# Patient Record
Sex: FEMALE | Race: BLACK OR AFRICAN AMERICAN | Employment: OTHER | URBAN - METROPOLITAN AREA
[De-identification: names, ages, dates, MRNs, and addresses within clinical notes are randomized per-mention and may not be internally consistent; named-entity substitution may affect disease eponyms.]

---

## 2017-01-12 ENCOUNTER — TELEPHONE (OUTPATIENT)
Dept: FAMILY MEDICINE CLINIC | Age: 57
End: 2017-01-12

## 2017-01-12 DIAGNOSIS — R26.2 AMBULATORY DYSFUNCTION: ICD-10-CM

## 2017-01-12 DIAGNOSIS — I69.959 HEMIPLEGIA OF DOMINANT SIDE AS LATE EFFECT FOLLOWING CEREBROVASCULAR DISEASE (HCC): Primary | ICD-10-CM

## 2017-01-13 ENCOUNTER — OFFICE VISIT (OUTPATIENT)
Dept: ORTHOPEDIC SURGERY | Age: 57
End: 2017-01-13

## 2017-01-13 VITALS
WEIGHT: 168 LBS | SYSTOLIC BLOOD PRESSURE: 119 MMHG | HEIGHT: 59 IN | HEART RATE: 72 BPM | TEMPERATURE: 97.2 F | BODY MASS INDEX: 33.87 KG/M2 | DIASTOLIC BLOOD PRESSURE: 64 MMHG

## 2017-01-13 DIAGNOSIS — M17.12 PRIMARY OSTEOARTHRITIS OF LEFT KNEE: Primary | ICD-10-CM

## 2017-01-13 RX ORDER — HYDROCODONE BITARTRATE AND ACETAMINOPHEN 10; 325 MG/1; MG/1
1 TABLET ORAL
Qty: 60 TAB | Refills: 0 | Status: SHIPPED | OUTPATIENT
Start: 2017-01-13 | End: 2017-02-24 | Stop reason: SDUPTHER

## 2017-01-13 RX ORDER — BETAMETHASONE SODIUM PHOSPHATE AND BETAMETHASONE ACETATE 3; 3 MG/ML; MG/ML
6 INJECTION, SUSPENSION INTRA-ARTICULAR; INTRALESIONAL; INTRAMUSCULAR; SOFT TISSUE ONCE
Qty: 1 ML | Refills: 0
Start: 2017-01-13 | End: 2017-01-13

## 2017-01-13 NOTE — PATIENT INSTRUCTIONS
Knee: Exercises  Your Care Instructions  Here are some examples of exercises for your knee. Start each exercise slowly. Ease off the exercise if you start to have pain. Your doctor or physical therapist will tell you when you can start these exercises and which ones will work best for you. How to do the exercises  Quad sets    1. Sit with your leg straight and supported on the floor or a firm bed. (If you feel discomfort in the front or back of your knee, place a small towel roll under your knee.)  2. Tighten the muscles on top of your thigh by pressing the back of your knee flat down to the floor. (If you feel discomfort under your kneecap, place a small towel roll under your knee.)  3. Hold for about 6 seconds, then rest for up to 10 seconds. 4. Do 8 to 12 repetitions several times a day. Straight-leg raises to the front    1. Lie on your back with your good knee bent so that your foot rests flat on the floor. Your injured leg should be straight. Make sure that your low back has a normal curve. You should be able to slip your flat hand in between the floor and the small of your back, with your palm touching the floor and your back touching the back of your hand. 2. Tighten the thigh muscles in the injured leg by pressing the back of your knee flat down to the floor. Hold your knee straight. 3. Keeping the thigh muscles tight, lift your injured leg up so that your heel is about 12 inches off the floor. Hold for about 6 seconds and then lower slowly. 4. Do 8 to 12 repetitions, 3 times a day. Straight-leg raises to the outside    1. Lie on your side, with your injured leg on top. 2. Tighten the front thigh muscles of your injured leg to keep your knee straight. 3. Keep your hip and your leg straight in line with the rest of your body, and keep your knee pointing forward. Do not drop your hip back. 4. Lift your injured leg straight up toward the ceiling, about 12 inches off the floor.  Hold for about 6 seconds, then slowly lower your leg. 5. Do 8 to 12 repetitions. Straight-leg raises to the back    1. Lie on your stomach, and lift your leg straight up behind you (toward the ceiling). 2. Lift your toes about 6 inches off the floor, hold for about 6 seconds, then lower slowly. 3. Do 8 to 12 repetitions. Straight-leg raises to the inside    1. Lie on the side of your body with the injured leg. 2. You can either prop your other (good) leg up on a chair, or you can bend your good knee and put that foot in front of your injured knee. Do not drop your hip back. 3. Tighten the muscles on the front of your thigh to straighten your injured knee. 4. Keep your kneecap pointing forward, and lift your whole leg up toward the ceiling about 6 inches. Hold for about 6 seconds, then lower slowly. 5. Do 8 to 12 repetitions. Heel dig bridging    1. Lie on your back with both knees bent and your ankles bent so that only your heels are digging into the floor. Your knees should be bent about 90 degrees. 2. Then push your heels into the floor, squeeze your buttocks, and lift your hips off the floor until your shoulders, hips, and knees are all in a straight line. 3. Hold for about 6 seconds as you continue to breathe normally, and then slowly lower your hips back down to the floor and rest for up to 10 seconds. 4. Do 8 to 12 repetitions. Hamstring curls    1. Lie on your stomach with your knees straight. If your kneecap is uncomfortable, roll up a washcloth and put it under your leg just above your kneecap. 2. Lift the foot of your injured leg by bending the knee so that you bring the foot up toward your buttock. If this motion hurts, try it without bending your knee quite as far. This may help you avoid any painful motion. 3. Slowly lower your leg back to the floor. 4. Do 8 to 12 repetitions.   5. With permission from your doctor or physical therapist, you may also want to add a cuff weight to your ankle (not more than 5 pounds). With weight, you do not have to lift your leg more than 12 inches to get a hamstring workout. Shallow standing knee bends    Note: Do this exercise only if you have very little pain; if you have no clicking, locking, or giving way if you have an injured knee; and if it does not hurt while you are doing 8 to 12 repetitions. 1. Stand with your hands lightly resting on a counter or chair in front of you. Put your feet shoulder-width apart. 2. Slowly bend your knees so that you squat down like you are going to sit in a chair. Make sure your knees do not go in front of your toes. 3. Lower yourself about 6 inches. Your heels should remain on the floor at all times. 4. Rise slowly to a standing position. Heel raises    1. Stand with your feet a few inches apart, with your hands lightly resting on a counter or chair in front of you. 2. Slowly raise your heels off the floor while keeping your knees straight. 3. Hold for about 6 seconds, then slowly lower your heels to the floor. 4. Do 8 to 12 repetitions several times during the day. Follow-up care is a key part of your treatment and safety. Be sure to make and go to all appointments, and call your doctor if you are having problems. It's also a good idea to know your test results and keep a list of the medicines you take. Where can you learn more? Go to http://pramod-bryan.info/. Enter G744 in the search box to learn more about \"Knee: Exercises. \"  Current as of: May 23, 2016  Content Version: 11.1  © 0512-5686 [employer]. Care instructions adapted under license by [employer] (which disclaims liability or warranty for this information). If you have questions about a medical condition or this instruction, always ask your healthcare professional. [address] any warranty or liability for your use of this information.        Joint Injections: Care Instructions  Your Care Instructions  Joint injections are shots into a joint, such as the knee. They may be used to put in medicines, such as pain relievers. Or they can be used to take out fluid. Sometimes the fluid is tested in a lab. This can help find the cause of a joint problem. A corticosteroid, or steroid, shot is used to reduce inflammation in tendons or joints. It is often used to treat problems such as arthritis, tendinitis, and bursitis. Steroids can be injected directly into a painful, inflamed joint. They can also help reduce inflammation of a bursa. A bursa is a sac of fluid. It cushions and lubricates areas where tendons, ligaments, skin, muscles, or bones rub against each other. A steroid shot can sometimes help with short-term pain relief when other treatments haven't worked. If steroid shots help, pain may improve for weeks or months. Follow-up care is a key part of your treatment and safety. Be sure to make and go to all appointments, and call your doctor if you are having problems. It's also a good idea to know your test results and keep a list of the medicines you take. How can you care for yourself at home? · Put ice or a cold pack on the area for 10 to 20 minutes at a time. Put a thin cloth between the ice and your skin. · Take anti-inflammatory medicines to reduce pain, swelling, or inflammation. These include ibuprofen (Advil, Motrin) and naproxen (Aleve). Read and follow all instructions on the label. · Avoid strenuous activities for several days, especially those that put stress on the area where you got the shot. · If you have dressings over the area, keep them clean and dry. You may remove them when your doctor tells you to. When should you call for help? Call your doctor now or seek immediate medical care if:  · You have signs of infection, such as:  ¨ Increased pain, swelling, warmth, or redness. ¨ Red streaks leading from the site. ¨ Pus draining from the site. ¨ A fever.   Watch closely for changes in your health, and be sure to contact your doctor if you have any problems. Where can you learn more? Go to http://pramod-bryan.info/. Enter N616 in the search box to learn more about \"Joint Injections: Care Instructions. \"  Current as of: May 23, 2016  Content Version: 11.1  © 9911-6761 [employer]. Care instructions adapted under license by [employer] (which disclaims liability or warranty for this information). If you have questions about a medical condition or this instruction, always ask your healthcare professional. [address] any warranty or liability for your use of this information.

## 2017-01-23 DIAGNOSIS — I10 ESSENTIAL HYPERTENSION: ICD-10-CM

## 2017-01-23 DIAGNOSIS — M89.9 DISORDER OF BONE AND CARTILAGE: ICD-10-CM

## 2017-01-23 DIAGNOSIS — M94.9 DISORDER OF BONE AND CARTILAGE: ICD-10-CM

## 2017-01-23 DIAGNOSIS — M54.16 LUMBAR NEURITIS: ICD-10-CM

## 2017-01-23 DIAGNOSIS — R73.09 ELEVATED HEMOGLOBIN A1C: ICD-10-CM

## 2017-01-23 DIAGNOSIS — E78.49 OTHER HYPERLIPIDEMIA: ICD-10-CM

## 2017-01-23 DIAGNOSIS — I50.22 CHRONIC SYSTOLIC HEART FAILURE (HCC): ICD-10-CM

## 2017-01-23 DIAGNOSIS — F51.01 PRIMARY INSOMNIA: ICD-10-CM

## 2017-01-24 RX ORDER — PREGABALIN 150 MG/1
150 CAPSULE ORAL 2 TIMES DAILY
Qty: 60 CAP | Refills: 0 | Status: SHIPPED | OUTPATIENT
Start: 2017-01-24 | End: 2017-03-01 | Stop reason: SDUPTHER

## 2017-01-25 ENCOUNTER — HOSPITAL ENCOUNTER (OUTPATIENT)
Dept: LAB | Age: 57
Discharge: HOME | End: 2017-01-25

## 2017-01-25 PROCEDURE — 99001 SPECIMEN HANDLING PT-LAB: CPT | Performed by: INTERNAL MEDICINE

## 2017-01-26 ENCOUNTER — TELEPHONE (OUTPATIENT)
Dept: FAMILY MEDICINE CLINIC | Age: 57
End: 2017-01-26

## 2017-01-26 DIAGNOSIS — I69.959 HEMIPLEGIA OF DOMINANT SIDE AS LATE EFFECT FOLLOWING CEREBROVASCULAR DISEASE (HCC): ICD-10-CM

## 2017-01-30 ENCOUNTER — TELEPHONE (OUTPATIENT)
Dept: FAMILY MEDICINE CLINIC | Age: 57
End: 2017-01-30

## 2017-01-30 DIAGNOSIS — E55.9 VITAMIN D DEFICIENCY: Primary | ICD-10-CM

## 2017-01-30 RX ORDER — ERGOCALCIFEROL 1.25 MG/1
50000 CAPSULE ORAL
Qty: 12 CAP | Refills: 3 | Status: SHIPPED | OUTPATIENT
Start: 2017-01-30 | End: 2020-06-23 | Stop reason: ALTCHOICE

## 2017-01-30 NOTE — TELEPHONE ENCOUNTER
Omer Reynoso,   Please let the patient know that her Vitamin D is low and I will reorder this. Also, I may have to restart metformin because her HgbA1C is still elevated at 6.6. Thanks.     CHELO

## 2017-01-31 ENCOUNTER — TELEPHONE (OUTPATIENT)
Dept: FAMILY MEDICINE CLINIC | Age: 57
End: 2017-01-31

## 2017-01-31 NOTE — TELEPHONE ENCOUNTER
Pt said she needs a report explaining why she needs the shower bars,to take with,to [facility]--they will not fill without

## 2017-02-02 NOTE — TELEPHONE ENCOUNTER
Contacted Pt regarding previous note. Informed Pt that document would be printed and left at  for . Pt verbalized understanding.

## 2017-02-03 ENCOUNTER — OFFICE VISIT (OUTPATIENT)
Dept: ORTHOPEDIC SURGERY | Age: 57
End: 2017-02-03

## 2017-02-03 VITALS
HEART RATE: 59 BPM | HEIGHT: 59 IN | BODY MASS INDEX: 33.67 KG/M2 | DIASTOLIC BLOOD PRESSURE: 74 MMHG | SYSTOLIC BLOOD PRESSURE: 146 MMHG | WEIGHT: 167 LBS

## 2017-02-03 DIAGNOSIS — M47.817 SPONDYLOSIS WITHOUT MYELOPATHY OR RADICULOPATHY, LUMBOSACRAL REGION: ICD-10-CM

## 2017-02-03 DIAGNOSIS — M54.12 RADICULOPATHY, CERVICAL: ICD-10-CM

## 2017-02-03 DIAGNOSIS — M50.30 OTHER CERVICAL DISC DEGENERATION, UNSPECIFIED CERVICAL REGION: ICD-10-CM

## 2017-02-03 DIAGNOSIS — M47.812 SPONDYLOSIS WITHOUT MYELOPATHY OR RADICULOPATHY, CERVICAL REGION: Primary | ICD-10-CM

## 2017-02-03 NOTE — PROGRESS NOTES
[facility]  [address], [place]   Phone: [phone]  Fax: [phone]        PROGRESS NOTE      HISTORY OF PRESENT ILLNESS:  The patient is a 55 y.o. female and was seen today for follow up of low back pain localized primarily to the right side of the lumbar spine. Previously, she had c/o low back pain localized primarily to the right side of the lumbar spine without significant radicular pain complaints. She also had c/o left knee pain which she is followed by Dr. Parham. Per patient, she did have knee surgery in 2009 and 2011 and was given a brace and they are monitoring her on this. She reports no increase in pain since discontinuing NEURONTIN. She reports significant relief following bilateral L4 and L5 and left sided L5 and S1 facet blocks on 3/17/16. She states walking exacerbates her pain and bending over alleviates her pain. Note from Caron Smith dated 7/29/16 indicating patient was seen with f/u bilateral hip and knee pain. She has hx of bilateral hip replacements and left knee replacement. According to the note, she has trochanteric bursitis. Note from AMAURY Smith dated 9/9/16 indicating patient was seen with c/o bilateral hip and knee pain. He administered bilateral hip injections at that time. Note from Caron Smith dated 10/21/2016 reveals pt was seen for left knee pain. She was given an injection in her knee which she reports did not give her any relief. Patient has additional complaints of neck pain ongoing for 1 year which radiates into the left upper extremity to the forearm. The patient reports a fall on 10/11/16 from LOB and reported to the ER. She reports multiple falls due to LOB ongoing x1 year and states it is progressive in nature. She has also been dropping objects. She states her neck pain extends down her LUE to her hand with numbness in digits 1-2. She reports 2 falls since her last visit.  She endorses loss of dexterity and states she has been dropping things with her left hand. She admits to staggering with walking. It was noted patient has been prescribed Lyrica and continues to take Tegretol. She completed the MDP without significant relief. She continues on Cymbalta 60 mg daily. She completes her HEP 2x/day. Noted, patient has previously had bilateral L4/5 facet blocks and left-sided L5/S1 facet blocks with good relief performed 03/04/16. She previously reported significant relief with left-sided L4-L5 and L5-S1 facet blocks. She denies hx of DM. Of note, patient has a defibrillator. CT myelogram dated 9/29/14 per report revealed no spinal stenosis or diagnostic intrathecal abnormality. There was minimal degenerative disc disease. There was mild left-sided lumbar neuroforaminal narrowing from facet hypertrophy. There was lower lumbar facet hypertrophy and arthropathy, left greater than right. At L4-L5, there was bilateral moderate to severe facet hypertrophy. At the L5-S1 level, there was severe left-sided facet hypertrophy. Cervical spine CT dated 10/4/16 reviewed. Per report, no fracture or subluxation. Degenerative changes at C4-5 with posterior osteophyte which may be making contact with the cord. Disc bulge at C6-7 may also be making contact with the cord. Cervical spine CT myelogram dated 11/2/2016 per report, reveals multilevel mild degenerative changes as discussed most pronounced disc disease at C4/5 and C5/6. No high-grade central canal or foraminal stenosis. Cervical spine myelogram dated 11/2/2016 per report, reveals multilevel mild broad based on the disc space extradural defects at C2-3, C3-4, C4-5, and C5-6. C6/7 and C7/T1 is not adequately visualized on the crosstable lateral view due to high position of the shoulders. At her last clinical appointment, I was unable to explain her balance and coordination issues or her LUE symptoms from a cervical spine standpoint.  I was seeking a second opinion on her CT myelogram films from Dr. Ev Will. I referred her to Dr. Sofia Castillo for LUE EMG and further evaluation of her loss of balance and coordination. The patient returns today with LUE pain from the shoulder to the elbow. She denies symptoms extending to the hand at this time. Pain is exacerbated with reaching behind and overhead activity. She rates pain 7/10, a slight decrease from her last visit (8/10). She continues to have LOB with coordination issues and falls. Note from Etelvina Barry 1/13/17 indicating patient was seen significant fixed flexion deformity which of improved with left knee surgery. Per patient, she received a left knee injection at that appointment. Upon examination, she was unable to extend digits 3, 4 and 5 from previous nerve injury. A LUE EMG dated 12/23/16 was suggestive of possible C5 radiculopathy. Note from Afua Forde LPN dated 92/34/16 indicating Dr. Ev Will reviewed the CT myelogram and stated he didn't see anything.  reviewed. Past Medical History   Diagnosis Date    Arm pain jan15    Arrhythmia 2012      [employer] ICD     Arthritis      ALL OVER    CAD (coronary artery disease) 2011     STENTS PLACED X2    Chronic pain      KNEE & LOWER BACK    Diabetes (HCC)     GERD (gastroesophageal reflux disease)     H/O gastric bypass     Heart attack ([address] 2011    Heart failure ([address]      ischemic cardiomyopathy    Hypertension     Spinal cord injury         Social History     Social History    Marital status: SINGLE     Spouse name: N/A    Number of children: N/A    Years of education: N/A     Occupational History    Not on file.      Social History Main Topics    Smoking status: Former Smoker     Quit date: 5/20/2013    Smokeless tobacco: Never Used    Alcohol use No    Drug use: No    Sexual activity: No      Comment: Hysterectomy     Other Topics Concern    Not on file     Social History Narrative       Current Outpatient Prescriptions   Medication Sig Dispense Refill    ergocalciferol (ERGOCALCIFEROL) 50,000 unit capsule Take 1 Cap by mouth every seven (7) days. 12 Cap 3    pregabalin (LYRICA) 150 mg capsule Take 1 Cap by mouth two (2) times a day. Max Daily Amount: 300 mg. 60 Cap 0    HYDROcodone-acetaminophen (NORCO)  mg tablet Take 1 Tab by mouth every eight (8) hours as needed for Pain. Max Daily Amount: 3 Tabs. 60 Tab 0    celecoxib (CELEBREX) 200 mg capsule TAKE 1 CAPSULE BY MOUTH TWICE DAILY FOR 90 DAYS 180 Cap 2    zolpidem (AMBIEN) 10 mg tablet Take 1 Tab by mouth nightly as needed for Sleep. Max Daily Amount: 10 mg. 30 Tab 0    carBAMazepine (TEGRETOL) 200 mg tablet SEE NOTES 360 Tab 2    methocarbamol (ROBAXIN) 500 mg tablet Take 1 Tab by mouth four (4) times daily as needed. Indications: MUSCLE SPASM 120 Tab 2    levocetirizine (XYZAL) 5 mg tablet Take 1 Tab by mouth daily. 30 Tab 1    lisinopril (PRINIVIL, ZESTRIL) 5 mg tablet Take  by mouth daily.  rosuvastatin (CRESTOR) 40 mg tablet Take 1 Tab by mouth daily. 30 Tab 2    furosemide (LASIX) 40 mg tablet TAKE 1 TABLET BY MOUTH TWICE DAILY AS NEEDED 180 Tab 0    GENERLAC 10 gram/15 mL solution       DULoxetine (CYMBALTA) 60 mg capsule Take 1 Cap by mouth daily. 30 Cap 5    isosorbide mononitrate ER (IMDUR) 30 mg tablet 15 mg.  carvedilol (COREG) 12.5 mg tablet 6.25 mg.      cyanocobalamin (VITAMIN B-12) 500 mcg tablet Take 500 mcg by mouth daily.  omeprazole (PRILOSEC) 20 mg capsule Take 1 capsule by mouth daily. 30 capsule 3    polyethylene glycol (MIRALAX) 17 gram/dose powder Take 17 g by mouth daily. 255 g 1    clopidogrel (PLAVIX) 75 mg tablet Take 1 tablet by mouth daily. 30 tablet 3    therapeutic multivitamin (THERAGRAN) tablet Take 1 tablet by mouth daily.  calcium citrate-vitamin d3 (CITRACAL+D) 315-200 mg-unit tab Take 1 tablet by mouth two (2) times daily (with meals).  Cholecalciferol, Vitamin D3, (VITAMIN D3) 1,000 unit cap Take  by mouth.       miscellaneous medical supply misc 2 Each by Does Not Apply route daily. 2 Each 1    miscellaneous medical supply misc 2 Each by Does Not Apply route daily. 2 Each 0    miscellaneous medical supply misc 1 Each by Does Not Apply route daily. 1 Each 1    miscellaneous medical supply misc 1 Each by Does Not Apply route daily. 1 Each 1    HYDROcodone-acetaminophen (NORCO) 5-325 mg per tablet Take 1 Tab by mouth every eight (8) hours as needed for Pain. Max Daily Amount: 3 Tabs. 60 Tab 0       Allergies   Allergen Reactions    Aspirin Hives        Ambulates with a single point cane. PHYSICAL EXAMINATION    Visit Vitals    /74    Pulse (!) 59    Ht 4' 11\" (1.499 m)    Wt 167 lb (75.8 kg)    BMI 33.73 kg/m2       CONSTITUTIONAL: NAD, A&O x 3  SENSATION: Intact to light touch throughout  NEURO: Mechelle's is negative bilaterally. RANGE OF MOTION: The patient has full passive range of motion in all four extremities. MOTOR:  Straight Leg Raise: Negative, bilateral  MUSCULOSKELETAL: Positive impingement, left shoulder. Unable to extend digits 3, 4 and 5 from previous nerve injury. Shoulder AB/Flex Elbow Flex Wrist Ext Elbow Ext Wrist Flex Hand Intrin Tone   Right +4/5 +4/5 +4/5 +4/5 +4/5 +4/5 +4/5   Left +4/5 +4/5 +4/5 +4/5 +4/5 +4/5 +4/5              Hip Flex Knee Ext Knee Flex Ankle DF GTE Ankle PF Tone   Right +4/5 +4/5 +4/5 +4/5 +4/5 +4/5 +4/5   Left +4/5 +4/5 +4/5 +4/5 +4/5 +4/5 +4/5       ASSESSMENT   Temi was seen today for follow-up.     Diagnoses and all orders for this visit:    Spondylosis without myelopathy or radiculopathy, cervical region  -     REFERRAL TO NEUROLOGY  -     REFERRAL TO ORTHOPEDICS    Radiculopathy, cervical  -     REFERRAL TO NEUROLOGY  -     REFERRAL TO ORTHOPEDICS    Other cervical disc degeneration, unspecified cervical region  -     REFERRAL TO NEUROLOGY  -     REFERRAL TO ORTHOPEDICS    Spondylosis without myelopathy or radiculopathy, lumbosacral region  - REFERRAL TO NEUROLOGY  -     REFERRAL TO ORTHOPEDICS          IMPRESSION AND PLAN:  She has continued RUE pain. I cannot explain her neck and upper extremity symptoms based on spinal pathology and her diagnostic testing. Her examination is more consistent with shoulder pathology with a positive impingement on the left and she will be referred to Dr. Etelvina Jacob for further evaluation. I will again refer her to neurology for evaluation of patient's balance and coordination issues. I will see the patient back following neurology evaluation. Written by Jay Olmos, as dictated by Toi Nunez MD  I examined the patient, reviewed and agree with the note.

## 2017-02-03 NOTE — MR AVS SNAPSHOT
Visit Information Date & Time Provider Department Dept. Phone Encounter #  
 2/3/2017 11:10 AM Manuel Beavers  [address], [address] [facility] [place] [phone] [id number] Follow-up Instructions Return for following neurology consult. Your Appointments 2/24/2017 10:40 AM  
Follow Up with Em Reyna PA-C  
[facility] [place] ([address]) Appt Note: lt knee 6wk fu  
 [address], [address] 6[address] [facility]  
[phone] [address], [address]  
  
    
 3/1/2017 10:00 AM  
Follow Up with Kimi Luis DO [address] [place] [address] (--) Appt Note: 3 month fu  
 [address] [facility] [address] [phone]  
  
   
 [facility] [address]  
  
    
 3/20/2017  2:45 PM  
Follow Up with Gloria Bermudez MD  
[facility] [address]) Appt Note: 3mon f/u; Labs; rs'd from 02/14/2017 provider out of office Marbin Mustafa [address] [address] [phone]  
  
   
 [place] [id number] Upcoming Health Maintenance Date Due  
 EYE EXAM RETINAL OR DILATED Q1 8/5/1971 Pneumococcal 19-64 Medium Risk (1 of 1 - PPSV23) 8/5/1980 DTaP/Tdap/Td series (1 - Tdap) 8/5/1982 FOBT Q 1 YEAR AGE 50-75 8/5/2011 FOOT EXAM Q1 3/3/2015 INFLUENZA AGE 9 TO ADULT 8/1/2016 GLAUCOMA SCREENING Q2Y 9/29/2016 BREAST CANCER SCRN MAMMOGRAM 4/15/2017 HEMOGLOBIN A1C Q6M 7/25/2017 MICROALBUMIN Q1 10/17/2017 LIPID PANEL Q1 1/25/2018 Allergies as of 2/3/2017  Review Complete On: 2/3/2017 By: Manuel Beavers MD  
  
 Severity Noted Reaction Type Reactions Aspirin  08/02/2012   Topical Hives Current Immunizations  Reviewed on 12/13/2013 Name Date Influenza Vaccine 9/29/2015 Influenza Vaccine PF 9/24/2014, 12/13/2013 Not reviewed this visit You Were Diagnosed With   
  
 Codes Comments Spondylosis without myelopathy or radiculopathy, cervical region    -  Primary ICD-10-CM: M47.812 ICD-9-CM: 721.0 Radiculopathy, cervical     ICD-10-CM: M54.12 
ICD-9-CM: 723.4 Other cervical disc degeneration, unspecified cervical region     ICD-10-CM: M50.30 ICD-9-CM: 722.4 Spondylosis without myelopathy or radiculopathy, lumbosacral region     ICD-10-CM: M47.817 ICD-9-CM: 721.3 Vitals BP Pulse Height(growth percentile) Weight(growth percentile) BMI OB Status 146/74 (!) 59 4' 11\" (1.499 m) 167 lb (75.8 kg) 33.73 kg/m2 Hysterectomy Smoking Status Former Smoker Vitals History BMI and BSA Data Body Mass Index Body Surface Area  
 33.73 kg/m 2 1.78 m 2 [facility]erred Pharmacy [facility] [address] [phone] Your Updated Medication List  
  
   
This list is accurate as of: 2/3/17 12:19 PM.  Always use your most recent med list.  
  
  
  
  
 calcium citrate-vitamin d3 315-200 mg-unit Tab Commonly known as:  CITRACAL+D Take 1 tablet by mouth two (2) times daily (with meals). carBAMazepine 200 mg tablet Commonly known as:  TEGretol SEE NOTES  
  
 carvedilol 12.5 mg tablet Commonly known as:  COREG  
6.25 mg.  
  
 celecoxib 200 mg capsule Commonly known as:  CELEBREX  
TAKE 1 CAPSULE BY MOUTH TWICE DAILY FOR 90 DAYS  
  
 clopidogrel 75 mg Tab Commonly known as:  PLAVIX Take 1 tablet by mouth daily. DULoxetine 60 mg capsule Commonly known as:  CYMBALTA Take 1 Cap by mouth daily. ergocalciferol 50,000 unit capsule Commonly known as:  ERGOCALCIFEROL Take 1 Cap by mouth every seven (7) days. furosemide 40 mg tablet Commonly known as:  LASIX TAKE 1 TABLET BY MOUTH TWICE DAILY AS NEEDED  
  
 GENERLAC 10 gram/15 mL solution Generic drug:  lactulose * HYDROcodone-acetaminophen 5-325 mg per tablet Commonly known as:  Sherie Pereira Take 1 Tab by mouth every eight (8) hours as needed for Pain. Max Daily Amount: 3 Tabs. * HYDROcodone-acetaminophen  mg tablet Commonly known as:  Sherie Pereira Take 1 Tab by mouth every eight (8) hours as needed for Pain. Max Daily Amount: 3 Tabs. isosorbide mononitrate ER 30 mg tablet Commonly known as:  IMDUR  
15 mg.  
  
 levocetirizine 5 mg tablet Commonly known as:  Ladonna Easton Take 1 Tab by mouth daily. lisinopril 5 mg tablet Commonly known as:  Bhaskar Isabel Take  by mouth daily. methocarbamol 500 mg tablet Commonly known as:  ROBAXIN Take 1 Tab by mouth four (4) times daily as needed. Indications: MUSCLE SPASM * miscellaneous medical supply Misc  
1 Each by Does Not Apply route daily. * miscellaneous medical supply Misc  
1 Each by Does Not Apply route daily. * miscellaneous medical supply Misc  
2 Each by Does Not Apply route daily. * miscellaneous medical supply Misc  
2 Each by Does Not Apply route daily. omeprazole 20 mg capsule Commonly known as:  PRILOSEC Take 1 capsule by mouth daily. polyethylene glycol 17 gram/dose powder Commonly known as:  Nataly Kehr Take 17 g by mouth daily. pregabalin 150 mg capsule Commonly known as:  Catalina Ruff Take 1 Cap by mouth two (2) times a day. Max Daily Amount: 300 mg.  
  
 rosuvastatin 40 mg tablet Commonly known as:  CRESTOR Take 1 Tab by mouth daily. therapeutic multivitamin tablet Commonly known as:  [facility] Take 1 tablet by mouth daily. VITAMIN B-12 500 mcg tablet Generic drug:  cyanocobalamin Take 500 mcg by mouth daily. VITAMIN D3 1,000 unit Cap Generic drug:  cholecalciferol Take  by mouth.  
  
 zolpidem 10 mg tablet Commonly known as:  AMBIEN  
 Take 1 Tab by mouth nightly as needed for Sleep. Max Daily Amount: 10 mg.  
  
 * Notice: This list has 6 medication(s) that are the same as other medications prescribed for you. Read the directions carefully, and ask your doctor or other care provider to review them with you. We Performed the Following REFERRAL TO NEUROLOGY [BKJ99 Custom] Comments:  
 Dr. Joy lr for LOB and falls REFERRAL TO ORTHOPEDICS [ZNA536 Custom] Comments:  
 Ivy Left shoulder Impingement Follow-up Instructions Return for following neurology consult. Referral Information Referral ID Referred By Referred To  
  
 [id number] JANELL HENSLEY III Not Available Visits Status Start Date End Date 1 New Request 2/3/17 2/3/18 If your referral has a status of pending review or denied, additional information will be sent to support the outcome of this decision. Referral ID Referred By Referred To  
 [id number] JANELL HENSLEY III Not Available Visits Status Start Date End Date 1 New Request 2/3/17 2/3/18 If your referral has a status of pending review or denied, additional information will be sent to support the outcome of this decision. Introducing [facility]! Dear Jeremi Rodgers: 
Thank you for requesting a [employer] account. Our records indicate that you already have an active [employer] account. You can access your account anytime at [email or web address] Did you know that you can access your hospital and ER discharge instructions at any time in [employer]? You can also review all of your test results from your hospital stay or ER visit. Additional Information If you have questions, please visit the Frequently Asked Questions section of the [employer] website at [email or web address] Remember, [employer] is NOT to be used for urgent needs. For medical emergencies, dial 911. Now available from your iPhone and Android! Please provide this summary of care documentation to your next provider. Your primary care clinician is listed as Robb Hung. If you have any questions after today's visit, please call [phone].

## 2017-02-20 ENCOUNTER — OFFICE VISIT (OUTPATIENT)
Dept: ORTHOPEDIC SURGERY | Age: 57
End: 2017-02-20

## 2017-02-20 VITALS
BODY MASS INDEX: 34.07 KG/M2 | HEART RATE: 72 BPM | WEIGHT: 169 LBS | DIASTOLIC BLOOD PRESSURE: 85 MMHG | SYSTOLIC BLOOD PRESSURE: 151 MMHG | HEIGHT: 59 IN

## 2017-02-20 DIAGNOSIS — M25.512 CHRONIC LEFT SHOULDER PAIN: ICD-10-CM

## 2017-02-20 DIAGNOSIS — G89.29 CHRONIC LEFT SHOULDER PAIN: ICD-10-CM

## 2017-02-20 DIAGNOSIS — M25.512 LEFT SHOULDER PAIN, UNSPECIFIED CHRONICITY: Primary | ICD-10-CM

## 2017-02-20 NOTE — PROGRESS NOTES
Patient: Kyler Ansari                MRN: [id number]       SSN: [id number]  YOB: 1960        AGE: 55 y.o. SEX: female  There is no height or weight on file to calculate BMI. PCP: Janet Geronimo DO  02/20/17      No chief complaint on file. HISTORY OF PRESENT ILLNESS: Kyler Ansari is a 55 y.o. female who presents to the office for 2 week hx of left shoulder pain. She denies any numbness but has pain when raising her arm over her head. She has hadprevious neck problems and was seen at the spine center. Review of Systems   Constitutional: Negative. HENT: Negative. Eyes: Negative. Respiratory: Negative. Cardiovascular: Negative. Gastrointestinal: Negative. Genitourinary: Negative. Musculoskeletal: Positive for joint pain (left shoulder). Skin: Negative. Neurological: Negative. Endo/Heme/Allergies: Negative. Psychiatric/Behavioral: Negative. Social History     Social History    Marital status: SINGLE     Spouse name: N/A    Number of children: N/A    Years of education: N/A     Occupational History    Not on file.      Social History Main Topics    Smoking status: Former Smoker     Quit date: 5/20/2013    Smokeless tobacco: Never Used    Alcohol use No    Drug use: No    Sexual activity: No      Comment: Hysterectomy     Other Topics Concern    Not on file     Social History Narrative        Past Medical History   Diagnosis Date    Arm pain jan15    Arrhythmia 2012      Medtronic ICD     Arthritis      ALL OVER    CAD (coronary artery disease) 2011     STENTS PLACED X2    Chronic pain      KNEE & LOWER BACK    Diabetes ([address]     GERD (gastroesophageal reflux disease)     H/O gastric bypass     Heart attack ([address] 2011    Heart failure ([address]      ischemic cardiomyopathy    Hypertension     Spinal cord injury         Allergies   Allergen Reactions    Aspirin Hives         Current Outpatient Prescriptions Medication Sig    ergocalciferol (ERGOCALCIFEROL) 50,000 unit capsule Take 1 Cap by mouth every seven (7) days.  miscellaneous medical supply [facility] 2 Each by Does Not Apply route daily.  pregabalin (LYRICA) 150 mg capsule Take 1 Cap by mouth two (2) times a day. Max Daily Amount: 300 mg.    HYDROcodone-acetaminophen (NORCO)  mg tablet Take 1 Tab by mouth every eight (8) hours as needed for Pain. Max Daily Amount: 3 Tabs.  miscellaneous medical supply [facility] 2 Each by Does Not Apply route daily.  miscellaneous medical supply [facility] 1 Each by Does Not Apply route daily.  miscellaneous medical supply [facility] 1 Each by Does Not Apply route daily.  celecoxib (CELEBREX) 200 mg capsule TAKE 1 CAPSULE BY MOUTH TWICE DAILY FOR 90 DAYS    zolpidem (AMBIEN) 10 mg tablet Take 1 Tab by mouth nightly as needed for Sleep. Max Daily Amount: 10 mg.    carBAMazepine (TEGRETOL) 200 mg tablet SEE NOTES    methocarbamol (ROBAXIN) 500 mg tablet Take 1 Tab by mouth four (4) times daily as needed. Indications: MUSCLE SPASM    HYDROcodone-acetaminophen (NORCO) 5-325 mg per tablet Take 1 Tab by mouth every eight (8) hours as needed for Pain. Max Daily Amount: 3 Tabs.  levocetirizine (XYZAL) 5 mg tablet Take 1 Tab by mouth daily.  lisinopril (PRINIVIL, ZESTRIL) 5 mg tablet Take  by mouth daily.  rosuvastatin (CRESTOR) 40 mg tablet Take 1 Tab by mouth daily.  furosemide (LASIX) 40 mg tablet TAKE 1 TABLET BY MOUTH TWICE DAILY AS NEEDED    GENERLAC 10 gram/15 mL solution     DULoxetine (CYMBALTA) 60 mg capsule Take 1 Cap by mouth daily.  isosorbide mononitrate ER (IMDUR) 30 mg tablet 15 mg.  carvedilol (COREG) 12.5 mg tablet 6.25 mg.    cyanocobalamin (VITAMIN B-12) 500 mcg tablet Take 500 mcg by mouth daily.  omeprazole (PRILOSEC) 20 mg capsule Take 1 capsule by mouth daily.  polyethylene glycol (MIRALAX) 17 gram/dose powder Take 17 g by mouth daily.     clopidogrel (PLAVIX) 75 mg tablet Take 1 tablet by mouth daily.  therapeutic multivitamin (THERAGRAN) tablet Take 1 tablet by mouth daily.  calcium citrate-vitamin d3 (CITRACAL+D) 315-200 mg-unit tab Take 1 tablet by mouth two (2) times daily (with meals).  Cholecalciferol, Vitamin D3, (VITAMIN D3) 1,000 unit cap Take  by mouth. No current facility-administered medications for this visit. Physical Exam  Constitutional: she is oriented to person, place, and time and well-developed, well-nourished, and in no distress. No distress. HENT:   Head: Normocephalic and atraumatic. Right Ear: Hearing normal.   Left Ear: Hearing normal.   Nose: Nose normal.   Eyes: Conjunctivae, EOM and lids are normal. Pupils are equal, round, and reactive to light. Neck: Trachea normal.   Pulmonary/Chest: Effort normal and breath sounds normal. No respiratory distress. Abdominal: Soft. Neurological: she is alert and oriented to person, place, and time. Skin: Skin is warm, dry and intact. she is not diaphoretic. Psychiatric: Affect normal.   Nursing note and vitals reviewed. Ortho Exam   Shows pain with flexion to 90 degrees and abduction to 110 degrees. External rotation in normal and pain free. Left shoulder rotation with pain. No swelling of the left shoulder but palpation to the anterior cause pain. RADIOGRAPHS:  3 views of the shoulder were normal.    IMPRESSION & PLAN:  my concern has a rotator cuff tear and have her undergo an MRI.  I will see her back after the results of the arthrogram        Scribed by [facility]) as dictated by Frankie Haines MD.

## 2017-02-24 ENCOUNTER — OFFICE VISIT (OUTPATIENT)
Dept: ORTHOPEDIC SURGERY | Age: 57
End: 2017-02-24

## 2017-02-24 VITALS
SYSTOLIC BLOOD PRESSURE: 147 MMHG | HEIGHT: 59 IN | DIASTOLIC BLOOD PRESSURE: 79 MMHG | BODY MASS INDEX: 32.74 KG/M2 | WEIGHT: 162.4 LBS | HEART RATE: 62 BPM | TEMPERATURE: 97.6 F

## 2017-02-24 DIAGNOSIS — M17.12 PRIMARY OSTEOARTHRITIS OF LEFT KNEE: ICD-10-CM

## 2017-02-24 DIAGNOSIS — Z96.652 STATUS POST LEFT KNEE REPLACEMENT: Primary | ICD-10-CM

## 2017-02-24 RX ORDER — BETAMETHASONE SODIUM PHOSPHATE AND BETAMETHASONE ACETATE 3; 3 MG/ML; MG/ML
6 INJECTION, SUSPENSION INTRA-ARTICULAR; INTRALESIONAL; INTRAMUSCULAR; SOFT TISSUE ONCE
Qty: 1 ML | Refills: 0
Start: 2017-02-24 | End: 2017-02-24

## 2017-02-24 RX ORDER — HYDROCODONE BITARTRATE AND ACETAMINOPHEN 10; 325 MG/1; MG/1
1 TABLET ORAL
Qty: 60 TAB | Refills: 0 | Status: SHIPPED | OUTPATIENT
Start: 2017-02-24 | End: 2017-05-26 | Stop reason: SDUPTHER
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
## 2017-03-01 ENCOUNTER — OFFICE VISIT (OUTPATIENT)
Dept: FAMILY MEDICINE CLINIC | Age: 57
End: 2017-03-01

## 2017-03-01 VITALS
BODY MASS INDEX: 33.67 KG/M2 | WEIGHT: 167 LBS | OXYGEN SATURATION: 99 % | TEMPERATURE: 97 F | SYSTOLIC BLOOD PRESSURE: 124 MMHG | DIASTOLIC BLOOD PRESSURE: 77 MMHG | HEIGHT: 59 IN | RESPIRATION RATE: 17 BRPM | HEART RATE: 68 BPM

## 2017-03-01 DIAGNOSIS — M94.9 DISORDER OF BONE AND CARTILAGE: ICD-10-CM

## 2017-03-01 DIAGNOSIS — E78.49 OTHER HYPERLIPIDEMIA: ICD-10-CM

## 2017-03-01 DIAGNOSIS — R73.09 ELEVATED HEMOGLOBIN A1C: ICD-10-CM

## 2017-03-01 DIAGNOSIS — I50.22 CHRONIC SYSTOLIC HEART FAILURE (HCC): ICD-10-CM

## 2017-03-01 DIAGNOSIS — I10 ESSENTIAL HYPERTENSION: ICD-10-CM

## 2017-03-01 DIAGNOSIS — M89.9 DISORDER OF BONE AND CARTILAGE: ICD-10-CM

## 2017-03-01 DIAGNOSIS — M54.16 LUMBAR NEURITIS: ICD-10-CM

## 2017-03-01 DIAGNOSIS — F51.01 PRIMARY INSOMNIA: ICD-10-CM

## 2017-03-01 RX ORDER — ROSUVASTATIN CALCIUM 40 MG/1
40 TABLET, COATED ORAL DAILY
Qty: 90 TAB | Refills: 3 | Status: SHIPPED | OUTPATIENT
Start: 2017-03-01 | End: 2018-03-07 | Stop reason: SDUPTHER

## 2017-03-01 RX ORDER — PREGABALIN 150 MG/1
150 CAPSULE ORAL 2 TIMES DAILY
Qty: 60 CAP | Refills: 0 | Status: SHIPPED | OUTPATIENT
Start: 2017-03-01 | End: 2017-05-02 | Stop reason: SDUPTHER

## 2017-03-01 NOTE — PATIENT INSTRUCTIONS
Please contact our office if you have any questions about your visit today.     1.) Please get labs a week before next visit    2.) Return in 3 months for follow up

## 2017-03-01 NOTE — PROGRESS NOTES
Progress Note    Patient: Norah Laboy MRN: [id number]  SSN: [id number]    YOB: 1960  Age: 55 y.o. Sex: female          Subjective:   Norah Laboy is a 55 y.o. female who is here for regular follow up. The patient has had pain in rotator cuff which her orthopedic surgeon is following. Patient will get CT of left shoulder for rotator cuff. She is planned for left knee repair as well. The patient also would like refills on her Lyrica and also Crestor. Objective:     Visit Vitals    /77 (BP 1 Location: Right arm, BP Patient Position: Sitting)    Pulse 68    Temp 97 °F (36.1 °C) (Oral)    Resp 17    Ht 4' 11\" (1.499 m)    Wt 167 lb (75.8 kg)    SpO2 99%    BMI 33.73 kg/m2       Review of Systems:  Pertinent ROS noted in HPI     Physical Exam:   GENERAL: alert, cooperative, appears stated age  EYE: conjunctivae/corneas clear. PERRL, EOM's intact. Fundi benign  LYMPHATIC: Cervical, supraclavicular, and axillary nodes normal.   THROAT & NECK: normal and no erythema or exudates noted. Poor dentition on exam.   LUNG: clear to auscultation bilaterally  HEART: regular rate and rhythm, S1, S2 normal, no murmur, click, rub or gallop  ABDOMEN: Bowels sounds diminished but observed. EXTREMITIES:  No significant edema  NEUROLOGIC: Right arm flaccid on exam. No change from previous visits.            Lab/Data Review:    Lab Results   Component Value Date/Time    Hemoglobin A1c 6.6 01/25/2017 12:10 PM     Lab Results   Component Value Date/Time    Cholesterol, total 201 01/25/2017 12:10 PM    HDL Cholesterol 62 01/25/2017 12:10 PM    LDL, calculated 114 01/25/2017 12:10 PM    VLDL, calculated 25 01/25/2017 12:10 PM    Triglyceride 127 01/25/2017 12:10 PM    CHOL/HDL Ratio 2.6 01/05/2016 10:48 AM     Lab Results   Component Value Date/Time    Sodium 144 01/25/2017 12:10 PM    Potassium 4.4 01/25/2017 12:10 PM    Chloride 102 01/25/2017 12:10 PM    CO2 23 01/25/2017 12:10 PM    Anion gap 4 11/02/2016 08:23 AM    Glucose 139 01/25/2017 12:10 PM    BUN 12 01/25/2017 12:10 PM    Creatinine 0.89 01/25/2017 12:10 PM    BUN/Creatinine ratio 13 01/25/2017 12:10 PM    GFR est AA 84 01/25/2017 12:10 PM    GFR est non-AA 73 01/25/2017 12:10 PM    Calcium 8.8 01/25/2017 12:10 PM    Bilirubin, total <0.2 01/25/2017 12:10 PM    AST (SGOT) 27 01/25/2017 12:10 PM    Alk. phosphatase 130 01/25/2017 12:10 PM    Protein, total 6.2 01/25/2017 12:10 PM    Albumin 3.8 01/25/2017 12:10 PM    Globulin 3.0 07/26/2016 11:25 AM    A-G Ratio 1.6 01/25/2017 12:10 PM    ALT (SGPT) 19 01/25/2017 12:10 PM       Assessment:     1.) Diabetic Neuropathy: Patient's Lyrica 150 mg twice a day was refilled. 2.) Insomnia: Patient stable on Ambien 10 mg once a day. 3.) Diabetes Mellitus Type 2:  HgbA1C gradually increasing. The patient will continue lifestyle change. Patient will continue on exercise regimen. 4.) Hyperlipidemia: Patient's rosuvastatin reordered. 5.) Preventive: Labs ordered as noted below     I personally reviewed and summarized all labs with the patient. Patient will return in 3 weeks for follow-up.         Plan:     Orders Placed This Encounter    LIPID CASCADE W/REFLEX APO B    HEMOGLOBIN A1C WITH EAG    VITAMIN D, 25 HYDROXY    pregabalin (LYRICA) 150 mg capsule    rosuvastatin (CRESTOR) 40 mg tablet             Signed By: [id number] Raj Geronimo, DO     March 1, 2017

## 2017-03-01 NOTE — MR AVS SNAPSHOT
Visit Information Date & Time Provider Department Dept. Phone Encounter #  
 3/1/2017 10:00 AM Saba Chaudhari [id number] Follow-up Instructions Return in about 3 months (around 6/1/2017) for Regular Follow Up. Your Appointments 3/13/2017  1:00 PM  
DIAG TEST F/U with Danielle Soto MD  
[facility], [address] [facility] ([facility]) Appt Note: LFT SHOULD CT F/U/CT IN CC  
 [address] [facility] [id number]  
[phone]  
  
   
 [address] [facility] [address]  
  
    
 3/20/2017  2:45 PM  
Follow Up with Gaurav Guerra MD  
[facility]) Appt Note: 3mon f/u; Labs; rs'd from 02/14/2017 provider out of office Marbin Mustafa [address] [place], [place] [address] [address]  
[phone]  
  
   
 [place] [id number]  
  
    
 5/26/2017  9:45 AM  
Follow Up with Casie Redmond PA-C  
[facility] ([facility]) Appt Note: 3 mo fu  
 [address], [address] [place] [address]  
[phone] [address] [place] [address], [facility] [address] Upcoming Health Maintenance Date Due  
 EYE EXAM RETINAL OR DILATED Q1 8/5/1971 Pneumococcal 19-64 Medium Risk (1 of 1 - PPSV23) 8/5/1980 DTaP/Tdap/Td series (1 - Tdap) 8/5/1982 FOBT Q 1 YEAR AGE 50-75 8/5/2011 FOOT EXAM Q1 3/3/2015 INFLUENZA AGE 9 TO ADULT 8/1/2016 GLAUCOMA SCREENING Q2Y 9/29/2016 BREAST CANCER SCRN MAMMOGRAM 4/15/2017 HEMOGLOBIN A1C Q6M 7/25/2017 MICROALBUMIN Q1 10/17/2017 LIPID PANEL Q1 1/25/2018 Allergies as of 3/1/2017  Review Complete On: 2/8/4465 By: Chela Garcia. Bill Arredondo LPN Severity Noted Reaction Type Reactions Aspirin  08/02/2012   Topical Hives Current Immunizations  Reviewed on 12/13/2013 Name Date Influenza Vaccine 9/29/2015 Influenza Vaccine PF 9/24/2014, 12/13/2013 Not reviewed this visit You Were Diagnosed With   
  
 Codes Comments Lumbar neuritis     ICD-10-CM: M54.16 
ICD-9-CM: 724.4 Primary insomnia     ICD-10-CM: F51.01 
ICD-9-CM: 307.42 Chronic systolic heart failure (HCC)     ICD-10-CM: I50.22 ICD-9-CM: 428.22 Other hyperlipidemia     ICD-10-CM: E78.4 ICD-9-CM: 272.4 Essential hypertension     ICD-10-CM: I10 
ICD-9-CM: 401.9 Elevated hemoglobin A1c     ICD-10-CM: R73.09 
ICD-9-CM: 790.29 Disorder of bone and cartilage     ICD-10-CM: M89.9, M94.9 ICD-9-CM: 733.90 Vitals BP  
  
  
  
  
  
 124/77 (BP 1 Location: Right arm, BP Patient Position: Sitting) BMI and BSA Data Body Mass Index Body Surface Area  
 33.73 kg/m 2 1.78 m 2 Preferred Pharmacy Pharmacy Name Phone [address] [place] [phone] Your Updated Medication List  
  
   
This list is accurate as of: 3/1/17 10:54 AM.  Always use your most recent med list.  
  
  
  
  
 calcium citrate-vitamin d3 315-200 mg-unit Tab Commonly known as:  CITRACAL+D Take 1 tablet by mouth two (2) times daily (with meals). carBAMazepine 200 mg tablet Commonly known as:  TEGretol SEE NOTES  
  
 carvedilol 12.5 mg tablet Commonly known as:  COREG  
6.25 mg.  
  
 celecoxib 200 mg capsule Commonly known as:  CELEBREX  
TAKE 1 CAPSULE BY MOUTH TWICE DAILY FOR 90 DAYS  
  
 clopidogrel 75 mg Tab Commonly known as:  PLAVIX Take 1 tablet by mouth daily. DULoxetine 60 mg capsule Commonly known as:  CYMBALTA Take 1 Cap by mouth daily. ergocalciferol 50,000 unit capsule Commonly known as:  ERGOCALCIFEROL Take 1 Cap by mouth every seven (7) days. furosemide 40 mg tablet Commonly known as:  LASIX TAKE 1 TABLET BY MOUTH TWICE DAILY AS NEEDED GENERLAC 10 gram/15 mL solution Generic drug:  lactulose * HYDROcodone-acetaminophen 5-325 mg per tablet Commonly known as:  Shiraz Ill Take 1 Tab by mouth every eight (8) hours as needed for Pain. Max Daily Amount: 3 Tabs. * HYDROcodone-acetaminophen  mg tablet Commonly known as:  Shiraz Ill Take 1 Tab by mouth every eight (8) hours as needed for Pain. Max Daily Amount: 3 Tabs. isosorbide mononitrate ER 30 mg tablet Commonly known as:  IMDUR  
15 mg.  
  
 levocetirizine 5 mg tablet Commonly known as:  Homer Lennox Take 1 Tab by mouth daily. lisinopril 5 mg tablet Commonly known as:  Cullen Bridges Take  by mouth daily. methocarbamol 500 mg tablet Commonly known as:  ROBAXIN Take 1 Tab by mouth four (4) times daily as needed. Indications: MUSCLE SPASM * miscellaneous medical supply Misc  
1 Each by Does Not Apply route daily. * miscellaneous medical supply Misc  
1 Each by Does Not Apply route daily. * miscellaneous medical supply Misc  
2 Each by Does Not Apply route daily. * miscellaneous medical supply Misc  
2 Each by Does Not Apply route daily. omeprazole 20 mg capsule Commonly known as:  PRILOSEC Take 1 capsule by mouth daily. polyethylene glycol 17 gram/dose powder Commonly known as:  Melisa  Take 17 g by mouth daily. pregabalin 150 mg capsule Commonly known as:  [place] Take 1 Cap by mouth two (2) times a day. Max Daily Amount: 300 mg.  
  
 rosuvastatin 40 mg tablet Commonly known as:  CRESTOR Take 1 Tab by mouth daily. therapeutic multivitamin tablet Commonly known as:  [facility] Take 1 tablet by mouth daily. VITAMIN B-12 500 mcg tablet Generic drug:  cyanocobalamin Take 500 mcg by mouth daily. VITAMIN D3 1,000 unit Cap Generic drug:  cholecalciferol Take  by mouth.  
  
 zolpidem 10 mg tablet Commonly known as:  AMBIEN Take 1 Tab by mouth nightly as needed for Sleep. Max Daily Amount: 10 mg. * Notice: This list has 6 medication(s) that are the same as other medications prescribed for you. Read the directions carefully, and ask your doctor or other care provider to review them with you. Prescriptions Printed Refills  
 pregabalin (LYRICA) 150 mg capsule 0 Sig: Take 1 Cap by mouth two (2) times a day. Max Daily Amount: 300 mg. Class: Print Route: Oral  
  
Prescriptions Sent to Pharmacy Refills  
 rosuvastatin (CRESTOR) 40 mg tablet 3 Sig: Take 1 Tab by mouth daily. Class: Normal  
 Pharmacy: [facility], [address] [place] [address] #: [phone] Route: Oral  
  
Follow-up Instructions Return in about 3 months (around 6/1/2017) for Regular Follow Up. To-Do List   
 03/01/2017 Lab:  HEMOGLOBIN A1C WITH EAG   
  
 03/01/2017 Lab:  LIPID CASCADE W/REFLEX APO B   
  
 03/01/2017 Lab:  VITAMIN D, 25 HYDROXY   
  
 03/06/2017 10:30 AM  
  Appointment with Karla Keith; [facility] DX RM 1 at [facility] ([phone]) OUTSIDE FILMS  - Any outside films related to the study being scheduled should be brought with you on the day of the exam.  If this cannot be done there may be a delay in the reading of the study. MEDICATIONS  - Patient must bring a complete list of all medications currently taking to include prescriptions, over-the-counter meds, herbals, vitamins & any dietary supplements  CONTRAST EXAMS  - Diabetic patients/ patients with renal deficiency and /or patients age 61 and older will be required to have the Creatinine labs drawn- must be done no more than 30 days prior to exam - If  labs were drawn at MD office, please fax results to [phone]  QUESTIONS  - Notify the Diagnostic Radiology Department if there are questions.   [facility] [phone] [facility] [phone] [facility] [phone]  SAME DAY LABS  - Same day labs: report to Registration 1 hour earlier than the exam time - Written order for labs is required. MD office can fax to [phone] or the patient can hand carry to Registration. 03/06/2017 11:30 AM  
  Appointment with [facility] CT RM 2 at [facility] RAD [address] ([phone]) Please follow instructions given to you for the first part of your study in Diagnostic Radiology. Patient Instructions Please contact our office if you have any questions about your visit today. 1.) Please get labs a week before next visit 2.) Return in 3 months for follow up Introducing [facility] [place] [facility] SERVICES! Dear Nika Sees: 
Thank you for requesting a [employer] account. Our records indicate that you already have an active [employer] account. You can access your account anytime at [email or web address] Did you know that you can access your hospital and ER discharge instructions at any time in [employer]? You can also review all of your test results from your hospital stay or ER visit. Additional Information If you have questions, please visit the Frequently Asked Questions section of the [employer] website at [email or web address] Remember, [employer] is NOT to be used for urgent needs. For medical emergencies, dial 911. Now available from your iPhone and Android! Please provide this summary of care documentation to your next provider. Your primary care clinician is listed as Mariajose Guzman. If you have any questions after today's visit, please call [phone].

## 2017-03-01 NOTE — PROGRESS NOTES
Chief Complaint   Patient presents with    CHF     1. Have you been to the ER, urgent care clinic since your last visit? Hospitalized since your last visit? No    2. Have you seen or consulted any other health care providers outside of the [address] [place] [address] since your last visit? Include any pap smears or colon screening.  No

## 2017-03-06 ENCOUNTER — HOSPITAL ENCOUNTER (OUTPATIENT)
Dept: LAB | Age: 57
Discharge: HOME | End: 2017-03-06
Payer: MEDICARE

## 2017-03-06 ENCOUNTER — HOSPITAL ENCOUNTER (OUTPATIENT)
Dept: LAB | Age: 57
Discharge: HOME | End: 2017-03-06

## 2017-03-06 ENCOUNTER — HOSPITAL ENCOUNTER (OUTPATIENT)
Dept: GENERAL RADIOLOGY | Age: 57
Discharge: HOME | End: 2017-03-06
Attending: ORTHOPAEDIC SURGERY
Payer: MEDICARE

## 2017-03-06 ENCOUNTER — HOSPITAL ENCOUNTER (OUTPATIENT)
Dept: CT IMAGING | Age: 57
Discharge: HOME | End: 2017-03-06
Attending: ORTHOPAEDIC SURGERY
Payer: MEDICARE

## 2017-03-06 DIAGNOSIS — G89.29 CHRONIC LEFT SHOULDER PAIN: ICD-10-CM

## 2017-03-06 DIAGNOSIS — M25.512 CHRONIC LEFT SHOULDER PAIN: ICD-10-CM

## 2017-03-06 LAB
CARBAMAZEPINE SERPL-MCNC: 7.7 UG/ML (ref 4–12)
FOLATE SERPL-MCNC: 8.8 NG/ML (ref 3.1–17.5)
VIT B12 SERPL-MCNC: 415 PG/ML (ref 211–911)

## 2017-03-06 PROCEDURE — 74011636320 HC RX REV CODE- 636/320: Performed by: ORTHOPAEDIC SURGERY

## 2017-03-06 PROCEDURE — 74011000250 HC RX REV CODE- 250: Performed by: ORTHOPAEDIC SURGERY

## 2017-03-06 PROCEDURE — 73201 CT UPPER EXTREMITY W/DYE: CPT

## 2017-03-06 PROCEDURE — 77002 NEEDLE LOCALIZATION BY XRAY: CPT

## 2017-03-06 RX ORDER — SODIUM CHLORIDE 9 MG/ML
20 INJECTION INTRAMUSCULAR; INTRAVENOUS; SUBCUTANEOUS
Status: COMPLETED | OUTPATIENT
Start: 2017-03-06 | End: 2017-03-06

## 2017-03-06 RX ORDER — LIDOCAINE HYDROCHLORIDE 10 MG/ML
30 INJECTION, SOLUTION EPIDURAL; INFILTRATION; INTRACAUDAL; PERINEURAL
Status: COMPLETED | OUTPATIENT
Start: 2017-03-06 | End: 2017-03-06

## 2017-03-06 RX ADMIN — SODIUM CHLORIDE 13 ML: 9 INJECTION, SOLUTION INTRAMUSCULAR; INTRAVENOUS; SUBCUTANEOUS at 11:00

## 2017-03-06 RX ADMIN — IOPAMIDOL 10 ML: 408 INJECTION, SOLUTION INTRATHECAL at 11:00

## 2017-03-06 RX ADMIN — LIDOCAINE HYDROCHLORIDE 10 ML: 10 INJECTION, SOLUTION EPIDURAL; INFILTRATION; INTRACAUDAL; PERINEURAL at 11:00

## 2017-03-07 LAB
ALBUMIN SERPL ELPH-MCNC: 3.7 G/DL (ref 2.9–4.4)
ALBUMIN/GLOB SERPL: 1.3 {RATIO} (ref 0.7–1.7)
ALPHA1 GLOB SERPL ELPH-MCNC: 0.2 G/DL (ref 0–0.4)
ALPHA2 GLOB SERPL ELPH-MCNC: 0.7 G/DL (ref 0.4–1)
ANA SER QL: NEGATIVE
B-GLOBULIN SERPL ELPH-MCNC: 1.2 G/DL (ref 0.7–1.3)
GAMMA GLOB SERPL ELPH-MCNC: 0.8 G/DL (ref 0.4–1.8)
GLOBULIN SER-MCNC: 2.9 G/DL (ref 2.2–3.9)
IGA SERPL-MCNC: 301 MG/DL (ref 87–352)
IGG SERPL-MCNC: 876 MG/DL (ref 700–1600)
IGM SERPL-MCNC: 99 MG/DL (ref 26–217)
INTERPRETATION SERPL IEP-IMP: NORMAL
M PROTEIN SERPL ELPH-MCNC: NORMAL G/DL
PROT SERPL-MCNC: 6.6 G/DL (ref 6–8.5)

## 2017-03-13 ENCOUNTER — OFFICE VISIT (OUTPATIENT)
Dept: ORTHOPEDIC SURGERY | Age: 57
End: 2017-03-13

## 2017-03-13 VITALS
BODY MASS INDEX: 33.87 KG/M2 | HEART RATE: 57 BPM | DIASTOLIC BLOOD PRESSURE: 79 MMHG | HEIGHT: 59 IN | SYSTOLIC BLOOD PRESSURE: 143 MMHG | WEIGHT: 168 LBS

## 2017-03-13 DIAGNOSIS — M75.112 INCOMPLETE TEAR OF LEFT ROTATOR CUFF: Primary | ICD-10-CM

## 2017-03-13 NOTE — PROGRESS NOTES
Patient: Rico Cobb                MRN: [id number]       SSN: [id number]  YOB: 1960        AGE: 55 y.o. SEX: female  There is no height or weight on file to calculate BMI. PCP: Cecily Tian DO  03/13/17      No chief complaint on file. HISTORY OF PRESENT ILLNESS: Rico Cobb is a 55 y.o. female who presents to the office for ***. Review of Systems   Constitutional: Negative. HENT: Negative. Eyes: Negative. Respiratory: Negative. Cardiovascular: Negative. Gastrointestinal: Negative. Genitourinary: Negative. Musculoskeletal: Positive for joint pain (left shoulder). Skin: Negative. Neurological: Negative. Endo/Heme/Allergies: Negative. Psychiatric/Behavioral: Negative. Social History     Social History    Marital status: SINGLE     Spouse name: N/A    Number of children: N/A    Years of education: N/A     Occupational History    Not on file. Social History Main Topics    Smoking status: Former Smoker     Quit date: 5/20/2013    Smokeless tobacco: Never Used    Alcohol use No    Drug use: No    Sexual activity: No      Comment: Hysterectomy     Other Topics Concern    Not on file     Social History Narrative        Past Medical History:   Diagnosis Date    Arm pain jan15    Arrhythmia 2012     Medtronic ICD     Arthritis     ALL OVER    CAD (coronary artery disease) 2011    STENTS PLACED X2    Chronic pain     KNEE & LOWER BACK    Diabetes ([address]     GERD (gastroesophageal reflux disease)     H/O gastric bypass     Heart attack ([address] 2011    Heart failure ([address]     ischemic cardiomyopathy    Hypertension     Spinal cord injury         Allergies   Allergen Reactions    Aspirin Hives         Current Outpatient Prescriptions   Medication Sig    pregabalin (LYRICA) 150 mg capsule Take 1 Cap by mouth two (2) times a day.  Max Daily Amount: 300 mg.    rosuvastatin (CRESTOR) 40 mg tablet Take 1 Tab by mouth daily.    HYDROcodone-acetaminophen (NORCO)  mg tablet Take 1 Tab by mouth every eight (8) hours as needed for Pain. Max Daily Amount: 3 Tabs.  ergocalciferol (ERGOCALCIFEROL) 50,000 unit capsule Take 1 Cap by mouth every seven (7) days.  miscellaneous medical supply [facility] [place] [facility] 2 Each by Does Not Apply route daily.  miscellaneous medical supply [facility] [place] [facility] 2 Each by Does Not Apply route daily.  miscellaneous medical supply [facility] [place] [facility] 1 Each by Does Not Apply route daily.  miscellaneous medical supply [facility] [place] [facility] 1 Each by Does Not Apply route daily.  celecoxib (CELEBREX) 200 mg capsule TAKE 1 CAPSULE BY MOUTH TWICE DAILY FOR 90 DAYS    zolpidem (AMBIEN) 10 mg tablet Take 1 Tab by mouth nightly as needed for Sleep. Max Daily Amount: 10 mg.    carBAMazepine (TEGRETOL) 200 mg tablet SEE NOTES    methocarbamol (ROBAXIN) 500 mg tablet Take 1 Tab by mouth four (4) times daily as needed. Indications: MUSCLE SPASM    HYDROcodone-acetaminophen (NORCO) 5-325 mg per tablet Take 1 Tab by mouth every eight (8) hours as needed for Pain. Max Daily Amount: 3 Tabs.  levocetirizine (XYZAL) 5 mg tablet Take 1 Tab by mouth daily.  lisinopril (PRINIVIL, ZESTRIL) 5 mg tablet Take  by mouth daily.  furosemide (LASIX) 40 mg tablet TAKE 1 TABLET BY MOUTH TWICE DAILY AS NEEDED    GENERLAC 10 gram/15 mL solution     DULoxetine (CYMBALTA) 60 mg capsule Take 1 Cap by mouth daily.  isosorbide mononitrate ER (IMDUR) 30 mg tablet 15 mg.  carvedilol (COREG) 12.5 mg tablet 6.25 mg.    cyanocobalamin (VITAMIN B-12) 500 mcg tablet Take 500 mcg by mouth daily.  omeprazole (PRILOSEC) 20 mg capsule Take 1 capsule by mouth daily.  polyethylene glycol (MIRALAX) 17 gram/dose powder Take 17 g by mouth daily.  clopidogrel (PLAVIX) 75 mg tablet Take 1 tablet by mouth daily.  therapeutic multivitamin (THERAGRAN) tablet Take 1 tablet by mouth daily.     calcium citrate-vitamin d3 (CITRACAL+D) 315-200 mg-unit tab Take 1 tablet by mouth two (2) times daily (with meals).  Cholecalciferol, Vitamin D3, (VITAMIN D3) 1,000 unit cap Take  by mouth. No current facility-administered medications for this visit. Physical Exam  Constitutional: she is oriented to person, place, and time and well-developed, well-nourished, and in no distress. No distress. HENT:   Head: Normocephalic and atraumatic. Right Ear: Hearing normal.   Left Ear: Hearing normal.   Nose: Nose normal.   Eyes: Conjunctivae, EOM and lids are normal. Pupils are equal, round, and reactive to light. Neck: Trachea normal.   Pulmonary/Chest: Effort normal and breath sounds normal. No respiratory distress. Abdominal: Soft. Neurological: she is alert and oriented to person, place, and time. Skin: Skin is warm, dry and intact. she is not diaphoretic. Psychiatric: Affect normal.   Nursing note and vitals reviewed. Ortho Exam   ***    RADIOGRAPHS:   ***    IMPRESSION & PLAN: ***. I will see her back ***.       Scribed by Dania Sanz ([address] [place] [address] [address]) as dictated by Hina Crespo MD.

## 2017-03-13 NOTE — PROGRESS NOTES
Patient: Kavon Kingston                MRN: [id number]       SSN: [id number]  YOB: 1960        AGE: 55 y.o. SEX: female  Body mass index is 33.93 kg/(m^2). PCP: Fox De Los Santos DO  03/13/17      Chief Complaint   Patient presents with    Follow-up     left shoulder CT results       HISTORY OF PRESENT ILLNESS: Kavon Kingston is a 55 y.o. female who presents to the office for management of her shoulder pain. MRI shows a 5 mm by 5 mm partial thickness tear of the anterior supraspinatus. Since her last visit her pain has not changed. Review of Systems   Constitutional: Negative. HENT: Negative. Eyes: Negative. Respiratory: Negative. Cardiovascular: Negative. Gastrointestinal: Negative. Genitourinary: Negative. Musculoskeletal: Positive for joint pain (left shoulder). Skin: Negative. Neurological: Negative. Endo/Heme/Allergies: Negative. Psychiatric/Behavioral: Negative. Social History     Social History    Marital status: SINGLE     Spouse name: N/A    Number of children: N/A    Years of education: N/A     Occupational History    Not on file.      Social History Main Topics    Smoking status: Former Smoker     Quit date: 5/20/2013    Smokeless tobacco: Never Used    Alcohol use No    Drug use: No    Sexual activity: No      Comment: Hysterectomy     Other Topics Concern    Not on file     Social History Narrative        Past Medical History:   Diagnosis Date    Arm pain jan15    Arrhythmia 2012     Medtronic ICD     Arthritis     ALL OVER    CAD (coronary artery disease) 2011    STENTS PLACED X2    Chronic pain     KNEE & LOWER BACK    Diabetes ([address]     GERD (gastroesophageal reflux disease)     H/O gastric bypass     Heart attack ([address] 2011    Heart failure ([address]     ischemic cardiomyopathy    Hypertension     Spinal cord injury         Allergies   Allergen Reactions    Aspirin Hives         Current Outpatient Prescriptions   Medication Sig    pregabalin (LYRICA) 150 mg capsule Take 1 Cap by mouth two (2) times a day. Max Daily Amount: 300 mg.    rosuvastatin (CRESTOR) 40 mg tablet Take 1 Tab by mouth daily.  HYDROcodone-acetaminophen (NORCO)  mg tablet Take 1 Tab by mouth every eight (8) hours as needed for Pain. Max Daily Amount: 3 Tabs.  ergocalciferol (ERGOCALCIFEROL) 50,000 unit capsule Take 1 Cap by mouth every seven (7) days.  celecoxib (CELEBREX) 200 mg capsule TAKE 1 CAPSULE BY MOUTH TWICE DAILY FOR 90 DAYS    zolpidem (AMBIEN) 10 mg tablet Take 1 Tab by mouth nightly as needed for Sleep. Max Daily Amount: 10 mg.    carBAMazepine (TEGRETOL) 200 mg tablet SEE NOTES    methocarbamol (ROBAXIN) 500 mg tablet Take 1 Tab by mouth four (4) times daily as needed. Indications: MUSCLE SPASM    levocetirizine (XYZAL) 5 mg tablet Take 1 Tab by mouth daily.  lisinopril (PRINIVIL, ZESTRIL) 5 mg tablet Take  by mouth daily.  furosemide (LASIX) 40 mg tablet TAKE 1 TABLET BY MOUTH TWICE DAILY AS NEEDED    GENERLAC 10 gram/15 mL solution     DULoxetine (CYMBALTA) 60 mg capsule Take 1 Cap by mouth daily.  isosorbide mononitrate ER (IMDUR) 30 mg tablet 15 mg.  carvedilol (COREG) 12.5 mg tablet 6.25 mg.    cyanocobalamin (VITAMIN B-12) 500 mcg tablet Take 500 mcg by mouth daily.  omeprazole (PRILOSEC) 20 mg capsule Take 1 capsule by mouth daily.  polyethylene glycol (MIRALAX) 17 gram/dose powder Take 17 g by mouth daily.  clopidogrel (PLAVIX) 75 mg tablet Take 1 tablet by mouth daily.  therapeutic multivitamin (THERAGRAN) tablet Take 1 tablet by mouth daily.  calcium citrate-vitamin d3 (CITRACAL+D) 315-200 mg-unit tab Take 1 tablet by mouth two (2) times daily (with meals).  Cholecalciferol, Vitamin D3, (VITAMIN D3) 1,000 unit cap Take  by mouth.  miscellaneous medical supply misc 2 Each by Does Not Apply route daily.     miscellaneous medical supply [facility] 2 Each by Does Not Apply route daily.  miscellaneous medical supply [facility] 1 Each by Does Not Apply route daily.  miscellaneous medical supply [facility] 1 Each by Does Not Apply route daily.  HYDROcodone-acetaminophen (NORCO) 5-325 mg per tablet Take 1 Tab by mouth every eight (8) hours as needed for Pain. Max Daily Amount: 3 Tabs. No current facility-administered medications for this visit. Physical Exam  Constitutional: she is oriented to person, place, and time and well-developed, well-nourished, and in no distress. No distress. HENT:   Head: Normocephalic and atraumatic. Right Ear: Hearing normal.   Left Ear: Hearing normal.   Nose: Nose normal.   Eyes: Conjunctivae, EOM and lids are normal. Pupils are equal, round, and reactive to light. Neck: Trachea normal.   Pulmonary/Chest: Effort normal and breath sounds normal. No respiratory distress. Abdominal: Soft. Neurological: she is alert and oriented to person, place, and time. Skin: Skin is warm, dry and intact. she is not diaphoretic. Psychiatric: Affect normal.   Nursing note and vitals reviewed. Ortho Exam   She has pain with flexion and abduction of the shoulder. RADIOGRAPHS:   03/06/17 CT Shoulder LT W CONT  IMPRESSION:     1. No full-thickness rotator cuff rupture. Anterior supraspinatus undersurface  partial-thickness tear about 5 x 5 mm. Smaller undersurface tear at the junction  of the supraspinatus and infraspinatus insertion.     2. Mild acromioclavicular osteoarthrosis. 03/06/17 XR INJ SHOULDER LT  IMPRESSION:        1. The arthrogram of left shoulder with injection of diluted iodinated contrast,  as been completed, as above.        2. No evidence of rotator cuff tear on fluoroscopy examination.        3. Findings suggestive of adhesive capsulitis in left shoulder joint.        4.  CT scan of left shoulder joint to be performed following arthrogram procedure  and would be reported separately      IMPRESSION & PLAN:  I discussed the options of surgical and non surgical care and the option of a cortisone injection. She is not interested in the injectio and would like to have the surgery. She was given Dr. Colt mcdonald.          Scribed by Carol Jordan ([address] [place] [address] [address]) as dictated by Cullen Mesa MD.

## 2017-03-17 ENCOUNTER — HOSPITAL ENCOUNTER (OUTPATIENT)
Dept: PHYSICAL THERAPY | Age: 57
End: 2017-03-17

## 2017-03-20 ENCOUNTER — OFFICE VISIT (OUTPATIENT)
Dept: NEUROLOGY | Age: 57
End: 2017-03-20

## 2017-03-20 VITALS
HEIGHT: 59 IN | BODY MASS INDEX: 33.38 KG/M2 | HEART RATE: 68 BPM | TEMPERATURE: 98.4 F | OXYGEN SATURATION: 98 % | WEIGHT: 165.6 LBS | DIASTOLIC BLOOD PRESSURE: 80 MMHG | SYSTOLIC BLOOD PRESSURE: 147 MMHG

## 2017-03-20 DIAGNOSIS — M79.2 NEUROPATHIC PAIN: ICD-10-CM

## 2017-03-20 DIAGNOSIS — M54.12 RADICULOPATHY, CERVICAL: ICD-10-CM

## 2017-03-20 DIAGNOSIS — G81.90 HEMIPLEGIA AFFECTING DOMINANT SIDE (HCC): Primary | ICD-10-CM

## 2017-03-20 RX ORDER — CARBAMAZEPINE 200 MG/1
TABLET ORAL
Qty: 360 TAB | Refills: 2 | Status: SHIPPED | OUTPATIENT
Start: 2017-03-20 | End: 2017-12-27 | Stop reason: SDUPTHER
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
## 2017-03-20 NOTE — MR AVS SNAPSHOT
Visit Information Date & Time Provider Department Dept. Phone Encounter #  
 3/20/2017  2:45 PM Izabella Dawson MD [address] [place] [address] [phone] [id number] Follow-up Instructions Return in about 3 months (around 6/20/2017). Follow-up and Disposition History Your Appointments 3/21/2017  2:00 PM  
New Patient with Klever Hugo MD  
[facility] - [facility] ([facility]) Appt Note: PAIN IN LEFT SHOULDER ref by Dr. Camilla Vivas [address] [facility] [id number]  
[phone]  
  
   
 [address] [place] [address]  
  
    
 5/2/2017 10:00 AM  
Office Visit with Teri Farah [facility] (--) Appt Note: leonardo Alonso [address] [facility] [address] [phone]  
  
   
 [address] [place] [phone]  
  
    
 5/26/2017  9:45 AM  
Follow Up with Chayito Tinoco PA-C  
[facility] - [facility] ([facility]) Appt Note: 3 mo fu  
 [address], [address] [facility]  
[phone] [address], [address]  
  
    
 6/7/2017 10:00 AM  
Follow Up with Teri Farah [facility] (--) Appt Note: 3 month fu  
 [address] [facility] [address] [address]  
  
    
 6/20/2017  9:45 AM  
Follow Up with Izabella Dawson MD  
[address] [place] [address]) Appt Note: 3mon f/u  
 [address] [address] [phone]  
  
   
 [facility] [address] Upcoming Health Maintenance Date Due  
 EYE EXAM RETINAL OR DILATED Q1 8/5/1971 Pneumococcal 19-64 Medium Risk (1 of 1 - PPSV23) 8/5/1980 DTaP/Tdap/Td series (1 - Tdap) 8/5/1982 FOBT Q 1 YEAR AGE 50-75 8/5/2011 FOOT EXAM Q1 3/3/2015 INFLUENZA AGE 9 TO ADULT 8/1/2016 GLAUCOMA SCREENING Q2Y 9/29/2016 BREAST CANCER SCRN MAMMOGRAM 4/15/2017 HEMOGLOBIN A1C Q6M 7/25/2017 MICROALBUMIN Q1 10/17/2017 LIPID PANEL Q1 1/25/2018 Allergies as of 3/20/2017  Review Complete On: 3/20/2017 By: Evelyne Mcgrath LPN Severity Noted Reaction Type Reactions Aspirin  08/02/2012   Topical Hives Current Immunizations  Reviewed on 12/13/2013 Name Date Influenza Vaccine 9/29/2015 Influenza Vaccine PF 9/24/2014, 12/13/2013 Not reviewed this visit You Were Diagnosed With   
  
 Codes Comments Hemiplegia affecting dominant side ([facility]    -  Primary ICD-10-CM: G81.90 ICD-9-CM: 342.91 Neuropathic pain     ICD-10-CM: M79.2 ICD-9-CM: 729.2 Radiculopathy, cervical     ICD-10-CM: M54.12 
ICD-9-CM: 723.4 Vitals BP Pulse Temp Height(growth percentile) Weight(growth percentile) SpO2  
 147/80 68 98.4 °F (36.9 °C) (Oral) 4' 11\" (1.499 m) 165 lb 9.6 oz (75.1 kg) 98% BMI OB Status Smoking Status 33.45 kg/m2 Hysterectomy Former Smoker BMI and BSA Data Body Mass Index Body Surface Area  
 33.45 kg/m 2 1.77 m 2 Preferred Pharmacy [facility] [address] [phone] Your Updated Medication List  
  
   
This list is accurate as of: 3/20/17  3:54 PM.  Always use your most recent med list.  
  
  
  
  
 calcium citrate-vitamin d3 315-200 mg-unit Tab Commonly known as:  CITRACAL+D Take 1 tablet by mouth two (2) times daily (with meals). carBAMazepine 200 mg tablet Commonly known as:  TEGretol 1  q am 1 q pm  2 qhs  
  
 carvedilol 12.5 mg tablet Commonly known as:  COREG  
6.25 mg.  
  
 celecoxib 200 mg capsule Commonly known as:  CELEBREX  
TAKE 1 CAPSULE BY MOUTH TWICE DAILY FOR 90 DAYS  
  
 clopidogrel 75 mg Tab Commonly known as:  PLAVIX Take 1 tablet by mouth daily. DULoxetine 60 mg capsule Commonly known as:  CYMBALTA Take 1 Cap by mouth daily. ergocalciferol 50,000 unit capsule Commonly known as:  ERGOCALCIFEROL Take 1 Cap by mouth every seven (7) days. furosemide 40 mg tablet Commonly known as:  LASIX TAKE 1 TABLET BY MOUTH TWICE DAILY AS NEEDED GENERLAC 10 gram/15 mL solution Generic drug:  lactulose * HYDROcodone-acetaminophen 5-325 mg per tablet Commonly known as:  Iven Zimmerman Take 1 Tab by mouth every eight (8) hours as needed for Pain. Max Daily Amount: 3 Tabs. * HYDROcodone-acetaminophen  mg tablet Commonly known as:  Iven Zimmerman Take 1 Tab by mouth every eight (8) hours as needed for Pain. Max Daily Amount: 3 Tabs. isosorbide mononitrate ER 30 mg tablet Commonly known as:  IMDUR  
15 mg.  
  
 levocetirizine 5 mg tablet Commonly known as:  Sherlyn Hemp Take 1 Tab by mouth daily. lisinopril 5 mg tablet Commonly known as:  Jaimee Avina Take  by mouth daily. methocarbamol 500 mg tablet Commonly known as:  ROBAXIN Take 1 Tab by mouth four (4) times daily as needed. Indications: MUSCLE SPASM * miscellaneous medical supply Misc  
1 Each by Does Not Apply route daily. * miscellaneous medical supply Misc  
1 Each by Does Not Apply route daily. * miscellaneous medical supply Misc  
2 Each by Does Not Apply route daily. * miscellaneous medical supply Misc  
2 Each by Does Not Apply route daily. omeprazole 20 mg capsule Commonly known as:  PRILOSEC Take 1 capsule by mouth daily. polyethylene glycol 17 gram/dose powder Commonly known as:  Antoinette Prabhu Take 17 g by mouth daily. pregabalin 150 mg capsule Commonly known as:  Gretchen Millwood Take 1 Cap by mouth two (2) times a day. Max Daily Amount: 300 mg.  
  
 rosuvastatin 40 mg tablet Commonly known as:  CRESTOR Take 1 Tab by mouth daily. therapeutic multivitamin tablet Commonly known as:  [facility] Take 1 tablet by mouth daily. VITAMIN B-12 500 mcg tablet Generic drug:  cyanocobalamin Take 500 mcg by mouth daily. VITAMIN D3 1,000 unit Cap Generic drug:  cholecalciferol Take  by mouth.  
  
 zolpidem 10 mg tablet Commonly known as:  AMBIEN Take 1 Tab by mouth nightly as needed for Sleep. Max Daily Amount: 10 mg.  
  
 * Notice: This list has 6 medication(s) that are the same as other medications prescribed for you. Read the directions carefully, and ask your doctor or other care provider to review them with you. Prescriptions Sent to Pharmacy Refills  
 carBAMazepine (TEGRETOL) 200 mg tablet 2 Si  q am 1 q pm  2 qhs  
 Class: Normal  
 Pharmacy: [address] [place] [address] #: [phone] We Performed the Following REFERRAL TO ORTHOPEDIC SURGERY [REF62 Custom] Comments:  
 Please evaluate patient for cervical radiculopathy Follow-up Instructions Return in about 3 months (around 2017). To-Do List   
 2017 Lab:  CARBAMAZEPINE   
  
 2017 11:00 AM  
  Appointment with Juan Carlos Conner PT at [facility] PT Gemma SIFUENTES ([phone]) Referral Information Referral ID Referred By Referred To  
  
 [id number] Yesica MCLAUGHLIN Not Available Visits Status Start Date End Date 1 New Request 3/20/17 3/20/18 If your referral has a status of pending review or denied, additional information will be sent to support the outcome of this decision. Patient Instructions Patient  To have dexa scan pcp Introducing [facility]! Dear Lissett Del Cid: 
Thank you for requesting a [employer] account. Our records indicate that you already have an active [employer] account. You can access your account anytime at [email or web address] Did you know that you can access your hospital and ER discharge instructions at any time in [employer]? You can also review all of your test results from your hospital stay or ER visit. Additional Information If you have questions, please visit the Frequently Asked Questions section of the [employer] website at [email or web address] Remember, [employer] is NOT to be used for urgent needs. For medical emergencies, dial 911. Now available from your iPhone and Android! Please provide this summary of care documentation to your next provider. Your primary care clinician is listed as Kane Sung. If you have any questions after today's visit, please call [phone].

## 2017-03-21 ENCOUNTER — HOSPITAL ENCOUNTER (OUTPATIENT)
Dept: PHYSICAL THERAPY | Age: 57
Discharge: HOME | End: 2017-03-21
Payer: MEDICARE

## 2017-03-21 ENCOUNTER — OFFICE VISIT (OUTPATIENT)
Dept: ORTHOPEDIC SURGERY | Age: 57
End: 2017-03-21

## 2017-03-21 VITALS
TEMPERATURE: 97.7 F | BODY MASS INDEX: 33.26 KG/M2 | HEART RATE: 65 BPM | HEIGHT: 59 IN | SYSTOLIC BLOOD PRESSURE: 131 MMHG | WEIGHT: 165 LBS | DIASTOLIC BLOOD PRESSURE: 78 MMHG

## 2017-03-21 DIAGNOSIS — M75.112 INCOMPLETE TEAR OF LEFT ROTATOR CUFF: Primary | ICD-10-CM

## 2017-03-21 PROCEDURE — G8978 MOBILITY CURRENT STATUS: HCPCS

## 2017-03-21 PROCEDURE — 97112 NEUROMUSCULAR REEDUCATION: CPT

## 2017-03-21 PROCEDURE — 97162 PT EVAL MOD COMPLEX 30 MIN: CPT

## 2017-03-21 PROCEDURE — G8979 MOBILITY GOAL STATUS: HCPCS

## 2017-03-21 PROCEDURE — 97110 THERAPEUTIC EXERCISES: CPT

## 2017-03-21 RX ORDER — TRIAMCINOLONE ACETONIDE 40 MG/ML
40 INJECTION, SUSPENSION INTRA-ARTICULAR; INTRAMUSCULAR ONCE
Qty: 1 ML | Refills: 0
Start: 2017-03-21 | End: 2017-03-21

## 2017-03-21 NOTE — PROGRESS NOTES
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 Pt was recently referred to a neurologist who did and EMG/NCV revealing neuropathy in B LE's. Pt reports PMHx of L TKA & R THR that still cause her issues as well. Pt ambulates with SPC in community.      OBJECTIVE    Modality rationale:  to improve the patients ability to    Min Type Additional Details    [] Estim:  []Unatt       []IFC  []Premod                        []Other:  []w/ice   []w/heat  Position:  Location:    [] Estim: []Att    []TENS instruct  []NMES                    []Other:  []w/US   []w/ice   []w/heat  Position:  Location:    []  Traction: [] Cervical       []Lumbar                       [] Prone          []Supine                       []Intermittent   []Continuous Lbs:  [] before manual  [] after manual    []  Ultrasound: []Continuous   [] Pulsed                           []1MHz   []3MHz W/cm2:  Location:    []  Iontophoresis with dexamethasone         Location: [] Take home patch   [] In clinic    []  Ice     []  heat  []  Ice massage  []  Laser   []  Anodyne Position:  Location:    []  Laser with stim  []  Other:  Position:  Location:    []  Vasopneumatic Device Pressure:       [] lo [] med [] hi   Temperature: [] lo [] med [] hi   [] Skin assessment post-treatment:  []intact []redness- no adverse reaction    []redness - adverse reaction:     12 min [x]Eval                  []Re-Eval       13 min Therapeutic Exercise:  [x] See flow sheet : Instructed, demonstrated, and performed HEP   Rationale: increase ROM and increase strength to improve the patients ability to decrease risk for falls     min Therapeutic Activity:  []  See flow sheet :   Rationale:   to improve the patients ability to      10 min Neuromuscular Re-education:  [x]  See flow sheet : balance act's   Rationale: increase strength, improve coordination, improve balance and increase proprioception  to improve the patients ability to decrease risk for falls     min Manual Therapy:     Rationale:  to      min Gait Training:  ___ feet with ___ device on level surfaces with ___ level of assist   Rationale: With   [] TE   [] TA   [] neuro   [] other: Patient Education: [x] Review HEP    [] Progressed/Changed HEP based on:   [] positioning   [] body mechanics   [] transfers   [] heat/ice application    [] other:      Other Objective/Functional Measures: Pt ambulates into clinic with SPC on L, L LE toe out and Trendelenburg on R. Pt's B LE MMT: hip flex L 4-/5 R 3+/5, knee ext L 3+/5 R 4-/5, knee flex B 4-/5, ankle DF L 4/5 R 4-/5. Pt demos sit to stand with UE support and narrow base of support. Pt's balance assessment: Rhomberg EO 15 sec EC 5 sec, MSR B 5 sec, SLS L 5 sec R 3 sec.        Pain Level (0-10 scale) post treatment: 0    ASSESSMENT/Changes in Function: see POC    Patient will continue to benefit from skilled PT services to modify and progress therapeutic interventions, address functional mobility deficits, address ROM deficits, address strength deficits, analyze and address soft tissue restrictions, analyze and cue movement patterns, address imbalance/dizziness and instruct in home and community integration to attain remaining goals. [x]  See Plan of Care  []  See progress note/recertification  []  See Discharge Summary         Progress towards goals / Updated goals:  Short term goals: to be accomplished in 2 weeks  1) Pt will report (I) and compliance with HEP for home management of symptoms. At eval: Instructed, demonstrated, and performed HEP  2) Pt will demo Rhomberg EO on airex > or = 30 sec to decrease risk for falls. At eval: Rhomberg EO on even ground 15 sec  Long term goals: to be accomplished in 6 weeks  1) Pt's FOTO score will improve > or = 64 indicating improvements in function. At eval: FOTO = 57  2) Pt will improve B LE MMT > or = 4/5 for functional strength during ambulation. At eval: hip flex L 4-/5 R 3+/5, knee ext L 3+/5 R 4-/5, knee flex B 4-/5, ankle DF L 4/5 R 4-/5  3) Pt will demo MSR B > or = 30 sec to decrease risk for falls. At eval: MSR B 5 sec  4) Pt will demo B SLS > or = 15 sec to decrease risk for falls. At eval: SLS L 5 sec R 3 sec  5) Pt will report > or = 60% improvement in symptoms in order to progress rehab.   At eval: 0%    PLAN  []  Upgrade activities as tolerated     []  Continue plan of care  []  Update interventions per flow sheet       []  Discharge due to:_  [x]  Other: 2x/week for 6 weeks      Mitchell Odell, PT 3/21/2017  11:27 AM    Future Appointments  Date Time Provider Oren Ramon   3/21/2017 2:00 PM MD AHSAN Silva EVERARDO SCHED   5/2/2017 10:00 AM Emmanuelle Marvin DO [facility] None   5/26/2017 9:45 AM AMAURY Matamoros [address]   6/7/2017 10:00 AM Emmanuelle Marvin DO [facility] None   6/20/2017 9:45 AM Ortega Denis MD [facility]
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
## 2017-03-21 NOTE — PROGRESS NOTES
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 The Plan of Care and following information is based on the information from the initial evaluation. Assessment/ key information: Pt is a 54 y/o female who c/o decreased balance and an increase in falls that has gotten progressively worse over last 2 years. Pt was recently referred to a neurologist who did and EMG/NCV revealing neuropathy in B LE's. Pt reports PMHx of L TKA & R THR that still cause her issues as well. Pt ambulates into clinic with SPC on L, L LE toe out and Trendelenburg on R. Pt's B LE MMT: hip flex L 4-/5 R 3+/5, knee ext L 3+/5 R 4-/5, knee flex B 4-/5, ankle DF L 4/5 R 4-/5. Pt demos sit to stand with UE support and narrow base of support. Pt's balance assessment: Rhomberg EO 15 sec EC 5 sec, MSR B 5 sec, SLS L 5 sec R 3 sec.  Patient will benefit from skilled PT services to modify and progress therapeutic interventions, address functional mobility deficits, address ROM deficits, address strength deficits, analyze and address soft tissue restrictions, analyze and cue movement patterns, address imbalance/dizziness and instruct in home and community integration to attain remaining goals.    Evaluation Complexity History MEDIUM  Complexity : 1-2 comorbidities / personal factors will impact the outcome/ POC ; Examination MEDIUM Complexity : 3 Standardized tests and measures addressing body structure, function, activity limitation and / or participation in recreation  ;Presentation MEDIUM Complexity : Evolving with changing characteristics  ; Clinical Decision Making MEDIUM Complexity : FOTO score of 26-74  Overall Complexity Rating: MEDIUM  Problem List: pain affecting function, decrease ROM, decrease strength, impaired gait/ balance, decrease ADL/ functional abilitiies, decrease activity tolerance and decrease flexibility/ joint mobility   Treatment Plan may include any combination of the following: Therapeutic exercise, Therapeutic activities, Neuromuscular re-education, Physical agent/modality, Gait/balance training, Manual therapy and Patient education  Patient / Family readiness to learn indicated by: asking questions, trying to perform skills and interest  Persons(s) to be included in education: patient (P)  Barriers to Learning/Limitations: None  Patient Goal (s): Decrease falls  Patient Self Reported Health Status: poor  Rehabilitation Potential: fair    Short Term Goals: To be accomplished in 2 weeks:  1) Pt will report (I) and compliance with HEP for home management of symptoms. 2) Pt will demo Rhomberg EO on airex > or = 30 sec to decrease risk for falls. Long Term Goals: To be accomplished in 6 weeks:  1) Pt's FOTO score will improve > or = 64 indicating improvements in function. 2) Pt will improve B LE MMT > or = 4/5 for functional strength during ambulation. 3) Pt will demo MSR B > or = 30 sec to decrease risk for falls. 4) Pt will demo B SLS > or = 15 sec to decrease risk for falls. 5) Pt will report > or = 60% improvement in symptoms in order to progress rehab. Frequency / Duration: Patient to be seen 2 times per week for 6 weeks.     Patient/ Caregiver education and instruction: Diagnosis, prognosis, exercises   [x]  Plan of care has been reviewed with PTA    G-Codes (GP)  Mobility   Current  CK= 40-59%   Goal  CJ= 20-39%    The severity rating is based on clinical judgment and the FOTO score. Certification Period: 3/21/2017-6/19/2017  Natali Bustillos, PT 3/21/2017 11:27 AM  ________________________________________________________________________    I certify that the above Therapy Services are being furnished while the patient is under my care. I agree with the treatment plan and certify that this therapy is necessary.     [de-identified] Signature:____________________  Date:____________Time: _________    Please sign and return to [facility] - [address], [facility]   [phone] fax

## 2017-03-24 ENCOUNTER — HOSPITAL ENCOUNTER (OUTPATIENT)
Dept: PHYSICAL THERAPY | Age: 57
Discharge: HOME | End: 2017-03-24
Payer: MEDICARE

## 2017-03-24 NOTE — PROGRESS NOTES
PT DAILY TREATMENT NOTE - [facility]     Patient Name: Francesco Carlson  Date:3/24/2017  : 1960  [x]  Patient  Verified  Payor: Elfego Galdamez / Plan: [facility] [employer] MEDICARE CHOICE PPO/PFFS / Product Type: Managed Care Medicare /    In time:11:05  Out time:11:43  Total Treatment Time (min): 38  Total Timed Codes (min): 23  1:1 Treatment Time ( [address] only): 38   Visit #: 1 of 12    Treatment Area: Incomplete rotator cuff tear or rupture of left shoulder, not specified as traumatic [M75.112]    SUBJECTIVE  Pain Level (0-10 scale): 9  Any medication changes, allergies to medications, adverse drug reactions, diagnosis change, or new procedure performed?: [x] No    [] Yes (see summary sheet for update)  Subjective functional status/changes:   [] No changes reported  Pt is a 56 y/o female who c/o L shoulder that has gotten progressively worse over the last year. Pt reports QIANA is just wear and tear and a few falls where she has landed on L side. Pt reports her pain is in anterior/top of shoulder that sometimes radiates down to elbow. Pt reports pain is constant, nagging and increases when she tries to use her arm. Pt denies any paraesthesias. Pt reports she had a CT scan about ~1 month ago revealing incomplete RC tear.      OBJECTIVE    Modality rationale:  to improve the patients ability to    Min Type Additional Details    [] Estim:  []Unatt       []IFC  []Premod                        []Other:  []w/ice   []w/heat  Position:  Location:    [] Estim: []Att    []TENS instruct  []NMES                    []Other:  []w/US   []w/ice   []w/heat  Position:  Location:    []  Traction: [] Cervical       []Lumbar                       [] Prone          []Supine                       []Intermittent   []Continuous Lbs:  [] before manual  [] after manual    []  Ultrasound: []Continuous   [] Pulsed                           []1MHz   []3MHz W/cm2:  Location:    []  Iontophoresis with dexamethasone         Location: [] Take home patch   [] In clinic    []  Ice     []  heat  []  Ice massage  []  Laser   []  Anodyne Position:  Location:    []  Laser with stim  []  Other:  Position:  Location:    []  Vasopneumatic Device Pressure:       [] lo [] med [] hi   Temperature: [] lo [] med [] hi   [] Skin assessment post-treatment:  []intact []redness- no adverse reaction    []redness - adverse reaction:     15 min [x]Eval                  []Re-Eval       13 min Therapeutic Exercise:  [x] See flow sheet : Instructed, demonstrated, and performed HEP   Rationale: increase ROM, increase strength and improve coordination to improve the patients ability to decrease pain and perform ADL's     min Therapeutic Activity:  []  See flow sheet :   Rationale:   to improve the patients ability to       min Neuromuscular Re-education:  []  See flow sheet :   Rationale:   to improve the patients ability to     10 min Manual Therapy:  PROM all planes, Grade I GHJ mobs, STJ mobs, STM L UT, levator, biceps. Rationale: decrease pain, increase ROM, increase tissue extensibility and decrease trigger points to increase ease with ADL's     min Gait Training:  ___ feet with ___ device on level surfaces with ___ level of assist   Rationale: With   [] TE   [] TA   [] neuro   [] other: Patient Education: [x] Review HEP    [] Progressed/Changed HEP based on:   [] positioning   [] body mechanics   [] transfers   [] heat/ice application    [] other:      Other Objective/Functional Measures: Pt sits with L shoulder lower than R. Pt's L shoulder PROM: flex 130 deg with pain, scap 150 deg with pain, ER 70 deg with pain, IR WNL no pain. Pt's L shoulder AROM: flex 115 deg, scap 110 deg, ER fingertip to L ear, IR fingertip to L glute max. Pt presents with (+) Neer's & HK on L, (-) Drop Arm on L.       Pain Level (0-10 scale) post treatment: 8    ASSESSMENT/Changes in Function: see POC    Patient will continue to benefit from skilled PT services to modify and progress therapeutic interventions, address functional mobility deficits, address ROM deficits, address strength deficits, analyze and address soft tissue restrictions, analyze and cue movement patterns, assess and modify postural abnormalities and instruct in home and community integration to attain remaining goals. [x]  See Plan of Care  []  See progress note/recertification  []  See Discharge Summary         Progress towards goals / Updated goals:  Short term goals: to be accomplished in 2 weeks  1) Pt will report (I) and compliance with HEP for home management of symptoms. At eval: Instructed, demonstrated, and performed HEP  2) Pt will improve L shoulder PROM flex, scap, ER WNL no increase in pain in order to perform ADL's. At eval: flex 130 deg with pain, scap 150 deg with pain, ER 70 deg with pain  Long term goals: to be accomplished in 6 weeks  1) Pt's FOTO score will improve > or = 64 indicating improvements in function. Current: FOTO = 53  2) Pt will improve L shoulder AAROM flex/scap > or = 120 deg in order to perform ADL's. At eval: NT  3) Pt will improve L shoulder AROM flex/scap > or = 120 deg in order to perform ADL's. At eval: L shoulder AROM flex 115 deg, scap 110 deg  4) Pt will improve L shoulder AROM ER/IR to [facility] in order to perform ADL's. At eval: ER fingertip to L ear, IR fingertip to L glute max  5) Pt will report < or = 4/10 pain pre and post for 2 weeks in order to progress rehab.   At eval: 9/10 pre and 8/10 post    PLAN  []  Upgrade activities as tolerated     []  Continue plan of care  []  Update interventions per flow sheet       []  Discharge due to:_  [x]  Other: 2x/week for 6 weeks      Heraclio Mcpherson, PT 3/24/2017  11:55 AM    Future Appointments  Date Time Provider Oren Ramon   4/18/2017 1:20 PM MD AHSAN Adams EVERARDO SCHED   5/2/2017 10:00 AM Emmanuelle Marvin,  [facility] None   5/26/2017 9:45 AM Tully Severin, PA-C [address]   6/7/2017 10:00 AM [facility]   6/20/2017 9:45 AM Bree Patino MD [address] [place] [address]
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
## 2017-03-29 ENCOUNTER — APPOINTMENT (OUTPATIENT)
Dept: PHYSICAL THERAPY | Age: 57
End: 2017-03-29
Payer: MEDICARE

## 2017-04-04 ENCOUNTER — HOSPITAL ENCOUNTER (OUTPATIENT)
Dept: PHYSICAL THERAPY | Age: 57
Discharge: HOME | End: 2017-04-04
Payer: MEDICARE

## 2017-04-04 PROCEDURE — 97112 NEUROMUSCULAR REEDUCATION: CPT

## 2017-04-04 PROCEDURE — 97110 THERAPEUTIC EXERCISES: CPT

## 2017-04-04 PROCEDURE — 97116 GAIT TRAINING THERAPY: CPT

## 2017-04-04 NOTE — PROGRESS NOTES
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]    SUBJECTIVE  Pain Level (0-10 scale): K-6  Any medication changes, allergies to medications, adverse drug reactions, diagnosis change, or new procedure performed?: [x] No    [] Yes (see summary sheet for update)  Subjective functional status/changes:   [] No changes reported  Pt reports compliance with HEP    OBJECTIVE    Modality rationale:  to improve the patients ability to    Min Type Additional Details    [] Estim:  []Unatt       []IFC  []Premod                        []Other:  []w/ice   []w/heat  Position:  Location:    [] Estim: []Att    []TENS instruct  []NMES                    []Other:  []w/US   []w/ice   []w/heat  Position:  Location:    []  Traction: [] Cervical       []Lumbar                       [] Prone          []Supine                       []Intermittent   []Continuous Lbs:  [] before manual  [] after manual    []  Ultrasound: []Continuous   [] Pulsed                           []1MHz   []3MHz W/cm2:  Location:    []  Iontophoresis with dexamethasone         Location: [] Take home patch   [] In clinic    []  Ice     []  heat  []  Ice massage  []  Laser   []  Anodyne Position:  Location:    []  Laser with stim  []  Other:  Position:  Location:    []  Vasopneumatic Device Pressure:       [] lo [] med [] hi   Temperature: [] lo [] med [] hi   [] Skin assessment post-treatment:  []intact []redness- no adverse reaction    []redness - adverse reaction:      min []Eval                  []Re-Eval       25 min Therapeutic Exercise:  [x] See flow sheet :   Rationale: increase ROM, increase strength and improve coordination to improve the patients ability to decrease risk for falls     min Therapeutic Activity:  []  See flow sheet :   Rationale:   to improve the patients ability to      8 min Neuromuscular Re-education:  [x]  See flow sheet : Balance act's   Rationale: increase strength, improve coordination, improve balance and increase proprioception  to improve the patients ability to decrease risk for falls     min Manual Therapy:     Rationale:  to     10 min Gait Training:  Dynamic gait per flow sheet   Rationale: to improve community ambulation          With   [] TE   [] TA   [] neuro   [] other: Patient Education: [x] Review HEP    [] Progressed/Changed HEP based on:   [] positioning   [] body mechanics   [] transfers   [] heat/ice application    [] other:      Other Objective/Functional Measures:      Pain Level (0-10 scale) post treatment: K-6    ASSESSMENT/Changes in Function: cont per POC    Patient will continue to benefit from skilled PT services to modify and progress therapeutic interventions, address functional mobility deficits, address ROM deficits, address strength deficits, analyze and address soft tissue restrictions, analyze and cue movement patterns, assess and modify postural abnormalities, address imbalance/dizziness and instruct in home and community integration to attain remaining goals. []  See Plan of Care  []  See progress note/recertification  []  See Discharge Summary         Progress towards goals / Updated goals:  Short term goals: to be accomplished in 2 weeks  1) Pt will report (I) and compliance with HEP for home management of symptoms. At eval: Instructed, demonstrated, and performed HEP  Current: Goal met per pt report (4/4/17)  2) Pt will demo Rhomberg EO on airex > or = 30 sec to decrease risk for falls.   At eval: Rhomberg EO on even ground 15 sec  Long term goals: to be accomplished in 6 weeks  1) Pt's FOTO score will improve > or = 64 indicating improvements in function. At eval: FOTO = 57  2) Pt will improve B LE MMT > or = 4/5 for functional strength during ambulation. At eval: hip flex L 4-/5 R 3+/5, knee ext L 3+/5 R 4-/5, knee flex B 4-/5, ankle DF L 4/5 R 4-/5  3) Pt will demo MSR B > or = 30 sec to decrease risk for falls. At eval: MSR B 5 sec  4) Pt will demo B SLS > or = 15 sec to decrease risk for falls. At eval: SLS L 5 sec R 3 sec  5) Pt will report > or = 60% improvement in symptoms in order to progress rehab.   At eval: 0%    PLAN  [x]  Upgrade activities as tolerated     [x]  Continue plan of care  []  Update interventions per flow sheet       []  Discharge due to:_  []  Other:_      Jaxon Lobe, PT 4/4/2017  12:46 PM    Future Appointments  Date Time Provider hospitals   4/18/2017 1:20 PM Tez Solo MD [facility]   5/2/2017 10:00 AM Alyssa Marvin, DO [facility] None   5/26/2017 9:45 AM [facility] Rosales [facility]   6/7/2017 10:00 AM Emmanuelle Marvin DO [facility] None   6/20/2017 9:45 AM Mery Vasquez MD [address] [place] [address]

## 2017-04-05 ENCOUNTER — DOCUMENTATION ONLY (OUTPATIENT)
Dept: NEUROLOGY | Age: 57
End: 2017-04-05

## 2017-04-06 RX ORDER — HYDROCODONE BITARTRATE AND ACETAMINOPHEN 10; 325 MG/1; MG/1
1 TABLET ORAL
Qty: 60 TAB | Refills: 0 | Status: SHIPPED | OUTPATIENT
Start: 2017-04-06 | End: 2017-10-11 | Stop reason: SDUPTHER

## 2017-04-06 NOTE — TELEPHONE ENCOUNTER
Last Visit: 02/24/2017 with BERNICE Frank    Next Appointment: 05/26/2017 with BERNICE Frank   Previous Refill Encounters: 02/24/2017 per BERNICE Frank #[id number]     Requested Prescriptions     Pending Prescriptions Disp Refills    HYDROcodone-acetaminophen (NORCO)  mg tablet 60 Tab 0     Sig: Take 1 Tab by mouth every eight (8) hours as needed for Pain. Max Daily Amount: 3 Tabs.

## 2017-04-10 DIAGNOSIS — M62.830 PARASPINAL MUSCLE SPASM: ICD-10-CM

## 2017-04-10 RX ORDER — METHOCARBAMOL 500 MG/1
TABLET, FILM COATED ORAL
Qty: 120 TAB | Refills: 0 | Status: SHIPPED | OUTPATIENT
Start: 2017-04-10 | End: 2017-06-07 | Stop reason: SDUPTHER

## 2017-04-11 ENCOUNTER — HOSPITAL ENCOUNTER (OUTPATIENT)
Dept: PHYSICAL THERAPY | Age: 57
End: 2017-04-11
Payer: MEDICARE

## 2017-04-13 ENCOUNTER — HOSPITAL ENCOUNTER (OUTPATIENT)
Dept: PHYSICAL THERAPY | Age: 57
Discharge: HOME | End: 2017-04-13
Payer: MEDICARE

## 2017-04-13 PROCEDURE — 97110 THERAPEUTIC EXERCISES: CPT

## 2017-04-13 PROCEDURE — 97116 GAIT TRAINING THERAPY: CPT

## 2017-04-13 PROCEDURE — 97140 MANUAL THERAPY 1/> REGIONS: CPT

## 2017-04-13 PROCEDURE — 97112 NEUROMUSCULAR REEDUCATION: CPT

## 2017-04-13 NOTE — PROGRESS NOTES
PT DAILY TREATMENT NOTE - [facility]    Patient Name: Giovanni Oats  Date:2017  : 1960  [x]  Patient  Verified  Payor: Tonny Patton / Plan: [facility] HUMANA MEDICARE CHOICE PPO/PFFS / Product Type: Managed Care Medicare /    In time:1:46  Out time:2:30  Total Treatment Time (min): 44  Total Timed Codes (min): 34  1:1 Treatment Time ( [address] only): 34   Visit #: 2 of 12    Treatment Area: Incomplete rotator cuff tear or rupture of left shoulder, not specified as traumatic [M75.112]    SUBJECTIVE  Pain Level (0-10 scale): 5  Any medication changes, allergies to medications, adverse drug reactions, diagnosis change, or new procedure performed?: [x] No    [] Yes (see summary sheet for update)  Subjective functional status/changes:   [] No changes reported  Patient reports compliance with HEP.     OBJECTIVE  Modality rationale: decrease pain to improve the patients ability to ease ADL tolerance   Min Type Additional Details    [] Estim:  []Unatt       []IFC  []Premod                        []Other:  []w/ice   []w/heat  Position:  Location:    [] Estim: []Att    []TENS instruct  []NMES                    []Other:  []w/US   []w/ice   []w/heat  Position:  Location:    []  Traction: [] Cervical       []Lumbar                       [] Prone          []Supine                       []Intermittent   []Continuous Lbs:  [] before manual  [] after manual    []  Ultrasound: []Continuous   [] Pulsed                           []1MHz   []3MHz Location:  W/cm2:    []  Iontophoresis with dexamethasone         Location: [] Take home patch   [] In clinic   10 [x]  Ice     []  heat  []  Ice massage  []  Laser   []  Anodyne Position: seated  Location: left shoulder    []  Laser with stim  []  Other: Position:  Location:    []  Vasopneumatic Device Pressure:       [] lo [] med [] hi   Temperature: [] lo [] med [] hi   [] Skin assessment post-treatment:  []intact []redness- no adverse reaction    []redness - adverse reaction:     26 min Therapeutic Exercise:  [x] See flow sheet :   Rationale: increase ROM, increase strength and improve coordination to improve the patients ability to improve activity tolerance      8 min Manual Therapy:  PROM left shoulder, GHJ posterior/superior capsule mobs, DTM left UT/ supraspinatus   Rationale: decrease pain, increase ROM and increase tissue extensibility to ease ADL tolerance            With   [] TE   [] TA   [] neuro   [] other: Patient Education: [x] Review HEP    [] Progressed/Changed HEP based on:   [] positioning   [] body mechanics   [] transfers   [] heat/ice application    [] other:      Other Objective/Functional Measures: first follow up session. Cues with teal theraband rows/bilateral shoulder ext to avoid UT subsitution    Pain Level (0-10 scale) post treatment: 5    ASSESSMENT/Changes in Function: Initiated therex per flowsheet. Patient presents with TTP to left supraspinatus. Patient presents with fatigue towards end of session as patient's reps become slower, will continue POC. Patient will continue to benefit from skilled PT services to modify and progress therapeutic interventions, address functional mobility deficits, address ROM deficits, address strength deficits, analyze and address soft tissue restrictions, analyze and cue movement patterns, analyze and modify body mechanics/ergonomics and assess and modify postural abnormalities to attain remaining goals. []  See Plan of Care  []  See progress note/recertification  []  See Discharge Summary         Progress towards goals / Updated goals:  Short term goals: to be accomplished in 2 weeks  1) Pt will report (I) and compliance with HEP for home management of symptoms. At eval: Instructed, demonstrated, and performed HEP  Current: progressing on 4/13/2017  2) Pt will improve L shoulder PROM flex, scap, ER WNL no increase in pain in order to perform ADL's.   At eval: flex 130 deg with pain, scap 150 deg with pain, ER 70 deg with pain  Long term goals: to be accomplished in 6 weeks  1) Pt's FOTO score will improve > or = 64 indicating improvements in function. Current: FOTO = 53  2) Pt will improve L shoulder AAROM flex/scap > or = 120 deg in order to perform ADL's. At eval: NT  3) Pt will improve L shoulder AROM flex/scap > or = 120 deg in order to perform ADL's. At eval: L shoulder AROM flex 115 deg, scap 110 deg  4) Pt will improve L shoulder AROM ER/IR to [facility] in order to perform ADL's. At eval: ER fingertip to L ear, IR fingertip to L glute max  5) Pt will report < or = 4/10 pain pre and post for 2 weeks in order to progress rehab.   At eval: 9/10 pre and 8/10 post    PLAN  []  Upgrade activities as tolerated     [x]  Continue plan of care  []  Update interventions per flow sheet       []  Discharge due to:_  []  Other:_      Carolyn Regalado 4/13/2017  12:44 PM    Future Appointments  Date Time Provider Oren Ramon   4/13/2017 1:00 PM Carolyn Regalado [facility]   4/13/2017 1:30 PM Carolyn Corpus [facility]   4/18/2017 11:00 AM Katianaeleanor Conwayio [facility]   4/18/2017 11:30 AM Katiana Mata [facility]   4/20/2017 1:00 PM Carolyn Corpus [facility]   4/20/2017 1:30 PM Katiana Mata [facility]   4/25/2017 11:30 AM Katiana Mata [facility]   4/25/2017 12:00 PM Katiana Conwayio [facility]   4/27/2017 11:30 AM Abby Sterling PT [facility]   4/27/2017 12:00 PM Abby Lie, PT [facility]   5/2/2017 10:00 AM Emmanuelle Marvin, DO [facility] None   5/2/2017 11:30 AM Jlseble Shell, PTA [facility]   5/2/2017 12:00 PM Jlseble Shell, PTA [facility]   5/2/2017 1:50 PM Colby Chen MD [facility]   5/4/2017 1:00 PM Jlseble Shell, PTA [facility]   5/4/2017 1:30 PM Jl Shell, PTA [facility]   5/26/2017 9:45 AM AMAURY Rubio [facility]   6/7/2017 10:00 AM Emmanuelle Marvin, DO [facility] None   6/20/2017 9:45 AM Mandi Colbert MD [address] [place] [address]

## 2017-04-13 NOTE — PROGRESS NOTES
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]    SUBJECTIVE  Pain Level (0-10 scale): 5-left knee  Any medication changes, allergies to medications, adverse drug reactions, diagnosis change, or new procedure performed?: [x] No    [] Yes (see summary sheet for update)  Subjective functional status/changes:   [] No changes reported  \"I had to cancel last time because I had a 24 stomach bug.\"    OBJECTIVE    24 min Therapeutic Exercise:  [x] See flow sheet :   Rationale: increase ROM, increase strength and improve coordination to improve the patients ability to perform ADLs    8 min Neuromuscular Re-education:  [x]  See flow sheet :   Rationale: increase strength, improve coordination and increase proprioception  to improve the patients ability to perform functional tasks     10 min Gait Training:  Dynamic gait per flow sheet   Rationale: to normalize gait and improve community ambulation              With   [] TE   [] TA   [] neuro   [] other: Patient Education: [x] Review HEP    [] Progressed/Changed HEP based on:   [] positioning   [] body mechanics   [] transfers   [] heat/ice application    [] other:      Other Objective/Functional Measures:   Increased HR/TR x15   Presents with moderate sway during rhomberg and EC (able to self correct)  Requires therapist assist x1 for MSR and EC with left LE behind    Dynamic gait without SPC: forward and backward, cues to widen XENA as patient presents with scissoring of left LE    Dynamic gait with SPC: marches and side steps, cues for core engagement and upright posture throughout    Patient ambulates throughout clinic at [facility] with forward flexed posture, flexed knees, and antalgic gait    Pain Level (0-10 scale) post treatment: 5-left knee    ASSESSMENT/Changes in Function: Patient is challenged with MSR and EC, will continue to challenge balance without visual system input. Frequent cues throughout treatment to maintain upright posture. Patient will continue to benefit from skilled PT services to modify and progress therapeutic interventions, address functional mobility deficits, address ROM deficits, address strength deficits, analyze and address soft tissue restrictions, analyze and cue movement patterns, analyze and modify body mechanics/ergonomics, assess and modify postural abnormalities and address imbalance/dizziness to attain remaining goals. []  See Plan of Care  []  See progress note/recertification  []  See Discharge Summary         Progress towards goals / Updated goals:  Short term goals: to be accomplished in 2 weeks  1) Pt will report (I) and compliance with HEP for home management of symptoms. At eval: Instructed, demonstrated, and performed HEP  Current: Goal met per pt report (4/4/17)  2) Pt will demo Rhomberg EO on airex > or = 30 sec to decrease risk for falls. At eval: Rhomberg EO on even ground 15 sec  Long term goals: to be accomplished in 6 weeks  1) Pt's FOTO score will improve > or = 64 indicating improvements in function. At eval: FOTO = 57  2) Pt will improve B LE MMT > or = 4/5 for functional strength during ambulation. At eval: hip flex L 4-/5 R 3+/5, knee ext L 3+/5 R 4-/5, knee flex B 4-/5, ankle DF L 4/5 R 4-/5  3) Pt will demo MSR B > or = 30 sec to decrease risk for falls. At eval: MSR B 5 sec  Current: met, able to perform MSR and EO 2x30\" on 4/13/2017  4) Pt will demo B SLS > or = 15 sec to decrease risk for falls. At eval: SLS L 5 sec R 3 sec  5) Pt will report > or = 60% improvement in symptoms in order to progress rehab.   At eval: 0%    PLAN  []  Upgrade activities as tolerated     [x]  Continue plan of care  []  Update interventions per flow sheet       []  Discharge due to:_  []  Other:_      Jorge Delong 4/13/2017  12:43 PM    Future Appointments  Date Time Provider Oren Yenny   4/13/2017 1:00 PM Jorge Baljeet [facility]   4/13/2017 1:30 PM Jorge Delong [facility]   4/18/2017 11:00 AM Katiana Mata [facility]   4/18/2017 11:30 AM Katiana Mata [facility]   4/20/2017 1:00 PM Ojrge Delong [facility]   4/20/2017 1:30 PM Katiana Mata [facility]   4/25/2017 11:30 AM Katiana Mata [facility]   4/25/2017 12:00 PM Katiana Mata [facility]   4/27/2017 11:30 AM Riddhi Piedra, PT [facility]   4/27/2017 12:00 PM Riddhi Piedra, PT [facility]   5/2/2017 10:00 AM Emmanuelle Marvin, DO [facility] None   5/2/2017 11:30 AM Guido Latif, PTA [facility]   5/2/2017 12:00 PM Guido Latif, PTA [facility]   5/2/2017 1:50 PM Annel Ruiz MD [facility] SCHED   5/4/2017 1:00 PM Guido Latif, PTA [facility]   5/4/2017 1:30 PM Guido Latif PTA [facility]   5/26/2017 9:45 AM AMAURY House [facility]   6/7/2017 10:00 AM [facility]   6/20/2017 9:45 AM Betsy Zamudio MD [address] [place] [address]

## 2017-04-18 ENCOUNTER — HOSPITAL ENCOUNTER (OUTPATIENT)
Dept: PHYSICAL THERAPY | Age: 57
Discharge: HOME | End: 2017-04-18
Payer: MEDICARE

## 2017-04-18 PROCEDURE — 97140 MANUAL THERAPY 1/> REGIONS: CPT

## 2017-04-18 PROCEDURE — 97110 THERAPEUTIC EXERCISES: CPT

## 2017-04-18 PROCEDURE — 97116 GAIT TRAINING THERAPY: CPT

## 2017-04-18 PROCEDURE — 97112 NEUROMUSCULAR REEDUCATION: CPT

## 2017-04-18 NOTE — PROGRESS NOTES
PT DAILY TREATMENT NOTE - [facility] 3-16    Patient Name: Jason Berger  Date:2017  : 1960  [x]  Patient  Verified  Payor: Demetrice Justo / Plan: [facility] [employer] MEDICARE CHOICE PPO/PFFS / Product Type: Managed Care Medicare /    In time:12:11  Out time:12:45  Total Treatment Time (min): 34  Total Timed Codes (min): 34  1:1 Treatment Time ([facility] only): 30   Visit #: 3 of 12    Treatment Area: Incomplete rotator cuff tear or rupture of left shoulder, not specified as traumatic [M75.112]    SUBJECTIVE  Pain Level (0-10 scale): 5  Any medication changes, allergies to medications, adverse drug reactions, diagnosis change, or new procedure performed?: [x] No    [] Yes (see summary sheet for update)  Subjective functional status/changes:   [] No changes reported  Patient reports no new complaints.  Patient reports compliance with HEP    OBJECTIVE  Modality rationale: PD   Min Type Additional Details    [] Estim:  []Unatt       []IFC  []Premod                        []Other:  []w/ice   []w/heat  Position:  Location:    [] Estim: []Att    []TENS instruct  []NMES                    []Other:  []w/US   []w/ice   []w/heat  Position:  Location:    []  Traction: [] Cervical       []Lumbar                       [] Prone          []Supine                       []Intermittent   []Continuous Lbs:  [] before manual  [] after manual    []  Ultrasound: []Continuous   [] Pulsed                           []1MHz   []3MHz Location:  W/cm2:    []  Iontophoresis with dexamethasone         Location: [] Take home patch   [] In clinic    []  Ice     []  heat  []  Ice massage  []  Laser   []  Anodyne     []  Laser with stim  []  Other: Position:  Location:    []  Vasopneumatic Device Pressure:       [] lo [] med [] hi   Temperature: [] lo [] med [] hi   [] Skin assessment post-treatment:  []intact []redness- no adverse reaction    []redness - adverse reaction:     26 min Therapeutic Exercise:  [x] See flow sheet :   Rationale: increase ROM, increase strength and improve coordination to improve the patients ability to improve activity tolerance    8 min Manual Therapy:  PROM left shoulder all planes, gentle GHJ mobs   Rationale: decrease pain, increase ROM and increase tissue extensibility to ease ADL tolerance            With   [] TE   [] TA   [] neuro   [] other: Patient Education: [x] Review HEP    [] Progressed/Changed HEP based on:   [] positioning   [] body mechanics   [] transfers   [] heat/ice application    [] other:      Other Objective/Functional Measures:   Increased table slides 5\" x10    Patient continues to require cues to decrease UT substitution during theraband rows/ bilateral shoulder extension     Pain Level (0-10 scale) post treatment: 2    ASSESSMENT/Changes in Function: Patient presents with decreased pain during manual today, states that her shoulder has been doing better since she has been doing her HEP. Patient demonstrates good tolerance to therex and reports decrease in pain from 5/10 to 2/10 pre and post session. Patient's session is cut short due to patient reports that she has to go  her mom since her mom recently fell. Will continue POC. Patient will continue to benefit from skilled PT services to modify and progress therapeutic interventions, address functional mobility deficits, address ROM deficits, address strength deficits, analyze and address soft tissue restrictions, analyze and cue movement patterns, analyze and modify body mechanics/ergonomics and assess and modify postural abnormalities to attain remaining goals. []  See Plan of Care  []  See progress note/recertification  []  See Discharge Summary             Progress towards goals / Updated goals:  Short term goals: to be accomplished in 2 weeks  1) Pt will report (I) and compliance with HEP for home management of symptoms.   At [facility]: Instructed, demonstrated, and performed HEP  Current: met on 4/18/2017  2) Pt will improve L shoulder PROM flex, scap, ER WNL no increase in pain in order to perform ADL's. At eval: flex 130 deg with pain, scap 150 deg with pain, ER 70 deg with pain  Current: flex 150 without pain, scap 164 without pain, ER 73 without pain on 4/18/2018  Long term goals: to be accomplished in 6 weeks  1) Pt's FOTO score will improve > or = 64 indicating improvements in function. Current: FOTO = 53  2) Pt will improve L shoulder AAROM flex/scap > or = 120 deg in order to perform ADL's. At eval: NT  3) Pt will improve L shoulder AROM flex/scap > or = 120 deg in order to perform ADL's. At eval: L shoulder AROM flex 115 deg, scap 110 deg  4) Pt will improve L shoulder AROM ER/IR to [facility] in order to perform ADL's. At eval: ER fingertip to L ear, IR fingertip to L glute max  5) Pt will report < or = 4/10 pain pre and post for 2 weeks in order to progress rehab.   At eval: 9/10 pre and 8/10 post    PLAN  []  Upgrade activities as tolerated     [x]  Continue plan of care  []  Update interventions per flow sheet       []  Discharge due to:_  []  Other:_      Dino Hebert 4/18/2017  10:09 AM    Future Appointments  Date Time Provider Oren Ramon   4/18/2017 11:00 AM Dino Hebert [facility]   4/18/2017 11:30 AM Katiana Mata [facility]   4/20/2017 1:00 PM Dino Hebert [facility]   4/20/2017 1:30 PM Dino Hebert [facility]   4/25/2017 11:30 AM Katiana Mata [facility]   4/25/2017 12:00 PM Katiana Mata [facility]   4/27/2017 11:30 AM Lay Morris PT [facility]   4/27/2017 12:00 PM Lay Morris, PT [facility]   5/2/2017 10:00 AM Emmanuelle Marvin, DO [facility] None   5/2/2017 11:30 AM Klaudia Susan, PTA [facility]   5/2/2017 12:00 PM Klaudia Arelie, PTA [facility]   5/2/2017 1:50 PM [facility]   5/4/2017 1:00 PM Klaudia Susan, PTA [facility]   5/4/2017 1:30 PM Klaudiadanica Davis, PTA [facility]   5/26/2017 9:45 AM AMAURY Delacruz Miguel 70   6/7/2017 10:00 AM Emmanuelle Marvin,  [facility] None   6/20/2017 9:45 AM Celina Marquez  [facility]

## 2017-04-18 NOTE — PROGRESS NOTES
PT DAILY TREATMENT NOTE - [facility] 316    Patient Name: Maria Dolores Lenz  Date:2017  : 1960  [x]  Patient  Verified  Payor: Zaina Kamara / Plan: 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 Any medication changes, allergies to medications, adverse drug reactions, diagnosis change, or new procedure performed?: [x] No    [] Yes (see summary sheet for update)  Subjective functional status/changes:   [] No changes reported  Patient reports that she fell out of her bed last night and that she lost her balance and almost fell getting her laundry out of the washing machine a few days ago, however, states her niece was present and caught her. Patient reports that she has been practicing the balancing activities at home where her eyes are closed, therapist educates patient that the exercises she is to do at home are only the ones printed out on the HEP and not the ones that the patient performs in therapy, therapist instructs patient to refrain from performing any exercise at home besides the HEP due to safety risks, patient states verbal understanding.      OBJECTIVE  25 min Therapeutic Exercise:  [x] See flow sheet :   Rationale: increase ROM, increase strength and improve coordination to improve the patients ability to perform ADLs    8 min Neuromuscular Re-education:  [x]  See flow sheet :   Rationale: increase strength, improve coordination, improve balance and increase proprioception  to improve the patients ability to improve stability for ambulation     10 min Gait Training:  Dynamic gait per flow sheet   Rationale: to normalize gait and improve community ambulation            With   [] TE   [] TA   [] neuro [] other: Patient Education: [x] Review HEP    [] Progressed/Changed HEP based on:   [] positioning   [] body mechanics   [] transfers   [] heat/ice application    [] other:      Other Objective/Functional Measures:   Demonstrates mild-moderate sway during MSR with EC, presents with forward flexed posture     Demonstrates mild sway during rhomberg with EC, is able to self correct via ankle strategy    Patient is able to maintain widened XENA during dynamic gait without SPC and demonstrates decreased scissoring of left LE today with cues for increased eloisa and for upright posture    Cues to decrease weight bear on SPC during marches and side stepping    Pain Level (0-10 scale) post treatment: 8-left knee    ASSESSMENT/Changes in Function: Patient education provided to patient to only perform exercises provided on HEP as patient reports that she has been practicing the exercises where her eyes are closed at home, therapist instructs patient to refrain from performing such exercises due to safety concerns, patient states verbal understanding and reports that she will only perform her HEP at home. Patient will continue to benefit from skilled PT services to modify and progress therapeutic interventions, address functional mobility deficits, address ROM deficits, address strength deficits, analyze and address soft tissue restrictions, analyze and cue movement patterns, analyze and modify body mechanics/ergonomics, assess and modify postural abnormalities and address imbalance/dizziness to attain remaining goals. []  See Plan of Care  []  See progress note/recertification  []  See Discharge Summary         Progress towards goals / Updated goals:  Short term goals: to be accomplished in 2 weeks  1) Pt will report (I) and compliance with HEP for home management of symptoms.   At eval: Instructed, demonstrated, and performed HEP  Current: Goal met per pt report (4/4/17)  2) Pt will demo Rhomberg EO on airex > or = 30 sec to decrease risk for falls. At eval: Rhomberg EO on even ground 15 sec  Long term goals: to be accomplished in 6 weeks  1) Pt's FOTO score will improve > or = 64 indicating improvements in function. At eval: FOTO = 57  2) Pt will improve B LE MMT > or = 4/5 for functional strength during ambulation. At eval: hip flex L 4-/5 R 3+/5, knee ext L 3+/5 R 4-/5, knee flex B 4-/5, ankle DF L 4/5 R 4-/5  Current: hip flex L 4-/5 R 3+/5, knee ext L 4-/5 R 4/5, knee flex R 4-/5 L 4/5, DF L 4/5 R 4-/5 on 4/18/2018  3) Pt will demo MSR B > or = 30 sec to decrease risk for falls. At eval: MSR B 5 sec  Current: met, able to perform MSR and EO 2x30\" on 4/13/2017  4) Pt will demo B SLS > or = 15 sec to decrease risk for falls. At eval: SLS L 5 sec R 3 sec  Current: progressing, currently RLE x10 sec, LLE x20 sec, on 4/18/2017  5) Pt will report > or = 60% improvement in symptoms in order to progress rehab.   At eval: 0%    PLAN  []  Upgrade activities as tolerated     [x]  Continue plan of care  []  Update interventions per flow sheet       []  Discharge due to:_  []  Other:_      Saima Monroy 4/18/2017  10:07 AM    Future Appointments  Date Time Provider Oren Ramon   4/18/2017 11:00 AM Saima Monroy [facility]   4/18/2017 11:30 AM Katiana Mata [facility]   4/20/2017 1:00 PM Saima Monroy [facility]   4/20/2017 1:30 PM Saima Monroy [facility]   4/25/2017 11:30 AM Katiana Mata [facility]   4/25/2017 12:00 PM Saima Monroy [facility]   4/27/2017 11:30 AM Amanda Effie, PT [facility]   4/27/2017 12:00 PM Amanda Chou, PT [facility]   5/2/2017 10:00 AM Todd-Trever Marvin, DO [facility] None   5/2/2017 11:30 AM Verner Seals, PTA [facility]   5/2/2017 12:00 PM Verner Seals, PTA [facility]   5/2/2017 1:50 PM [facility]   5/4/2017 1:00 PM Verner Seals, PTA [facility]   5/4/2017 1:30 PM Verner Seals, PTA [facility]   5/26/2017 9:45 AM AMAURY Hernandez Miguel 70   6/7/2017 10:00 AM [facility]   6/20/2017 9:45 AM Tom Art MD [facility] [place] [facility]

## 2017-04-20 ENCOUNTER — HOSPITAL ENCOUNTER (OUTPATIENT)
Dept: PHYSICAL THERAPY | Age: 57
End: 2017-04-20
Payer: MEDICARE

## 2017-04-25 ENCOUNTER — HOSPITAL ENCOUNTER (OUTPATIENT)
Dept: PHYSICAL THERAPY | Age: 57
Discharge: HOME | End: 2017-04-25
Payer: MEDICARE

## 2017-04-25 PROCEDURE — G8984 CARRY CURRENT STATUS: HCPCS

## 2017-04-25 PROCEDURE — 97112 NEUROMUSCULAR REEDUCATION: CPT

## 2017-04-25 PROCEDURE — G8978 MOBILITY CURRENT STATUS: HCPCS

## 2017-04-25 PROCEDURE — G8979 MOBILITY GOAL STATUS: HCPCS

## 2017-04-25 PROCEDURE — 97116 GAIT TRAINING THERAPY: CPT

## 2017-04-25 PROCEDURE — 97110 THERAPEUTIC EXERCISES: CPT

## 2017-04-25 PROCEDURE — G8985 CARRY GOAL STATUS: HCPCS

## 2017-04-25 NOTE — PROGRESS NOTES
PT DAILY TREATMENT NOTE - [facility] 316    Patient Name: Giovanni Roy  Date:2017  : 1960  [x]  Patient  Verified  Payor: Jose Raul Emmanuel / Plan: 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 \" Patient reports that she has not had any falls recently.  Patient reports that she is going to see her heart doctor today and must leave therapy by 12:30 PM.     OBJECTIVE  26 min Therapeutic Exercise:  [x] See flow sheet :   Rationale: increase ROM, increase strength and improve coordination to improve the patients ability to reduce patient's fall risk    8 min Neuromuscular Re-education:  [x]  See flow sheet :   Rationale: improve coordination, improve balance and increase proprioception  to improve the patients ability to reduce patient's fall risk     10 min Gait Training: dynamic gait per flow sheet   Rationale: to normalize gait and improve tolerance to community ambulation            With   [] TE   [] TA   [] neuro   [] other: Patient Education: [x] Review HEP    [] Progressed/Changed HEP based on:   [] positioning   [] body mechanics   [] transfers   [] heat/ice application    [] other:      Other Objective/Functional Measures:   FOTO score of 55     Presents with minimal sway during MSR with EC    Dynamic gait activities per flowsheet without SPC    Pain Level (0-10 scale) post treatment: 5    ASSESSMENT/Changes in Function: Patient making steady progress towards goals, able to maintain rhomberg EO on airex x 40 seconds today with minimal sway. Patient is able to perform all dynamic gait activities without SPC today, is challenged with marches and requires therapist assist x1 to regain balance, however, presents with improved sidestepping form. Patient would continue to benefit from skilled PT to decrease fall risk. Patient will continue to benefit from skilled PT services to modify and progress therapeutic interventions, address functional mobility deficits, address ROM deficits, address strength deficits, analyze and address soft tissue restrictions, analyze and cue movement patterns, analyze and modify body mechanics/ergonomics, assess and modify postural abnormalities and address imbalance/dizziness to attain remaining goals. []  See Plan of Care  [x]  See progress note/recertification  []  See Discharge Summary         Short term goals: to be accomplished in 2 weeks  1) Pt will report (I) and compliance with HEP for home management of symptoms. At [facility]: Instructed, demonstrated, and performed HEP  Current: Goal met per pt report (4/4/17)  2) Pt will demo Rhomberg EO on airex > or = 30 sec to decrease risk for falls. At [facility]: Rhomberg EO on even ground 15 sec  Current: met, able to perform x40 sec on 4/25/2017  Long term goals: to be accomplished in 6 weeks  1) Pt's FOTO score will improve > or = 64 indicating improvements in function. At [facility]: FOTO = 57  Current: not met, score of 55 on 4/25/2017  2) Pt will improve B LE MMT > or = 4/5 for functional strength during ambulation. At eval: hip flex L 4-/5 R 3+/5, knee ext L 3+/5 R 4-/5, knee flex B 4-/5, ankle DF L 4/5 R 4-/5  Current: hip flex L 4-/5 R 3+/5, knee ext L 4-/5 R 4/5, knee flex R 4-/5 L 4/5, DF L 4/5 R 4-/5 on 4/18/2018  3) Pt will demo MSR B > or = 30 sec to decrease risk for falls.   At [facility]: MSR B 5 sec  Current: met, able to perform MSR and EO 2x30\" on 4/13/2017  4) Pt will demo B SLS > or = 15 sec to decrease risk for falls. At eval: SLS L 5 sec R 3 sec  Current: progressing, currently RLE x10 sec, LLE x20 sec, on 4/18/2017  5) Pt will report > or = 60% improvement in symptoms in order to progress rehab.   At eval: 0%  Current: progressing, patient reports 50% improvement on 4/25/2017    PLAN  []  Upgrade activities as tolerated     [x]  Continue plan of care  []  Update interventions per flow sheet       []  Discharge due to:_  []  Other:_      Jay Ache 4/25/2017  10:01 AM    Future Appointments  Date Time Provider Oren Yenny   4/25/2017 11:30 AM Jay Ache [facility]   4/25/2017 12:00 PM Jay Ache [facility]   4/27/2017 11:30 AM Lizet Soliz, PT [facility]   4/27/2017 12:00 PM Lizet Soliz, PT [facility]   5/2/2017 10:00 AM Emmanuelle Marvin, DO [facility] None   5/2/2017 11:30 AM Daily Barnes PTA [facility]   5/2/2017 12:00 PM Daily Barnes PTA [facility]   5/2/2017 1:50 PM [facility]   5/4/2017 1:00 PM Daily Barnes PTA [facility]   5/4/2017 1:30 PM Daily Barnes PTA [facility]   5/26/2017 9:45 AM AMAURY Gray 70   6/7/2017 10:00 AM Emmanuelle Marvin, DO [facility] None   6/20/2017 9:45 AM Micheline Barnes MD [address] [place] [address]

## 2017-04-25 NOTE — PROGRESS NOTES
[facility]              [address]        [place], [address]   [phone] fax    Medicare Progress Report    Patient name: Romana Ramirez Start of Care: 3/21/2017   Referral source: Chilango Murray MD : 1960   Medical/Treatment Diagnosis: Idiopathic progressive neuropathy [G60.3] Onset Date:progressive over last 2 years     Prior Hospitalization: see medical history Provider#: [id number]   Medications: Verified on Patient Summary List    Comorbidities: Arthritis, Back pain, BMI over 30, CHF, HTN, Osteoporosis, Pacemaker  Prior Level of Function: Pt is not employed; (I) with ADL's but ambulates in community with [facility] and is fall risk. Visits from Start of Care: 5    Missed Visits: 3  30 day PN missed due to pt having to cxl last appt    Reporting Period: 3/21/2017 to 2017    Subjective Reports: Pt reports 50% improvement since [facility]    Key functional changes:   Short term goals: to be accomplished in 2 weeks  1) Pt will report (I) and compliance with HEP for home management of symptoms. At eval: Instructed, demonstrated, and performed HEP  Current: Goal met per pt report   2) Pt will demo Rhomberg EO on airex > or = 30 sec to decrease risk for falls. At eval: Rhomberg EO on even ground 15 sec  Current: met, able to perform x40 sec   Long term goals: to be accomplished in 6 weeks  1) Pt's FOTO score will improve > or = 64 indicating improvements in function. At eval: FOTO = 57  Current: not met, score of 55 on   2) Pt will improve B LE MMT > or = 4/5 for functional strength during ambulation. At eval: hip flex L 4-/5 R 3+/5, knee ext L 3+/5 R 4-/5, knee flex B 4-/5, ankle DF L 4/5 R 4-/5  Current: hip flex L 4-/5 R 3+/5, knee ext L 4-/5 R 4/5, knee flex R 4-/5 L 4/5, DF L 4/5 R 4-/5    3) Pt will demo MSR B > or = 30 sec to decrease risk for falls.   At eval: MSR B 5 sec  Current: met, able to perform MSR and EO 2x30\"    4) Pt will demo B SLS > or = 15 sec to decrease risk for falls. At eval: SLS L 5 sec R 3 sec  Current: progressing, currently RLE x10 sec, LLE x20 sec   5) Pt will report > or = 60% improvement in symptoms in order to progress rehab. At eval: 0%  Current: progressing, patient reports 50% improvement     Patient making steady progress towards goals, able to maintain rhomberg EO on airex x 40 seconds today with minimal sway. Patient is able to perform all dynamic gait activities without SPC today, is challenged with marches and requires therapist assist x1 to regain balance, however, presents with improved sidestepping form. Problems/ barriers to goal attainment: Other medical issues     Assessment / Recommendations: Patient will continue to benefit from skilled PT services to modify and progress therapeutic interventions, address functional mobility deficits, address ROM deficits, address strength deficits, analyze and address soft tissue restrictions, analyze and cue movement patterns, analyze and modify body mechanics/ergonomics, assess and modify postural abnormalities and address imbalance/dizziness to attain remaining goals. Problem List: pain affecting function, decrease ROM, decrease strength, impaired gait/ balance, decrease ADL/ functional abilitiies, decrease activity tolerance, decrease flexibility/ joint mobility and decrease transfer abilities   Treatment Plan: Therapeutic exercise, Therapeutic activities, Neuromuscular re-education, Physical agent/modality, Gait/balance training, Manual therapy and Patient education    Patient Goal (s) has been updated and includes: No more falls. Updated Goals to be accomplished in 4 weeks:  Continue with above unmet goals    Frequency / Duration: Patient to be seen 2 times per week for 4 weeks:    G-Codes (GP)  Mobility  [id number] Current  CK= 40-59%   Goal  CJ= 20-39%    The severity rating is based on clinical judgment and the FOTO score.       Nolan Escobar, PT 4/25/2017 1:56 PM

## 2017-04-25 NOTE — PROGRESS NOTES
[facility] - [facility]              [address], [address]   [phone] fax    Medicare Progress Report    Patient name: Prateek Holt Start of Care: 3/24/2017   Referral source: Arya Rojas,* : 1960   Medical/Treatment Diagnosis: Incomplete rotator cuff tear or rupture of left shoulder, not specified as traumatic [M75.112] Onset Date:~1 year ago     Prior Hospitalization: see medical history Provider#: [id number]   Medications: Verified on Patient Summary List    Comorbidities: Arthritis, Back pain, BMI over 30, CHF, Hearth Attack, HTN, Osteoporosis, Pacemaker  Prior Level of Function: Pt is on disability; but (I) with all ADL's. Pt ambulates with SPC on L and is R hand dominate. Visits from Start of Care: 4    Missed Visits: 2  30 day PN missed due to pt cxl last appt    Reporting Period: 3/24/2017 to 2017    Subjective Reports: Pt reports 0% improvement since [facility] and pain is still constant, nagging pain. Key functional changes:   Progress towards goals / Updated goals:  Short term goals: to be accomplished in 2 weeks  1) Pt will report (I) and compliance with HEP for home management of symptoms. At eval: Instructed, demonstrated, and performed HEP  Current: met   2) Pt will improve L shoulder PROM flex, scap, ER WNL no increase in pain in order to perform ADL's. At eval: flex 130 deg with pain, scap 150 deg with pain, ER 70 deg with pain  Current: flex 150 without pain, scap 164 without pain, ER 73 without pain   Long term goals: to be accomplished in 6 weeks  1) Pt's FOTO score will improve > or = 64 indicating improvements in function. Current: FOTO = 53  Current: progressing, score of 59   2) Pt will improve L shoulder AAROM flex/scap > or = 120 deg in order to perform ADL's. At eval: NT  3) Pt will improve L shoulder AROM flex/scap > or = 120 deg in order to perform ADL's.   At eval: L shoulder AROM flex 115 deg, scap 110 deg  Current: L shoulder AROM flex 142 deg with pain, scap 135 deg with pain  4) Pt will improve L shoulder AROM ER/IR to [facility] in order to perform ADL's. At eval: ER fingertip to L ear, IR fingertip to L glute max  5) Pt will report < or = 4/10 pain pre and post for 2 weeks in order to progress rehab. At eval: 9/10 pre and 8/10 post  Current: 3/10 pre and 8/10 post today    Session is limited due to time constraint (patient's reports \"I have to be out of here by 12:30 PM\") and patient's c/o \"nagging\" pain today. Patient's AROM measurements have improved even with c/o pain today. Patient's pain increases significantly pre/post session, will continue POC per patient tolerance. Problems/ barriers to goal attainment: Other medical issues     Assessment / Recommendations: Patient will continue to benefit from skilled PT services to modify and progress therapeutic interventions, address functional mobility deficits, address ROM deficits, address strength deficits, analyze and address soft tissue restrictions, analyze and cue movement patterns, analyze and modify body mechanics/ergonomics and assess and modify postural abnormalities to attain remaining goals. Problem List: pain affecting function, decrease ROM, decrease strength, decrease ADL/ functional abilitiies, decrease activity tolerance and decrease flexibility/ joint mobility   Treatment Plan: Therapeutic exercise, Therapeutic activities, Neuromuscular re-education, Physical agent/modality, Manual therapy and Patient education    Patient Goal (s) has been updated and includes: Less pain in shoulder     Updated Goals to be accomplished in 4 weeks:  Continue with above unmet goals    Frequency / Duration: Patient to be seen 2 times per week for 4 weeks:    G-Codes (GP)  Carry   Current  CK= 40-59%    Goal  CJ= 20-39%    The severity rating is based on clinical judgment and the FOTO score.       Annette Parikh, PT 4/25/2017 2:04 PM

## 2017-04-25 NOTE — PROGRESS NOTES
PT DAILY TREATMENT NOTE - [facility] 3-16    Patient Name: Anup Browne  Date:2017  : 1960  [x]  Patient  Verified  Payor: Bonnie Tapia / Plan: [facility] [employer] MEDICARE CHOICE PPO/PFFS / Product Type: Managed Care Medicare /    In time:12:05  Out time:12:30  Total Treatment Time (min): 25  Total Timed Codes (min): 15  1:1 Treatment Time ( [address] only): 15   Visit #: 4 of 12    Treatment Area: Incomplete rotator cuff tear or rupture of left shoulder, not specified as traumatic [M75.112]    SUBJECTIVE  Pain Level (0-10 scale): 3  Any medication changes, allergies to medications, adverse drug reactions, diagnosis change, or new procedure performed?: [x] No    [] Yes (see summary sheet for update)  Subjective functional status/changes:   [] No changes reported  \"It is a nagging pain today. I had wanted my shoulder pain to decrease to a 2/10 or a 0/10 after therapy. I haven't eaten anything all day because I have a stress test today. \"    OBJECTIVE  Modality rationale: decrease pain to improve the patients ability to ease ADL tolerance   Min Type Additional Details    [] Estim:  []Unatt       []IFC  []Premod                        []Other:  []w/ice   []w/heat  Position:  Location:    [] Estim: []Att    []TENS instruct  []NMES                    []Other:  []w/US   []w/ice   []w/heat  Position:  Location:    []  Traction: [] Cervical       []Lumbar                       [] Prone          []Supine                       []Intermittent   []Continuous Lbs:  [] before manual  [] after manual    []  Ultrasound: []Continuous   [] Pulsed                           []1MHz   []3MHz Location:  W/cm2:    []  Iontophoresis with dexamethasone         Location: [] Take home patch   [] In clinic   10 [x]  Ice     []  heat  []  Ice massage  []  Laser   []  Anodyne Position:seated  Location: left shoulder    []  Laser with stim  []  Other: Position:  Location:    []  Vasopneumatic Device Pressure:       [] lo [] med [] hi Temperature: [] lo [] med [] hi   [x] Skin assessment post-treatment:  [x]intact []redness- no adverse reaction    []redness - adverse reaction:     15 min Therapeutic Exercise:  [x] See flow sheet :   Rationale: increase ROM, increase strength and improve coordination to improve the patients ability to perform ADLs              With   [] TE   [] TA   [] neuro   [] other: Patient Education: [x] Review HEP    [] Progressed/Changed HEP based on:   [] positioning   [] body mechanics   [] transfers   [] heat/ice application    [] other:      Other Objective/Functional Measures:   Patient reports pain with pulley's   FOTO score of [address] through therex, patient begins to cry and states \"I don't want to be in pain\", therapist holds therex per flowsheet. Left shoulder  flexion AROM: 142 degrees (with pain)  Left shoulder scaption AROM: 135 degrees (with pain)    Pain Level (0-10 scale) post treatment: 8 \"nagging\"    ASSESSMENT/Changes in Function: Session is limited due to time constraint (patient's reports \"I have to be out of here by 12:30 PM\") and patient's c/o \"nagging\" pain today. Patient's AROM measurements have improved even with c/o pain today. Patient's pain increases significantly pre/post session, will continue POC per patient tolerance. Patient will continue to benefit from skilled PT services to modify and progress therapeutic interventions, address functional mobility deficits, address ROM deficits, address strength deficits, analyze and address soft tissue restrictions, analyze and cue movement patterns, analyze and modify body mechanics/ergonomics and assess and modify postural abnormalities to attain remaining goals. []  See Plan of Care  [x]  See progress note/recertification  []  See Discharge Summary         Progress towards goals / Updated goals:  Short term goals: to be accomplished in 2 weeks  1) Pt will report (I) and compliance with HEP for home management of symptoms.   At eval: Instructed, demonstrated, and performed HEP  Current: met on 4/18/2017  2) Pt will improve L shoulder PROM flex, scap, ER WNL no increase in pain in order to perform ADL's. At eval: flex 130 deg with pain, scap 150 deg with pain, ER 70 deg with pain  Current: flex 150 without pain, scap 164 without pain, ER 73 without pain on 4/18/2018  Long term goals: to be accomplished in 6 weeks  1) Pt's FOTO score will improve > or = 64 indicating improvements in function. Current: FOTO = 53  Current: progressing, score of 59 on 4/25/2017  2) Pt will improve L shoulder AAROM flex/scap > or = 120 deg in order to perform ADL's. At eval: NT  3) Pt will improve L shoulder AROM flex/scap > or = 120 deg in order to perform ADL's. At eval: L shoulder AROM flex 115 deg, scap 110 deg  Current: L shoulder AROM flex 142 deg with pain, scap 135 deg with pain on 4/25/2017  4) Pt will improve L shoulder AROM ER/IR to [facility] in order to perform ADL's. At eval: ER fingertip to L ear, IR fingertip to L glute max  5) Pt will report < or = 4/10 pain pre and post for 2 weeks in order to progress rehab.   At eval: 9/10 pre and 8/10 post    PLAN  []  Upgrade activities as tolerated     [x]  Continue plan of care  []  Update interventions per flow sheet       []  Discharge due to:_  []  Other:_      Aren Potts 4/25/2017  10:05 AM    Future Appointments  Date Time Provider Oren Ramon   4/25/2017 11:30 AM Aren Potts [facility]   4/25/2017 12:00 PM [facility]   4/27/2017 11:30 AM Lorena Ling PT [facility]   4/27/2017 12:00 PM Lorena Ling PT [facility]   5/2/2017 10:00 AM Emmanuelle Marvin DO [facility] None   5/2/2017 11:30 AM Nancy Paez PTA [facility]   5/2/2017 12:00 PM Nancy Paez PTA [facility]   5/2/2017 1:50 PM Claressa Aase, MD [facility]ED   5/4/2017 1:00 PM Nancy Paez PTA [facility]   5/4/2017 1:30 PM Nancy Paez PTA [facility]   5/26/2017 9:45 AM Violeta Santiago PA-C [id number] SCHED   6/7/2017 10:00 AM Emmanuelle Marvin DO [facility] None   6/20/2017 9:45 AM Stas Christopher MD [address] [place] [address]

## 2017-04-27 ENCOUNTER — HOSPITAL ENCOUNTER (OUTPATIENT)
Dept: PHYSICAL THERAPY | Age: 57
Discharge: HOME | End: 2017-04-27
Payer: MEDICARE

## 2017-04-27 PROCEDURE — 97110 THERAPEUTIC EXERCISES: CPT

## 2017-04-27 PROCEDURE — 97140 MANUAL THERAPY 1/> REGIONS: CPT

## 2017-04-27 PROCEDURE — 97112 NEUROMUSCULAR REEDUCATION: CPT

## 2017-04-27 NOTE — PROGRESS NOTES
PT DAILY TREATMENT NOTE - [facility]     Patient Name: Liz Alcazar  Date:2017  : 1960  [x]  Patient  Verified  Payor: Dominga Jhaveri / Plan: [facility] [employer] MEDICARE CHOICE PPO/PFFS / Product Type: Managed Care Medicare /    In time:11:24  Out time:11:56  Total Treatment Time (min): 32  Total Timed Codes (min): 32  1:1 Treatment Time ( [address] only): 32   Visit #: 1 of 8    Treatment Area: Incomplete rotator cuff tear or rupture of left shoulder, not specified as traumatic [M75.112]    SUBJECTIVE  Pain Level (0-10 scale): 3  Any medication changes, allergies to medications, adverse drug reactions, diagnosis change, or new procedure performed?: [x] No    [] Yes (see summary sheet for update)  Subjective functional status/changes:   [] No changes reported  Pt reports her shoulder is feeling better today compared to last session.      OBJECTIVE    Modality rationale:  to improve the patients ability to    Min Type Additional Details    [] Estim:  []Unatt       []IFC  []Premod                        []Other:  []w/ice   []w/heat  Position:  Location:    [] Estim: []Att    []TENS instruct  []NMES                    []Other:  []w/US   []w/ice   []w/heat  Position:  Location:    []  Traction: [] Cervical       []Lumbar                       [] Prone          []Supine                       []Intermittent   []Continuous Lbs:  [] before manual  [] after manual    []  Ultrasound: []Continuous   [] Pulsed                           []1MHz   []3MHz W/cm2:  Location:    []  Iontophoresis with dexamethasone         Location: [] Take home patch   [] In clinic    []  Ice     []  heat  []  Ice massage  []  Laser   []  Anodyne Position:  Location:    []  Laser with stim  []  Other:  Position:  Location:    []  Vasopneumatic Device Pressure:       [] lo [] med [] hi   Temperature: [] lo [] med [] hi   [] Skin assessment post-treatment:  []intact []redness- no adverse reaction    []redness - adverse reaction:      min []Eval []Re-Eval       22 min Therapeutic Exercise:  [x] See flow sheet :   Rationale: increase ROM, increase strength and improve coordination to improve the patients ability to decrease pain and perform ADL's     min Therapeutic Activity:  []  See flow sheet :   Rationale:   to improve the patients ability to       min Neuromuscular Re-education:  []  See flow sheet :   Rationale:   to improve the patients ability to     10 min Manual Therapy:  Per flow sheet   Rationale: decrease pain, increase ROM, increase tissue extensibility and decrease trigger points to increase ease with ADL's     min Gait Training:  ___ feet with ___ device on level surfaces with ___ level of assist   Rationale: With   [] TE   [] TA   [] neuro   [] other: Patient Education: [x] Review HEP    [] Progressed/Changed HEP based on:   [] positioning   [] body mechanics   [] transfers   [] heat/ice application    [] other:      Other Objective/Functional Measures: ER fingertip to levator, IR thumb to lower L/S   Pain Level (0-10 scale) post treatment: 2    ASSESSMENT/Changes in Function: cont per POC    Patient will continue to benefit from skilled PT services to modify and progress therapeutic interventions, address functional mobility deficits, address ROM deficits, address strength deficits, analyze and address soft tissue restrictions, analyze and cue movement patterns, assess and modify postural abnormalities and instruct in home and community integration to attain remaining goals. []  See Plan of Care  []  See progress note/recertification  []  See Discharge Summary         Progress towards goals / Updated goals:  Short term goals: to be accomplished in 2 weeks  1) Pt will improve L shoulder PROM flex, scap, ER WNL no increase in pain in order to perform ADL's. At PN: Progressing- flex 150 without pain, scap 164 without pain, ER 73 without pain.  4/18/2018  Long term goals: to be accomplished in 6 weeks  1) Pt's FOTO score will improve > or = 64 indicating improvements in function. At PN: progressing, score of 59 (+6 since I.E.) (4/25/17)  2) Pt will improve L shoulder AAROM flex/scap > or = 120 deg in order to perform ADL's. At eval: NT  3) Pt will improve L shoulder AROM flex/scap > or = 120 deg in order to perform ADL's. At PN: Progressing- L shoulder AROM flex 142 deg with pain, scap 135 deg with pain on 4/25/2017  4) Pt will improve L shoulder AROM ER/IR to [facility] in order to perform ADL's. At eval: ER fingertip to L ear, IR fingertip to L glute max  Current: Progressing- ER to L levator, IR thumb to lower L/S (4/27/17)  5) Pt will report < or = 4/10 pain pre and post for 2 weeks in order to progress rehab.   At eval: 9/10 pre and 8/10 post  Current: Progressing- 3-5/10 pre and 2-8/10 post last 4 sessions (4/27/17)    PLAN  [x]  Upgrade activities as tolerated     [x]  Continue plan of care  []  Update interventions per flow sheet       []  Discharge due to:_  []  Other:_      Jaxon Hough, PT 4/27/2017  12:31 PM    Future Appointments  Date Time Provider Oren Ramon   5/2/2017 10:00 AM Emmanuelle Marvin DO [facility] None   5/2/2017 11:30 AM Jay Jay Alves PTA [facility]   5/2/2017 12:00 PM Jay Jay Alves PTA [facility]   5/2/2017 1:50 PM Tez Solo MD [facility] SCHED   5/4/2017 1:00 PM Jay Jay Alves PTA [facility]   5/4/2017 1:30 PM Jay Jay Alves PTA [facility]   5/26/2017 9:45 AM AMAURY Rosales   6/7/2017 10:00 AM Emmanuelle Marvin DO [facility] None   6/20/2017 9:45 AM Mery Vasquez MD [facility]

## 2017-04-27 NOTE — PROGRESS NOTES
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      OBJECTIVE    Modality rationale:  to improve the patients ability to    Min Type Additional Details    [] Estim:  []Unatt       []IFC  []Premod                        []Other:  []w/ice   []w/heat  Position:  Location:    [] Estim: []Att    []TENS instruct  []NMES                    []Other:  []w/US   []w/ice   []w/heat  Position:  Location:    []  Traction: [] Cervical       []Lumbar                       [] Prone          []Supine                       []Intermittent   []Continuous Lbs:  [] before manual  [] after manual    []  Ultrasound: []Continuous   [] Pulsed                           []1MHz   []3MHz W/cm2:  Location:    []  Iontophoresis with dexamethasone         Location: [] Take home patch   [] In clinic    []  Ice     []  heat  []  Ice massage  []  Laser   []  Anodyne Position:  Location:    []  Laser with stim  []  Other:  Position:  Location:    []  Vasopneumatic Device Pressure:       [] lo [] med [] hi   Temperature: [] lo [] med [] hi   [] Skin assessment post-treatment:  []intact []redness- no adverse reaction    []redness - adverse reaction:      min []Eval                  []Re-Eval       24 min Therapeutic Exercise:  [x] See flow sheet : Rationale: increase ROM and increase strength to improve the patients ability to improve activity tolerance and decrease risk for falls     min Therapeutic Activity:  []  See flow sheet :   Rationale:   to improve the patients ability to      10 min Neuromuscular Re-education:  [x]  See flow sheet : balance and core ex's   Rationale: increase strength, improve coordination, improve balance and increase proprioception  to improve the patients ability to decrease risk for falls     min Manual Therapy:     Rationale:  to      min Gait Training:  ___ feet with ___ device on level surfaces with ___ level of assist   Rationale: With   [] TE   [] TA   [] neuro   [] other: Patient Education: [x] Review HEP    [] Progressed/Changed HEP based on:   [] positioning   [] body mechanics   [] transfers   [] heat/ice application    [] other:      Other Objective/Functional Measures:      Pain Level (0-10 scale) post treatment: Knee-3, Hip-4    ASSESSMENT/Changes in Function: cont per POC    Patient will continue to benefit from skilled PT services to modify and progress therapeutic interventions, address functional mobility deficits, address ROM deficits, address strength deficits, analyze and address soft tissue restrictions, analyze and cue movement patterns, assess and modify postural abnormalities, address imbalance/dizziness and instruct in home and community integration to attain remaining goals. []  See Plan of Care  []  See progress note/recertification  []  See Discharge Summary         Progress towards goals / Updated goals:  Long term goals: to be accomplished in 6 weeks  1) Pt's FOTO score will improve > or = 64 indicating improvements in function. At PN: not met, score of 55 on 4/25/2017  2) Pt will improve B LE MMT > or = 4/5 for functional strength during ambulation.    At PN: hip flex L 4-/5 R 3+/5, knee ext L 4-/5 R 4/5, knee flex R 4-/5 L 4/5, DF L 4/5 R 4-/5 on 4/18/2018  3) Pt will demo B SLS > or = 15 sec to decrease risk for falls. At PN: progressing, currently RLE x10 sec, LLE x20 sec, on 4/18/2017  4) Pt will report > or = 60% improvement in symptoms in order to progress rehab.   At PN: progressing, patient reports 50% improvement on 4/25/2017    PLAN  [x]  Upgrade activities as tolerated     [x]  Continue plan of care  []  Update interventions per flow sheet       []  Discharge due to:_  []  Other:_      Shon Mcneil, PT 4/27/2017  12:36 PM    Future Appointments  Date Time Provider Oren Polancoi   5/2/2017 10:00 AM Emmanuelle Marvin, DO [facility] None   5/2/2017 11:30 AM Freddy Grist, PTA [facility]   5/2/2017 12:00 PM Freddy Grist, PTA [facility]   5/2/2017 1:50 PM Kelly Ponce MD [facility]   5/4/2017 1:00 PM Freddy Grist, PTA [facility]   5/4/2017 1:30 PM Freddy Grist, PTA [facility]   5/26/2017 9:45 AM AAMURY [address]   6/7/2017 10:00 AM Emmanuelle Marvin DO [facility] None   6/20/2017 9:45 AM Brisa Bassett MD [address] [place] [address]

## 2017-05-02 ENCOUNTER — OFFICE VISIT (OUTPATIENT)
Dept: ORTHOPEDIC SURGERY | Age: 57
End: 2017-05-02

## 2017-05-02 ENCOUNTER — APPOINTMENT (OUTPATIENT)
Dept: PHYSICAL THERAPY | Age: 57
End: 2017-05-02
Payer: MEDICARE

## 2017-05-02 ENCOUNTER — OFFICE VISIT (OUTPATIENT)
Dept: FAMILY MEDICINE CLINIC | Age: 57
End: 2017-05-02

## 2017-05-02 ENCOUNTER — HOSPITAL ENCOUNTER (OUTPATIENT)
Dept: PHYSICAL THERAPY | Age: 57
End: 2017-05-02
Payer: MEDICARE

## 2017-05-02 VITALS
BODY MASS INDEX: 32.46 KG/M2 | DIASTOLIC BLOOD PRESSURE: 60 MMHG | SYSTOLIC BLOOD PRESSURE: 104 MMHG | TEMPERATURE: 97 F | HEIGHT: 59 IN | WEIGHT: 161 LBS | HEART RATE: 59 BPM

## 2017-05-02 VITALS
SYSTOLIC BLOOD PRESSURE: 145 MMHG | OXYGEN SATURATION: 100 % | WEIGHT: 161 LBS | RESPIRATION RATE: 17 BRPM | HEART RATE: 66 BPM | DIASTOLIC BLOOD PRESSURE: 78 MMHG | TEMPERATURE: 97.2 F | HEIGHT: 59 IN | BODY MASS INDEX: 32.46 KG/M2

## 2017-05-02 DIAGNOSIS — M54.16 LUMBAR NEURITIS: ICD-10-CM

## 2017-05-02 DIAGNOSIS — F51.01 PRIMARY INSOMNIA: ICD-10-CM

## 2017-05-02 DIAGNOSIS — I10 ESSENTIAL HYPERTENSION: ICD-10-CM

## 2017-05-02 DIAGNOSIS — M89.9 DISORDER OF BONE AND CARTILAGE: ICD-10-CM

## 2017-05-02 DIAGNOSIS — M50.10 CERVICAL DISC DISORDER WITH RADICULOPATHY: Primary | ICD-10-CM

## 2017-05-02 DIAGNOSIS — I50.22 CHRONIC SYSTOLIC HEART FAILURE (HCC): ICD-10-CM

## 2017-05-02 DIAGNOSIS — R73.09 ELEVATED HEMOGLOBIN A1C: ICD-10-CM

## 2017-05-02 DIAGNOSIS — M75.112 INCOMPLETE TEAR OF LEFT ROTATOR CUFF: ICD-10-CM

## 2017-05-02 DIAGNOSIS — Z12.31 ENCOUNTER FOR SCREENING MAMMOGRAM FOR BREAST CANCER: ICD-10-CM

## 2017-05-02 DIAGNOSIS — Z00.00 ROUTINE GENERAL MEDICAL EXAMINATION AT A HEALTH CARE FACILITY: ICD-10-CM

## 2017-05-02 DIAGNOSIS — M94.9 DISORDER OF BONE AND CARTILAGE: ICD-10-CM

## 2017-05-02 DIAGNOSIS — Z13.39 SCREENING FOR ALCOHOLISM: ICD-10-CM

## 2017-05-02 DIAGNOSIS — Z23 ENCOUNTER FOR IMMUNIZATION: Primary | ICD-10-CM

## 2017-05-02 RX ORDER — PREGABALIN 150 MG/1
150 CAPSULE ORAL 2 TIMES DAILY
Qty: 60 CAP | Refills: 0 | Status: SHIPPED | OUTPATIENT
Start: 2017-05-02 | End: 2017-06-09 | Stop reason: SDUPTHER

## 2017-05-02 RX ORDER — ZOLPIDEM TARTRATE 10 MG/1
10 TABLET ORAL
Qty: 30 TAB | Refills: 0 | Status: SHIPPED | OUTPATIENT
Start: 2017-05-02 | End: 2017-09-07 | Stop reason: SDUPTHER

## 2017-05-02 NOTE — PROGRESS NOTES
Jason Berger  1960   Chief Complaint   Patient presents with    Shoulder Pain     left        HISTORY OF PRESENT ILLNESS  Jason Berger is a 55 y.o. female who presents today for reevaluation of left shoulder pain x 6 months. She rates her pain 6/10 today. Patient received relief for one week from the cortisone injection she received at her previous office visit on 3/21/17. She states she is attending PT. Patient has completed a CT that shows a partial thickness rotator cuff tear. She is unable to have an MRI due to a defibrillator. She reports having pain with overhead motions or dangling by her side. She recalls she has had a lot of falls in the past. She ambulates with a single point cane. Patient denies any fever, chills, chest pain, shortness of breath or calf pain. There are no new illness or injuries to report since last seen in the office. There are no changes to medications, allergies, family or social history. PHYSICAL EXAM:   Visit Vitals    /60    Pulse (!) 59    Temp 97 °F (36.1 °C) (Oral)    Ht 4' 11\" (1.499 m)    Wt 161 lb (73 kg)    BMI 32.52 kg/m2     The patient is a well-developed, well-nourished female   in no acute distress. The patient is alert and oriented times three. The patient is alert and oriented times three. Mood and affect are normal.  LYMPHATIC: lymph nodes are not enlarged and are within normal limits  SKIN: normal in color and non tender to palpation. There are no bruises or abrasions noted. NEUROLOGICAL: Motor sensory exam is within normal limits. Reflexes are equal bilaterally.  There is normal sensation to pinprick and light touch  MUSCULOSKELETAL:  Examination Left shoulder   Skin Intact   AC joint tenderness -   Biceps tenderness -   Forward flexion/Elevation    Active abduction    Glenohumeral abduction 70   External rotation ROM 70   Internal rotation ROM 50   Apprehension -   Jennifers Relocation -   Jerk -   Load and Shift - Montey Flaming -   Speeds -   Impingement sign +   Supraspinatus/Empty Can -   External Rotation Strength -, 5/5   Lift Off/Belly Press -, 5/5   Neurovascular Intact      pos spurling    IMAGING: CT of the left shoulder dated 3/6/17 was reviewed and read:   IMPRESSION:  1. No full-thickness rotator cuff rupture. Anterior supraspinatus undersurface  partial-thickness tear about 5 x 5 mm. Smaller undersurface tear at the junction  of the supraspinatus and infraspinatus insertion. 2. Mild acromioclavicular osteoarthrosis. IMPRESSION:      ICD-10-CM ICD-9-CM    1. Cervical disc disorder with radiculopathy M50.10 723.4 REFERRAL TO SPINE SURGERY   2. Incomplete tear of left rotator cuff M75.112 840.4         PLAN:Her pain is worse with the arm down , more consistent with a cervical disc herniation Patient received limitied relief from the cortisone injection she received at her previous office visit. I would like to proceed with an MRI of the cervical spine but the patient has a defibrillator. Due to limited relief from the cortisone injection, at this time, I feel her pain is coming from the neck. I will have her follow up with the spine center. Follow-up Disposition: Not on File    Scribed by [facility]) as dictated by Sara Bush MD    I, Dr. Sara Bush, confirm that all documentation is accurate.     Sara Bush M.D.   [facility]
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
## 2017-05-02 NOTE — MR AVS SNAPSHOT
Visit Information Date & Time Provider Department Dept. Phone Encounter #  
 5/2/2017 10:00 AM Saba Mcintyre Bishnu Hemphill 77 [id number] Follow-up Instructions Return for Patient will keep June apointment . Your Appointments 5/2/2017  1:50 PM  
Follow Up with Sofia Melton MD  
[facility] [place] ([address]) Appt Note: fu for left shoulder; DUE TO PROVIDER IN SX; FU FOR LT SHDR / [place] FR 04-18/DUE TO PROVIDER IN SX  
 2012 [facility] [address] [address]  
  
    
 5/26/2017  9:45 AM  
Follow Up with Asad Yepez PA-C  
[facility] [place] [address] ([address]) Appt Note: 3 mo fu  
 [address] Mehta, [facility] [address] [place] [address]  
[phone] [address]  
  
    
 6/7/2017 10:00 AM  
Follow Up with Key Bledsoe DO [facility] (--) Appt Note: 3 month fu  
 [place] [address] [address]  
  
    
 6/20/2017  9:45 AM  
Follow Up with Chintan Moran MD  
[facility]) Appt Note: 3mon f/u  
 [address] [id number] [phone]  
  
   
 [facility] [address] Upcoming Health Maintenance Date Due  
 EYE EXAM RETINAL OR DILATED Q1 8/5/1971 Pneumococcal 19-64 Medium Risk (1 of 1 - PPSV23) 8/5/1980 DTaP/Tdap/Td series (1 - Tdap) 8/5/1982 FOBT Q 1 YEAR AGE 50-75 8/5/2011 FOOT EXAM Q1 3/3/2015 GLAUCOMA SCREENING Q2Y 9/29/2016 BREAST CANCER SCRN MAMMOGRAM 4/15/2017 HEMOGLOBIN A1C Q6M 7/25/2017 INFLUENZA AGE 9 TO ADULT 8/1/2017 MICROALBUMIN Q1 10/17/2017 LIPID PANEL Q1 1/25/2018 Allergies as of 5/2/2017  Review Complete On: 4/7/7893 By: Cherry Gould. Yasemin Hayden LPN Severity Noted Reaction Type Reactions Aspirin  08/02/2012   Topical Hives Current Immunizations  Reviewed on 12/13/2013 Name Date Influenza Vaccine 9/29/2015 Influenza Vaccine PF 9/24/2014, 12/13/2013 Not reviewed this visit You Were Diagnosed With   
  
 Codes Comments Encounter for immunization    -  Primary ICD-10-CM: G05 ICD-9-CM: V03.89 Routine general medical examination at a health care facility     ICD-10-CM: Z00.00 ICD-9-CM: V70.0 Screening for alcoholism     ICD-10-CM: Z13.89 ICD-9-CM: V79.1 Encounter for screening mammogram for breast cancer     ICD-10-CM: Z12.31 
ICD-9-CM: V76.12 Lumbar neuritis     ICD-10-CM: M54.16 
ICD-9-CM: 724.4 Primary insomnia     ICD-10-CM: F51.01 
ICD-9-CM: 307.42 Chronic systolic heart failure (HCC)     ICD-10-CM: I50.22 ICD-9-CM: 428.22 Essential hypertension     ICD-10-CM: I10 
ICD-9-CM: 401.9 Elevated hemoglobin A1c     ICD-10-CM: R73.09 
ICD-9-CM: 790.29 Disorder of bone and cartilage     ICD-10-CM: M89.9, M94.9 ICD-9-CM: 733.90 Vitals BP Pulse Temp Resp Height(growth percentile) Weight(growth percentile) 145/78 (BP 1 Location: Right arm, BP Patient Position: Sitting) 66 97.2 °F (36.2 °C) (Oral) 17 4' 11\" (1.499 m) 161 lb (73 kg) SpO2 BMI OB Status Smoking Status 100% 32.52 kg/m2 Hysterectomy Former Smoker Vitals History BMI and BSA Data Body Mass Index Body Surface Area 32.52 kg/m 2 1.74 m 2 Preferred Pharmacy Pharmacy Name Phone [facility] [address] [phone] Your Updated Medication List  
  
   
This list is accurate as of: 5/2/17 11:09 AM.  Always use your most recent med list.  
  
  
  
  
 calcium citrate-vitamin d3 315-200 mg-unit Tab Commonly known as:  CITRACAL+D  
 Take 1 tablet by mouth two (2) times daily (with meals). carBAMazepine 200 mg tablet Commonly known as:  TEGretol 1  q am 1 q pm  2 qhs  
  
 carvedilol 12.5 mg tablet Commonly known as:  COREG  
6.25 mg.  
  
 celecoxib 200 mg capsule Commonly known as:  CELEBREX  
TAKE 1 CAPSULE BY MOUTH TWICE DAILY FOR 90 DAYS  
  
 clopidogrel 75 mg Tab Commonly known as:  PLAVIX Take 1 tablet by mouth daily. DULoxetine 60 mg capsule Commonly known as:  CYMBALTA Take 1 Cap by mouth daily. ergocalciferol 50,000 unit capsule Commonly known as:  ERGOCALCIFEROL Take 1 Cap by mouth every seven (7) days. furosemide 40 mg tablet Commonly known as:  LASIX TAKE 1 TABLET BY MOUTH TWICE DAILY AS NEEDED GENERLAC 10 gram/15 mL solution Generic drug:  lactulose * HYDROcodone-acetaminophen 5-325 mg per tablet Commonly known as:  Tyra Dus Take 1 Tab by mouth every eight (8) hours as needed for Pain. Max Daily Amount: 3 Tabs. * HYDROcodone-acetaminophen  mg tablet Commonly known as:  Tyra Dus Take 1 Tab by mouth every eight (8) hours as needed for Pain. Max Daily Amount: 3 Tabs. * HYDROcodone-acetaminophen  mg tablet Commonly known as:  Tyra Dus Take 1 Tab by mouth every eight (8) hours as needed for Pain. Max Daily Amount: 3 Tabs. isosorbide mononitrate ER 30 mg tablet Commonly known as:  IMDUR  
15 mg.  
  
 levocetirizine 5 mg tablet Commonly known as:  Kvng Cesar Take 1 Tab by mouth daily. lisinopril 5 mg tablet Commonly known as:  Dorean Peeks Take  by mouth daily. methocarbamol 500 mg tablet Commonly known as:  ROBAXIN  
TAKE 1 TABLET BY MOUTH FOUR TIMES DAILY AS NEEDED FOR MUSCLE SPASM * miscellaneous medical supply [facility]  
1 Each by Does Not Apply route daily. * miscellaneous medical supply [facility]  
1 Each by Does Not Apply route daily. * miscellaneous medical supply Misc 2 Each by Does Not Apply route daily. * miscellaneous medical supply [facility]  
2 Each by Does Not Apply route daily. omeprazole 20 mg capsule Commonly known as:  PRILOSEC Take 1 capsule by mouth daily. polyethylene glycol 17 gram/dose powder Commonly known as:  Jacob Caldwell Take 17 g by mouth daily. pregabalin 150 mg capsule Commonly known as:  Kia Brand Take 1 Cap by mouth two (2) times a day. Max Daily Amount: 300 mg.  
  
 rosuvastatin 40 mg tablet Commonly known as:  CRESTOR Take 1 Tab by mouth daily. therapeutic multivitamin tablet Commonly known as:  [facility] Take 1 tablet by mouth daily. varicella zoster vacine live 19,400 unit/0.65 mL Susr injection Commonly known as:  ZOSTAVAX  
1 Vial by SubCUTAneous route once for 1 dose. VITAMIN B-12 500 mcg tablet Generic drug:  cyanocobalamin Take 500 mcg by mouth daily. VITAMIN D3 1,000 unit Cap Generic drug:  cholecalciferol Take  by mouth.  
  
 zolpidem 10 mg tablet Commonly known as:  AMBIEN Take 1 Tab by mouth nightly as needed for Sleep. Max Daily Amount: 10 mg.  
  
 * Notice: This list has 7 medication(s) that are the same as other medications prescribed for you. Read the directions carefully, and ask your doctor or other care provider to review them with you. Prescriptions Printed Refills  
 varicella zoster vacine live (ZOSTAVAX) 19,400 unit/0.65 mL susr injection 0 Si Vial by SubCUTAneous route once for 1 dose. Class: Print Route: SubCUTAneous  
 pregabalin (LYRICA) 150 mg capsule 0 Sig: Take 1 Cap by mouth two (2) times a day. Max Daily Amount: 300 mg. Class: Print Route: Oral  
 zolpidem (AMBIEN) 10 mg tablet 0 Sig: Take 1 Tab by mouth nightly as needed for Sleep. Max Daily Amount: 10 mg.  
 Class: Print Route: Oral  
  
Follow-up Instructions Return for Patient will keep Prisca iglesia . To-Do List   
 2017 Imaging:  PABLO MAMMO BI SCREENING INCL CAD   
  
 05/02/2017 11:30 AM  
  Appointment with Fior Resendiz PTA at [facility] ([phone])  
  
 05/02/2017 12:00 PM  
  Appointment with Fior Resendiz PTA at [facility] ([phone])  
  
 05/04/2017 1:00 PM  
  Appointment with Fior Resendiz PTA at [facility] ([phone])  
  
 05/04/2017 1:30 PM  
  [facility]  ([phone]) Patient Instructions Please contact our office if you have any questions about your visit today. Introducing [facility] SERVICES! Dear Vero Manuel: 
Thank you for requesting a [employer] account. Our records indicate that you already have an active [employer] account. You can access your account anytime at [email or web address] Did you know that you can access your hospital and ER discharge instructions at any time in [employer]? You can also review all of your test results from your hospital stay or ER visit. Additional Information If you have questions, please visit the Frequently Asked Questions section of the [employer] website at [email or web address] Remember, [employer] is NOT to be used for urgent needs. For medical emergencies, dial 911. Now available from your iPhone and Android! Please provide this summary of care documentation to your next provider. Your primary care clinician is listed as Radha Camacho. If you have any questions after today's visit, please call [phone].

## 2017-05-04 ENCOUNTER — HOSPITAL ENCOUNTER (OUTPATIENT)
Dept: PHYSICAL THERAPY | Age: 57
Discharge: HOME | End: 2017-05-04
Payer: MEDICARE

## 2017-05-04 PROCEDURE — G8985 CARRY GOAL STATUS: HCPCS

## 2017-05-04 PROCEDURE — G8986 CARRY D/C STATUS: HCPCS

## 2017-05-04 PROCEDURE — 97112 NEUROMUSCULAR REEDUCATION: CPT

## 2017-05-04 PROCEDURE — 97110 THERAPEUTIC EXERCISES: CPT

## 2017-05-04 NOTE — PROGRESS NOTES
PT DAILY TREATMENT NOTE - [facility]     Patient Name: Silverio Moody  Date:2017  : 1960  [x]  Patient  Verified  Payor: Leighann Amos / Plan: [facility] HUMANA MEDICARE CHOICE PPO/PFFS / Product Type: Managed Care Medicare /    In time:1:08  Out time:1:35  Total Treatment Time (min): 27  Total Timed Codes (min): 27  1:1 Treatment Time ( [address] only): 27   Visit #: 2 of 8    Treatment Area: Incomplete rotator cuff tear or rupture of left shoulder, not specified as traumatic [M75.112]    SUBJECTIVE  Pain Level (0-10 scale): 0  Any medication changes, allergies to medications, adverse drug reactions, diagnosis change, or new procedure performed?: [x] No    [] Yes (see summary sheet for update)  Subjective functional status/changes:   [] No changes reported  Pt reports the MD stated the pain is not in her shoulder it is more in her neck. She is going back to the spine doctor.      OBJECTIVE    Modality rationale:    Min Type Additional Details    [] Estim:  []Unatt       []IFC  []Premod                        []Other:  []w/ice   []w/heat  Position:  Location:    [] Estim: []Att    []TENS instruct  []NMES                    []Other:  []w/US   []w/ice   []w/heat  Position:  Location:    []  Traction: [] Cervical       []Lumbar                       [] Prone          []Supine                       []Intermittent   []Continuous Lbs:  [] before manual  [] after manual    []  Ultrasound: []Continuous   [] Pulsed                           []1MHz   []3MHz W/cm2:  Location:    []  Iontophoresis with dexamethasone         Location: [] Take home patch   [] In clinic    []  Ice     []  heat  []  Ice massage  []  Laser   []  Anodyne Position:  Location:    []  Laser with stim  []  Other:  Position:  Location:    []  Vasopneumatic Device Pressure:       [] lo [] med [] hi   Temperature: [] lo [] med [] hi   [] Skin assessment post-treatment:  []intact []redness- no adverse reaction    []redness - adverse reaction:       27 min Therapeutic Exercise:  [x] See flow sheet :   Rationale: increase ROM and increase strength to improve the patients ability to increase tolerance to activities. With   [] TE   [] TA   [] neuro   [] other: Patient Education: [x] Review HEP    [] Progressed/Changed HEP based on:   [] positioning   [] body mechanics   [] transfers   [] heat/ice application    [] other:      Other Objective/Functional Measures: see goals     Pain Level (0-10 scale) post treatment: 0    ASSESSMENT/Changes in Function: Pt has progressed with having decreased shoulder pain. AAROM flex and scap 160 deg without pain. L shoulder AROM flex 145 deg without pain, scap 150 deg without pain. ER/IR AROM WNL without pain. FOTO score = 75, increased by 22 since [facility]. []  See Plan of Care  []  See progress note/recertification  [x]  See Discharge Summary         Progress towards goals / Updated goals:  Long term goals: to be accomplished in 6 weeks  1) Pt's FOTO score will improve > or = 64 indicating improvements in function. At PN: progressing, score of 59 (+6 since I.E.) (4/25/17)  Current; FOTO = 75, increased by 22 since [facility]. 5/3/17  2) Pt will improve L shoulder AAROM flex/scap > or = 120 deg in order to perform ADL's. At eval: NT  Current; Goal met: AAROM flex and scap 160 deg without pain. 5/4/17  3) Pt will improve L shoulder AROM flex/scap > or = 120 deg in order to perform ADL's. At PN: Progressing- L shoulder AROM flex 142 deg with pain, scap 135 deg with pain on 4/25/2017  Current: goal met: L shoulder AROM flex 145 deg without pain, scap 150 deg without pain 5/4/17  4) Pt will improve L shoulder AROM ER/IR to [facility] in order to perform ADL's. At eval: ER fingertip to L ear, IR fingertip to L glute max  Current:goal met: ER/IR WNL without pain. 5/4/17  5) Pt will report < or = 4/10 pain pre and post for 2 weeks in order to progress rehab.   At eval: 9/10 pre and 8/10 post  Current: Goal met: :Pain has been last then 4/10 for the past two weeks. PLAN  []  Upgrade activities as tolerated     []  Continue plan of care  []  Update interventions per flow sheet       [x]  Discharge due to:_Pt reports the MD stated the pain is not in her shoulder it is more in her neck. She is going back to the spine doctor.    []  Other:_      Parvin Goins PTA 5/4/2017  1:12 PM    Future Appointments  Date Time Provider Oren Yenny   5/4/2017 1:30 PM Parvin Goins PTA [facility] [id number]   5/9/2017 10:25 AM Michelle Galvin MD [facility]   5/13/2017 11:00 AM [facility] PABLO RM 1 [facility]   5/26/2017 9:45 AM AMAURY Beverly [address]   6/7/2017 10:00 AM Emmanuelle Marvin, DO [facility] None   6/20/2017 9:45 AM Freeman Velazquez MD [address] [place] [address]

## 2017-05-04 NOTE — PROGRESS NOTES
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      OBJECTIVE        Min Type Additional Details    [] Estim:  []Unatt       []IFC  []Premod                        []Other:  []w/ice   []w/heat  Position:  Location:    [] Estim: []Att    []TENS instruct  []NMES                    []Other:  []w/US   []w/ice   []w/heat  Position:  Location:    []  Traction: [] Cervical       []Lumbar                       [] Prone          []Supine                       []Intermittent   []Continuous Lbs:  [] before manual  [] after manual    []  Ultrasound: []Continuous   [] Pulsed                           []1MHz   []3MHz W/cm2:  Location:    []  Iontophoresis with dexamethasone         Location: [] Take home patch   [] In clinic    []  Ice     []  heat  []  Ice massage  []  Laser   []  Anodyne Position:  Location:    []  Laser with stim  []  Other:  Position:  Location:    []  Vasopneumatic Device Pressure:       [] lo [] med [] hi   Temperature: [] lo [] med [] hi   [] Skin assessment post-treatment:  []intact []redness- no adverse reaction    []redness - adverse reaction:       20 min Therapeutic Exercise:  [x] See flow sheet :   Rationale: increase ROM and increase strength to improve the patients ability to increase tolerance to activities. 10 min Neuromuscular Re-education:  [x]  See flow sheet :balance ex's   Rationale: improve coordination, improve balance and increase proprioception  to improve the patients ability to increase ease with ADLs. With   [] TE   [] TA   [] neuro   [] other: Patient Education: [x] Review HEP    [] Progressed/Changed HEP based on:   [] positioning   [] body mechanics   [] transfers   [] heat/ice application    [] other:      Other Objective/Functional Measures: Pt is able to SLS B >15 sec. Pain Level (0-10 scale) post treatment: knee = 5    ASSESSMENT/Changes in Function: Continue per POC. Patient will continue to benefit from skilled PT services to modify and progress therapeutic interventions, address functional mobility deficits, address ROM deficits and address strength deficits to attain remaining goals. []  See Plan of Care  []  See progress note/recertification  []  See Discharge Summary         Progress towards goals / Updated goals:  Long term goals: to be accomplished in 6 weeks  1) Pt's FOTO score will improve > or = 64 indicating improvements in function. At PN: not met, score of 55 on 4/25/2017  2) Pt will improve B LE MMT > or = 4/5 for functional strength during ambulation. At PN: hip flex L 4-/5 R 3+/5, knee ext L 4-/5 R 4/5, knee flex R 4-/5 L 4/5, DF L 4/5 R 4-/5 on 4/18/2018  3) Pt will demo B SLS > or = 15 sec to decrease risk for falls. At PN: progressing, currently RLE x10 sec, LLE x20 sec, on 4/18/2017  Current: Goal met: Pt is able to SLS B >15 sec. 5/4/17  4) Pt will report > or = 60% improvement in symptoms in order to progress rehab.   At PN: progressing, patient reports 50% improvement on 4/25/2017    PLAN  []  Upgrade activities as tolerated     [x]  Continue plan of care  []  Update interventions per flow sheet       []  Discharge due to:_  []  Other:_      Gloria Kaiser, PTA 5/4/2017  1:23 PM    Future Appointments  Date Time Provider Department Center   5/4/2017 1:30 PM Yasmany Edwards, PTA [facility]   5/9/2017 10:25 AM Osbaldo Ivey MD [facility]   5/13/2017 11:00 AM [facility] PABLO RM 1 [facility]   5/26/2017 9:45 AM AMAURY Be Miguel [address]   6/7/2017 10:00 AM Emmanuelle Marvin, DO [facility] None   6/20/2017 9:45 AM Georgia Cervantes MD [address] [place] [address]

## 2017-05-04 NOTE — PROGRESS NOTES
[facility]              [address]        [place], [address] [place]   [phone] fax      Discharge Summary  Patient name: Silverio Moody Start of Care: 3/24/2017   Referral source: Bekaerick Meigs,* : 1960   Medical/Treatment Diagnosis: Incomplete rotator cuff tear or rupture of left shoulder, not specified as traumatic [M75.112] Onset Date:~1 year ago     Prior Hospitalization: see medical history Provider#: [id number]   Medications: Verified on Patient Summary List    Comorbidities: Arthritis, Back pain, BMI over 30, CHF, Hearth Attack, HTN, Osteoporosis, Pacemaker  Prior Level of Function: Pt is on disability; but (I) with all ADL's. Pt ambulates with SPC on L and is R hand dominate. Visits from Start of Care: 6    Missed Visits: 3  Reporting Period : 3/24/2017 to 2017      Summary of Care:  Progress towards goals / Updated goals:  Long term goals: to be accomplished in 6 weeks  1) Pt's FOTO score will improve > or = 64 indicating improvements in function. At PN: progressing, score of 59 (+6 since I.E.)   Current; FOTO = 75, increased by 22 since [facility]. 2) Pt will improve L shoulder AAROM flex/scap > or = 120 deg in order to perform ADL's. At eval: NT  Current; Goal met: AAROM flex and scap 160 deg without pain. 3) Pt will improve L shoulder AROM flex/scap > or = 120 deg in order to perform ADL's. At PN: Progressing- L shoulder AROM flex 142 deg with pain, scap 135 deg with pain on   Current: goal met: L shoulder AROM flex 145 deg without pain, scap 150 deg without pain   4) Pt will improve L shoulder AROM ER/IR to [facility] in order to perform ADL's. At eval: ER fingertip to L ear, IR fingertip to L glute max  Current:goal met: ER/IR WNL without pain. 5) Pt will report < or = 4/10 pain pre and post for 2 weeks in order to progress rehab.   At eval: 10 pre and 8/10 post  Current: Goal met: :Pain has been last then 4/10 for the past two weeks. Pt has progressed with having decreased shoulder pain. AAROM flex and scap 160 deg without pain. L shoulder AROM flex 145 deg without pain, scap 150 deg without pain. ER/IR AROM WNL without pain. FOTO score = 75, increased by 22 since [facility]. Pt had a quick turn around with progressing over the last week and also reports MD referred her to spine specialist for neck issues. Pt to be D/C at this time. G-Codes (GP)  Carry    Goal  CJ= 20-39%   D/C  CJ= 20-39%    The severity rating is based on clinical judgment and the FOTO Score score.     ASSESSMENT/RECOMMENDATIONS:  [x]Discontinue therapy: [x]Patient has reached or is progressing toward set goals      []Patient is non-compliant or has abdicated      []Due to lack of appreciable progress towards set [address], PT 5/4/2017 3:10 PM

## 2017-05-09 ENCOUNTER — OFFICE VISIT (OUTPATIENT)
Dept: ORTHOPEDIC SURGERY | Age: 57
End: 2017-05-09

## 2017-05-09 VITALS
HEART RATE: 58 BPM | DIASTOLIC BLOOD PRESSURE: 78 MMHG | BODY MASS INDEX: 32.66 KG/M2 | HEIGHT: 59 IN | WEIGHT: 162 LBS | SYSTOLIC BLOOD PRESSURE: 148 MMHG

## 2017-05-09 DIAGNOSIS — M75.112 INCOMPLETE TEAR OF LEFT ROTATOR CUFF: Primary | ICD-10-CM

## 2017-05-09 DIAGNOSIS — M50.30 OTHER CERVICAL DISC DEGENERATION, UNSPECIFIED CERVICAL REGION: ICD-10-CM

## 2017-05-09 DIAGNOSIS — M47.817 SPONDYLOSIS OF LUMBOSACRAL JOINT WITHOUT MYELOPATHY: ICD-10-CM

## 2017-05-09 DIAGNOSIS — M47.812 SPONDYLOSIS WITHOUT MYELOPATHY OR RADICULOPATHY, CERVICAL REGION: ICD-10-CM

## 2017-05-09 DIAGNOSIS — M54.12 RADICULOPATHY, CERVICAL: ICD-10-CM

## 2017-05-09 RX ORDER — MONTELUKAST SODIUM 10 MG/1
TABLET ORAL
Refills: 2 | Status: ON HOLD | COMMUNITY
Start: 2017-03-20 | End: 2019-05-23

## 2017-05-09 RX ORDER — CALCIPOTRIENE 50 UG/G
CREAM TOPICAL
Status: ON HOLD | COMMUNITY
Start: 2017-05-02 | End: 2018-08-28 | Stop reason: CLARIF

## 2017-05-09 RX ORDER — PREGABALIN 225 MG/1
225 CAPSULE ORAL 2 TIMES DAILY
Qty: 60 CAP | Refills: 1 | Status: SHIPPED | OUTPATIENT
Start: 2017-05-09 | End: 2017-06-09 | Stop reason: SDUPTHER

## 2017-05-09 RX ORDER — ALIROCUMAB 75 MG/ML
INJECTION, SOLUTION SUBCUTANEOUS
Status: ON HOLD | COMMUNITY
Start: 2017-04-28 | End: 2018-08-28 | Stop reason: CLARIF

## 2017-05-09 NOTE — MR AVS SNAPSHOT
Visit Information Date & Time Provider Department Dept. Phone Encounter #  
 5/9/2017 10:25 AM Blanca Chin MD [facility] [place] [phone] [id number] Follow-up Instructions Return in about 4 weeks (around 6/6/2017). Your Appointments 5/26/2017  9:45 AM  
Follow Up with Elyse Taveras PA-C  
[facility] Appt Note: 3 mo fu  
 [address], [address] [facility]  
[phone] [address], [address]  
  
    
 6/7/2017 10:00 AM  
Follow Up with [address] [address] (--) Appt Note: 3 month fu  
 [address] [address]  
  
    
 6/20/2017  9:45 AM  
Follow Up with Stas Christopher MD  
[address]-[facility]) Appt Note: 3mon f/u  
 [address] [place] [address] [facility] [address] [phone]  
  
   
 [facility] [address] Upcoming Health Maintenance Date Due  
 EYE EXAM RETINAL OR DILATED Q1 8/5/1971 FOBT Q 1 YEAR AGE 50-75 8/5/2011 FOOT EXAM Q1 3/3/2015 GLAUCOMA SCREENING Q2Y 9/29/2016 BREAST CANCER SCRN MAMMOGRAM 4/15/2017 HEMOGLOBIN A1C Q6M 7/25/2017 INFLUENZA AGE 9 TO ADULT 8/1/2017 MICROALBUMIN Q1 10/17/2017 LIPID PANEL Q1 1/25/2018 DTaP/Tdap/Td series (2 - Td) 10/4/2026 Allergies as of 5/9/2017  Review Complete On: 5/9/2017 By: Blanca Chin MD  
  
 Severity Noted Reaction Type Reactions Aspirin  08/02/2012   Topical Hives Current Immunizations  Reviewed on 12/13/2013 Name Date Influenza Vaccine 9/29/2015, 9/2/2015 12:00 AM, 11/24/2011 12:00 AM  
 Influenza Vaccine PF 9/24/2014, 12/13/2013 Pneumococcal Conjugate (PCV-13) 5/2/2017 Pneumococcal Polysaccharide (PPSV-23) 11/3/2015 12:00 AM  
 Tdap 10/4/2016 12:00 AM  
  
 Not reviewed this visit You Were Diagnosed With   
  
 Codes Comments Incomplete tear of left rotator cuff    -  Primary ICD-10-CM: Q58.297 ICD-9-CM: 840.4 Spondylosis without myelopathy or radiculopathy, cervical region     ICD-10-CM: M47.812 ICD-9-CM: 721.0 Radiculopathy, cervical     ICD-10-CM: M54.12 
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  
  
   
This list is accurate as of: 5/9/17 12:45 PM.  Always use your most recent med list.  
  
  
  
  
 calcipotriene 0.005 % topical cream  
Commonly known as:  DOVONEX  
  
 calcium citrate-vitamin d3 315-200 mg-unit Tab Commonly known as:  CITRACAL+D Take 1 tablet by mouth two (2) times daily (with meals). carBAMazepine 200 mg tablet Commonly known as:  TEGretol 1  q am 1 q pm  2 qhs  
  
 carvedilol 12.5 mg tablet Commonly known as:  COREG  
6.25 mg.  
  
 celecoxib 200 mg capsule Commonly known as:  CELEBREX  
TAKE 1 CAPSULE BY MOUTH TWICE DAILY FOR 90 DAYS  
  
 clopidogrel 75 mg Tab Commonly known as:  PLAVIX Take 1 tablet by mouth daily. DULoxetine 60 mg capsule Commonly known as:  CYMBALTA Take 1 Cap by mouth daily. ergocalciferol 50,000 unit capsule Commonly known as:  ERGOCALCIFEROL Take 1 Cap by mouth every seven (7) days. furosemide 40 mg tablet Commonly known as:  LASIX TAKE 1 TABLET BY MOUTH TWICE DAILY AS NEEDED GENERLAC 10 gram/15 mL solution Generic drug:  lactulose * HYDROcodone-acetaminophen 5-325 mg per tablet Commonly known as:  [place] Take 1 Tab by mouth every eight (8) hours as needed for Pain. Max Daily Amount: 3 Tabs. * HYDROcodone-acetaminophen  mg tablet Commonly known as:  [place] Take 1 Tab by mouth every eight (8) hours as needed for Pain. Max Daily Amount: 3 Tabs. * HYDROcodone-acetaminophen  mg tablet Commonly known as:  [place] Take 1 Tab by mouth every eight (8) hours as needed for Pain. Max Daily Amount: 3 Tabs. isosorbide mononitrate ER 30 mg tablet Commonly known as:  IMDUR  
15 mg.  
  
 levocetirizine 5 mg tablet Commonly known as:  Chalino Hammoleg Take 1 Tab by mouth daily. lisinopril 5 mg tablet Commonly known as:  Yohan Headings Take  by mouth daily. methocarbamol 500 mg tablet Commonly known as:  ROBAXIN  
TAKE 1 TABLET BY MOUTH FOUR TIMES DAILY AS NEEDED FOR MUSCLE SPASM * miscellaneous medical supply Misc  
1 Each by Does Not Apply route daily. * miscellaneous medical supply Misc  
1 Each by Does Not Apply route daily. * miscellaneous medical supply Misc  
2 Each by Does Not Apply route daily. * miscellaneous medical supply Misc  
2 Each by Does Not Apply route daily. montelukast 10 mg tablet Commonly known as:  SINGULAIR TK 1 T PO D  
  
 omeprazole 20 mg capsule Commonly known as:  PRILOSEC Take 1 capsule by mouth daily. polyethylene glycol 17 gram/dose powder Commonly known as:  Elbridge Rodriguez Take 17 g by mouth daily. PRALUENT PEN 75 mg/mL injector pen Generic drug:  alirocumab * pregabalin 150 mg capsule Commonly known as:  Yaritza Crawford Take 1 Cap by mouth two (2) times a day. Max Daily Amount: 300 mg.  
  
 * pregabalin 225 mg capsule Commonly known as:  Rendell Panda Take 1 Cap by mouth two (2) times a day. Max Daily Amount: 450 mg.  
  
 rosuvastatin 40 mg tablet Commonly known as:  CRESTOR Take 1 Tab by mouth daily. therapeutic multivitamin tablet Commonly known as:  [facility] Take 1 tablet by mouth daily. VITAMIN B-12 500 mcg tablet Generic drug:  cyanocobalamin Take 500 mcg by mouth daily. VITAMIN D3 1,000 unit Cap Generic drug:  cholecalciferol Take  by mouth.  
  
 zolpidem 10 mg tablet Commonly known as:  AMBIEN Take 1 Tab by mouth nightly as needed for Sleep. Max Daily Amount: 10 mg.  
  
 * Notice: This list has 9 medication(s) that are the same as other medications prescribed for you. Read the directions carefully, and ask your doctor or other care provider to review them with you. Prescriptions Printed Refills  
 pregabalin (LYRICA) 225 mg capsule 1 Sig: Take 1 Cap by mouth two (2) times a day. Max Daily Amount: 450 mg.  
 Class: Print Route: Oral  
  
Follow-up Instructions Return in about 4 weeks (around 6/6/2017). To-Do List   
 05/13/2017 11:00 AM  
  Appointment with TOBIN SHAH RM 1 at [address] [place] [address] ([phone]) OUTSIDE FILMS  - Any outside films related to the study being scheduled should be brought with you on the day of the exam.  If this cannot be done there may be a delay in the reading of the study. MEDICATIONS  - Patient must bring a complete list of all medications currently taking to include prescriptions, over-the-counter meds, herbals, vitamins & any dietary supplements  GENERAL INSTRUCTIONS  - On the day of your exam do not use any bath powder, deodorant or lotions on the armpit area. -Tenderness of breasts may cause an increase of discomfort during procedure.   If you are experiencing breast tenderness on the day of your appointment and would like to reschedule, please call [phone].  
  
 05/22/2017 3:00 PM  
  Appointment with Beckie Dos Santos PTA at [facility]  [address] [facility] ([phone])  
  
 05/24/2017 10:00 AM  
  Appointment with Jay Jay Alves PTA at [address] [facility] ([phone])  
  
 05/31/2017 9:30 AM  
  Appointment with Beckie Dos Santos PTA at [facility]  [address] [facility] ([phone])  
  
 06/01/2017 1:30 PM  
  Appointment with Jay Jay Alves PTA at [address] IM ([phone]) [facility]! Dear Lisa Lu: 
Thank you for requesting a [employer] account. Our records indicate that you already have an active [employer] account. You can access your account anytime at [email or web address] Did you know that you can access your hospital and ER discharge instructions at any time in [employer]? You can also review all of your test results from your hospital stay or ER visit. Additional Information If you have questions, please visit the Frequently Asked Questions section of the [employer] website at [email or web address] Remember, [employer] is NOT to be used for urgent needs. For medical emergencies, dial 911. Now available from your iPhone and Android! Please provide this summary of care documentation to your next provider. Your primary care clinician is listed as Zainab Cage. If you have any questions after today's visit, please call [phone].

## 2017-05-09 NOTE — PROGRESS NOTES
[facility] SPECIALISTS  [address], [place]   Phone: [phone]  Fax: [phone]        PROGRESS NOTE      HISTORY OF PRESENT ILLNESS:  The patient is a 55 y.o. female and was seen today for follow up of LUE pain from the shoulder to the elbow. She denies symptoms extending to the hand at this time. Pain is exacerbated with reaching behind and overhead activity. She continues to have LOB with coordination issues and falls. Pt was initially seen for low back pain localized primarily to the right side of the lumbar spine. Previously, she had c/o low back pain localized primarily to the right side of the lumbar spine without significant radicular pain complaints. She also had c/o left knee pain which she is followed by Dr. Naima Macias for. Per patient, she did have knee surgery in 2009 and 2011 and was given a brace and they are monitoring her on this. She reports no increase in pain since discontinuing NEURONTIN. She reports significant relief following bilateral L4 and L5 and left sided L5 and S1 facet blocks on 3/17/16. She states walking exacerbates her pain and bending over alleviates her pain. She is followed by Melisa Tripp PA-C for bilateral hip and knee pain. Upon examination, she was unable to extend digits 3, 4 and 5 from previous nerve injury. Pt has additional complaints of neck pain ongoing for 1+ year which radiates into the left upper extremity to the forearm. The patient reports a fall on 10/11/16 from LOB and reported to the ER. She reports multiple falls due to LOB ongoing x 1+ year and states it is progressive in nature. She has also been dropping objects. She states her neck pain extends down her LUE to her hand with numbness in digits 1-2. She reports 2 falls since her last visit. She endorses loss of dexterity and states she has been dropping things with her left hand. She admits to staggering with walking.  It was noted patient has been prescribed Lyrica and continues to take Tegretol. She completed the MDP without significant relief. She continues on Cymbalta 60 mg daily. She completes her HEP 2x/day. Noted, patient has previously had bilateral L4/5 facet blocks and left-sided L5/S1 facet blocks with good relief performed 03/04/16. She previously reported significant relief with left-sided L4-L5 and L5-S1 facet blocks. She denies hx of DM. Pt is not a candidate for MRI due to defibrillator. Lumbar CT myelogram dated 9/29/14 per report revealed no spinal stenosis or diagnostic intrathecal abnormality. There was minimal degenerative disc disease. There was mild left-sided lumbar neuroforaminal narrowing from facet hypertrophy. There was lower lumbar facet hypertrophy and arthropathy, left greater than right. At L4-L5, there was bilateral moderate to severe facet hypertrophy. At the L5-S1 level, there was severe left-sided facet hypertrophy. Cervical CT myelogram dated 11/2/2016 per report, reveals multilevel mild degenerative changes as discussed most pronounced disc disease at C4/5 and C5/6. No high-grade central canal or foraminal stenosis. Cervical spine myelogram dated 11/2/2016 per report, reveals multilevel mild broad based on the disc space extradural defects at C2-3, C3-4, C4-5, and C5-6. C6/7 and C7/T1 is not adequately visualized on the crosstable lateral view due to high position of the shoulders. A LUE EMG dated 12/23/16 was suggestive of possible C5 radiculopathy. Note from Aurora Sheriff LPN dated 57/91/98 indicating Dr. Leia Bella reviewed the CT myelogram and stated he didn't note anything to account for her pain complaints. At her last clinical appointment, she had continued RUE pain. I am unsure her neck and upper extremity symptoms are based solely on spinal pathology. Her examination was more consistent with shoulder pathology with a positive impingement on the left and she was referred to Dr. Jayne Escobar for further evaluation.  I again referred her to neurology for evaluation of patient's balance and coordination issues. The patient returns today with left-sided neck pain extending into the left shoulder. She rates pain 6/10, a slight decrease since her last visit (7/10). Note from Dr. Daya Carr dated 5/2/17 indicating patient was seen for reevaluation of let shoulder pain with limited relief from previous cortisone injections. Of note, there is a partial thickness tear of the rotator cuff by left shoulder arthrogram. Per patient, she is currently enrolled in physical therapy for her left shoulder. She states she did f/u with Dr. Fadi Price concerning her balance and coordination issues who referred her to physical therapy. Notes from Dr. Fadi Price were not available for my review.  reviewed. Past Medical History:   Diagnosis Date    Arm pain jan15    Arrhythmia 2012     [employer] ICD     Arthritis     ALL OVER    CAD (coronary artery disease) 2011    STENTS PLACED X2    Chronic pain     KNEE & LOWER BACK    Diabetes (HCC)     GERD (gastroesophageal reflux disease)     H/O gastric bypass     Heart attack ([address] 2011    Heart failure ([address]     ischemic cardiomyopathy    Hypertension     Nerve damage 2017    in bilat legs and feet    Spinal cord injury         Social History     Social History    Marital status: SINGLE     Spouse name: N/A    Number of children: N/A    Years of education: N/A     Occupational History    Not on file.      Social History Main Topics    Smoking status: Former Smoker     Quit date: 5/20/2013    Smokeless tobacco: Never Used    Alcohol use No    Drug use: No    Sexual activity: No      Comment: Hysterectomy     Other Topics Concern    Not on file     Social History Narrative       Current Outpatient Prescriptions   Medication Sig Dispense Refill    montelukast (SINGULAIR) 10 mg tablet TK 1 T PO D  2    calcipotriene (DOVONEX) 0.005 % topical cream       PRALUENT PEN 75 mg/mL injector pen       pregabalin (LYRICA) 225 mg capsule Take 1 Cap by mouth two (2) times a day. Max Daily Amount: 450 mg. 60 Cap 1    pregabalin (LYRICA) 150 mg capsule Take 1 Cap by mouth two (2) times a day. Max Daily Amount: 300 mg. 60 Cap 0    zolpidem (AMBIEN) 10 mg tablet Take 1 Tab by mouth nightly as needed for Sleep. Max Daily Amount: 10 mg. 30 Tab 0    methocarbamol (ROBAXIN) 500 mg tablet TAKE 1 TABLET BY MOUTH FOUR TIMES DAILY AS NEEDED FOR MUSCLE SPASM 120 Tab 0    carBAMazepine (TEGRETOL) 200 mg tablet 1  q am 1 q pm  2 qhs 360 Tab 2    rosuvastatin (CRESTOR) 40 mg tablet Take 1 Tab by mouth daily. 90 Tab 3    HYDROcodone-acetaminophen (NORCO)  mg tablet Take 1 Tab by mouth every eight (8) hours as needed for Pain. Max Daily Amount: 3 Tabs. 60 Tab 0    ergocalciferol (ERGOCALCIFEROL) 50,000 unit capsule Take 1 Cap by mouth every seven (7) days. 12 Cap 3    celecoxib (CELEBREX) 200 mg capsule TAKE 1 CAPSULE BY MOUTH TWICE DAILY FOR 90 DAYS 180 Cap 2    levocetirizine (XYZAL) 5 mg tablet Take 1 Tab by mouth daily. 30 Tab 1    lisinopril (PRINIVIL, ZESTRIL) 5 mg tablet Take  by mouth daily.  furosemide (LASIX) 40 mg tablet TAKE 1 TABLET BY MOUTH TWICE DAILY AS NEEDED 180 Tab 0    GENERLAC 10 gram/15 mL solution       DULoxetine (CYMBALTA) 60 mg capsule Take 1 Cap by mouth daily. 30 Cap 5    isosorbide mononitrate ER (IMDUR) 30 mg tablet 15 mg.  carvedilol (COREG) 12.5 mg tablet 6.25 mg.      cyanocobalamin (VITAMIN B-12) 500 mcg tablet Take 500 mcg by mouth daily.  omeprazole (PRILOSEC) 20 mg capsule Take 1 capsule by mouth daily. 30 capsule 3    polyethylene glycol (MIRALAX) 17 gram/dose powder Take 17 g by mouth daily. 255 g 1    clopidogrel (PLAVIX) 75 mg tablet Take 1 tablet by mouth daily. 30 tablet 3    therapeutic multivitamin (THERAGRAN) tablet Take 1 tablet by mouth daily.       calcium citrate-vitamin d3 (CITRACAL+D) 315-200 mg-unit tab Take 1 tablet by mouth two (2) times daily (with meals).  Cholecalciferol, Vitamin D3, (VITAMIN D3) 1,000 unit cap Take  by mouth.  HYDROcodone-acetaminophen (NORCO)  mg tablet Take 1 Tab by mouth every eight (8) hours as needed for Pain. Max Daily Amount: 3 Tabs. 60 Tab 0    miscellaneous medical supply misc 2 Each by Does Not Apply route daily. 2 Each 1    miscellaneous medical supply misc 2 Each by Does Not Apply route daily. 2 Each 0    miscellaneous medical supply misc 1 Each by Does Not Apply route daily. 1 Each 1    miscellaneous medical supply misc 1 Each by Does Not Apply route daily. 1 Each 1    HYDROcodone-acetaminophen (NORCO) 5-325 mg per tablet Take 1 Tab by mouth every eight (8) hours as needed for Pain. Max Daily Amount: 3 Tabs. 60 Tab 0       Allergies   Allergen Reactions    Aspirin Hives        Ambulates with a single point cane. PHYSICAL EXAMINATION    Visit Vitals    /78    Pulse (!) 58    Ht 4' 11\" (1.499 m)    Wt 162 lb (73.5 kg)    BMI 32.72 kg/m2       CONSTITUTIONAL: NAD, A&O x 3  SENSATION: Intact to light touch throughout  NEURO: Positive Mechelle's, bilaterally. RANGE OF MOTION: The patient has full passive range of motion in all four extremities. MUSCULOSKELETAL: Minimal impingement, left shoulder. No pain with overhead activity. Shoulder AB/Flex Elbow Flex Wrist Ext Elbow Ext Wrist Flex Hand Intrin Tone   Right +4/5 +4/5 +4/5 +4/5 +4/5 +4/5 +4/5   Left +4/5 +4/5 +4/5 +4/5 +4/5 +4/5 +4/5                 ASSESSMENT   Temi was seen today for follow-up. Diagnoses and all orders for this visit:    Incomplete tear of left rotator cuff    Spondylosis without myelopathy or radiculopathy, cervical region    Radiculopathy, cervical    Other cervical disc degeneration, unspecified cervical region    Spondylosis of lumbosacral joint without myelopathy or radiculopathy    Other orders  -     pregabalin (LYRICA) 225 mg capsule; Take 1 Cap by mouth two (2) times a day.  Max Daily Amount: 450 mg.        IMPRESSION AND PLAN:  She describes neck pain extending to the left shoulder. Despite being diagnosed with partial rotator cuff tear, Dr. Claudia Ding does not believe her symptoms are related to shoulder pathology. I previously had Dr. Ana Damon review her cervical CT myelogram and it is my understanding he did not see surgical pathology. Regardless, she is interested in surgical intervention and I will be seeking Dr. Alan Peng opinion once again. In the interim, I will increase her Lyrica to 225 mg BID. Patient advised to call the office if intolerant to higher dose. I will see the patient back in 1 month's time or earlier if needed. Written by Oliva Silva, as dictated by Rigoberto Marquez MD  I examined the patient, reviewed and agree with the note.

## 2017-05-15 ENCOUNTER — HOSPITAL ENCOUNTER (OUTPATIENT)
Dept: PHYSICAL THERAPY | Age: 57
Discharge: HOME | End: 2017-05-15
Payer: MEDICARE
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
## 2017-05-16 ENCOUNTER — HOSPITAL ENCOUNTER (OUTPATIENT)
Dept: ULTRASOUND IMAGING | Age: 57
Discharge: HOME | End: 2017-05-16
Attending: INTERNAL MEDICINE
Payer: MEDICARE

## 2017-05-16 ENCOUNTER — HOSPITAL ENCOUNTER (OUTPATIENT)
Dept: MAMMOGRAPHY | Age: 57
Discharge: HOME | End: 2017-05-16
Attending: INTERNAL MEDICINE
Payer: MEDICARE

## 2017-05-16 DIAGNOSIS — N63.10 MASS OF BREAST, RIGHT: Primary | ICD-10-CM

## 2017-05-16 DIAGNOSIS — N63.10 MASS OF BREAST, RIGHT: ICD-10-CM

## 2017-05-16 DIAGNOSIS — R92.8 ABNORMAL MAMMOGRAM: Primary | ICD-10-CM

## 2017-05-16 DIAGNOSIS — R92.8 ABNORMAL MAMMOGRAM: ICD-10-CM

## 2017-05-16 PROCEDURE — 77062 BREAST TOMOSYNTHESIS BI: CPT

## 2017-05-16 PROCEDURE — 77066 DX MAMMO INCL CAD BI: CPT

## 2017-05-16 PROCEDURE — 76642 ULTRASOUND BREAST LIMITED: CPT

## 2017-05-16 PROCEDURE — 77063 BREAST TOMOSYNTHESIS BI: CPT

## 2017-05-19 ENCOUNTER — HOSPITAL ENCOUNTER (OUTPATIENT)
Dept: PHYSICAL THERAPY | Age: 57
Discharge: HOME | End: 2017-05-19
Payer: MEDICARE

## 2017-05-19 PROCEDURE — 97112 NEUROMUSCULAR REEDUCATION: CPT

## 2017-05-19 PROCEDURE — 97110 THERAPEUTIC EXERCISES: CPT

## 2017-05-19 NOTE — PROGRESS NOTES
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 \"    OBJECTIVE  15 min Therapeutic Exercise:  [x] See flow sheet :   Rationale: increase ROM, increase strength and improve coordination to improve the patients ability to improve stability for functional activities    25 min Neuromuscular Re-education:  [x]  See flow sheet :   Rationale: increase strength, improve coordination, improve balance and increase proprioception  to improve the patients ability to perform ADLs with increased stability             With   [] TE   [] TA   [] neuro   [] other: Patient Education: [x] Review HEP    [] Progressed/Changed HEP based on:   [] positioning   [] body mechanics   [] transfers   [] heat/ice application    [] other:      Other Objective/Functional Measures:   Cues upright posture with hip 3 way  Patient heavilty utilizes B UE support during 6\" step ups  Patient is able to increase speed with retro dynamic gait with practice     Pain Level (0-10 scale) post treatment: 0    ASSESSMENT/Changes in Function: Patient is increasing in B LE strength, able to perform increased reps and add weight per flowsheet; however, she continues to struggle with all balance activities, requiring Nhan to regain balance during dynamic gait several times. Will continue to focus on balance activities for improved stability with ambulation. Patient will continue to benefit from skilled PT services to modify and progress therapeutic interventions, address functional mobility deficits, address ROM deficits, address strength deficits, analyze and address soft tissue restrictions, analyze and cue movement patterns, analyze and modify body mechanics/ergonomics, assess and modify postural abnormalities, address imbalance/dizziness and instruct in home and community integration to attain remaining goals. []  See Plan of Care  []  See progress note/recertification  []  See Discharge Summary         Progress towards goals / Updated goals:  Long term goals: to be accomplished in 6 weeks  1) Pt's FOTO score will improve > or = 64 indicating improvements in function. At PN: not met, score of 55 on 4/25/2017  2) Pt will improve B LE MMT > or = 4/5 for functional strength during ambulation. At PN: hip flex L 4-/5 R 3+/5, knee ext L 4-/5 R 4/5, knee flex R 4-/5 L 4/5, DF L 4/5 R 4-/5 on 4/18/2018  3) Pt will demo B SLS > or = 15 sec to decrease risk for falls. At PN: progressing, currently RLE x10 sec, LLE x20 sec, on 4/18/2017  Current: Goal met: Pt is able to SLS B >15 sec. 5/4/17  4) Pt will report > or = 60% improvement in symptoms in order to progress rehab.   At PN: progressing, patient reports 50% improvement on 4/25/2017  Current: met, patient reports 60% improvement on 5/19/2017    PLAN  []  Upgrade activities as tolerated     [x]  Continue plan of care  []  Update interventions per flow sheet       []  Discharge due to:_  []  Other:_      Dom Cook 5/19/2017  9:29 AM    Future Appointments  Date Time Provider Oren Ramon   5/19/2017 11:30 AM Dom Cook [facility]   5/22/2017 3:00 PM Naima Anne PTA [facility]   5/24/2017 10:00 AM Kel Ponce PTA [facility]   5/26/2017 9:45 AM Trey Saxena PA-C [address]   5/31/2017 9:30 AM Naima Anne, PTA [facility]   6/1/2017 1:30 PM Perez Salomon PTA [facility]   6/7/2017 10:00 AM Emmanuelle Marvin DO [facility] None   6/9/2017 1:45 PM [facility]   6/20/2017 9:45 AM Celina Marquez MD [address] [place] [address]

## 2017-05-22 ENCOUNTER — APPOINTMENT (OUTPATIENT)
Dept: PHYSICAL THERAPY | Age: 57
End: 2017-05-22
Payer: MEDICARE

## 2017-05-24 ENCOUNTER — APPOINTMENT (OUTPATIENT)
Dept: PHYSICAL THERAPY | Age: 57
End: 2017-05-24
Payer: MEDICARE

## 2017-05-24 ENCOUNTER — PATIENT OUTREACH (OUTPATIENT)
Dept: FAMILY MEDICINE CLINIC | Age: 57
End: 2017-05-24
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
## 2017-05-26 ENCOUNTER — OFFICE VISIT (OUTPATIENT)
Dept: ORTHOPEDIC SURGERY | Age: 57
End: 2017-05-26

## 2017-05-26 VITALS
DIASTOLIC BLOOD PRESSURE: 55 MMHG | SYSTOLIC BLOOD PRESSURE: 98 MMHG | BODY MASS INDEX: 32.58 KG/M2 | HEIGHT: 59 IN | WEIGHT: 161.6 LBS | HEART RATE: 70 BPM | TEMPERATURE: 97.1 F

## 2017-05-26 DIAGNOSIS — Z96.652 STATUS POST LEFT KNEE REPLACEMENT: ICD-10-CM

## 2017-05-26 DIAGNOSIS — M70.61 TROCHANTERIC BURSITIS OF RIGHT HIP: Primary | ICD-10-CM

## 2017-05-26 RX ORDER — HYDROCODONE BITARTRATE AND ACETAMINOPHEN 10; 325 MG/1; MG/1
1 TABLET ORAL
Qty: 60 TAB | Refills: 0 | Status: SHIPPED | OUTPATIENT
Start: 2017-05-26 | End: 2017-06-09 | Stop reason: SDUPTHER

## 2017-05-26 RX ORDER — BETAMETHASONE SODIUM PHOSPHATE AND BETAMETHASONE ACETATE 3; 3 MG/ML; MG/ML
6 INJECTION, SUSPENSION INTRA-ARTICULAR; INTRALESIONAL; INTRAMUSCULAR; SOFT TISSUE ONCE
Qty: 1 ML | Refills: 0
Start: 2017-05-26 | End: 2017-05-26

## 2017-05-26 RX ORDER — BETAMETHASONE SODIUM PHOSPHATE AND BETAMETHASONE ACETATE 3; 3 MG/ML; MG/ML
6 INJECTION, SUSPENSION INTRA-ARTICULAR; INTRALESIONAL; INTRAMUSCULAR; SOFT TISSUE ONCE
Qty: 1 ML | Refills: 0 | Status: CANCELLED
Start: 2017-05-26 | End: 2017-05-26
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
## 2017-05-30 ENCOUNTER — OFFICE VISIT (OUTPATIENT)
Dept: FAMILY MEDICINE CLINIC | Age: 57
End: 2017-05-30

## 2017-05-30 VITALS
BODY MASS INDEX: 32.66 KG/M2 | TEMPERATURE: 96.6 F | SYSTOLIC BLOOD PRESSURE: 134 MMHG | WEIGHT: 162 LBS | OXYGEN SATURATION: 99 % | HEART RATE: 65 BPM | DIASTOLIC BLOOD PRESSURE: 79 MMHG | HEIGHT: 59 IN

## 2017-05-30 DIAGNOSIS — Z09 HOSPITAL DISCHARGE FOLLOW-UP: ICD-10-CM

## 2017-05-30 DIAGNOSIS — R55 SYNCOPE, UNSPECIFIED SYNCOPE TYPE: Primary | ICD-10-CM

## 2017-05-30 DIAGNOSIS — L98.9 HAND LESION: ICD-10-CM

## 2017-05-30 RX ORDER — LISINOPRIL 20 MG/1
20 TABLET ORAL DAILY
COMMUNITY
Start: 2017-05-23 | End: 2020-05-19

## 2017-05-30 NOTE — MR AVS SNAPSHOT
Visit Information Date & Time Provider Department Dept. Phone Encounter #  
 5/30/2017 10:30 AM Meche James NP [address] [id number] Follow-up Instructions Return if symptoms worsen or fail to improve, for appointment with Dr. Rabia Johnson on June 7th. Your Appointments 6/7/2017 10:00 AM  
Follow Up with Emmanuelle Marvin DO [facility] (--) Appt Note: 3 month fu  
 [address] [facility] [id number] [phone]  
  
   
 [address] [facility] [id number]  
  
    
 6/9/2017  1:45 PM  
Follow Up with Blanca Chin MD  
[facility] [facility] ([address] [place] [address]) Appt Note: upper back 4 wk fu no copay 2012 [facility] [address]  
  
   
 [address] [facility] [address]  
  
    
 6/20/2017  9:45 AM  
Follow Up with Stas Christopher MD  
[facility] [address]) Appt Note: 3mon f/u  
 [facility] [address] [place] [id number] [phone]  
  
   
 [facility] [address]  
  
    
 9/1/2017  9:45 AM  
Follow Up with Elyse Taveras PA-C  
[facility] ([address] [place] [address]) Appt Note: lt knee 3 mo fu  
 [address] [place] [address], [address] [facility]  
[phone] [address] [place] [address], [facility] [address] Upcoming Health Maintenance Date Due  
 EYE EXAM RETINAL OR DILATED Q1 8/5/1971 FOBT Q 1 YEAR AGE 50-75 8/5/2011 FOOT EXAM Q1 3/3/2015 GLAUCOMA SCREENING Q2Y 9/29/2016 HEMOGLOBIN A1C Q6M 7/25/2017 INFLUENZA AGE 9 TO ADULT 8/1/2017 MICROALBUMIN Q1 10/17/2017 LIPID PANEL Q1 1/25/2018 BREAST CANCER SCRN MAMMOGRAM 5/16/2018 DTaP/Tdap/Td series (2 - Td) 10/4/2026 Allergies as of 5/30/2017  Review Complete On: 5/30/2017 By: Meche James NP  
  
 Severity Noted Reaction Type Reactions Aspirin  08/02/2012   Topical Hives Current Immunizations  Reviewed on 12/13/2013 Name Date Influenza Vaccine 9/29/2015, 9/2/2015 12:00 AM, 11/24/2011 12:00 AM  
 Influenza Vaccine PF 9/24/2014, 12/13/2013 Pneumococcal Conjugate (PCV-13) 5/2/2017 Pneumococcal Polysaccharide (PPSV-23) 11/3/2015 12:00 AM  
 Tdap 10/4/2016 12:00 AM  
  
 Not reviewed this visit You Were Diagnosed With   
  
 Codes Comments Syncope, unspecified syncope type    -  Primary ICD-10-CM: R55 
ICD-9-CM: 780.2 Hand lesion     ICD-10-CM: L98.9 ICD-9-CM: 709.9 Vitals BP Pulse Temp Height(growth percentile) Weight(growth percentile) LMP  
 134/79 (BP 1 Location: Right arm, BP Patient Position: Sitting) 65 96.6 °F (35.9 °C) (Oral) 4' 11\" (1.499 m) 162 lb (73.5 kg) (LMP Unknown) SpO2 BMI OB Status Smoking Status 99% 32.72 kg/m2 Hysterectomy Former Smoker BMI and BSA Data Body Mass Index Body Surface Area 32.72 kg/m 2 1.75 m 2 Preferred Pharmacy Pharmacy Name Phone [address], [address] [place] [address] [phone] Your Updated Medication List  
  
   
This list is accurate as of: 5/30/17 11:44 AM.  Always use your most recent med list.  
  
  
  
  
 calcipotriene 0.005 % topical cream  
Commonly known as:  DOVONEX  
  
 calcium citrate-vitamin d3 315-200 mg-unit Tab Commonly known as:  CITRACAL+D Take 1 tablet by mouth two (2) times daily (with meals). carBAMazepine 200 mg tablet Commonly known as:  TEGretol 1  q am 1 q pm  2 qhs  
  
 carvedilol 12.5 mg tablet Commonly known as:  COREG  
6.25 mg.  
  
 celecoxib 200 mg capsule Commonly known as:  CELEBREX  
TAKE 1 CAPSULE BY MOUTH TWICE DAILY FOR 90 DAYS  
  
 clopidogrel 75 mg Tab Commonly known as:  PLAVIX Take 1 tablet by mouth daily. DULoxetine 60 mg capsule Commonly known as:  CYMBALTA Take 1 Cap by mouth daily. ergocalciferol 50,000 unit capsule Commonly known as:  ERGOCALCIFEROL Take 1 Cap by mouth every seven (7) days. furosemide 40 mg tablet Commonly known as:  LASIX TAKE 1 TABLET BY MOUTH TWICE DAILY AS NEEDED GENERLAC 10 gram/15 mL solution Generic drug:  lactulose * HYDROcodone-acetaminophen 5-325 mg per tablet Commonly known as:  Elyn Barley Take 1 Tab by mouth every eight (8) hours as needed for Pain. Max Daily Amount: 3 Tabs. * HYDROcodone-acetaminophen  mg tablet Commonly known as:  Elyn Barley Take 1 Tab by mouth every eight (8) hours as needed for Pain. Max Daily Amount: 3 Tabs. * HYDROcodone-acetaminophen  mg tablet Commonly known as:  Elyn Barley Take 1 Tab by mouth every twelve (12) hours as needed for Pain. Max Daily Amount: 2 Tabs. isosorbide mononitrate ER 30 mg tablet Commonly known as:  IMDUR  
15 mg.  
  
 levocetirizine 5 mg tablet Commonly known as:  Avie Harsh Take 1 Tab by mouth daily. * lisinopril 5 mg tablet Commonly known as:  Myra Cozier Take  by mouth daily. * lisinopril 20 mg tablet Commonly known as:  Myra Cozier Take 20 mg by mouth daily. methocarbamol 500 mg tablet Commonly known as:  ROBAXIN  
TAKE 1 TABLET BY MOUTH FOUR TIMES DAILY AS NEEDED FOR MUSCLE SPASM * miscellaneous medical supply Misc  
1 Each by Does Not Apply route daily. * miscellaneous medical supply Misc  
1 Each by Does Not Apply route daily. * miscellaneous medical supply Misc  
2 Each by Does Not Apply route daily. * miscellaneous medical supply Misc  
2 Each by Does Not Apply route daily. montelukast 10 mg tablet Commonly known as:  SINGULAIR TK 1 T PO D  
  
 omeprazole 20 mg capsule Commonly known as:  PRILOSEC Take 1 capsule by mouth daily. polyethylene glycol 17 gram/dose powder Commonly known as:  Noah Young Take 17 g by mouth daily. PRALUENT PEN 75 mg/mL injector pen Generic drug:  alirocumab * pregabalin 150 mg capsule Commonly known as:  York Pace Take 1 Cap by mouth two (2) times a day. Max Daily Amount: 300 mg.  
  
 * pregabalin 225 mg capsule Commonly known as:  York Pace Take 1 Cap by mouth two (2) times a day. Max Daily Amount: 450 mg.  
  
 rosuvastatin 40 mg tablet Commonly known as:  CRESTOR Take 1 Tab by mouth daily. therapeutic multivitamin tablet Commonly known as:  [facility] Take 1 tablet by mouth daily. VITAMIN B-12 500 mcg tablet Generic drug:  cyanocobalamin Take 500 mcg by mouth daily. VITAMIN D3 1,000 unit Cap Generic drug:  cholecalciferol Take  by mouth.  
  
 zolpidem 10 mg tablet Commonly known as:  AMBIEN Take 1 Tab by mouth nightly as needed for Sleep. Max Daily Amount: 10 mg.  
  
 * Notice: This list has 11 medication(s) that are the same as other medications prescribed for you. Read the directions carefully, and ask your doctor or other care provider to review them with you. Follow-up Instructions Return if symptoms worsen or fail to improve, for appointment with Dr. Homer Gracia on June 7th. To-Do List   
 05/31/2017 9:30 AM  
  Appointment with Douglas Loza PTA at [facility]  [address]  ([phone])  
  
 06/01/2017 1:30 PM  
  [facility]  ([phone]) Patient Instructions Lightheadedness or Faintness: Care Instructions Your Care Instructions Lightheadedness is a feeling that you are about to faint or \"pass out. \" You do not feel as if you or your surroundings are moving. It is different from vertigo, which is the feeling that you or things around you are spinning or tilting. Lightheadedness usually goes away or gets better when you lie down. If lightheadedness gets worse, it can lead to a fainting spell. It is common to feel lightheaded from time to time. Lightheadedness usually is not caused by a serious problem. It often is caused by a short-lasting drop in blood pressure and blood flow to your head that occurs when you get up too quickly from a seated or lying position. Follow-up care is a key part of your treatment and safety. Be sure to make and go to all appointments, and call your doctor if you are having problems. It's also a good idea to know your test results and keep a list of the medicines you take. How can you care for yourself at home? · Lie down for 1 or 2 minutes when you feel lightheaded. After lying down, sit up slowly and remain sitting for 1 to 2 minutes before slowly standing up. · Avoid movements, positions, or activities that have made you lightheaded in the past. 
· Get plenty of rest, especially if you have a cold or flu, which can cause lightheadedness. · Make sure you drink plenty of fluids, especially if you have a fever or have been sweating. · Do not drive or put yourself and others in danger while you feel lightheaded. When should you call for help? Call 911 anytime you think you may need emergency care. For example, call if: 
· You have symptoms of a stroke. These may include: 
¨ Sudden numbness, tingling, weakness, or loss of movement in your face, arm, or leg, especially on only one side of your body. ¨ Sudden vision changes. ¨ Sudden trouble speaking. ¨ Sudden confusion or trouble understanding simple statements. ¨ Sudden problems with walking or balance. ¨ A sudden, severe headache that is different from past headaches. · You have symptoms of a heart attack. These may include: ¨ Chest pain or pressure, or a strange feeling in the chest. 
¨ Sweating. ¨ Shortness of breath. ¨ Nausea or vomiting. ¨ Pain, pressure, or a strange feeling in the back, neck, jaw, or upper belly or in one or both shoulders or arms. ¨ Lightheadedness or sudden weakness. ¨ A fast or irregular heartbeat. After you call 911, the  may tell you to chew 1 adult-strength or 2 to 4 low-dose aspirin. Wait for an ambulance. Do not try to drive yourself. Watch closely for changes in your health, and be sure to contact your doctor if: 
· Your lightheadedness gets worse or does not get better with home care. Where can you learn more? Go to http://pramod-bryan.info/. Enter R116 in the search box to learn more about \"Lightheadedness or Faintness: Care Instructions. \" Current as of: May 27, 2016 Content Version: 11.2 © 2608-1936 [employer]. Care instructions adapted under license by [employer] (which disclaims liability or warranty for this information). If you have questions about a medical condition or this instruction, always ask your healthcare professional. [place] [address] any warranty or liability for your use of this information. Introducing \[facility] [place] [facility]\"" & HEALTH SERVICES! Dear Kristi Tse: 
Thank you for requesting a [employer] account. Our records indicate that you already have an active [employer] account. You can access your account anytime at [email or web address] Did you know that you can access your hospital and ER discharge instructions at any time in [employer]? You can also review all of your test results from your hospital stay or ER visit. Additional Information If you have questions, please visit the Frequently Asked Questions section of the [employer] website at [email or web address] Remember, [employer] is NOT to be used for urgent needs. For medical emergencies, dial 911. Now available from your iPhone and Android! Please provide this summary of care documentation to your next provider. Your primary care clinician is listed as Tally Pi. If you have any questions after today's visit, please call [phone].
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
## 2017-05-30 NOTE — PATIENT INSTRUCTIONS
Lightheadedness or Faintness: Care Instructions  Your Care Instructions  Lightheadedness is a feeling that you are about to faint or \"pass out. \" You do not feel as if you or your surroundings are moving. It is different from vertigo, which is the feeling that you or things around you are spinning or tilting. Lightheadedness usually goes away or gets better when you lie down. If lightheadedness gets worse, it can lead to a fainting spell. It is common to feel lightheaded from time to time. Lightheadedness usually is not caused by a serious problem. It often is caused by a short-lasting drop in blood pressure and blood flow to your head that occurs when you get up too quickly from a seated or lying position. Follow-up care is a key part of your treatment and safety. Be sure to make and go to all appointments, and call your doctor if you are having problems. It's also a good idea to know your test results and keep a list of the medicines you take. How can you care for yourself at home? · Lie down for 1 or 2 minutes when you feel lightheaded. After lying down, sit up slowly and remain sitting for 1 to 2 minutes before slowly standing up. · Avoid movements, positions, or activities that have made you lightheaded in the past.  · Get plenty of rest, especially if you have a cold or flu, which can cause lightheadedness. · Make sure you drink plenty of fluids, especially if you have a fever or have been sweating. · Do not drive or put yourself and others in danger while you feel lightheaded. When should you call for help? Call 911 anytime you think you may need emergency care. For example, call if:  · You have symptoms of a stroke. These may include:  ¨ Sudden numbness, tingling, weakness, or loss of movement in your face, arm, or leg, especially on only one side of your body. ¨ Sudden vision changes. ¨ Sudden trouble speaking. ¨ Sudden confusion or trouble understanding simple statements.   ¨ Sudden problems with walking or balance. ¨ A sudden, severe headache that is different from past headaches. · You have symptoms of a heart attack. These may include:  ¨ Chest pain or pressure, or a strange feeling in the chest.  ¨ Sweating. ¨ Shortness of breath. ¨ Nausea or vomiting. ¨ Pain, pressure, or a strange feeling in the back, neck, jaw, or upper belly or in one or both shoulders or arms. ¨ Lightheadedness or sudden weakness. ¨ A fast or irregular heartbeat. After you call 911, the  may tell you to chew 1 adult-strength or 2 to 4 low-dose aspirin. Wait for an ambulance. Do not try to drive yourself. Watch closely for changes in your health, and be sure to contact your doctor if:  · Your lightheadedness gets worse or does not get better with home care. Where can you learn more? Go to http://pramod-bryan.info/. Enter P726 in the search box to learn more about \"Lightheadedness or Faintness: Care Instructions. \"  Current as of: May 27, 2016  Content Version: 11.2  © 9083-4755 [employer]. Care instructions adapted under license by [employer] (which disclaims liability or warranty for this information). If you have questions about a medical condition or this instruction, always ask your healthcare professional. [address] any warranty or liability for your use of this information.

## 2017-06-01 ENCOUNTER — APPOINTMENT (OUTPATIENT)
Dept: PHYSICAL THERAPY | Age: 57
End: 2017-06-01

## 2017-06-07 ENCOUNTER — OFFICE VISIT (OUTPATIENT)
Dept: FAMILY MEDICINE CLINIC | Age: 57
End: 2017-06-07

## 2017-06-07 ENCOUNTER — TELEPHONE (OUTPATIENT)
Dept: ORTHOPEDIC SURGERY | Age: 57
End: 2017-06-07

## 2017-06-07 VITALS
OXYGEN SATURATION: 100 % | HEIGHT: 59 IN | TEMPERATURE: 97.6 F | BODY MASS INDEX: 32.66 KG/M2 | SYSTOLIC BLOOD PRESSURE: 101 MMHG | WEIGHT: 162 LBS | DIASTOLIC BLOOD PRESSURE: 67 MMHG | HEART RATE: 68 BPM

## 2017-06-07 DIAGNOSIS — M50.30 OTHER CERVICAL DISC DEGENERATION, UNSPECIFIED CERVICAL REGION: ICD-10-CM

## 2017-06-07 DIAGNOSIS — M89.9 DISORDER OF BONE: ICD-10-CM

## 2017-06-07 DIAGNOSIS — M62.830 PARASPINAL MUSCLE SPASM: ICD-10-CM

## 2017-06-07 DIAGNOSIS — I69.959 HEMIPLEGIA OF DOMINANT SIDE AS LATE EFFECT FOLLOWING CEREBROVASCULAR DISEASE (HCC): ICD-10-CM

## 2017-06-07 DIAGNOSIS — I50.22 CHRONIC SYSTOLIC HEART FAILURE (HCC): Primary | ICD-10-CM

## 2017-06-07 DIAGNOSIS — M75.112 PARTIAL TEAR OF LEFT ROTATOR CUFF: ICD-10-CM

## 2017-06-07 DIAGNOSIS — G62.9 NEUROPATHY: ICD-10-CM

## 2017-06-07 RX ORDER — METHOCARBAMOL 500 MG/1
TABLET, FILM COATED ORAL
Qty: 120 TAB | Refills: 3 | Status: SHIPPED | OUTPATIENT
Start: 2017-06-07 | End: 2018-12-06 | Stop reason: SDUPTHER

## 2017-06-07 NOTE — PROGRESS NOTES
Progress Note    Patient: Era Woods MRN: [id number]  SSN: [id number]    YOB: 1960  Age: 55 y.o. Sex: female          Subjective:   Era Woods is a 55 y.o. female who is here for regular follow up. Patient mentions that she had a fainting episode a few weeks ago. She had an echo and carotid ultrasound. They did place her on a Holter Monitor. She will see Dr. Joelle Gonzalez on Thursday. She states that she was in [employer] when she had the fainting spell. She states that at the time she could hear people speaking but could not respond. She states that she was later taken to the ER and that is when she had all of the evaluation done. She states she was standing up at the time of the fainting episodes. She states that her BP and BS were also fine that morning. She denies any lightheaded episodes since discharge from the hospital.     The patient mentions that Dr. Gema Das increased her Lyrica to 225 mg twice a day. The patient mentions that she was told by her different orthopedic specialists and was told that pain was coming from neck and another says the pain is coming from the shoulder. The patient cannot have an MRI due to her pacemaker. She states that she has episodes where she will drop items that she is holding in her left hands. The patient admits to getting CT scans of both her neck and left shoulder. Objective:     Visit Vitals    /67 (BP 1 Location: Right arm, BP Patient Position: Sitting)    Pulse 68    Temp 97.6 °F (36.4 °C) (Oral)    Ht 4' 11\" (1.499 m)    Wt 162 lb (73.5 kg)    LMP  (LMP Unknown)    SpO2 100%    BMI 32.72 kg/m2       Review of Systems:  Pertinent ROS noted in HPI     Physical Exam:   GENERAL: alert, cooperative, appears stated age  EYE: conjunctivae/corneas clear. PERRL, EOM's intact. Fundi benign  LYMPHATIC: Cervical, supraclavicular, and axillary nodes normal.   THROAT & NECK: normal and no erythema or exudates noted.  Poor dentition on exam. LUNG: clear to auscultation bilaterally  HEART: regular rate and rhythm, S1, S2 normal, no murmur, click, rub or gallop  ABDOMEN: Bowels sounds diminished but observed. EXTREMITIES:  No significant edema  NEUROLOGIC: Right arm flaccid on exam. No change from previous visits. MUSCULOSKELETAL:          Lab/Data Review:    Lab Results   Component Value Date/Time    Hemoglobin A1c 6.6 01/25/2017 12:10 PM     Lab Results   Component Value Date/Time    Cholesterol, total 201 01/25/2017 12:10 PM    HDL Cholesterol 62 01/25/2017 12:10 PM    LDL, calculated 114 01/25/2017 12:10 PM    VLDL, calculated 25 01/25/2017 12:10 PM    Triglyceride 127 01/25/2017 12:10 PM    CHOL/HDL Ratio 2.6 01/05/2016 10:48 AM     Lab Results   Component Value Date/Time    Sodium 144 01/25/2017 12:10 PM    Potassium 4.4 01/25/2017 12:10 PM    Chloride 102 01/25/2017 12:10 PM    CO2 23 01/25/2017 12:10 PM    Anion gap 4 11/02/2016 08:23 AM    Glucose 139 01/25/2017 12:10 PM    BUN 12 01/25/2017 12:10 PM    Creatinine 0.89 01/25/2017 12:10 PM    BUN/Creatinine ratio 13 01/25/2017 12:10 PM    GFR est AA 84 01/25/2017 12:10 PM    GFR est non-AA 73 01/25/2017 12:10 PM    Calcium 8.8 01/25/2017 12:10 PM    Bilirubin, total <0.2 01/25/2017 12:10 PM    AST (SGOT) 27 01/25/2017 12:10 PM    Alk. phosphatase 130 01/25/2017 12:10 PM    Protein, total 6.6 03/06/2017 12:07 PM    Albumin 3.8 01/25/2017 12:10 PM    Globulin 3.0 07/26/2016 11:25 AM    A-G Ratio 1.6 01/25/2017 12:10 PM    ALT (SGPT) 19 01/25/2017 12:10 PM       Assessment:     1.) Lumbar Neuritis with radiation down left arm: Patient referred to Dr. Sarah Dalton for second opinion. 2.) Diabetic Neuropathy: Patient switched to Lyrica 225 mg twice a day by Dr. Arcenio Cedillo. 3.) Insomnia: Patient stable on Ambien 10 mg once a day. 4.) Paraspinal Muscle Spasm:  Robaxin reordered. 5.) Hyperlipidemia: Lipid cascade ordered.      6.) Preventive: Labs ordered as noted below     I personally reviewed and summarized all labs with the patient. Patient will return in 3 months for follow-up.         Plan:     Orders Placed This Encounter    CBC WITH AUTOMATED DIFF    METABOLIC PANEL, COMPREHENSIVE    LIPID CASCADE W/REFLEX APO B    VITAMIN D, 25 HYDROXY    REFERRAL TO ORTHOPEDIC SURGERY    methocarbamol (ROBAXIN) 500 mg tablet           Signed By: Key Bledsoe DO     June 7, 2017

## 2017-06-07 NOTE — TELEPHONE ENCOUNTER
Dr. Rosalva Magana asked Dr. Al Trevino to look over the patients CT Myelogram Cervical and after review by Dr. Al Trevino he states it is nothing surgical from the neck standpoint but could be related to her shoulder. I informed Dr. Rosalva Mgaana and he states to inform the patient.

## 2017-06-07 NOTE — TELEPHONE ENCOUNTER
Called and informed patient of the providers findings that there is nothing surgical from the neck standpoint but it could be coming from the shoulder. Patient verbalized understanding.

## 2017-06-07 NOTE — PATIENT INSTRUCTIONS
1.) They will call to set up an appointment for a second opinion on your shoulder. If you do not hear back from the specialist in 2 weeks then give them a call. 2.) Return in 3 months for regular follow up. Please get labs before next visit.

## 2017-06-07 NOTE — MR AVS SNAPSHOT
Visit Information Date & Time Provider Department Dept. Phone Encounter #  
 6/7/2017 10:00 AM Artist NadiaSaba Bishnu Hemphill 77 [id number] Follow-up Instructions Return in about 3 months (around 9/7/2017) for Regular Follow Up. .  
  
Your Appointments 6/9/2017  1:45 PM  
Follow Up with Kaur Rivera MD  
914 [facility], [address] and [facility] ([address]) Appt Note: upper back 4 wk fu no copay 2012 [facility] [address]  
  
   
 [address] [facility] access hospital [address]  
  
    
 6/20/2017  9:45 AM  
Follow Up with Lottie Libman, MD  
[address]) Appt Note: 3mon f/u  
 [address] [address]  
[phone]  
  
   
 [place] [address]  
  
    
 9/1/2017  9:45 AM  
Follow Up with Stalin Menezes PA-C  
[facility] ([address]) Appt Note: lt knee 3 mo fu  
 [address], [address] 200 [facility]  
[phone] 1212 [facility], [address] Upcoming Health Maintenance Date Due  
 EYE EXAM RETINAL OR DILATED Q1 8/5/1971 FOBT Q 1 YEAR AGE 50-75 8/5/2011 FOOT EXAM Q1 3/3/2015 GLAUCOMA SCREENING Q2Y 9/29/2016 HEMOGLOBIN A1C Q6M 7/25/2017 INFLUENZA AGE 9 TO ADULT 8/1/2017 MICROALBUMIN Q1 10/17/2017 LIPID PANEL Q1 1/25/2018 BREAST CANCER SCRN MAMMOGRAM 5/16/2018 DTaP/Tdap/Td series (2 - Td) 10/4/2026 Allergies as of 6/7/2017  Review Complete On: 6/7/2017 By: Sylvester Turcios LPN Severity Noted Reaction Type Reactions Aspirin  08/02/2012   Topical Hives Current Immunizations  Reviewed on 12/13/2013 Name Date Influenza Vaccine 9/29/2015, 9/2/2015 12:00 AM, 11/24/2011 12:00 AM  
 Influenza Vaccine PF 9/24/2014, 12/13/2013 Pneumococcal Conjugate (PCV-13) 5/2/2017 Pneumococcal Polysaccharide (PPSV-23) 11/3/2015 12:00 AM  
 Tdap 10/4/2016 12:00 AM  
  
 Not reviewed this visit You Were Diagnosed With   
  
 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  
 101/67 (BP 1 Location: Right arm, BP Patient Position: Sitting) 68 97.6 °F (36.4 °C) (Oral) 4' 11\" (1.499 m) 162 lb (73.5 kg) (LMP Unknown) SpO2 BMI OB Status Smoking Status 100% 32.72 kg/m2 Hysterectomy Former Smoker BMI and BSA Data Body Mass Index Body Surface Area 32.72 kg/m 2 1.75 m 2 Preferred Pharmacy Pharmacy Name Phone [facility] 1, [address] [phone] Your Updated Medication List  
  
   
This list is accurate as of: 6/7/17 10:47 AM.  Always use your most recent med list.  
  
  
  
  
 calcipotriene 0.005 % topical cream  
Commonly known as:  DOVONEX  
  
 calcium citrate-vitamin d3 315-200 mg-unit Tab Commonly known as:  CITRACAL+D Take 1 tablet by mouth two (2) times daily (with meals). carBAMazepine 200 mg tablet Commonly known as:  TEGretol 1  q am 1 q pm  2 qhs  
  
 carvedilol 12.5 mg tablet Commonly known as:  COREG  
6.25 mg.  
  
 celecoxib 200 mg capsule Commonly known as:  CELEBREX TAKE 1 CAPSULE BY MOUTH TWICE DAILY FOR 90 DAYS  
  
 clopidogrel 75 mg Tab Commonly known as:  PLAVIX Take 1 tablet by mouth daily. DULoxetine 60 mg capsule Commonly known as:  CYMBALTA Take 1 Cap by mouth daily. ergocalciferol 50,000 unit capsule Commonly known as:  ERGOCALCIFEROL Take 1 Cap by mouth every seven (7) days. furosemide 40 mg tablet Commonly known as:  LASIX TAKE 1 TABLET BY MOUTH TWICE DAILY AS NEEDED GENERLAC 10 gram/15 mL solution Generic drug:  lactulose * HYDROcodone-acetaminophen 5-325 mg per tablet Commonly known as:  [place] Littler Take 1 Tab by mouth every eight (8) hours as needed for Pain. Max Daily Amount: 3 Tabs. * HYDROcodone-acetaminophen  mg tablet Commonly known as:  Helio Littler Take 1 Tab by mouth every eight (8) hours as needed for Pain. Max Daily Amount: 3 Tabs. * HYDROcodone-acetaminophen  mg tablet Commonly known as:  Helio Littler Take 1 Tab by mouth every twelve (12) hours as needed for Pain. Max Daily Amount: 2 Tabs. isosorbide mononitrate ER 30 mg tablet Commonly known as:  IMDUR  
15 mg.  
  
 levocetirizine 5 mg tablet Commonly known as:  Lawrnce Ty Take 1 Tab by mouth daily. * lisinopril 5 mg tablet Commonly known as:  Em Gaster Take  by mouth daily. * lisinopril 20 mg tablet Commonly known as:  Em Gaster Take 20 mg by mouth daily. methocarbamol 500 mg tablet Commonly known as:  ROBAXIN  
TAKE 1 TABLET BY MOUTH FOUR TIMES DAILY AS NEEDED FOR MUSCLE SPASM * miscellaneous medical supply Misc  
1 Each by Does Not Apply route daily. * miscellaneous medical supply Misc  
1 Each by Does Not Apply route daily. * miscellaneous medical supply Misc  
2 Each by Does Not Apply route daily. * miscellaneous medical supply Misc  
2 Each by Does Not Apply route daily. montelukast 10 mg tablet Commonly known as:  CRICKET TK 1 T PO D  
  
 omeprazole 20 mg capsule Commonly known as:  PRILOSEC Take 1 capsule by mouth daily. polyethylene glycol 17 gram/dose powder Commonly known as:  Aidan Bryant Take 17 g by mouth daily. PRALUENT PEN 75 mg/mL injector pen Generic drug:  alirocumab * pregabalin 150 mg capsule Commonly known as:  Terra Garcia Take 1 Cap by mouth two (2) times a day. Max Daily Amount: 300 mg.  
  
 * pregabalin 225 mg capsule Commonly known as:  Terra Garcia Take 1 Cap by mouth two (2) times a day. Max Daily Amount: 450 mg.  
  
 rosuvastatin 40 mg tablet Commonly known as:  CRESTOR Take 1 Tab by mouth daily. therapeutic multivitamin tablet Commonly known as:  [facility] Take 1 tablet by mouth daily. VITAMIN B-12 500 mcg tablet Generic drug:  cyanocobalamin Take 500 mcg by mouth daily. VITAMIN D3 1,000 unit Cap Generic drug:  cholecalciferol Take  by mouth.  
  
 zolpidem 10 mg tablet Commonly known as:  AMBIEN Take 1 Tab by mouth nightly as needed for Sleep. Max Daily Amount: 10 mg.  
  
 * Notice: This list has 11 medication(s) that are the same as other medications prescribed for you. Read the directions carefully, and ask your doctor or other care provider to review them with you. Prescriptions Sent to Pharmacy Refills  
 methocarbamol (ROBAXIN) 500 mg tablet 3 Sig: TAKE 1 TABLET BY MOUTH FOUR TIMES DAILY AS NEEDED FOR MUSCLE SPASM Class: Normal  
 Pharmacy: [address] [place] [address] #: [phone] We Performed the Following REFERRAL TO ORTHOPEDIC SURGERY [REF62 Custom] Comments:  
 Please evaluate patient for second opinion for partial left rotator cuff tear and cervical disc disease. Follow-up Instructions Return in about 3 months (around 9/7/2017) for Regular Follow Up. Tyler Au To-Do List   
 06/07/2017 Lab:  LIPID CASCADE W/REFLEX APO B   
  
 06/07/2017 Lab: VITAMIN D, 25 HYDROXY Around 09/05/2017 Lab:  CBC WITH AUTOMATED DIFF Around 09/05/2017 Lab:  METABOLIC PANEL, COMPREHENSIVE Referral Information Referral ID Referred By Referred To  
  
 [id number] GLEN HannaSheriBENITA [address] [place] [address], [facility] [place], [facility] [facility] [address] Phone: [phone] Fax: [phone] Visits Status Start Date End Date 1 New Request 6/7/17 6/7/18 If your referral has a status of pending review or denied, additional information will be sent to support the outcome of this decision. Patient Instructions 1.) They will call to set up an appointment for a second opinion on your shoulder. If you do not hear back from the specialist in 2 weeks then give them a call. 2.) Return in 3 months for regular follow up. Please get labs before next visit. Introducing [facility] & HEALTH SERVICES! Dear Danielle Mitchell: 
Thank you for requesting a [email or web address] account. Our records indicate that you already have an active [email or web address] account. You can access your account anytime at [email or web address] Did you know that you can access your hospital and ER discharge instructions at any time in [email or web address]? You can also review all of your test results from your hospital stay or ER visit. Additional Information If you have questions, please visit the Frequently Asked Questions section of the [email or web address] website at [email or web address] Remember, [email or web address] is NOT to be used for urgent needs. For medical emergencies, dial 911. Now available from your iPhone and Android! Please provide this summary of care documentation to your next provider. Your primary care clinician is listed as Cassidy Pearl. If you have any questions after today's visit, please call [phone].

## 2017-06-07 NOTE — PROGRESS NOTES
1. Have you been to the ER, urgent care clinic since your last visit? Hospitalized since your last visit? No    2. Have you seen or consulted any other health care providers outside of the [address] [place] [address] since your last visit? Include any pap smears or colon screening.  No    Is someone accompanying this pt? no    Is the patient using any DME equipment during OV? no      Chief Complaint   Patient presents with    Hypertension    Coronary Artery Disease    Diabetes

## 2017-06-09 ENCOUNTER — OFFICE VISIT (OUTPATIENT)
Dept: ORTHOPEDIC SURGERY | Age: 57
End: 2017-06-09

## 2017-06-09 VITALS — RESPIRATION RATE: 14 BRPM | WEIGHT: 165.6 LBS | BODY MASS INDEX: 33.38 KG/M2 | HEIGHT: 59 IN

## 2017-06-09 DIAGNOSIS — M54.12 RADICULOPATHY, CERVICAL: ICD-10-CM

## 2017-06-09 DIAGNOSIS — M50.30 OTHER CERVICAL DISC DEGENERATION, UNSPECIFIED CERVICAL REGION: Primary | ICD-10-CM

## 2017-06-09 DIAGNOSIS — M47.812 SPONDYLOSIS WITHOUT MYELOPATHY OR RADICULOPATHY, CERVICAL REGION: ICD-10-CM

## 2017-06-09 DIAGNOSIS — M75.112 NONTRAUMATIC INCOMPLETE TEAR OF ROTATOR CUFF, LEFT: ICD-10-CM

## 2017-06-09 PROBLEM — M75.110: Status: ACTIVE | Noted: 2017-06-09

## 2017-06-09 RX ORDER — PREGABALIN 300 MG/1
300 CAPSULE ORAL 2 TIMES DAILY
Qty: 60 CAP | Refills: 1 | Status: SHIPPED | OUTPATIENT
Start: 2017-06-09 | End: 2017-10-11 | Stop reason: SDUPTHER

## 2017-06-09 NOTE — MR AVS SNAPSHOT
Visit Information Date & Time Provider Department Dept. Phone Encounter #  
 6/9/2017  1:45 PM Asael Castañeda  [facility], [address] and [facility] Specialists - [facility] [phone] [id number] Your Appointments 6/20/2017  9:45 AM  
Follow Up with Yelena Rutherford MD  
[employer] [facility]) Appt Note: 3mon f/u  
 [address] [facility] [id number] [phone]  
  
   
 [place] [address]  
  
    
 9/1/2017  9:45 AM  
Follow Up with Milton Stoddard PA-C  
[facility] ([facility]) Appt Note: lt knee 3 mo fu  
 [facility], [address] 200 [facility]  
[phone] [facility], [facility] [address]  
  
    
 9/7/2017 10:30 AM  
Follow Up with [id number] [place] [address] (--) Appt Note: follow up and Lab results [facility] 57 [address] [address] [address] [phone]  
  
   
 [address] [address] Upcoming Health Maintenance Date Due  
 EYE EXAM RETINAL OR DILATED Q1 8/5/1971 FOBT Q 1 YEAR AGE 50-75 8/5/2011 FOOT EXAM Q1 3/3/2015 GLAUCOMA SCREENING Q2Y 9/29/2016 HEMOGLOBIN A1C Q6M 7/25/2017 INFLUENZA AGE 9 TO ADULT 8/1/2017 MICROALBUMIN Q1 10/17/2017 LIPID PANEL Q1 1/25/2018 BREAST CANCER SCRN MAMMOGRAM 5/16/2018 DTaP/Tdap/Td series (2 - Td) 10/4/2026 Allergies as of 6/9/2017  Review Complete On: 6/9/2017 By: Asael Castañeda MD  
  
 Severity Noted Reaction Type Reactions Aspirin  08/02/2012   Topical Hives Current Immunizations  Reviewed on 12/13/2013 Name Date Influenza Vaccine 9/29/2015, 9/2/2015 12:00 AM, 11/24/2011 12:00 AM  
 Influenza Vaccine PF 9/24/2014, 12/13/2013 Pneumococcal Conjugate (PCV-13) 5/2/2017  Pneumococcal Polysaccharide (PPSV-23) 11/3/2015 12:00 AM  
 Tdap 10/4/2016 12:00 AM  
  
 Not reviewed this visit You Were Diagnosed With   
  
 Codes Comments Other cervical disc degeneration, unspecified cervical region    -  Primary ICD-10-CM: M50.30 ICD-9-CM: 722.4 Radiculopathy, cervical     ICD-10-CM: M54.12 
ICD-9-CM: 723.4 Spondylosis without myelopathy or radiculopathy, cervical region     ICD-10-CM: M47.812 ICD-9-CM: 721.0 Nontraumatic incomplete tear of rotator cuff, left     ICD-10-CM: M75.112 ICD-9-CM: 726.13 Vitals Resp Height(growth percentile) Weight(growth percentile) LMP BMI OB Status 14 4' 11\" (1.499 m) 165 lb 9.6 oz (75.1 kg) (LMP Unknown) 33.45 kg/m2 Hysterectomy Smoking Status Former Smoker BMI and BSA Data Body Mass Index Body Surface Area  
 33.45 kg/m 2 1.77 m 2 Preferred Pharmacy Pharmacy Name Phone [facility], [address] [phone] Your Updated Medication List  
  
   
This list is accurate as of: 6/9/17  2:18 PM.  Always use your most recent med list.  
  
  
  
  
 calcipotriene 0.005 % topical cream  
Commonly known as:  DOVONEX  
  
 calcium citrate-vitamin d3 315-200 mg-unit Tab Commonly known as:  CITRACAL+D Take 1 tablet by mouth two (2) times daily (with meals). carBAMazepine 200 mg tablet Commonly known as:  TEGretol 1  q am 1 q pm  2 qhs  
  
 carvedilol 12.5 mg tablet Commonly known as:  Soni Stable Take 6.25 mg by mouth two (2) times daily (with meals). celecoxib 200 mg capsule Commonly known as:  CELEBREX  
TAKE 1 CAPSULE BY MOUTH TWICE DAILY FOR 90 DAYS  
  
 clopidogrel 75 mg Tab Commonly known as:  PLAVIX Take 1 tablet by mouth daily. DULoxetine 60 mg capsule Commonly known as:  CYMBALTA Take 1 Cap by mouth daily. ergocalciferol 50,000 unit capsule Commonly known as:  ERGOCALCIFEROL Take 1 Cap by mouth every seven (7) days. furosemide 40 mg tablet Commonly known as:  LASIX TAKE 1 TABLET BY MOUTH TWICE DAILY AS NEEDED HYDROcodone-acetaminophen  mg tablet Commonly known as:  Karime Wadsworth Take 1 Tab by mouth every eight (8) hours as needed for Pain. Max Daily Amount: 3 Tabs. isosorbide mononitrate ER 30 mg tablet Commonly known as:  IMDUR Take 15 mg by mouth every morning. levocetirizine 5 mg tablet Commonly known as:  Pearley Manners Take 1 Tab by mouth daily. lisinopril 20 mg tablet Commonly known as:  Aminta Barrier Take 20 mg by mouth daily. methocarbamol 500 mg tablet Commonly known as:  ROBAXIN  
TAKE 1 TABLET BY MOUTH FOUR TIMES DAILY AS NEEDED FOR MUSCLE SPASM * miscellaneous medical supply Misc  
1 Each by Does Not Apply route daily. * miscellaneous medical supply Misc  
1 Each by Does Not Apply route daily. * miscellaneous medical supply Misc  
2 Each by Does Not Apply route daily. * miscellaneous medical supply Misc  
2 Each by Does Not Apply route daily. montelukast 10 mg tablet Commonly known as:  SINGULAIR TK 1 T PO D  
  
 omeprazole 20 mg capsule Commonly known as:  PRILOSEC Take 1 capsule by mouth daily. polyethylene glycol 17 gram/dose powder Commonly known as:  Bettylou Hanny Take 17 g by mouth daily. PRALUENT PEN 75 mg/mL injector pen Generic drug:  alirocumab  
  
 pregabalin 300 mg capsule Commonly known as:  Shell Ballardkel Take 1 Cap by mouth two (2) times a day. Max Daily Amount: 600 mg.  
  
 rosuvastatin 40 mg tablet Commonly known as:  CRESTOR Take 1 Tab by mouth daily. therapeutic multivitamin tablet Commonly known as:  [facility] Take 1 tablet by mouth daily. VITAMIN B-12 500 mcg tablet Generic drug:  cyanocobalamin Take 500 mcg by mouth daily. zolpidem 10 mg tablet Commonly known as:  AMBIEN Take 1 Tab by mouth nightly as needed for Sleep. Max Daily Amount: 10 mg. * Notice: This list has 4 medication(s) that are the same as other medications prescribed for you. Read the directions carefully, and ask your doctor or other care provider to review them with you. Prescriptions Printed Refills  
 pregabalin (LYRICA) 300 mg capsule 1 Sig: Take 1 Cap by mouth two (2) times a day. Max Daily Amount: 600 mg. Class: Print Route: Oral  
  
Introducing [facility] & HEALTH SERVICES! Dear Marilyn He: 
Thank you for requesting a [employer] account. Our records indicate that you already have an active [employer] account. You can access your account anytime at [email or web address] Did you know that you can access your hospital and ER discharge instructions at any time in [employer]? You can also review all of your test results from your hospital stay or ER visit. Additional Information If you have questions, please visit the Frequently Asked Questions section of the [employer] website at [email or web address] Remember, [employer] is NOT to be used for urgent needs. For medical emergencies, dial 911. Now available from your iPhone and Android! Please provide this summary of care documentation to your next provider. Your primary care clinician is listed as Adán Larson. If you have any questions after today's visit, please call [phone].

## 2017-06-09 NOTE — PROGRESS NOTES
[facility]  [address], [place]   Phone: [phone]  Fax: [phone]        PROGRESS NOTE      HISTORY OF PRESENT ILLNESS:  The patient is a 55 y.o. female and was seen today for follow up of left-sided neck pain extending into the left shoulder. She denies symptoms extending to the hand at this time. Pain is exacerbated with reaching behind and overhead activity. The patient reports a fall on 10/11/16 from LOB and reported to the ER. She reports multiple falls due to LOB ongoing x 1+ year and states it is progressive in nature. She has also been dropping objects. Pt endorses loss of dexterity and states she has been dropping things with her left hand. She admits to staggering with walking. She continues to have LOB with coordination issues and falls. Note from Dr. Roxanne Story dated 5/2/17 indicating patient was seen for reevaluation of let shoulder pain with limited relief from previous cortisone injections. Of note, there is a partial thickness tear of the rotator cuff by left shoulder arthrogram. Per patient, she is currently enrolled in physical therapy for her left shoulder. She states she did f/u with Dr. Charles Trevizo concerning her balance and coordination issues who referred her to physical therapy. Cervical CT myelogram dated 11/2/2016 per report, reveals multilevel mild degenerative changes as discussed most pronounced disc disease at C4/5 and C5/6. No high-grade central canal or foraminal stenosis. Cervical spine myelogram dated 11/2/2016 per report, reveals multilevel mild broad based on the disc space extradural defects at C2-3, C3-4, C4-5, and C5-6. C6/7 and C7/T1 is not adequately visualized on the crosstable lateral view due to high position of the shoulders. Note from Heath Quinteros LPN dated 09/29/84 indicating Dr. Alexis Gilbert reviewed the CT myelogram and stated he didn't note definitely surgical pathology to account for her pain complaints.  PITO DISLA EMG dated 12/23/16 was suggestive of possible C5 radiculopathy. Pt was initially seen for low back pain localized primarily to the right side of the lumbar spine. Previously, she had c/o low back pain localized primarily to the right side of the lumbar spine without significant radicular pain complaints. She also had c/o left knee pain which she is followed by Dr. Edgar Em for. Per patient, she did have knee surgery in 2009 and 2011 and was given a brace and they are monitoring her on this. She reports significant relief following bilateral L4 and L5 and left sided L5 and S1 facet blocks on 3/17/16. She states walking exacerbates her pain and bending over alleviates her pain. She is followed by Zenaida Marley PA-C for bilateral hip and knee pain. Upon examination, she was unable to extend digits 3, 4 and 5 from previous nerve injury. Noted, patient has previously had bilateral L4/5 facet blocks and left-sided L5/S1 facet blocks with good relief performed 03/04/16. She previously reported significant relief with left-sided L4-L5 and L5-S1 facet blocks. Pt is not a candidate for MRI due to defibrillator. Lumbar CT myelogram dated 9/29/14 per report revealed no spinal stenosis or diagnostic intrathecal abnormality. There was minimal degenerative disc disease. There was mild left-sided lumbar neuroforaminal narrowing from facet hypertrophy. There was lower lumbar facet hypertrophy and arthropathy, left greater than right. At L4-L5, there was bilateral moderate to severe facet hypertrophy. At the L5-S1 level, there was severe left-sided facet hypertrophy. She reports no increase in pain since discontinuing NEURONTIN. It was noted patient continues to take Tegretol. She completed the MDP without significant relief. Pt completes her HEP 2x/day. She denies hx of DM. At her last clinical appointment, she described neck pain extending to the left shoulder.  Despite being diagnosed with partial rotator cuff tear, Dr. Marko Booker did not believe her symptoms were related to shoulder pathology. I previously had Dr. Pastora Mccabe review her cervical CT myelogram and it was my understanding he did not see surgical pathology. Regardless, she was interested in surgical intervention and I did seek Dr. Deirdre Kemp opinion once again. In the interim, I increased her Lyrica to 225 mg BID. The patient returns today with pain location and distribution remain unchanged. She rates pain 5/10, a slight decrease since her last visit (6/10). Dr. Girish Carrillo again reviewed patient's cervical CT myelogram and felt no definitive surgical pathology. She is tolerating increased Lyrica 225 mg BID with good relief. Pt continues on Cymbalta 60 mg daily. She has taken Topamax in the past.  reviewed. Past Medical History:   Diagnosis Date    Arm pain jan15    Arrhythmia 2012     Medtronic ICD     Arthritis     ALL OVER    CAD (coronary artery disease) 2011    STENTS PLACED X2    Chronic pain     KNEE & LOWER BACK    Diabetes (HCC)     GERD (gastroesophageal reflux disease)     H/O gastric bypass     Heart attack ([address] 2011    Heart failure ([address]     ischemic cardiomyopathy    Hypertension     Nerve damage 2017    in bilat legs and feet    Spinal cord injury         Social History     Social History    Marital status: SINGLE     Spouse name: N/A    Number of children: N/A    Years of education: N/A     Occupational History    Not on file. Social History Main Topics    Smoking status: Former Smoker     Quit date: 5/20/2013    Smokeless tobacco: Never Used    Alcohol use No    Drug use: No    Sexual activity: No      Comment: Hysterectomy     Other Topics Concern    Not on file     Social History Narrative       Current Outpatient Prescriptions   Medication Sig Dispense Refill    pregabalin (LYRICA) 300 mg capsule Take 1 Cap by mouth two (2) times a day.  Max Daily Amount: 600 mg. 60 Cap 1    methocarbamol (ROBAXIN) 500 mg tablet TAKE 1 TABLET BY MOUTH FOUR TIMES DAILY AS NEEDED FOR MUSCLE SPASM 120 Tab 3    lisinopril (PRINIVIL, ZESTRIL) 20 mg tablet Take 20 mg by mouth daily.  montelukast (SINGULAIR) 10 mg tablet TK 1 T PO D  2    calcipotriene (DOVONEX) 0.005 % topical cream       PRALUENT PEN 75 mg/mL injector pen       zolpidem (AMBIEN) 10 mg tablet Take 1 Tab by mouth nightly as needed for Sleep. Max Daily Amount: 10 mg. 30 Tab 0    HYDROcodone-acetaminophen (NORCO)  mg tablet Take 1 Tab by mouth every eight (8) hours as needed for Pain. Max Daily Amount: 3 Tabs. 60 Tab 0    carBAMazepine (TEGRETOL) 200 mg tablet 1  q am 1 q pm  2 qhs 360 Tab 2    rosuvastatin (CRESTOR) 40 mg tablet Take 1 Tab by mouth daily. 90 Tab 3    ergocalciferol (ERGOCALCIFEROL) 50,000 unit capsule Take 1 Cap by mouth every seven (7) days. 12 Cap 3    celecoxib (CELEBREX) 200 mg capsule TAKE 1 CAPSULE BY MOUTH TWICE DAILY FOR 90 DAYS 180 Cap 2    levocetirizine (XYZAL) 5 mg tablet Take 1 Tab by mouth daily. 30 Tab 1    furosemide (LASIX) 40 mg tablet TAKE 1 TABLET BY MOUTH TWICE DAILY AS NEEDED 180 Tab 0    DULoxetine (CYMBALTA) 60 mg capsule Take 1 Cap by mouth daily. 30 Cap 5    isosorbide mononitrate ER (IMDUR) 30 mg tablet Take 15 mg by mouth every morning.  carvedilol (COREG) 12.5 mg tablet Take 6.25 mg by mouth two (2) times daily (with meals).  cyanocobalamin (VITAMIN B-12) 500 mcg tablet Take 500 mcg by mouth daily.  polyethylene glycol (MIRALAX) 17 gram/dose powder Take 17 g by mouth daily. 255 g 1    clopidogrel (PLAVIX) 75 mg tablet Take 1 tablet by mouth daily. 30 tablet 3    therapeutic multivitamin (THERAGRAN) tablet Take 1 tablet by mouth daily.  calcium citrate-vitamin d3 (CITRACAL+D) 315-200 mg-unit tab Take 1 tablet by mouth two (2) times daily (with meals).  miscellaneous medical supply misc 2 Each by Does Not Apply route daily.  2 Each 1    miscellaneous medical supply misc 2 Each by Does Not Apply route daily. 2 Each 0    miscellaneous medical supply misc 1 Each by Does Not Apply route daily. 1 Each 1    miscellaneous medical supply misc 1 Each by Does Not Apply route daily. 1 Each 1    omeprazole (PRILOSEC) 20 mg capsule Take 1 capsule by mouth daily. 30 capsule 3       Allergies   Allergen Reactions    Aspirin Hives        Ambulates with a single point cane. PHYSICAL EXAMINATION    Visit Vitals    Resp 14    Ht 4' 11\" (1.499 m)    Wt 165 lb 9.6 oz (75.1 kg)    LMP  (LMP Unknown)    BMI 33.45 kg/m2       CONSTITUTIONAL: NAD, A&O x 3  SENSATION: Intact to light touch throughout  NEURO: Mechelle's is negative bilaterally. RANGE OF MOTION: The patient has full passive range of motion in all four extremities. MOTOR:  Straight Leg Raise: Negative, bilateral  EXTREMITIES: Digits 3-5 of the RUE, partially flexed position which is chronic from previous injury. Shoulder AB/Flex Elbow Flex Wrist Ext Elbow Ext Wrist Flex Hand Intrin Tone   Right +4/5 +4/5 +4/5 +4/5 +4/5 +4/5 +4/5   Left +4/5 +4/5 +4/5 +4/5 +4/5 +4/5 +4/5              Hip Flex Knee Ext Knee Flex Ankle DF GTE Ankle PF Tone   Right +4/5 +4/5 +4/5 +4/5 +4/5 +4/5 +4/5   Left +4/5 +4/5 +4/5 +4/5 +4/5 +4/5 +4/5       ASSESSMENT   Temi was seen today for back pain and follow-up. Diagnoses and all orders for this visit:    Other cervical disc degeneration, unspecified cervical region    Radiculopathy, cervical    Spondylosis without myelopathy or radiculopathy, cervical region    Nontraumatic incomplete tear of rotator cuff, left    Other orders  -     pregabalin (LYRICA) 300 mg capsule; Take 1 Cap by mouth two (2) times a day. Max Daily Amount: 600 mg. IMPRESSION AND PLAN:  Patient notes improvement in symptoms with increased dose of Lyrica 225 mg BID. I will increase her Lyrica to 300 mg BID for hopeful additional benefit. Patient advised to call the office if intolerant to higher dose. I encourage patient to perform her HEP daily. I will see the patient back in 1 month's time or earlier if needed. Written by Pedro Dexter, as dictated by Fatuma Rizo MD  I examined the patient, reviewed and agree with the note.

## 2017-06-16 ENCOUNTER — OFFICE VISIT (OUTPATIENT)
Dept: ORTHOPEDIC SURGERY | Age: 57
End: 2017-06-16

## 2017-06-16 VITALS
HEART RATE: 64 BPM | DIASTOLIC BLOOD PRESSURE: 77 MMHG | TEMPERATURE: 96.8 F | HEIGHT: 59 IN | SYSTOLIC BLOOD PRESSURE: 151 MMHG

## 2017-06-16 DIAGNOSIS — S80.12XA CONTUSION OF KNEE AND LOWER LEG, LEFT, INITIAL ENCOUNTER: Primary | ICD-10-CM

## 2017-06-16 DIAGNOSIS — S80.02XA CONTUSION OF KNEE AND LOWER LEG, LEFT, INITIAL ENCOUNTER: Primary | ICD-10-CM

## 2017-06-16 DIAGNOSIS — Z96.652 STATUS POST TOTAL LEFT KNEE REPLACEMENT: ICD-10-CM

## 2017-06-20 ENCOUNTER — OFFICE VISIT (OUTPATIENT)
Dept: NEUROLOGY | Age: 57
End: 2017-06-20

## 2017-06-20 VITALS
WEIGHT: 163.6 LBS | DIASTOLIC BLOOD PRESSURE: 70 MMHG | BODY MASS INDEX: 33.04 KG/M2 | OXYGEN SATURATION: 98 % | HEART RATE: 69 BPM | SYSTOLIC BLOOD PRESSURE: 138 MMHG

## 2017-06-20 DIAGNOSIS — M79.2 NEUROPATHIC PAIN: ICD-10-CM

## 2017-06-20 DIAGNOSIS — M54.12 RADICULOPATHY, CERVICAL: ICD-10-CM

## 2017-06-20 DIAGNOSIS — G81.90 HEMIPLEGIA AFFECTING DOMINANT SIDE (HCC): Primary | ICD-10-CM

## 2017-06-20 NOTE — MR AVS SNAPSHOT
Visit Information Date & Time Provider Department Dept. Phone Encounter #  
 6/20/2017  9:45 AM Betsy Zamudio MD [facility] [phone] [id number] Follow-up Instructions Return in about 6 months (around 12/20/2017). Follow-up and Disposition History Your Appointments 7/7/2017 10:50 AM  
Follow Up with Cecily Graves PA-C  
[facility] ([facility]) Appt Note: lt knee 3 wk fu  
 [address], [address] [facility] [address]  
[phone] [address] [facility], [address]  
  
    
 7/7/2017  1:35 PM  
Follow Up with Quita Lu MD  
[address] [facility], [address] [facility] [place] ([address]) Appt Note: upper back 4 wk fu no copay 2012 [place] [facility] [address]  
  
   
 [address] [facility] [address]  
  
    
 9/1/2017  9:45 AM  
Follow Up with Cecily Graves PA-C  
[facility] ([facility]) Appt Note: lt knee 3 mo fu  
 [address], [address] [facility] [address]  
[phone]  
  
    
 9/7/2017 10:30 AM  
Follow Up with [address] [facility] [address] [place] [address] (--) Appt Note: follow up and Lab results [place] [address] [id number] [address] [address] [phone]  
  
   
 [place] [address] [id number] [address] [address] Upcoming Health Maintenance Date Due  
 EYE EXAM RETINAL OR DILATED Q1 8/5/1971 FOBT Q 1 YEAR AGE 50-75 8/5/2011 FOOT EXAM Q1 3/3/2015 GLAUCOMA SCREENING Q2Y 9/29/2016 HEMOGLOBIN A1C Q6M 7/25/2017 INFLUENZA AGE 9 TO ADULT 8/1/2017 MICROALBUMIN Q1 10/17/2017 LIPID PANEL Q1 1/25/2018 BREAST CANCER SCRN MAMMOGRAM 5/16/2018 DTaP/Tdap/Td series (2 - Td) 10/4/2026 Allergies as of 6/20/2017  Review Complete On: 6/20/2017 By: Debra Morales LPN Severity Noted Reaction Type Reactions Aspirin  08/02/2012   Topical Hives Current Immunizations  Reviewed on 12/13/2013 Name Date Influenza Vaccine 9/29/2015, 9/2/2015 12:00 AM, 11/24/2011 12:00 AM  
 Influenza Vaccine PF 9/24/2014, 12/13/2013 Pneumococcal Conjugate (PCV-13) 5/2/2017 Pneumococcal Polysaccharide (PPSV-23) 11/3/2015 12:00 AM  
 Tdap 10/4/2016 12:00 AM  
  
 Not reviewed this visit You Were Diagnosed With   
  
 Codes Comments Hemiplegia affecting dominant side ([facility] [address]    -  Primary ICD-10-CM: G81.90 ICD-9-CM: 342.91 Neuropathic pain     ICD-10-CM: M79.2 ICD-9-CM: 729.2 Radiculopathy, cervical     ICD-10-CM: M54.12 
ICD-9-CM: 723.4 Vitals BP Pulse Weight(growth percentile) LMP SpO2 BMI  
 138/70 69 163 lb 9.6 oz (74.2 kg) (LMP Unknown) 98% 33.04 kg/m2 OB Status Smoking Status Hysterectomy Former Smoker BMI and BSA Data Body Mass Index Body Surface Area 33.04 kg/m 2 1.76 m 2 Preferred Pharmacy [facility] [address] [phone] Your Updated Medication List  
  
   
This list is accurate as of: 6/20/17 10:54 AM.  Always use your most recent med list.  
  
  
  
  
 calcipotriene 0.005 % topical cream  
Commonly known as:  DOVONEX  
  
 calcium citrate-vitamin d3 315-200 mg-unit Tab Commonly known as:  CITRACAL+D Take 1 tablet by mouth two (2) times daily (with meals). carBAMazepine 200 mg tablet Commonly known as:  TEGretol 1  q am 1 q pm  2 qhs  
  
 carvedilol 12.5 mg tablet Commonly known as:  Amy Haven Take 6.25 mg by mouth two (2) times daily (with meals). celecoxib 200 mg capsule Commonly known as:  CELEBREX  
TAKE 1 CAPSULE BY MOUTH TWICE DAILY FOR 90 DAYS  
  
 clopidogrel 75 mg Tab Commonly known as:  PLAVIX Take 1 tablet by mouth daily. DULoxetine 60 mg capsule Commonly known as:  CYMBALTA Take 1 Cap by mouth daily. ergocalciferol 50,000 unit capsule Commonly known as:  ERGOCALCIFEROL Take 1 Cap by mouth every seven (7) days. furosemide 40 mg tablet Commonly known as:  LASIX TAKE 1 TABLET BY MOUTH TWICE DAILY AS NEEDED HYDROcodone-acetaminophen  mg tablet Commonly known as:  Rozann Tiffanie Take 1 Tab by mouth every eight (8) hours as needed for Pain. Max Daily Amount: 3 Tabs. isosorbide mononitrate ER 30 mg tablet Commonly known as:  IMDUR Take 15 mg by mouth every morning. levocetirizine 5 mg tablet Commonly known as:  Evalene Shirlene Take 1 Tab by mouth daily. lisinopril 20 mg tablet Commonly known as:  Walker Ku Take 20 mg by mouth daily. methocarbamol 500 mg tablet Commonly known as:  ROBAXIN  
TAKE 1 TABLET BY MOUTH FOUR TIMES DAILY AS NEEDED FOR MUSCLE SPASM * miscellaneous medical supply Misc  
1 Each by Does Not Apply route daily. * miscellaneous medical supply Misc  
1 Each by Does Not Apply route daily. * miscellaneous medical supply Misc  
2 Each by Does Not Apply route daily. * miscellaneous medical supply Misc  
2 Each by Does Not Apply route daily. montelukast 10 mg tablet Commonly known as:  SINGULAIR TK 1 T PO D  
  
 omeprazole 20 mg capsule Commonly known as:  PRILOSEC Take 1 capsule by mouth daily. polyethylene glycol 17 gram/dose powder Commonly known as:  Hermann Aus Take 17 g by mouth daily. PRALUENT PEN 75 mg/mL injector pen Generic drug:  alirocumab  
  
 pregabalin 300 mg capsule Commonly known as:  Elisa Ehsan Take 1 Cap by mouth two (2) times a day. Max Daily Amount: 600 mg.  
  
 rosuvastatin 40 mg tablet Commonly known as:  CRESTOR Take 1 Tab by mouth daily. therapeutic multivitamin tablet Commonly known as:  [facility] Take 1 tablet by mouth daily. VITAMIN B-12 500 mcg tablet Generic drug:  cyanocobalamin Take 500 mcg by mouth daily. zolpidem 10 mg tablet Commonly known as:  AMBIEN Take 1 Tab by mouth nightly as needed for Sleep. Max Daily Amount: 10 mg.  
  
 * Notice: This list has 4 medication(s) that are the same as other medications prescribed for you. Read the directions carefully, and ask your doctor or other care provider to review them with you. Follow-up Instructions Return in about 6 months (around 12/20/2017). Introducing [facility]! Dear Hafsa Freeze: 
Thank you for requesting a [employer] account. Our records indicate that you already have an active [employer] account. You can access your account anytime at [email or web address] Did you know that you can access your hospital and ER discharge instructions at any time in [employer]? You can also review all of your test results from your hospital stay or ER visit. Additional Information If you have questions, please visit the Frequently Asked Questions section of the [employer] website at [email or web address] Remember, [employer] is NOT to be used for urgent needs. For medical emergencies, dial 911. Now available from your iPhone and Android! Please provide this summary of care documentation to your next provider. Your primary care clinician is listed as Janet Geronimo. If you have any questions after today's visit, please call [phone].
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
## 2017-06-20 NOTE — PROGRESS NOTES
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 Pt ambulates with SPC on L and is R hand dominate. Visits from Start of Care: 9    Missed Visits: 6  Reporting Period : 3/21/17 to 17    Summary of Care:  Progress towards goals / Updated goals:  Long term goals: to be accomplished in 6 weeks  1) Pt's FOTO score will improve > or = 64 indicating improvements in function. At PN: not met, score of 55 on 2017  Current: not formally reassessed prior to DC  2) Pt will improve B LE MMT > or = 4/5 for functional strength during ambulation. At PN: hip flex L 4-/5 R 3+/5, knee ext L 4-/5 R 4/5, knee flex R 4-/5 L 4/5, DF L 4/5 R 4-/5 on 2018  Current: not formally reassessed prior to DC  3) Pt will demo B SLS > or = 15 sec to decrease risk for falls. At PN: progressing, currently RLE x10 sec, LLE x20 sec, on 2017  Current: Goal met: Pt is able to SLS B >15 sec. 17  4) Pt will report > or = 60% improvement in symptoms in order to progress rehab. At PN: progressing, patient reports 50% improvement on 2017  Current: met, patient reports 60% improvement on 2017    Pt was making good progress with PT but had a set back when she was hospitalized on 17 after passing out at [employer]. Reported she was undergoing testing.  Pt was informed that she would have to have a MD note to resume PT. Pt was called again to check in but no answer. It has now been > 30 days and pt is DC'd at this time. ASSESSMENT/RECOMMENDATIONS:  [x]Discontinue therapy: []Patient has reached or is progressing toward set goals      [x]Patient is non-compliant or has abdicated      []Due to lack of appreciable progress towards set goals    Carmen Keane, PT 6/20/2017 8:46 AM    NOTE TO PHYSICIAN:  Please complete the following and fax to: [facility] at [facility] at [phone]  . Retain this original for your records. If you are unable to process this request in   24 hours, please contact our office.      [] I have read the above report and request that my patient continue therapy with the following changes/special instructions:  [] I have read the above report and request that my patient be discharged from therapy    Physician's Signature:_____________________ Date:___________Time:__________

## 2017-06-26 ENCOUNTER — PATIENT OUTREACH (OUTPATIENT)
Dept: FAMILY MEDICINE CLINIC | Age: 57
End: 2017-06-26

## 2017-06-26 NOTE — PROGRESS NOTES
S/P [facility] hospital discharge on 5/23/17 post treament of syncope    Contacted for follow-up. Spoke with patient and her identity verified. Patient deny dizziness or other symptoms; report ongoing pain in her left knee from fall she sustained on 6/14/17. Patient visited orthopedic specialist on 6/9/17 and 6/16/17 and neurologist on 6/20/17. Patient also kept post hospital discharge follow-up appointment with office provider on 5/30/17 and PCP on 6/7/17. Rate current knee pain level at 7/10 on 0-10 numeric scale; stated that she is using Narco 5 with little effect. Review patient's current medications; patient acknowledges that she is taking all medications as prescribed. Patient denied checking her weight and blood pressure daily. Stated that she weighs herself twice a week and her most recent weight was 162 pounds and her most recent blood pressure was 123/68. Discussed with patient the benefits of daily weights and blood pressure measurement for CHF self management. Encourage patient to contact orthopedic specialist if her left knee pain remains uncontrolled. Patient verbalized understanding of information discussed and has this NN's contact information to call with any questions.

## 2017-07-07 ENCOUNTER — OFFICE VISIT (OUTPATIENT)
Dept: ORTHOPEDIC SURGERY | Age: 57
End: 2017-07-07

## 2017-07-07 VITALS
HEART RATE: 64 BPM | WEIGHT: 164.2 LBS | SYSTOLIC BLOOD PRESSURE: 126 MMHG | RESPIRATION RATE: 16 BRPM | HEIGHT: 59 IN | DIASTOLIC BLOOD PRESSURE: 68 MMHG | BODY MASS INDEX: 33.1 KG/M2

## 2017-07-07 DIAGNOSIS — M47.812 SPONDYLOSIS WITHOUT MYELOPATHY OR RADICULOPATHY, CERVICAL REGION: ICD-10-CM

## 2017-07-07 DIAGNOSIS — M75.112 INCOMPLETE TEAR OF LEFT ROTATOR CUFF: ICD-10-CM

## 2017-07-07 DIAGNOSIS — M54.12 RADICULOPATHY, CERVICAL: ICD-10-CM

## 2017-07-07 DIAGNOSIS — M50.30 OTHER CERVICAL DISC DEGENERATION, UNSPECIFIED CERVICAL REGION: Primary | ICD-10-CM

## 2017-07-07 RX ORDER — DULOXETIN HYDROCHLORIDE 60 MG/1
60 CAPSULE, DELAYED RELEASE ORAL DAILY
Qty: 90 CAP | Refills: 0 | Status: SHIPPED | OUTPATIENT
Start: 2017-07-07 | End: 2018-05-02 | Stop reason: SDUPTHER

## 2017-07-07 RX ORDER — ZOSTER VACCINE LIVE 19400 [PFU]/.65ML
INJECTION, POWDER, LYOPHILIZED, FOR SUSPENSION SUBCUTANEOUS
Refills: 0 | COMMUNITY
Start: 2017-05-09 | End: 2017-10-11 | Stop reason: ALTCHOICE

## 2017-07-07 RX ORDER — PREGABALIN 300 MG/1
300 CAPSULE ORAL 2 TIMES DAILY
Qty: 60 CAP | Refills: 2 | Status: SHIPPED | OUTPATIENT
Start: 2017-07-07 | End: 2018-05-02 | Stop reason: SDUPTHER

## 2017-07-07 NOTE — MR AVS SNAPSHOT
Visit Information Date & Time Provider Department Dept. Phone Encounter #  
 7/7/2017  1:35 PM Tanja Inman  [facility], [address] [facility] [place] [phone] [id number] Follow-up Instructions Return in about 3 months (around 10/7/2017). Your Appointments 7/14/2017 11:20 AM  
Follow Up with Codie Fiore PA-C  
[facility] [address] ([address] [place] [address]) Appt Note: lt knee 3 wk fu; 06/29/17- PT CALLED DUE TO PROVIDER OUT OF OFFICE ON 7/7/17. CALLED PT. PT RS FOR THIS NEW DATE FOR HV LOC. [address] [facility] [address]  
[phone] [address] [place] [address]  
  
    
 9/1/2017  9:45 AM  
Follow Up with Codie Fiore PA-C  
[facility] [address] ([address] [place] [address]) Appt Note: lt knee 3 mo fu  
 [address] [facility] [address]  
[phone]  
  
    
 9/7/2017 10:30 AM  
Follow Up with Beth Rutledge DO [address] [place] [address] (--) Appt Note: follow up and Lab results [address] [address]  
  
    
 12/20/2017 11:00 AM  
Follow Up with Andrew Menezes MD  
[address]) Appt Note: 6mon f/u  
 [address] [address]  
[phone]  
  
   
 [facility] [address] Upcoming Health Maintenance Date Due  
 EYE EXAM RETINAL OR DILATED Q1 8/5/1971 FOBT Q 1 YEAR AGE 50-75 8/5/2011 FOOT EXAM Q1 3/3/2015 GLAUCOMA SCREENING Q2Y 9/29/2016 HEMOGLOBIN A1C Q6M 7/25/2017 INFLUENZA AGE 9 TO ADULT 8/1/2017 MICROALBUMIN Q1 10/17/2017 LIPID PANEL Q1 1/25/2018 BREAST CANCER SCRN MAMMOGRAM 5/16/2018 DTaP/Tdap/Td series (2 - Td) 10/4/2026 Allergies as of 7/7/2017  Review Complete On: 7/7/2017 By: Stephan Stark MD  
  
 Severity Noted Reaction Type Reactions Dextromethorphan-guaifenesin High 11/24/2011    Other (comments) Aspirin  08/02/2012   Topical Hives Current Immunizations  Reviewed on 12/13/2013 Name Date Influenza Vaccine 9/29/2015, 9/2/2015 12:00 AM, 11/24/2011 12:00 AM  
 Influenza Vaccine PF 9/24/2014, 12/13/2013 Pneumococcal Conjugate (PCV-13) 5/2/2017 Pneumococcal Polysaccharide (PPSV-23) 11/3/2015 12:00 AM  
 Tdap 10/4/2016 12:00 AM  
  
 Not reviewed this visit You Were Diagnosed With   
  
 Codes Comments Other cervical disc degeneration, unspecified cervical region    -  Primary ICD-10-CM: M50.30 ICD-9-CM: 722.4 Radiculopathy, cervical     ICD-10-CM: M54.12 
ICD-9-CM: 723.4 Spondylosis without myelopathy or radiculopathy, cervical region     ICD-10-CM: M47.812 ICD-9-CM: 721.0 Incomplete tear of left rotator cuff     ICD-10-CM: M75.112 ICD-9-CM: 840.4 Vitals BP Pulse Resp Height(growth percentile) Weight(growth percentile) LMP  
 126/68 64 16 4' 11\" (1.499 m) 164 lb 3.2 oz (74.5 kg) (LMP Unknown) BMI OB Status Smoking Status 33.16 kg/m2 Hysterectomy Former Smoker BMI and BSA Data Body Mass Index Body Surface Area  
 33.16 kg/m 2 1.76 m 2 [facility] [address] [phone] Your Updated Medication List  
  
   
This list is accurate as of: 7/7/17  2:34 PM.  Always use your most recent med list.  
  
  
  
  
 calcipotriene 0.005 % topical cream  
Commonly known as:  DOVONEX  
  
 calcium citrate-vitamin d3 315-200 mg-unit Tab Commonly known as:  CITRACAL+D Take 1 tablet by mouth two (2) times daily (with meals). carBAMazepine 200 mg tablet Commonly known as:  TEGretol 1  q am 1 q pm  2 qhs  
  
 carvedilol 12.5 mg tablet Commonly known as:  Reddy Monae Take 6.25 mg by mouth two (2) times daily (with meals). celecoxib 200 mg capsule Commonly known as:  CELEBREX  
TAKE 1 CAPSULE BY MOUTH TWICE DAILY FOR 90 DAYS  
  
 clopidogrel 75 mg Tab Commonly known as:  PLAVIX Take 1 tablet by mouth daily. * DULoxetine 60 mg capsule Commonly known as:  CYMBALTA Take 1 Cap by mouth daily. * DULoxetine 60 mg capsule Commonly known as:  CYMBALTA Take 1 Cap by mouth daily. ergocalciferol 50,000 unit capsule Commonly known as:  ERGOCALCIFEROL Take 1 Cap by mouth every seven (7) days. furosemide 40 mg tablet Commonly known as:  LASIX TAKE 1 TABLET BY MOUTH TWICE DAILY AS NEEDED HYDROcodone-acetaminophen  mg tablet Commonly known as:  Dorothy Dieter Take 1 Tab by mouth every eight (8) hours as needed for Pain. Max Daily Amount: 3 Tabs. isosorbide mononitrate ER 30 mg tablet Commonly known as:  IMDUR Take 15 mg by mouth every morning. levocetirizine 5 mg tablet Commonly known as:  Mitch Linton Take 1 Tab by mouth daily. lisinopril 20 mg tablet Commonly known as:  Chiqui Flight Take 20 mg by mouth daily. methocarbamol 500 mg tablet Commonly known as:  ROBAXIN  
TAKE 1 TABLET BY MOUTH FOUR TIMES DAILY AS NEEDED FOR MUSCLE SPASM * miscellaneous medical supply Misc  
1 Each by Does Not Apply route daily. * miscellaneous medical supply Misc  
1 Each by Does Not Apply route daily. * miscellaneous medical supply Misc  
2 Each by Does Not Apply route daily. * miscellaneous medical supply Misc  
2 Each by Does Not Apply route daily. montelukast 10 mg tablet Commonly known as:  SINGULAIR TK 1 T PO D  
  
 omeprazole 20 mg capsule Commonly known as:  PRILOSEC Take 1 capsule by mouth daily. polyethylene glycol 17 gram/dose powder Commonly known as:  Cortland Prince Take 17 g by mouth daily. PRALUENT PEN 75 mg/mL injector pen Generic drug:  alirocumab * pregabalin 300 mg capsule Commonly known as:  Tre Bob Take 1 Cap by mouth two (2) times a day. Max Daily Amount: 600 mg.  
  
 * pregabalin 300 mg capsule Commonly known as:  Tre Bob Take 1 Cap by mouth two (2) times a day. Max Daily Amount: 600 mg.  
  
 rosuvastatin 40 mg tablet Commonly known as:  CRESTOR Take 1 Tab by mouth daily. therapeutic multivitamin tablet Commonly known as:  [facility] Take 1 tablet by mouth daily. varicella-zoster vacine live 19,400 unit/0.65 mL Susr injection Generic drug:  varicella zoster vacine live ADM 0.65ML SC UTD  
  
 VITAMIN B-12 500 mcg tablet Generic drug:  cyanocobalamin Take 500 mcg by mouth daily. zolpidem 10 mg tablet Commonly known as:  AMBIEN Take 1 Tab by mouth nightly as needed for Sleep. Max Daily Amount: 10 mg.  
  
 * Notice: This list has 8 medication(s) that are the same as other medications prescribed for you. Read the directions carefully, and ask your doctor or other care provider to review them with you. Prescriptions Printed Refills  
 pregabalin (LYRICA) 300 mg capsule 2 Sig: Take 1 Cap by mouth two (2) times a day. Max Daily Amount: 600 mg. Class: Print Route: Oral  
  
Prescriptions Sent to Pharmacy Refills DULoxetine (CYMBALTA) 60 mg capsule 0 Sig: Take 1 Cap by mouth daily. Class: Normal  
 Pharmacy: [address] [place] [address] #: [phone] Route: Oral  
  
Follow-up Instructions Return in about 3 months (around 10/7/2017). Introducing [facility]! Dear Hayley Hill: 
Thank you for requesting a [employer] account. Our records indicate that you already have an active [employer] account. You can access your account anytime at [email or web address] Did you know that you can access your hospital and ER discharge instructions at any time in [employer]? You can also review all of your test results from your hospital stay or ER visit. Additional Information If you have questions, please visit the Frequently Asked Questions section of the [employer] website at [email or web address] Remember, [employer] is NOT to be used for urgent needs. For medical emergencies, dial 911. Now available from your iPhone and Android! Please provide this summary of care documentation to your next provider. Your primary care clinician is listed as Cruz Silvestre. If you have any questions after today's visit, please call [phone].

## 2017-07-07 NOTE — PROGRESS NOTES
[facility]  [address] [facility]  [address], [address]   Phone: [phone]  Fax: [phone]        PROGRESS NOTE      HISTORY OF PRESENT ILLNESS:  The patient is a 55 y.o. female and was seen today for follow up of left-sided neck pain extending into the left shoulder. She denies symptoms extending to the hand at this time. Pain is exacerbated with reaching behind and overhead activity. The patient reports a fall on 10/11/16 from LOB and reported to the ER. She reports multiple falls due to LOB ongoing x 1+ year and states it is progressive in nature. She has also been dropping objects. Pt endorses loss of dexterity and states she has been dropping things with her left hand. She admits to staggering with walking. She continues to have LOB with coordination issues and falls. Note from Dr. Joel Martinez dated 5/2/17 indicating patient was seen for reevaluation of let shoulder pain with limited relief from previous cortisone injections. Of note, there is a partial thickness tear of the rotator cuff by left shoulder arthrogram. Per patient, she is currently enrolled in physical therapy for her left shoulder. She states she did f/u with Dr. Compa Parham concerning her balance and coordination issues who referred her to physical therapy. Pt completes her HEP 2x/day. She failed NEURONTIN. It was noted patient continues to take Tegretol. She completed the MDP without significant relief. She denies hx of DM. Cervical CT myelogram dated 11/2/2016 per report reveals multilevel mild degenerative changes as discussed most pronounced disc disease at C4/5 and C5/6. No high-grade central canal or foraminal stenosis. Cervical spine myelogram dated 11/2/2016 per report, reveals multilevel mild broad based on the disc space extradural defects at C2-3, C3-4, C4-5, and C5-6. C6/7 and C7/T1 is not adequately visualized on the crosstable lateral view due to high position of the shoulders.  Note from [facility] ZURI Longoria dated 94/04/77 indicating Dr. Chandler Goode reviewed the CT myelogram and stated he didn't note definitely surgical pathology to account for her pain complaints. Dr. Harry Wilson again reviewed patient's cervical CT myelogram and felt no definitive surgical pathology. A LUE EMG dated 12/23/16 was suggestive of possible C5 radiculopathy. Pt was initially seen for low back pain localized primarily to the right side of the lumbar spine. Previously, she had c/o low back pain localized primarily to the right side of the lumbar spine without significant radicular pain complaints. She also had c/o left knee pain which she is followed by Dr. Mar Caraballo for. Per patient, she did have knee surgery in 2009 and 2011 and was given a brace and they are monitoring her on this. She reports significant relief following bilateral L4 and L5 and left sided L5 and S1 facet blocks on 3/17/16. She states walking exacerbates her pain and bending over alleviates her pain. She is followed by Jt Kirby PA-C for bilateral hip and knee pain. Upon examination, she was unable to extend digits 3, 4 and 5 from previous nerve injury. Noted, patient has previously had bilateral L4/5 facet blocks and left-sided L5/S1 facet blocks with good relief performed 03/04/16. She previously reported significant relief with left-sided L4-L5 and L5-S1 facet blocks. Pt is not a candidate for MRI due to defibrillator. Lumbar CT myelogram dated 9/29/14 per report revealed no spinal stenosis or diagnostic intrathecal abnormality. There was minimal degenerative disc disease. There was mild left-sided lumbar neuroforaminal narrowing from facet hypertrophy. There was lower lumbar facet hypertrophy and arthropathy, left greater than right. At L4-L5, there was bilateral moderate to severe facet hypertrophy. At the L5-S1 level, there was severe left-sided facet hypertrophy.   At her last clinical appointment, patient noted improvement in symptoms with increased dose of Lyrica 225 mg BID. I increased her Lyrica to 300 mg BID for hopeful additional benefit. I encouraged patient to perform her HEP daily. The patient returns today with pain location and distribution remain unchanged. She rates pain 0-4/10, an improvement since her last visit (5/10). Pt is tolerating increased Lyrica 300 mg BID with good relief. Pt continues on Cymbalta 60 mg daily. She has taken Topamax in the past. Pt continues to be followed by Dr. Stacey Alexis for her left knee.  reviewed. Past Medical History:   Diagnosis Date    Arm pain jan15    Arrhythmia 2012     Medtronic ICD     Arthritis     ALL OVER    CAD (coronary artery disease) 2011    STENTS PLACED X2    Chronic pain     KNEE & LOWER BACK    Diabetes (HCC)     GERD (gastroesophageal reflux disease)     H/O gastric bypass     Heart attack ([address] 2011    Heart failure ([address]     ischemic cardiomyopathy    Hypertension     Nerve damage 2017    in bilat legs and feet    Spinal cord injury         Social History     Social History    Marital status: SINGLE     Spouse name: N/A    Number of children: N/A    Years of education: N/A     Occupational History    Not on file. Social History Main Topics    Smoking status: Former Smoker     Quit date: 5/20/2013    Smokeless tobacco: Never Used    Alcohol use No    Drug use: No    Sexual activity: No      Comment: Hysterectomy     Other Topics Concern    Not on file     Social History Narrative       Current Outpatient Prescriptions   Medication Sig Dispense Refill    DULoxetine (CYMBALTA) 60 mg capsule Take 1 Cap by mouth daily. 90 Cap 0    pregabalin (LYRICA) 300 mg capsule Take 1 Cap by mouth two (2) times a day. Max Daily Amount: 600 mg. 60 Cap 2    pregabalin (LYRICA) 300 mg capsule Take 1 Cap by mouth two (2) times a day.  Max Daily Amount: 600 mg. 60 Cap 1    methocarbamol (ROBAXIN) 500 mg tablet TAKE 1 TABLET BY MOUTH FOUR TIMES DAILY AS NEEDED FOR MUSCLE SPASM 120 Tab 3  lisinopril (PRINIVIL, ZESTRIL) 20 mg tablet Take 20 mg by mouth daily.  montelukast (SINGULAIR) 10 mg tablet TK 1 T PO D  2    calcipotriene (DOVONEX) 0.005 % topical cream       PRALUENT PEN 75 mg/mL injector pen       zolpidem (AMBIEN) 10 mg tablet Take 1 Tab by mouth nightly as needed for Sleep. Max Daily Amount: 10 mg. 30 Tab 0    HYDROcodone-acetaminophen (NORCO)  mg tablet Take 1 Tab by mouth every eight (8) hours as needed for Pain. Max Daily Amount: 3 Tabs. 60 Tab 0    carBAMazepine (TEGRETOL) 200 mg tablet 1  q am 1 q pm  2 qhs 360 Tab 2    rosuvastatin (CRESTOR) 40 mg tablet Take 1 Tab by mouth daily. 90 Tab 3    ergocalciferol (ERGOCALCIFEROL) 50,000 unit capsule Take 1 Cap by mouth every seven (7) days. 12 Cap 3    miscellaneous medical supply [facility] 2 Each by Does Not Apply route daily. 2 Each 1    miscellaneous medical supply misc 2 Each by Does Not Apply route daily. 2 Each 0    miscellaneous medical supply misc 1 Each by Does Not Apply route daily. 1 Each 1    miscellaneous medical supply [facility] 1 Each by Does Not Apply route daily. 1 Each 1    levocetirizine (XYZAL) 5 mg tablet Take 1 Tab by mouth daily. 30 Tab 1    furosemide (LASIX) 40 mg tablet TAKE 1 TABLET BY MOUTH TWICE DAILY AS NEEDED 180 Tab 0    DULoxetine (CYMBALTA) 60 mg capsule Take 1 Cap by mouth daily. 30 Cap 5    isosorbide mononitrate ER (IMDUR) 30 mg tablet Take 15 mg by mouth every morning.  carvedilol (COREG) 12.5 mg tablet Take 6.25 mg by mouth two (2) times daily (with meals).  cyanocobalamin (VITAMIN B-12) 500 mcg tablet Take 500 mcg by mouth daily.  omeprazole (PRILOSEC) 20 mg capsule Take 1 capsule by mouth daily. 30 capsule 3    polyethylene glycol (MIRALAX) 17 gram/dose powder Take 17 g by mouth daily. 255 g 1    clopidogrel (PLAVIX) 75 mg tablet Take 1 tablet by mouth daily. 30 tablet 3    therapeutic multivitamin (THERAGRAN) tablet Take 1 tablet by mouth daily.       calcium citrate-vitamin d3 (CITRACAL+D) 315-200 mg-unit tab Take 1 tablet by mouth two (2) times daily (with meals).  VARICELLA-ZOSTER VACINE LIVE 19,400 unit/0.65 mL susr injection ADM 0.65ML SC UTD  0    celecoxib (CELEBREX) 200 mg capsule TAKE 1 CAPSULE BY MOUTH TWICE DAILY FOR 90 DAYS 180 Cap 2       Allergies   Allergen Reactions    Dextromethorphan-Guaifenesin Other (comments)    Aspirin Hives          PHYSICAL EXAMINATION    Visit Vitals    /68    Pulse 64    Resp 16    Ht 4' 11\" (1.499 m)    Wt 164 lb 3.2 oz (74.5 kg)    LMP  (LMP Unknown)    BMI 33.16 kg/m2       CONSTITUTIONAL: NAD, A&O x 3  SENSATION: Intact to light touch throughout  NEURO: Mechelle's is negative bilaterally. RANGE OF MOTION: The patient has full passive range of motion in all four extremities. Shoulder AB/Flex Elbow Flex Wrist Ext Elbow Ext Wrist Flex Hand Intrin Tone   Right +4/5 +4/5 +4/5 +4/5 +4/5 +4/5 +4/5   Left +4/5 +4/5 +4/5 +4/5 +4/5 +4/5 +4/5                 ASSESSMENT   Camilaia was seen today for neck pain, back pain and arm pain. Diagnoses and all orders for this visit:    Other cervical disc degeneration, unspecified cervical region    Radiculopathy, cervical    Spondylosis without myelopathy or radiculopathy, cervical region    Incomplete tear of left rotator cuff    Other orders  -     DULoxetine (CYMBALTA) 60 mg capsule; Take 1 Cap by mouth daily. -     pregabalin (LYRICA) 300 mg capsule; Take 1 Cap by mouth two (2) times a day. Max Daily Amount: 600 mg. IMPRESSION AND PLAN:  The patient notes improvement in symptoms with increased dose of Lyrica 300 mg BID. She wishes to continue her current treatment. I will refill her Lyrica 300 mg BID and Cymbalta 60 mg.  I encourage patient to perform her HEP daily. I will see the patient back in 3 month's time or earlier if needed.       Written by Evert Magallon, as dictated by Sae Hall MD  I examined the patient, reviewed and agree with the note.

## 2017-07-14 ENCOUNTER — OFFICE VISIT (OUTPATIENT)
Dept: ORTHOPEDIC SURGERY | Age: 57
End: 2017-07-14

## 2017-07-14 VITALS
HEART RATE: 66 BPM | HEIGHT: 59 IN | WEIGHT: 165 LBS | SYSTOLIC BLOOD PRESSURE: 112 MMHG | TEMPERATURE: 96.3 F | BODY MASS INDEX: 33.26 KG/M2 | DIASTOLIC BLOOD PRESSURE: 65 MMHG

## 2017-07-14 DIAGNOSIS — M70.52 BURSITIS OF LEFT KNEE, UNSPECIFIED BURSA: Primary | ICD-10-CM

## 2017-07-14 RX ORDER — HYDROCODONE BITARTRATE AND ACETAMINOPHEN 10; 325 MG/1; MG/1
1 TABLET ORAL
Qty: 21 TAB | Refills: 0 | Status: SHIPPED | OUTPATIENT
Start: 2017-07-14 | End: 2017-10-11 | Stop reason: SDUPTHER

## 2017-07-14 RX ORDER — BETAMETHASONE SODIUM PHOSPHATE AND BETAMETHASONE ACETATE 3; 3 MG/ML; MG/ML
6 INJECTION, SUSPENSION INTRA-ARTICULAR; INTRALESIONAL; INTRAMUSCULAR; SOFT TISSUE ONCE
Qty: 1 ML | Refills: 0
Start: 2017-07-14 | End: 2017-07-14
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
## 2017-07-26 RX ORDER — CELECOXIB 200 MG/1
CAPSULE ORAL
Qty: 180 CAP | Refills: 0 | Status: SHIPPED | OUTPATIENT
Start: 2017-07-26 | End: 2017-10-28 | Stop reason: SDUPTHER

## 2017-08-17 ENCOUNTER — OFFICE VISIT (OUTPATIENT)
Dept: ORTHOPEDIC SURGERY | Age: 57
End: 2017-08-17

## 2017-08-17 VITALS
TEMPERATURE: 97.7 F | SYSTOLIC BLOOD PRESSURE: 115 MMHG | DIASTOLIC BLOOD PRESSURE: 69 MMHG | HEART RATE: 68 BPM | OXYGEN SATURATION: 97 %

## 2017-08-17 DIAGNOSIS — G89.29 CHRONIC PAIN OF LEFT KNEE: Primary | ICD-10-CM

## 2017-08-17 DIAGNOSIS — M25.562 CHRONIC PAIN OF LEFT KNEE: Primary | ICD-10-CM

## 2017-08-17 RX ORDER — HYDROCODONE BITARTRATE AND ACETAMINOPHEN 10; 325 MG/1; MG/1
1 TABLET ORAL
Qty: 35 TAB | Refills: 0 | Status: SHIPPED | OUTPATIENT
Start: 2017-08-17 | End: 2017-10-11 | Stop reason: SDUPTHER
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
## 2017-08-21 RX ORDER — CARBAMAZEPINE 200 MG/1
TABLET ORAL
Qty: 360 TAB | Refills: 0 | Status: SHIPPED | OUTPATIENT
Start: 2017-08-21 | End: 2017-10-11 | Stop reason: SDUPTHER

## 2017-09-01 ENCOUNTER — OFFICE VISIT (OUTPATIENT)
Dept: ORTHOPEDIC SURGERY | Age: 57
End: 2017-09-01

## 2017-09-01 VITALS
RESPIRATION RATE: 15 BRPM | DIASTOLIC BLOOD PRESSURE: 72 MMHG | SYSTOLIC BLOOD PRESSURE: 132 MMHG | TEMPERATURE: 97.3 F | OXYGEN SATURATION: 97 % | WEIGHT: 169.8 LBS | BODY MASS INDEX: 34.23 KG/M2 | HEIGHT: 59 IN | HEART RATE: 60 BPM

## 2017-09-01 DIAGNOSIS — S80.02XA CONTUSION OF KNEE AND LOWER LEG, LEFT, INITIAL ENCOUNTER: Primary | ICD-10-CM

## 2017-09-01 DIAGNOSIS — S80.12XA CONTUSION OF KNEE AND LOWER LEG, LEFT, INITIAL ENCOUNTER: Primary | ICD-10-CM

## 2017-09-01 RX ORDER — HYDROCODONE BITARTRATE AND ACETAMINOPHEN 7.5; 325 MG/1; MG/1
1 TABLET ORAL
Qty: 21 TAB | Refills: 0 | Status: SHIPPED | OUTPATIENT
Start: 2017-09-01 | End: 2017-12-27 | Stop reason: SDUPTHER
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
## 2017-09-07 ENCOUNTER — OFFICE VISIT (OUTPATIENT)
Dept: FAMILY MEDICINE CLINIC | Age: 57
End: 2017-09-07

## 2017-09-07 ENCOUNTER — HOSPITAL ENCOUNTER (OUTPATIENT)
Dept: PHYSICAL THERAPY | Age: 57
Discharge: HOME | End: 2017-09-07
Payer: MEDICARE

## 2017-09-07 ENCOUNTER — HOSPITAL ENCOUNTER (OUTPATIENT)
Dept: LAB | Age: 57
Discharge: HOME | End: 2017-09-07
Payer: MEDICARE

## 2017-09-07 VITALS
HEART RATE: 59 BPM | OXYGEN SATURATION: 98 % | WEIGHT: 169 LBS | RESPIRATION RATE: 17 BRPM | HEIGHT: 59 IN | SYSTOLIC BLOOD PRESSURE: 154 MMHG | DIASTOLIC BLOOD PRESSURE: 85 MMHG | TEMPERATURE: 97.3 F | BODY MASS INDEX: 34.07 KG/M2

## 2017-09-07 DIAGNOSIS — R73.09 ELEVATED HEMOGLOBIN A1C: ICD-10-CM

## 2017-09-07 DIAGNOSIS — I50.22 CHRONIC SYSTOLIC HEART FAILURE (HCC): ICD-10-CM

## 2017-09-07 DIAGNOSIS — M25.474 SWELLING OF FOOT JOINT, RIGHT: ICD-10-CM

## 2017-09-07 DIAGNOSIS — G62.9 NEUROPATHY: ICD-10-CM

## 2017-09-07 DIAGNOSIS — I69.959 HEMIPLEGIA OF DOMINANT SIDE AS LATE EFFECT FOLLOWING CEREBROVASCULAR DISEASE (HCC): ICD-10-CM

## 2017-09-07 DIAGNOSIS — M62.830 PARASPINAL MUSCLE SPASM: ICD-10-CM

## 2017-09-07 DIAGNOSIS — M75.112 PARTIAL TEAR OF LEFT ROTATOR CUFF: ICD-10-CM

## 2017-09-07 DIAGNOSIS — N39.498 OTHER URINARY INCONTINENCE: ICD-10-CM

## 2017-09-07 DIAGNOSIS — M89.9 DISORDER OF BONE: ICD-10-CM

## 2017-09-07 DIAGNOSIS — M94.9 DISORDER OF BONE AND CARTILAGE: ICD-10-CM

## 2017-09-07 DIAGNOSIS — F51.01 PRIMARY INSOMNIA: Primary | ICD-10-CM

## 2017-09-07 DIAGNOSIS — I10 ESSENTIAL HYPERTENSION: ICD-10-CM

## 2017-09-07 DIAGNOSIS — M50.30 OTHER CERVICAL DISC DEGENERATION, UNSPECIFIED CERVICAL REGION: ICD-10-CM

## 2017-09-07 DIAGNOSIS — M89.9 DISORDER OF BONE AND CARTILAGE: ICD-10-CM

## 2017-09-07 DIAGNOSIS — M54.16 LUMBAR NEURITIS: ICD-10-CM

## 2017-09-07 LAB
ALBUMIN SERPL-MCNC: 3.7 G/DL (ref 3.4–5)
ALBUMIN/GLOB SERPL: 1.2 {RATIO} (ref 0.8–1.7)
ALP SERPL-CCNC: 182 U/L (ref 45–117)
ALT SERPL-CCNC: 44 U/L (ref 13–56)
ANION GAP SERPL CALC-SCNC: 8 MMOL/L (ref 3–18)
AST SERPL-CCNC: 29 U/L (ref 15–37)
BASOPHILS # BLD: 0 K/UL (ref 0–0.06)
BASOPHILS NFR BLD: 1 % (ref 0–2)
BILIRUB SERPL-MCNC: 0.2 MG/DL (ref 0.2–1)
BUN SERPL-MCNC: 6 MG/DL (ref 7–18)
BUN/CREAT SERPL: 8 (ref 12–20)
CALCIUM SERPL-MCNC: 9.1 MG/DL (ref 8.5–10.1)
CHLORIDE SERPL-SCNC: 109 MMOL/L (ref 100–108)
CO2 SERPL-SCNC: 28 MMOL/L (ref 21–32)
CREAT SERPL-MCNC: 0.72 MG/DL (ref 0.6–1.3)
DIFFERENTIAL METHOD BLD: ABNORMAL
EOSINOPHIL # BLD: 0.1 K/UL (ref 0–0.4)
EOSINOPHIL NFR BLD: 2 % (ref 0–5)
ERYTHROCYTE [DISTWIDTH] IN BLOOD BY AUTOMATED COUNT: 15.7 % (ref 11.6–14.5)
GLOBULIN SER CALC-MCNC: 3.2 G/DL (ref 2–4)
GLUCOSE SERPL-MCNC: 87 MG/DL (ref 74–99)
HCT VFR BLD AUTO: 34.9 % (ref 35–45)
HGB BLD-MCNC: 11 G/DL (ref 12–16)
LYMPHOCYTES # BLD: 2 K/UL (ref 0.9–3.6)
LYMPHOCYTES NFR BLD: 40 % (ref 21–52)
MCH RBC QN AUTO: 27 PG (ref 24–34)
MCHC RBC AUTO-ENTMCNC: 31.5 G/DL (ref 31–37)
MCV RBC AUTO: 85.7 FL (ref 74–97)
MONOCYTES # BLD: 0.5 K/UL (ref 0.05–1.2)
MONOCYTES NFR BLD: 9 % (ref 3–10)
NEUTS SEG # BLD: 2.4 K/UL (ref 1.8–8)
NEUTS SEG NFR BLD: 48 % (ref 40–73)
PLATELET # BLD AUTO: 312 K/UL (ref 135–420)
PMV BLD AUTO: 10.8 FL (ref 9.2–11.8)
POTASSIUM SERPL-SCNC: 4.5 MMOL/L (ref 3.5–5.5)
PROT SERPL-MCNC: 6.9 G/DL (ref 6.4–8.2)
RBC # BLD AUTO: 4.07 M/UL (ref 4.2–5.3)
SODIUM SERPL-SCNC: 145 MMOL/L (ref 136–145)
URATE SERPL-MCNC: 4.6 MG/DL (ref 2.6–7.2)
WBC # BLD AUTO: 5 K/UL (ref 4.6–13.2)

## 2017-09-07 PROCEDURE — 97162 PT EVAL MOD COMPLEX 30 MIN: CPT

## 2017-09-07 PROCEDURE — 82172 ASSAY OF APOLIPOPROTEIN: CPT | Performed by: INTERNAL MEDICINE

## 2017-09-07 PROCEDURE — 97116 GAIT TRAINING THERAPY: CPT

## 2017-09-07 PROCEDURE — 85025 COMPLETE CBC W/AUTO DIFF WBC: CPT | Performed by: INTERNAL MEDICINE

## 2017-09-07 PROCEDURE — 36415 COLL VENOUS BLD VENIPUNCTURE: CPT | Performed by: INTERNAL MEDICINE

## 2017-09-07 PROCEDURE — 97110 THERAPEUTIC EXERCISES: CPT

## 2017-09-07 PROCEDURE — 80061 LIPID PANEL: CPT | Performed by: INTERNAL MEDICINE

## 2017-09-07 PROCEDURE — 84550 ASSAY OF BLOOD/URIC ACID: CPT | Performed by: INTERNAL MEDICINE

## 2017-09-07 PROCEDURE — 80053 COMPREHEN METABOLIC PANEL: CPT | Performed by: INTERNAL MEDICINE

## 2017-09-07 PROCEDURE — 82306 VITAMIN D 25 HYDROXY: CPT | Performed by: INTERNAL MEDICINE

## 2017-09-07 RX ORDER — ZOLPIDEM TARTRATE 10 MG/1
10 TABLET ORAL
Qty: 30 TAB | Refills: 0 | Status: SHIPPED | OUTPATIENT
Start: 2017-09-07 | End: 2017-12-07 | Stop reason: SDUPTHER

## 2017-09-07 NOTE — PROGRESS NOTES
Chief Complaint   Patient presents with    Knee Pain     left side rates as 10/10 states that she will have PT this afternoon followed by Dr Viola Shaffer     1. Have you been to the ER, urgent care clinic since your last visit? Hospitalized since your last visit? Yes When: 8/20/17 [facility] Reason for visit: knee pain/fall    2. Have you seen or consulted any other health care providers outside of the [address] [place] [address] since your last visit? Include any pap smears or colon screening.  No

## 2017-09-07 NOTE — PROGRESS NOTES
PT DAILY TREATMENT NOTE - [facility]     Patient Name: Rosita Rivera  Date:2017  : 1960  [x]  Patient  Verified  Payor: Suhas Betancourt / Plan: [facility] HUMAN MEDICARE CHOICE PPO/PFFS / Product Type: Managed Care Medicare /    In time:230  Out time:310  Total Treatment Time (min): 40  Total Timed Codes (min): 40  1:1 Treatment Time ( [address] only): 40   Visit #: 1 of 12    Treatment Area: Contusion of left knee, initial encounter [S80.02XA]  Contusion of left lower leg, initial encounter [S80.12XA]    Physical Therapy Evaluation - Knee    SUBJECTIVE      Any medication changes, allergies to medications, adverse drug reactions, diagnosis change, or new procedure performed?: [x] No    [] Yes (see summary sheet for update)    Subjective functional status/changes:     PLOF: SPC with ambulation outside of the home, Walking tolerance (10 minutes), Standing tolerance (10-15 minutes), Does not drive, (I) Household chores, No assistance with self-care ADLs (i.e. Shower chair utilized)  Current Functional Status: Standing tolerance 5 minutes, Walking tolerance 5 minutes, Dependent with household chores, Assistance with self-care ADLs (i.e. Showering while seated)  Work Hx: Not working  Living Situation: 1-story, 3 JOAO, Lives with family (able to assist)  Systems Review:Arthritis, Back Pain, BMI>30, Congestive Heart Failure, Heart Attack (), Osteoporosis, Pacemaker, Prior Surgery, L TKA (, ), R MARIANA (Lateral Approach - )  Medications: Norco (every 8 hours)    Patient Goals: \"Relieve the pain to avoid having surgery\"    OBJECTIVE EXAMINATION    Posture/Observation:   Static Standing: Unweighting of the L LE    Gait: With bilateral axillary crutches; Decreased step-length bilaterally with unweighting of the L LE and diminished heel-toe gait pattern    Functional Tests:  **Unable to complete secondary to patient intolerance to weightbearing with patient requiring use of bilateral axillary crutches with all weightbearing    ROM / Strength [] Unable to assess                                                  AROM                         MMT (1-5)    Left Right Left Right   Hip Flexion   5 3+    Extension   2- 2-    Abduction   2- 2-    ER   3+ 2-    IR   3+ 2-   Knee Flexion 115 97 5 3+    Extension 4(0) (0)17 4 3+   Ankle Dorsiflexion   5 3+   *Assessed in supine    Palpation:   Pain: TTP Medial hamstring tendons, Medial patella, Superior patella    Patellar Assessment:  Patellar Mobility:   L Patella: Hypomobile inferior/medial/lateral/superior   R Patella: Normal        OBJECTIVE    17 min [x]Eval                  []Re-Eval     15 min Therapeutic Exercise:  [x] See flow sheet : Patient educated in performance of prescribed HEP and provided with written instructions, Patient educated regarding diagnosis and PoC   Rationale: increase ROM, increase strength and improve balance to improve the patients ability to decrease falls risk with community ambulation    8 min Gait Trainin. Gait Training: 2x60'; Emphasis placed on proper sequencing with use of bilateral axillary crutches, increasing step length bilaterally, increasing consistency of heel-toe gait pattern and increasing R foot clearance during swing phase  2. Anterior/posterior weight shifting  3. Lateral weight shifting   Rationale: With   [] TE   [] TA   [] neuro   [] other: Patient Education: [x] Review HEP    [] Progressed/Changed HEP based on:   [] positioning   [] body mechanics   [] transfers   [] heat/ice application    [] other:      Pain Level (0-10 scale) post treatment: 7    ASSESSMENT/Changes in Function: Patient presents s/p fall 3 weeks prior to initial evaluation with resultant reported hyperflexion of the L knee with patient with a [address] significant for L TKA x2 (, ).  Patient as well with a PMH significant for frequent falls with patient diagnosed with neuropathy in accordance to patient subjective report as well as CHF with resultant R LE swelling and diminished activity tolerance. Prior to fall patient reports use of SPC with all ambulation outside of the home with reported standing tolerance of 10-15 minutes and ambulation tolerance of 10 minutes. At present patient reports walking and standing tolerance of 5 minutes with patient reporting assistance required with self-care and household ADLs. Patient as well subjectively with chronic L knee flexion contracture with current L knee AROM 0-17-97 with patient presenting with high irritability of symptoms thus limiting extent of examination. Patient as well with PSH of R MARIANA (2011) with patient noted with generalized non-specific weakness of the R LE. Patient with inability to tolerate assessment of weightbearing functional mobility therefore continued reassessment will be completed with successive treatments. Patient will continue to benefit from skilled PT services to modify and progress therapeutic interventions, address functional mobility deficits, address ROM deficits, address strength deficits, analyze and address soft tissue restrictions, analyze and cue movement patterns and analyze and modify body mechanics/ergonomics to attain remaining goals. Emphasis of treatment to be placed on returning patient to baseline of symptoms and improving functional weightbearing stability and balance in order to decrease falls risk and improve independence with ambulation within the home and community as well as with the completion of self-care and household ADLs. [x]  See Plan of Care  []  See progress note/recertification  []  See Discharge Summary         Progress towards goals / Updated goals:    Short Term Goals: To be accomplished in 3 weeks:  1. Patient will subjectively report full compliance with prescribed HEP. Eval: HEP provided  2.  Patient will demonstrate the ability to perform anterior weightshift onto L LE with maintenance x10 seconds without UE assistance in order to improve ease with ambulation within the home. Eval: Unable to maintain anterior weight shift >5 seconds with patient requiring 1 HHA  3. Patient will demonstrate L knee AROM 0- degrees in order to improve ease with stair management. Eval: L Knee AROM 0-17-97    Long Term Goals: To be accomplished in 6 weeks:  1. Patient will demonstrate a significant functional improvement as demonstrated by a score of >/=53 on FOTO. Eval: FOTO = 28  2. Patient will subjectively report ability to ambulate with use of SPC for 10 minutes prior to requiring to sit in order to allow patient to return to PLOF. Eval: Patient requires bilateral crutches or FWW with reported ambulation tolerance of 5 minutes  3. Patient will subjectively report the ability to independently get in/out of the bathtub without assistance in order to improve safety with self-care ADLs.   Eval: Patient reports requiring assistance with getting in/out of the tub    PLAN  [x]  Upgrade activities as tolerated     []  Continue plan of care  []  Update interventions per flow sheet       []  Discharge due to:_  []  Other:_      Shree Shahid PT 9/7/2017  9:09 AM    Future Appointments  Date Time Provider Oren Ramon   9/7/2017 10:30 AM Emmanuelle Marvin DO [facility] None   9/7/2017 2:30 PM Shree Shahid PT [facility]   10/11/2017 1:00 PM Mikel Ortega MD [facility]   12/20/2017 11:00 AM Bill Damico MD [address] [place] [address]

## 2017-09-07 NOTE — PATIENT INSTRUCTIONS
Please contact our office if you have any questions about your visit today. 1.) They will call with podiatry referral.    2.) Return in 3 months for regular follow up and Wellness Visit.

## 2017-09-07 NOTE — MR AVS SNAPSHOT
Visit Information Date & Time Provider Department Dept. Phone Encounter #  
 9/7/2017 10:30 AM Saba Delacruz 77 [id number] Follow-up Instructions Return in about 3 months (around 12/7/2017) for Regular Follow Up. Your Appointments 10/11/2017  1:00 PM  
Follow Up with Geri Wright MD  
[facility]) Appt Note: 3 mo f/u lower and upper back 2012 [address]  
  
    
 12/20/2017 11:00 AM  
Follow Up with Sabino Colindres MD  
[address] [facility]) Appt Note: 6mon f/u  
 [address] [facility] [id number] [phone]  
  
   
 [place] [id number] Upcoming Health Maintenance Date Due  
 EYE EXAM RETINAL OR DILATED Q1 8/5/1971 FOBT Q 1 YEAR AGE 50-75 8/5/2011 FOOT EXAM Q1 3/3/2015 GLAUCOMA SCREENING Q2Y 9/29/2016 HEMOGLOBIN A1C Q6M 7/25/2017 INFLUENZA AGE 9 TO ADULT 8/1/2017 MICROALBUMIN Q1 10/17/2017 LIPID PANEL Q1 1/25/2018 BREAST CANCER SCRN MAMMOGRAM 5/16/2018 DTaP/Tdap/Td series (2 - Td) 10/4/2026 Allergies as of 9/7/2017  Review Complete On: 4/3/2136 By: Silvio Gotti. Kary Bracket, LPN Severity Noted Reaction Type Reactions Dextromethorphan-guaifenesin High 11/24/2011    Other (comments) Aspirin  08/02/2012   Topical Hives Current Immunizations  Reviewed on 12/13/2013 Name Date Influenza Vaccine 9/29/2015, 9/2/2015 12:00 AM, 11/24/2011 12:00 AM  
 Influenza Vaccine PF 9/24/2014, 12/13/2013 Pneumococcal Conjugate (PCV-13) 5/2/2017 Pneumococcal Polysaccharide (PPSV-23) 11/3/2015 12:00 AM  
 Tdap 10/4/2016 12:00 AM  
  
 Not reviewed this visit You Were Diagnosed With   
  
 Codes Comments Primary insomnia    -  Primary ICD-10-CM: F51.01 
ICD-9-CM: 307.42 Lumbar neuritis     ICD-10-CM: M54.16 
ICD-9-CM: 724.4 Chronic systolic heart failure (HCC)     ICD-10-CM: I50.22 ICD-9-CM: 428.22 Essential hypertension     ICD-10-CM: I10 
ICD-9-CM: 401.9 Elevated hemoglobin A1c     ICD-10-CM: R73.09 
ICD-9-CM: 790.29 Disorder of bone and cartilage     ICD-10-CM: M89.9, M94.9 ICD-9-CM: 733.90 Swelling of foot joint, right     ICD-10-CM: M25.474 ICD-9-CM: 719.07 Neuropathy ([address]     ICD-10-CM: G62.9 ICD-9-CM: 597. 9 Vitals BP Pulse Temp Resp Height(growth percentile) Weight(growth percentile) 154/85 (BP 1 Location: Right arm, BP Patient Position: Sitting) (!) 59 97.3 °F (36.3 °C) (Oral) 17 4' 11\" (1.499 m) 169 lb (76.7 kg) LMP SpO2 BMI OB Status Smoking Status (LMP Unknown) 98% 34.13 kg/m2 Hysterectomy Former Smoker BMI and BSA Data Body Mass Index Body Surface Area  
 34.13 kg/m 2 1.79 m 2 Preferred Pharmacy Pharmacy Name Phone [facility] [address] [phone] Your Updated Medication List  
  
   
This list is accurate as of: 9/7/17 11:48 AM.  Always use your most recent med list.  
  
  
  
  
 Adilson Moeller  
by Does Not Apply route. calcipotriene 0.005 % topical cream  
Commonly known as:  DOVONEX  
  
 calcium citrate-vitamin d3 315-200 mg-unit Tab Commonly known as:  CITRACAL+D Take 1 tablet by mouth two (2) times daily (with meals). * carBAMazepine 200 mg tablet Commonly known as:  TEGretol 1  q am 1 q pm  2 qhs  
  
 * carBAMazepine 200 mg tablet Commonly known as:  TEGretol SEE NOTES  
  
 carvedilol 12.5 mg tablet Commonly known as:  Jorge Cline Take 6.25 mg by mouth two (2) times daily (with meals). celecoxib 200 mg capsule Commonly known as:  CELEBREX  
TAKE 1 CAPSULE BY MOUTH TWICE DAILY FOR 90 DAYS clopidogrel 75 mg Tab Commonly known as:  PLAVIX Take 1 tablet by mouth daily. * DULoxetine 60 mg capsule Commonly known as:  CYMBALTA Take 1 Cap by mouth daily. * DULoxetine 60 mg capsule Commonly known as:  CYMBALTA Take 1 Cap by mouth daily. ergocalciferol 50,000 unit capsule Commonly known as:  ERGOCALCIFEROL Take 1 Cap by mouth every seven (7) days. furosemide 40 mg tablet Commonly known as:  LASIX TAKE 1 TABLET BY MOUTH TWICE DAILY AS NEEDED  
  
 * HYDROcodone-acetaminophen  mg tablet Commonly known as:  Theodore Grist Take 1 Tab by mouth every eight (8) hours as needed for Pain. Max Daily Amount: 3 Tabs. * HYDROcodone-acetaminophen  mg tablet Commonly known as:  Theodore Grist Take 1 Tab by mouth every eight (8) hours as needed for Pain. Max Daily Amount: 3 Tabs. * HYDROcodone-acetaminophen  mg tablet Commonly known as:  Landyman Grist Take 1 Tab by mouth every eight (8) hours as needed for Pain. Max Daily Amount: 3 Tabs. * HYDROcodone-acetaminophen 7.5-325 mg per tablet Commonly known as:  Theodore Gould Take 1 Tab by mouth every eight (8) hours as needed for Pain. Max Daily Amount: 3 Tabs. isosorbide mononitrate ER 30 mg tablet Commonly known as:  IMDUR Take 15 mg by mouth every morning. levocetirizine 5 mg tablet Commonly known as:  Leyt Brooke Take 1 Tab by mouth daily. lisinopril 20 mg tablet Commonly known as:  Susi Sol Take 20 mg by mouth daily. methocarbamol 500 mg tablet Commonly known as:  ROBAXIN  
TAKE 1 TABLET BY MOUTH FOUR TIMES DAILY AS NEEDED FOR MUSCLE SPASM * miscellaneous medical supply Misc  
1 Each by Does Not Apply route daily. * miscellaneous medical supply Misc  
1 Each by Does Not Apply route daily. * miscellaneous medical supply Misc  
2 Each by Does Not Apply route daily. * miscellaneous medical supply Misc  
2 Each by Does Not Apply route daily. montelukast 10 mg tablet Commonly known as:  SINGULAIR TK 1 T PO D  
  
 omeprazole 20 mg capsule Commonly known as:  PRILOSEC Take 1 capsule by mouth daily. polyethylene glycol 17 gram/dose powder Commonly known as:  Marcellina Dull Take 17 g by mouth daily. PRALUENT PEN 75 mg/mL injector pen Generic drug:  alirocumab * pregabalin 300 mg capsule Commonly known as:  Carlynn Kaitlynn Take 1 Cap by mouth two (2) times a day. Max Daily Amount: 600 mg.  
  
 * pregabalin 300 mg capsule Commonly known as:  Carlynn Kaitlynn Take 1 Cap by mouth two (2) times a day. Max Daily Amount: 600 mg.  
  
 rosuvastatin 40 mg tablet Commonly known as:  CRESTOR Take 1 Tab by mouth daily. therapeutic multivitamin tablet Commonly known as:  [facility] Take 1 tablet by mouth daily. varicella-zoster vacine live 19,400 unit/0.65 mL Susr injection Generic drug:  varicella zoster vacine live ADM 0.65ML SC UTD  
  
 VITAMIN B-12 500 mcg tablet Generic drug:  cyanocobalamin Take 500 mcg by mouth daily. zolpidem 10 mg tablet Commonly known as:  AMBIEN Take 1 Tab by mouth nightly as needed for Sleep. Max Daily Amount: 10 mg.  
  
 * Notice: This list has 14 medication(s) that are the same as other medications prescribed for you. Read the directions carefully, and ask your doctor or other care provider to review them with you. Prescriptions Printed Refills  
 zolpidem (AMBIEN) 10 mg tablet 0 Sig: Take 1 Tab by mouth nightly as needed for Sleep. Max Daily Amount: 10 mg.  
 Class: Print Route: Oral  
  
We Performed the Following REFERRAL TO PODIATRY [REF90 Custom] Comments:  
 Please evaluate patient for right foot swelling and diabetic neuropathy. Follow-up Instructions Return in about 3 months (around 12/7/2017) for Regular Follow Up. To-Do List   
 09/07/2017   Lab:  URIC ACID   
  
 09/07/2017  2:30 PM  
 Appointment with Joe Bourgeois, PT at [facility]  W Ian Ernst MARIA ([phone]) Referral Information Referral ID Referred By Referred To  
  
 [id number] [facility] [address] [place] g[place], [address]  Phone: [phone] Fax: [phone] Visits Status Start Date End Date 1 New Request 9/7/17 9/7/18 If your referral has a status of pending review or denied, additional information will be sent to support the outcome of this decision. Patient Instructions Please contact our office if you have any questions about your visit today. 1.) They will call with podiatry referral. 
 
2.) Return in 3 months for regular follow up and Wellness Visit. Introducing [facility]! Dear Naseem Frazier: 
Thank you for requesting a [employer] account. Our records indicate that you already have an active [employer] account. You can access your account anytime at [email or web address] Did you know that you can access your hospital and ER discharge instructions at any time in [employer]? You can also review all of your test results from your hospital stay or ER visit. Additional Information If you have questions, please visit the Frequently Asked Questions section of the [employer] website at [email or web address] Remember, [employer] is NOT to be used for urgent needs. For medical emergencies, dial 911. Now available from your iPhone and Android! Please provide this summary of care documentation to your next provider. Your primary care clinician is listed as Raj Jewell. If you have any questions after today's visit, please call [phone].

## 2017-09-07 NOTE — PROGRESS NOTES
Progress Note    Patient: Kenneth Ralph MRN: [id number]  SSN: [id number]    YOB: 1960  Age: 65 y.o. Sex: female          Subjective:   Kenneth Ralph is a 65 y.o. female who is here for regular follow up. The patient mentions that she saw Dr. Calli Munoz last week Friday. She fell and sprained her left knee. She state that her orthopedic specialist is trying to stay out of surgery. She states that she is doing physical therapy for a total of 6 weeks. She also mentions that she will go to the PT later on today. She also mentions that she is going to the bathroom more frequently. She has been having episodes of urinary incontinence. She mentions that her right foot is swollen a lot. She states that it is to the point where her shoes are tight. She mentions that this has been going on for months at this point. She has seen different podiatrist but does not have one currently. The patient also sates that her Lyrica has been increased to 300 mg twice a day by her orthopedic specialist Dr. Rey Fleischer. She states that it has been effective---maintains that she can sleep very well and she does not have any restless leg associated with it. The patient mentions that she will have her appointment with her cardiologist later on this year with Dr. Feli Guerra. She will see Dr. Feli Guerra in December. Objective:     Visit Vitals    /85 (BP 1 Location: Right arm, BP Patient Position: Sitting)    Pulse (!) 59    Temp 97.3 °F (36.3 °C) (Oral)    Resp 17    Ht 4' 11\" (1.499 m)    Wt 169 lb (76.7 kg)    LMP  (LMP Unknown)    SpO2 98%    BMI 34.13 kg/m2       Review of Systems:  Pertinent ROS noted in HPI     Physical Exam:   GENERAL: alert, cooperative, appears stated age  EYE: conjunctivae/corneas clear. PERRL, EOM's intact. Fundi benign  LYMPHATIC: Cervical, supraclavicular, and axillary nodes normal.   THROAT & NECK: normal and no erythema or exudates noted.  Poor dentition on exam.   LUNG: clear to auscultation bilaterally  HEART: regular rate and rhythm, S1, S2 normal, no murmur, click, rub or gallop  ABDOMEN: Bowels sounds diminished but observed. EXTREMITIES:  No significant edema  NEUROLOGIC: Right arm flaccid on exam. No change from previous visits. MUSCULOSKELETAL:          Lab/Data Review:    Lab Results   Component Value Date/Time    Hemoglobin A1c 6.6 01/25/2017 12:10 PM     Lab Results   Component Value Date/Time    Cholesterol, total 201 01/25/2017 12:10 PM    HDL Cholesterol 62 01/25/2017 12:10 PM    LDL, calculated 114 01/25/2017 12:10 PM    VLDL, calculated 25 01/25/2017 12:10 PM    Triglyceride 127 01/25/2017 12:10 PM    CHOL/HDL Ratio 2.6 01/05/2016 10:48 AM     Lab Results   Component Value Date/Time    Sodium 144 01/25/2017 12:10 PM    Potassium 4.4 01/25/2017 12:10 PM    Chloride 102 01/25/2017 12:10 PM    CO2 23 01/25/2017 12:10 PM    Anion gap 4 11/02/2016 08:23 AM    Glucose 139 01/25/2017 12:10 PM    BUN 12 01/25/2017 12:10 PM    Creatinine 0.89 01/25/2017 12:10 PM    BUN/Creatinine ratio 13 01/25/2017 12:10 PM    GFR est AA 84 01/25/2017 12:10 PM    GFR est non-AA 73 01/25/2017 12:10 PM    Calcium 8.8 01/25/2017 12:10 PM    Bilirubin, total <0.2 01/25/2017 12:10 PM    AST (SGOT) 27 01/25/2017 12:10 PM    Alk. phosphatase 130 01/25/2017 12:10 PM    Protein, total 6.6 03/06/2017 12:07 PM    Albumin 3.8 01/25/2017 12:10 PM    Globulin 3.0 07/26/2016 11:25 AM    A-G Ratio 1.6 01/25/2017 12:10 PM    ALT (SGPT) 19 01/25/2017 12:10 PM       Assessment:     1.) Insomnia: Patient stable on Ambien 10 mg once a day and this was refilled. 2.) Right Foot Swelling: Patient ordered Uric Acid level to rule out potential for gout. I will also refer her to another podiatrist, Dr. Diane Brunson. 3.) Lumbar Neuritis with radiation down left arm: Patient previously referred to Dr. Haider Calle for second opinion.      4.) Diabetic Neuropathy: Patient switched to Lyrica 300 mg twice a day by  Sofie Siemens. 5.) Hyperlipidemia: Lipid panel drawn in the office today. 6.) Urinary Incontinence: Likely stress incontinence. I will reorder her adult briefs. 7.) Preventive: Labs ordered as noted below     I personally reviewed and summarized all labs with the patient. Patient will return in 3 months for follow-up.         Plan:     Orders Placed This Encounter    URIC ACID    REFERRAL TO PODIATRY    zolpidem (AMBIEN) 10 mg tablet    DISCONTD: DIAPER,BRIEF,ADULT, DISPOSABLE ([facility])    DIAPER,BRIEF,ADULT, DISPOSABLE ([facility])             Signed By: Yulisa Pack DO     September 7, 2017

## 2017-09-07 NOTE — PROGRESS NOTES
[facility] - [facility]              [address]        [place] garcia, [address] [place]   [phone] fax    Plan of Care/ Statement of Necessity for Physical Therapy Services    Patient name: Clementina Rees Start of Care: 2017   Referral source: Claudio Javier MD : 1960    Medical Diagnosis: Contusion of left knee, initial encounter [S80.02XA]  Contusion of left lower leg, initial encounter [S80.12XA]   Onset Date:3 weeks prior to I.E. Treatment Diagnosis: Left Knee Pain   Prior Hospitalization: see medical history Provider#: [id number]   Medications: Verified on Patient summary List    Comorbidities: Arthritis, Back Pain, BMI>30, Congestive Heart Failure, Heart Attack (), Osteoporosis, Pacemaker, Prior Surgery, L TKA (, ), R MARIANA (Lateral Approach - )   Prior Level of Function: SPC with ambulation outside of the home, Walking tolerance (10 minutes), Standing tolerance (10-15 minutes), Does not drive, (I) Household chores, No assistance with self-care ADLs (i.e. Shower chair utilized)      The Plan of Care and following information is based on the information from the initial evaluation. Assessment:    Patient presents s/p fall 3 weeks prior to initial evaluation with resultant reported hyperflexion of the L knee with patient with a [address] significant for L TKA x2 (, ). Patient as well with a PMH significant for frequent falls with patient diagnosed with neuropathy in accordance to patient subjective report as well as CHF with resultant R LE swelling and diminished activity tolerance. Prior to fall patient reports use of SPC with all ambulation outside of the home with reported standing tolerance of 10-15 minutes and ambulation tolerance of 10 minutes. At present patient reports walking and standing tolerance of 5 minutes with patient reporting assistance required with self-care and household ADLs.  Patient as well subjectively with chronic L knee flexion contracture with current L knee AROM 0-17-97 with patient presenting with high irritability of symptoms thus limiting extent of examination. Patient as well with PSH of R MARIANA (2011) with patient noted with generalized non-specific weakness of the R LE. Patient with inability to tolerate assessment of weightbearing functional mobility therefore continued reassessment will be completed with successive treatments.     Patient will continue to benefit from skilled PT services to modify and progress therapeutic interventions, address functional mobility deficits, address ROM deficits, address strength deficits, analyze and address soft tissue restrictions, analyze and cue movement patterns and analyze and modify body mechanics/ergonomics to attain remaining goals. Emphasis of treatment to be placed on returning patient to baseline of symptoms and improving functional weightbearing stability and balance in order to decrease falls risk and improve independence with ambulation within the home and community as well as with the completion of self-care and household ADLs.     Key Information:    Posture/Observation: Static Standing: Unweighting of the L LE  Gait: With bilateral axillary crutches; Decreased step-length bilaterally with unweighting of the L LE and diminished heel-toe gait pattern     Functional Tests:  **Unable to complete secondary to patient intolerance to weightbearing with patient requiring use of bilateral axillary crutches with all weightbearing     ROM / Strength [] Unable to assess                                                                                                                                                                            AROM                         MMT (1-5)      Left Right Left Right   Hip Flexion     5 3+     Extension     2- 2-     Abduction     2- 2-     ER     3+ 2-     IR     3+ 2-   Knee Flexion 115 97 5 3+     Extension 4(0) (0)17 4 3+   Ankle Dorsiflexion     5 3+   *Assessed in supine     Palpation:                        Pain: TTP Medial hamstring tendons, Medial patella, Superior patella     Patellar Assessment:  Patellar Mobility:                        L Patella: Hypomobile inferior/medial/lateral/superior                        R Patella: Normal        Evaluation Complexity History HIGH Complexity :3+ comorbidities / personal factors will impact the outcome/ POC ; Examination HIGH Complexity : 4+ Standardized tests and measures addressing body structure, function, activity limitation and / or participation in recreation  ;Presentation HIGH Complexity : Unstable and unpredictable characteristics  ; Clinical Decision Making MEDIUM Complexity : FOTO score of 26-74  Overall Complexity Rating: MEDIUM  Problem List: pain affecting function, decrease ROM, decrease strength, edema affecting function, impaired gait/ balance, decrease ADL/ functional abilitiies, decrease activity tolerance, decrease flexibility/ joint mobility and decrease transfer abilities   Treatment Plan may include any combination of the following: Therapeutic exercise, Therapeutic activities, Neuromuscular re-education, Physical agent/modality, Gait/balance training, Manual therapy, Self Care training, Functional mobility training, Home safety training and Stair training  Patient / Family readiness to learn indicated by: asking questions, trying to perform skills and interest  Persons(s) to be included in education: patient (P)  Barriers to Learning/Limitations: None  Patient Goal (s): Relieve the pain to avoid having surgery  Patient Self Reported Health Status: fair  Rehabilitation Potential: fair    Short Term Goals: To be accomplished in 3 weeks:  1. Patient will subjectively report full compliance with prescribed HEP.   2. Patient will demonstrate the ability to perform anterior weightshift onto L LE with maintenance x10 seconds without UE assistance in order to improve ease with ambulation within the home. 3. Patient will demonstrate L knee AROM 0- degrees in order to improve ease with stair management. Long Term Goals: To be accomplished in 6 weeks:  1. Patient will demonstrate a significant functional improvement as demonstrated by a score of >/=53 on FOTO. 2. Patient will subjectively report ability to ambulate with use of SPC for 10 minutes prior to requiring to sit in order to allow patient to return to PLOF. 3. Patient will subjectively report the ability to independently get in/out of the bathtub without assistance in order to improve safety with self-care ADLs. Frequency / Duration: Patient to be seen 2 times per week for 6 weeks. Patient/ Caregiver education and instruction: Diagnosis, prognosis, self care, activity modification and exercises   [x]  Plan of care has been reviewed with DAVID    G-Codes (GP)  Mobility   Current  CL= 60-79%   Goal  CK= 40-59%    The severity rating is based on clinical judgment and the FOTO score. Certification Period: 9/7/2017 - 11/6/2017  Amena Alarcon, PT 9/7/2017 9:10 AM  ________________________________________________________________________    I certify that the above Therapy Services are being furnished while the patient is under my care. I agree with the treatment plan and certify that this therapy is necessary.     [de-identified] Signature:____________________  Date:____________Time: _________    Please sign and return to [facility] - [address]        [place], [address] [place]   [phone] fax

## 2017-09-08 LAB
25(OH)D3 SERPL-MCNC: 38.9 NG/ML (ref 30–100)
APO B SERPL-MCNC: 63 MG/DL (ref 54–133)
CHOLEST SERPL-MCNC: 143 MG/DL (ref 100–199)
HDLC SERPL-MCNC: 77 MG/DL
LDLC SERPL CALC-MCNC: 41 MG/DL (ref 0–99)
LDLC/HDLC SERPL: 0.5 RATIO UNITS (ref 0–3.2)
NONHDLC SERPL-MCNC: 66 MG/DL (ref 0–129)
TRIGL SERPL-MCNC: 124 MG/DL (ref 0–149)

## 2017-09-13 ENCOUNTER — APPOINTMENT (OUTPATIENT)
Dept: PHYSICAL THERAPY | Age: 57
End: 2017-09-13
Payer: MEDICARE

## 2017-09-15 ENCOUNTER — HOSPITAL ENCOUNTER (OUTPATIENT)
Dept: PHYSICAL THERAPY | Age: 57
Discharge: HOME | End: 2017-09-15
Payer: MEDICARE

## 2017-09-15 PROCEDURE — 97140 MANUAL THERAPY 1/> REGIONS: CPT

## 2017-09-15 PROCEDURE — 97110 THERAPEUTIC EXERCISES: CPT

## 2017-09-15 NOTE — PROGRESS NOTES
PT DAILY TREATMENT NOTE - [facility]     Patient Name: Micheline Bridges  Date:9/15/2017  : 1960  [x]  Patient  Verified  Payor: Jennifer Marin / Plan: [facility] HUMANA MEDICARE CHOICE PPO/PFFS / Product Type: Managed Care Medicare /    In time:1127  Out time:1207  Total Treatment Time (min): 40  Total Timed Codes (min): 40  1:1 Treatment Time ( only): 20   Visit #: 2 of 12    Treatment Area: Contusion of left knee, initial encounter [S80.02XA]  Contusion of left lower leg, initial encounter [S80.12XA]    SUBJECTIVE  Pain Level (0-10 scale): 7/10  Any medication changes, allergies to medications, adverse drug reactions, diagnosis change, or new procedure performed?: [x] No    [] Yes (see summary sheet for update)  Subjective functional status/changes:   [] No changes reported  Patient reports completion of prescribed HEP 1-2x/day with attempted ability to increase ambulation time. OBJECTIVE    32  (1:1 12') min Therapeutic Exercise:  [x] See flow sheet : Emphasis placed on improving available knee AROM and quadriceps strengthening    Rationale: increase ROM and increase strength to improve the patients ability to improve patient ease with community ambulation    8 min Manual Therapy:    Supine, L Femur AP Grade II/III Mobilizations  Supine, L Patellar Superior Grade III Mobilizations  Supine, L Patellar Inferior Grade III Mobilizations  Supine, L Patellar Medial Grade III Mobilizations   Rationale: decrease pain, increase ROM and increase tissue extensibility to improve ease with household ADLs.           With   [] TE   [] TA   [] neuro   [] other: Patient Education: [x] Review HEP    [] Progressed/Changed HEP based on:   [] positioning   [] body mechanics   [] transfers   [] heat/ice application    [] other:      Other Objective/Functional Measures:     Post-Manual Knee AROM: 0 - 23 - 97     Pain Level (0-10 scale) post treatment: 7    ASSESSMENT/Changes in Function: All therapeutic exercise initiated with good tolerance demonstrated. Patient demonstrates generalized hypomobility of the L patella with completion of patellar mobilizations to improve weightbearing tolerance. With plinth height increased patient able to perform sit-to-stand without HHA assist with ability to stabilize herself independently without assistance. Patient will continue to benefit from skilled PT services to modify and progress therapeutic interventions, address functional mobility deficits, address ROM deficits, address strength deficits, analyze and address soft tissue restrictions and analyze and cue movement patterns to attain remaining goals. []  See Plan of Care  []  See progress note/recertification  []  See Discharge Summary         Progress towards goals / Updated goals:    Short Term Goals: To be accomplished in 3 weeks:  1. Patient will subjectively report full compliance with prescribed HEP. Eval: HEP provided  Current: Progressing, HEP performance reported 1-2x/day, 9/15/2017  2. Patient will demonstrate the ability to perform anterior weightshift onto L LE with maintenance x10 seconds without UE assistance in order to improve ease with ambulation within the home. Eval: Unable to maintain anterior weight shift >5 seconds with patient requiring 1 HHA  3. Patient will demonstrate L knee AROM 0- degrees in order to improve ease with stair management. Eval: L Knee AROM 0-17-97     Long Term Goals: To be accomplished in 6 weeks:  1. Patient will demonstrate a significant functional improvement as demonstrated by a score of >/=53 on FOTO. Eval: FOTO = 28  2. Patient will subjectively report ability to ambulate with use of SPC for 10 minutes prior to requiring to sit in order to allow patient to return to PLOF. Eval: Patient requires bilateral crutches or FWW with reported ambulation tolerance of 5 minutes  3.  Patient will subjectively report the ability to independently get in/out of the bathtub without assistance in order to improve safety with self-care ADLs.   Eval: Patient reports requiring assistance with getting in/out of the tub    PLAN  [x]  Upgrade activities as tolerated     [x]  Continue plan of care  []  Update interventions per flow sheet       []  Discharge due to:_  []  Other:_      Saleem Gonzalez, PT 9/15/2017  7:04 AM    Future Appointments  Date Time Provider Oren Ramon   9/15/2017 11:30 AM Saleem Gonzalez PT [facility]   10/11/2017 1:00 PM Tejas Mauricio MD [facility]   12/7/2017 10:30 AM Emmanuelle Marvin DO [facility] None   12/20/2017 11:00 AM Felix Guerrero MD [address] [place] [address]

## 2017-09-18 ENCOUNTER — HOSPITAL ENCOUNTER (OUTPATIENT)
Dept: PHYSICAL THERAPY | Age: 57
Discharge: HOME | End: 2017-09-18
Payer: MEDICARE

## 2017-09-18 PROCEDURE — 97110 THERAPEUTIC EXERCISES: CPT

## 2017-09-18 PROCEDURE — 97140 MANUAL THERAPY 1/> REGIONS: CPT

## 2017-09-18 NOTE — PROGRESS NOTES
PT DAILY TREATMENT NOTE - [facility]     Patient Name: Honorio Alamo  Date:2017  : 1960  [x]  Patient  Verified  Payor: Telma Hussein / Plan: [facility] HUMANA MEDICARE CHOICE PPO/PFFS / Product Type: Managed Care Medicare /    In time:2:00  Out time:2:33  Total Treatment Time (min): 33  Total Timed Codes (min): 33  1:1 Treatment Time ( only): 33   Visit #: 3 of 12    Treatment Area: Contusion of left knee, initial encounter [S80.02XA]  Contusion of left lower leg, initial encounter [S80.12XA]    SUBJECTIVE  Pain Level (0-10 scale): 6  Any medication changes, allergies to medications, adverse drug reactions, diagnosis change, or new procedure performed?: [x] No    [] Yes (see summary sheet for update)  Subjective functional status/changes:   [] No changes reported  Pt reports no fall since last visit. Pt reports she has more pain with bending her knee than straightening her knee.        OBJECTIVE        Min Type Additional Details    [] Estim:  []Unatt       []IFC  []Premod                        []Other:  []w/ice   []w/heat  Position:  Location:    [] Estim: []Att    []TENS instruct  []NMES                    []Other:  []w/US   []w/ice   []w/heat  Position:  Location:    []  Traction: [] Cervical       []Lumbar                       [] Prone          []Supine                       []Intermittent   []Continuous Lbs:  [] before manual  [] after manual    []  Ultrasound: []Continuous   [] Pulsed                           []1MHz   []3MHz W/cm2:  Location:    []  Iontophoresis with dexamethasone         Location: [] Take home patch   [] In clinic    []  Ice     []  heat  []  Ice massage  []  Laser   []  Anodyne Position:  Location:    []  Laser with stim  []  Other:  Position:  Location:    []  Vasopneumatic Device Pressure:       [] lo [] med [] hi   Temperature: [] lo [] med [] hi   [] Skin assessment post-treatment:  []intact []redness- no adverse reaction    []redness - adverse reaction:       25 min Therapeutic Exercise:  [x] See flow sheet :   Rationale: increase ROM and increase strength to improve the patients ability to increase tolerance to activities. 8 min Manual Therapy:  Tibiofemoral distraction, extension mobs, patellar mobs. Rationale: decrease pain, increase ROM, increase tissue extensibility and decrease trigger points to increase ease with ADLs. With   [] TE   [] TA   [] neuro   [] other: Patient Education: [x] Review HEP    [] Progressed/Changed HEP based on:   [] positioning   [] body mechanics   [] transfers   [] heat/ice application    [] other:      Other Objective/Functional Measures: L knee AROM 17-96 deg     Pain Level (0-10 scale) post treatment: 6    ASSESSMENT/Changes in Function: Pt has very little movement in patellar mobs. Patient will continue to benefit from skilled PT services to modify and progress therapeutic interventions, address functional mobility deficits, address ROM deficits, address strength deficits and analyze and address soft tissue restrictions to attain remaining goals. []  See Plan of Care  []  See progress note/recertification  []  See Discharge Summary         Progress towards goals / Updated goals:  Short Term Goals: To be accomplished in 3 weeks:  1. Patient will subjectively report full compliance with prescribed HEP. Eval: HEP provided  Current: Progressing, HEP performance reported 1-2x/day, 9/15/2017  2. Patient will demonstrate the ability to perform anterior weightshift onto L LE with maintenance x10 seconds without UE assistance in order to improve ease with ambulation within the home. Eval: Unable to maintain anterior weight shift >5 seconds with patient requiring 1 HHA  3. Patient will demonstrate L knee AROM 0- degrees in order to improve ease with stair management. Eval: L Knee AROM 0-17-97  Current: Remains: L knee AROM 17-96 deg 9/18/17      Long Term Goals: To be accomplished in 6 weeks:  1.  Patient will demonstrate a significant functional improvement as demonstrated by a score of >/=53 on FOTO. Eval: FOTO = 28  2. Patient will subjectively report ability to ambulate with use of SPC for 10 minutes prior to requiring to sit in order to allow patient to return to [facility]. Eval: Patient requires bilateral crutches or FWW with reported ambulation tolerance of 5 minutes  3. Patient will subjectively report the ability to independently get in/out of the bathtub without assistance in order to improve safety with self-care ADLs.   Eval: Patient reports requiring assistance with getting in/out of the tub    PLAN  []  Upgrade activities as tolerated     [x]  Continue plan of care  []  Update interventions per flow sheet       []  Discharge due to:_  []  Other:_      Virgin Dubin, PTA 9/18/2017  2:04 PM    Future Appointments  Date Time Provider Oren Ramon   9/22/2017 10:00 AM Virgin Dubin, PTA [facility]   9/25/2017 11:30 AM Everett Dubin, PTA [facility]   9/27/2017 10:00 AM Everett Dubin, PTA [facility]   10/2/2017 10:30 AM Everett Dubin, PTA [facility]   10/4/2017 2:00 PM [place], PT [facility]   10/9/2017 10:30 AM Everett Tejedain, PTA [facility]   10/11/2017 10:00 AM Everett Dubin, PTA [facility]   10/11/2017 1:00 PM Himanshu Potts MD [facility]   12/7/2017 10:30 AM Todd-Trever Marvin, DO [facility] None   12/20/2017 11:00 AM Juanita Rogers MD [address] [place] [address]

## 2017-09-22 ENCOUNTER — HOSPITAL ENCOUNTER (OUTPATIENT)
Dept: PHYSICAL THERAPY | Age: 57
Discharge: HOME | End: 2017-09-22
Payer: MEDICARE

## 2017-09-22 PROCEDURE — 97116 GAIT TRAINING THERAPY: CPT

## 2017-09-22 PROCEDURE — 97110 THERAPEUTIC EXERCISES: CPT

## 2017-09-22 PROCEDURE — 97140 MANUAL THERAPY 1/> REGIONS: CPT

## 2017-09-22 NOTE — PROGRESS NOTES
PT DAILY TREATMENT NOTE - [facility]     Patient Name: Kenneth Ralph  Date:2017  : 1960  [x]  Patient  Verified  Payor: Omaira Starks / Plan: [facility] HUM[employer] MEDICARE CHOICE PPO/PFFS / Product Type: Managed Care Medicare /    In time:9:58  Out time:10:42  Total Treatment Time (min): 44  Total Timed Codes (min): 44  1:1 Treatment Time (MC only): 38   Visit #: 4 of 12    Treatment Area: Contusion of left knee, initial encounter [S80.02XA]  Contusion of left lower leg, initial encounter [S80.12XA]    SUBJECTIVE  Pain Level (0-10 scale): 6  Any medication changes, allergies to medications, adverse drug reactions, diagnosis change, or new procedure performed?: [x] No    [] Yes (see summary sheet for update)  Subjective functional status/changes:   [] No changes reported  Pt reports her pain is getting better and she is doing her home exercises more at home.      OBJECTIVE    Modality rationale:    Min Type Additional Details    [] Estim:  []Unatt       []IFC  []Premod                        []Other:  []w/ice   []w/heat  Position:  Location:    [] Estim: []Att    []TENS instruct  []NMES                    []Other:  []w/US   []w/ice   []w/heat  Position:  Location:    []  Traction: [] Cervical       []Lumbar                       [] Prone          []Supine                       []Intermittent   []Continuous Lbs:  [] before manual  [] after manual    []  Ultrasound: []Continuous   [] Pulsed                           []1MHz   []3MHz W/cm2:  Location:    []  Iontophoresis with dexamethasone         Location: [] Take home patch   [] In clinic    []  Ice     []  heat  []  Ice massage  []  Laser   []  Anodyne Position:  Location:    []  Laser with stim  []  Other:  Position:  Location:    []  Vasopneumatic Device Pressure:       [] lo [] med [] hi   Temperature: [] lo [] med [] hi   [] Skin assessment post-treatment:  []intact []redness- no adverse reaction    []redness - adverse reaction:      28 min Therapeutic Exercise:  [x] See flow sheet :   Rationale: increase ROM and increase strength to improve the patients ability to increase tolerance to activities.      8 min Manual Therapy:  Tibiofemoral distraction, extension mobs, patellar mobs. Rationale: decrease pain, increase ROM, increase tissue extensibility and decrease trigger points to increase ease with ADLs. 8 min Gait Trainin feet with SPC device on level surfaces with CGA level of assist   Rationale: With   [] TE   [] TA   [] neuro   [] other: Patient Education: [x] Review HEP    [] Progressed/Changed HEP based on:   [] positioning   [] body mechanics   [] transfers   [] heat/ice application    [] other:      Pain Level (0-10 scale) post treatment: 4    ASSESSMENT/Changes in Function: Pt able to ambulate with SPC with minimal increase in pain. Educated pt to continue to use B axillary crutches until we can build endurance with SPC. Patient will continue to benefit from skilled PT services to modify and progress therapeutic interventions, address functional mobility deficits, address ROM deficits, address strength deficits and analyze and address soft tissue restrictions to attain remaining goals. []  See Plan of Care  []  See progress note/recertification  []  See Discharge Summary         Progress towards goals / Updated goals:  Short Term Goals: To be accomplished in 3 weeks:  1. Patient will subjectively report full compliance with prescribed HEP. Eval: HEP provided  Current: Progressing, HEP performance reported 1-2x/day, 9/15/2017  2. Patient will demonstrate the ability to perform anterior weightshift onto L LE with maintenance x10 seconds without UE assistance in order to improve ease with ambulation within the home. Eval: Unable to maintain anterior weight shift >5 seconds with patient requiring 1 HHA  3. Patient will demonstrate L knee AROM 0- degrees in order to improve ease with stair management.   Eval: L Knee AROM 0-17-97  Current: Remains: L knee AROM 17-96 deg 9/18/17      Long Term Goals: To be accomplished in 6 weeks:  1. Patient will demonstrate a significant functional improvement as demonstrated by a score of >/=53 on FOTO. Eval: FOTO = 28  2. Patient will subjectively report ability to ambulate with use of SPC for 10 minutes prior to requiring to sit in order to allow patient to return to [facility]. Eval: Patient requires bilateral crutches or FWW with reported ambulation tolerance of 5 minutes  3. Patient will subjectively report the ability to independently get in/out of the bathtub without assistance in order to improve safety with self-care ADLs.   Eval: Patient reports requiring assistance with getting in/out of the tub       PLAN  []  Upgrade activities as tolerated     [x]  Continue plan of care  []  Update interventions per flow sheet       []  Discharge due to:_  []  Other:_      Deangelo Simper, PTA 9/22/2017  10:39 AM    Future Appointments  Date Time Provider Oren Ramon   9/25/2017 11:30 AM Deangelo Simper, PTA [facility]   9/27/2017 10:00 AM Deangelo Simper, PTA [facility]   10/2/2017 10:30 AM Deangelo Simper, PTA [facility]   10/4/2017 2:00 PM Lars Ansari, PT [facility]   10/9/2017 10:30 AM Deangelo Simper, PTA [facility]   10/11/2017 10:00 AM Deangelo Simper, PTA [facility]   10/11/2017 1:00 PM MD Maxwell Hurtado   12/7/2017 10:30 AM Emmanuelle Marvin DO [facility] None   12/20/2017 11:00 AM Hilda Leonard MD [address] [place] [address]

## 2017-09-25 ENCOUNTER — HOSPITAL ENCOUNTER (OUTPATIENT)
Dept: PHYSICAL THERAPY | Age: 57
Discharge: HOME | End: 2017-09-25
Payer: MEDICARE

## 2017-09-25 PROCEDURE — 97110 THERAPEUTIC EXERCISES: CPT

## 2017-09-25 PROCEDURE — 97116 GAIT TRAINING THERAPY: CPT

## 2017-09-25 NOTE — PROGRESS NOTES
PT DAILY TREATMENT NOTE - [facility]     Patient Name: Dinorah Anderson  Date:2017  : 1960  [x]  Patient  Verified  Payor: Donavan Monae / Plan: [facility] [address] [employer] MEDICARE CHOICE PPO/PFFS / Product Type: Managed Care Medicare /    In time:11:34  Out time:12:09  Total Treatment Time (min): 35  Total Timed Codes (min): 35  1:1 Treatment Time ([address] only): 30   Visit #: 5 of 12    Treatment Area: Contusion of left knee, initial encounter [S80.02XA]  Contusion of left lower leg, initial encounter [S80.12XA]    SUBJECTIVE  Pain Level (0-10 scale): 5  Any medication changes, allergies to medications, adverse drug reactions, diagnosis change, or new procedure performed?: [x] No    [] Yes (see summary sheet for update)  Subjective functional status/changes:   [] No changes reported  Pt reports she is not having much pain in her knee. Pt reports that she does not feel her knee is straightening as much as it was before her fall.     OBJECTIVE    Modality rationale:    Min Type Additional Details    [] Estim:  []Unatt       []IFC  []Premod                        []Other:  []w/ice   []w/heat  Position:  Location:    [] Estim: []Att    []TENS instruct  []NMES                    []Other:  []w/US   []w/ice   []w/heat  Position:  Location:    []  Traction: [] Cervical       []Lumbar                       [] Prone          []Supine                       []Intermittent   []Continuous Lbs:  [] before manual  [] after manual    []  Ultrasound: []Continuous   [] Pulsed                           []1MHz   []3MHz W/cm2:  Location:    []  Iontophoresis with dexamethasone         Location: [] Take home patch   [] In clinic    []  Ice     []  heat  []  Ice massage  []  Laser   []  Anodyne Position:  Location:    []  Laser with stim  []  Other:  Position:  Location:    []  Vasopneumatic Device Pressure:       [] lo [] med [] hi   Temperature: [] lo [] med [] hi   [] Skin assessment post-treatment:  []intact []redness- no adverse reaction    []redness - adverse reaction:       24 min Therapeutic Exercise:  [x] See flow sheet :   Rationale: increase ROM and increase strength to improve the patients ability to increase tolerance to activities. 10 min Gait Trainin feet with SPC device and 80 ft with 1 axillary cructch on level surfaces with CGA level of assist   Rationale: With   [] TE   [] TA   [] neuro   [] other: Patient Education: [x] Review HEP    [] Progressed/Changed HEP based on:   [] positioning   [] body mechanics   [] transfers   [] heat/ice application    [] other:      Other Objective/Functional Measures: Pt is able to maintain anterior weight shift for 5 sec using 1 HHA for 10x with having increase in pain. Pain Level (0-10 scale) post treatment: 5    ASSESSMENT/Changes in Function: Attempted ambulation with SPC in R UE this session to help decrease weight off L LE, but due to a stroke from many years ago, pt has decrease strength in R UE and felt unsteady with SPC in R UE. Changed SPC to 1 axillary crutch on R side and pt felt more steady with ambulation. Patient will continue to benefit from skilled PT services to modify and progress therapeutic interventions, address functional mobility deficits, address ROM deficits, address strength deficits and analyze and address soft tissue restrictions to attain remaining goals. []  See Plan of Care  []  See progress note/recertification  []  See Discharge Summary         Progress towards goals / Updated goals:  Short Term Goals: To be accomplished in 3 weeks:  1. Patient will subjectively report full compliance with prescribed HEP. Eval: HEP provided  Current: Progressing, HEP performance reported 1-2x/day, 9/15/2017  2. Patient will demonstrate the ability to perform anterior weightshift onto L LE with maintenance x10 seconds without UE assistance in order to improve ease with ambulation within the home.   Eval: Unable to maintain anterior weight shift >5 seconds with patient requiring 1 HHA  Current: Progressing: Pt is able to maintain anterior weight shift for 5 sec using 1 HHA for 10x with having increase in pain. 9/25/17  3. Patient will demonstrate L knee AROM 0- degrees in order to improve ease with stair management. Eval: L Knee AROM 0-17-97  Current: Remains: L knee AROM 17-96 deg 9/18/17      Long Term Goals: To be accomplished in 6 weeks:  1. Patient will demonstrate a significant functional improvement as demonstrated by a score of >/=53 on FOTO. Eval: FOTO = 28  2. Patient will subjectively report ability to ambulate with use of SPC for 10 minutes prior to requiring to sit in order to allow patient to return to [facility]. Eval: Patient requires bilateral crutches or FWW with reported ambulation tolerance of 5 minutes  3. Patient will subjectively report the ability to independently get in/out of the bathtub without assistance in order to improve safety with self-care ADLs.   Eval: Patient reports requiring assistance with getting in/out of the tub    PLAN  []  Upgrade activities as tolerated     [x]  Continue plan of care  []  Update interventions per flow sheet       []  Discharge due to:_  []  Other:_      Sandra Burgess PTA 9/25/2017  11:52 AM    Future Appointments  Date Time Provider Oren Ramon   9/27/2017 10:00 AM Sandra Burgess PTA [facility]   10/2/2017 10:30 AM Sandra Burgess PTA [facility]   10/4/2017 2:00 PM Troy Bauer PT [facility]   10/9/2017 10:30 AM Sandra Burgess PTA [facility]   10/11/2017 10:00 AM Sandra Burgess PTA [facility]   10/11/2017 1:00 PM MD Maxwell Rust   12/7/2017 10:30 AM Emmanuelle Marvin DO [facility] None   12/20/2017 11:00 AM Vida Ozuna MD [address] [place] [address]

## 2017-09-27 ENCOUNTER — HOSPITAL ENCOUNTER (OUTPATIENT)
Dept: PHYSICAL THERAPY | Age: 57
Discharge: HOME | End: 2017-09-27
Payer: MEDICARE

## 2017-09-27 PROCEDURE — 97110 THERAPEUTIC EXERCISES: CPT

## 2017-09-27 PROCEDURE — 97116 GAIT TRAINING THERAPY: CPT

## 2017-09-27 NOTE — PROGRESS NOTES
PT DAILY TREATMENT NOTE - [facility]     Patient Name: Isis Mckee  Date:2017  : 1960  [x]  Patient  Verified  Payor: Cristela Green / Plan: [facility] [employer] MEDICARE CHOICE PPO/PFFS / Product Type: Managed Care Medicare /    In time:9:56  Out time:10:30  Total Treatment Time (min): 34  Total Timed Codes (min): 34  1:1 Treatment Time ( only): 34   Visit #: 6 of 12    Treatment Area: Contusion of left knee, initial encounter [S80.02XA]  Contusion of left lower leg, initial encounter [S80.12XA]    SUBJECTIVE  Pain Level (0-10 scale): 5  Any medication changes, allergies to medications, adverse drug reactions, diagnosis change, or new procedure performed?: [x] No    [] Yes (see summary sheet for update)  Subjective functional status/changes:   [] No changes reported  Pt reports she has five steps to go inside their house and with no hand rails. Pt reports she has no problem getting inside her house because she using both of her crutches.      OBJECTIVE    Modality rationale:    Min Type Additional Details    [] Estim:  []Unatt       []IFC  []Premod                        []Other:  []w/ice   []w/heat  Position:  Location:    [] Estim: []Att    []TENS instruct  []NMES                    []Other:  []w/US   []w/ice   []w/heat  Position:  Location:    []  Traction: [] Cervical       []Lumbar                       [] Prone          []Supine                       []Intermittent   []Continuous Lbs:  [] before manual  [] after manual    []  Ultrasound: []Continuous   [] Pulsed                           []1MHz   []3MHz W/cm2:  Location:    []  Iontophoresis with dexamethasone         Location: [] Take home patch   [] In clinic    []  Ice     []  heat  []  Ice massage  []  Laser   []  Anodyne Position:  Location:    []  Laser with stim  []  Other:  Position:  Location:    []  Vasopneumatic Device Pressure:       [] lo [] med [] hi   Temperature: [] lo [] med [] hi   [] Skin assessment post-treatment:  []intact []redness- no adverse reaction    []redness - adverse reaction:      24 min Therapeutic Exercise:  [x] See flow sheet :   Rationale: increase ROM and increase strength to improve the patients ability to increase tolerance to activities.         10 min Gait Training:   160 ft with 1 axillary cructch on level surfaces with CGA/SBA level of assist   Rationale:increase independence with ambulation.               With   [] TE   [] TA   [] neuro   [] other: Patient Education: [x] Review HEP    [] Progressed/Changed HEP based on:   [] positioning   [] body mechanics   [] transfers   [] heat/ice application    [] other:      Other Objective/Functional Measures: FOTO = 45, increased by 17 since [facility]. Pain Level (0-10 scale) post treatment: 5    ASSESSMENT/Changes in Function: Pt educated to use 1 crutch when ambulating inside her home, continue to use B axillary crutches when out in the community. Patient will continue to benefit from skilled PT services to modify and progress therapeutic interventions, address functional mobility deficits, address ROM deficits, address strength deficits and analyze and address soft tissue restrictions to attain remaining goals. []  See Plan of Care  []  See progress note/recertification  []  See Discharge Summary         Progress towards goals / Updated goals:  Short Term Goals: To be accomplished in 3 weeks:  1. Patient will subjectively report full compliance with prescribed HEP. Eval: HEP provided  Current: Progressing, HEP performance reported 1-2x/day, 9/15/2017  2. Patient will demonstrate the ability to perform anterior weightshift onto L LE with maintenance x10 seconds without UE assistance in order to improve ease with ambulation within the home. Eval: Unable to maintain anterior weight shift >5 seconds with patient requiring 1 HHA  Current: Progressing: Pt is able to maintain anterior weight shift for 5 sec using 1 HHA for 10x with having increase in pain. 9/25/17  3. Patient will demonstrate L knee AROM 0- degrees in order to improve ease with stair management. Eval: L Knee AROM 0-17-97  Current: Remains: L knee AROM 17-96 deg 9/18/17      Long Term Goals: To be accomplished in 6 weeks:  1. Patient will demonstrate a significant functional improvement as demonstrated by a score of >/=53 on FOTO. Eval: FOTO = 28  Current: Progressing: FOTO = 45, increased by 17 since [facility]. 9/27/17  2. Patient will subjectively report ability to ambulate with use of SPC for 10 minutes prior to requiring to sit in order to allow patient to return to [facility]. Eval: Patient requires bilateral crutches or FWW with reported ambulation tolerance of 5 minutes  3. Patient will subjectively report the ability to independently get in/out of the bathtub without assistance in order to improve safety with self-care ADLs.   Eval: Patient reports requiring assistance with getting in/out of the tub    PLAN  []  Upgrade activities as tolerated     [x]  Continue plan of care  []  Update interventions per flow sheet       []  Discharge due to:_  []  Other:_      Eddi Jaime PTA 9/27/2017  10:05 AM    Future Appointments  Date Time Provider Oren Ramon   10/2/2017 10:30 AM Eddi Jaime, PTA [facility]   10/4/2017 2:00 PM Tonya Martins PT [facility]   10/9/2017 10:30 AM Eddi Jaime, PTA [facility]   10/11/2017 10:00 AM Eddi Jaime PTA [facility]   10/11/2017 1:00 PM Pavithra Zuñiga MD [facility]   12/7/2017 10:30 AM Emmanuelle Mavrin, DO [facility] None   12/20/2017 11:00 AM Lidia Johnson MD [address] [place] [address]

## 2017-10-02 ENCOUNTER — HOSPITAL ENCOUNTER (OUTPATIENT)
Dept: PHYSICAL THERAPY | Age: 57
Discharge: HOME | End: 2017-10-02
Payer: MEDICARE

## 2017-10-02 PROCEDURE — 97110 THERAPEUTIC EXERCISES: CPT

## 2017-10-02 PROCEDURE — 97140 MANUAL THERAPY 1/> REGIONS: CPT

## 2017-10-04 ENCOUNTER — HOSPITAL ENCOUNTER (OUTPATIENT)
Dept: PHYSICAL THERAPY | Age: 57
Discharge: HOME | End: 2017-10-04
Payer: MEDICARE

## 2017-10-04 PROCEDURE — G8978 MOBILITY CURRENT STATUS: HCPCS

## 2017-10-04 PROCEDURE — G8979 MOBILITY GOAL STATUS: HCPCS

## 2017-10-04 PROCEDURE — 97110 THERAPEUTIC EXERCISES: CPT

## 2017-10-04 PROCEDURE — 97140 MANUAL THERAPY 1/> REGIONS: CPT

## 2017-10-04 NOTE — PROGRESS NOTES
PT DAILY TREATMENT NOTE - [facility]     Patient Name: Norah Laboy  Date:10/4/2017  : 1960  [x]  Patient  Verified  Payor: Shanna Pack / Plan: [facility] MEDICARE CHOICE PPO/PFFS / Product Type: Managed Care Medicare /    In time:206  Out time:247  Total Treatment Time (min): 41  Total Timed Codes (min): 41  1:1 Treatment Time ( [address] only): 25   Visit #: 8 of 12    Treatment Area: Contusion of left knee, initial encounter [S80.02XA]  Contusion of left lower leg, initial encounter [S80.12XA]    SUBJECTIVE  Pain Level (0-10 scale): 6/10  Any medication changes, allergies to medications, adverse drug reactions, diagnosis change, or new procedure performed?: [x] No    [] Yes (see summary sheet for update)  Subjective functional status/changes:   [] No changes reported  Patient reports continued use of FWW within the home with uses of bilateral axillary crutches with short-distance community ambulation. Patient denies use of SPC secondary to feeling off-balanced with no falls reported. Patient reports continued use of neoprene brace per MD orders with all ambulation. Patient reports that she continues to feel as if she has not regained ROM achieved prior to fall.     OBJECTIVE     31  (1:1 15') min Therapeutic Exercise:  []  See flow sheet : Emphasis placed on improving available knee AROM and quadriceps strengthening    Rationale: increase ROM and increase strength to improve the patients ability to improve patient ease with community ambulation    8 min Manual Therapy:    Supine, L Hamstring 90-90 STM  Supine, L Hamstring 90-90 Contract-Relax  Supine, L Femur PA Grade II-III Mobilization  Supine, L Patellar Grade IV Superior Mobilization   Rationale: decrease pain, increase ROM and increase tissue extensibility to improve ease with stair management    2 min Gait Training:  x50' with use of single bilateral axillary crutch   Rationale: Performed in order to improve patient safety with household ambulation          With   [] TE   [] TA   [] neuro   [] other: Patient Education: [x] Review HEP    [] Progressed/Changed HEP based on:   [] positioning   [] body mechanics   [] transfers   [] heat/ice application    [] other:      Pain Level (0-10 scale) post treatment: 3    ASSESSMENT/Changes in Function: Since SoC patient has demonstrated a 17 point improvement in FOTO score despite patient subjectively reporting no self-perceived improvement in functional mobility with continued use of FWW with home ambulation and bilateral axillary crutches with short-distance community ambulation. Patient has demonstrated minimal improvement in L knee AROM with current AROM 0- with patient with concern regarding continued limitations in AROM. Patient with known limitations in L knee AROM prior to reported fall therefore question degree of improvement able to be achieved. Patient has demonstrated good compliance with within clinic treatment with reported completion of prescribed HEP 3x/day, despite non-improvement in AROM. Patient continues to demonstrate limitations in ambulation secondary to poor L LE weightbearing tolerance with patient continuing to report ambulation tolerance of 5 minutes. Patient has been provided with updated HEP to allow for continued improvements in functional mobility and L knee AROM. Patient would benefit from the continuation of PT services to allow patient to achieve greater functional mobility although question degree of improvement in knee AROM. Patient will continue to benefit from skilled PT services to modify and progress therapeutic interventions, address functional mobility deficits, address ROM deficits, address strength deficits, analyze and address soft tissue restrictions, analyze and cue movement patterns and analyze and modify body mechanics/ergonomics to attain remaining goals.      []  See Plan of Care  []  See progress note/recertification  []  See Discharge Summary Progress towards goals / Updated goals:    Short Term Goals: To be accomplished in 3 weeks:  1. Patient will subjectively report full compliance with prescribed HEP. Eval: HEP provided  Current: Progressing, HEP performance reported 3x/day, 10/4/2017  2. Patient will demonstrate the ability to perform anterior weightshift onto L LE with maintenance x10 seconds without UE assistance in order to improve ease with ambulation within the home. Eval: Unable to maintain anterior weight shift >5 seconds with patient requiring 1 HHA  Current: Progressing: Pt is able to maintain anterior weight shift for 5 sec using 1 HHA for 10x with having increase in pain. 9/25/17  3. Patient will demonstrate L knee AROM 0- degrees in order to improve ease with stair management. Eval: L Knee AROM 0-17-97  Current: Remains: L knee AROM 0- (post-manual) deg 10/4/17      Long Term Goals: To be accomplished in 6 weeks:  1. Patient will demonstrate a significant functional improvement as demonstrated by a score of >/=53 on FOTO. Eval: FOTO = 28  Current: Progressing: FOTO = 45, increased by 17 since [facility]. 9/27/17  2. Patient will subjectively report ability to ambulate with use of SPC for 10 minutes prior to requiring to sit in order to allow patient to return to PLOF. Eval: Patient requires bilateral crutches or FWW with reported ambulation tolerance of 5 minutes  Current: Remains, Current ambulation tolerance of 5 minutes reported with use of bilateral crutches and FWW, 10/4/2017  3. Patient will subjectively report the ability to independently get in/out of the bathtub without assistance in order to improve safety with self-care ADLs.   Eval: Patient reports requiring assistance with getting in/out of the tub  Current: Remains, Continued assistance required with getting in/otu of the tub, 10/4/2017    PLAN  [x]  Upgrade activities as tolerated     [x]  Continue plan of care  []  Update interventions per flow sheet       [] Discharge due to:_  []  Other:_      Duarte Samuel, PT 10/4/2017  1:32 PM    Future Appointments  Date Time Provider Oren Ramon   10/4/2017 2:00 PM Dylan Nelson [facility]   10/9/2017 10:30 AM Arseniomj Paz, PTA [facility]   10/11/2017 10:00 AM Arseniomj Paz, PTA [facility]   10/11/2017 1:00 PM MD STEPHEN PalmSS EVERARDO SCHED   10/20/2017 9:20 AM AMAURY Haddad Miguel [address]   12/7/2017 10:30 AM Emmanuelle Marvin DO [facility] None   12/20/2017 11:00 AM Nelson Arrington MD [address] [place] [address]

## 2017-10-04 NOTE — PROGRESS NOTES
[facility]              [address] [place], [address]   [phone] fax    Medicare Progress Report    Patient name: Romayne Dings Start of Care: 2017   Referral source: Keith Roldan MD : 1960   Medical/Treatment Diagnosis: Contusion of left knee, initial encounter [S80.02XA]  Contusion of left lower leg, initial encounter [S80.12XA] Onset Date:3 weeks prior to I.E. Prior Hospitalization: see medical history Provider#: [id number]   Medications: Verified on Patient Summary List     Comorbidities: Arthritis, Back Pain, BMI>30, Congestive Heart Failure, Heart Attack (), Osteoporosis, Pacemaker, Prior Surgery, L TKA (, ), R MARIANA (Lateral Approach - )   Prior Level of Function: SPC with ambulation outside of the home, Walking tolerance (10 minutes), Standing tolerance (10-15 minutes), Does not drive, (I) Household chores, No assistance with self-care ADLs (i.e. Shower chair utilized)  Visits from Start of Care: 7    Missed Visits: 1    Reporting Period: 2017 to 10/4/2017    Subjective Reports: Patient reports continued use of FWW within the home with uses of bilateral axillary crutches with short-distance community ambulation. Patient denies use of SPC secondary to feeling off-balanced with no falls reported. Patient reports continued use of neoprene brace per MD orders with all ambulation. Patient reports that she continues to feel as if she has not regained ROM achieved prior to fall. Short Term Goals: To be accomplished in 3 weeks:  1. Patient will subjectively report full compliance with prescribed HEP. Eval: HEP provided  At PN: Progressing, HEP performance reported 3x/day  2. Patient will demonstrate the ability to perform anterior weightshift onto L LE with maintenance x10 seconds without UE assistance in order to improve ease with ambulation within the home.   Eval: Unable to maintain anterior weight shift >5 seconds with patient requiring 1 HHA  At PN: Progressing: Pt is able to maintain anterior weight shift for 5 sec using 1 HHA for 10x with having increase in pain. 3. Patient will demonstrate L knee AROM 0- degrees in order to improve ease with stair management. Eval: L Knee AROM 0-17-97  Current: Progressing, L knee AROM 0- (post-manual) deg       Long Term Goals: To be accomplished in 6 weeks:  1. Patient will demonstrate a significant functional improvement as demonstrated by a score of >/=53 on FOTO. Eval: FOTO = 28  At PN: Progressing: FOTO = 45, increased by 17 since [facility]. \  2. Patient will subjectively report ability to ambulate with use of SPC for 10 minutes prior to requiring to sit in order to allow patient to return to PLOF. Eval: Patient requires bilateral crutches or FWW with reported ambulation tolerance of 5 minutes  At PN: Remains, Current ambulation tolerance of 5 minutes reported with use of bilateral crutches and FWW  3. Patient will subjectively report the ability to independently get in/out of the bathtub without assistance in order to improve safety with self-care ADLs. Eval: Patient reports requiring assistance with getting in/out of the tub  At PN: Remains, Continued assistance required with getting in/otu of the tub    Key functional changes: See above goals. Assessment / Recommendations:    Since SoC patient has demonstrated a 17 point improvement in FOTO score despite patient subjectively reporting no self-perceived improvement in functional mobility with continued use of FWW with home ambulation and bilateral axillary crutches with short-distance community ambulation. Patient has demonstrated minimal improvement in L knee AROM with current AROM 0- with patient with concern regarding continued limitations in AROM. Patient with known limitations in L knee AROM prior to reported fall therefore question degree of improvement able to be achieved.  Patient has demonstrated good compliance with within clinic treatment with reported completion of prescribed HEP 3x/day, despite non-improvement in AROM. Patient continues to demonstrate limitations in ambulation secondary to poor L LE weightbearing tolerance with patient continuing to report ambulation tolerance of 5 minutes. Patient has been provided with updated HEP to allow for continued improvements in functional mobility and L knee AROM. Patient would benefit from the continuation of PT services to allow patient to achieve greater functional mobility although question degree of improvement in knee AROM.    Patient will continue to benefit from skilled PT services to modify and progress therapeutic interventions, address functional mobility deficits, address ROM deficits, address strength deficits, analyze and address soft tissue restrictions, analyze and cue movement patterns and analyze and modify body mechanics/ergonomics to attain remaining goals. Problem List: pain affecting function, decrease ROM, decrease strength, impaired gait/ balance, decrease ADL/ functional abilitiies, decrease activity tolerance, decrease flexibility/ joint mobility and decrease transfer abilities   Treatment Plan: Therapeutic exercise, Therapeutic activities, Neuromuscular re-education, Physical agent/modality, Gait/balance training, Manual therapy, Patient education, Functional mobility training, Home safety training and Stair training    Updated Goals: Continue with unmet goals above. Frequency / Duration: Patient to be seen 2 times per week for 4 weeks:    G-Codes (GP)  Mobility  [id number] Current  CK= 40-59%   Goal  CK= 40-59%    The severity rating is based on clinical judgment and the FOTO score.       Clem Moreno, PT 10/4/2017 1:33 PM

## 2017-10-09 ENCOUNTER — HOSPITAL ENCOUNTER (OUTPATIENT)
Dept: PHYSICAL THERAPY | Age: 57
Discharge: HOME | End: 2017-10-09
Payer: MEDICARE

## 2017-10-09 PROCEDURE — 97110 THERAPEUTIC EXERCISES: CPT

## 2017-10-09 PROCEDURE — 97140 MANUAL THERAPY 1/> REGIONS: CPT

## 2017-10-09 PROCEDURE — 97116 GAIT TRAINING THERAPY: CPT

## 2017-10-09 NOTE — PROGRESS NOTES
PT DAILY TREATMENT NOTE - [facility]     Patient Name: Ean Mosuqera  Date:10/9/2017  : 1960  [x]  Patient  Verified  Payor: Rey Ramires / Plan: [facility] HUMAN MEDICARE CHOICE PPO/PFFS / Product Type: Managed Care Medicare /    In time:10:30  Out time:11:27  Total Treatment Time (min): 57  Total Timed Codes (min): 47  1:1 Treatment Time (MC only): 52   Visit #: 1 of 8 (new PN)    Treatment Area: Contusion of left knee, initial encounter [S80.02XA]  Contusion of left lower leg, initial encounter [S80.12XA]    SUBJECTIVE  Pain Level (0-10 scale): 5  Any medication changes, allergies to medications, adverse drug reactions, diagnosis change, or new procedure performed?: [x] No    [] Yes (see summary sheet for update)  Subjective functional status/changes:   [] No changes reported  Pt reports she fell this morning when she was in the bathroom while using the walker. Pt reports that her left knee gave out to her and she fell to the floor, but did not hurt herself. OBJECTIVE    Modality rationale: decrease pain to improve the patients ability to decrease difficulty while performing tasks.     Min Type Additional Details    [] Estim:  []Unatt       []IFC  []Premod                        []Other:  []w/ice   []w/heat  Position:  Location:    [] Estim: []Att    []TENS instruct  []NMES                    []Other:  []w/US   []w/ice   []w/heat  Position:  Location:    []  Traction: [] Cervical       []Lumbar                       [] Prone          []Supine                       []Intermittent   []Continuous Lbs:  [] before manual  [] after manual    []  Ultrasound: []Continuous   [] Pulsed                           []1MHz   []3MHz W/cm2:  Location:    []  Iontophoresis with dexamethasone         Location: [] Take home patch   [] In clinic   10 []  Ice     [x]  heat  []  Ice massage  []  Laser   []  Anodyne Position:sitting  Location:posterior knee    []  Laser with stim  []  Other:  Position:  Location:    [] Vasopneumatic Device Pressure:       [] lo [] med [] hi   Temperature: [] lo [] med [] hi   [] Skin assessment post-treatment:  []intact []redness- no adverse reaction    []redness - adverse reaction:          31 min Therapeutic Exercise:  [x] See flow sheet :   Rationale: increase ROM and increase strength to improve the patients ability to increase tolerance to activities.     8 min Manual Therapy:  Tibiofemoral distraction, extension mobs, patellar mobs. Rationale: decrease pain, increase ROM, increase tissue extensibility and decrease trigger points to increase ease with ADLs. 8 min Gait Trainin and 20 feet with axillary crutch device on level surfaces with SBA level of assist   Rationale: With   [] TE   [] TA   [] neuro   [] other: Patient Education: [x] Review HEP    [] Progressed/Changed HEP based on:   [] positioning   [] body mechanics   [] transfers   [] heat/ice application    [] other:      Other Objective/Functional Measures:L knee AROM Post manual      Pain Level (0-10 scale) post treatment: 3    ASSESSMENT/Changes in Function: Continue to progress pt to ambulate with 1 axillary crutch to increase more independence with ambulation. Patient will continue to benefit from skilled PT services to modify and progress therapeutic interventions, address functional mobility deficits, address ROM deficits, address strength deficits and analyze and address soft tissue restrictions to attain remaining goals. []  See Plan of Care  []  See progress note/recertification  []  See Discharge Summary         Progress towards goals / Updated goals:  Short Term Goals: To be accomplished in 3 weeks:    1. Patient will demonstrate the ability to perform anterior weightshift onto L LE with maintenance x10 seconds without UE assistance in order to improve ease with ambulation within the home.   PN:  Pt is able to maintain anterior weight shift for 5 sec using 1 HHA for 10x with having increase in pain. 9/25/17  2. Patient will demonstrate L knee AROM 0- degrees in order to improve ease with stair management. PN: L knee AROM 0- (post-manual) deg 10/4/17  Current: Not met: L knee AROM Post manual  10/9/17      Long Term Goals: To be accomplished in 6 weeks:  1. Patient will demonstrate a significant functional improvement as demonstrated by a score of >/=53 on FOTO. PN: FOTO = 45, increased by 17 since [facility]. 9/27/17  2. Patient will subjectively report ability to ambulate with use of SPC for 10 minutes prior to requiring to sit in order to allow patient to return to OF. PN: Current ambulation tolerance of 5 minutes reported with use of bilateral crutches and FWW, 10/4/2017  3. Patient will subjectively report the ability to independently get in/out of the bathtub without assistance in order to improve safety with self-care ADLs.   PN:Continued assistance required with getting in/otu of the tub, 10/4/2017    PLAN  []  Upgrade activities as tolerated     [x]  Continue plan of care  []  Update interventions per flow sheet       []  Discharge due to:_  []  Other:_      Natalie Elise PTA 10/9/2017  10:32 AM    Future Appointments  Date Time Provider Oren Ramon   10/11/2017 10:00 AM Natalie Elise PTA [facility]   10/11/2017 1:00 PM Sancho Martini MD [facility]   10/20/2017 9:20 AM AMAURY Mat[address]   12/7/2017 10:30 AM Emmanuelle Marvin, DO [facility] None   12/20/2017 11:00 AM Ortega Denis MD [facility][note 3] [facility]

## 2017-10-11 ENCOUNTER — OFFICE VISIT (OUTPATIENT)
Dept: ORTHOPEDIC SURGERY | Age: 57
End: 2017-10-11

## 2017-10-11 ENCOUNTER — HOSPITAL ENCOUNTER (OUTPATIENT)
Dept: PHYSICAL THERAPY | Age: 57
Discharge: HOME | End: 2017-10-11
Payer: MEDICARE

## 2017-10-11 VITALS
WEIGHT: 170 LBS | BODY MASS INDEX: 34.27 KG/M2 | HEART RATE: 66 BPM | SYSTOLIC BLOOD PRESSURE: 151 MMHG | HEIGHT: 59 IN | DIASTOLIC BLOOD PRESSURE: 75 MMHG

## 2017-10-11 DIAGNOSIS — M75.112 INCOMPLETE TEAR OF LEFT ROTATOR CUFF: ICD-10-CM

## 2017-10-11 DIAGNOSIS — M54.12 RADICULOPATHY, CERVICAL: ICD-10-CM

## 2017-10-11 DIAGNOSIS — M47.812 CERVICAL SPONDYLOSIS WITHOUT MYELOPATHY: ICD-10-CM

## 2017-10-11 DIAGNOSIS — M50.30 OTHER CERVICAL DISC DEGENERATION, UNSPECIFIED CERVICAL REGION: Primary | ICD-10-CM

## 2017-10-11 PROCEDURE — 97140 MANUAL THERAPY 1/> REGIONS: CPT

## 2017-10-11 PROCEDURE — 97110 THERAPEUTIC EXERCISES: CPT

## 2017-10-11 RX ORDER — PREGABALIN 300 MG/1
300 CAPSULE ORAL 2 TIMES DAILY
Qty: 180 CAP | Refills: 0 | Status: SHIPPED | OUTPATIENT
Start: 2017-10-11 | End: 2017-12-27 | Stop reason: SDUPTHER

## 2017-10-11 RX ORDER — DULOXETIN HYDROCHLORIDE 60 MG/1
60 CAPSULE, DELAYED RELEASE ORAL DAILY
Qty: 90 CAP | Refills: 0 | Status: SHIPPED | OUTPATIENT
Start: 2017-10-11 | End: 2017-12-27 | Stop reason: SDUPTHER

## 2017-10-11 NOTE — PROGRESS NOTES
PT DAILY TREATMENT NOTE - [facility]     Patient Name: Jessica Harrington  Date:10/11/2017  : 1960  [x]  Patient  Verified  Payor: Colette Arenas / Plan: [facility] HUMAN MEDICARE CHOICE PPO/PFFS / Product Type: Managed Care Medicare /    In time:10:13  Out time:10:53  Total Treatment Time (min): 40  Total Timed Codes (min): 30  1:1 Treatment Time ( only): 25   Visit #: 2 of 8    Treatment Area: Contusion of left knee, initial encounter [S80.02XA]  Contusion of left lower leg, initial encounter [S80.12XA]    SUBJECTIVE  Pain Level (0-10 scale): 5  Any medication changes, allergies to medications, adverse drug reactions, diagnosis change, or new procedure performed?: [x] No    [] Yes (see summary sheet for update)  Subjective functional status/changes:   [] No changes reported  Pt reports that her hips are bothering her this morning. OBJECTIVE    Modality rationale: decrease pain to improve the patients ability to decrease difficulty while performing tasks.     Min Type Additional Details    [] Estim:  []Unatt       []IFC  []Premod                        []Other:  []w/ice   []w/heat  Position:  Location:    [] Estim: []Att    []TENS instruct  []NMES                    []Other:  []w/US   []w/ice   []w/heat  Position:  Location:    []  Traction: [] Cervical       []Lumbar                       [] Prone          []Supine                       []Intermittent   []Continuous Lbs:  [] before manual  [] after manual    []  Ultrasound: []Continuous   [] Pulsed                           []1MHz   []3MHz W/cm2:  Location:    []  Iontophoresis with dexamethasone         Location: [] Take home patch   [] In clinic   10 []  Ice     [x]  heat  []  Ice massage  []  Laser   []  Anodyne Position:sitting  Location:    []  Laser with stim  []  Other:  Position:  Location:    []  Vasopneumatic Device Pressure:       [] lo [] med [] hi   Temperature: [] lo [] med [] hi   [] Skin assessment post-treatment:  []intact []redness- no adverse reaction    []redness - adverse reaction:      22 min Therapeutic Exercise:  [x] See flow sheet :   Rationale: increase ROM and increase strength to improve the patients ability to increase tolerance to activities.     8 min Manual Therapy:  Tibiofemoral distraction, extension mobs, patellar mobs. Rationale: decrease pain, increase ROM, increase tissue extensibility and decrease trigger points to increase ease with ADLs.           With   [] TE   [] TA   [] neuro   [] other: Patient Education: [x] Review HEP    [] Progressed/Changed HEP based on:   [] positioning   [] body mechanics   [] transfers   [] heat/ice application    [] other:      Pain Level (0-10 scale) post treatment: 2    ASSESSMENT/Changes in Function: Continue to progress pt as tolerated. Pt has limited L knee and hip motion. Held on gait training this session due to pt arriving 13 mins late. Patient will continue to benefit from skilled PT services to modify and progress therapeutic interventions, address functional mobility deficits, address ROM deficits, address strength deficits and analyze and address soft tissue restrictions to attain remaining goals. []  See Plan of Care  []  See progress note/recertification  []  See Discharge Summary         Progress towards goals / Updated goals:    Short Term Goals: To be accomplished in 3 weeks:  1. Patient will demonstrate the ability to perform anterior weightshift onto L LE with maintenance x10 seconds without UE assistance in order to improve ease with ambulation within the home. PN:  Pt is able to maintain anterior weight shift for 5 sec using 1 HHA for 10x with having increase in pain. 9/25/17  2. Patient will demonstrate L knee AROM 0- degrees in order to improve ease with stair management. PN: L knee AROM 0- (post-manual) deg 10/4/17  Current: Not met: L knee AROM Post manual  10/9/17      Long Term Goals: To be accomplished in 6 weeks:  1.  Patient will demonstrate a significant functional improvement as demonstrated by a score of >/=53 on FOTO. PN: FOTO = 45, increased by 17 since [facility]. 9/27/17  2. Patient will subjectively report ability to ambulate with use of SPC for 10 minutes prior to requiring to sit in order to allow patient to return to PLOF. PN: Current ambulation tolerance of 5 minutes reported with use of bilateral crutches and FWW, 10/4/2017  3. Patient will subjectively report the ability to independently get in/out of the bathtub without assistance in order to improve safety with self-care ADLs.   PN:Continued assistance required with getting in/otu of the tub, 10/4/2017    PLAN  []  Upgrade activities as tolerated     [x]  Continue plan of care  []  Update interventions per flow sheet       []  Discharge due to:_  []  Other:_      Trudy Kayser, DAVID 10/11/2017  10:30 AM    Future Appointments  Date Time Provider Oren Yenny   10/11/2017 1:00 PM Awa Zavaleta MD [facility]   10/13/2017 9:00 AM Xochilt Guillermo PT [facility]   10/17/2017 2:30 PM Xochilt Guillermo PT [facility]   10/20/2017 9:20 AM AMAURY Sierra [address]   12/7/2017 10:30 AM Emmanuelle Marvin, DO [facility] None   12/20/2017 11:00 AM Sierra Ledesma MD [address] [place] [address]

## 2017-10-11 NOTE — MR AVS SNAPSHOT
Visit Information Date & Time Provider Department Dept. Phone Encounter #  
 10/11/2017  1:00 PM Britta Chambers MD [address] [facility] [place] [phone] [id number] Follow-up Instructions Return in about 3 months (around 1/11/2018). Your Appointments 10/20/2017  9:20 AM  
Follow Up with Red Rosenberg PA-C  
[facility]) Appt Note: [address] [facility], [address] [facility]  
[phone] [address], [address]  
  
    
 12/7/2017 10:30 AM  
Follow Up with [address] [facility], [address] (--) Appt Note: 3 month fu  
 [address] [place] [address] [address]  
  
    
 12/20/2017 11:00 AM  
Follow Up with Milagros Hamlin MD  
[facility]) Appt Note: 6mon f/u  
 [address] [facility] [address] [phone]  
  
   
 [facility] [address] Upcoming Health Maintenance Date Due  
 EYE EXAM RETINAL OR DILATED Q1 8/5/1971 FOBT Q 1 YEAR AGE 50-75 8/5/2011 FOOT EXAM Q1 3/3/2015 GLAUCOMA SCREENING Q2Y 9/29/2016 HEMOGLOBIN A1C Q6M 7/25/2017 INFLUENZA AGE 9 TO ADULT 8/1/2017 MICROALBUMIN Q1 10/17/2017 BREAST CANCER SCRN MAMMOGRAM 5/16/2018 LIPID PANEL Q1 9/7/2018 DTaP/Tdap/Td series (2 - Td) 10/4/2026 Allergies as of 10/11/2017  Review Complete On: 10/11/2017 By: Britta Chambers MD  
  
 Severity Noted Reaction Type Reactions Dextromethorphan-guaifenesin High 11/24/2011    Other (comments) Aspirin  08/02/2012   Topical Hives Current Immunizations  Reviewed on 12/13/2013 Name Date Influenza Vaccine 9/29/2015, 9/2/2015 12:00 AM, 11/24/2011 12:00 AM  
 Influenza Vaccine PF 9/24/2014, 12/13/2013 Pneumococcal Conjugate (PCV-13) 5/2/2017 Pneumococcal Polysaccharide (PPSV-23) 11/3/2015 12:00 AM  
 Tdap 10/4/2016 12:00 AM  
  
 Not reviewed this visit You Were Diagnosed With   
  
 Codes Comments Other cervical disc degeneration, unspecified cervical region    -  Primary ICD-10-CM: M50.30 ICD-9-CM: 722.4 Radiculopathy, cervical     ICD-10-CM: M54.12 
ICD-9-CM: 723.4 Cervical spondylosis without myelopathy     ICD-10-CM: I87.838 ICD-9-CM: 721.0 Incomplete tear of left rotator cuff     ICD-10-CM: M75.112 ICD-9-CM: 840.4 Vitals BP Pulse Height(growth percentile) Weight(growth percentile) LMP BMI  
 151/75 66 4' 11\" (1.499 m) 170 lb (77.1 kg) (LMP Unknown) 34.34 kg/m2 OB Status Smoking Status Hysterectomy Former Smoker Vitals History BMI and BSA Data Body Mass Index Body Surface Area  
 34.34 kg/m 2 1.79 m 2 Preferred Pharmacy Pharmacy Name Phone [address] [place] [address] [phone] Your Updated Medication List  
  
   
This list is accurate as of: 10/11/17  1:26 PM.  Always use your most recent med list.  
  
  
  
  
 brief disposable Misc Commonly known as:  adult  
by Does Not Apply route. Dispense one package of 180 briefs/ diapers. calcipotriene 0.005 % topical cream  
Commonly known as:  DOVONEX  
  
 calcium citrate-vitamin d3 315-200 mg-unit Tab Commonly known as:  CITRACAL+D Take 1 tablet by mouth two (2) times daily (with meals). carBAMazepine 200 mg tablet Commonly known as:  TEGretol 1  q am 1 q pm  2 qhs  
  
 carvedilol 12.5 mg tablet Commonly known as:  Aniyah Buster Take 6.25 mg by mouth two (2) times daily (with meals). celecoxib 200 mg capsule Commonly known as:  CELEBREX  
TAKE 1 CAPSULE BY MOUTH TWICE DAILY FOR 90 DAYS  
  
 clopidogrel 75 mg Tab Commonly known as:  PLAVIX Take 1 tablet by mouth daily. * DULoxetine 60 mg capsule Commonly known as:  CYMBALTA Take 1 Cap by mouth daily. * DULoxetine 60 mg capsule Commonly known as:  CYMBALTA Take 1 Cap by mouth daily. ergocalciferol 50,000 unit capsule Commonly known as:  ERGOCALCIFEROL Take 1 Cap by mouth every seven (7) days. furosemide 40 mg tablet Commonly known as:  LASIX TAKE 1 TABLET BY MOUTH TWICE DAILY AS NEEDED HYDROcodone-acetaminophen 7.5-325 mg per tablet Commonly known as:  Thelbert Garwood Take 1 Tab by mouth every eight (8) hours as needed for Pain. Max Daily Amount: 3 Tabs. isosorbide mononitrate ER 30 mg tablet Commonly known as:  IMDUR Take 15 mg by mouth every morning. levocetirizine 5 mg tablet Commonly known as:  John Sanford Take 1 Tab by mouth daily. lisinopril 20 mg tablet Commonly known as:  Dong Hermitage Take 20 mg by mouth daily. methocarbamol 500 mg tablet Commonly known as:  ROBAXIN  
TAKE 1 TABLET BY MOUTH FOUR TIMES DAILY AS NEEDED FOR MUSCLE SPASM * miscellaneous medical supply Misc  
1 Each by Does Not Apply route daily. * miscellaneous medical supply Misc  
1 Each by Does Not Apply route daily. * miscellaneous medical supply Misc  
2 Each by Does Not Apply route daily. * miscellaneous medical supply Misc  
2 Each by Does Not Apply route daily. montelukast 10 mg tablet Commonly known as:  SINGULAIR TK 1 T PO D  
  
 omeprazole 20 mg capsule Commonly known as:  PRILOSEC Take 1 capsule by mouth daily. polyethylene glycol 17 gram/dose powder Commonly known as:  Reda Litchfield Take 17 g by mouth daily. PRALUENT PEN 75 mg/mL injector pen Generic drug:  alirocumab * pregabalin 300 mg capsule Commonly known as:  Roel Kyle Take 1 Cap by mouth two (2) times a day. Max Daily Amount: 600 mg.  
  
 * pregabalin 300 mg capsule Commonly known as:  Roel Kyle Take 1 Cap by mouth two (2) times a day. Max Daily Amount: 600 mg.  
  
 rosuvastatin 40 mg tablet Commonly known as:  CRESTOR Take 1 Tab by mouth daily. therapeutic multivitamin tablet Commonly known as:  [facility] Take 1 tablet by mouth daily. VITAMIN B-12 500 mcg tablet Generic drug:  cyanocobalamin Take 500 mcg by mouth daily. zolpidem 10 mg tablet Commonly known as:  AMBIEN Take 1 Tab by mouth nightly as needed for Sleep. Max Daily Amount: 10 mg.  
  
 * Notice: This list has 8 medication(s) that are the same as other medications prescribed for you. Read the directions carefully, and ask your doctor or other care provider to review them with you. Prescriptions Printed Refills  
 pregabalin (LYRICA) 300 mg capsule 0 Sig: Take 1 Cap by mouth two (2) times a day. Max Daily Amount: 600 mg. Class: Print Route: Oral  
  
Prescriptions Sent to Pharmacy Refills DULoxetine (CYMBALTA) 60 mg capsule 0 Sig: Take 1 Cap by mouth daily. Class: Normal  
 Pharmacy: [address] [place] [address], [address] [place] [address] #: [phone] Route: Oral  
  
Follow-up Instructions Return in about 3 months (around 1/11/2018). To-Do List   
 10/13/2017 9:00 AM  
  Appointment with Leonel Mendoza PT at [facility]  ([phone])  
  
 10/17/2017 2:30 PM  
  Appointment with Leonel Mendoza PT at [facility] [place]  ([phone]) [facility] SERVICES! Dear Jamaal العلي: 
Thank you for requesting a [employer] account. Our records indicate that you already have an active [employer] account. You can access your account anytime at [email or web address] Did you know that you can access your hospital and ER discharge instructions at any time in [employer]? You can also review all of your test results from your hospital stay or ER visit. Additional Information If you have questions, please visit the Frequently Asked Questions section of the [email or web address] website at [email or web address] Remember, [employer] is NOT to be used for urgent needs. For medical emergencies, dial 911. Now available from your iPhone and Android! Please provide this summary of care documentation to your next provider. Your primary care clinician is listed as Marie Mirza. If you have any questions after today's visit, please call [phone].

## 2017-10-11 NOTE — PROGRESS NOTES
[facility]  [address], [place]   Phone: [phone]  Fax: [phone]        PROGRESS NOTE      HISTORY OF PRESENT ILLNESS:  The patient is a 65 y.o. female and was seen today for follow up of left-sided neck pain extending into the left shoulder. She denies symptoms extending to the hand at this time. Pain is exacerbated with reaching behind and overhead activity. The patient reports a fall on 10/11/16 from LOB and reported to the ER. She reports multiple falls due to LOB ongoing x 1+ year and states it is progressive in nature. She has also been dropping objects. Pt endorses loss of dexterity and states she has been dropping things with her left hand. She admits to staggering with walking. She continues to have LOB with coordination issues and falls. Note from Dr. Emry Siemens dated 5/2/17 indicating patient was seen for reevaluation of let shoulder pain with limited relief from previous cortisone injections. Of note, there is a partial thickness tear of the rotator cuff by left shoulder arthrogram. Per patient, she is currently enrolled in physical therapy for her left shoulder. She states she did f/u with Dr. Rick Jarquin concerning her balance and coordination issues who referred her to physical therapy. Pt completes her HEP 2x/day. She failed NEURONTIN. It was noted patient continues to take Tegretol. She completed the MDP without significant relief. She denies hx of DM. Cervical CT myelogram dated 11/2/2016 per report reveals multilevel mild degenerative changes as discussed most pronounced disc disease at C4/5 and C5/6. No high-grade central canal or foraminal stenosis. Cervical spine myelogram dated 11/2/2016 per report, reveals multilevel mild broad based on the disc space extradural defects at C2-3, C3-4, C4-5, and C5-6. C6/7 and C7/T1 is not adequately visualized on the crosstable lateral view due to high position of the shoulders.  Note from Yves Montanez ZURI Longoria dated 30/81/84 indicating Dr. Elvira Jones reviewed the CT myelogram and stated he didn't note definitely surgical pathology to account for her pain complaints. Dr. Asim White again reviewed patient's cervical CT myelogram and felt no definitive surgical pathology. A LUE EMG dated 12/23/16 was suggestive of possible C5 radiculopathy. Pt was initially seen for low back pain localized primarily to the right side of the lumbar spine. Previously, she had c/o low back pain localized primarily to the right side of the lumbar spine without significant radicular pain complaints. She also had c/o left knee pain which she is followed by Dr. Kristopher Easton for. Per patient, she did have knee surgery in 2009 and 2011 and was given a brace and they are monitoring her on this. She reports significant relief following bilateral L4 and L5 and left sided L5 and S1 facet blocks on 3/17/16. She states walking exacerbates her pain and bending over alleviates her pain. She is followed by Mortimer Cheese, PA-C for bilateral hip and knee pain. Upon examination, she was unable to extend digits 3, 4 and 5 from previous nerve injury. Noted, patient has previously had bilateral L4/5 facet blocks and left-sided L5/S1 facet blocks with good relief performed 03/04/16. She previously reported significant relief with left-sided L4-L5 and L5-S1 facet blocks. She has taken Topamax in the past. Pt is not a candidate for MRI due to defibrillator. Lumbar CT myelogram dated 9/29/14 per report revealed no spinal stenosis or diagnostic intrathecal abnormality. There was minimal degenerative disc disease. There was mild left-sided lumbar neuroforaminal narrowing from facet hypertrophy. There was lower lumbar facet hypertrophy and arthropathy, left greater than right. At L4-L5, there was bilateral moderate to severe facet hypertrophy. At the L5-S1 level, there was severe left-sided facet hypertrophy.   At her last clinical appointment, the patient noted improvement in symptoms with increased dose of Lyrica 300 mg BID. She wished to continue her current treatment. I refilled her Lyrica 300 mg BID and Cymbalta 60 mg.  I encouraged patient to perform her HEP daily. The patient returns today with pain location and distribution remain unchanged. She rates pain 6/10, elevated since her last visit (0-4/10). Pt observed with ambulatory crutches today which she reports she is using as a result of having a fall roughly one month ago after her left knee gave out. Notes from Dr. Joana Canchola reviewed. He continue to follow her for left knee pain. Dr. Joana Canchola diagnosed a knee sprain and referred her physical therapy. Pt was recommended a knee brace. She has a f/u with Dr. Joana Canchola on 10/20/17. As a result of her recent fall, she reports an increase in neck and low back pain. Pt is compliant with Lyrica 300 mg BID and Cymbalta 60 mg per day. She performs her C/L HEP daily.  reviewed. Past Medical History:   Diagnosis Date    Arm pain jan15    Arrhythmia 2012     [employer] ICD     Arthritis     ALL OVER    CAD (coronary artery disease) 2011    STENTS PLACED X2    Chronic pain     KNEE & LOWER BACK    Diabetes (HCC)     GERD (gastroesophageal reflux disease)     H/O gastric bypass     Heart attack 2011    Heart failure ([facility] [address]     ischemic cardiomyopathy    Hypertension     Nerve damage 2017    in bilat legs and feet    Spinal cord injury         Social History     Social History    Marital status: SINGLE     Spouse name: N/A    Number of children: N/A    Years of education: N/A     Occupational History    Not on file.      Social History Main Topics    Smoking status: Former Smoker     Quit date: 5/20/2013    Smokeless tobacco: Never Used    Alcohol use No    Drug use: No    Sexual activity: No      Comment: Hysterectomy     Other Topics Concern    Not on file     Social History Narrative       Current Outpatient Prescriptions   Medication Sig Dispense Refill    DULoxetine (CYMBALTA) 60 mg capsule Take 1 Cap by mouth daily. 90 Cap 0    pregabalin (LYRICA) 300 mg capsule Take 1 Cap by mouth two (2) times a day. Max Daily Amount: 600 mg. 180 Cap 0    zolpidem (AMBIEN) 10 mg tablet Take 1 Tab by mouth nightly as needed for Sleep. Max Daily Amount: 10 mg. 30 Tab 0    HYDROcodone-acetaminophen (NORCO) 7.5-325 mg per tablet Take 1 Tab by mouth every eight (8) hours as needed for Pain. Max Daily Amount: 3 Tabs. 21 Tab 0    celecoxib (CELEBREX) 200 mg capsule TAKE 1 CAPSULE BY MOUTH TWICE DAILY FOR 90 DAYS 180 Cap 0    DULoxetine (CYMBALTA) 60 mg capsule Take 1 Cap by mouth daily. 90 Cap 0    pregabalin (LYRICA) 300 mg capsule Take 1 Cap by mouth two (2) times a day. Max Daily Amount: 600 mg. 60 Cap 2    methocarbamol (ROBAXIN) 500 mg tablet TAKE 1 TABLET BY MOUTH FOUR TIMES DAILY AS NEEDED FOR MUSCLE SPASM 120 Tab 3    lisinopril (PRINIVIL, ZESTRIL) 20 mg tablet Take 20 mg by mouth daily.  montelukast (SINGULAIR) 10 mg tablet TK 1 T PO D  2    calcipotriene (DOVONEX) 0.005 % topical cream       PRALUENT PEN 75 mg/mL injector pen       carBAMazepine (TEGRETOL) 200 mg tablet 1  q am 1 q pm  2 qhs 360 Tab 2    rosuvastatin (CRESTOR) 40 mg tablet Take 1 Tab by mouth daily. 90 Tab 3    ergocalciferol (ERGOCALCIFEROL) 50,000 unit capsule Take 1 Cap by mouth every seven (7) days. 12 Cap 3    levocetirizine (XYZAL) 5 mg tablet Take 1 Tab by mouth daily. 30 Tab 1    furosemide (LASIX) 40 mg tablet TAKE 1 TABLET BY MOUTH TWICE DAILY AS NEEDED 180 Tab 0    carvedilol (COREG) 12.5 mg tablet Take 6.25 mg by mouth two (2) times daily (with meals).  cyanocobalamin (VITAMIN B-12) 500 mcg tablet Take 500 mcg by mouth daily.  omeprazole (PRILOSEC) 20 mg capsule Take 1 capsule by mouth daily. 30 capsule 3    polyethylene glycol (MIRALAX) 17 gram/dose powder Take 17 g by mouth daily. 255 g 1    clopidogrel (PLAVIX) 75 mg tablet Take 1 tablet by mouth daily.  30 tablet 3    therapeutic multivitamin (THERAGRAN) tablet Take 1 tablet by mouth daily.  calcium citrate-vitamin d3 (CITRACAL+D) 315-200 mg-unit tab Take 1 tablet by mouth two (2) times daily (with meals).  brief disposable (ADULT) misc by Does Not Apply route. Dispense one package of 180 briefs/ diapers. 1 Package 11    miscellaneous medical supply misc 2 Each by Does Not Apply route daily. 2 Each 1    miscellaneous medical supply misc 2 Each by Does Not Apply route daily. 2 Each 0    miscellaneous medical supply misc 1 Each by Does Not Apply route daily. 1 Each 1    miscellaneous medical supply misc 1 Each by Does Not Apply route daily. 1 Each 1    isosorbide mononitrate ER (IMDUR) 30 mg tablet Take 15 mg by mouth every morning. Allergies   Allergen Reactions    Dextromethorphan-Guaifenesin Other (comments)    Aspirin Hives          PHYSICAL EXAMINATION    Visit Vitals    /75    Pulse 66    Ht 4' 11\" (1.499 m)    Wt 170 lb (77.1 kg)    LMP  (LMP Unknown)    BMI 34.34 kg/m2     CONSTITUTIONAL: NAD, A&O x 3  SENSATION: Intact to light touch throughout  NEURO: Mechelle's is negative bilaterally. RANGE OF MOTION: The patient has full passive range of motion in all four extremities. MOTOR:  Straight Leg Raise: Negative, bilateral     Shoulder AB/Flex Elbow Flex Wrist Ext Elbow Ext Wrist Flex Hand Intrin Tone   Right +4/5 +4/5 +4/5 +4/5 +4/5 4/5 +4/5   Left +4/5 +4/5 +4/5 +4/5 +4/5 +4/5 +4/5              Hip Flex Knee Ext Knee Flex Ankle DF GTE Ankle PF Tone   Right +4/5 +4/5 +4/5 +4/5 +4/5 +4/5 +4/5   Left +4/5 +4/5 +4/5 +4/5 +4/5 +4/5 +4/5       ASSESSMENT   Diagnoses and all orders for this visit:    1. Other cervical disc degeneration, unspecified cervical region    2. Radiculopathy, cervical    3. Cervical spondylosis without myelopathy    4. Incomplete tear of left rotator cuff    Other orders  -     DULoxetine (CYMBALTA) 60 mg capsule; Take 1 Cap by mouth daily.   -     pregabalin (LYRICA) 300 mg capsule; Take 1 Cap by mouth two (2) times a day. Max Daily Amount: 600 mg. IMPRESSION AND PLAN:  Patient wished to continue her current treatment. She was provided with refills of her Cymbalta 60 mg and Lyrica 300 mg BID. I encourage patient to perform her HEP daily. I will see the patient back in 3 month's time or earlier if needed. Written by Artist Izabel, as dictated by Alonzo Garcia MD  I examined the patient, reviewed and agree with the note.

## 2017-10-13 ENCOUNTER — HOSPITAL ENCOUNTER (OUTPATIENT)
Dept: PHYSICAL THERAPY | Age: 57
Discharge: HOME | End: 2017-10-13
Payer: MEDICARE

## 2017-10-13 PROCEDURE — 97110 THERAPEUTIC EXERCISES: CPT

## 2017-10-13 PROCEDURE — 97140 MANUAL THERAPY 1/> REGIONS: CPT

## 2017-10-13 NOTE — PROGRESS NOTES
PT DAILY TREATMENT NOTE - [facility]     Patient Name: Kavon Kingston  Date:10/13/2017  : 1960  [x]  Patient  Verified  Payor: Dannie Hernandez / Plan: [facility] [employer] MEDICARE CHOICE PPO/PFFS / Product Type: Managed Care Medicare /    In [id number]  Out [id number]  Total Treatment Time (min): 50  Total Timed Codes (min): 40  1:1 Treatment Time (MC only): 40   Visit #: 3 of 8    Treatment Area: Contusion of left knee, initial encounter [S80.02XA]  Contusion of left lower leg, initial encounter [S80.12XA]    SUBJECTIVE  Pain Level (0-10 scale): 3/10  Any medication changes, allergies to medications, adverse drug reactions, diagnosis change, or new procedure performed?: [x] No    [] Yes (see summary sheet for update)  Subjective functional status/changes:   [] No changes reported  Patient reports that she perceives that PT has been progressing well with patient with desire to continue. Patient reports a self-perceived improvement in knee AROM since SoC.     OBJECTIVE    Modality rationale: decrease pain and increase tissue extensibility to improve the patients ability to improve ease with sleep   Min Type Additional Details   10 []  Ice     [x]  heat  []  Ice massage Position: Supine  Location: Posterior L Knee, Post-Tx     27 min Therapeutic Exercise:  [x] See flow sheet : Emphasis placed on improving available knee AROM and quadriceps strengthening    Rationale: increase ROM and increase strength to improve the patients ability to improve patient ease with community ambulation    8 min Manual Therapy:    Supine, L Hamstring 90-90 STM  Supine, L Femur PA Grade II-III Mobilization  Supine, L Patellar Grade IV Superior Mobilization   Rationale: decrease pain, increase ROM and increase tissue extensibility to improve ease with stair management    5 min Gait Training: Emphasis placed on a symmetrical heel-toe gait pattern with symmetrical stride length bilaterally and utilization of single axillary crutch Rationale: To improve ease with stair management. With   [] TE   [] TA   [] neuro   [] other: Patient Education: [x] Review HEP    [] Progressed/Changed HEP based on:   [] positioning   [] body mechanics   [] transfers   [] heat/ice application    [] other:      Pain Level (0-10 scale) post treatment: 3    ASSESSMENT/Changes in Function: Emphasis of gait training placed on completion of a symmetrical heel-toe gait pattern with increased step length bilaterally with use of single axillary crutch. Progressed gluteal and quadriceps strengthening with good tolerance. Ability to progress to completion of 4\" forward step ups. Patient will continue to benefit from skilled PT services to modify and progress therapeutic interventions, address functional mobility deficits, address ROM deficits, address strength deficits, analyze and address soft tissue restrictions, analyze and cue movement patterns, analyze and modify body mechanics/ergonomics and assess and modify postural abnormalities to attain remaining goals. []  See Plan of Care  []  See progress note/recertification  []  See Discharge Summary         Progress towards goals / Updated goals:    Short Term Goals: To be accomplished in 3 weeks:  1. Patient will demonstrate the ability to perform anterior weightshift onto L LE with maintenance x10 seconds without UE assistance in order to improve ease with ambulation within the home. PN:  Pt is able to maintain anterior weight shift for 5 sec using 1 HHA for 10x with having increase in pain. 9/25/17  2. Patient will demonstrate L knee AROM 0- degrees in order to improve ease with stair management. PN: L knee AROM 0- (post-manual) deg 10/4/17  Current: Not met: L knee AROM Post manual  10/9/17      Long Term Goals: To be accomplished in 6 weeks:  1. Patient will demonstrate a significant functional improvement as demonstrated by a score of >/=53 on FOTO.   PN: FOTO = 45, increased by 17 since [facility]. 9/27/17  2. Patient will subjectively report ability to ambulate with use of SPC for 10 minutes prior to requiring to sit in order to allow patient to return to [facility]. PN: Current ambulation tolerance of 5 minutes reported with use of bilateral crutches and FWW, 10/4/2017  3. Patient will subjectively report the ability to independently get in/out of the bathtub without assistance in order to improve safety with self-care ADLs.   PN:Continued assistance required with getting in/out of the tub, 10/4/2017    PLAN  [x]  Upgrade activities as tolerated     [x]  Continue plan of care  []  Update interventions per flow sheet       []  Discharge due to:_  []  Other:_      Felice De La Torre PT 10/13/2017  6:44 AM    Future Appointments  Date Time Provider Oren Ramon   10/13/2017 9:00 AM Felice De La Torre PT [facility]   10/17/2017 2:30 PM Felice De La Torre PT [facility]   10/20/2017 9:20 AM AMAURY Iniguez [address]   12/7/2017 10:30 AM Emmanuelle Marvin DO [facility] None   12/20/2017 11:00 AM Rabia Heart  [address]   1/10/2018 1:00 PM Odilia Tabor MD [facility]

## 2017-10-17 ENCOUNTER — HOSPITAL ENCOUNTER (OUTPATIENT)
Dept: PHYSICAL THERAPY | Age: 57
Discharge: HOME | End: 2017-10-17
Payer: MEDICARE

## 2017-10-17 PROCEDURE — 97110 THERAPEUTIC EXERCISES: CPT

## 2017-10-17 PROCEDURE — 97140 MANUAL THERAPY 1/> REGIONS: CPT

## 2017-10-17 NOTE — PROGRESS NOTES
PT DAILY TREATMENT NOTE - [facility]     Patient Name: Rico Cobb  Date:10/17/2017  : 1960  [x]  Patient  Verified  Payor: Deshawn Abenr / Plan: [facility] [employer] MEDICARE CHOICE PPO/PFFS / Product Type: Managed Care Medicare /    In time:230  Out time:032  Total Treatment Time (min): 50  Total Timed Codes (min): 40  1:1 Treatment Time ( only): 25   Visit #: 4 of 8    Treatment Area: Contusion of left knee, initial encounter [S80.02XA]  Contusion of left lower leg, initial encounter [S80.12XA]    SUBJECTIVE  Pain Level (0-10 scale): 5/10  Any medication changes, allergies to medications, adverse drug reactions, diagnosis change, or new procedure performed?: [x] No    [] Yes (see summary sheet for update)  Subjective functional status/changes:   [] No changes reported  Patient's son reports that he has been working with patient on completion of prescribed HEP.     OBJECTIVE    Modality rationale: decrease pain and increase tissue extensibility to improve the patients ability to improve ease with sleep   Min Type Additional Details   10 []  Ice     [x]  heat  []  Ice massage Position: Supine  Location: L Posterior Knee     32 min Therapeutic Exercise:  [x] See flow sheet : Emphasis placed on improving available knee AROM and quadriceps strengthening    Rationale: increase ROM and increase strength to improve the patients ability to improve patient ease with community ambulation    8 min Manual Therapy:    Supine, L Femur PA Grade II-IV Mobilization   Rationale: decrease pain, increase ROM and increase tissue extensibility to improve ease with stair management    With   [] TE   [] TA   [] neuro   [] other: Patient Education: [x] Review HEP    [] Progressed/Changed HEP based on:   [] positioning   [] body mechanics   [] transfers   [] heat/ice application    [] other:      Other Objective/Functional Measures:     Pre-Manual Knee AROM: 0 - 14 - 104  Post-Manual Knee AROM: 0 - 14 - 104     Pain Level (0-10 scale) post treatment: 2    ASSESSMENT/Changes in Function: Educated patient's son in the completion of distal femur AP mobilizations in order to allow for further independent improvements in knee extension mobility. Patient and son as well educated to initiate gait training independently at home, safely, without assistive device. Patient noted with L knee AROM 0- degrees, an improvement in knee extension in comparison to last treatment session. Patient will continue to benefit from skilled PT services to modify and progress therapeutic interventions, address functional mobility deficits, address ROM deficits, address strength deficits, analyze and address soft tissue restrictions and analyze and cue movement patterns to attain remaining goals. []  See Plan of Care  []  See progress note/recertification  []  See Discharge Summary         Progress towards goals / Updated goals:    Short Term Goals: To be accomplished in 3 weeks:  1. Patient will demonstrate the ability to perform anterior weightshift onto L LE with maintenance x10 seconds without UE assistance in order to improve ease with ambulation within the home. PN:  Pt is able to maintain anterior weight shift for 5 sec using 1 HHA for 10x with having increase in pain. 9/25/17  2. Patient will demonstrate L knee AROM 0- degrees in order to improve ease with stair management. PN: L knee AROM 0- (post-manual) deg 10/4/17  Current: Not met: L knee AROM Post manual  10/9/17      Long Term Goals: To be accomplished in 6 weeks:  1. Patient will demonstrate a significant functional improvement as demonstrated by a score of >/=53 on FOTO. PN: FOTO = 45, increased by 17 since [facility]. 9/27/17  2. Patient will subjectively report ability to ambulate with use of SPC for 10 minutes prior to requiring to sit in order to allow patient to return to PLOF.   PN: Current ambulation tolerance of 5 minutes reported with use of bilateral crutches and FWW, 10/4/2017  3. Patient will subjectively report the ability to independently get in/out of the bathtub without assistance in order to improve safety with self-care ADLs.   PN:Continued assistance required with getting in/out of the tub, 10/4/2017  Current: Remains, Continued assistance required with getting in/out of the tub, 10/17/2017       PLAN  [x]  Upgrade activities as tolerated     [x]  Continue plan of care  []  Update interventions per flow sheet       []  Discharge due to:_  []  Other:_      Tonya Martins, ALLISON 10/17/2017  9:35 AM    Future Appointments  Date Time Provider Oren Ramon   10/17/2017 2:30 PM Dylan Junior [facility]   10/20/2017 9:20 AM AMUARY Schmitt [address]   12/7/2017 10:30 AM Emmanuelle Marvin DO [facility] None   12/20/2017 11:00 AM Lidia Johnson  [facility]   1/10/2018 1:00 PM Pavithra Zuñiga MD [facility]

## 2017-10-19 ENCOUNTER — HOSPITAL ENCOUNTER (OUTPATIENT)
Dept: PHYSICAL THERAPY | Age: 57
Discharge: HOME | End: 2017-10-19
Payer: MEDICARE

## 2017-10-19 PROCEDURE — 97116 GAIT TRAINING THERAPY: CPT

## 2017-10-19 PROCEDURE — 97140 MANUAL THERAPY 1/> REGIONS: CPT

## 2017-10-19 NOTE — PROGRESS NOTES
PT DAILY TREATMENT NOTE - [facility]     Patient Name: Reinier Marino  Date:10/19/2017  : 1960  [x]  Patient  Verified  Payor: Lizette Boudreaux / Plan: [facility] HUMAN MEDICARE CHOICE PPO/PFFS / Product Type: Managed Care Medicare /    In time: 9:33  Out time: 10:38  Total Treatment Time (min): 65  Total Timed Codes (min): 55  1:1 Treatment Time ([address] only): 30   Visit #: 5 of 8    Treatment Area: Contusion of left knee, initial encounter [S80.02XA]  Contusion of left lower leg, initial encounter [S80.12XA]    SUBJECTIVE  Pain Level (0-10 scale): 3/10  Any medication changes, allergies to medications, adverse drug reactions, diagnosis change, or new procedure performed?: [x] No    [] Yes (see summary sheet for update)  Subjective functional status/changes:   [] No changes reported  Pt states he knee gave out yesterday, but she caught herself on the stove and did not fall. OBJECTIVE    Modality rationale: decrease pain to improve the patients ability to perform ADLs.     Min Type Additional Details    [] Estim:  []Unatt       []IFC  []Premod                        []Other:  []w/ice   []w/heat  Position:  Location:    [] Estim: []Att    []TENS instruct  []NMES                    []Other:  []w/US   []w/ice   []w/heat  Position:  Location:    []  Traction: [] Cervical       []Lumbar                       [] Prone          []Supine                       []Intermittent   []Continuous Lbs:  [] before manual  [] after manual    []  Ultrasound: []Continuous   [] Pulsed                           []1MHz   []3MHz W/cm2:  Location:    []  Iontophoresis with dexamethasone         Location: [] Take home patch   [] In clinic   10 []  Ice     [x]  heat  []  Ice massage  []  Laser   []  Anodyne Position: reclined  Location: posterior knee    []  Laser with stim  []  Other:  Position:  Location:    []  Vasopneumatic Device Pressure:       [] lo [] med [] hi   Temperature: [] lo [] med [] hi   [] Skin assessment post-treatment:  []intact []redness- no adverse reaction    []redness - adverse reaction:     39 min Therapeutic Exercise:  [x] See flow sheet :   Rationale: increase ROM and increase strength to improve the patients ability to ambulate with safety. 8 min Manual Therapy:  Per flow sheet   Rationale: decrease pain, increase ROM and increase tissue extensibility to perform ADLs. 8 min Gait Trainin feet with  2 crutches (80'),  1 crutch (40') device on level surfaces with CG level of assist   Rationale: With   [] TE   [] TA   [] neuro   [] other: Patient Education: [x] Review HEP    [] Progressed/Changed HEP based on:   [] positioning   [] body mechanics   [] transfers   [] heat/ice application    [] other:      Other Objective/Functional Measures: Pt was able to ambulate for 8' without rest break. FOTO 37    Pain Level (0-10 scale) post treatment: 4/10    ASSESSMENT/Changes in Function:     Patient will continue to benefit from skilled PT services to modify and progress therapeutic interventions, address functional mobility deficits, address ROM deficits, address strength deficits and analyze and address soft tissue restrictions to attain remaining goals. []  See Plan of Care  []  See progress note/recertification  []  See Discharge Summary         Progress towards goals / Updated goals:  Short Term Goals: To be accomplished in 3 weeks:  1. Patient will demonstrate the ability to perform anterior weightshift onto L LE with maintenance x10 seconds without UE assistance in order to improve ease with ambulation within the home. PN:  Pt is able to maintain anterior weight shift for 5 sec using 1 HHA for 10x with having increase in pain. 17  2. Patient will demonstrate L knee AROM 0- degrees in order to improve ease with stair management.   PN: L knee AROM 0- (post-manual) deg 10/4/17  Current: Not met: L knee AROM Post manual  10/9/17      Long Term Goals: To be accomplished in 6 weeks:  1. Patient will demonstrate a significant functional improvement as demonstrated by a score of >/=53 on FOTO. PN: FOTO = 45, increased by 17 since [facility]. 9/27/17  Current: Regressing, FOTO 37.  10/19/17  2. Patient will subjectively report ability to ambulate with use of SPC for 10 minutes prior to requiring to sit in order to allow patient to return to OF. PN: Current ambulation tolerance of 5 minutes reported with use of bilateral crutches and FWW, 10/4/2017  Current:  Progressing, 8' without rest break. 10/19/17  3. Patient will subjectively report the ability to independently get in/out of the bathtub without assistance in order to improve safety with self-care ADLs.   PN:Continued assistance required with getting in/out of the tub, 10/4/2017  Current: Remains, Continued assistance required with getting in/out of the tub, 10/17/2017    PLAN  []  Upgrade activities as tolerated     [x]  Continue plan of care  []  Update interventions per flow sheet       []  Discharge due to:_  []  Other:_      Naima Anne PTA 10/19/2017  9:40 AM    Future Appointments  Date Time Provider Oren Ramon   10/20/2017 9:20 AM AMAURY Shea [address]   10/24/2017 1:30 PM Naima Anne PTA [facility]   10/27/2017 8:30 AM Joana Allan, PT [facility]   10/30/2017 11:30 AM Naima Anne PTA [facility]   11/1/2017 2:30 PM Naima Anne PTA [facility]   11/7/2017 4:30 PM Joana Allan, PT [facility]   11/9/2017 1:30 PM Syeda Brizuela PTA [facility]   11/14/2017 2:30 PM Joana Allan, PT [facility]   11/17/2017 3:00 PM Joana Allan, PT [facility]   11/20/2017 11:00 AM Joana Allan, PT [facility]   11/22/2017 3:30 PM Joana Allan, PT [facility]   11/27/2017 11:00 AM Joana Allan, PT [facility]   12/7/2017 10:30 AM Emmanuelle Marvin, DO [facility] None   12/20/2017 11:00 AM Allan Armstrong MD [facility]   1/10/2018 1:00 PM Saba Shelton MD [place] [address]

## 2017-10-20 ENCOUNTER — OFFICE VISIT (OUTPATIENT)
Dept: ORTHOPEDIC SURGERY | Age: 57
End: 2017-10-20

## 2017-10-20 VITALS
DIASTOLIC BLOOD PRESSURE: 77 MMHG | WEIGHT: 170 LBS | HEART RATE: 64 BPM | TEMPERATURE: 95.9 F | RESPIRATION RATE: 15 BRPM | HEIGHT: 59 IN | OXYGEN SATURATION: 96 % | SYSTOLIC BLOOD PRESSURE: 134 MMHG | BODY MASS INDEX: 34.27 KG/M2

## 2017-10-20 DIAGNOSIS — M25.562 CHRONIC PAIN OF LEFT KNEE: ICD-10-CM

## 2017-10-20 DIAGNOSIS — M25.562 LEFT KNEE PAIN, UNSPECIFIED CHRONICITY: Primary | ICD-10-CM

## 2017-10-20 DIAGNOSIS — M12.262 VILLONODULAR SYNOVITIS OF LEFT LOWER LEG: ICD-10-CM

## 2017-10-20 DIAGNOSIS — G89.29 CHRONIC PAIN OF LEFT KNEE: ICD-10-CM

## 2017-10-20 DIAGNOSIS — Z96.652 STATUS POST TOTAL LEFT KNEE REPLACEMENT: ICD-10-CM

## 2017-10-20 RX ORDER — HYDROCODONE BITARTRATE AND ACETAMINOPHEN 7.5; 325 MG/1; MG/1
1 TABLET ORAL
Qty: 28 TAB | Refills: 0 | Status: SHIPPED | OUTPATIENT
Start: 2017-10-20 | End: 2017-12-27 | Stop reason: SDUPTHER
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
## 2017-10-24 ENCOUNTER — TELEPHONE (OUTPATIENT)
Dept: ORTHOPEDIC SURGERY | Facility: CLINIC | Age: 57
End: 2017-10-24

## 2017-10-24 ENCOUNTER — HOSPITAL ENCOUNTER (OUTPATIENT)
Dept: PHYSICAL THERAPY | Age: 57
Discharge: HOME | End: 2017-10-24
Payer: MEDICARE

## 2017-10-24 PROCEDURE — 97140 MANUAL THERAPY 1/> REGIONS: CPT

## 2017-10-24 PROCEDURE — 97110 THERAPEUTIC EXERCISES: CPT

## 2017-10-24 NOTE — PROGRESS NOTES
PT DAILY TREATMENT NOTE - [facility]     Patient Name: Jessica Harrington  Date:10/24/2017  : 1960  [x]  Patient  Verified  Payor: Colette Arenas / Plan: [facility] [address] [facility] HUMAN MEDICARE CHOICE PPO/PFFS / Product Type: Managed Care Medicare /    In time: 1:32  Out time: 2:34  Total Treatment Time (min): 62  Total Timed Codes (min): 52  1:1 Treatment Time ( only): 30  Visit #: 6 of 8    Treatment Area: Contusion of left knee, initial encounter [S80.02XA]  Contusion of left lower leg, initial encounter [S80.12XA]    SUBJECTIVE  Pain Level (0-10 scale): 6/10  Any medication changes, allergies to medications, adverse drug reactions, diagnosis change, or new procedure performed?: [x] No    [] Yes (see summary sheet for update)  Subjective functional status/changes:   [] No changes reported  Pt reports the MD ordered a bone scan this week. He wants her to continue to wear her brace and use her crutches because he saw bone spurs in the x-ray. OBJECTIVE    Modality rationale: increase tissue extensibility to improve the patients ability to increase ease of ADLs.     Min Type Additional Details    [] Estim:  []Unatt       []IFC  []Premod                        []Other:  []w/ice   []w/heat  Position:  Location:    [] Estim: []Att    []TENS instruct  []NMES                    []Other:  []w/US   []w/ice   []w/heat  Position:  Location:    []  Traction: [] Cervical       []Lumbar                       [] Prone          []Supine                       []Intermittent   []Continuous Lbs:  [] before manual  [] after manual    []  Ultrasound: []Continuous   [] Pulsed                           []1MHz   []3MHz W/cm2:  Location:    []  Iontophoresis with dexamethasone         Location: [] Take home patch   [] In clinic   10 []  Ice     [x]  heat  []  Ice massage  []  Laser   []  Anodyne Position:  Location:    []  Laser with stim  []  Other:  Position:  Location:    []  Vasopneumatic Device Pressure:       [] lo [] med [] hi Temperature: [] lo [] med [] hi   [] Skin assessment post-treatment:  []intact []redness- no adverse reaction    []redness - adverse reaction:     44 min Therapeutic Exercise:  [x] See flow sheet :   Rationale: increase ROM and increase strength to improve the patients ability to perform ADLs. 8 min Manual Therapy:  Per flow sheet   Rationale: decrease pain, increase ROM and increase tissue extensibility to perfform ADLs with ease. With   [] TE   [] TA   [] neuro   [] other: Patient Education: [x] Review HEP    [] Progressed/Changed HEP based on:   [] positioning   [] body mechanics   [] transfers   [] heat/ice application    [] other:      Other Objective/Functional Measures:  AROM  degrees. Pain Level (0-10 scale) post treatment: 1/10    ASSESSMENT/Changes in Function: Pt is able to hold 10\" anterior WS without HHA, no LOB. Patient will continue to benefit from skilled PT services to modify and progress therapeutic interventions, address functional mobility deficits, address ROM deficits, address strength deficits and analyze and address soft tissue restrictions to attain remaining goals. []  See Plan of Care  []  See progress note/recertification  []  See Discharge Summary         Progress towards goals / Updated goals:  Short Term Goals: To be accomplished in 3 weeks:  1. Patient will demonstrate the ability to perform anterior weightshift onto L LE with maintenance x10 seconds without UE assistance in order to improve ease with ambulation within the home. PN:  Pt is able to maintain anterior weight shift for 5 sec using 1 HHA for 10x with having increase in pain. 9/25/17  Current: Met.  10/24/17  2. Patient will demonstrate L knee AROM 0- degrees in order to improve ease with stair management. PN: L knee AROM 0- (post-manual) deg 10/4/17  Current: Not met: L knee AROM Post manual  10/24/17      Long Term Goals: To be accomplished in 6 weeks:  1.  Patient will demonstrate a significant functional improvement as demonstrated by a score of >/=53 on FOTO. PN: FOTO = 45, increased by 17 since [facility]. 9/27/17  Current: Regressing, FOTO 37.  10/19/17  2. Patient will subjectively report ability to ambulate with use of SPC for 10 minutes prior to requiring to sit in order to allow patient to return to PLOF. PN: Current ambulation tolerance of 5 minutes reported with use of bilateral crutches and FWW, 10/4/2017  Current:  Progressing, 8' without rest break. 10/19/17  3. Patient will subjectively report the ability to independently get in/out of the bathtub without assistance in order to improve safety with self-care ADLs.   PN:Continued assistance required with getting in/out of the tub, 10/4/2017  Current: Remains, Continued assistance required with getting in/out of the tub, 10/17/2017    PLAN  []  Upgrade activities as tolerated     [x]  Continue plan of care  []  Update interventions per flow sheet       []  Discharge due to:_  []  Other:_      Naima Anne PTA 10/24/2017  1:52 PM    Future Appointments  Date Time Provider Oren Ramon   10/27/2017 8:30 AM Bernadette James PT [facility]   10/27/2017 9:00 AM [facility] NM RM 1 [facility]   10[facility]27/2017 12:00 PM [facility] NM RM 2 [facility]   10/30/2017 11:30 AM Naima Anne PTA [facility]   11/1/2017 2:30 PM Naima Anne PTA [facility]   11/7/2017 4:30 PM Bernadette James, PT [facility]   11/9/2017 1:30 PM Marbella Reyes PTA [facility]   11/14/2017 2:30 PM Bernadette James PT [facility]   11/17/2017 11:20 AM AMAURY [address]   11/17/2017 3:00 PM Bernadette James, PT [facility]   11/20/2017 11:00 AM Bernadette James, PT [facility]   11/22/2017 3:30 PM Bernadette James, PT [facility]   11/27/2017 11:00 AM Bernadette James, PT [facility]   12/7/2017 10:30 AM Todd-Trever Marvin, DO [facility] None   12/20/2017 11:00 AM Myles Neville  [address]   1/10/2018 1:00 PM Debi Dobbs MD [place] [address]

## 2017-10-24 NOTE — TELEPHONE ENCOUNTER
[address] notifying patient her disc is ready for pickup at the [facility] office.

## 2017-10-24 NOTE — TELEPHONE ENCOUNTER
Patient called to request a cd of knee xrays done in office at 10/20 office visit. She has a bone scan scheduled for 10/27. Please advise patient when ready for  at [phone].

## 2017-10-27 ENCOUNTER — HOSPITAL ENCOUNTER (OUTPATIENT)
Dept: NUCLEAR MEDICINE | Age: 57
Discharge: HOME | End: 2017-10-27
Attending: PHYSICIAN ASSISTANT
Payer: MEDICARE

## 2017-10-27 ENCOUNTER — APPOINTMENT (OUTPATIENT)
Dept: PHYSICAL THERAPY | Age: 57
End: 2017-10-27
Payer: MEDICARE

## 2017-10-27 DIAGNOSIS — M12.262 VILLONODULAR SYNOVITIS OF LEFT LOWER LEG: ICD-10-CM

## 2017-10-27 DIAGNOSIS — Z96.652 STATUS POST TOTAL LEFT KNEE REPLACEMENT: ICD-10-CM

## 2017-10-27 DIAGNOSIS — M25.562 LEFT KNEE PAIN, UNSPECIFIED CHRONICITY: ICD-10-CM

## 2017-10-27 PROCEDURE — 78315 BONE IMAGING 3 PHASE: CPT

## 2017-10-30 ENCOUNTER — HOSPITAL ENCOUNTER (OUTPATIENT)
Dept: PHYSICAL THERAPY | Age: 57
Discharge: HOME | End: 2017-10-30
Payer: MEDICARE

## 2017-10-30 PROCEDURE — 97110 THERAPEUTIC EXERCISES: CPT

## 2017-10-30 PROCEDURE — 97116 GAIT TRAINING THERAPY: CPT

## 2017-10-30 PROCEDURE — 97140 MANUAL THERAPY 1/> REGIONS: CPT

## 2017-10-30 RX ORDER — CELECOXIB 200 MG/1
CAPSULE ORAL
Qty: 180 CAP | Refills: 0 | Status: SHIPPED | OUTPATIENT
Start: 2017-10-30 | End: 2018-01-22 | Stop reason: SDUPTHER

## 2017-10-30 NOTE — PROGRESS NOTES
PT DAILY TREATMENT NOTE - [facility]    Patient Name: Vijay Marshall  Date:10/30/2017  : 1960  [x]  Patient  Verified  Payor: Mary Mejia / Plan: [facility] HUMANA MEDICARE CHOICE PPO/PFFS / Product Type: Managed Care Medicare /    In time: 11:30  Out time: 12:35  Total Treatment Time (min): 65  Total Timed Codes (min): 55  1:1 Treatment Time ([address] only): 39  Visit #: 7 of 8    Treatment Area: Contusion of left knee, initial encounter [S80.02XA]  Contusion of left lower leg, initial encounter [S80.12XA]    SUBJECTIVE  Pain Level (0-10 scale): 6/10  Any medication changes, allergies to medications, adverse drug reactions, diagnosis change, or new procedure performed?: [x] No    [] Yes (see summary sheet for update)  Subjective functional status/changes:   [] No changes reported  Pt reports increased pain since this weekend, no particular cause. OBJECTIVE    Modality rationale: decrease pain to improve the patients ability to perform ADLs.     Min Type Additional Details    [] Estim:  []Unatt       []IFC  []Premod                        []Other:  []w/ice   []w/heat  Position:  Location:    [] Estim: []Att    []TENS instruct  []NMES                    []Other:  []w/US   []w/ice   []w/heat  Position:  Location:    []  Traction: [] Cervical       []Lumbar                       [] Prone          []Supine                       []Intermittent   []Continuous Lbs:  [] before manual  [] after manual    []  Ultrasound: []Continuous   [] Pulsed                           []1MHz   []3MHz Location:  W/cm2:    []  Iontophoresis with dexamethasone         Location: [] Take home patch   [] In clinic   10 []  Ice     [x]  heat  []  Ice massage  []  Laser   []  Anodyne Position: reclined  Location: L knee    []  Laser with stim  []  Other: Position:  Location:    []  Vasopneumatic Device Pressure:       [] lo [] med [] hi   Temperature: [] lo [] med [] hi   [] Skin assessment post-treatment:  []intact []redness- no adverse reaction    []redness - adverse reaction:     39 min Therapeutic Exercise:  [x] See flow sheet :   Rationale: increase ROM and increase strength to improve the patients ability to perform ADLs. 8 min Manual Therapy:  Patellar mobs, tib AP mobs, TF distraction   Rationale: decrease pain, increase ROM and increase tissue extensibility to increase ease of ADLs. 8 min Gait Trainin feet with  1 crutches (80') , CG level of assist   Rationale: increase endurance and safety with gait            With   [] TE   [] TA   [] neuro   [] other: Patient Education: [x] Review HEP    [] Progressed/Changed HEP based on:   [] positioning   [] body mechanics   [] transfers   [] heat/ice application    [] other:      Other Objective/Functional Measures: P ambulated [de-identified]' with one crutch on R side. 2 LOB due to pain during gait. Pain Level (0-10 scale) post treatment: 3/10    ASSESSMENT/Changes in Function: Pt reports increased pain since this weekend, increasing with knee flexion. Pt requires minimal assistance when getting into and out of the tub. Patient will continue to benefit from skilled PT services to modify and progress therapeutic interventions, address functional mobility deficits, address ROM deficits and address strength deficits to attain remaining goals. []  See Plan of Care  []  See progress note/recertification  []  See Discharge Summary         Progress towards goals / Updated goals:  Short Term Goals: To be accomplished in 3 weeks:  1. Patient will demonstrate the ability to perform anterior weightshift onto L LE with maintenance x10 seconds without UE assistance in order to improve ease with ambulation within the home. PN:  Pt is able to maintain anterior weight shift for 5 sec using 1 HHA for 10x with having increase in pain. 17  Current: Met.  10/24/17  2. Patient will demonstrate L knee AROM 0- degrees in order to improve ease with stair management.   PN: L knee AROM 0- (post-manual) deg 10/4/17  Current: Not met: L knee AROM Post manual  10/24/17      Long Term Goals: To be accomplished in 6 weeks:  1. Patient will demonstrate a significant functional improvement as demonstrated by a score of >/=53 on FOTO. PN: FOTO = 45, increased by 17 since [facility]. 9/27/17  Current: Regressing, FOTO 37.  10/19/17  2. Patient will subjectively report ability to ambulate with use of SPC for 10 minutes prior to requiring to sit in order to allow patient to return to PLOF. PN: Current ambulation tolerance of 5 minutes reported with use of bilateral crutches and FWW, 10/4/2017  Current:  Progressing, 8' without rest break.  10/19/17  3. Patient will subjectively report the ability to independently get in/out of the bathtub without assistance in order to improve safety with self-care ADLs. PN:Continued assistance required with getting in/out of the tub, 10/4/2017  Current: Remains, Continued assistance required with getting in/out of the tub.  10/30/2017    PLAN  []  Upgrade activities as tolerated     [x]  Continue plan of care  []  Update interventions per flow sheet       []  Discharge due to:_  []  Other:_      Naima Anne, PTA 10/30/2017  12:13 PM

## 2017-11-01 ENCOUNTER — HOSPITAL ENCOUNTER (OUTPATIENT)
Dept: PHYSICAL THERAPY | Age: 57
Discharge: HOME | End: 2017-11-01
Payer: MEDICARE

## 2017-11-01 PROCEDURE — 97140 MANUAL THERAPY 1/> REGIONS: CPT

## 2017-11-01 PROCEDURE — G8978 MOBILITY CURRENT STATUS: HCPCS

## 2017-11-01 PROCEDURE — 97110 THERAPEUTIC EXERCISES: CPT

## 2017-11-01 PROCEDURE — G8979 MOBILITY GOAL STATUS: HCPCS

## 2017-11-01 NOTE — PROGRESS NOTES
[facility] - [facility]              [address]        [place], [address]   [phone] fax    Medicare Progress Report    Patient name: Alia Buenrostro Start of Care: 2017   Referral source: Joss Holguin MD : 1960   Medical/Treatment Diagnosis: Contusion of left knee, initial encounter [S80.02XA]  Contusion of left lower leg, initial encounter [S80.12XA] Onset Date:3 weeks prior to I.E. Prior Hospitalization: see medical history Provider#: [id number]   Medications: Verified on Patient Summary List     Comorbidities: Arthritis, Back Pain, BMI>30, Congestive Heart Failure, Heart Attack (), Osteoporosis, Pacemaker, Prior Surgery, L TKA (, ), R MARIANA (Lateral Approach - )   Prior Level of Function: SPC with ambulation outside of the home, Walking tolerance (10 minutes), Standing tolerance (10-15 minutes), Does not drive, (I) Household chores, No assistance with self-care ADLs (i.e. Shower chair utilized)  Visits from Start of Care: 16    Missed Visits: 1    Reporting Period: 10/4/2017 to 2017    Subjective Reports:     Short Term Goals: To be accomplished in 3 weeks:  1. Patient will demonstrate the ability to perform anterior weightshift onto L LE with maintenance x10 seconds without UE assistance in order to improve ease with ambulation within the home. Last PN:  Pt is able to maintain anterior weight shift for 5 sec using 1 HHA for 10x with having increase in pain. At PN: Met.    2. Patient will demonstrate L knee AROM 0- degrees in order to improve ease with stair management. Last PN: L knee AROM 0- (post-manual) deg   At PN: Remains: L knee AROM Post manual       Long Term Goals: To be accomplished in 6 weeks:  1. Patient will demonstrate a significant functional improvement as demonstrated by a score of >/=53 on FOTO. Last PN: FOTO = 45, increased by 17 since SOC.   At PN: Regressing, FOTO 37.    2. Patient will subjectively report ability to ambulate with use of SPC for 10 minutes prior to requiring to sit in order to allow patient to return to PLOF. Last PN: Current ambulation tolerance of 5 minutes reported with use of bilateral crutches and FWW  At PN: Progressing, Patient reports current ambulation tolerance 20 minutes with use of FWW or bilateral axillary crutches  3. Patient will subjectively report the ability to independently get in/out of the bathtub without assistance in order to improve safety with self-care ADLs. Last PN:Continued assistance required with getting in/out of the tub  At PN: Progressing, Minimal assistance required with getting in/out of the tub.     Key functional changes: See above goals. Assessment / Recommendations:    Since SoC patient reports a significant improvement in ambulation tolerance with patient reporting tolerance of 20 minutes with use of FWW or bilateral axillary crutches. Patient reports continued self-perception that L knee AROM has not returned to pre-fall mobility with patient reporting at present her greatest concerns are knee AROM and functional weightbearing knee stability with ADLs. Patient continues to demonstrate limitations in L knee AROM with pre-manual AROM 0- with patient able to achieve 0- post-manual. Patient would benefit from the continuation of therapy services x3 weeks with primary emphasis on knee AROM and function weightbearing stability and mobility to decrease falls risk and improve independence.  Difficulty progressing AD with ambulation secondary to R-sided weakness secondary to stroke.     Patient will continue to benefit from skilled PT services to modify and progress therapeutic interventions, address functional mobility deficits, address ROM deficits, address strength deficits, analyze and address soft tissue restrictions, analyze and cue movement patterns, analyze and modify body mechanics/ergonomics and assess and modify postural abnormalities to attain remaining goals. Problem List: pain affecting function, decrease ROM, decrease strength, impaired gait/ balance, decrease ADL/ functional abilitiies, decrease activity tolerance, decrease flexibility/ joint mobility and decrease transfer abilities   Treatment Plan: Therapeutic exercise, Therapeutic activities, Neuromuscular re-education, Physical agent/modality, Gait/balance training, Manual therapy, Patient education, Self Care training, Functional mobility training and Home safety training    Updated Goals: Continue with unmet goals above. Frequency / Duration: Patient to be seen 2 times per week for 3 weeks:    G-Codes (GP)  Mobility  [id number] Current  CL= 60-79%   Goal  CK= 40-59%     The severity rating is based on clinical judgment and the FOTO score.       Ann Bautista, PT 11/1/2017 1:35 PM

## 2017-11-01 NOTE — PROGRESS NOTES
PT DAILY TREATMENT NOTE - [facility]     Patient Name: Zahra Mcnair  Date:2017  : 1960  [x]  Patient  Verified  Payor: Mile Tse / Plan: [facility] HUMANA MEDICARE CHOICE PPO/PFFS / Product Type: Managed Care Medicare /    In NRIR:[id number]  Out time:0333  Total Treatment Time (min): 55  Total Timed Codes (min): 55  1:1 Treatment Time ([address] only): 42   Visit #: 8 of 8    Treatment Area: Contusion of left knee, initial encounter [S80.02XA]  Contusion of left lower leg, initial encounter [S80.12XA]    SUBJECTIVE  Pain Level (0-10 scale): 5  Any medication changes, allergies to medications, adverse drug reactions, diagnosis change, or new procedure performed?: [x] No    [] Yes (see summary sheet for update)  Subjective functional status/changes:   [] No changes reported  Patient reports that she continues to feel as if her knee is moving the way it was prior to her fall. OBJECTIVE    47 min Therapeutic Exercise:  [x] See flow sheet : Emphasis placed on improving available knee AROM and quadriceps strengthening    Rationale: increase ROM and increase strength to improve the patients ability to improve patient ease with community ambulation    8 min Manual Therapy:    Supine, L Femur PA Grade II-IV Mobilization  Supine, L Patellar Superior Grade III Mobilization   Rationale: decrease pain, increase ROM and increase tissue extensibility to improve ease with stair management          With   [] TE   [] TA   [] neuro   [] other: Patient Education: [x] Review HEP    [] Progressed/Changed HEP based on:   [] positioning   [] body mechanics   [] transfers   [] heat/ice application    [] other:       Pain Level (0-10 scale) post treatment: 3    ASSESSMENT/Changes in Function: Since SoC patient reports a significant improvement in ambulation tolerance with patient reporting tolerance of 20 minutes with use of FWW or bilateral axillary crutches.  Patient reports continued self-perception that L knee AROM has not returned to pre-fall mobility with patient reporting at present her greatest concerns are knee AROM and functional weightbearing knee stability with ADLs. Patient continues to demonstrate limitations in L knee AROM with pre-manual AROM 0- with patient able to achieve 0- post-manual. Patient would benefit from the continuation of therapy services x3 weeks with primary emphasis on knee AROM and function weightbearing stability and mobility to decrease falls risk and improve independence. Difficulty progressing AD with ambulation secondary to R-sided weakness secondary to stroke. Patient will continue to benefit from skilled PT services to modify and progress therapeutic interventions, address functional mobility deficits, address ROM deficits, address strength deficits, analyze and address soft tissue restrictions, analyze and cue movement patterns, analyze and modify body mechanics/ergonomics and assess and modify postural abnormalities to attain remaining goals. []  See Plan of Care  [x]  See progress note/recertification  []  See Discharge Summary         Progress towards goals / Updated goals:    Short Term Goals: To be accomplished in 3 weeks:  1. Patient will demonstrate the ability to perform anterior weightshift onto L LE with maintenance x10 seconds without UE assistance in order to improve ease with ambulation within the home. PN:  Pt is able to maintain anterior weight shift for 5 sec using 1 HHA for 10x with having increase in pain. 9/25/17  Current: Met.  10/24/17  2. Patient will demonstrate L knee AROM 0- degrees in order to improve ease with stair management. PN: L knee AROM 0- (post-manual) deg 10/4/17  Current: Remains: L knee AROM Post manual  11/1/17      Long Term Goals: To be accomplished in 6 weeks:  1. Patient will demonstrate a significant functional improvement as demonstrated by a score of >/=53 on FOTO. PN: FOTO = 45, increased by 17 since SOC. 9/27/17  Current: Regressing, FOTO 37.  10/19/17  2. Patient will subjectively report ability to ambulate with use of SPC for 10 minutes prior to requiring to sit in order to allow patient to return to PLOF. PN: Current ambulation tolerance of 5 minutes reported with use of bilateral crutches and FWW, 10/4/2017  Current: Progressing, Patient reports current ambulation tolerance 20 minutes with use of FWW or bilateral axillary crutches, 11/1/2017  3. Patient will subjectively report the ability to independently get in/out of the bathtub without assistance in order to improve safety with self-care ADLs. PN:Continued assistance required with getting in/out of the tub, 10/4/2017  Current: Progressing, Minimal assistance required with getting in/out of the tub.  10/30/2017    PLAN  [x]  Upgrade activities as tolerated     [x]  Continue plan of care  []  Update interventions per flow sheet       []  Discharge due to:_  []  Other:_      Elva Miranda PT 11/1/2017  1:32 PM    Future Appointments  Date Time Provider Oren Ramon   11/1/2017 2:30 PM Elva Miranda, PT [facility]   11/7/2017 4:30 PM Elva Miranda, PT [facility]   11/9/2017 1:30 PM Hung Gordillo, PTA [facility]   11/14/2017 2:30 PM Elva Miranda, PT [facility]   11/16/2017 1:30 PM Yair Allan, PT [facility]   11/17/2017 11:20 AM Marylen Counter, PA-C [facility]   11/20/2017 11:00 AM Elva Miranda, PT [facility]   11/22/2017 3:30 PM Elva Miranda, PT [facility]   11/27/2017 11:00 AM Elva Miranda, PT [facility]   12/7/2017 10:30 AM Todd-Trever Marvin, DO [facility] None   12/20/2017 11:00 AM John Hernandez MD [address]   1/10/2018 1:00 PM Raza Isabel MD [facility]

## 2017-11-07 ENCOUNTER — APPOINTMENT (OUTPATIENT)
Dept: PHYSICAL THERAPY | Age: 57
End: 2017-11-07
Payer: MEDICARE

## 2017-11-09 ENCOUNTER — HOSPITAL ENCOUNTER (OUTPATIENT)
Dept: PHYSICAL THERAPY | Age: 57
Discharge: HOME | End: 2017-11-09
Payer: MEDICARE

## 2017-11-09 PROCEDURE — 97140 MANUAL THERAPY 1/> REGIONS: CPT

## 2017-11-09 PROCEDURE — 97110 THERAPEUTIC EXERCISES: CPT

## 2017-11-09 NOTE — PROGRESS NOTES
PT DAILY TREATMENT NOTE - [facility]     Patient Name: Reinier Marino  Date:2017  : 1960  [x]  Patient  Verified  Payor: Lizette Boudreaux / Plan: [facility] [employer] MEDICARE CHOICE PPO/PFFS / Product Type: Managed Care Medicare /    In time:1:26  Out time:2:15  Total Treatment Time (min): 49  Total Timed Codes (min): 49  1:1 Treatment Time ([facility] only): 40   Visit #: 1 of 6 (New PN)    Treatment Area: Contusion of left knee, initial encounter [S80.02XA]  Contusion of left lower leg, initial encounter [S80.12XA]    SUBJECTIVE  Pain Level (0-10 scale): 6  Any medication changes, allergies to medications, adverse drug reactions, diagnosis change, or new procedure performed?: [x] No    [] Yes (see summary sheet for update)  Subjective functional status/changes:   [] No changes reported  Pt reports no recent falls.      OBJECTIVE        Min Type Additional Details    [] Estim:  []Unatt       []IFC  []Premod                        []Other:  []w/ice   []w/heat  Position:  Location:    [] Estim: []Att    []TENS instruct  []NMES                    []Other:  []w/US   []w/ice   []w/heat  Position:  Location:    []  Traction: [] Cervical       []Lumbar                       [] Prone          []Supine                       []Intermittent   []Continuous Lbs:  [] before manual  [] after manual    []  Ultrasound: []Continuous   [] Pulsed                           []1MHz   []3MHz W/cm2:  Location:    []  Iontophoresis with dexamethasone         Location: [] Take home patch   [] In clinic    []  Ice     []  heat  []  Ice massage  []  Laser   []  Anodyne Position:  Location:    []  Laser with stim  []  Other:  Position:  Location:    []  Vasopneumatic Device Pressure:       [] lo [] med [] hi   Temperature: [] lo [] med [] hi   [] Skin assessment post-treatment:  []intact []redness- no adverse reaction    []redness - adverse reaction:       41 min Therapeutic Exercise:  [x] See flow sheet :   Rationale: increase ROM and increase strength to improve the patients ability to increase ease with ADLs. 8 min Manual Therapy:  Knee flex/ext mobs, Hamstring stretch, PROM with OP. Rationale: decrease pain, increase ROM, increase tissue extensibility and decrease trigger points to increase tolerance to activities. With   [] TE   [] TA   [] neuro   [] other: Patient Education: [x] Review HEP    [] Progressed/Changed HEP based on:   [] positioning   [] body mechanics   [] transfers   [] heat/ice application    [] other:      Other Objective/Functional Measures: L knee AROM Post manual      Pain Level (0-10 scale) post treatment: 5    ASSESSMENT/Changes in Function: Pt continue to have  Minimal ROM in L knee. Patient will continue to benefit from skilled PT services to modify and progress therapeutic interventions, address functional mobility deficits, address ROM deficits, address strength deficits and analyze and address soft tissue restrictions to attain remaining goals. []  See Plan of Care  []  See progress note/recertification  []  See Discharge Summary         Progress towards goals / Updated goals:    Short Term Goals: To be accomplished in 3 weeks:  1. Patient will demonstrate L knee AROM 0- degrees in order to improve ease with stair management. PN: L knee AROM Post manual  11/1/17  Current: Progressing: L knee AROM Post manual  11/9/17      Long Term Goals: To be accomplished in 6 weeks:  1. Patient will demonstrate a significant functional improvement as demonstrated by a score of >/=53 on FOTO. PN:  FOTO 37.  10/19/17  2. Patient will subjectively report ability to ambulate with use of SPC for 10 minutes prior to requiring to sit in order to allow patient to return to PLOF. PN: Patient reports current ambulation tolerance 20 minutes with use of FWW or bilateral axillary crutches, 11/1/2017  3.  Patient will subjectively report the ability to independently get in/out of the bathtub without assistance in order to improve safety with self-care ADLs. PN: Minimal assistance required with getting in/out of the tub. 10/30/2017    Goals Met:  1. Patient will demonstrate the ability to perform anterior weightshift onto L LE with maintenance x10 seconds without UE assistance in order to improve ease with ambulation within the home. PN:  Pt is able to maintain anterior weight shift for 5 sec using 1 HHA for 10x with having increase in pain.  9/25/17  Current: Met.  10/24/17  PLAN  []  Upgrade activities as tolerated     [x]  Continue plan of care  []  Update interventions per flow sheet       []  Discharge due to:_  []  Other:_      Norville Cowden, PTA 11/9/2017  1:29 PM    Future Appointments  Date Time Provider Oren Ramon   11/9/2017 1:30 PM Norville Cowden, PTA [facility]   11/10/2017 2:30 PM Erasmo Lord, PT [facility]   11/14/2017 2:30 PM Chito Salomon, PT [facility]   11/16/2017 1:30 PM Britt Hurtado PT [facility]   11/17/2017 11:20 AM Kierra Bates [facility]-C [facility]   11/20/2017 11:00 AM Chito Salomon, PT [facility]   11/22/2017 3:30 PM Chito Salomon PT [facility]   11/27/2017 11:00 AM Chito Salomon PT [facility]   12/7/2017 10:30 AM Todd-Trever Marvin, DO [facility] None   12/20/2017 11:00 AM MD Derek Garcia E Katiana Geronimo   1/10/2018 1:00 PM MD Maxwell Thapa [place]

## 2017-11-10 ENCOUNTER — APPOINTMENT (OUTPATIENT)
Dept: PHYSICAL THERAPY | Age: 57
End: 2017-11-10
Payer: MEDICARE

## 2017-11-14 ENCOUNTER — HOSPITAL ENCOUNTER (OUTPATIENT)
Dept: PHYSICAL THERAPY | Age: 57
Discharge: HOME | End: 2017-11-14
Payer: MEDICARE

## 2017-11-14 PROCEDURE — 97140 MANUAL THERAPY 1/> REGIONS: CPT

## 2017-11-14 PROCEDURE — 97110 THERAPEUTIC EXERCISES: CPT

## 2017-11-14 NOTE — PROGRESS NOTES
PT DAILY TREATMENT NOTE - [facility]     Patient Name: Isis Mckee  Date:2017  : 1960  [x]  Patient  Verified  Payor: Cristela Green / Plan: [facility] HUMANA MEDICARE CHOICE PPO/PFFS / Product Type: Managed Care Medicare /    In time:233  Out time:340  Total Treatment Time (min): 67  Total Timed Codes (min): 62  1:1 Treatment Time (MC only): 23   Visit #: 2 of 6    Treatment Area: Contusion of left knee, initial encounter [S80.02XA]  Contusion of left lower leg, initial encounter [S80.12XA]    SUBJECTIVE  Pain Level (0-10 scale): 7  Any medication changes, allergies to medications, adverse drug reactions, diagnosis change, or new procedure performed?: [x] No    [] Yes (see summary sheet for update)  Subjective functional status/changes:   [] No changes reported  Patient reports increase in pain with patient reporting no falls. Patient reports desire to complete heat at the initiation of treatment.     OBJECTIVE    Modality rationale: decrease pain and increase tissue extensibility to improve the patients ability to improve ease with sleep   Min Type Additional Details   5 []  Ice     [x]  heat  []  Ice massage Position: Supine  Location: Posterior L Knee, Pre-Tx     54  (1:1 15') min Therapeutic Exercise:  [x] See flow sheet : Emphasis placed on improving available knee AROM and quadriceps strengthening    Rationale: increase ROM and increase strength to improve the patients ability to improve patient ease with community ambulation     8 min Manual Therapy:    Supine, L Femur PA Grade II-IV Mobilization  Supine, L Patellar Superior Grade III Mobilization   Rationale: decrease pain, increase ROM and increase tissue extensibility to improve ease with stair management    With   [] TE   [] TA   [] neuro   [] other: Patient Education: [x] Review HEP    [] Progressed/Changed HEP based on:   [] positioning   [] body mechanics   [] transfers   [] heat/ice application    [] other:      Pain Level (0-10 scale) post treatment: 7    ASSESSMENT/Changes in Function: Per patient request moist heat applied at initiation of treatment to improve tolerance to manual therapy and therapeutic exercise. Improved gait pattern demonstrated with ambulation with single axillary crutch. Ability to progress to completion of forward 6\"step ups with minimal UE assistance required    Patient will continue to benefit from skilled PT services to modify and progress therapeutic interventions, address functional mobility deficits, address ROM deficits, address strength deficits, analyze and address soft tissue restrictions, analyze and cue movement patterns, analyze and modify body mechanics/ergonomics and assess and modify postural abnormalities to attain remaining goals. []  See Plan of Care  []  See progress note/recertification  []  See Discharge Summary         Progress towards goals / Updated goals:    Short Term Goals: To be accomplished in 3 weeks:  1. Patient will demonstrate L knee AROM 0- degrees in order to improve ease with stair management. PN: L knee AROM Post manual  11/1/17  Current: Progressing: L knee AROM Post manual  11/9/17      Long Term Goals: To be accomplished in 6 weeks:  1. Patient will demonstrate a significant functional improvement as demonstrated by a score of >/=53 on FOTO. PN:  FOTO 37.  10/19/17  2. Patient will subjectively report ability to ambulate with use of SPC for 10 minutes prior to requiring to sit in order to allow patient to return to PLOF. PN: Patient reports current ambulation tolerance 20 minutes with use of FWW or bilateral axillary crutches, 11/1/2017  Current: Regressing, Ambulation tolerance of 10 min reported, 11/14/2017  3. Patient will subjectively report the ability to independently get in/out of the bathtub without assistance in order to improve safety with self-care ADLs.   PN: Minimal assistance required with getting in/out of the tub. 10/30/2017    PLAN  [x]  Upgrade activities as tolerated     [x]  Continue plan of care  []  Update interventions per flow sheet       []  Discharge due to:_  []  Other:_      Katherine Orr, PT 11/14/2017  8:15 AM    Future Appointments  Date Time Provider Oren Ramon   11/14/2017 2:30 PM Katherine Orr, PT [facility]   11/16/2017 1:30 PM Maame Solis, PT [facility]   11/17/2017 11:20 AM [facility]   11/20/2017 11:00 AM Katherine Orr, PT [facility]   11/22/2017 3:30 PM Katherine Orr, PT [facility]   11/27/2017 11:00 AM Katherine Orr, PT [facility]   12/7/2017 10:30 AM Emmanuelle Marvin, DO [facility] None   12/20/2017 11:00 AM Ortega Denis  [facility]   1/10/2018 1:00 PM Sancho Martini MD [facility]

## 2017-11-16 ENCOUNTER — HOSPITAL ENCOUNTER (OUTPATIENT)
Dept: PHYSICAL THERAPY | Age: 57
Discharge: HOME | End: 2017-11-16
Payer: MEDICARE

## 2017-11-16 PROCEDURE — G8979 MOBILITY GOAL STATUS: HCPCS

## 2017-11-16 PROCEDURE — G8980 MOBILITY D/C STATUS: HCPCS

## 2017-11-16 PROCEDURE — 97110 THERAPEUTIC EXERCISES: CPT | Performed by: PHYSICAL THERAPIST

## 2017-11-16 PROCEDURE — 97140 MANUAL THERAPY 1/> REGIONS: CPT | Performed by: PHYSICAL THERAPIST

## 2017-11-16 NOTE — PROGRESS NOTES
PT DAILY TREATMENT NOTE - [facility]     Patient Name: Kathy Preciado  Date:2017  : 1960  [x]  Patient  Verified  Payor: Alejandra Colon / Plan: [facility] HUMANA MEDICARE CHOICE PPO/PFFS / Product Type: Managed Care Medicare /    In time:1:35  Out time:2:25  Total Treatment Time (min): 50  Total Timed Codes (min): 40  1:1 Treatment Time ( only): 40   Visit #: 3 of 6    Treatment Area: Contusion of left knee, initial encounter [S80.02XA]  Contusion of left lower leg, initial encounter [S80.12XA]    SUBJECTIVE  Pain Level (0-10 scale): 6  Any medication changes, allergies to medications, adverse drug reactions, diagnosis change, or new procedure performed?: [x] No    [] Yes (see summary sheet for update)  Subjective functional status/changes:   [] No changes reported  Feels the same    OBJECTIVE    Modality rationale: decrease pain to improve the patients ability to complete ADLs   Min Type Additional Details   10 []  Ice     [x]  heat  []  Ice massage  []  Laser   []  Anodyne Position: reclined  Location: L knee   [] Skin assessment post-treatment:  []intact []redness- no adverse reaction    []redness - adverse reaction:     32 min Therapeutic Exercise:  [x] See flow sheet :   Rationale: increase ROM, increase strength and decrease pain to improve the patients ability to complete ADLs    8 min Manual Therapy:  Patellar mobs, tibiofemoral PA Grade 2-3   Rationale: decrease pain and increase ROM to complete ADLs        With   [] TE   [] TA   [] neuro   [] other: Patient Education: [x] Review HEP    [] Progressed/Changed HEP based on:   [] positioning   [] body mechanics   [] transfers   [] heat/ice application    [] other:      Other Objective/Functional Measures:  L knee AROM     Pain Level (0-10 scale) post treatment: 6    ASSESSMENT/Changes in Function: Patient responds well to treatment session. Decent tolerance to manual therapy.  Seems uninterested in participating, but is willing to perform exercises as requested. Likely to finished remaining scheduled appointments then discharge to [facility]. No adverse effects were noted from today's treatment session      Patient will continue to benefit from skilled PT services to modify and progress therapeutic interventions, address functional mobility deficits, address ROM deficits, address strength deficits, analyze and address soft tissue restrictions, analyze and cue movement patterns, analyze and modify body mechanics/ergonomics and assess and modify postural abnormalities to attain remaining goals. []  See Plan of Care  []  See progress note/recertification  []  See Discharge Summary         Progress towards goals / Updated goals:  Short Term Goals: To be accomplished in 3 weeks:  1. Patient will demonstrate L knee AROM 0- degrees in order to improve ease with stair management. PN: L knee AROM Post manual  11/1/17  Current: Progressing:  L knee AROM 11/16/2017      Long Term Goals: To be accomplished in 6 weeks:  1. Patient will demonstrate a significant functional improvement as demonstrated by a score of >/=53 on FOTO. PN:  FOTO 37.  10/19/17  2. Patient will subjectively report ability to ambulate with use of SPC for 10 minutes prior to requiring to sit in order to allow patient to return to PLOF. PN: Patient reports current ambulation tolerance 20 minutes with use of FWW or bilateral axillary crutches, 11/1/2017  Current: Regressing, Ambulation tolerance of 10 min reported, 11/14/2017  3. Patient will subjectively report the ability to independently get in/out of the bathtub without assistance in order to improve safety with self-care ADLs.   PN: Minimal assistance required with getting in/out of the tub. 10/30/2017    PLAN  []  Upgrade activities as tolerated     [x]  Continue plan of care  []  Update interventions per flow sheet       []  Discharge due to:_  []  Other:_      Maame Solis PT, DPT 11/16/2017  1:42 PM    Future Appointments  Date Time Provider Oren Ramon   11/17/2017 11:20 AM Tully Severin, [address] [place] [address]   11/20/2017 11:00 AM Marco Roque, PT [facility]   11/22/2017 3:30 PM Marco Roque, PT [facility]   11/27/2017 11:00 AM Marco Roque, PT [facility]   12/7/2017 10:30 AM Emmanuelle Marvin, DO [facility] None   12/20/2017 11:00 AM Shanti Lopez  E [facility]   1/10/2018 1:00 PM Matt Robertson MD [facility]

## 2017-11-16 NOTE — PROGRESS NOTES
[facility] - [facility]              [address] [place], [address] [place]   [phone] fax    Medicare Progress Report    Patient name: Kyler Ansari Start of Care: 2017   Referral source: Lorna Haywood MD : 1960   Medical/Treatment Diagnosis: Contusion of left knee, initial encounter [S80.02XA]  Contusion of left lower leg, initial encounter [S80.12XA] Onset Date:3 weeks prior to I.E. Prior Hospitalization: see medical history Provider#: [id number]   Medications: Verified on Patient Summary List    Comorbidities: Arthritis, Back Pain, BMI>30, Congestive Heart Failure, Heart Attack (), Osteoporosis, Pacemaker, Prior Surgery, L TKA (, ), R MARIANA (Lateral Approach - )  Prior Level of Function:SPC with ambulation outside of the home, Walking tolerance (10 minutes), Standing tolerance (10-15 minutes), Does not drive, (I) Household chores, No assistance with self-care ADLs (i.e. Shower chair utilized)  Visits from Start of Care: 19    Missed Visits: 1    Reporting Period: 2017 to 2017    Subjective Reports: \"Feels the same\"    Progress towards goals / Updated goals:  Short Term Goals: To be accomplished in 3 weeks:  1. Patient will demonstrate L knee AROM 0- degrees in order to improve ease with stair management. PN: L knee AROM Post manual  17  Current: Progressing:  L knee AROM 2017      Long Term Goals: To be accomplished in 6 weeks:  1. Patient will demonstrate a significant functional improvement as demonstrated by a score of >/=53 on FOTO. PN:  FOTO 37.  10/19/17  2. Patient will subjectively report ability to ambulate with use of SPC for 10 minutes prior to requiring to sit in order to allow patient to return to PLOF.   PN: Patient reports current ambulation tolerance 20 minutes with use of FWW or bilateral axillary crutches, 2017  Current: Regressing, Ambulation tolerance of 10 min reported, 11/14/2017  3. Patient will subjectively report the ability to independently get in/out of the bathtub without assistance in order to improve safety with self-care ADLs. PN: Minimal assistance required with getting in/out of the tub. 10/30/2017    Key functional changes: none since last progress report      Assessment / Recommendations:Patient has made minimal progress since last progress note (2 weeks ago). Overall progress has been slow. The plan, after conferring with the evaluating therapist is to complete the few remaining scheduled appointments and discharge to [facility]. Problem List: pain affecting function, decrease ROM, decrease strength, impaired gait/ balance, decrease ADL/ functional abilitiies, decrease activity tolerance, decrease flexibility/ joint mobility and decrease transfer abilities   Treatment Plan: Therapeutic exercise, Therapeutic activities, Neuromuscular re-education, Physical agent/modality, Gait/balance training, Manual therapy, Aquatic therapy, Patient education, Self Care training, Functional mobility training, Home safety training and Stair training      Updated Goals to be accomplished in 2   weeks:  Continue unmet goals    Frequency / Duration: Patient to be seen 1-2 times per week for 2 weeks:    G-Codes (GP)  Mobility  [id number] Current  CL= 60-79%   Goal  CK= 40-59%    The severity rating is based on clinical judgment and the FOTO score.       Hannah Muñoz, PT, DPT 11/16/2017 3:00 PM

## 2017-11-17 ENCOUNTER — APPOINTMENT (OUTPATIENT)
Dept: PHYSICAL THERAPY | Age: 57
End: 2017-11-17
Payer: MEDICARE

## 2017-11-17 ENCOUNTER — OFFICE VISIT (OUTPATIENT)
Dept: ORTHOPEDIC SURGERY | Age: 57
End: 2017-11-17

## 2017-11-17 VITALS
OXYGEN SATURATION: 99 % | HEART RATE: 61 BPM | DIASTOLIC BLOOD PRESSURE: 82 MMHG | SYSTOLIC BLOOD PRESSURE: 169 MMHG | HEIGHT: 59 IN

## 2017-11-17 DIAGNOSIS — Z96.652 HISTORY OF LEFT KNEE REPLACEMENT: ICD-10-CM

## 2017-11-17 DIAGNOSIS — G89.29 CHRONIC PAIN OF LEFT KNEE: Primary | ICD-10-CM

## 2017-11-17 DIAGNOSIS — M25.562 CHRONIC PAIN OF LEFT KNEE: Primary | ICD-10-CM

## 2017-11-17 RX ORDER — HYDROCODONE BITARTRATE AND ACETAMINOPHEN 10; 325 MG/1; MG/1
1 TABLET ORAL
Qty: 21 TAB | Refills: 0 | Status: SHIPPED | OUTPATIENT
Start: 2017-11-17 | End: 2018-03-07 | Stop reason: SDUPTHER

## 2017-11-20 ENCOUNTER — DOCUMENTATION ONLY (OUTPATIENT)
Dept: BARIATRICS/WEIGHT MGMT | Age: 57
End: 2017-11-20

## 2017-11-20 ENCOUNTER — HOSPITAL ENCOUNTER (OUTPATIENT)
Dept: PHYSICAL THERAPY | Age: 57
End: 2017-11-20
Payer: MEDICARE

## 2017-11-20 NOTE — PROGRESS NOTES
Per [facility] requirements;  E-mail and letter sent for follow up appointment. Joana Erickson Zanoni Loss [address] [facility]  [facility]      Dear Vijay Marshall,  Your health is our main concern. It is important for your health to have follow-up lab work and to see you surgeon at 3 months, 6 months and annually after your weight loss surgery. Additionally, the Department of bariatric Surgery at our hospital is a member of the [employer] [address] Surgical Quality Improvement Program ([facility] NSQIP). As a participant in this program, we gather information on the outcomes of our patients after surgery. Please call the office for a follow up appointment at [phone] with [facility] Sheri CR PA-C. If you have moved out of the area or have changed surgeons please call us and let us know the name of your doctor. Your health and feedback are important to us. We greatly appreciate your response.        Thank you,  Joana Erickson Hernesto Loss [address] [facility]

## 2017-11-27 NOTE — PROGRESS NOTES
[facility] - [facility]                                                   [address]                                                                    [place], [address] [place]   [phone] fax        Discharge Summary  Patient name: Danish Bermudez Start of Care: 2017   Referral source: Eze Reeder MD : 1960   Medical/Treatment Diagnosis: Contusion of left knee, initial encounter [S80.02XA]  Contusion of left lower leg, initial encounter [S80.12XA] Onset Date:3 weeks prior to I.E. Prior Hospitalization: see medical history Provider#: [id number]   Medications: Verified on Patient Summary List     Comorbidities: Arthritis, Back Pain, BMI>30, Congestive Heart Failure, Heart Attack (), Osteoporosis, Pacemaker, Prior Surgery, L TKA (, ), R MARIANA (Lateral Approach - )  Prior Level of Function:SPC with ambulation outside of the home, Walking tolerance (10 minutes), Standing tolerance (10-15 minutes), Does not drive, (I) Household chores, No assistance with self-care ADLs (i.e. Shower chair utilized)  Visits from Start of Care: 19                                                                                        Missed Visits: 6  Reporting Period : 2017 to 2017        Summary of Care:     Short Term Goals: To be accomplished in 3 weeks:  1. Patient will demonstrate L knee AROM 0- degrees in order to improve ease with stair management. PN: L knee AROM Post manual    At DC: Progressing:  L knee AROM      Long Term Goals: To be accomplished in 6 weeks:  1. Patient will demonstrate a significant functional improvement as demonstrated by a score of >/=53 on FOTO. PN:  FOTO 37  At DC: Inability to assess secondary to patient non-attendance  2. Patient will subjectively report ability to ambulate with use of SPC for 10 minutes prior to requiring to sit in order to allow patient to return to OF.   PN: Patient reports current ambulation tolerance 20 minutes with use of FWW or bilateral axillary crutches  At DC: Regressing, Ambulation tolerance of 10 min reported  3. Patient will subjectively report the ability to independently get in/out of the bathtub without assistance in order to improve safety with self-care ADLs. PN: Minimal assistance required with getting in/out of the tub.   At DC: Inability to assess secondary to patient non-attendance     G-Codes (GP)  Mobility    Goal  CK= 40-59%  D/C  CL= 60-79%     The severity rating is based on clinical judgment and the FOTO Score score.     ASSESSMENT/RECOMMENDATIONS:     Patient has demonstrated since [address] minimal improvement in L knee AROM with patient presenting upon initial evaluation with a chronic L knee flexion contracture, therefore questionable prognosis. Patient with continued use of FWW within the home and bilateral axillary crutches with short-distance community ambulation with ambulation tolerance of 20 minutes subjectively reported. Difficulty progressing weightbearing functional exercises within clinic and progressing towards ambulation with the least restrictive assistive device secondary to chronic R-sided weakness secondary to stroke. Patient with non-attendance to physical therapy x3 sessions (cancellation x1, no-show x2) since last progress note on 11/16/2017. With contact with patient via phone on 11/27/2017, patient indicated she was unsure whether or not the MD desired for her to continue with Physical Therapy services.  Secondary to relative lack of progress demonstrated within clinic at this time patient in agreement to be discharged.     [x]Discontinue therapy:             []Patient has reached or is progressing toward set goals                                                                                          []Patient is non-compliant or has abdicated [x]Due to lack of appreciable progress towards set goals     Ann Bautista, PT 11/27/2017 11:21 AM     NOTE TO PHYSICIAN:  Please complete the following and fax to: [facility] at [facility] at [phone]  . Retain this original for your records.   If you are unable to process this request in   24 hours, please contact our office.      [] I have read the above report and request that my patient continue therapy with the following changes/special instructions:  [] I have read the above report and request that my patient be discharged from therapy     Physician's Signature:_____________________ Date:___________Time:__________

## 2017-11-28 LAB
ABSOLUTE LYMPHOCYTE COUNT, 10803: 1.6 K/UL (ref 1–4.8)
BASOPHILS # BLD: 0 K/UL (ref 0–0.2)
BASOPHILS NFR BLD: 1 % (ref 0–2)
C-REACTIVE PROTEIN, CARDIAC, 120768: 2.45 MG/L
EOSINOPHIL # BLD: 0.1 K/UL (ref 0–0.5)
EOSINOPHIL NFR BLD: 3 % (ref 0–6)
ERYTHROCYTE [DISTWIDTH] IN BLOOD BY AUTOMATED COUNT: 16.3 % (ref 10–16)
GRANULOCYTES,GRANS: 43 % (ref 40–75)
HCT VFR BLD AUTO: 32 % (ref 35.1–48)
HGB BLD-MCNC: 10.2 G/DL (ref 11.7–16)
IL-6, 10347: 3.2 PG/ML
LYMPHOCYTES, LYMLT: 45 % (ref 27–45)
MCH RBC QN AUTO: 26 PG (ref 26–34)
MCHC RBC AUTO-ENTMCNC: 32 G/DL (ref 32–36)
MCV RBC AUTO: 81 FL (ref 80–95)
MONOCYTES # BLD: 0.3 K/UL (ref 0.1–0.9)
MONOCYTES NFR BLD: 7 % (ref 3–9)
NEUTROPHILS # BLD AUTO: 1.5 K/UL (ref 1.8–7.7)
PLATELET # BLD AUTO: 243 K/UL (ref 140–440)
PMV BLD AUTO: 11 FL (ref 6–10.8)
RBC # BLD AUTO: 3.95 M/UL (ref 3.8–5.2)
SED RATE (ESR): 12 MM/HR (ref 0–30)
URATE SERPL-MCNC: 5.3 MG/DL (ref 2.2–7.7)
WBC # BLD AUTO: 3.4 K/UL (ref 4–11)

## 2017-11-29 ENCOUNTER — APPOINTMENT (OUTPATIENT)
Dept: PHYSICAL THERAPY | Age: 57
End: 2017-11-29
Payer: MEDICARE

## 2017-12-01 ENCOUNTER — OFFICE VISIT (OUTPATIENT)
Dept: ORTHOPEDIC SURGERY | Age: 57
End: 2017-12-01

## 2017-12-01 VITALS
RESPIRATION RATE: 18 BRPM | HEIGHT: 59 IN | SYSTOLIC BLOOD PRESSURE: 136 MMHG | HEART RATE: 64 BPM | TEMPERATURE: 97.3 F | DIASTOLIC BLOOD PRESSURE: 72 MMHG | BODY MASS INDEX: 33.67 KG/M2 | OXYGEN SATURATION: 98 % | WEIGHT: 167 LBS

## 2017-12-01 DIAGNOSIS — M25.562 LEFT KNEE PAIN, UNSPECIFIED CHRONICITY: Primary | ICD-10-CM

## 2017-12-01 RX ORDER — BETAMETHASONE SODIUM PHOSPHATE AND BETAMETHASONE ACETATE 3; 3 MG/ML; MG/ML
6 INJECTION, SUSPENSION INTRA-ARTICULAR; INTRALESIONAL; INTRAMUSCULAR; SOFT TISSUE ONCE
Qty: 1 ML | Refills: 0
Start: 2017-12-01 | End: 2017-12-01
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
## 2017-12-07 ENCOUNTER — OFFICE VISIT (OUTPATIENT)
Dept: FAMILY MEDICINE CLINIC | Age: 57
End: 2017-12-07

## 2017-12-07 VITALS
RESPIRATION RATE: 16 BRPM | DIASTOLIC BLOOD PRESSURE: 79 MMHG | WEIGHT: 167 LBS | HEART RATE: 71 BPM | BODY MASS INDEX: 33.67 KG/M2 | SYSTOLIC BLOOD PRESSURE: 130 MMHG | TEMPERATURE: 97 F | OXYGEN SATURATION: 98 % | HEIGHT: 59 IN

## 2017-12-07 DIAGNOSIS — R73.09 ELEVATED HEMOGLOBIN A1C: ICD-10-CM

## 2017-12-07 DIAGNOSIS — I10 ESSENTIAL HYPERTENSION: ICD-10-CM

## 2017-12-07 DIAGNOSIS — M89.9 DISORDER OF BONE AND CARTILAGE: ICD-10-CM

## 2017-12-07 DIAGNOSIS — M94.9 DISORDER OF BONE AND CARTILAGE: ICD-10-CM

## 2017-12-07 DIAGNOSIS — Z23 ENCOUNTER FOR IMMUNIZATION: ICD-10-CM

## 2017-12-07 DIAGNOSIS — M54.16 LUMBAR NEURITIS: ICD-10-CM

## 2017-12-07 DIAGNOSIS — I50.22 CHRONIC SYSTOLIC HEART FAILURE (HCC): ICD-10-CM

## 2017-12-07 DIAGNOSIS — F51.01 PRIMARY INSOMNIA: Primary | ICD-10-CM

## 2017-12-07 RX ORDER — ZOLPIDEM TARTRATE 10 MG/1
10 TABLET ORAL
Qty: 30 TAB | Refills: 0 | Status: SHIPPED | OUTPATIENT
Start: 2017-12-07 | End: 2018-03-07 | Stop reason: SDUPTHER

## 2017-12-07 NOTE — PROGRESS NOTES
s     Progress Note    Patient: Matthew Ward MRN: [id number]  SSN: [id number]    YOB: 1960  Age: 65 y.o. Sex: female          Subjective:   Matthew Ward is a 65 y.o. female who is here for regular follow up. The patient recently had a fall and twisted her left knee while in [place] three months ago. She was placed in a brace and was given a cortisone shot by her orthopedic specialist. She mentions that they told her that if the shot did not work she would have to try to see a specialist in [facility]. She mentionjs that her left knee feels OK. She states that they did do a CT scan on the knee. She was told that nothing was broken or loose. The patient has been on PT for the past three months. She has also been doing home PT exercises as well. The patient never got a call from her foot doctor in regard to swelling in her foot. She admits to an injury to the second toe on the right foot. He mentions that it looks like the wound is closing but is not yet completely healed. The patient mentions that her Ambien has been effective in helping her sleep. She mentions that she has been doing well on the Lyrica twice a day. In regard to her lumbar neuritis with radiation down arm she has not heard back from Dr. Malvin Bradley. Objective:     Visit Vitals    /79 (BP 1 Location: Right arm, BP Patient Position: Sitting)    Pulse 71    Temp 97 °F (36.1 °C) (Oral)    Resp 16    Ht 4' 11\" (1.499 m)    Wt 167 lb (75.8 kg)    LMP  (LMP Unknown)    SpO2 98%    BMI 33.73 kg/m2       Review of Systems:  Pertinent ROS noted in HPI     Physical Exam:   GENERAL: alert, cooperative, appears stated age  EYE: conjunctivae/corneas clear. PERRL, EOM's intact. Fundi benign  THROAT & NECK: normal and no erythema or exudates noted.  Poor dentition on exam.   LUNG: clear to auscultation bilaterally  HEART: regular rate and rhythm, S1, S2 normal, no murmur, click, rub or gallop  ABDOMEN: Bowels sounds diminished but observed. EXTREMITIES:  No significant edema  NEUROLOGIC: Right arm flaccid on exam. No change from previous visits. MUSCULOSKELETAL: Tenderness in left lower extremity. Lab/Data Review:    No new labs to review   Assessment:     1.) Insomnia: Patient stable on Ambien 10 mg once a day and this was refilled. 2.) Right Foot Swelling: Patient awaiting call from podiatrist, Dr. Riky David. 3.) Lumbar Neuritis with radiation down left arm: Patient previously referred to Dr. Daria Martin for second opinion. She is awaiting a call from them. 4.) Diabetic Neuropathy: Patient stable on Lyrica 300 mg twice a day by Dr. Noreen Levine. 5.) Hyperlipidemia: Lipid panel ordered. 6.) Urinary Incontinence: Stable    7.) Preventive: Flu shot administered in the office today    I personally reviewed and summarized all labs with the patient. Patient will return in 3 months for follow-up.         Plan:     Orders Placed This Encounter    Influenza virus vaccine (QUADRIVALENT PRES FREE SYRINGE) IM (47037)    CBC WITH AUTOMATED DIFF    METABOLIC PANEL, COMPREHENSIVE    LIPID PANEL    HEMOGLOBIN A1C WITH EAG    Administration fee () for Medicare insured patients    zolpidem (AMBIEN) 10 mg tablet           Signed By: Nyasia Le DO     December 7, 2017

## 2017-12-07 NOTE — PROGRESS NOTES
Chief Complaint   Patient presents with    Knee Pain     left side states that she had injection 12-1-17, rates as 5/10, wears brace    Hypertension     managed with meds    Insomnia     states that this is managed with meds as she has trouble maintaing and initiating     1. Have you been to the ER, urgent care clinic since your last visit? Hospitalized since your last visit? No    2. Have you seen or consulted any other health care providers outside of the [address] [place] [address] since your last visit? Include any pap smears or colon screening. No  Ottoniel Dodson 1960 female who presents for routine immunizations. Patient denies any symptoms , reactions or allergies that would exclude them from being immunized today. Risks and adverse reactions were discussed and the VIS was given to them. All questions were addressed. Order placed for flu,  per Verbal Order from Dr Germain Martel with read back. Patient was observed for 15 min post injection. There were no reactions observed. Briseyda Edwards.  Jesus Campo LPN

## 2017-12-07 NOTE — MR AVS SNAPSHOT
Visit Information Date & Time Provider Department Dept. Phone Encounter #  
 12/7/2017 10:30 AM Jackie Day, [address] [id number] Follow-up Instructions Return in about 3 months (around 3/7/2018) for Regular Follow Up . Your Appointments 12/20/2017 11:00 AM  
Follow Up with Gaurav Guerra MD  
[employer] [facility]) Appt Note: 6mon f/u  
 [address] [facility] [address]  
[phone]  
  
   
 [facility] [address]  
  
    
 1/10/2018  1:00 PM  
Follow Up with Robi Daly MD  
[address] [facility], [address] [facility] - SPECIALTY [facility] ([facility]) Appt Note: 3 mo follow for lower back 2012 SPECIALTY [facility] [address]  
  
   
 [address] [facility] [address]  
  
    
 3/2/2018 10:30 AM  
Follow Up with Casie Redmond PA-C  
[facility] - [facility] ([facility]) Appt Note: rt knee 3 mo fu  
 [address], [address] [facility]  
[phone] [address] [place] [address] Upcoming Health Maintenance Date Due  
 EYE EXAM RETINAL OR DILATED Q1 8/5/1971 FOBT Q 1 YEAR AGE 50-75 8/5/2011 FOOT EXAM Q1 3/3/2015 GLAUCOMA SCREENING Q2Y 9/29/2016 HEMOGLOBIN A1C Q6M 7/25/2017 Influenza Age 5 to Adult 8/1/2017 MICROALBUMIN Q1 10/17/2017 BREAST CANCER SCRN MAMMOGRAM 5/16/2018 LIPID PANEL Q1 9/7/2018 DTaP/Tdap/Td series (2 - Td) 10/4/2026 Allergies as of 12/7/2017  Review Complete On: 12/7/2017 By: Jackie Day DO Severity Noted Reaction Type Reactions Dextromethorphan-guaifenesin High 11/24/2011    Other (comments) Aspirin  08/02/2012   Topical Hives Current Immunizations  Reviewed on 12/13/2013 Name Date  Influenza Vaccine 9/29/2015, 9/2/2015 12:00 AM, 11/24/2011 12:00 AM  
 Influenza Vaccine PF 9/24/2014, 12/13/2013 Pneumococcal Conjugate (PCV-13) 5/2/2017 Pneumococcal Polysaccharide (PPSV-23) 11/3/2015 12:00 AM  
 Tdap 10/4/2016 12:00 AM  
  
 Not reviewed this visit You Were Diagnosed With   
  
 Codes Comments Primary insomnia    -  Primary ICD-10-CM: F51.01 
ICD-9-CM: 307.42 Lumbar neuritis     ICD-10-CM: M54.16 
ICD-9-CM: 724.4 Chronic systolic heart failure (HCC)     ICD-10-CM: I50.22 ICD-9-CM: 428.22 Essential hypertension     ICD-10-CM: I10 
ICD-9-CM: 401.9 Elevated hemoglobin A1c     ICD-10-CM: R73.09 
ICD-9-CM: 790.29 Disorder of bone and cartilage     ICD-10-CM: M89.9, M94.9 ICD-9-CM: 733.90 Encounter for immunization     ICD-10-CM: Y55 ICD-9-CM: V03.89 Vitals BP Pulse Temp Resp Height(growth percentile) Weight(growth percentile) 130/79 (BP 1 Location: Right arm, BP Patient Position: Sitting) 71 97 °F (36.1 °C) (Oral) 16 4' 11\" (1.499 m) 167 lb (75.8 kg) LMP SpO2 BMI OB Status Smoking Status (LMP Unknown) 98% 33.73 kg/m2 Hysterectomy Former Smoker BMI and BSA Data Body Mass Index Body Surface Area  
 33.73 kg/m 2 1.78 m 2 Preferred Pharmacy Pharmacy Name Phone [facility] [phone] Your Updated Medication List  
  
   
This list is accurate as of: 12/7/17 11:17 AM.  Always use your most recent med list.  
  
  
  
  
 brief disposable Misc Commonly known as:  adult  
by Does Not Apply route. Dispense one package of 180 briefs/ diapers. calcipotriene 0.005 % topical cream  
Commonly known as:  DOVONEX  
  
 calcium citrate-vitamin d3 315-200 mg-unit Tab Commonly known as:  CITRACAL+D Take 1 tablet by mouth two (2) times daily (with meals). carBAMazepine 200 mg tablet Commonly known as:  TEGretol 1  q am 1 q pm  2 qhs  
  
 carvedilol 12.5 mg tablet Commonly known as:  Edi Due  
 Take 6.25 mg by mouth two (2) times daily (with meals). celecoxib 200 mg capsule Commonly known as:  CELEBREX  
TAKE 1 CAPSULE BY MOUTH TWICE DAILY FOR 90 DAYS  
  
 clopidogrel 75 mg Tab Commonly known as:  PLAVIX Take 1 tablet by mouth daily. * DULoxetine 60 mg capsule Commonly known as:  CYMBALTA Take 1 Cap by mouth daily. * DULoxetine 60 mg capsule Commonly known as:  CYMBALTA Take 1 Cap by mouth daily. ergocalciferol 50,000 unit capsule Commonly known as:  ERGOCALCIFEROL Take 1 Cap by mouth every seven (7) days. furosemide 40 mg tablet Commonly known as:  LASIX TAKE 1 TABLET BY MOUTH TWICE DAILY AS NEEDED  
  
 * HYDROcodone-acetaminophen 7.5-325 mg per tablet Commonly known as:  Timo Songster Take 1 Tab by mouth every eight (8) hours as needed for Pain. Max Daily Amount: 3 Tabs. * HYDROcodone-acetaminophen 7.5-325 mg per tablet Commonly known as:  Timo Songster Take 1 Tab by mouth every six (6) hours as needed for Pain. Max Daily Amount: 4 Tabs. * HYDROcodone-acetaminophen  mg tablet Commonly known as:  Timo Songster Take 1 Tab by mouth every eight (8) hours as needed for Pain. Max Daily Amount: 3 Tabs. isosorbide mononitrate ER 30 mg tablet Commonly known as:  IMDUR Take 15 mg by mouth every morning. levocetirizine 5 mg tablet Commonly known as:  Lovett Morrison Take 1 Tab by mouth daily. lisinopril 20 mg tablet Commonly known as:  Amelia November Take 20 mg by mouth daily. methocarbamol 500 mg tablet Commonly known as:  ROBAXIN  
TAKE 1 TABLET BY MOUTH FOUR TIMES DAILY AS NEEDED FOR MUSCLE SPASM * miscellaneous medical supply Misc  
1 Each by Does Not Apply route daily. * miscellaneous medical supply Misc  
1 Each by Does Not Apply route daily. * miscellaneous medical supply Misc  
2 Each by Does Not Apply route daily. * miscellaneous medical supply Misc  
2 Each by Does Not Apply route daily. montelukast 10 mg tablet Commonly known as:  SINGULAIR TK 1 T PO D  
  
 omeprazole 20 mg capsule Commonly known as:  PRILOSEC Take 1 capsule by mouth daily. polyethylene glycol 17 gram/dose powder Commonly known as:  Alonza Schimke Take 17 g by mouth daily. PRALUENT PEN 75 mg/mL injector pen Generic drug:  alirocumab * pregabalin 300 mg capsule Commonly known as:  Kavya Bard Take 1 Cap by mouth two (2) times a day. Max Daily Amount: 600 mg.  
  
 * pregabalin 300 mg capsule Commonly known as:  Kavya Bard Take 1 Cap by mouth two (2) times a day. Max Daily Amount: 600 mg.  
  
 rosuvastatin 40 mg tablet Commonly known as:  CRESTOR Take 1 Tab by mouth daily. therapeutic multivitamin tablet Commonly known as:  [facility] Take 1 tablet by mouth daily. VITAMIN B-12 500 mcg tablet Generic drug:  cyanocobalamin Take 500 mcg by mouth daily. zolpidem 10 mg tablet Commonly known as:  AMBIEN Take 1 Tab by mouth nightly as needed for Sleep. Max Daily Amount: 10 mg.  
  
 * Notice: This list has 11 medication(s) that are the same as other medications prescribed for you. Read the directions carefully, and ask your doctor or other care provider to review them with you. Prescriptions Printed Refills  
 zolpidem (AMBIEN) 10 mg tablet 0 Sig: Take 1 Tab by mouth nightly as needed for Sleep. Max Daily Amount: 10 mg.  
 Class: Print Route: Oral  
  
Follow-up Instructions Return in about 3 months (around 3/7/2018) for Regular Follow Up . To-Do List   
 12/07/2017 Lab:  HEMOGLOBIN A1C WITH EAG Around 03/07/2018 Lab:  CBC WITH AUTOMATED DIFF Around 03/07/2018 Lab:  LIPID PANEL Around 03/07/2018 Lab:  METABOLIC PANEL, COMPREHENSIVE Patient Instructions Please contact our office if you have any questions about your visit today. 1.) Please get labs a week before next visit 2. ) Return in 3 months for follow up Introducing [facility]! Dear Nika Sees: 
Thank you for requesting a [employer] account. Our records indicate that you already have an active [employer] account. You can access your account anytime at [email or web address] Did you know that you can access your hospital and ER discharge instructions at any time in [employer]? You can also review all of your test results from your hospital stay or ER visit. Additional Information If you have questions, please visit the Frequently Asked Questions section of the [employer] website at [email or web address] Remember, Sunfire is NOT to be used for urgent needs. For medical emergencies, dial 911. Now available from your iPhone and Android! Please provide this summary of care documentation to your next provider. Your primary care clinician is listed as Mariajose Guzman. If you have any questions after today's visit, please call [phone].

## 2017-12-27 ENCOUNTER — OFFICE VISIT (OUTPATIENT)
Dept: NEUROLOGY | Age: 57
End: 2017-12-27

## 2017-12-27 VITALS
HEART RATE: 58 BPM | RESPIRATION RATE: 16 BRPM | TEMPERATURE: 97.7 F | OXYGEN SATURATION: 96 % | HEIGHT: 59 IN | DIASTOLIC BLOOD PRESSURE: 72 MMHG | SYSTOLIC BLOOD PRESSURE: 128 MMHG

## 2017-12-27 DIAGNOSIS — G81.90 HEMIPLEGIA AFFECTING DOMINANT SIDE (HCC): Primary | ICD-10-CM

## 2017-12-27 DIAGNOSIS — R29.898 BILATERAL ARM WEAKNESS: ICD-10-CM

## 2017-12-27 DIAGNOSIS — M54.12 RADICULOPATHY, CERVICAL: ICD-10-CM

## 2017-12-27 DIAGNOSIS — M79.2 NEUROPATHIC PAIN: ICD-10-CM

## 2017-12-27 RX ORDER — TRAMADOL HYDROCHLORIDE 50 MG/1
50 TABLET ORAL
COMMUNITY
End: 2018-07-19

## 2017-12-27 RX ORDER — CARBAMAZEPINE 200 MG/1
TABLET ORAL
Qty: 360 TAB | Refills: 1 | Status: SHIPPED | OUTPATIENT
Start: 2017-12-27 | End: 2018-04-18 | Stop reason: SDUPTHER

## 2017-12-27 NOTE — MR AVS SNAPSHOT
Visit Information Date & Time Provider Department Dept. Phone Encounter #  
 12/27/2017 11:00 AM Gloria Bermudez  [facility] [address] [address] [id number] Follow-up Instructions Return in about 6 months (around 6/27/2018). Follow-up and Disposition History Your Appointments 1/10/2018  1:00 PM  
Follow Up with Manuel Beavers MD  
[facility] [place] ([address]) Appt Note: 3 mo follow for lower back 2012 [place] Count includes the [facility] [address]  
  
   
 [address] Count includes the [facility] [id number]  
  
    
 3/2/2018 10:30 AM  
Follow Up with Em Reyna PA-C  
[facility] ([address]) Appt Note: rt knee 3 mo fu  
 27 Ranjana Mehta, [facility] [address] [place] [address]  
[phone] 27 Eddie Huff  
  
    
 3/7/2018 10:30 AM  
Follow Up with Kimi Luis DO [facility] (--) Appt Note: fu  
 [address] [id number] [address] [address] [phone]  
  
   
 [address] [id number] [address] [address] Upcoming Health Maintenance Date Due  
 EYE EXAM RETINAL OR DILATED Q1 8/5/1971 FOBT Q 1 YEAR AGE 50-75 8/5/2011 FOOT EXAM Q1 3/3/2015 GLAUCOMA SCREENING Q2Y 9/29/2016 HEMOGLOBIN A1C Q6M 7/25/2017 MICROALBUMIN Q1 10/17/2017 BREAST CANCER SCRN MAMMOGRAM 5/16/2018 LIPID PANEL Q1 9/7/2018 DTaP/Tdap/Td series (2 - Td) 10/4/2026 Allergies as of 12/27/2017  Review Complete On: 12/27/2017 By: Gloria Bermudez MD  
  
 Severity Noted Reaction Type Reactions Dextromethorphan-guaifenesin High 11/24/2011    Other (comments) Aspirin  08/02/2012   Topical Hives Current Immunizations  Reviewed on 12/13/2013 Name Date Influenza Vaccine 9/29/2015, 9/2/2015 12:00 AM, 11/24/2011 12:00 AM  
 Influenza Vaccine (Quad) PF 12/7/2017 Influenza Vaccine PF 9/24/2014, 12/13/2013 Pneumococcal Conjugate (PCV-13) 5/2/2017 Pneumococcal Polysaccharide (PPSV-23) 11/3/2015 12:00 AM  
 Tdap 10/4/2016 12:00 AM  
  
 Not reviewed this visit You Were Diagnosed With   
  
 Codes Comments Hemiplegia affecting dominant side ([facility]    -  Primary ICD-10-CM: G81.90 ICD-9-CM: 342.91 Neuropathic pain     ICD-10-CM: M79.2 ICD-9-CM: 729.2 Radiculopathy, cervical     ICD-10-CM: M54.12 
ICD-9-CM: 723.4 Bilateral arm weakness     ICD-10-CM: R29.898 ICD-9-CM: 729.89 Vitals BP Pulse Temp Resp Height(growth percentile) LMP  
 128/72 (BP 1 Location: Left arm, BP Patient Position: Sitting) (!) 58 97.7 °F (36.5 °C) (Oral) 16 4' 11\" (1.499 m) (LMP Unknown) SpO2 OB Status Smoking Status 96% Hysterectomy Former Smoker Vitals History Preferred Pharmacy Pharmacy Name Phone [facility], [address] [place] [address] [phone] Your Updated Medication List  
  
   
This list is accurate as of: 12/27/17 11:38 AM.  Always use your most recent med list.  
  
  
  
  
 brief disposable Misc Commonly known as:  adult  
by Does Not Apply route. Dispense one package of 180 briefs/ diapers. calcipotriene 0.005 % topical cream  
Commonly known as:  DOVONEX  
  
 calcium citrate-vitamin d3 315-200 mg-unit Tab Commonly known as:  CITRACAL+D Take 1 tablet by mouth two (2) times daily (with meals). carBAMazepine 200 mg tablet Commonly known as:  TEGretol 1  q am 1 q pm  2 qhs  
  
 carvedilol 12.5 mg tablet Commonly known as:  Monda Lovings Take 6.25 mg by mouth two (2) times daily (with meals). celecoxib 200 mg capsule Commonly known as:  CELEBREX  
TAKE 1 CAPSULE BY MOUTH TWICE DAILY FOR 90 DAYS  
  
 clopidogrel 75 mg Tab Commonly known as:  PLAVIX Take 1 tablet by mouth daily. DULoxetine 60 mg capsule Commonly known as:  CYMBALTA Take 1 Cap by mouth daily. ergocalciferol 50,000 unit capsule Commonly known as:  ERGOCALCIFEROL Take 1 Cap by mouth every seven (7) days. furosemide 40 mg tablet Commonly known as:  LASIX TAKE 1 TABLET BY MOUTH TWICE DAILY AS NEEDED HYDROcodone-acetaminophen  mg tablet Commonly known as:  Julaine Kava Take 1 Tab by mouth every eight (8) hours as needed for Pain. Max Daily Amount: 3 Tabs. isosorbide mononitrate ER 30 mg tablet Commonly known as:  IMDUR Take 15 mg by mouth every morning. levocetirizine 5 mg tablet Commonly known as:  Patsy Reagin Take 1 Tab by mouth daily. lisinopril 20 mg tablet Commonly known as:  Arnold Files Take 20 mg by mouth daily. methocarbamol 500 mg tablet Commonly known as:  ROBAXIN  
TAKE 1 TABLET BY MOUTH FOUR TIMES DAILY AS NEEDED FOR MUSCLE SPASM * miscellaneous medical supply Misc  
1 Each by Does Not Apply route daily. * miscellaneous medical supply Misc  
1 Each by Does Not Apply route daily. * miscellaneous medical supply Misc  
2 Each by Does Not Apply route daily. * miscellaneous medical supply Misc  
2 Each by Does Not Apply route daily. montelukast 10 mg tablet Commonly known as:  SINGULAIR TK 1 T PO D  
  
 omeprazole 20 mg capsule Commonly known as:  PRILOSEC Take 1 capsule by mouth daily. OTHER Antibiotic from dentist- unsure of name  
  
 polyethylene glycol 17 gram/dose powder Commonly known as:  Sheria Bud Take 17 g by mouth daily. PRALUENT PEN 75 mg/mL injector pen Generic drug:  alirocumab  
  
 pregabalin 300 mg capsule Commonly known as:  Kimi Dewayne Take 1 Cap by mouth two (2) times a day. Max Daily Amount: 600 mg.  
  
 rosuvastatin 40 mg tablet Commonly known as:  CRESTOR Take 1 Tab by mouth daily. therapeutic multivitamin tablet Commonly known as:  [facility] Take 1 tablet by mouth daily. traMADol 50 mg tablet Commonly known as:  ULTRAM  
Take 50 mg by mouth every six (6) hours as needed for Pain. VITAMIN B-12 500 mcg tablet Generic drug:  cyanocobalamin Take 500 mcg by mouth daily. zolpidem 10 mg tablet Commonly known as:  AMBIEN Take 1 Tab by mouth nightly as needed for Sleep. Max Daily Amount: 10 mg.  
  
 * Notice: This list has 4 medication(s) that are the same as other medications prescribed for you. Read the directions carefully, and ask your doctor or other care provider to review them with you. Prescriptions Sent to Pharmacy Refills  
 carBAMazepine (TEGRETOL) 200 mg tablet 1 Si  q am 1 q pm  2 qhs  
 Class: Normal  
 Pharmacy: [address] [place] [address] [facility]: [phone] Follow-up Instructions Return in about 6 months (around 2018). To-Do List   
 2017 Lab:  CARBAMAZEPINE Patient Instructions Reviewed need for lab work Introducing [facility]! Dear Amelia Zamudio: 
Thank you for requesting a [employer] account. Our records indicate that you already have an active [employer] account. You can access your account anytime at [email or web address] Did you know that you can access your hospital and ER discharge instructions at any time in [employer]? You can also review all of your test results from your hospital stay or ER visit. Additional Information If you have questions, please visit the Frequently Asked Questions section of the [employer] website at [email or web address] Remember, [employer] is NOT to be used for urgent needs. For medical emergencies, dial 911. Now available from your iPhone and Android! Please provide this summary of care documentation to your next provider. Your primary care clinician is listed as Shaheen Del Rosario.  If you have any questions after today's visit, please call [phone].
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
## 2017-12-27 NOTE — PROGRESS NOTES
1. Have you been to the ER, urgent care clinic since your last visit? Hospitalized since your last visit? ER- 4 weeks ago for a cut on toe (Jacklyn Thomas)    2. Have you seen or consulted any other health care providers outside of the [address] [place] [address] since your last visit? Include any pap smears or colon screening.  No     Chief Complaint   Patient presents with    Follow-up    Neurologic Problem     hemiplegia, neuropathic pain, radiculopathy

## 2018-01-10 ENCOUNTER — OFFICE VISIT (OUTPATIENT)
Dept: ORTHOPEDIC SURGERY | Age: 58
End: 2018-01-10

## 2018-01-10 VITALS
HEIGHT: 59 IN | HEART RATE: 63 BPM | SYSTOLIC BLOOD PRESSURE: 133 MMHG | WEIGHT: 165 LBS | BODY MASS INDEX: 33.26 KG/M2 | DIASTOLIC BLOOD PRESSURE: 68 MMHG

## 2018-01-10 DIAGNOSIS — M47.817 SPONDYLOSIS WITHOUT MYELOPATHY OR RADICULOPATHY, LUMBOSACRAL REGION: ICD-10-CM

## 2018-01-10 DIAGNOSIS — M54.12 RADICULOPATHY, CERVICAL: ICD-10-CM

## 2018-01-10 DIAGNOSIS — M47.812 SPONDYLOSIS WITHOUT MYELOPATHY OR RADICULOPATHY, CERVICAL REGION: Primary | ICD-10-CM

## 2018-01-10 PROBLEM — E11.40 TYPE 2 DIABETES MELLITUS WITH DIABETIC NEUROPATHY (HCC): Status: ACTIVE | Noted: 2018-01-10

## 2018-01-10 RX ORDER — PREGABALIN 300 MG/1
300 CAPSULE ORAL 2 TIMES DAILY
Qty: 60 CAP | Refills: 2 | Status: SHIPPED | OUTPATIENT
Start: 2018-01-10 | End: 2018-05-02 | Stop reason: SDUPTHER

## 2018-01-10 RX ORDER — DULOXETIN HYDROCHLORIDE 60 MG/1
60 CAPSULE, DELAYED RELEASE ORAL DAILY
Qty: 30 CAP | Refills: 2 | Status: SHIPPED | OUTPATIENT
Start: 2018-01-10 | End: 2018-05-02 | Stop reason: SDUPTHER

## 2018-01-10 NOTE — MR AVS SNAPSHOT
Visit Information Date & Time Provider Department Dept. Phone Encounter #  
 1/10/2018  1:00 PM Pelon Hernandez MD [facility] [place] [phone] [id number] Follow-up Instructions Return in about 3 months (around 4/10/2018). Your Appointments 3/2/2018 10:30 AM  
Follow Up with Heraclio Rucker PA-C  
[facility] Appt Note: rt knee 3 mo fu  
 [address], [address] [facility]  
[phone] Heddy Me[address], [address]  
  
    
 3/7/2018 10:30 AM  
Follow Up with [address] [facility], [address] (--) Appt Note: fu  
 [address] [facility] [id number] [phone]  
  
   
 [address] [place] [address] Road [address]  
  
    
 6/27/2018 11:00 AM  
Follow Up with Guadalupe Shannon MD  
[facility]) Appt Note: 6 month f/u [facility]  
 [address] [place] [id number] [phone]  
  
   
 [facility] [id number] Upcoming Health Maintenance Date Due  
 EYE EXAM RETINAL OR DILATED Q1 8/5/1971 FOBT Q 1 YEAR AGE 50-75 8/5/2011 FOOT EXAM Q1 3/3/2015 GLAUCOMA SCREENING Q2Y 9/29/2016 HEMOGLOBIN A1C Q6M 7/25/2017 MICROALBUMIN Q1 10/17/2017 BREAST CANCER SCRN MAMMOGRAM 5/16/2018 LIPID PANEL Q1 9/7/2018 DTaP/Tdap/Td series (2 - Td) 10/4/2026 Allergies as of 1/10/2018  Review Complete On: 1/10/2018 By: Pelon Hernandez MD  
  
 Severity Noted Reaction Type Reactions Dextromethorphan-guaifenesin High 11/24/2011    Other (comments) Aspirin  08/02/2012   Topical Hives Current Immunizations  Reviewed on 12/13/2013 Name Date Influenza Vaccine 9/29/2015, 9/2/2015 12:00 AM, 11/24/2011 12:00 AM  
 Influenza Vaccine (Quad) PF 12/7/2017 Influenza Vaccine PF 9/24/2014, 12/13/2013 Pneumococcal Conjugate (PCV-13) 5/2/2017 Pneumococcal Polysaccharide (PPSV-23) 11/3/2015 12:00 AM  
 Tdap 10/4/2016 12:00 AM  
  
 Not reviewed this visit You Were Diagnosed With   
  
 Codes Comments Spondylosis without myelopathy or radiculopathy, cervical region    -  Primary ICD-10-CM: M47.812 ICD-9-CM: 721.0 Radiculopathy, cervical     ICD-10-CM: M54.12 
ICD-9-CM: 723.4 Spondylosis without myelopathy or radiculopathy, lumbosacral region     ICD-10-CM: M47.817 ICD-9-CM: 721.3 Vitals BP Pulse Height(growth percentile) Weight(growth percentile) LMP BMI  
 133/68 63 4' 11\" (1.499 m) 165 lb (74.8 kg) (LMP Unknown) 33.33 kg/m2 OB Status Smoking Status Hysterectomy Former Smoker Vitals History BMI and BSA Data Body Mass Index Body Surface Area  
 33.33 kg/m 2 1.76 m 2 Preferred Pharmacy Pharmacy Name Phone [facility] [address] [phone] Your Updated Medication List  
  
   
This list is accurate as of: 1/10/18  2:29 PM.  Always use your most recent med list.  
  
  
  
  
 brief disposable Misc Commonly known as:  adult  
by Does Not Apply route. Dispense one package of 180 briefs/ diapers. calcipotriene 0.005 % topical cream  
Commonly known as:  DOVONEX  
  
 calcium citrate-vitamin d3 315-200 mg-unit Tab Commonly known as:  CITRACAL+D Take 1 tablet by mouth two (2) times daily (with meals). carBAMazepine 200 mg tablet Commonly known as:  TEGretol 1  q am 1 q pm  2 qhs  
  
 carvedilol 12.5 mg tablet Commonly known as:  Daryle Rubins Take 6.25 mg by mouth two (2) times daily (with meals). celecoxib 200 mg capsule Commonly known as:  CELEBREX  
TAKE 1 CAPSULE BY MOUTH TWICE DAILY FOR 90 DAYS  
  
 clopidogrel 75 mg Tab Commonly known as:  PLAVIX Take 1 tablet by mouth daily. * DULoxetine 60 mg capsule Commonly known as:  CYMBALTA Take 1 Cap by mouth daily. * DULoxetine 60 mg capsule Commonly known as:  CYMBALTA Take 1 Cap by mouth daily. ergocalciferol 50,000 unit capsule Commonly known as:  ERGOCALCIFEROL Take 1 Cap by mouth every seven (7) days. furosemide 40 mg tablet Commonly known as:  LASIX TAKE 1 TABLET BY MOUTH TWICE DAILY AS NEEDED HYDROcodone-acetaminophen  mg tablet Commonly known as:  Gila Dhillon Take 1 Tab by mouth every eight (8) hours as needed for Pain. Max Daily Amount: 3 Tabs. isosorbide mononitrate ER 30 mg tablet Commonly known as:  IMDUR Take 15 mg by mouth every morning. levocetirizine 5 mg tablet Commonly known as:  Obadiah Cocks Take 1 Tab by mouth daily. lisinopril 20 mg tablet Commonly known as:  Mercylnidsey Callaway Take 20 mg by mouth daily. methocarbamol 500 mg tablet Commonly known as:  ROBAXIN  
TAKE 1 TABLET BY MOUTH FOUR TIMES DAILY AS NEEDED FOR MUSCLE SPASM * miscellaneous medical supply Misc  
1 Each by Does Not Apply route daily. * miscellaneous medical supply Misc  
1 Each by Does Not Apply route daily. * miscellaneous medical supply Misc  
2 Each by Does Not Apply route daily. * miscellaneous medical supply Misc  
2 Each by Does Not Apply route daily. montelukast 10 mg tablet Commonly known as:  SINGULAIR TK 1 T PO D  
  
 omeprazole 20 mg capsule Commonly known as:  PRILOSEC Take 1 capsule by mouth daily. OTHER Antibiotic from dentist- unsure of name  
  
 polyethylene glycol 17 gram/dose powder Commonly known as:  Opal Lenze Take 17 g by mouth daily. PRALUENT PEN 75 mg/mL injector pen Generic drug:  alirocumab * pregabalin 300 mg capsule Commonly known as:  Susa Hemp Take 1 Cap by mouth two (2) times a day. Max Daily Amount: 600 mg.  
  
 * pregabalin 300 mg capsule Commonly known as:  Susa Hemp Take 1 Cap by mouth two (2) times a day. Max Daily Amount: 600 mg.  
  
 rosuvastatin 40 mg tablet Commonly known as:  CRESTOR Take 1 Tab by mouth daily. therapeutic multivitamin tablet Commonly known as:  [facility] Take 1 tablet by mouth daily. traMADol 50 mg tablet Commonly known as:  ULTRAM  
Take 50 mg by mouth every six (6) hours as needed for Pain. VITAMIN B-12 500 mcg tablet Generic drug:  cyanocobalamin Take 500 mcg by mouth daily. zolpidem 10 mg tablet Commonly known as:  AMBIEN Take 1 Tab by mouth nightly as needed for Sleep. Max Daily Amount: 10 mg.  
  
 * Notice: This list has 8 medication(s) that are the same as other medications prescribed for you. Read the directions carefully, and ask your doctor or other care provider to review them with you. Prescriptions Printed Refills  
 pregabalin (LYRICA) 300 mg capsule 2 Sig: Take 1 Cap by mouth two (2) times a day. Max Daily Amount: 600 mg. Class: Print Route: Oral  
  
Prescriptions Sent to Pharmacy Refills DULoxetine (CYMBALTA) 60 mg capsule 2 Sig: Take 1 Cap by mouth daily. Class: Normal  
 Pharmacy: [address] [place] [address] #: [phone] Route: Oral  
  
Follow-up Instructions Return in about 3 months (around 4/10/2018). [facility]! Dear Kasey Velazquez: 
Thank you for requesting a TPACK account. Our records indicate that you already have an active TPACK account. You can access your account anytime at [email or web address] Did you know that you can access your hospital and ER discharge instructions at any time in TPACK? You can also review all of your test results from your hospital stay or ER visit. Additional Information If you have questions, please visit the Frequently Asked Questions section of the [employer] website at [email or web address] Remember, [employer] is NOT to be used for urgent needs. For medical emergencies, dial 911. Now available from your iPhone and Android! Please provide this summary of care documentation to your next provider. Your primary care clinician is listed as Fede Police. If you have any questions after today's visit, please call [phone].

## 2018-01-10 NOTE — PROGRESS NOTES
[facility]  [address] [facility]  [address], [address] [place]   Phone: [phone]  Fax: [phone]        PROGRESS NOTE      HISTORY OF PRESENT ILLNESS:  The patient is a 65 y.o. female and was seen today for follow up of left-sided neck pain extending into the left shoulder. She denies symptoms extending to the hand at this time. Pain is exacerbated with reaching behind and overhead activity. The patient reports a fall on 10/11/16 from LOB and reported to the ER. She reports multiple falls due to LOB ongoing x 1+ year and states it is progressive in nature. She has also been dropping objects. Pt endorses loss of dexterity and states she has been dropping things with her left hand. She admits to staggering with walking. She continues to have LOB with coordination issues and falls. Note from Dr. Jimmie Colvin dated 5/2/17 indicating patient was seen for reevaluation of left shoulder pain with limited relief from previous cortisone injections. Of note, there is a partial thickness tear of the rotator cuff by left shoulder arthrogram. Per patient, she is currently enrolled in physical therapy for her left shoulder. She states she did f/u with Dr. Lisa Klein concerning her balance and coordination issues who referred her to physical therapy. She continues to be followed by Dr. Maya Peng for left knee pain. Pt completes her HEP 2x/day. She failed NEURONTIN. It was noted patient continues to take Tegretol. She completed the MDP without significant relief. She denies hx of DM. Cervical CT myelogram dated 11/2/2016 per report reveals multilevel mild degenerative changes as discussed most pronounced disc disease at C4/5 and C5/6. No high-grade central canal or foraminal stenosis. Cervical spine myelogram dated 11/2/2016 per report, reveals multilevel mild broad based on the disc space extradural defects at C2-3, C3-4, C4-5, and C5-6.  C6/7 and C7/T1 is not adequately visualized on the crosstable lateral view due to high position of the shoulders. Note from Natividad Shaw LPN dated 93/41/20 indicating Dr. Ardath Saint reviewed the CT myelogram and stated he didn't note definite surgical pathology to account for her pain complaints. Dr. Devan Hoff again reviewed patient's cervical CT myelogram and felt no definitive surgical pathology. A LUE EMG dated 12/23/16 was suggestive of possible C5 radiculopathy. Pt was initially seen for low back pain localized primarily to the right side of the lumbar spine. Previously, she had c/o low back pain localized primarily to the right side of the lumbar spine without significant radicular pain complaints. She also had c/o left knee pain which she is followed by Dr. Real Gallego for. Per patient, she did have knee surgery in 2009 and 2011 and was given a brace and they are monitoring her on this. She reports significant relief following bilateral L4 and L5 and left sided L5 and S1 facet blocks on 3/17/16. She states walking exacerbates her pain and bending over alleviates her pain. She is followed by Robin Fish PA-C for bilateral hip and knee pain. Upon examination, she was unable to extend digits 3, 4 and 5 from previous nerve injury. Noted, patient has previously had bilateral L4/5 facet blocks and left-sided L5/S1 facet blocks with good relief performed 03/04/16. She previously reported significant relief with left-sided L4-L5 and L5-S1 facet blocks. She has taken Topamax in the past. Pt is not a candidate for MRI due to defibrillator. Lumbar CT myelogram dated 9/29/14 per report revealed no spinal stenosis or diagnostic intrathecal abnormality. There was minimal degenerative disc disease. There was mild left-sided lumbar neuroforaminal narrowing from facet hypertrophy. There was lower lumbar facet hypertrophy and arthropathy, left greater than right. At L4-L5, there was bilateral moderate to severe facet hypertrophy. At the L5-S1 level, there was severe left-sided facet hypertrophy.   At her last clinical appointment, patient wished to continue her current treatment. She was provided with refills of her Cymbalta 60 mg and Lyrica 300 mg BID. I encouraged patient to perform her HEP daily. The patient returns today with pain location and distribution remain unchanged. She rates pain 5/10, a slight decrease since her last visit (6/10). Pt continues to have left knee pain and is being followed by Dr. Kateryna Viveros. Pt is compliant with Lyrica 300 mg BID and Cymbalta 60 mg per day. She performs her C/L HEP daily.  reviewed. Body mass index is 33.33 kg/(m^2). Past Medical History:   Diagnosis Date    Arm pain jan15    Arrhythmia 2012     [employer]onic ICD     Arthritis     ALL OVER    CAD (coronary artery disease) 2011    STENTS PLACED X2    Chronic pain     KNEE & LOWER BACK    Diabetes (HCC)     GERD (gastroesophageal reflux disease)     H/O gastric bypass     Heart attack 2011    Heart failure ([facility]     ischemic cardiomyopathy    Hypertension     Nerve damage 2017    in bilat legs and feet    Spinal cord injury         Social History     Social History    Marital status: SINGLE     Spouse name: N/A    Number of children: N/A    Years of education: N/A     Occupational History    Not on file. Social History Main Topics    Smoking status: Former Smoker     Quit date: 5/20/2013    Smokeless tobacco: Never Used    Alcohol use No    Drug use: No    Sexual activity: No      Comment: Hysterectomy     Other Topics Concern    Not on file     Social History Narrative       Current Outpatient Prescriptions   Medication Sig Dispense Refill    DULoxetine (CYMBALTA) 60 mg capsule Take 1 Cap by mouth daily. 30 Cap 2    pregabalin (LYRICA) 300 mg capsule Take 1 Cap by mouth two (2) times a day. Max Daily Amount: 600 mg. 60 Cap 2    traMADol (ULTRAM) 50 mg tablet Take 50 mg by mouth every six (6) hours as needed for Pain.       carBAMazepine (TEGRETOL) 200 mg tablet 1  q am 1 q pm  2 qhs 360 Tab 1    zolpidem (AMBIEN) 10 mg tablet Take 1 Tab by mouth nightly as needed for Sleep. Max Daily Amount: 10 mg. 30 Tab 0    HYDROcodone-acetaminophen (NORCO)  mg tablet Take 1 Tab by mouth every eight (8) hours as needed for Pain. Max Daily Amount: 3 Tabs. 21 Tab 0    celecoxib (CELEBREX) 200 mg capsule TAKE 1 CAPSULE BY MOUTH TWICE DAILY FOR 90 DAYS 180 Cap 0    brief disposable (ADULT) misc by Does Not Apply route. Dispense one package of 180 briefs/ diapers. 1 Package 11    DULoxetine (CYMBALTA) 60 mg capsule Take 1 Cap by mouth daily. 90 Cap 0    pregabalin (LYRICA) 300 mg capsule Take 1 Cap by mouth two (2) times a day. Max Daily Amount: 600 mg. 60 Cap 2    methocarbamol (ROBAXIN) 500 mg tablet TAKE 1 TABLET BY MOUTH FOUR TIMES DAILY AS NEEDED FOR MUSCLE SPASM 120 Tab 3    lisinopril (PRINIVIL, ZESTRIL) 20 mg tablet Take 20 mg by mouth daily.  montelukast (SINGULAIR) 10 mg tablet TK 1 T PO D  2    calcipotriene (DOVONEX) 0.005 % topical cream       PRALUENT PEN 75 mg/mL injector pen       rosuvastatin (CRESTOR) 40 mg tablet Take 1 Tab by mouth daily. 90 Tab 3    ergocalciferol (ERGOCALCIFEROL) 50,000 unit capsule Take 1 Cap by mouth every seven (7) days. 12 Cap 3    miscellaneous medical supply misc 2 Each by Does Not Apply route daily. 2 Each 1    miscellaneous medical supply misc 2 Each by Does Not Apply route daily. 2 Each 0    miscellaneous medical supply misc 1 Each by Does Not Apply route daily. 1 Each 1    miscellaneous medical supply misc 1 Each by Does Not Apply route daily. 1 Each 1    levocetirizine (XYZAL) 5 mg tablet Take 1 Tab by mouth daily. 30 Tab 1    furosemide (LASIX) 40 mg tablet TAKE 1 TABLET BY MOUTH TWICE DAILY AS NEEDED 180 Tab 0    carvedilol (COREG) 12.5 mg tablet Take 6.25 mg by mouth two (2) times daily (with meals).  cyanocobalamin (VITAMIN B-12) 500 mcg tablet Take 500 mcg by mouth daily.       omeprazole (PRILOSEC) 20 mg capsule Take 1 capsule by mouth daily. 30 capsule 3    polyethylene glycol (MIRALAX) 17 gram/dose powder Take 17 g by mouth daily. 255 g 1    clopidogrel (PLAVIX) 75 mg tablet Take 1 tablet by mouth daily. 30 tablet 3    therapeutic multivitamin (THERAGRAN) tablet Take 1 tablet by mouth daily.  calcium citrate-vitamin d3 (CITRACAL+D) 315-200 mg-unit tab Take 1 tablet by mouth two (2) times daily (with meals).  OTHER Antibiotic from dentist- unsure of name      isosorbide mononitrate ER (IMDUR) 30 mg tablet Take 15 mg by mouth every morning. Allergies   Allergen Reactions    Dextromethorphan-Guaifenesin Other (comments)    Aspirin Hives        Ambulates with rolling walker. PHYSICAL EXAMINATION    Visit Vitals    /68    Pulse 63    Ht 4' 11\" (1.499 m)    Wt 165 lb (74.8 kg)    LMP  (LMP Unknown)    BMI 33.33 kg/m2       CONSTITUTIONAL: NAD, A&O x 3  SENSATION: Intact to light touch throughout  NEURO: Mechelle's is negative bilaterally. RANGE OF MOTION: The patient has full passive range of motion in all four extremities. MOTOR:  Straight Leg Raise: Negative, bilateral    Remote nerve injury, right hand. Shoulder AB/Flex Elbow Flex Wrist Ext Elbow Ext Wrist Flex Hand Intrin Tone   Right +4/5 +4/5 +4/5 +4/5 +4/5 4/5 +4/5   Left +4/5 +4/5 +4/5 +4/5 +4/5 +4/5 +4/5              Hip Flex Knee Ext Knee Flex Ankle DF GTE Ankle PF Tone   Right +4/5 +4/5 +4/5 +4/5 +4/5 +4/5 +4/5   Left +4/5 +4/5 +4/5 +4/5 +4/5 +4/5 +4/5       ASSESSMENT   Diagnoses and all orders for this visit:    1. Spondylosis without myelopathy or radiculopathy, cervical region  -     pregabalin (LYRICA) 300 mg capsule; Take 1 Cap by mouth two (2) times a day. Max Daily Amount: 600 mg.    2. Radiculopathy, cervical  -     pregabalin (LYRICA) 300 mg capsule; Take 1 Cap by mouth two (2) times a day.  Max Daily Amount: 600 mg.    3. Spondylosis without myelopathy or radiculopathy, lumbosacral region  -     pregabalin (LYRICA) 300 mg capsule; Take 1 Cap by mouth two (2) times a day. Max Daily Amount: 600 mg. Other orders  -     DULoxetine (CYMBALTA) 60 mg capsule; Take 1 Cap by mouth daily. IMPRESSION AND PLAN:  Patient wished to continue her current treatment. She was provided with refills of her Lyrica 300 mg BID and Cymbalta 60 mg per day. I encourage patient to perform her C/L HEP daily. I will see the patient back in 3 month's time or earlier if needed. Written by Ajit Tony, as dictated by Bessy Palafox MD  I examined the patient, reviewed and agree with the note.

## 2018-01-22 NOTE — TELEPHONE ENCOUNTER
Last Visit: 12/01/2017 with BERNICE Low    Next Appointment: 03/02/2018 with BERNICE Low   Previous Refill Encounters: 10/30/2017 per BERNICE Low #[id number]     Requested Prescriptions     Pending Prescriptions Disp Refills    celecoxib (CELEBREX) 200 mg capsule 180 Cap 0     Sig: Take 1 Cap by mouth two (2) times a day.

## 2018-01-23 RX ORDER — CELECOXIB 200 MG/1
200 CAPSULE ORAL 2 TIMES DAILY
Qty: 180 CAP | Refills: 0 | Status: SHIPPED | OUTPATIENT
Start: 2018-01-23 | End: 2018-05-02 | Stop reason: SDUPTHER

## 2018-03-02 ENCOUNTER — OFFICE VISIT (OUTPATIENT)
Dept: ORTHOPEDIC SURGERY | Age: 58
End: 2018-03-02

## 2018-03-02 VITALS
HEART RATE: 60 BPM | DIASTOLIC BLOOD PRESSURE: 79 MMHG | SYSTOLIC BLOOD PRESSURE: 138 MMHG | OXYGEN SATURATION: 100 % | HEIGHT: 59 IN | TEMPERATURE: 97.3 F

## 2018-03-02 DIAGNOSIS — M70.52 PES ANSERINUS BURSITIS OF LEFT KNEE: ICD-10-CM

## 2018-03-02 DIAGNOSIS — M70.61 TROCHANTERIC BURSITIS OF RIGHT HIP: Primary | ICD-10-CM

## 2018-03-02 RX ORDER — BETAMETHASONE SODIUM PHOSPHATE AND BETAMETHASONE ACETATE 3; 3 MG/ML; MG/ML
6 INJECTION, SUSPENSION INTRA-ARTICULAR; INTRALESIONAL; INTRAMUSCULAR; SOFT TISSUE ONCE
Qty: 1 VIAL | Refills: 0
Start: 2018-03-02 | End: 2018-03-02

## 2018-03-02 RX ORDER — BETAMETHASONE SODIUM PHOSPHATE AND BETAMETHASONE ACETATE 3; 3 MG/ML; MG/ML
6 INJECTION, SUSPENSION INTRA-ARTICULAR; INTRALESIONAL; INTRAMUSCULAR; SOFT TISSUE ONCE
Qty: 1 ML | Refills: 0 | Status: CANCELLED
Start: 2018-03-02 | End: 2018-03-02

## 2018-03-02 RX ORDER — BETAMETHASONE SODIUM PHOSPHATE AND BETAMETHASONE ACETATE 3; 3 MG/ML; MG/ML
6 INJECTION, SUSPENSION INTRA-ARTICULAR; INTRALESIONAL; INTRAMUSCULAR; SOFT TISSUE ONCE
Qty: 1 ML | Refills: 0
Start: 2018-03-02 | End: 2018-03-02
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
## 2018-03-07 ENCOUNTER — HOSPITAL ENCOUNTER (OUTPATIENT)
Dept: LAB | Age: 58
Discharge: HOME | End: 2018-03-07
Payer: MEDICARE

## 2018-03-07 ENCOUNTER — OFFICE VISIT (OUTPATIENT)
Dept: FAMILY MEDICINE CLINIC | Age: 58
End: 2018-03-07

## 2018-03-07 VITALS
WEIGHT: 165 LBS | RESPIRATION RATE: 17 BRPM | HEART RATE: 61 BPM | TEMPERATURE: 96.6 F | DIASTOLIC BLOOD PRESSURE: 72 MMHG | SYSTOLIC BLOOD PRESSURE: 128 MMHG | HEIGHT: 59 IN | BODY MASS INDEX: 33.26 KG/M2

## 2018-03-07 DIAGNOSIS — M54.16 LUMBAR NEURITIS: ICD-10-CM

## 2018-03-07 DIAGNOSIS — M89.9 DISORDER OF BONE AND CARTILAGE: ICD-10-CM

## 2018-03-07 DIAGNOSIS — I50.22 CHRONIC SYSTOLIC HEART FAILURE (HCC): ICD-10-CM

## 2018-03-07 DIAGNOSIS — R73.09 ELEVATED HEMOGLOBIN A1C: ICD-10-CM

## 2018-03-07 DIAGNOSIS — F51.01 PRIMARY INSOMNIA: ICD-10-CM

## 2018-03-07 DIAGNOSIS — E78.49 OTHER HYPERLIPIDEMIA: ICD-10-CM

## 2018-03-07 DIAGNOSIS — I10 ESSENTIAL HYPERTENSION: ICD-10-CM

## 2018-03-07 DIAGNOSIS — M94.9 DISORDER OF BONE AND CARTILAGE: ICD-10-CM

## 2018-03-07 DIAGNOSIS — R05.9 COUGH: Primary | ICD-10-CM

## 2018-03-07 LAB
ALBUMIN SERPL-MCNC: 3.4 G/DL (ref 3.4–5)
ALBUMIN/GLOB SERPL: 1 {RATIO} (ref 0.8–1.7)
ALP SERPL-CCNC: 161 U/L (ref 45–117)
ALT SERPL-CCNC: 19 U/L (ref 13–56)
ANION GAP SERPL CALC-SCNC: 5 MMOL/L (ref 3–18)
AST SERPL-CCNC: 13 U/L (ref 15–37)
BASOPHILS # BLD: 0.1 K/UL (ref 0–0.06)
BASOPHILS NFR BLD: 1 % (ref 0–2)
BILIRUB SERPL-MCNC: 0.2 MG/DL (ref 0.2–1)
BUN SERPL-MCNC: 6 MG/DL (ref 7–18)
BUN/CREAT SERPL: 8 (ref 12–20)
CALCIUM SERPL-MCNC: 8.5 MG/DL (ref 8.5–10.1)
CHLORIDE SERPL-SCNC: 108 MMOL/L (ref 100–108)
CHOLEST SERPL-MCNC: 271 MG/DL
CO2 SERPL-SCNC: 32 MMOL/L (ref 21–32)
CREAT SERPL-MCNC: 0.75 MG/DL (ref 0.6–1.3)
DIFFERENTIAL METHOD BLD: ABNORMAL
EOSINOPHIL # BLD: 0.2 K/UL (ref 0–0.4)
EOSINOPHIL NFR BLD: 4 % (ref 0–5)
ERYTHROCYTE [DISTWIDTH] IN BLOOD BY AUTOMATED COUNT: 17.5 % (ref 11.6–14.5)
EST. AVERAGE GLUCOSE BLD GHB EST-MCNC: 137 MG/DL
GLOBULIN SER CALC-MCNC: 3.5 G/DL (ref 2–4)
GLUCOSE SERPL-MCNC: 81 MG/DL (ref 74–99)
HBA1C MFR BLD: 6.4 % (ref 4.2–5.6)
HCT VFR BLD AUTO: 34.5 % (ref 35–45)
HDLC SERPL-MCNC: 72 MG/DL (ref 40–60)
HDLC SERPL: 3.8 {RATIO} (ref 0–5)
HGB BLD-MCNC: 10.7 G/DL (ref 12–16)
LDLC SERPL CALC-MCNC: 175.8 MG/DL (ref 0–100)
LDLC/HDLC SERPL: 2.4 {RATIO}
LIPID PROFILE,FLP: ABNORMAL
LYMPHOCYTES # BLD: 1.6 K/UL (ref 0.9–3.6)
LYMPHOCYTES NFR BLD: 37 % (ref 21–52)
MCH RBC QN AUTO: 24.7 PG (ref 24–34)
MCHC RBC AUTO-ENTMCNC: 31 G/DL (ref 31–37)
MCV RBC AUTO: 79.7 FL (ref 74–97)
MONOCYTES # BLD: 0.6 K/UL (ref 0.05–1.2)
MONOCYTES NFR BLD: 14 % (ref 3–10)
NEUTS SEG # BLD: 2 K/UL (ref 1.8–8)
NEUTS SEG NFR BLD: 44 % (ref 40–73)
PLATELET # BLD AUTO: 236 K/UL (ref 135–420)
PMV BLD AUTO: 11.7 FL (ref 9.2–11.8)
POTASSIUM SERPL-SCNC: 4.6 MMOL/L (ref 3.5–5.5)
PROT SERPL-MCNC: 6.9 G/DL (ref 6.4–8.2)
RBC # BLD AUTO: 4.33 M/UL (ref 4.2–5.3)
SODIUM SERPL-SCNC: 145 MMOL/L (ref 136–145)
TRIGL SERPL-MCNC: 116 MG/DL (ref ?–150)
VLDLC SERPL CALC-MCNC: 23.2 MG/DL
WBC # BLD AUTO: 4.5 K/UL (ref 4.6–13.2)

## 2018-03-07 PROCEDURE — 85025 COMPLETE CBC W/AUTO DIFF WBC: CPT | Performed by: INTERNAL MEDICINE

## 2018-03-07 PROCEDURE — 80061 LIPID PANEL: CPT | Performed by: INTERNAL MEDICINE

## 2018-03-07 PROCEDURE — 36415 COLL VENOUS BLD VENIPUNCTURE: CPT | Performed by: INTERNAL MEDICINE

## 2018-03-07 PROCEDURE — 83036 HEMOGLOBIN GLYCOSYLATED A1C: CPT | Performed by: INTERNAL MEDICINE

## 2018-03-07 PROCEDURE — 80053 COMPREHEN METABOLIC PANEL: CPT | Performed by: INTERNAL MEDICINE

## 2018-03-07 RX ORDER — ZOLPIDEM TARTRATE 10 MG/1
10 TABLET ORAL
Qty: 30 TAB | Refills: 0 | Status: SHIPPED | OUTPATIENT
Start: 2018-03-07 | End: 2018-04-19 | Stop reason: SDUPTHER

## 2018-03-07 RX ORDER — HYDROCODONE BITARTRATE AND ACETAMINOPHEN 10; 325 MG/1; MG/1
1 TABLET ORAL
Qty: 80 TAB | Refills: 0 | Status: SHIPPED | OUTPATIENT
Start: 2018-03-07 | End: 2018-07-19

## 2018-03-07 RX ORDER — ROSUVASTATIN CALCIUM 40 MG/1
TABLET, COATED ORAL
Qty: 90 TAB | Refills: 0 | Status: SHIPPED | OUTPATIENT
Start: 2018-03-07 | End: 2018-06-07 | Stop reason: SDUPTHER

## 2018-03-07 RX ORDER — BENZONATATE 200 MG/1
200 CAPSULE ORAL
Qty: 60 CAP | Refills: 0 | Status: SHIPPED | OUTPATIENT
Start: 2018-03-07 | End: 2018-05-02 | Stop reason: ALTCHOICE

## 2018-03-07 NOTE — PROGRESS NOTES
Chief Complaint   Patient presents with    Knee Pain     LLE has had an injection last friday    Hip Pain    Hypertension     1. Have you been to the ER, urgent care clinic since your last visit? Hospitalized since your last visit? No    2. Have you seen or consulted any other health care providers outside of the [address] [place] [address] since your last visit? Include any pap smears or colon screening.  No

## 2018-03-07 NOTE — MR AVS SNAPSHOT
[facility] 
 
 
 [address] [address] [address] [address] [phone] Patient: Marlen Clements MRN: [id number] :1960 Visit Information Date & Time Provider Department Dept. Phone Encounter #  
 3/7/2018 10:30 AM [id number] Raj Geronimo, [address] [id number] Follow-up Instructions Return in about 3 months (around 2018). Your Appointments 2018  1:00 PM  
Follow Up with Ulysses Reagin, MD  
[facility] - SPECIAL[facility] ([facility] Appt Note: 3 mo f/u  
 [address] [place] [address] [address] [address]  
  
    
 2018 10:45 AM  
Follow Up with Ragini Lindsey MD  
[facility] - [facility] ([facility] Appt Note: rt knee 3 mo fu  
 [place] [address], [address] [facility]  
[phone] [address] [place] [address], [facility] [address]  
  
    
 2018 11:00 AM  
Follow Up with Sheila Rojas MD  
[address] [place] [address] Appt Note: 6 month f/u [address] [address]  
[phone]  
  
   
 [facility] [address] Upcoming Health Maintenance Date Due  
 EYE EXAM RETINAL OR DILATED Q1 1971 FOBT Q 1 YEAR AGE 50-75 2011 FOOT EXAM Q1 3/3/2015 GLAUCOMA SCREENING Q2Y 2016 HEMOGLOBIN A1C Q6M 2017 MICROALBUMIN Q1 10/17/2017 BREAST CANCER SCRN MAMMOGRAM 2018 LIPID PANEL Q1 2018 DTaP/Tdap/Td series (2 - Td) 10/4/2026 Allergies as of 3/7/2018  Review Complete On: 8259 By: Michael Castillo LPN Severity Noted Reaction Type Reactions Dextromethorphan-guaifenesin High 2011    Other (comments) Aspirin  2012   Topical Hives Current Immunizations  Reviewed on 12/13/2013 Name Date Influenza Vaccine 9/29/2015, 9/2/2015 12:00 AM, 11/24/2011 12:00 AM  
 Influenza Vaccine (Quad) PF 12/7/2017 Influenza Vaccine PF 9/24/2014, 12/13/2013 Pneumococcal Conjugate (PCV-13) 5/2/2017 Pneumococcal Polysaccharide (PPSV-23) 11/3/2015 12:00 AM  
 Tdap 10/4/2016 12:00 AM  
  
 Not reviewed this visit You Were Diagnosed With   
  
 Codes Comments Cough    -  Primary ICD-10-CM: K72 ICD-9-CM: 786.2 Primary insomnia     ICD-10-CM: F51.01 
ICD-9-CM: 307.42 Lumbar neuritis     ICD-10-CM: M54.16 
ICD-9-CM: 724.4 Chronic systolic heart failure (HCC)     ICD-10-CM: I50.22 ICD-9-CM: 428.22 Essential hypertension     ICD-10-CM: I10 
ICD-9-CM: 401.9 Elevated hemoglobin A1c     ICD-10-CM: R73.09 
ICD-9-CM: 790.29 Disorder of bone and cartilage     ICD-10-CM: M89.9, M94.9 ICD-9-CM: 733.90 Vitals BP Pulse Temp Resp Height(growth percentile) Weight(growth percentile) 128/72 (BP 1 Location: Right arm, BP Patient Position: Sitting) 61 96.6 °F (35.9 °C) (Oral) 17 4' 11\" (1.499 m) 165 lb (74.8 kg) LMP BMI OB Status Smoking Status (LMP Unknown) 33.33 kg/m2 Hysterectomy Former Smoker BMI and BSA Data Body Mass Index Body Surface Area  
 33.33 kg/m 2 1.76 m 2 Preferred Pharmacy Pharmacy Name Phone [facility] [address] [phone] Your Updated Medication List  
  
   
This list is accurate as of 3/7/18 11:14 AM.  Always use your most recent med list.  
  
  
  
  
 benzonatate 200 mg capsule Commonly known as:  TESSALON Take 1 Cap by mouth three (3) times daily as needed for Cough. brief disposable Misc Commonly known as:  adult  
by Does Not Apply route. Dispense one package of 180 briefs/ diapers. calcipotriene 0.005 % topical cream  
Commonly known as:  Bienvenido Spar calcium citrate-vitamin d3 315-200 mg-unit Tab Commonly known as:  CITRACAL+D Take 1 tablet by mouth two (2) times daily (with meals). carBAMazepine 200 mg tablet Commonly known as:  TEGretol 1  q am 1 q pm  2 qhs  
  
 carvedilol 12.5 mg tablet Commonly known as:  Clerance Barley Take 6.25 mg by mouth two (2) times daily (with meals). * celecoxib 200 mg capsule Commonly known as:  CELEBREX Take 1 Cap by mouth two (2) times a day. * celecoxib 200 mg capsule Commonly known as:  CELEBREX  
TAKE 1 CAPSULE BY MOUTH TWICE DAILY FOR 90 DAYS  
  
 clopidogrel 75 mg Tab Commonly known as:  PLAVIX Take 1 tablet by mouth daily. * DULoxetine 60 mg capsule Commonly known as:  CYMBALTA Take 1 Cap by mouth daily. * DULoxetine 60 mg capsule Commonly known as:  CYMBALTA Take 1 Cap by mouth daily. ergocalciferol 50,000 unit capsule Commonly known as:  ERGOCALCIFEROL Take 1 Cap by mouth every seven (7) days. furosemide 40 mg tablet Commonly known as:  LASIX TAKE 1 TABLET BY MOUTH TWICE DAILY AS NEEDED HYDROcodone-acetaminophen  mg tablet Commonly known as:  Radonna Maricopa Take 1 Tab by mouth every eight (8) hours as needed for Pain. Max Daily Amount: 3 Tabs. isosorbide mononitrate ER 30 mg tablet Commonly known as:  IMDUR Take 15 mg by mouth every morning. levocetirizine 5 mg tablet Commonly known as:  Valentina Clan Take 1 Tab by mouth daily. lisinopril 20 mg tablet Commonly known as:  Luetta Manzanilla Take 20 mg by mouth daily. methocarbamol 500 mg tablet Commonly known as:  ROBAXIN  
TAKE 1 TABLET BY MOUTH FOUR TIMES DAILY AS NEEDED FOR MUSCLE SPASM * miscellaneous medical supply Misc  
1 Each by Does Not Apply route daily. * miscellaneous medical supply Misc  
1 Each by Does Not Apply route daily. * miscellaneous medical supply Misc  
2 Each by Does Not Apply route daily. * miscellaneous medical supply Misc  
2 Each by Does Not Apply route daily. montelukast 10 mg tablet Commonly known as:  SINGULAIR TK 1 T PO D  
  
 omeprazole 20 mg capsule Commonly known as:  PRILOSEC Take 1 capsule by mouth daily. OTHER Antibiotic from dentist- unsure of name  
  
 polyethylene glycol 17 gram/dose powder Commonly known as:  Sanchez Zhao Take 17 g by mouth daily. PRALUENT PEN 75 mg/mL injector pen Generic drug:  alirocumab * pregabalin 300 mg capsule Commonly known as:  Stacey Hendrix Take 1 Cap by mouth two (2) times a day. Max Daily Amount: 600 mg.  
  
 * pregabalin 300 mg capsule Commonly known as:  Stacey Hendrix Take 1 Cap by mouth two (2) times a day. Max Daily Amount: 600 mg.  
  
 rosuvastatin 40 mg tablet Commonly known as:  CRESTOR Take 1 Tab by mouth daily. therapeutic multivitamin tablet Commonly known as:  [facility] Take 1 tablet by mouth daily. traMADol 50 mg tablet Commonly known as:  ULTRAM  
Take 50 mg by mouth every six (6) hours as needed for Pain. VITAMIN B-12 500 mcg tablet Generic drug:  cyanocobalamin Take 500 mcg by mouth daily. zolpidem 10 mg tablet Commonly known as:  AMBIEN Take 1 Tab by mouth nightly as needed for Sleep. Max Daily Amount: 10 mg.  
  
 * Notice: This list has 10 medication(s) that are the same as other medications prescribed for you. Read the directions carefully, and ask your doctor or other care provider to review them with you. Prescriptions Printed Refills  
 zolpidem (AMBIEN) 10 mg tablet 0 Sig: Take 1 Tab by mouth nightly as needed for Sleep. Max Daily Amount: 10 mg.  
 Class: Print Route: Oral  
 HYDROcodone-acetaminophen (NORCO)  mg tablet 0 Sig: Take 1 Tab by mouth every eight (8) hours as needed for Pain. Max Daily Amount: 3 Tabs. Class: Print Route: Oral  
  
Prescriptions Sent to Pharmacy Refills benzonatate (TESSALON) 200 mg capsule 0 Sig: Take 1 Cap by mouth three (3) times daily as needed for Cough. Class: Normal  
 Pharmacy: [id number], [address] #: [phone] Route: Oral  
  
Follow-up Instructions Return in about 3 months (around 6/7/2018). Patient Instructions Please contact our office if you have any questions about your visit today. 1.) Please get labs a week before next visit 2.) Return in 3 months for follow up Introducing [facility] SERVICES! Dear Sukhdev Sahni: 
Thank you for requesting a [employer] account. Our records indicate that you already have an active [employer] account. You can access your account anytime at [email or web address] Did you know that you can access your hospital and ER discharge instructions at any time in [employer]? You can also review all of your test results from your hospital stay or ER visit. Additional Information If you have questions, please visit the Frequently Asked Questions section of the [employer] website at [email or web address] Remember, [employer] is NOT to be used for urgent needs. For medical emergencies, dial 911. Now available from your iPhone and Android! Please provide this summary of care documentation to your next provider. Your primary care clinician is listed as Jael Salmeron. If you have any questions after today's visit, please call [phone].

## 2018-03-07 NOTE — PROGRESS NOTES
s         Progress Note    Patient: Gideon Posadas MRN: [id number]  SSN: [id number]    YOB: 1960  Age: 65 y.o. Sex: female          Subjective:   Gideon Posadas is a 65 y.o. female who is here for regular follow up. The patient was recently referred to Pain management by her orthopedic surgery. She mentions that she does not feel that Pain Management is not the thing for her. She states that they were originally placed her on Opana but she could not afford this. The Pain management specialist offered to do knee injections. She mentions that they were requiring that she goes once a month. She later was placed on a low dose of Norco. She mentions that she was placed on 5-325 [place]. She mentions that her pain is concentrated on her hip and knee. She makes note of the fact that her orthopedic surgeon will repeat imaging on her right hip and left knee. She mentions that she continues to use Lyrica. She states that the Lyrica helps with lower extremity sensitivity. Visit from 12/7/2017  The patient recently had a fall and twisted her left knee while in [place] three months ago. She was placed in a brace and was given a cortisone shot by her orthopedic specialist. She mentions that they told her that if the shot did not work she would have to try to see a specialist in [place]. She mentions that her left knee feels OK. She states that they did do a CT scan on the knee. She was told that nothing was broken or loose. The patient has been on PT for the past three months. She has also been doing home PT exercises as well. The patient never got a call from her foot doctor in regard to swelling in her foot. She admits to an injury to the second toe on the right foot. He mentions that it looks like the wound is closing but is not yet completely healed. The patient mentions that her Ambien has been effective in helping her sleep. She mentions that she has been doing well on the Lyrica twice a day.    In regard to her lumbar neuritis with radiation down arm she has not heard back from Dr. Juventino Nava. Objective:     Visit Vitals    /72 (BP 1 Location: Right arm, BP Patient Position: Sitting)    Pulse 61    Temp 96.6 °F (35.9 °C) (Oral)    Resp 17    Ht 4' 11\" (1.499 m)    Wt 165 lb (74.8 kg)    LMP  (LMP Unknown)    BMI 33.33 kg/m2       Review of Systems:  Pertinent ROS noted in HPI     Physical Exam:   GENERAL: alert, cooperative, appears stated age  EYE: conjunctivae/corneas clear. PERRL, EOM's intact. Fundi benign  THROAT & NECK: normal and no erythema or exudates noted. Poor dentition on exam.   LUNG: clear to auscultation bilaterally  HEART: regular rate and rhythm, S1, S2 normal, no murmur, click, rub or gallop  ABDOMEN: Bowels sounds diminished but observed. EXTREMITIES:  No significant edema  NEUROLOGIC: Right arm flaccid on exam. No change from previous visits. Lab/Data Review:    Lab Results   Component Value Date/Time    Hemoglobin A1c 6.4 (H) 03/07/2018 11:22 AM     Lab Results   Component Value Date/Time    Cholesterol, total 271 (H) 03/07/2018 11:22 AM    HDL Cholesterol 72 (H) 03/07/2018 11:22 AM    LDL, calculated 175.8 (H) 03/07/2018 11:22 AM    VLDL, calculated 23.2 03/07/2018 11:22 AM    Triglyceride 116 03/07/2018 11:22 AM    CHOL/HDL Ratio 3.8 03/07/2018 11:22 AM     Lab Results   Component Value Date/Time    Sodium 145 03/07/2018 11:22 AM    Potassium 4.6 03/07/2018 11:22 AM    Chloride 108 03/07/2018 11:22 AM    CO2 32 03/07/2018 11:22 AM    Anion gap 5 03/07/2018 11:22 AM    Glucose 81 03/07/2018 11:22 AM    BUN 6 (L) 03/07/2018 11:22 AM    Creatinine 0.75 03/07/2018 11:22 AM    BUN/Creatinine ratio 8 (L) 03/07/2018 11:22 AM    GFR est AA >60 03/07/2018 11:22 AM    GFR est non-AA >60 03/07/2018 11:22 AM    Calcium 8.5 03/07/2018 11:22 AM    Bilirubin, total 0.2 03/07/2018 11:22 AM    AST (SGOT) 13 (L) 03/07/2018 11:22 AM    Alk.  phosphatase 161 (H) 03/07/2018 11:22 AM    Protein, total 6.9 03/07/2018 11:22 AM    Albumin 3.4 03/07/2018 11:22 AM    Globulin 3.5 03/07/2018 11:22 AM    A-G Ratio 1.0 03/07/2018 11:22 AM    ALT (SGPT) 19 03/07/2018 11:22 AM           Assessment:     1.) Insomnia: Patient stable on Ambien 10 mg once a day and this was refilled. 2.) Lumbar Neuritis with radiation down left arm: Patient has followed up with orthopedics as well as Pain Management with no resolution. I will provide her with an as needed Norco.     4.) Diabetic Neuropathy: Patient stable on Lyrica 300 mg twice a day by Dr. Jamir Slaughter. 5.) Hyperlipidemia: Lipid panel drawn today. 6.) Urinary Incontinence: Stable    7.) Cough: Patient ordered Tessalon pearls as needed for cough. 8.) Preventive: Labs drawn as noted below. Patient will return in 3 months for follow-up.         Plan:     Orders Placed This Encounter    zolpidem (AMBIEN) 10 mg tablet    HYDROcodone-acetaminophen (NORCO)  mg tablet    benzonatate (TESSALON) 200 mg capsule           Signed By: Daksha Baugh DO     March 7, 2018

## 2018-03-08 ENCOUNTER — TELEPHONE (OUTPATIENT)
Dept: FAMILY MEDICINE CLINIC | Age: 58
End: 2018-03-08

## 2018-03-08 NOTE — TELEPHONE ENCOUNTER
Asking for diferent medication to be called in ,as the one called in yesterday ,ins would not pay--said pharmacy was sending request
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
## 2018-03-23 ENCOUNTER — TELEPHONE (OUTPATIENT)
Dept: FAMILY MEDICINE CLINIC | Age: 58
End: 2018-03-23

## 2018-03-23 DIAGNOSIS — R26.81 GAIT INSTABILITY: Primary | ICD-10-CM

## 2018-03-23 NOTE — TELEPHONE ENCOUNTER
Returned call to the  and she states that she saw the patient today and the patient has asked if the provider could get a bedside commode for night time usage.

## 2018-03-23 NOTE — TELEPHONE ENCOUNTER
Requesting new order to be sent to [address] [place] [address] for beside commode   Fax# [phone]

## 2018-03-23 NOTE — TELEPHONE ENCOUNTER
Omer Varela,   I have placed the order for the bedside commode. I will send it to you. Thanks.     CHELO

## 2018-04-05 NOTE — TELEPHONE ENCOUNTER
Pt checking on status of order for bedside commode. [address] (Y#[phone] I#[phone]) never received it.

## 2018-04-18 ENCOUNTER — OFFICE VISIT (OUTPATIENT)
Dept: ORTHOPEDIC SURGERY | Age: 58
End: 2018-04-18

## 2018-04-18 ENCOUNTER — DOCUMENTATION ONLY (OUTPATIENT)
Dept: ORTHOPEDIC SURGERY | Age: 58
End: 2018-04-18

## 2018-04-18 VITALS
WEIGHT: 174 LBS | BODY MASS INDEX: 35.08 KG/M2 | OXYGEN SATURATION: 97 % | DIASTOLIC BLOOD PRESSURE: 82 MMHG | SYSTOLIC BLOOD PRESSURE: 129 MMHG | HEART RATE: 65 BPM | HEIGHT: 59 IN

## 2018-04-18 DIAGNOSIS — M54.12 CERVICAL RADICULOPATHY: ICD-10-CM

## 2018-04-18 DIAGNOSIS — M47.817 LUMBOSACRAL SPONDYLOSIS WITHOUT MYELOPATHY: ICD-10-CM

## 2018-04-18 DIAGNOSIS — M47.812 CERVICAL SPONDYLOSIS WITHOUT MYELOPATHY: ICD-10-CM

## 2018-04-18 DIAGNOSIS — M47.817 LUMBOSACRAL SPONDYLOSIS WITHOUT MYELOPATHY: Primary | ICD-10-CM

## 2018-04-18 DIAGNOSIS — R32 URINARY INCONTINENCE, UNSPECIFIED TYPE: ICD-10-CM

## 2018-04-18 RX ORDER — CARBAMAZEPINE 200 MG/1
TABLET ORAL
Qty: 360 TAB | Refills: 1 | Status: SHIPPED | OUTPATIENT
Start: 2018-04-18 | End: 2018-09-28 | Stop reason: SDUPTHER

## 2018-04-18 RX ORDER — CELECOXIB 200 MG/1
200 CAPSULE ORAL 2 TIMES DAILY
Qty: 180 CAP | Refills: 0 | Status: SHIPPED | OUTPATIENT
Start: 2018-04-18 | End: 2018-07-03 | Stop reason: SDUPTHER

## 2018-04-18 RX ORDER — PREGABALIN 300 MG/1
300 CAPSULE ORAL 2 TIMES DAILY
Qty: 60 CAP | Refills: 2 | Status: SHIPPED | OUTPATIENT
Start: 2018-04-18 | End: 2018-07-19 | Stop reason: SDUPTHER

## 2018-04-18 RX ORDER — DULOXETIN HYDROCHLORIDE 60 MG/1
60 CAPSULE, DELAYED RELEASE ORAL DAILY
Qty: 30 CAP | Refills: 2 | Status: SHIPPED | OUTPATIENT
Start: 2018-04-18 | End: 2019-01-14 | Stop reason: SDUPTHER
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
## 2018-04-18 NOTE — TELEPHONE ENCOUNTER
Last Visit: 03/02/2018 with BERNICE Greer    Next Appointment: 06/08/2018 with MD Rawls   Previous Refill Encounters: 01/29/2018 per BERNICE Greer #[id number]    Requested Prescriptions     Pending Prescriptions Disp Refills    celecoxib (CELEBREX) 200 mg capsule 180 Cap 0     Sig: Take 1 Cap by mouth two (2) times a day.

## 2018-04-18 NOTE — TELEPHONE ENCOUNTER
Requested Prescriptions     Pending Prescriptions Disp Refills    carBAMazepine (TEGRETOL) 200 mg tablet 360 Tab 1     Si  q am 1 q pm  2 qhs

## 2018-04-18 NOTE — PROGRESS NOTES
Myelogram/CT Lumbar Spine with is scheduled at [facility], [id number], on 04/26/18, arrive 7:00AM, Scan 9:00AM. Must complete PT/PTT/INR by 04/23/18. The lab order was given to the patient, faxed to [facility] and faxed to [facility] with the Myelogram/CT order. NPO after midnight. MUST have a . [employer] pre-authorization [id number], effective 04/26/18-05/26/18, [employer] pre-authorization [id number], effective 04/18/18-06/16/18.

## 2018-04-19 ENCOUNTER — TELEPHONE (OUTPATIENT)
Dept: NEUROLOGY | Age: 58
End: 2018-04-19

## 2018-04-19 DIAGNOSIS — R73.09 ELEVATED HEMOGLOBIN A1C: ICD-10-CM

## 2018-04-19 DIAGNOSIS — I50.22 CHRONIC SYSTOLIC HEART FAILURE (HCC): ICD-10-CM

## 2018-04-19 DIAGNOSIS — M89.9 DISORDER OF BONE AND CARTILAGE: ICD-10-CM

## 2018-04-19 DIAGNOSIS — I10 ESSENTIAL HYPERTENSION: ICD-10-CM

## 2018-04-19 DIAGNOSIS — M94.9 DISORDER OF BONE AND CARTILAGE: ICD-10-CM

## 2018-04-19 DIAGNOSIS — F51.01 PRIMARY INSOMNIA: ICD-10-CM

## 2018-04-19 DIAGNOSIS — M54.16 LUMBAR NEURITIS: ICD-10-CM

## 2018-04-19 NOTE — TELEPHONE ENCOUNTER
Pt called requesting refill for medication . Requested Prescriptions     Pending Prescriptions Disp Refills    zolpidem (AMBIEN) 10 mg tablet 30 Tab 0     Sig: Take 1 Tab by mouth nightly as needed for Sleep. Max Daily Amount: 10 mg.

## 2018-04-20 ENCOUNTER — TELEPHONE (OUTPATIENT)
Dept: ORTHOPEDIC SURGERY | Age: 58
End: 2018-04-20

## 2018-04-20 DIAGNOSIS — Z79.899 MEDICATION MANAGEMENT: Primary | ICD-10-CM

## 2018-04-20 RX ORDER — ZOLPIDEM TARTRATE 10 MG/1
10 TABLET ORAL
Qty: 30 TAB | Refills: 0 | Status: SHIPPED | OUTPATIENT
Start: 2018-04-20 | End: 2018-06-07 | Stop reason: SDUPTHER

## 2018-04-20 NOTE — TELEPHONE ENCOUNTER
Dr Aliene Eisenmenger has approved for Ms Marisela Kim to be off her plavix for 5 days in order to complete her CT/Myelogram of the Lumbar Spine. Edna Johnson was received written on a [facility] office note so I will send it to scanning. Patient is aware. Thank you.

## 2018-04-25 ENCOUNTER — HOSPITAL ENCOUNTER (OUTPATIENT)
Dept: LAB | Age: 58
Discharge: HOME | End: 2018-04-25
Payer: MEDICARE

## 2018-04-25 DIAGNOSIS — Z79.899 MEDICATION MANAGEMENT: ICD-10-CM

## 2018-04-25 PROCEDURE — 80307 DRUG TEST PRSMV CHEM ANLYZR: CPT | Performed by: INTERNAL MEDICINE

## 2018-05-02 ENCOUNTER — OFFICE VISIT (OUTPATIENT)
Dept: ORTHOPEDIC SURGERY | Age: 58
End: 2018-05-02

## 2018-05-02 VITALS
HEIGHT: 59 IN | WEIGHT: 177 LBS | DIASTOLIC BLOOD PRESSURE: 70 MMHG | SYSTOLIC BLOOD PRESSURE: 143 MMHG | BODY MASS INDEX: 35.68 KG/M2 | HEART RATE: 72 BPM

## 2018-05-02 DIAGNOSIS — M47.817 LUMBOSACRAL SPONDYLOSIS WITHOUT MYELOPATHY: Primary | ICD-10-CM

## 2018-05-02 DIAGNOSIS — M54.16 LUMBAR NEURITIS: ICD-10-CM

## 2018-05-02 DIAGNOSIS — M54.12 CERVICAL RADICULOPATHY: ICD-10-CM

## 2018-05-02 DIAGNOSIS — M47.812 SPONDYLOSIS OF CERVICAL REGION WITHOUT MYELOPATHY OR RADICULOPATHY: ICD-10-CM

## 2018-05-02 DIAGNOSIS — M50.30 DDD (DEGENERATIVE DISC DISEASE), CERVICAL: ICD-10-CM

## 2018-05-02 LAB — DRUGS UR: NORMAL
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
## 2018-05-02 NOTE — MR AVS SNAPSHOT
[address] [facility] 
 
 
 [address] [facility] [address] 
[phone] Patient: Isabela Real MRN: [id number] :1960 Visit Information Date & Time Provider Department Dept. Phone Encounter #  
 2018 11:00 AM Aaron Bustillo MD [facility] [place] [phone] [id number] Follow-up Instructions Return for following block. Your Appointments 2018 10:30 AM  
Follow Up with Emmanuelle Marvin DO [address] (--) Appt Note: Follow Up  
 [address] [facility] [address] [phone]  
  
   
 [facility] [address]  
  
    
 2018 10:45 AM  
Follow Up with Jonelle Young MD  
[facility], [address] and [facility] 1 ([address]) Appt Note: rt knee 3 mo fu  
 [address], [address] [facility] [address]  
[phone] [address] [facility], [address] Upcoming Health Maintenance Date Due  
 EYE EXAM RETINAL OR DILATED Q1 1971 FOBT Q 1 YEAR AGE 50-75 2011 FOOT EXAM Q1 3/3/2015 GLAUCOMA SCREENING Q2Y 2016 MICROALBUMIN Q1 10/17/2017 BREAST CANCER SCRN MAMMOGRAM 2018 MEDICARE YEARLY EXAM 5/3/2018 Influenza Age 5 to Adult 2018 HEMOGLOBIN A1C Q6M 2018 LIPID PANEL Q1 3/7/2019 DTaP/Tdap/Td series (2 - Td) 10/4/2026 Allergies as of 2018  Review Complete On: 2018 By: Aaron Bustillo MD  
  
 Severity Noted Reaction Type Reactions Dextromethorphan-guaifenesin High 2011    Other (comments) Aspirin  2012   Topical Hives Current Immunizations  Reviewed on 2013 Name Date Influenza Vaccine 2015, 2015 12:00 AM, 2011 12:00 AM  
 Influenza Vaccine (Quad) PF 2017 Influenza Vaccine PF 2014, 2013 Pneumococcal Conjugate (PCV-13) 2017 Pneumococcal Polysaccharide (PPSV-23) 11/3/2015 12:00 AM  
 Tdap 10/4/2016 12:00 AM  
  
 Not reviewed this visit You Were Diagnosed With   
  
 Codes Comments Lumbosacral spondylosis without myelopathy    -  Primary ICD-10-CM: O48.795 ICD-9-CM: 721.3 Lumbar neuritis     ICD-10-CM: M54.16 
ICD-9-CM: 724.4 Spondylosis of cervical region without myelopathy or radiculopathy     ICD-10-CM: M47.812 ICD-9-CM: 721.0   
 DDD (degenerative disc disease), cervical     ICD-10-CM: M50.30 ICD-9-CM: 722.4 Cervical radiculopathy     ICD-10-CM: M54.12 
ICD-9-CM: 723.4 Vitals BP Pulse Height(growth percentile) Weight(growth percentile) LMP BMI  
 143/70 72 4' 11\" (1.499 m) 177 lb (80.3 kg) (LMP Unknown) 35.75 kg/m2 OB Status Smoking Status Hysterectomy Former Smoker Vitals History BMI and BSA Data Body Mass Index Body Surface Area 35.75 kg/m 2 1.83 m 2 Preferred Pharmacy Pharmacy Name Phone [facility] [phone] Your Updated Medication List  
  
   
This list is accurate as of 5/2/18 12:03 PM.  Always use your most recent med list.  
  
  
  
  
 brief disposable Misc Commonly known as:  adult  
by Does Not Apply route. Dispense one package of 180 briefs/ diapers. calcipotriene 0.005 % topical cream  
Commonly known as:  DOVONEX  
  
 calcium citrate-vitamin d3 315-200 mg-unit Tab Commonly known as:  CITRACAL+D Take 1 tablet by mouth two (2) times daily (with meals). carBAMazepine 200 mg tablet Commonly known as:  TEGretol 1  q am 1 q pm  2 qhs  
  
 carvedilol 12.5 mg tablet Commonly known as:  Helen Alstrom Take 6.25 mg by mouth two (2) times daily (with meals). celecoxib 200 mg capsule Commonly known as:  CELEBREX Take 1 Cap by mouth two (2) times a day. clopidogrel 75 mg Tab Commonly known as:  PLAVIX Take 1 tablet by mouth daily. DULoxetine 60 mg capsule Commonly known as:  CYMBALTA Take 1 Cap by mouth daily. ergocalciferol 50,000 unit capsule Commonly known as:  ERGOCALCIFEROL Take 1 Cap by mouth every seven (7) days. furosemide 40 mg tablet Commonly known as:  LASIX TAKE 1 TABLET BY MOUTH TWICE DAILY AS NEEDED HYDROcodone-acetaminophen  mg tablet Commonly known as:  Marija Plum Take 1 Tab by mouth every eight (8) hours as needed for Pain. Max Daily Amount: 3 Tabs. isosorbide mononitrate ER 30 mg tablet Commonly known as:  IMDUR Take 15 mg by mouth every morning. levocetirizine 5 mg tablet Commonly known as:  Debo Slack Take 1 Tab by mouth daily. lisinopril 20 mg tablet Commonly known as:  Helon Estrin Take 20 mg by mouth daily. methocarbamol 500 mg tablet Commonly known as:  ROBAXIN  
TAKE 1 TABLET BY MOUTH FOUR TIMES DAILY AS NEEDED FOR MUSCLE SPASM * miscellaneous medical supply Misc  
1 Each by Does Not Apply route daily. * miscellaneous medical supply Misc  
1 Each by Does Not Apply route daily. * miscellaneous medical supply Misc  
2 Each by Does Not Apply route daily. * miscellaneous medical supply Misc  
2 Each by Does Not Apply route daily. * miscellaneous medical supply Misc Dispense one bedside commode. montelukast 10 mg tablet Commonly known as:  SINGULAIR TK 1 T PO D  
  
 omeprazole 20 mg capsule Commonly known as:  PRILOSEC Take 1 capsule by mouth daily. polyethylene glycol 17 gram/dose powder Commonly known as:  Court Hugger Take 17 g by mouth daily. PRALUENT PEN 75 mg/mL injector pen Generic drug:  alirocumab  
  
 pregabalin 300 mg capsule Commonly known as:  Alejandra Hakeem Take 1 Cap by mouth two (2) times a day. Max Daily Amount: 600 mg.  
  
 rosuvastatin 40 mg tablet Commonly known as:  CRESTOR  
TAKE 1 TABLET BY MOUTH DAILY therapeutic multivitamin tablet Commonly known as:  [facility] Take 1 tablet by mouth daily. traMADol 50 mg tablet Commonly known as:  ULTRAM  
Take 50 mg by mouth every six (6) hours as needed for Pain. VITAMIN B-12 500 mcg tablet Generic drug:  cyanocobalamin Take 500 mcg by mouth daily. zolpidem 10 mg tablet Commonly known as:  AMBIEN Take 1 Tab by mouth nightly as needed for Sleep. Max Daily Amount: 10 mg.  
  
 * Notice: This list has 5 medication(s) that are the same as other medications prescribed for you. Read the directions carefully, and ask your doctor or other care provider to review them with you. Follow-up Instructions Return for following block. Introducing [facility]! Dear Mali Kim: 
Thank you for requesting a [employer] account. Our records indicate that you already have an active [employer] account. You can access your account anytime at [email or web address] Did you know that you can access your hospital and ER discharge instructions at any time in [employer]? You can also review all of your test results from your hospital stay or ER visit. Additional Information If you have questions, please visit the Frequently Asked Questions section of the Be[employer] website at [email or web address] Remember, [employer] is NOT to be used for urgent needs. For medical emergencies, dial 911. Now available from your iPhone and Android! Please provide this summary of care documentation to your next provider. Your primary care clinician is listed as Naima Montgomery. If you have any questions after today's visit, please call [phone].

## 2018-05-15 ENCOUNTER — DOCUMENTATION ONLY (OUTPATIENT)
Dept: ORTHOPEDIC SURGERY | Age: 58
End: 2018-05-15

## 2018-05-16 DIAGNOSIS — R32 URINARY INCONTINENCE, UNSPECIFIED TYPE: ICD-10-CM

## 2018-05-16 DIAGNOSIS — M47.817 LUMBOSACRAL SPONDYLOSIS WITHOUT MYELOPATHY: ICD-10-CM

## 2018-05-24 ENCOUNTER — APPOINTMENT (OUTPATIENT)
Dept: GENERAL RADIOLOGY | Age: 58
End: 2018-05-24
Attending: PHYSICAL MEDICINE & REHABILITATION
Payer: MEDICARE

## 2018-05-24 ENCOUNTER — HOSPITAL ENCOUNTER (OUTPATIENT)
Age: 58
Setting detail: OUTPATIENT SURGERY
Discharge: HOME | End: 2018-05-24
Attending: PHYSICAL MEDICINE & REHABILITATION | Admitting: PHYSICAL MEDICINE & REHABILITATION
Payer: MEDICARE

## 2018-05-24 VITALS
SYSTOLIC BLOOD PRESSURE: 109 MMHG | DIASTOLIC BLOOD PRESSURE: 72 MMHG | BODY MASS INDEX: 35.68 KG/M2 | OXYGEN SATURATION: 95 % | WEIGHT: 177 LBS | RESPIRATION RATE: 18 BRPM | HEART RATE: 70 BPM | HEIGHT: 59 IN | TEMPERATURE: 97.8 F

## 2018-05-24 PROCEDURE — 76010000009 HC PAIN MGT 0 TO 30 MIN PROC: Performed by: PHYSICAL MEDICINE & REHABILITATION

## 2018-05-24 PROCEDURE — 74011250636 HC RX REV CODE- 250/636: Performed by: PHYSICAL MEDICINE & REHABILITATION

## 2018-05-24 PROCEDURE — 74011000250 HC RX REV CODE- 250

## 2018-05-24 PROCEDURE — 74011250637 HC RX REV CODE- 250/637: Performed by: PHYSICAL MEDICINE & REHABILITATION

## 2018-05-24 PROCEDURE — 74011636320 HC RX REV CODE- 636/320

## 2018-05-24 PROCEDURE — 74011250636 HC RX REV CODE- 250/636

## 2018-05-24 RX ORDER — DEXAMETHASONE SODIUM PHOSPHATE 100 MG/10ML
INJECTION INTRAMUSCULAR; INTRAVENOUS AS NEEDED
Status: DISCONTINUED | OUTPATIENT
Start: 2018-05-24 | End: 2018-05-24 | Stop reason: HOSPADM

## 2018-05-24 RX ORDER — LIDOCAINE HYDROCHLORIDE 10 MG/ML
INJECTION, SOLUTION EPIDURAL; INFILTRATION; INTRACAUDAL; PERINEURAL AS NEEDED
Status: DISCONTINUED | OUTPATIENT
Start: 2018-05-24 | End: 2018-05-24 | Stop reason: HOSPADM

## 2018-05-24 RX ORDER — DIAZEPAM 5 MG/1
5-20 TABLET ORAL ONCE
Status: COMPLETED | OUTPATIENT
Start: 2018-05-24 | End: 2018-05-24

## 2018-05-24 RX ADMIN — DIAZEPAM 10 MG: 5 TABLET ORAL at 12:56

## 2018-05-24 NOTE — PROCEDURES
Facet Joint Block Procedure Note    Patient Name: Walter Schreiber    Date of Procedure: May 24, 2018    Preoperative Diagnosis: DX    Post Operative Diagnosis:DX     Procedure:  bilateral  L4-L5 Facet Joint Block  left L5-S1 Facet Joint Block      Consent: Informed consent was obtained prior to the procedure. The patient was given the opportunity to ask questions regarding the procedure and its associated risks. In addition to the potential risks associated with the procedure itself, the patient was informed both verbally and in writing of potential side effects of the use of glucocorticoids. The patient appeared to comprehend the informed consent and desired to have the procedure perfored. Procedure: The patient was placed in the prone position on the flouroscopy table and the back was prepped and draped in the usual sterile manner. At each blocked level, the exact location of the facet joint was identified with flouroscopy, and after local Lidocaine 1% injection, a #22 gauge spinal needle was then advanced toward the joint. A total of 30 mg of preservative free Dexamethasone and 4 cc of Lidocaine was injected around and equally divided among all of the sites. The patient tolerated the procedure well. The injection area was cleaned and bandaids applied. No excessive bleeding was noted. Patient dressed and was discharged to home with instructions.        Alison Yanez MD  May 24, 2018

## 2018-05-24 NOTE — DISCHARGE INSTRUCTIONS
[facility]   ([place])  Dr. Marycarmen Duvall, Dr. Rivera Pollock, Dr. Hawkins Harness not drive a car, operate heavy machinery or dangerous equipment for 24 hours. * Activity as tolerated; rest for the remainder of the day. * Resume pre-block medications including those for your family doctor. * Do not drink alcoholic beverages for 24 hours. Alcohol and the medications you have received may interact and cause an adverse reaction. * You may feel better this evening and worse tomorrow, as the numbing medications wears off and the steroid has yet to begin to work. After 48 hrs the steroid should begin to release bringing you relief. * You may shower this evening and remove any bandages. * Avoid hot tubs and heating pads for 24 hours. You may use cold packs on the procedure site as tolerated for the first 24 hours. * If a headache develops, drink plenty of fluids and rest.  Take over the counter medications for headache if needed. If the headache continues longer than 24 hours, call MD at the [address] [place] [address]. [phone]    * Continue taking pain medications as needed. * You may resume your regular diet if tolerated. Otherwise, start with sips of water and advance slowly. * If Diabetic: check your blood sugar three times a day for the next 3 days. If your sugar is greater than 300 call your family doctor. If your sugar is greater than 400, have someone transport you to the nearest Emergency Room. * If you experience any of the following problems, Please Call the [address] [place] [address] at [phone].         * Shortness of Breath    * Fever of 101 or higher    * Nausea / Vomiting    * Severe Headache    * Weakness or numbness in arms or legs that is not      resolving    * Prolonged increase in pain greater than 4 days      DISCHARGE SUMMARY from Nurse      PATIENT INSTRUCTIONS:    After oral sedation, for 24 hours or while taking prescription Narcotics:  · Limit your activities  · Do not drive and operate hazardous machinery  · Do not make important personal or business decisions  · Do  not drink alcoholic beverages  · If you have not urinated within 8 hours after discharge, please contact your surgeon on call. Report the following to your surgeon:  · Excessive pain, swelling, redness or odor of or around the surgical area  · Temperature over 101  · Nausea and vomiting lasting longer than 4 hours or if unable to take medications  · Any signs of decreased circulation or nerve impairment to extremity: change in color, persistent  numbness, tingling, coldness or increase pain  · Any questions            What to do at Home:  Recommended activity: Activity as tolerated, NO DRIVING FOR 12 Hours post injection          *  Please give a list of your current medications to your Primary Care Provider. *  Please update this list whenever your medications are discontinued, doses are      changed, or new medications (including over-the-counter products) are added. *  Please carry medication information at all times in case of emergency situations. These are general instructions for a healthy lifestyle:    No smoking/ No tobacco products/ Avoid exposure to second hand smoke    Surgeon General's Warning:  Quitting smoking now greatly reduces serious risk to your health. Obesity, smoking, and sedentary lifestyle greatly increases your risk for illness    A healthy diet, regular physical exercise & weight monitoring are important for maintaining a healthy lifestyle    You may be retaining fluid if you have a history of heart failure or if you experience any of the following symptoms:  Weight gain of 3 pounds or more overnight or 5 pounds in a week, increased swelling in our hands or feet or shortness of breath while lying flat in bed.   Please call your doctor as soon as you notice any of these symptoms; do not wait until your next office visit. Recognize signs and symptoms of STROKE:    F-face looks uneven    A-arms unable to move or move unevenly    S-speech slurred or non-existent    T-time-call 911 as soon as signs and symptoms begin-DO NOT go       Back to bed or wait to see if you get better-TIME IS BRAIN.

## 2018-06-07 ENCOUNTER — OFFICE VISIT (OUTPATIENT)
Dept: FAMILY MEDICINE CLINIC | Age: 58
End: 2018-06-07

## 2018-06-07 VITALS
BODY MASS INDEX: 31.65 KG/M2 | SYSTOLIC BLOOD PRESSURE: 113 MMHG | TEMPERATURE: 97.3 F | RESPIRATION RATE: 17 BRPM | HEART RATE: 63 BPM | WEIGHT: 157 LBS | HEIGHT: 59 IN | DIASTOLIC BLOOD PRESSURE: 65 MMHG

## 2018-06-07 DIAGNOSIS — M94.9 DISORDER OF BONE AND CARTILAGE: ICD-10-CM

## 2018-06-07 DIAGNOSIS — I50.22 CHRONIC SYSTOLIC HEART FAILURE (HCC): ICD-10-CM

## 2018-06-07 DIAGNOSIS — R92.8 ABNORMAL MAMMOGRAM: ICD-10-CM

## 2018-06-07 DIAGNOSIS — Z86.39 HISTORY OF DIABETES MELLITUS, TYPE II: ICD-10-CM

## 2018-06-07 DIAGNOSIS — M54.16 LUMBAR NEURITIS: ICD-10-CM

## 2018-06-07 DIAGNOSIS — M89.9 DISORDER OF BONE AND CARTILAGE: ICD-10-CM

## 2018-06-07 DIAGNOSIS — I10 ESSENTIAL HYPERTENSION: ICD-10-CM

## 2018-06-07 DIAGNOSIS — E78.49 OTHER HYPERLIPIDEMIA: ICD-10-CM

## 2018-06-07 DIAGNOSIS — R73.09 ELEVATED HEMOGLOBIN A1C: ICD-10-CM

## 2018-06-07 DIAGNOSIS — M62.838 MUSCLE SPASM: Primary | ICD-10-CM

## 2018-06-07 DIAGNOSIS — F51.01 PRIMARY INSOMNIA: ICD-10-CM

## 2018-06-07 RX ORDER — ZOLPIDEM TARTRATE 10 MG/1
10 TABLET ORAL
Qty: 30 TAB | Refills: 0 | Status: SHIPPED | OUTPATIENT
Start: 2018-06-07 | End: 2018-12-06 | Stop reason: SDUPTHER

## 2018-06-07 RX ORDER — TIZANIDINE 4 MG/1
4 TABLET ORAL
Qty: 60 TAB | Refills: 1 | Status: ON HOLD | OUTPATIENT
Start: 2018-06-07 | End: 2018-08-28 | Stop reason: CLARIF

## 2018-06-07 RX ORDER — HYDROCODONE BITARTRATE AND ACETAMINOPHEN 10; 325 MG/1; MG/1
1 TABLET ORAL
Qty: 80 TAB | Refills: 0 | Status: CANCELLED | OUTPATIENT
Start: 2018-06-07

## 2018-06-07 RX ORDER — ROSUVASTATIN CALCIUM 40 MG/1
TABLET, COATED ORAL
Qty: 90 TAB | Refills: 0 | Status: ON HOLD | OUTPATIENT
Start: 2018-06-07 | End: 2018-08-28 | Stop reason: CLARIF

## 2018-06-07 NOTE — PATIENT INSTRUCTIONS
Please contact our office if you have any questions about your visit today. 1.) Use Zanaflex as needed for muscle spasm. 2.) Please get labs before next visit. 3.) Return in roughly 3 months for follow up.

## 2018-06-07 NOTE — PROGRESS NOTES
s         Progress Note    Patient: Rosita Rivera MRN: [id number]  SSN: [id number]    YOB: 1960  Age: 65 y.o. Sex: female          Subjective:   Rosita Rivera is a 65 y.o. female who is here for regular follow up. She mentions that she will see her orthopedic specialist tomorrow. She mentions that she no longer sees Pain Management. She mentions that she was being treated with similar medications as her orthopedic specialist and decided to stop seeing them. In regard to her insomnia she states that the Ambien has been very effective for her. Patient would like to get refills on her medications as well. Visit from 3/7/18  The patient was recently referred to Pain management by her orthopedic surgery. She mentions that she does not feel that Pain Management is not the thing for her. She states that they were originally placed her on Opana but she could not afford this. The Pain management specialist offered to do knee injections. She mentions that they were requiring that she goes once a month. She later was placed on a low dose of Norco. She mentions that she was placed on 5-325 Tionesta. She mentions that her pain is concentrated on her hip and knee. She makes note of the fact that her orthopedic surgeon will repeat imaging on her right hip and left knee. She mentions that she continues to use Lyrica. She states that the Lyrica helps with lower extremity sensitivity. Visit from 12/7/2017  The patient recently had a fall and twisted her left knee while in [place] three months ago. She was placed in a brace and was given a cortisone shot by her orthopedic specialist. She mentions that they told her that if the shot did not work she would have to try to see a specialist in [facility]. She mentions that her left knee feels OK. She states that they did do a CT scan on the knee. She was told that nothing was broken or loose. The patient has been on PT for the past three months.  She has also been doing home PT exercises as well. The patient never got a call from her foot doctor in regard to swelling in her foot. She admits to an injury to the second toe on the right foot. He mentions that it looks like the wound is closing but is not yet completely healed. The patient mentions that her Ambien has been effective in helping her sleep. She mentions that she has been doing well on the Lyrica twice a day. In regard to her lumbar neuritis with radiation down arm she has not heard back from Dr. Kristy Mendez. Objective:     Visit Vitals    /65 (BP 1 Location: Left arm, BP Patient Position: Sitting)    Pulse 63    Temp 97.3 °F (36.3 °C) (Oral)    Resp 17    Ht 4' 11\" (1.499 m)    Wt 157 lb (71.2 kg)    LMP  (LMP Unknown)    BMI 31.71 kg/m2       Review of Systems:  Pertinent ROS noted in HPI     Physical Exam:   GENERAL: alert, cooperative, appears stated age  EYE: conjunctivae/corneas clear. PERRL, EOM's intact. Fundi benign  THROAT & NECK: normal and no erythema or exudates noted. Poor dentition on exam.   LUNG: clear to auscultation bilaterally  HEART: regular rate and rhythm, S1, S2 normal, no murmur, click, rub or gallop  ABDOMEN: Bowels sounds diminished but observed. EXTREMITIES:  No significant edema  NEUROLOGIC: Right arm flaccid on exam. No change from previous visits.               Lab/Data Review:    Lab Results   Component Value Date/Time    Hemoglobin A1c 6.4 (H) 03/07/2018 11:22 AM     Lab Results   Component Value Date/Time    Cholesterol, total 271 (H) 03/07/2018 11:22 AM    HDL Cholesterol 72 (H) 03/07/2018 11:22 AM    LDL, calculated 175.8 (H) 03/07/2018 11:22 AM    VLDL, calculated 23.2 03/07/2018 11:22 AM    Triglyceride 116 03/07/2018 11:22 AM    CHOL/HDL Ratio 3.8 03/07/2018 11:22 AM     Lab Results   Component Value Date/Time    Sodium 145 03/07/2018 11:22 AM    Potassium 4.6 03/07/2018 11:22 AM    Chloride 108 03/07/2018 11:22 AM    CO2 32 03/07/2018 11:22 AM    Anion gap 5 03/07/2018 11:22 AM    Glucose 81 03/07/2018 11:22 AM    BUN 6 (L) 03/07/2018 11:22 AM    Creatinine 0.75 03/07/2018 11:22 AM    BUN/Creatinine ratio 8 (L) 03/07/2018 11:22 AM    GFR est AA >60 03/07/2018 11:22 AM    GFR est non-AA >60 03/07/2018 11:22 AM    Calcium 8.5 03/07/2018 11:22 AM    Bilirubin, total 0.2 03/07/2018 11:22 AM    AST (SGOT) 13 (L) 03/07/2018 11:22 AM    Alk. phosphatase 161 (H) 03/07/2018 11:22 AM    Protein, total 6.9 03/07/2018 11:22 AM    Albumin 3.4 03/07/2018 11:22 AM    Globulin 3.5 03/07/2018 11:22 AM    A-G Ratio 1.0 03/07/2018 11:22 AM    ALT (SGPT) 19 03/07/2018 11:22 AM           Assessment:     1.) Insomnia: Patient stable on Ambien 10 mg once a day and this was refilled. 2.) Lumbar Neuritis with radiation down left arm: Patient has followed up with orthopedics as well as Pain Management with no resolution. Patient states that she will get her [place] from her orthopedics specialist.     4.) Diabetic Neuropathy: Patient stable on Lyrica 300 mg twice a day by Dr. Lucia Reilly. 5.) History of Diabetes: Urine Microalbumin ordered as long as HgbA1C.     6.) Hyperlipidemia: Crestor ordered. Lipid panel ordered. 7.) Intermittent Muscle Spasm: Patient's tizanidine reordered. 8.) Preventive: Screening Mammogram ordered. Labs ordered as noted below. Patient will return in 3 months for follow-up.         Plan:     Orders Placed This Encounter    PABLO 3D MAEVE W MAMMO BI DX INCL CAD    LIPID PANEL    METABOLIC PANEL, COMPREHENSIVE    CBC WITH AUTOMATED DIFF    TSH 3RD GENERATION    HEMOGLOBIN A1C WITH EAG    MICROALBUMIN, UR, RAND W/ MICROALB/CREAT RATIO    zolpidem (AMBIEN) 10 mg tablet    rosuvastatin (CRESTOR) 40 mg tablet    tiZANidine (ZANAFLEX) 4 mg tablet           Signed By: [id number] Anthony , DO     June 7, 2018

## 2018-06-07 NOTE — MR AVS SNAPSHOT
[address] [place] [address] 
 
 
 [address] [facility] [address] [phone] Patient: Duyen Ca MRN: [id number] :1960 Visit Information Date & Time Provider Department Dept. Phone Encounter #  
 2018 10:30 AM Saba Tidwell 77 [id number] Follow-up Instructions Return in about 3 months (around 2018). Your Appointments 2018 10:45 AM  
Follow Up with Moncho Kuhn MD  
[facility] Appt Note: rt knee 3 mo fu  
 [address] [facility] [address] [place] [address]  
[phone] [address] [place] [address]  
  
    
 2018 10:25 AM  
Follow Up with Noemi Marion MD  
[address] [place] [address], [address] [facility] - [facility] ([facility]) Appt Note: FU BLOCK 2018 [address] [place] [address] [address] [facility] Upcoming Health Maintenance Date Due  
 EYE EXAM RETINAL OR DILATED Q1 1971 FOBT Q 1 YEAR AGE 50-75 2011 FOOT EXAM Q1 3/3/2015 GLAUCOMA SCREENING Q2Y 2016 MICROALBUMIN Q1 10/17/2017 MEDICARE YEARLY EXAM 5/3/2018 BREAST CANCER SCRN MAMMOGRAM 2018 Influenza Age 5 to Adult 2018 HEMOGLOBIN A1C Q6M 2018 LIPID PANEL Q1 3/7/2019 DTaP/Tdap/Td series (2 - Td) 10/4/2026 Allergies as of 2018  Review Complete On: 7328 By: Jacqui Jacinto. Marycarmen Erwin LPN Severity Noted Reaction Type Reactions Dextromethorphan-guaifenesin High 2011    Other (comments) Aspirin  2012   Topical Hives Current Immunizations  Reviewed on 2013 Name Date Influenza Vaccine 2015, 2015 12:00 AM, 2011 12:00 AM  
 Influenza Vaccine (Quad) PF 2017 Influenza Vaccine PF 2014, 2013 Pneumococcal Conjugate (PCV-13) 5/2/2017 Pneumococcal Polysaccharide (PPSV-23) 11/3/2015 12:00 AM  
 Tdap 10/4/2016 12:00 AM  
  
 Not reviewed this visit You Were Diagnosed With   
  
 Codes Comments Muscle spasm    -  Primary ICD-10-CM: Q43.825 ICD-9-CM: 728.85 Primary insomnia     ICD-10-CM: F51.01 
ICD-9-CM: 307.42 Lumbar neuritis     ICD-10-CM: M54.16 
ICD-9-CM: 724.4 Chronic systolic heart failure (HCC)     ICD-10-CM: I50.22 ICD-9-CM: 428.22 Essential hypertension     ICD-10-CM: I10 
ICD-9-CM: 401.9 Elevated hemoglobin A1c     ICD-10-CM: R73.09 
ICD-9-CM: 790.29 Disorder of bone and cartilage     ICD-10-CM: M89.9, M94.9 ICD-9-CM: 733.90 Other hyperlipidemia     ICD-10-CM: E78.4 ICD-9-CM: 272.4 Abnormal mammogram     ICD-10-CM: R92.8 ICD-9-CM: 793.80 History of diabetes mellitus, type II     ICD-10-CM: Z86.39 
ICD-9-CM: V12.29 Vitals BP Pulse Temp Resp Height(growth percentile) Weight(growth percentile) 113/65 (BP 1 Location: Left arm, BP Patient Position: Sitting) 63 97.3 °F (36.3 °C) (Oral) 17 4' 11\" (1.499 m) 157 lb (71.2 kg) LMP BMI OB Status Smoking Status (LMP Unknown) 31.71 kg/m2 Hysterectomy Former Smoker BMI and BSA Data Body Mass Index Body Surface Area 31.71 kg/m 2 1.72 m 2 Preferred Pharmacy Pharmacy Name Phone [facility] [phone] Your Updated Medication List  
  
   
This list is accurate as of 6/7/18 11:20 AM.  Always use your most recent med list.  
  
  
  
  
 brief disposable Misc Commonly known as:  adult  
by Does Not Apply route. Dispense one package of 180 briefs/ diapers. calcipotriene 0.005 % topical cream  
Commonly known as:  DOVONEX  
  
 calcium citrate-vitamin d3 315-200 mg-unit Tab Commonly known as:  CITRACAL+D Take 1 tablet by mouth two (2) times daily (with meals). carBAMazepine 200 mg tablet Commonly known as:  TEGretol 1  q am 1 q pm  2 qhs  
  
 carvedilol 12.5 mg tablet Commonly known as:  [place] Take 6.25 mg by mouth two (2) times daily (with meals). celecoxib 200 mg capsule Commonly known as:  CELEBREX Take 1 Cap by mouth two (2) times a day. clopidogrel 75 mg Tab Commonly known as:  PLAVIX Take 1 tablet by mouth daily. DULoxetine 60 mg capsule Commonly known as:  CYMBALTA Take 1 Cap by mouth daily. ergocalciferol 50,000 unit capsule Commonly known as:  ERGOCALCIFEROL Take 1 Cap by mouth every seven (7) days. furosemide 40 mg tablet Commonly known as:  LASIX TAKE 1 TABLET BY MOUTH TWICE DAILY AS NEEDED HYDROcodone-acetaminophen  mg tablet Commonly known as:  [place] Take 1 Tab by mouth every eight (8) hours as needed for Pain. Max Daily Amount: 3 Tabs. isosorbide mononitrate ER 30 mg tablet Commonly known as:  IMDUR Take 15 mg by mouth every morning. levocetirizine 5 mg tablet Commonly known as:  Pervis [place] Take 1 Tab by mouth daily. lisinopril 20 mg tablet Commonly known as:  Ruperto Hector Take 20 mg by mouth daily. methocarbamol 500 mg tablet Commonly known as:  ROBAXIN  
TAKE 1 TABLET BY MOUTH FOUR TIMES DAILY AS NEEDED FOR MUSCLE SPASM * miscellaneous medical supply Misc  
1 Each by Does Not Apply route daily. * miscellaneous medical supply Misc  
1 Each by Does Not Apply route daily. * miscellaneous medical supply [facility]  
2 Each by Does Not Apply route daily. * miscellaneous medical supply Misc  
2 Each by Does Not Apply route daily. * miscellaneous medical supply Misc Dispense one bedside commode. montelukast 10 mg tablet Commonly known as:  SINGULAIR TK 1 T PO D  
  
 omeprazole 20 mg capsule Commonly known as:  PRILOSEC Take 1 capsule by mouth daily. polyethylene glycol 17 gram/dose powder Commonly known as:  Alejandra Gio Take 17 g by mouth daily. PRALUENT PEN 75 mg/mL injector pen Generic drug:  alirocumab  
  
 pregabalin 300 mg capsule Commonly known as:  Tre Rojas Take 1 Cap by mouth two (2) times a day. Max Daily Amount: 600 mg.  
  
 rosuvastatin 40 mg tablet Commonly known as:  CRESTOR  
TAKE 1 TABLET BY MOUTH DAILY therapeutic multivitamin tablet Commonly known as:  [facility] Take 1 tablet by mouth daily. tiZANidine 4 mg tablet Commonly known as:  Robert Raymond Take 1 Tab by mouth three (3) times daily as needed. Indications: Muscle Spasm  
  
 traMADol 50 mg tablet Commonly known as:  ULTRAM  
Take 50 mg by mouth every six (6) hours as needed for Pain. VITAMIN B-12 500 mcg tablet Generic drug:  cyanocobalamin Take 500 mcg by mouth daily. zolpidem 10 mg tablet Commonly known as:  AMBIEN Take 1 Tab by mouth nightly as needed for Sleep. Max Daily Amount: 10 mg.  
  
 * Notice: This list has 5 medication(s) that are the same as other medications prescribed for you. Read the directions carefully, and ask your doctor or other care provider to review them with you. Prescriptions Printed Refills  
 zolpidem (AMBIEN) 10 mg tablet 0 Sig: Take 1 Tab by mouth nightly as needed for Sleep. Max Daily Amount: 10 mg.  
 Class: Print Route: Oral  
  
Prescriptions Sent to Pharmacy Refills  
 rosuvastatin (CRESTOR) 40 mg tablet 0 Sig: TAKE 1 TABLET BY MOUTH DAILY Class: Normal  
 Pharmacy: [facility] [place] [address] Ph #: [phone]  
 tiZANidine (ZANAFLEX) 4 mg tablet 1 Sig: Take 1 Tab by mouth three (3) times daily as needed. Indications: Muscle Spasm Class: Normal  
 Pharmacy: [facility], [address] [place] [address] Ph #: [phone] Route: Oral  
  
Follow-up Instructions Return in about 3 months (around 8/29/2018). To-Do List   
 06/07/2018 Lab:  HEMOGLOBIN A1C WITH EAG   
  
 06/07/2018 Imaging:  PABLO 3D MAEVE W MAMMO BI DX INCL CAD   
  
 06/07/2018 Lab:  MICROALBUMIN, UR, RAND W/ MICROALB/CREAT RATIO Around 09/05/2018 Lab:  CBC WITH AUTOMATED DIFF Around 09/05/2018 Lab:  LIPID PANEL Around 09/05/2018 Lab:  METABOLIC PANEL, COMPREHENSIVE Around 09/05/2018 Lab:  TSH 3RD GENERATION Patient Instructions Please contact our office if you have any questions about your visit today. 1.) Use Zanaflex as needed for muscle spasm. 2.) Please get labs before next visit. 3.) Return in roughly 3 months for follow up. Introducing [facility]! Dear Constantin Peer: 
Thank you for requesting a My Single Point account. Our records indicate that you already have an active My Single Point account. You can access your account anytime at [email or web address] Did you know that you can access your hospital and ER discharge instructions at any time in My Single Point? You can also review all of your test results from your hospital stay or ER visit. Additional Information If you have questions, please visit the Frequently Asked Questions section of the My Single Point website at [email or web address] Remember, My Single Point is NOT to be used for urgent needs. For medical emergencies, dial 911. Now available from your iPhone and Android! Please provide this summary of care documentation to your next provider. Your primary care clinician is listed as Jacek Coleman. If you have any questions after today's visit, please call [phone].

## 2018-06-07 NOTE — PROGRESS NOTES
Chief Complaint   Patient presents with    Knee Pain    Hip Pain    Hypertension    Back Pain     recently injection in her back, by dr Morrison Broerlinure     1. Have you been to the ER, urgent care clinic since your last visit? Hospitalized since your last visit? No    2. Have you seen or consulted any other health care providers outside of the [address] [place] [address] since your last visit? Include any pap smears or colon screening.  No

## 2018-06-08 ENCOUNTER — OFFICE VISIT (OUTPATIENT)
Dept: ORTHOPEDIC SURGERY | Age: 58
End: 2018-06-08

## 2018-06-08 VITALS
OXYGEN SATURATION: 98 % | HEART RATE: 64 BPM | TEMPERATURE: 97 F | DIASTOLIC BLOOD PRESSURE: 66 MMHG | HEIGHT: 59 IN | RESPIRATION RATE: 16 BRPM | SYSTOLIC BLOOD PRESSURE: 116 MMHG

## 2018-06-08 DIAGNOSIS — M25.562 CHRONIC PAIN OF LEFT KNEE: ICD-10-CM

## 2018-06-08 DIAGNOSIS — G89.29 CHRONIC PAIN OF LEFT KNEE: ICD-10-CM

## 2018-06-08 DIAGNOSIS — M25.552 PAIN OF BOTH HIP JOINTS: Primary | ICD-10-CM

## 2018-06-08 DIAGNOSIS — M25.551 PAIN OF BOTH HIP JOINTS: Primary | ICD-10-CM

## 2018-06-08 RX ORDER — ERGOCALCIFEROL 1.25 MG/1
CAPSULE ORAL
Status: ON HOLD | COMMUNITY
Start: 2017-05-09 | End: 2018-08-28 | Stop reason: CLARIF

## 2018-06-08 RX ORDER — PREGABALIN 300 MG/1
300 CAPSULE ORAL
Status: ON HOLD | COMMUNITY
End: 2018-08-28 | Stop reason: CLARIF

## 2018-06-08 RX ORDER — LISINOPRIL 10 MG/1
10 TABLET ORAL
Status: ON HOLD | COMMUNITY
Start: 2017-07-11 | End: 2018-08-28 | Stop reason: CLARIF

## 2018-06-08 RX ORDER — FERROUS GLUCONATE 324(38)MG
324 TABLET ORAL
COMMUNITY
End: 2020-05-19

## 2018-06-08 RX ORDER — OMEPRAZOLE 20 MG/1
20 CAPSULE, DELAYED RELEASE ORAL
Status: ON HOLD | COMMUNITY
End: 2018-08-28 | Stop reason: CLARIF

## 2018-06-08 RX ORDER — HYDROCODONE BITARTRATE AND ACETAMINOPHEN 5; 325 MG/1; MG/1
TABLET ORAL
Status: ON HOLD | COMMUNITY
Start: 2017-06-14 | End: 2018-08-28 | Stop reason: CLARIF

## 2018-06-08 RX ORDER — METHOCARBAMOL 500 MG/1
500 TABLET, FILM COATED ORAL
Status: ON HOLD | COMMUNITY
End: 2018-08-28 | Stop reason: CLARIF

## 2018-06-08 RX ORDER — LINACLOTIDE 145 UG/1
CAPSULE, GELATIN COATED ORAL
Refills: 2 | COMMUNITY
Start: 2018-05-17 | End: 2019-09-23 | Stop reason: SDUPTHER

## 2018-06-08 RX ORDER — CARVEDILOL 6.25 MG/1
6.25 TABLET ORAL
Status: ON HOLD | COMMUNITY
Start: 2017-03-10 | End: 2018-08-28 | Stop reason: CLARIF

## 2018-06-08 RX ORDER — DULOXETIN HYDROCHLORIDE 60 MG/1
60 CAPSULE, DELAYED RELEASE ORAL
Status: ON HOLD | COMMUNITY
End: 2018-08-28 | Stop reason: CLARIF

## 2018-06-08 RX ORDER — CALCIPOTRIENE 50 UG/G
CREAM TOPICAL
COMMUNITY
Start: 2017-05-02 | End: 2020-05-12

## 2018-06-08 RX ORDER — FUROSEMIDE 20 MG/1
TABLET ORAL
Status: ON HOLD | COMMUNITY
Start: 2017-06-27 | End: 2018-08-28 | Stop reason: CLARIF

## 2018-06-08 RX ORDER — HYDROCODONE BITARTRATE AND ACETAMINOPHEN 5; 325 MG/1; MG/1
1 TABLET ORAL
Qty: 21 TAB | Refills: 0 | Status: SHIPPED | OUTPATIENT
Start: 2018-06-08 | End: 2020-04-29 | Stop reason: ALTCHOICE

## 2018-06-08 RX ORDER — POLYETHYLENE GLYCOL 3350 17 G/17G
17 POWDER, FOR SOLUTION ORAL DAILY
COMMUNITY
End: 2020-11-23 | Stop reason: ALTCHOICE

## 2018-06-08 RX ORDER — ZOLPIDEM TARTRATE 10 MG/1
TABLET ORAL
Status: ON HOLD | COMMUNITY
Start: 2017-05-02 | End: 2018-08-28 | Stop reason: CLARIF

## 2018-06-08 RX ORDER — GUAIFENESIN 1200 MG
TABLET, EXTENDED RELEASE 12 HR ORAL
COMMUNITY
Start: 2017-08-14

## 2018-06-08 RX ORDER — ROSUVASTATIN CALCIUM 40 MG/1
40 TABLET, COATED ORAL
COMMUNITY
End: 2018-12-06 | Stop reason: SDUPTHER

## 2018-06-08 RX ORDER — CELECOXIB 200 MG/1
CAPSULE ORAL
Status: ON HOLD | COMMUNITY
Start: 2017-05-17 | End: 2018-08-28 | Stop reason: CLARIF

## 2018-06-08 RX ORDER — CLOPIDOGREL BISULFATE 75 MG/1
75 TABLET ORAL
Status: ON HOLD | COMMUNITY
Start: 2017-01-25 | End: 2018-08-28 | Stop reason: CLARIF

## 2018-06-08 RX ORDER — CARBAMAZEPINE 200 MG/1
200 TABLET ORAL
Status: ON HOLD | COMMUNITY
End: 2018-08-28 | Stop reason: CLARIF
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
## 2018-06-22 ENCOUNTER — OFFICE VISIT (OUTPATIENT)
Dept: ORTHOPEDIC SURGERY | Age: 58
End: 2018-06-22

## 2018-06-22 VITALS
HEART RATE: 55 BPM | BODY MASS INDEX: 35.88 KG/M2 | DIASTOLIC BLOOD PRESSURE: 80 MMHG | RESPIRATION RATE: 16 BRPM | SYSTOLIC BLOOD PRESSURE: 148 MMHG | HEIGHT: 59 IN | WEIGHT: 178 LBS

## 2018-06-22 DIAGNOSIS — M54.16 LUMBAR RADICULOPATHY: ICD-10-CM

## 2018-06-22 DIAGNOSIS — M47.817 SPONDYLOSIS OF LUMBOSACRAL REGION WITHOUT MYELOPATHY OR RADICULOPATHY: Primary | ICD-10-CM

## 2018-06-22 RX ORDER — PREGABALIN 300 MG/1
300 CAPSULE ORAL 2 TIMES DAILY
Qty: 60 CAP | Refills: 2 | Status: SHIPPED | OUTPATIENT
Start: 2018-06-22 | End: 2019-01-04 | Stop reason: SDUPTHER

## 2018-06-22 RX ORDER — DULOXETIN HYDROCHLORIDE 60 MG/1
60 CAPSULE, DELAYED RELEASE ORAL DAILY
Qty: 30 CAP | Refills: 2 | Status: SHIPPED | OUTPATIENT
Start: 2018-06-22 | End: 2018-07-19 | Stop reason: SDUPTHER
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
## 2018-06-22 NOTE — MR AVS SNAPSHOT
[address] [facility] 
 
 
 [address] [facility] 
[phone] Patient: He Hernandez MRN: [id number] :1960 Visit Information Date & Time Provider Department Dept. Phone Encounter #  
 2018  1:45 PM Dasha Mclean  [facility], [address] [facility] [place] [phone] [id number] Follow-up Instructions Return in about 3 months (around 2018). Your Appointments 2018  2:50 PM  
Follow Up with Dave Frankel, PA-C  
[facility] ([facility]) Appt Note: rt knee 5 wk fu; rt knee 5 wk fu* Patient was rescheduled due to the provider being out of the office on 18 [address], [address] 200 [facility]  
[phone] [address] Upcoming Health Maintenance Date Due  
 EYE EXAM RETINAL OR DILATED Q1 1971 FOBT Q 1 YEAR AGE 50-75 2011 FOOT EXAM Q1 3/3/2015 GLAUCOMA SCREENING Q2Y 2016 MICROALBUMIN Q1 10/17/2017 MEDICARE YEARLY EXAM 5/3/2018 BREAST CANCER SCRN MAMMOGRAM 2018 Influenza Age 5 to Adult 2018 HEMOGLOBIN A1C Q6M 2018 LIPID PANEL Q1 3/7/2019 DTaP/Tdap/Td series (2 - Td) 10/4/2026 Allergies as of 2018  Review Complete On: 2018 By: Dasha Mclean MD  
  
 Severity Noted Reaction Type Reactions Dextromethorphan-guaifenesin High 2011    Other (comments) Aspirin  2012   Topical Hives Current Immunizations  Reviewed on 2013 Name Date Influenza Vaccine 2015, 2015 12:00 AM, 2011 12:00 AM  
 Influenza Vaccine (Quad) PF 2017 Influenza Vaccine PF 2014, 2013 Pneumococcal Conjugate (PCV-13) 2017 Pneumococcal Polysaccharide (PPSV-23) 11/3/2015 12:00 AM  
 Tdap 10/4/2016 12:00 AM  
  
 Not reviewed this visit You Were Diagnosed With   
  
 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 
ICD-9-CM: 724.4 Vitals BP Pulse Resp Height(growth percentile) Weight(growth percentile) LMP  
 148/80 (!) 55 16 4' 11\" (1.499 m) 178 lb (80.7 kg) (LMP Unknown) BMI OB Status Smoking Status 35.95 kg/m2 Hysterectomy Former Smoker BMI and BSA Data Body Mass Index Body Surface Area 35.95 kg/m 2 1.83 m 2 Preferred Pharmacy Pharmacy Name Phone [facility], [phone] [phone] Your Updated Medication List  
  
   
This list is accurate as of 6/22/18  2:34 PM.  Always use your most recent med list.  
  
  
  
  
 acetaminophen 325 mg Cap Take 1 Tab by Mouth Every 6 Hours As Needed for Pain. brief disposable Misc Commonly known as:  adult  
by Does Not Apply route. Dispense one package of 180 briefs/ diapers. * calcipotriene 0.005 % topical cream  
Commonly known as:  Leodis Picking * calcipotriene 0.005 % topical cream  
Commonly known as:  Leodis Picking Use 1 Application to affected area Daily as needed. calcium citrate-vitamin d3 315-200 mg-unit Tab Commonly known as:  CITRACAL+D Take 1 tablet by mouth two (2) times daily (with meals). * carBAMazepine 200 mg tablet Commonly known as:  TEGretol 200 mg.  
  
 * carBAMazepine 200 mg tablet Commonly known as:  TEGretol 1  q am 1 q pm  2 qhs  
  
 * carvedilol 12.5 mg tablet Commonly known as:  Alla Solid Take 6.25 mg by mouth two (2) times daily (with meals). * carvedilol 6.25 mg tablet Commonly known as:  COREG  
6.25 mg.  
  
 * celecoxib 200 mg capsule Commonly known as:  CELEBREX Take 1 Cap by Mouth Twice Daily. * celecoxib 200 mg capsule Commonly known as:  CELEBREX Take 1 Cap by mouth two (2) times a day. * clopidogrel 75 mg Tab Commonly known as:  PLAVIX Take 1 tablet by mouth daily. * clopidogrel 75 mg Tab Commonly known as:  PLAVIX 75 mg.  
  
 * DULoxetine 60 mg capsule Commonly known as:  CYMBALTA 60 mg.  
  
 * DULoxetine 60 mg capsule Commonly known as:  CYMBALTA Take 1 Cap by mouth daily. * DULoxetine 60 mg capsule Commonly known as:  CYMBALTA Take 1 Cap by mouth daily. * ergocalciferol 50,000 unit capsule Commonly known as:  ERGOCALCIFEROL Take 1 Cap by mouth every seven (7) days. * ergocalciferol 50,000 unit capsule Commonly known as:  ERGOCALCIFEROL Take 1 Cap by Mouth Every 7 Days. monday  
  
 ferrous gluconate 324 mg (38 mg iron) tablet 324 mg.  
  
 * furosemide 40 mg tablet Commonly known as:  LASIX TAKE 1 TABLET BY MOUTH TWICE DAILY AS NEEDED * furosemide 20 mg tablet Commonly known as:  LASIX TAKE 1 TABLET BY MOUTH EVERY DAY AS NEEDED  
  
 * HYDROcodone-acetaminophen 5-325 mg per tablet Commonly known as:  Dana Leyland Take 1 Tab by Mouth Every 4 Hours As Needed. * HYDROcodone-acetaminophen  mg tablet Commonly known as:  Dana Leyland Take 1 Tab by mouth every eight (8) hours as needed for Pain. Max Daily Amount: 3 Tabs. * HYDROcodone-acetaminophen 5-325 mg per tablet Commonly known as:  Dana Leyland Take 1 Tab by mouth every eight (8) hours as needed for Pain. Max Daily Amount: 3 Tabs. isosorbide mononitrate ER 30 mg tablet Commonly known as:  IMDUR Take 15 mg by mouth every morning. levocetirizine 5 mg tablet Commonly known as:  Eran Gonzalez Take 1 Tab by mouth daily. LINZESS 145 mcg Cap capsule Generic drug:  linaclotide TK 1 C PO D  
  
 * lisinopril 20 mg tablet Commonly known as:  Nevin Husbands Take 20 mg by mouth daily. * lisinopril 10 mg tablet Commonly known as:  PRINIVIL, ZESTRIL  
10 mg.  
  
 * methocarbamol 500 mg tablet Commonly known as:  ROBAXIN  
500 mg.  
  
 * methocarbamol 500 mg tablet Commonly known as:  ROBAXIN  
TAKE 1 TABLET BY MOUTH FOUR TIMES DAILY AS NEEDED FOR MUSCLE SPASM * miscellaneous medical supply Misc  
1 Each by Does Not Apply route daily. * miscellaneous medical supply Misc  
1 Each by Does Not Apply route daily. * miscellaneous medical supply Misc  
2 Each by Does Not Apply route daily. * miscellaneous medical supply Misc  
2 Each by Does Not Apply route daily. * miscellaneous medical supply Misc Dispense one bedside commode. montelukast 10 mg tablet Commonly known as:  SINGULAIR TK 1 T PO D  
  
 * omeprazole 20 mg capsule Commonly known as:  PRILOSEC  
20 mg.  
  
 * omeprazole 20 mg capsule Commonly known as:  PRILOSEC Take 1 capsule by mouth daily. * polyethylene glycol 17 gram packet Commonly known as:  MIRALAX  
17 g. * polyethylene glycol 17 gram/dose powder Commonly known as:  Damaris Hussar Take 17 g by mouth daily. * PRALUENT PEN 75 mg/mL injector pen Generic drug:  alirocumab * PRALUENT PEN 75 mg/mL injector pen Generic drug:  alirocumab INJECT 75MG (1 PEN) SUBCUTANEOUSLY EVERY 2 WEEKS * pregabalin 300 mg capsule Commonly known as:  LYRICA  
300 mg.  
  
 * pregabalin 300 mg capsule Commonly known as:  Garlin Chapel Take 1 Cap by mouth two (2) times a day. Max Daily Amount: 600 mg.  
  
 * pregabalin 300 mg capsule Commonly known as:  Garlin Chapel Take 1 Cap by mouth two (2) times a day. Max Daily Amount: 600 mg.  
  
 * rosuvastatin 40 mg tablet Commonly known as:  CRESTOR 40 mg.  
  
 * rosuvastatin 40 mg tablet Commonly known as:  CRESTOR  
TAKE 1 TABLET BY MOUTH DAILY therapeutic multivitamin tablet Commonly known as:  [facility] Take 1 tablet by mouth daily. tiZANidine 4 mg tablet Commonly known as:  Milly Olp Take 1 Tab by mouth three (3) times daily as needed.  Indications: Muscle Spasm traMADol 50 mg tablet Commonly known as:  ULTRAM  
Take 50 mg by mouth every six (6) hours as needed for Pain. VITAMIN B-12 500 mcg tablet Generic drug:  cyanocobalamin Take 500 mcg by mouth daily. * zolpidem 10 mg tablet Commonly known as:  AMBIEN Take 1 Tab by Mouth Every Night at Bedtime. * zolpidem 10 mg tablet Commonly known as:  AMBIEN Take 1 Tab by mouth nightly as needed for Sleep. Max Daily Amount: 10 mg.  
  
 * Notice: This list has 42 medication(s) that are the same as other medications prescribed for you. Read the directions carefully, and ask your doctor or other care provider to review them with you. Prescriptions Printed Refills  
 pregabalin (LYRICA) 300 mg capsule 2 Sig: Take 1 Cap by mouth two (2) times a day. Max Daily Amount: 600 mg. Class: Print Route: Oral  
  
Prescriptions Sent to Pharmacy Refills DULoxetine (CYMBALTA) 60 mg capsule 2 Sig: Take 1 Cap by mouth daily. Class: Normal  
 Pharmacy: [address] [place] [address] #: [phone] Route: Oral  
  
Follow-up Instructions Return in about 3 months (around 9/22/2018). To-Do List   
 06/25/2018 10:00 AM  
  Appointment with TOBIN BEST at [address] [place] [address] ([phone]) PAYMENT  For Non-Medicare patients - $15.00 will be collected from you at the time of your exam.  You will be billed $35.00 from the reading [facility]. OUTSIDE FILMS  - Any outside films related to the study being scheduled should be brought with you on the day of the exam.  If this cannot be done there may be a delay in the reading of the study.   MEDICATIONS  - Patient must bring a complete list of all medications currently taking to include prescriptions, over-the-counter meds, herbals, vitamins & any dietary supplements GENERAL INSTRUCTIONS  - On the day of your exam do not use any bath powder, deodorant or lotions on the armpit area. 06/27/2018 11:00 AM  
  Appointment with Francisca Castro at [facility]  [place] [facility] ([phone]) OUTSIDE FILMS  - Any outside films related to the study being scheduled should be brought with you on the day of the exam.  If this cannot be done there may be a delay in the reading of the study. STUDY DETAILS  - This is a two part test. The first part consists of the injection of our tracer, and the second part (approx. 3 hours post-injection) is the imaging part. Imaging can be 30-60 minutes in duration. MEDICATIONS  - Patient must bring a complete list of all medications currently taking to include prescriptions, over-the-counter meds, herbals, vitamins & any dietary supplements 06/27/2018 2:00 PM  
  Appointment with Francisca Castro at [facility]  [place] [facility] ([phone]) OUTSIDE FILMS  - Any outside films related to the study being scheduled should be brought with you on the day of the exam.  If this cannot be done there may be a delay in the reading of the study. STUDY DETAILS  - This is a two part test. The first part consists of the injection of our tracer, and the second part (approx. 3 hours post-injection) is the imaging part. Imaging can be 30-60 minutes in duration. MEDICATIONS  - Patient must bring a complete list of all medications currently taking to include prescriptions, over-the-counter meds, herbals, vitamins & any dietary supplements [facility]! Dear Stephania Pisano: 
Thank you for requesting a [employer] account. Our records indicate that you already have an active [employer] account. You can access your account anytime at [email or web address] Did you know that you can access your hospital and ER discharge instructions at any time in [employer]? You can also review all of your test results from your hospital stay or ER visit. Additional Information If you have questions, please visit the Frequently Asked Questions section of the [email or web address] website at [email or web address] Remember, [employer]ment is NOT to be used for urgent needs. For medical emergencies, dial 911. Now available from your iPhone and Android! Please provide this summary of care documentation to your next provider. Your primary care clinician is listed as Katie Quach. If you have any questions after today's visit, please call [phone].

## 2018-06-27 ENCOUNTER — TELEPHONE (OUTPATIENT)
Dept: ORTHOPEDIC SURGERY | Age: 58
End: 2018-06-27

## 2018-06-27 ENCOUNTER — HOSPITAL ENCOUNTER (OUTPATIENT)
Dept: NUCLEAR MEDICINE | Age: 58
Discharge: HOME | End: 2018-06-27
Attending: ORTHOPAEDIC SURGERY
Payer: MEDICARE

## 2018-06-27 ENCOUNTER — HOSPITAL ENCOUNTER (OUTPATIENT)
Dept: LAB | Age: 58
Discharge: HOME | End: 2018-06-27
Attending: ORTHOPAEDIC SURGERY
Payer: MEDICARE

## 2018-06-27 DIAGNOSIS — M25.552 PAIN OF BOTH HIP JOINTS: ICD-10-CM

## 2018-06-27 DIAGNOSIS — M25.562 CHRONIC PAIN OF LEFT KNEE: ICD-10-CM

## 2018-06-27 DIAGNOSIS — G89.29 CHRONIC PAIN OF LEFT KNEE: ICD-10-CM

## 2018-06-27 DIAGNOSIS — M25.551 PAIN OF BOTH HIP JOINTS: ICD-10-CM

## 2018-06-27 LAB
BASOPHILS # BLD: 0.1 K/UL (ref 0–0.1)
BASOPHILS NFR BLD: 1 % (ref 0–2)
CRP SERPL-MCNC: 0.7 MG/DL (ref 0–0.3)
DIFFERENTIAL METHOD BLD: ABNORMAL
EOSINOPHIL # BLD: 0.2 K/UL (ref 0–0.4)
EOSINOPHIL NFR BLD: 3 % (ref 0–5)
ERYTHROCYTE [DISTWIDTH] IN BLOOD BY AUTOMATED COUNT: 17.8 % (ref 11.6–14.5)
ERYTHROCYTE [SEDIMENTATION RATE] IN BLOOD: 68 MM/HR (ref 0–30)
HCT VFR BLD AUTO: 33.3 % (ref 35–45)
HGB BLD-MCNC: 10.8 G/DL (ref 12–16)
LYMPHOCYTES # BLD: 2.6 K/UL (ref 0.9–3.6)
LYMPHOCYTES NFR BLD: 52 % (ref 21–52)
MCH RBC QN AUTO: 24.7 PG (ref 24–34)
MCHC RBC AUTO-ENTMCNC: 32.4 G/DL (ref 31–37)
MCV RBC AUTO: 76.2 FL (ref 74–97)
MONOCYTES # BLD: 0.3 K/UL (ref 0.05–1.2)
MONOCYTES NFR BLD: 6 % (ref 3–10)
NEUTS SEG # BLD: 1.9 K/UL (ref 1.8–8)
NEUTS SEG NFR BLD: 38 % (ref 40–73)
PLATELET # BLD AUTO: 320 K/UL (ref 135–420)
PMV BLD AUTO: 10.6 FL (ref 9.2–11.8)
RBC # BLD AUTO: 4.37 M/UL (ref 4.2–5.3)
RHEUMATOID FACT SER QL LA: NEGATIVE
URATE SERPL-MCNC: 6 MG/DL (ref 2.6–7.2)
WBC # BLD AUTO: 5.1 K/UL (ref 4.6–13.2)

## 2018-06-27 PROCEDURE — 85652 RBC SED RATE AUTOMATED: CPT | Performed by: ORTHOPAEDIC SURGERY

## 2018-06-27 PROCEDURE — 86430 RHEUMATOID FACTOR TEST QUAL: CPT | Performed by: ORTHOPAEDIC SURGERY

## 2018-06-27 PROCEDURE — 85025 COMPLETE CBC W/AUTO DIFF WBC: CPT | Performed by: ORTHOPAEDIC SURGERY

## 2018-06-27 PROCEDURE — 86617 LYME DISEASE ANTIBODY: CPT | Performed by: ORTHOPAEDIC SURGERY

## 2018-06-27 PROCEDURE — 86140 C-REACTIVE PROTEIN: CPT | Performed by: ORTHOPAEDIC SURGERY

## 2018-06-27 PROCEDURE — 84550 ASSAY OF BLOOD/URIC ACID: CPT | Performed by: ORTHOPAEDIC SURGERY

## 2018-06-27 PROCEDURE — 83520 IMMUNOASSAY QUANT NOS NONAB: CPT | Performed by: ORTHOPAEDIC SURGERY

## 2018-06-27 PROCEDURE — 78315 BONE IMAGING 3 PHASE: CPT

## 2018-06-27 PROCEDURE — 36415 COLL VENOUS BLD VENIPUNCTURE: CPT | Performed by: ORTHOPAEDIC SURGERY

## 2018-06-27 PROCEDURE — 86225 DNA ANTIBODY NATIVE: CPT | Performed by: ORTHOPAEDIC SURGERY

## 2018-06-27 NOTE — TELEPHONE ENCOUNTER
Johnna Ashley from [address] states he needs the xrays done on 06-08-18 on the knees & hips on a disc for the patient to  today at the [facility] office.   Johnna Ashley can be reached at [phone]

## 2018-06-28 LAB — IL6 SERPL-MCNC: 6.1 PG/ML (ref 0–15.5)

## 2018-06-29 LAB
B BURGDOR IGG PATRN SER IB-IMP: NEGATIVE
B BURGDOR IGM PATRN SER IB-IMP: NEGATIVE
B BURGDOR18KD IGG SER QL IB: NORMAL
B BURGDOR23KD IGG SER QL IB: NORMAL
B BURGDOR23KD IGM SER QL IB: NORMAL
B BURGDOR28KD IGG SER QL IB: NORMAL
B BURGDOR30KD IGG SER QL IB: NORMAL
B BURGDOR39KD IGG SER QL IB: NORMAL
B BURGDOR39KD IGM SER QL IB: NORMAL
B BURGDOR41KD IGG SER QL IB: NORMAL
B BURGDOR41KD IGM SER QL IB: NORMAL
B BURGDOR45KD IGG SER QL IB: NORMAL
B BURGDOR58KD IGG SER QL IB: NORMAL
B BURGDOR66KD IGG SER QL IB: NORMAL
B BURGDOR93KD IGG SER QL IB: NORMAL
CENTROMERE B AB SER-ACNC: <0.2 AI (ref 0–0.9)
CHROMATIN AB SERPL-ACNC: <0.2 AI (ref 0–0.9)
DSDNA AB SER-ACNC: <1 IU/ML (ref 0–9)
ENA JO1 AB SER-ACNC: <0.2 AI (ref 0–0.9)
ENA RNP AB SER-ACNC: <0.2 AI (ref 0–0.9)
ENA SCL70 AB SER-ACNC: <0.2 AI (ref 0–0.9)
ENA SM AB SER-ACNC: <0.2 AI (ref 0–0.9)
ENA SS-A AB SER-ACNC: <0.2 AI (ref 0–0.9)
ENA SS-B AB SER-ACNC: <0.2 AI (ref 0–0.9)
SEE BELOW, 164869: NORMAL

## 2018-07-03 ENCOUNTER — DOCUMENTATION ONLY (OUTPATIENT)
Dept: NEUROLOGY | Age: 58
End: 2018-07-03

## 2018-07-03 RX ORDER — CELECOXIB 200 MG/1
200 CAPSULE ORAL 2 TIMES DAILY
Qty: 180 CAP | Refills: 0 | Status: SHIPPED | OUTPATIENT
Start: 2018-07-03 | End: 2018-09-10 | Stop reason: SDUPTHER

## 2018-07-19 ENCOUNTER — OFFICE VISIT (OUTPATIENT)
Dept: ORTHOPEDIC SURGERY | Age: 58
End: 2018-07-19

## 2018-07-19 VITALS
TEMPERATURE: 97.4 F | WEIGHT: 178 LBS | RESPIRATION RATE: 16 BRPM | SYSTOLIC BLOOD PRESSURE: 165 MMHG | OXYGEN SATURATION: 96 % | HEIGHT: 59 IN | BODY MASS INDEX: 35.88 KG/M2 | DIASTOLIC BLOOD PRESSURE: 74 MMHG | HEART RATE: 71 BPM

## 2018-07-19 DIAGNOSIS — M12.262 VILLONODULAR SYNOVITIS OF LEFT LOWER LEG: ICD-10-CM

## 2018-07-19 DIAGNOSIS — Z96.652 HISTORY OF LEFT KNEE REPLACEMENT: Primary | ICD-10-CM

## 2018-07-19 DIAGNOSIS — M17.11 PRIMARY OSTEOARTHRITIS OF RIGHT KNEE: ICD-10-CM

## 2018-07-19 DIAGNOSIS — M70.52 PES ANSERINUS BURSITIS OF LEFT KNEE: ICD-10-CM

## 2018-07-19 RX ORDER — DICLOFENAC SODIUM 10 MG/G
GEL TOPICAL 4 TIMES DAILY
Qty: 5 EACH | Refills: 0 | Status: SHIPPED | OUTPATIENT
Start: 2018-07-19 | End: 2021-10-13 | Stop reason: ALTCHOICE
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
## 2018-08-16 ENCOUNTER — OFFICE VISIT (OUTPATIENT)
Dept: ORTHOPEDIC SURGERY | Age: 58
End: 2018-08-16

## 2018-08-16 VITALS
OXYGEN SATURATION: 98 % | BODY MASS INDEX: 35.28 KG/M2 | RESPIRATION RATE: 16 BRPM | HEART RATE: 67 BPM | DIASTOLIC BLOOD PRESSURE: 69 MMHG | HEIGHT: 59 IN | WEIGHT: 175 LBS | TEMPERATURE: 97 F | SYSTOLIC BLOOD PRESSURE: 129 MMHG

## 2018-08-16 DIAGNOSIS — M25.562 CHRONIC PAIN OF LEFT KNEE: Primary | ICD-10-CM

## 2018-08-16 DIAGNOSIS — Z96.652 HISTORY OF TOTAL LEFT KNEE REPLACEMENT: ICD-10-CM

## 2018-08-16 DIAGNOSIS — G89.29 CHRONIC PAIN OF LEFT KNEE: Primary | ICD-10-CM

## 2018-08-16 RX ORDER — HYDROCODONE BITARTRATE AND ACETAMINOPHEN 5; 325 MG/1; MG/1
1 TABLET ORAL
Qty: 21 TAB | Refills: 0 | Status: ON HOLD | OUTPATIENT
Start: 2018-08-16 | End: 2018-08-28 | Stop reason: CLARIF
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
## 2018-09-10 NOTE — TELEPHONE ENCOUNTER
Last Visit: 08/16/2018 with MD Karen Rogers    Next Appointment: 2nd opinion from [facility]  Previous Refill Encounters: 07/03/2018 per BERNICE Damon #[id number]    Requested Prescriptions     Pending Prescriptions Disp Refills    celecoxib (CELEBREX) 200 mg capsule 180 Cap 0     Sig: Take 1 Cap by mouth two (2) times a day.

## 2018-09-11 RX ORDER — CELECOXIB 200 MG/1
200 CAPSULE ORAL 2 TIMES DAILY
Qty: 180 CAP | Refills: 0 | Status: SHIPPED | OUTPATIENT
Start: 2018-09-11 | End: 2019-01-23 | Stop reason: SDUPTHER

## 2018-09-24 ENCOUNTER — OFFICE VISIT (OUTPATIENT)
Dept: ORTHOPEDIC SURGERY | Age: 58
End: 2018-09-24

## 2018-09-24 VITALS
WEIGHT: 177 LBS | HEART RATE: 65 BPM | BODY MASS INDEX: 35.68 KG/M2 | DIASTOLIC BLOOD PRESSURE: 74 MMHG | OXYGEN SATURATION: 93 % | SYSTOLIC BLOOD PRESSURE: 132 MMHG | HEIGHT: 59 IN

## 2018-09-24 DIAGNOSIS — M47.817 LUMBOSACRAL SPONDYLOSIS WITHOUT MYELOPATHY: Primary | ICD-10-CM

## 2018-09-24 DIAGNOSIS — M54.16 LUMBAR RADICULOPATHY: ICD-10-CM

## 2018-09-24 DIAGNOSIS — G89.29 CHRONIC PAIN OF LEFT KNEE: ICD-10-CM

## 2018-09-24 DIAGNOSIS — M25.552 PAIN OF BOTH HIP JOINTS: ICD-10-CM

## 2018-09-24 DIAGNOSIS — M25.551 PAIN OF BOTH HIP JOINTS: ICD-10-CM

## 2018-09-24 DIAGNOSIS — M25.562 CHRONIC PAIN OF LEFT KNEE: ICD-10-CM

## 2018-09-24 DIAGNOSIS — M50.20 HNP (HERNIATED NUCLEUS PULPOSUS), CERVICAL: ICD-10-CM

## 2018-09-24 RX ORDER — PREGABALIN 300 MG/1
300 CAPSULE ORAL 2 TIMES DAILY
Qty: 60 CAP | Refills: 2 | Status: SHIPPED | OUTPATIENT
Start: 2018-09-24 | End: 2018-12-06 | Stop reason: SDUPTHER

## 2018-09-24 RX ORDER — DULOXETIN HYDROCHLORIDE 60 MG/1
60 CAPSULE, DELAYED RELEASE ORAL DAILY
Qty: 30 CAP | Refills: 2 | Status: SHIPPED | OUTPATIENT
Start: 2018-09-24 | End: 2018-12-06 | Stop reason: SDUPTHER
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
## 2018-09-27 RX ORDER — HYDROCODONE BITARTRATE AND ACETAMINOPHEN 5; 325 MG/1; MG/1
1 TABLET ORAL
Qty: 21 TAB | Refills: 0 | OUTPATIENT
Start: 2018-09-27

## 2018-10-01 RX ORDER — CARBAMAZEPINE 200 MG/1
TABLET ORAL
Qty: 360 TAB | Refills: 0 | Status: SHIPPED | OUTPATIENT
Start: 2018-10-01 | End: 2018-12-19 | Stop reason: SDUPTHER

## 2018-10-03 ENCOUNTER — DOCUMENTATION ONLY (OUTPATIENT)
Dept: ORTHOPEDIC SURGERY | Age: 58
End: 2018-10-03

## 2018-10-03 NOTE — PROGRESS NOTES
RECEIVED FAX FROM DR Yesy CORMIER OFFICE  FOR PT TO D/C TANI 5 DAYS PRIOR TO PROCEDURE/ COPY OF LETTER TO BE SCANNED INTO EMR

## 2018-10-04 DIAGNOSIS — M25.552 PAIN OF BOTH HIP JOINTS: ICD-10-CM

## 2018-10-04 DIAGNOSIS — M25.551 PAIN OF BOTH HIP JOINTS: ICD-10-CM

## 2018-10-04 DIAGNOSIS — M25.562 CHRONIC PAIN OF LEFT KNEE: ICD-10-CM

## 2018-10-04 DIAGNOSIS — G89.29 CHRONIC PAIN OF LEFT KNEE: ICD-10-CM

## 2018-10-04 RX ORDER — HYDROCODONE BITARTRATE AND ACETAMINOPHEN 5; 325 MG/1; MG/1
1 TABLET ORAL
Qty: 21 TAB | Refills: 0 | OUTPATIENT
Start: 2018-10-04

## 2018-10-04 NOTE — TELEPHONE ENCOUNTER
Last Visit: 08/16/2018 with MD Motta                      Next Appointment: pt to get a second opinion at the [facility] to see if they feel revision would be indicated  Previous Refill Encounters: 08/16/2018 per MD Rawls #[id number]    Requested Prescriptions     Pending Prescriptions Disp Refills    HYDROcodone-acetaminophen (NORCO) 5-325 mg per tablet 21 Tab 0     Sig: Take 1 Tab by mouth every eight (8) hours as needed for Pain. Max Daily Amount: 3 Tabs.

## 2018-10-04 NOTE — TELEPHONE ENCOUNTER
Patient notified Evert Oscar denied and patient needs to make FU appointment. Patient would like a refill on her Celebrex.

## 2018-10-11 ENCOUNTER — APPOINTMENT (OUTPATIENT)
Dept: GENERAL RADIOLOGY | Age: 58
End: 2018-10-11
Attending: PHYSICAL MEDICINE & REHABILITATION
Payer: MEDICARE

## 2018-10-11 ENCOUNTER — HOSPITAL ENCOUNTER (OUTPATIENT)
Age: 58
Setting detail: OUTPATIENT SURGERY
Discharge: HOME | End: 2018-10-11
Attending: PHYSICAL MEDICINE & REHABILITATION | Admitting: PHYSICAL MEDICINE & REHABILITATION
Payer: MEDICARE

## 2018-10-11 VITALS
HEART RATE: 66 BPM | SYSTOLIC BLOOD PRESSURE: 127 MMHG | HEIGHT: 59 IN | OXYGEN SATURATION: 100 % | WEIGHT: 177 LBS | RESPIRATION RATE: 16 BRPM | BODY MASS INDEX: 35.68 KG/M2 | TEMPERATURE: 98.3 F | DIASTOLIC BLOOD PRESSURE: 78 MMHG

## 2018-10-11 PROCEDURE — 74011636320 HC RX REV CODE- 636/320: Performed by: PHYSICAL MEDICINE & REHABILITATION

## 2018-10-11 PROCEDURE — 74011636320 HC RX REV CODE- 636/320

## 2018-10-11 PROCEDURE — 74011250636 HC RX REV CODE- 250/636

## 2018-10-11 PROCEDURE — 74011250637 HC RX REV CODE- 250/637: Performed by: PHYSICAL MEDICINE & REHABILITATION

## 2018-10-11 PROCEDURE — 74011250636 HC RX REV CODE- 250/636: Performed by: PHYSICAL MEDICINE & REHABILITATION

## 2018-10-11 PROCEDURE — 76010000009 HC PAIN MGT 0 TO 30 MIN PROC: Performed by: PHYSICAL MEDICINE & REHABILITATION

## 2018-10-11 PROCEDURE — 77030003676 HC NDL SPN MPRI -A: Performed by: PHYSICAL MEDICINE & REHABILITATION

## 2018-10-11 PROCEDURE — 77030039433 HC TY MYLEOGRAM BD -B: Performed by: PHYSICAL MEDICINE & REHABILITATION

## 2018-10-11 RX ORDER — DEXAMETHASONE SODIUM PHOSPHATE 100 MG/10ML
INJECTION INTRAMUSCULAR; INTRAVENOUS AS NEEDED
Status: DISCONTINUED | OUTPATIENT
Start: 2018-10-11 | End: 2018-10-11 | Stop reason: HOSPADM

## 2018-10-11 RX ORDER — DIAZEPAM 5 MG/1
5-20 TABLET ORAL ONCE
Status: COMPLETED | OUTPATIENT
Start: 2018-10-11 | End: 2018-10-11

## 2018-10-11 RX ORDER — LIDOCAINE HYDROCHLORIDE 10 MG/ML
INJECTION, SOLUTION EPIDURAL; INFILTRATION; INTRACAUDAL; PERINEURAL AS NEEDED
Status: DISCONTINUED | OUTPATIENT
Start: 2018-10-11 | End: 2018-10-11 | Stop reason: HOSPADM

## 2018-10-11 RX ADMIN — DIAZEPAM 10 MG: 5 TABLET ORAL at 12:42

## 2018-10-11 NOTE — PROCEDURES
Facet Joint Block Procedure Note    Patient Name: Princess Whalen    Date of Procedure: October 11, 2018    Preoperative Diagnosis: Spondylosis of lumbosacral region, unspecified spinal osteoarthritis complication status [I41.081]    Post Operative Diagnosis:Spondylosis of lumbosacral region, unspecified spinal osteoarthritis complication status [M95.259]     Procedure:  bilateral  L4-L5 Facet Joint Block  left L5-S1 Facet Joint Block      Consent: Informed consent was obtained prior to the procedure. The patient was given the opportunity to ask questions regarding the procedure and its associated risks. In addition to the potential risks associated with the procedure itself, the patient was informed both verbally and in writing of potential side effects of the use of glucocorticoids. The patient appeared to comprehend the informed consent and desired to have the procedure perfored. Procedure: The patient was placed in the prone position on the flouroscopy table and the back was prepped and draped in the usual sterile manner. At each blocked level, the exact location of the facet joint was identified with flouroscopy, and after local Lidocaine 1% injection, a #22 gauge spinal needle was then advanced toward the joint. A total of 30 mg of preservative free Dexamethasone and 4 cc of Lidocaine was injected around and equally divided among all of the sites. The patient tolerated the procedure well. The injection area was cleaned and bandaids applied. No excessive bleeding was noted. Patient dressed and was discharged to home with instructions.        Therese Jones MD  October 11, 2018

## 2018-11-02 ENCOUNTER — OFFICE VISIT (OUTPATIENT)
Dept: ORTHOPEDIC SURGERY | Age: 58
End: 2018-11-02

## 2018-11-02 VITALS
WEIGHT: 177 LBS | HEART RATE: 80 BPM | BODY MASS INDEX: 35.68 KG/M2 | RESPIRATION RATE: 16 BRPM | HEIGHT: 59 IN | TEMPERATURE: 95.6 F | SYSTOLIC BLOOD PRESSURE: 153 MMHG | DIASTOLIC BLOOD PRESSURE: 83 MMHG

## 2018-11-02 DIAGNOSIS — G89.29 CHRONIC PAIN OF LEFT KNEE: ICD-10-CM

## 2018-11-02 DIAGNOSIS — Z96.652 HISTORY OF TOTAL KNEE REPLACEMENT, LEFT: Primary | ICD-10-CM

## 2018-11-02 DIAGNOSIS — M25.562 CHRONIC PAIN OF LEFT KNEE: ICD-10-CM

## 2018-11-02 DIAGNOSIS — M24.662 ARTHROFIBROSIS OF KNEE JOINT, LEFT: ICD-10-CM

## 2018-11-02 RX ORDER — HYDROCODONE BITARTRATE AND ACETAMINOPHEN 5; 325 MG/1; MG/1
1 TABLET ORAL
Qty: 21 TAB | Refills: 0 | Status: SHIPPED | OUTPATIENT
Start: 2018-11-02 | End: 2018-12-06 | Stop reason: SDUPTHER
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
## 2018-11-28 DIAGNOSIS — M89.9 DISORDER OF BONE AND CARTILAGE: ICD-10-CM

## 2018-11-28 DIAGNOSIS — I50.22 CHRONIC SYSTOLIC HEART FAILURE (HCC): ICD-10-CM

## 2018-11-28 DIAGNOSIS — M54.16 LUMBAR NEURITIS: ICD-10-CM

## 2018-11-28 DIAGNOSIS — R73.09 ELEVATED HEMOGLOBIN A1C: ICD-10-CM

## 2018-11-28 DIAGNOSIS — E78.49 OTHER HYPERLIPIDEMIA: ICD-10-CM

## 2018-11-28 DIAGNOSIS — M62.838 MUSCLE SPASM: ICD-10-CM

## 2018-11-28 DIAGNOSIS — R92.8 ABNORMAL MAMMOGRAM: ICD-10-CM

## 2018-11-28 DIAGNOSIS — I10 ESSENTIAL HYPERTENSION: ICD-10-CM

## 2018-11-28 DIAGNOSIS — Z86.39 HISTORY OF DIABETES MELLITUS, TYPE II: ICD-10-CM

## 2018-11-28 DIAGNOSIS — M94.9 DISORDER OF BONE AND CARTILAGE: ICD-10-CM

## 2018-11-28 DIAGNOSIS — F51.01 PRIMARY INSOMNIA: ICD-10-CM

## 2018-11-28 RX ORDER — ROSUVASTATIN CALCIUM 40 MG/1
TABLET, COATED ORAL
Qty: 90 TAB | Refills: 0 | OUTPATIENT
Start: 2018-11-28

## 2018-11-28 NOTE — TELEPHONE ENCOUNTER
PCP: Amanda Ruvalcaba NP    Last appt: 6/7/2018  No future appointments.     Requested Prescriptions     Pending Prescriptions Disp Refills    rosuvastatin (CRESTOR) 40 mg tablet 90 Tab 0     Sig: TAKE 1 TABLET BY MOUTH DAILY

## 2018-11-28 NOTE — TELEPHONE ENCOUNTER
Called patient and chart review was done. Patient was advise that she needed an appointment to have her medication refill. Patient stated that would be fine. Patient has an appointment on 12/06/18 at 11:15.

## 2018-12-03 DIAGNOSIS — Z86.39 HISTORY OF DIABETES MELLITUS, TYPE II: ICD-10-CM

## 2018-12-03 DIAGNOSIS — M54.16 LUMBAR NEURITIS: ICD-10-CM

## 2018-12-03 DIAGNOSIS — I10 ESSENTIAL HYPERTENSION: ICD-10-CM

## 2018-12-03 DIAGNOSIS — R92.8 ABNORMAL MAMMOGRAM: ICD-10-CM

## 2018-12-03 DIAGNOSIS — M94.9 DISORDER OF BONE AND CARTILAGE: ICD-10-CM

## 2018-12-03 DIAGNOSIS — M89.9 DISORDER OF BONE AND CARTILAGE: ICD-10-CM

## 2018-12-03 DIAGNOSIS — M62.838 MUSCLE SPASM: ICD-10-CM

## 2018-12-03 DIAGNOSIS — F51.01 PRIMARY INSOMNIA: ICD-10-CM

## 2018-12-03 DIAGNOSIS — R73.09 ELEVATED HEMOGLOBIN A1C: ICD-10-CM

## 2018-12-03 DIAGNOSIS — E78.49 OTHER HYPERLIPIDEMIA: ICD-10-CM

## 2018-12-03 DIAGNOSIS — I50.22 CHRONIC SYSTOLIC HEART FAILURE (HCC): ICD-10-CM

## 2018-12-03 NOTE — TELEPHONE ENCOUNTER
PCP: Marisela Gillespie NP    Last appt: 6/7/2018  Future Appointments   Date Time Provider Oren Polancoi   12/6/2018 11:15 AM [facility]       Requested Prescriptions     Pending Prescriptions Disp Refills    rosuvastatin (CRESTOR) 40 mg tablet 90 Tab 0     Sig: TAKE 1 TABLET BY MOUTH DAILY

## 2018-12-04 RX ORDER — ROSUVASTATIN CALCIUM 40 MG/1
TABLET, COATED ORAL
Qty: 90 TAB | Refills: 0 | OUTPATIENT
Start: 2018-12-04

## 2018-12-05 NOTE — TELEPHONE ENCOUNTER
Called patient and chart review was done. Patient was advised that she need an appointment. Patient stated that she has an appointment 12/6/18 with Ana Stovall.

## 2018-12-06 ENCOUNTER — OFFICE VISIT (OUTPATIENT)
Dept: FAMILY MEDICINE CLINIC | Age: 58
End: 2018-12-06

## 2018-12-06 VITALS
HEART RATE: 60 BPM | RESPIRATION RATE: 20 BRPM | HEIGHT: 59 IN | TEMPERATURE: 98 F | BODY MASS INDEX: 35.84 KG/M2 | WEIGHT: 177.8 LBS | DIASTOLIC BLOOD PRESSURE: 80 MMHG | SYSTOLIC BLOOD PRESSURE: 129 MMHG

## 2018-12-06 DIAGNOSIS — G62.9 NEUROPATHY: ICD-10-CM

## 2018-12-06 DIAGNOSIS — M54.16 LUMBAR NEURITIS: ICD-10-CM

## 2018-12-06 DIAGNOSIS — F51.01 PRIMARY INSOMNIA: ICD-10-CM

## 2018-12-06 DIAGNOSIS — M50.30 OTHER CERVICAL DISC DEGENERATION, UNSPECIFIED CERVICAL REGION: ICD-10-CM

## 2018-12-06 DIAGNOSIS — Z51.81 MEDICATION MONITORING ENCOUNTER: Primary | ICD-10-CM

## 2018-12-06 DIAGNOSIS — I69.959 HEMIPLEGIA OF DOMINANT SIDE AS LATE EFFECT FOLLOWING CEREBROVASCULAR DISEASE (HCC): ICD-10-CM

## 2018-12-06 DIAGNOSIS — M62.838 MUSCLE SPASM: ICD-10-CM

## 2018-12-06 DIAGNOSIS — Z23 ENCOUNTER FOR IMMUNIZATION: ICD-10-CM

## 2018-12-06 DIAGNOSIS — M62.830 PARASPINAL MUSCLE SPASM: ICD-10-CM

## 2018-12-06 RX ORDER — METHOCARBAMOL 500 MG/1
TABLET, FILM COATED ORAL
Qty: 120 TAB | Refills: 3 | Status: SHIPPED | OUTPATIENT
Start: 2018-12-06 | End: 2020-01-29 | Stop reason: SDUPTHER

## 2018-12-06 RX ORDER — ZOLPIDEM TARTRATE 10 MG/1
10 TABLET ORAL
Qty: 30 TAB | Refills: 0 | Status: SHIPPED | OUTPATIENT
Start: 2018-12-06 | End: 2019-04-08 | Stop reason: SDUPTHER

## 2018-12-06 NOTE — PROGRESS NOTES
Josh Granados 1960 female who presents for routine immunizations. Patient denies any symptoms , reactions or allergies that would exclude them from being immunized today. Risks and adverse reactions were discussed and the VIS was given to them. All questions were addressed. Order placed for Flu,  per Verbal Order from PRATIMA Andino with read back. Patient was observed for 15 min post injection. There were no reactions observed.     Ayde Clarke
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
## 2018-12-06 NOTE — PATIENT INSTRUCTIONS
Vaccine Information Statement    Influenza (Flu) Vaccine (Inactivated or Recombinant): What you need to know    Many Vaccine Information Statements are available in [place] and other languages. See www.immunize.org/vis  Hojas de Información Sobre Vacunas están disponibles en Español y en muchos otros idiomas. Visite www.immunize.org/vis    1. Why get vaccinated? Influenza (flu) is a contagious disease that spreads around the [place] every year, usually between October and May. Flu is caused by influenza viruses, and is spread mainly by coughing, sneezing, and close contact. Anyone can get flu. Flu strikes suddenly and can last several days. Symptoms vary by age, but can include:   fever/chills   sore throat   muscle aches   fatigue   cough   headache    runny or stuffy nose    Flu can also lead to pneumonia and blood infections, and cause diarrhea and seizures in children. If you have a medical condition, such as heart or lung disease, flu can make it worse. Flu is more dangerous for some people. Infants and young children, people 72years of age and older, pregnant women, and people with certain health conditions or a weakened immune system are at greatest risk. Each year thousands of people in the [facility] die from flu, and many more are hospitalized. Flu vaccine can:   keep you from getting flu,   make flu less severe if you do get it, and   keep you from spreading flu to your family and other people. 2. Inactivated and recombinant flu vaccines    A dose of flu vaccine is recommended every flu season. Children 6 months through 6years of age may need two doses during the same flu season. Everyone else needs only one dose each flu season.        Some inactivated flu vaccines contain a very small amount of a mercury-based preservative called thimerosal. Studies have not shown thimerosal in vaccines to be harmful, but flu vaccines that do not contain thimerosal are available. There is no live flu virus in flu shots. They cannot cause the flu. There are many flu viruses, and they are always changing. Each year a new flu vaccine is made to protect against three or four viruses that are likely to cause disease in the upcoming flu season. But even when the vaccine doesnt exactly match these viruses, it may still provide some protection    Flu vaccine cannot prevent:   flu that is caused by a virus not covered by the vaccine, or   illnesses that look like flu but are not. It takes about 2 weeks for protection to develop after vaccination, and protection lasts through the flu season. 3. Some people should not get this vaccine    Tell the person who is giving you the vaccine:     If you have any severe, life-threatening allergies. If you ever had a life-threatening allergic reaction after a dose of flu vaccine, or have a severe allergy to any part of this vaccine, you may be advised not to get vaccinated. Most, but not all, types of flu vaccine contain a small amount of egg protein.  If you ever had Guillain-Barré Syndrome (also called GBS). Some people with a history of GBS should not get this vaccine. This should be discussed with your doctor.  If you are not feeling well. It is usually okay to get flu vaccine when you have a mild illness, but you might be asked to come back when you feel better. 4. Risks of a vaccine reaction    With any medicine, including vaccines, there is a chance of reactions. These are usually mild and go away on their own, but serious reactions are also possible. Most people who get a flu shot do not have any problems with it.      Minor problems following a flu shot include:    soreness, redness, or swelling where the shot was given     hoarseness   sore, red or itchy eyes   cough   fever   aches   headache   itching   fatigue  If these problems occur, they usually begin soon after the shot and last 1 or 2 days. More serious problems following a flu shot can include the following:     There may be a small increased risk of Guillain-Barré Syndrome (GBS) after inactivated flu vaccine. This risk has been estimated at 1 or 2 additional cases per million people vaccinated. This is much lower than the risk of severe complications from flu, which can be prevented by flu vaccine.  Young children who get the flu shot along with pneumococcal vaccine (PCV13) and/or DTaP vaccine at the same time might be slightly more likely to have a seizure caused by fever. Ask your doctor for more information. Tell your doctor if a child who is getting flu vaccine has ever had a seizure. Problems that could happen after any injected vaccine:      People sometimes faint after a medical procedure, including vaccination. Sitting or lying down for about 15 minutes can help prevent fainting, and injuries caused by a fall. Tell your doctor if you feel dizzy, or have vision changes or ringing in the ears.  Some people get severe pain in the shoulder and have difficulty moving the arm where a shot was given. This happens very rarely.  Any medication can cause a severe allergic reaction. Such reactions from a vaccine are very rare, estimated at about 1 in a million doses, and would happen within a few minutes to a few hours after the vaccination. As with any medicine, there is a very remote chance of a vaccine causing a serious injury or death. The safety of vaccines is always being monitored. For more information, visit: www.cdc.gov/vaccinesafety/    5. What if there is a serious reaction? What should I look for?  Look for anything that concerns you, such as signs of a severe allergic reaction, very high fever, or unusual behavior.     Signs of a severe allergic reaction can include hives, swelling of the face and throat, difficulty breathing, a fast heartbeat, dizziness, and weakness - usually within a few minutes to a few hours after the vaccination. What should I do?  If you think it is a severe allergic reaction or other emergency that cant wait, call 9-1-1 and get the person to the nearest hospital. Otherwise, call your doctor.  Reactions should be reported to the Vaccine Adverse Event Reporting System (VAERS). Your doctor should file this report, or you can do it yourself through  the VAERS web site at www.vaers. [facility].gov, or by calling 4-814.510.8559. VAERS does not give medical advice. 6. The National Vaccine Injury Compensation Program    The [facility] Vaccine Injury Compensation Program (VICP) is a federal program that was created to compensate people who may have been injured by certain vaccines. Persons who believe they may have been injured by a vaccine can learn about the program and about filing a claim by calling 4-594.683.3942 or visiting the ipsy website at www.[facility].gov/vaccinecompensation. There is a time limit to file a claim for compensation. 7. How can I learn more?  Ask your healthcare provider. He or she can give you the vaccine package insert or suggest other sources of information.  Call your local or state health department.  Contact the Centers for Disease Control and Prevention (CDC):  - Call 8-273.241.8006 (1-800-CDC-INFO) or  - Visit CDCs website at www.cdc.gov/flu    Vaccine Information Statement   Inactivated Influenza Vaccine   8/7/2015  [address] [id number]    Department of Health and Human Services  Centers for Disease Control and Prevention    Office Use Only

## 2018-12-06 NOTE — PROGRESS NOTES
Chief Complaint   Patient presents with    Insomnia    Other     lumbar neuritis    Cholesterol Problem     high chol    Spasms    Other     neuropathy       Health Maintenance Due   Topic Date Due    EYE EXAM RETINAL OR DILATED  08/05/1971    Shingrix Vaccine Age 50> (1 of 2) 08/05/2011    FOBT Q 1 YEAR AGE 50-75  08/05/2011    FOOT EXAM Q1  03/03/2015    GLAUCOMA SCREENING Q2Y  09/29/2016    MICROALBUMIN Q1  10/17/2017    MEDICARE YEARLY EXAM  05/03/2018    BREAST CANCER SCRN MAMMOGRAM  05/16/2018    Influenza Age 9 to Adult  08/01/2018    HEMOGLOBIN A1C Q6M  09/07/2018       Health Maintenance reviewed       1. Have you been to the ER, urgent care clinic since your last visit? Hospitalized since your last visit? Yes When: 11/18 Where: [facility] Reason for visit: back pain    2. Have you seen or consulted any other health care providers outside of the [address] [place] [address] since your last visit? Include any pap smears or colon screening.  No       Abuse and learning screening updated
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
## 2018-12-19 RX ORDER — CARBAMAZEPINE 200 MG/1
TABLET ORAL
Qty: 360 TAB | Refills: 0 | Status: SHIPPED | OUTPATIENT
Start: 2018-12-19 | End: 2019-03-19 | Stop reason: SDUPTHER

## 2018-12-20 DIAGNOSIS — F51.01 PRIMARY INSOMNIA: ICD-10-CM

## 2018-12-20 DIAGNOSIS — R92.8 ABNORMAL MAMMOGRAM: ICD-10-CM

## 2018-12-20 DIAGNOSIS — R73.09 ELEVATED HEMOGLOBIN A1C: ICD-10-CM

## 2018-12-20 DIAGNOSIS — Z86.39 HISTORY OF DIABETES MELLITUS, TYPE II: ICD-10-CM

## 2018-12-20 DIAGNOSIS — I50.22 CHRONIC SYSTOLIC HEART FAILURE (HCC): ICD-10-CM

## 2018-12-20 DIAGNOSIS — I10 ESSENTIAL HYPERTENSION: ICD-10-CM

## 2018-12-20 DIAGNOSIS — M62.838 MUSCLE SPASM: ICD-10-CM

## 2018-12-20 DIAGNOSIS — M89.9 DISORDER OF BONE AND CARTILAGE: ICD-10-CM

## 2018-12-20 DIAGNOSIS — E78.49 OTHER HYPERLIPIDEMIA: ICD-10-CM

## 2018-12-20 DIAGNOSIS — M54.16 LUMBAR NEURITIS: ICD-10-CM

## 2018-12-20 DIAGNOSIS — M94.9 DISORDER OF BONE AND CARTILAGE: ICD-10-CM

## 2018-12-20 NOTE — TELEPHONE ENCOUNTER
PCP: Beata Cisneros NP    Last appt: 12/6/2018  Future Appointments   Date Time Provider Oren Ramon   12/27/2018  9:40 AM MD [place] [facility]   1/14/2019  1:35 PM Jeni Angelucci, MD [place]        Requested Prescriptions     Pending Prescriptions Disp Refills    rosuvastatin (CRESTOR) 40 mg tablet 90 Tab 0     Sig: TAKE 1 TABLET BY MOUTH DAILY

## 2018-12-26 DIAGNOSIS — E11.40 TYPE 2 DIABETES MELLITUS WITH DIABETIC NEUROPATHY, UNSPECIFIED WHETHER LONG TERM INSULIN USE (HCC): ICD-10-CM

## 2018-12-26 DIAGNOSIS — I10 ESSENTIAL HYPERTENSION: Primary | ICD-10-CM

## 2018-12-26 DIAGNOSIS — I25.10 CORONARY ARTERY DISEASE INVOLVING NATIVE CORONARY ARTERY OF NATIVE HEART WITHOUT ANGINA PECTORIS: ICD-10-CM

## 2018-12-26 RX ORDER — ROSUVASTATIN CALCIUM 40 MG/1
TABLET, COATED ORAL
Qty: 90 TAB | Refills: 0 | Status: SHIPPED | OUTPATIENT
Start: 2018-12-26 | End: 2019-03-19 | Stop reason: SDUPTHER

## 2018-12-27 ENCOUNTER — OFFICE VISIT (OUTPATIENT)
Dept: ORTHOPEDIC SURGERY | Age: 58
End: 2018-12-27

## 2018-12-27 VITALS
DIASTOLIC BLOOD PRESSURE: 80 MMHG | HEART RATE: 63 BPM | HEIGHT: 59 IN | TEMPERATURE: 97.5 F | OXYGEN SATURATION: 97 % | SYSTOLIC BLOOD PRESSURE: 135 MMHG | BODY MASS INDEX: 35.68 KG/M2 | WEIGHT: 177 LBS | RESPIRATION RATE: 16 BRPM

## 2018-12-27 DIAGNOSIS — M75.112 INCOMPLETE TEAR OF LEFT ROTATOR CUFF: Primary | ICD-10-CM

## 2018-12-27 DIAGNOSIS — M25.511 RIGHT SHOULDER PAIN, UNSPECIFIED CHRONICITY: ICD-10-CM

## 2018-12-27 DIAGNOSIS — M50.30 DDD (DEGENERATIVE DISC DISEASE), CERVICAL: ICD-10-CM

## 2018-12-27 DIAGNOSIS — M54.2 NECK PAIN: ICD-10-CM

## 2018-12-27 RX ORDER — BACLOFEN 10 MG/1
10 TABLET ORAL 3 TIMES DAILY
Qty: 40 TAB | Refills: 2 | Status: SHIPPED | OUTPATIENT
Start: 2018-12-27 | End: 2019-04-25 | Stop reason: SDUPTHER

## 2018-12-27 RX ORDER — TRIAMCINOLONE ACETONIDE 40 MG/ML
40 INJECTION, SUSPENSION INTRA-ARTICULAR; INTRAMUSCULAR ONCE
Qty: 1 ML | Refills: 0
Start: 2018-12-27 | End: 2018-12-27

## 2018-12-27 RX ORDER — MELOXICAM 15 MG/1
15 TABLET ORAL
Qty: 30 TAB | Refills: 1 | Status: CANCELLED | OUTPATIENT
Start: 2018-12-27

## 2018-12-27 NOTE — PROGRESS NOTES
Saintclair Ganja  1960   Chief Complaint   Patient presents with    Shoulder Pain     left shoulder pain x 3 weeks due to fall         HISTORY OF PRESENT ILLNESS  Saintclair Ganja is a 63 y.o. female who presents today for reevaluation of left shoulder pain. Patient rates pain as 6/10 today. The pain has been present for 3 weeks after a falling out of her bed. She has a h/o of a rotator cuff repair surgery. She can only lift the arm USP up, pain with certain movements of the arm. Patient received relief for one week from the cortisone injection she received at her previous office visit on 3/21/17. Patient has completed a CT that shows a partial thickness rotator cuff tear. She is unable to have an MRI due to a defibrillator. She reports having pain with overhead motions or dangling by her side. She ambulates with a walker today. Patient denies any fever, chills, chest pain, shortness of breath or calf pain. The remainder of the review of systems is negative. There are no new illness or injuries to report since last seen in the office. There are no changes to medications, allergies, family or social history. PHYSICAL EXAM:   Visit Vitals  /80   Pulse 63   Temp 97.5 °F (36.4 °C) (Oral)   Resp 16   Ht 4' 11\" (1.499 m)   Wt 177 lb (80.3 kg)   LMP  (LMP Unknown)   SpO2 97%   BMI 35.75 kg/m²     The patient is a well-developed, well-nourished female   in no acute distress. The patient is alert and oriented times three. The patient is alert and oriented times three. Mood and affect are normal.  LYMPHATIC: lymph nodes are not enlarged and are within normal limits  SKIN: normal in color and non tender to palpation. There are no bruises or abrasions noted. NEUROLOGICAL: Motor sensory exam is within normal limits. Reflexes are equal bilaterally.  There is normal sensation to pinprick and light touch  MUSCULOSKELETAL:  Examination Left shoulder   Skin Intact   AC joint tenderness -   Biceps tenderness -   Forward flexion/Elevation    Active abduction    Glenohumeral abduction 70   External rotation ROM 70   Internal rotation ROM 50   Apprehension -   Jennifers Relocation -   Jerk -   Load and Shift -   Obriens -   Speeds -   Impingement sign +   Supraspinatus/Empty Can -   External Rotation Strength -, 5/5   Lift Off/Belly Press -, 5/5   Neurovascular Intact   pos spurling  Examination Neck   Skin Intact   Tenderness, Paracervical +   Paracervical spasms  +   Flexion Decreased 25%   Extension Decreased 25%   Lateral bend left Normal   Lateral bend right Normal   Masses -   Spurling sign -   Biceps reflex Normal   Triceps reflex Normal   Brachioradialis reflex Normal   Sensation Normal     PROCEDURE: Left Shoulder Injection with Ultrasound Guidance  Indication:Left Shoulder pain/swelling    After sterile prep, 6 cc of Xylocaine and 1 cc of Kenalog were injected into the left shoulder. Ultrasound images captured using [facility] Ultrasound machine and scanned into patient's chart.        [facility]  OFFICE PROCEDURE PROGRESS NOTE        Chart reviewed for the following:  Adamaris Engel M.D, have reviewed the History, Physical and updated the Allergic reactions for [place] performed immediately prior to start of procedure:  Adamaris Engel M.D, have performed the following reviews on Princess Whalen prior to the start of the procedure:            * Patient was identified by name and date of birth   * Agreement on procedure being performed was verified  * Risks and Benefits explained to the patient  * Procedure site verified and marked as necessary  * Patient was positioned for comfort  * Consent was signed and verified     Time: 10:37 AM     Date of procedure: 12/27/2018    Procedure performed by:  Home Mistry M.D    Provider assisted by: (see medication administration)    How tolerated by patient: tolerated the procedure well with no complications    Comments: none    IMAGING: XR of right shoulder dated 12/27/18 was reviewed and read: evidence of a distal clavicle exicison, slight proximal migration of the humeral head. XR of cervical spine dated 12/27/18 was reviewed and read: neck diffuse spondylitic changes throughout the cervical spine. CT of the left shoulder dated 3/6/17 was reviewed and read:   IMPRESSION:  1. No full-thickness rotator cuff rupture. Anterior supraspinatus undersurface partial-thickness tear about 5 x 5 mm. Smaller undersurface tear at the junction of the supraspinatus and infraspinatus insertion. 2. Mild acromioclavicular osteoarthrosis. IMPRESSION:      ICD-10-CM ICD-9-CM    1. Incomplete tear of left rotator cuff M75.112 840.4 TRIAMCINOLONE ACETONIDE INJ      triamcinolone acetonide (KENALOG) 40 mg/mL injection      meloxicam (MOBIC) 15 mg tablet      baclofen (LIORESAL) 10 mg tablet      US GUIDE INJ/ASP/ARTHRO LG JNT/BURSA   2. Neck pain M54.2 723.1 AMB POC XRAY, SPINE, CERVICAL; 2 OR 3      REFERRAL TO SPINE SURGERY   3. Right shoulder pain, unspecified chronicity M25.511 719.41 AMB POC XRAY, SHOULDER; COMPLETE, 2+   4. DDD (degenerative disc disease), cervical M50.30 722.4 meloxicam (MOBIC) 15 mg tablet      baclofen (LIORESAL) 10 mg tablet      REFERRAL TO SPINE SURGERY        PLAN:   1. The patient presents today with left shoulder and neck pain and I would like to try a cortisone injection today in the shoulder. Risk factors include: htn, dm  2. No ultrasound exam indicated today  3. Yes cortisone injection indicated today L SHOULDER US  4. No Physical/Occupational Therapy indicated today  5. No diagnostic test indicated today:   6. No durable medical equipment indicated today  7. Yes referral indicated today [place]  8. Yes medications indicated today: MOBIC, BACLOFEN  9.  No Narcotic indicated today    RTC 3 weeks if pain continues  Follow-up Disposition: Not on File    Scribed by Raza Ugalde [address] [facility] [address]) as dictated by MD MARY Rodas, Dr. Kuldeep Bacon, confirm that all documentation is accurate.     Kuldeep Bacon M.D.   [address] [place] [address] and Spine Specialist

## 2019-01-04 DIAGNOSIS — M47.817 SPONDYLOSIS OF LUMBOSACRAL REGION WITHOUT MYELOPATHY OR RADICULOPATHY: ICD-10-CM

## 2019-01-04 DIAGNOSIS — M54.16 LUMBAR RADICULOPATHY: ICD-10-CM

## 2019-01-04 RX ORDER — PREGABALIN 300 MG/1
300 CAPSULE ORAL 2 TIMES DAILY
Qty: 60 CAP | Refills: 0 | OUTPATIENT
Start: 2019-01-04 | End: 2019-01-14 | Stop reason: SDUPTHER

## 2019-01-04 NOTE — TELEPHONE ENCOUNTER
PHONE IN RX    Date of Block: 10/11/2018 Left L5/S1 Facet Block   Last Visit: 09/24/2018 with MD Sarah Barrow  Next Appointment: 01/14/2019 with MD Sarah Barrow; Pt canceled 10/24; 11/12   Previous Refill Encounter(s): 06/22/2018 per MD Sarah Barrow #[id number] with 2 refills    Requested Prescriptions     Pending Prescriptions Disp Refills    pregabalin (LYRICA) 300 mg capsule 60 Cap 0     Sig: Take 1 Cap by mouth two (2) times a day. Max Daily Amount: 600 mg.

## 2019-01-07 ENCOUNTER — TELEPHONE (OUTPATIENT)
Dept: ORTHOPEDIC SURGERY | Age: 59
End: 2019-01-07

## 2019-01-07 NOTE — TELEPHONE ENCOUNTER
Patient called for Cecilia Matta and said that [facility] states that they did not receive the order for the Lyrica Medication that was sent in on 01/04/19. Patient is asking someone call  [facility] pharmacy tel. [phone]. Patient tel. [phone].

## 2019-01-07 NOTE — TELEPHONE ENCOUNTER
Patient called in states that she just got off the phone [facility] and they do not have a RX for pregabalin (LYRICA) 300 mg capsule. Patient is requesting it be called back in, patient can be reached at [phone].

## 2019-01-07 NOTE — TELEPHONE ENCOUNTER
Called in medication to pharmacy. Spoke with ms Bill Simmons informed her RX has been called in and will check on it tomorrow 1/8/19 if the pharmacy does not contact her before.

## 2019-01-08 NOTE — TELEPHONE ENCOUNTER
Writer called pharmacy to verify receipt of rx. rx was received and they will call patient and inform her rx is ready.

## 2019-01-14 ENCOUNTER — OFFICE VISIT (OUTPATIENT)
Dept: ORTHOPEDIC SURGERY | Age: 59
End: 2019-01-14

## 2019-01-14 VITALS
TEMPERATURE: 98.9 F | DIASTOLIC BLOOD PRESSURE: 94 MMHG | WEIGHT: 178 LBS | BODY MASS INDEX: 35.88 KG/M2 | HEART RATE: 74 BPM | OXYGEN SATURATION: 98 % | RESPIRATION RATE: 16 BRPM | HEIGHT: 59 IN | SYSTOLIC BLOOD PRESSURE: 166 MMHG

## 2019-01-14 DIAGNOSIS — M54.2 CERVICAL PAIN: ICD-10-CM

## 2019-01-14 DIAGNOSIS — M75.41 IMPINGEMENT SYNDROME OF RIGHT SHOULDER: ICD-10-CM

## 2019-01-14 DIAGNOSIS — M54.16 LUMBAR RADICULOPATHY: ICD-10-CM

## 2019-01-14 DIAGNOSIS — M47.817 SPONDYLOSIS OF LUMBOSACRAL REGION WITHOUT MYELOPATHY OR RADICULOPATHY: Primary | ICD-10-CM

## 2019-01-14 RX ORDER — PREGABALIN 300 MG/1
300 CAPSULE ORAL 2 TIMES DAILY
Qty: 60 CAP | Refills: 2 | Status: SHIPPED | OUTPATIENT
Start: 2019-01-14 | End: 2020-01-27 | Stop reason: ALTCHOICE

## 2019-01-14 RX ORDER — DULOXETIN HYDROCHLORIDE 60 MG/1
60 CAPSULE, DELAYED RELEASE ORAL DAILY
Qty: 90 CAP | Refills: 0 | Status: SHIPPED | OUTPATIENT
Start: 2019-01-14 | End: 2019-02-18 | Stop reason: SDUPTHER
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
## 2019-01-17 ENCOUNTER — OFFICE VISIT (OUTPATIENT)
Dept: ORTHOPEDIC SURGERY | Age: 59
End: 2019-01-17

## 2019-01-17 VITALS
RESPIRATION RATE: 10 BRPM | TEMPERATURE: 97.4 F | SYSTOLIC BLOOD PRESSURE: 172 MMHG | HEIGHT: 59 IN | WEIGHT: 178 LBS | HEART RATE: 80 BPM | DIASTOLIC BLOOD PRESSURE: 84 MMHG | OXYGEN SATURATION: 95 % | BODY MASS INDEX: 35.88 KG/M2

## 2019-01-17 DIAGNOSIS — M75.101 TEAR OF RIGHT ROTATOR CUFF, UNSPECIFIED TEAR EXTENT: Primary | ICD-10-CM

## 2019-01-17 NOTE — PROGRESS NOTES
1. Have you been to the ER, urgent care clinic since your last visit? Hospitalized since your last visit? No    2. Have you seen or consulted any other health care providers outside of the [address] [place] [address] since your last visit? Include any pap smears or colon screening.  No

## 2019-01-17 NOTE — PROGRESS NOTES
Juanito Hernandez  1960   Chief Complaint   Patient presents with    Shoulder Pain     LEFT SHOULDER PAIN        HISTORY OF PRESENT ILLNESS  Juanito Hernandez is a 63 y.o. female who presents today for reevaluation of right shoulder pain. Patient rates pain as 6/10 today. At last OV, patient had a cortisone injection which provided minimal relief. She reports pain in her shoulder that radiates to elbow, pain with certain movements of the arm. Pain at night time. She has a h/o of a rotator cuff repair surgery. She ambulates with a walker today. Patient denies any fever, chills, chest pain, shortness of breath or calf pain. The remainder of the review of systems is negative. There are no new illness or injuries to report since last seen in the office. There are no changes to medications, allergies, family or social history. PHYSICAL EXAM:   Visit Vitals  /84   Pulse 80   Temp 97.4 °F (36.3 °C) (Oral)   Resp 10   Ht 4' 11\" (1.499 m)   Wt 178 lb (80.7 kg)   LMP  (LMP Unknown)   SpO2 95%   BMI 35.95 kg/m²     The patient is a well-developed, well-nourished female   in no acute distress. The patient is alert and oriented times three. The patient is alert and oriented times three. Mood and affect are normal.  LYMPHATIC: lymph nodes are not enlarged and are within normal limits  SKIN: normal in color and non tender to palpation. There are no bruises or abrasions noted. NEUROLOGICAL: Motor sensory exam is within normal limits. Reflexes are equal bilaterally.  There is normal sensation to pinprick and light touch  MUSCULOSKELETAL:  Examination Right shoulder   Skin Intact   AC joint tenderness -   Biceps tenderness -   Forward flexion/Elevation    Active abduction    Glenohumeral abduction 90   External rotation ROM 30   Internal rotation ROM 30   Apprehension -   Jennifers Relocation -   Jerk -   Load and Shift -   Obriens -   Speeds -   Impingement sign +   Supraspinatus/Empty Can + External Rotation Strength -, 5/5   Lift Off/Belly Press -, 5/5   Neurovascular Intact     IMAGING: XR of right shoulder dated 12/27/18 was reviewed and read: evidence of a distal clavicle exicison, slight proximal migration of the humeral head. XR of cervical spine dated 12/27/18 was reviewed and read: neck diffuse spondylitic changes throughout the cervical spine. CT of the left shoulder dated 3/6/17 was reviewed and read:   IMPRESSION:  1. No full-thickness rotator cuff rupture. Anterior supraspinatus undersurface partial-thickness tear about 5 x 5 mm. Smaller undersurface tear at the junction of the supraspinatus and infraspinatus insertion. 2. Mild acromioclavicular osteoarthrosis. IMPRESSION:      ICD-10-CM ICD-9-CM    1. Tear of right rotator cuff, unspecified tear extent M75.101 840.4 CT SHOULDER RT W CONT      XR INJ SHOULDER RT PRE CT/MRI ARTHRO        PLAN:   1. The patient presents today with right shoulder pain due to a possible rotator cuff tear and I would like to get a CT. Risk factors include: htn, dm  2. No ultrasound exam indicated today  3. No cortisone injection indicated today   4. No Physical/Occupational Therapy indicated today  5. Yes diagnostic test indicated today: CT R SHOULDER  6. No durable medical equipment indicated today  7. No referral indicated today   8. No medications indicated today:   9. No Narcotic indicated today    RTC following CT  Follow-up Disposition: Not on File    Scribed by Fidel Root [facility]) as dictated by Frank Ferrera MD    I, Dr. Frank Ferrera, confirm that all documentation is accurate.     Frank Ferrera M.D.   [facility]

## 2019-01-18 RX ORDER — DULOXETIN HYDROCHLORIDE 60 MG/1
CAPSULE, DELAYED RELEASE ORAL
Qty: 30 CAP | Refills: 0 | Status: SHIPPED | OUTPATIENT
Start: 2019-01-18 | End: 2019-02-17 | Stop reason: SDUPTHER

## 2019-01-23 NOTE — TELEPHONE ENCOUNTER
Last Visit: 11/02/2018 with MD Stacey Alexis  Next Appointment: none  Previous Refill Encounter(s): 09/11/2018 per BERNICE Salas [facility]ires #[id number]    Requested Prescriptions     Pending Prescriptions Disp Refills    celecoxib (CELEBREX) 200 mg capsule 180 Cap 0     Sig: Take 1 Cap by mouth two (2) times a day.

## 2019-01-24 RX ORDER — CELECOXIB 200 MG/1
200 CAPSULE ORAL 2 TIMES DAILY
Qty: 180 CAP | Refills: 0 | Status: SHIPPED | OUTPATIENT
Start: 2019-01-24 | End: 2019-11-22 | Stop reason: ALTCHOICE

## 2019-02-18 RX ORDER — DULOXETIN HYDROCHLORIDE 60 MG/1
CAPSULE, DELAYED RELEASE ORAL
Qty: 30 CAP | Refills: 0 | Status: SHIPPED | OUTPATIENT
Start: 2019-02-18 | End: 2019-08-15 | Stop reason: SDUPTHER

## 2019-02-20 ENCOUNTER — DOCUMENTATION ONLY (OUTPATIENT)
Dept: ORTHOPEDIC SURGERY | Age: 59
End: 2019-02-20

## 2019-02-20 NOTE — PROGRESS NOTES
Dr. Beto Cline called regarding authorization for CT RT shoulder, I explained that Dr. Merline Bonito and BERNICE Goins were not in the office at this time. She states all she needs to know is why CT was ordered vs MRI arthrogram. Per office visit note dated 12/27/18, patient cannot have MRI due to a defibrillator. I informed Dr. Beto Cline of this and she states that she will be sending approval for CT to our office today.

## 2019-02-22 ENCOUNTER — OFFICE VISIT (OUTPATIENT)
Dept: ORTHOPEDIC SURGERY | Age: 59
End: 2019-02-22

## 2019-02-22 VITALS
HEART RATE: 74 BPM | TEMPERATURE: 97 F | BODY MASS INDEX: 35.88 KG/M2 | DIASTOLIC BLOOD PRESSURE: 89 MMHG | SYSTOLIC BLOOD PRESSURE: 152 MMHG | WEIGHT: 178 LBS | HEIGHT: 59 IN | OXYGEN SATURATION: 91 % | RESPIRATION RATE: 11 BRPM

## 2019-02-22 DIAGNOSIS — Z96.652 HISTORY OF TOTAL LEFT KNEE REPLACEMENT: ICD-10-CM

## 2019-02-22 DIAGNOSIS — M25.562 LEFT KNEE PAIN, UNSPECIFIED CHRONICITY: Primary | ICD-10-CM

## 2019-02-22 RX ORDER — HYDROCODONE BITARTRATE AND ACETAMINOPHEN 5; 325 MG/1; MG/1
1 TABLET ORAL
Qty: 21 TAB | Refills: 0 | Status: SHIPPED | OUTPATIENT
Start: 2019-02-22 | End: 2019-08-15 | Stop reason: SDUPTHER
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
## 2019-02-22 NOTE — PROGRESS NOTES
1. Have you been to the ER, urgent care clinic since your last visit? Hospitalized since your last visit? No    2. Have you seen or consulted any other health care providers outside of the [address] [place] [address] since your last visit? Include any pap smears or colon screening.  No

## 2019-02-25 ENCOUNTER — TELEPHONE (OUTPATIENT)
Dept: ORTHOPEDIC SURGERY | Age: 59
End: 2019-02-25

## 2019-02-25 DIAGNOSIS — Z96.652 HISTORY OF LEFT KNEE REPLACEMENT: ICD-10-CM

## 2019-02-25 DIAGNOSIS — M25.562 CHRONIC PAIN OF LEFT KNEE: Primary | ICD-10-CM

## 2019-02-25 DIAGNOSIS — G89.29 CHRONIC PAIN OF LEFT KNEE: Primary | ICD-10-CM

## 2019-02-25 NOTE — TELEPHONE ENCOUNTER
Patient called stating when she saw Dr. Diana Jones on 02/22/19 he was talking about setting her up for an appointment with [facility] for her left knee. She is asking if he can call them now and then call her to let her know when she can get in with them. Please advise patient back regarding this at [phone].

## 2019-03-07 NOTE — TELEPHONE ENCOUNTER
Referral, patient demographics, and office notes sent to [facility], [phone] on 2/25/19.

## 2019-03-11 ENCOUNTER — OFFICE VISIT (OUTPATIENT)
Dept: ORTHOPEDIC SURGERY | Age: 59
End: 2019-03-11

## 2019-03-11 VITALS
RESPIRATION RATE: 14 BRPM | HEIGHT: 59 IN | WEIGHT: 177 LBS | SYSTOLIC BLOOD PRESSURE: 127 MMHG | TEMPERATURE: 98.5 F | HEART RATE: 71 BPM | DIASTOLIC BLOOD PRESSURE: 53 MMHG | OXYGEN SATURATION: 100 % | BODY MASS INDEX: 35.68 KG/M2

## 2019-03-11 DIAGNOSIS — M54.16 LUMBAR RADICULOPATHY: ICD-10-CM

## 2019-03-11 DIAGNOSIS — M47.817 LUMBOSACRAL SPONDYLOSIS WITHOUT MYELOPATHY: Primary | ICD-10-CM

## 2019-03-11 DIAGNOSIS — M54.2 CERVICALGIA: ICD-10-CM

## 2019-03-11 DIAGNOSIS — M75.41 IMPINGEMENT SYNDROME OF RIGHT SHOULDER: ICD-10-CM

## 2019-03-11 RX ORDER — DULOXETIN HYDROCHLORIDE 30 MG/1
30 CAPSULE, DELAYED RELEASE ORAL DAILY
Qty: 30 CAP | Refills: 2 | Status: SHIPPED | OUTPATIENT
Start: 2019-03-11 | End: 2019-12-18
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
## 2019-03-19 RX ORDER — CARBAMAZEPINE 200 MG/1
TABLET ORAL
Qty: 360 TAB | Refills: 0 | Status: SHIPPED | OUTPATIENT
Start: 2019-03-19 | End: 2019-06-17 | Stop reason: SDUPTHER

## 2019-03-20 ENCOUNTER — OFFICE VISIT (OUTPATIENT)
Dept: ORTHOPEDIC SURGERY | Age: 59
End: 2019-03-20

## 2019-03-20 VITALS
WEIGHT: 177 LBS | HEIGHT: 59 IN | HEART RATE: 65 BPM | DIASTOLIC BLOOD PRESSURE: 64 MMHG | RESPIRATION RATE: 16 BRPM | BODY MASS INDEX: 35.68 KG/M2 | SYSTOLIC BLOOD PRESSURE: 123 MMHG | OXYGEN SATURATION: 92 % | TEMPERATURE: 97.5 F

## 2019-03-20 DIAGNOSIS — M65.811 SYNOVITIS OF RIGHT SHOULDER: ICD-10-CM

## 2019-03-20 DIAGNOSIS — M67.921 BICEPS TENDINOPATHY, RIGHT: Primary | ICD-10-CM

## 2019-03-20 RX ORDER — TRIAMCINOLONE ACETONIDE 40 MG/ML
40 INJECTION, SUSPENSION INTRA-ARTICULAR; INTRAMUSCULAR ONCE
Qty: 1 ML | Refills: 0
Start: 2019-03-20 | End: 2019-03-20

## 2019-03-20 NOTE — PROGRESS NOTES
Myles Dominguez  1960   Chief Complaint   Patient presents with    Shoulder Pain     right shoulder pain         HISTORY OF PRESENT ILLNESS  Myles Dominguez is a 63 y.o. female who presents today for reevaluation of right shoulder pain and to review CT. Patient rates pain as 10/10 today. The patient has had a cortisone injection which provided minimal relief. She reports pain in her shoulder that radiates to elbow, pain with certain movements of the arm. Pain at night time. She has a h/o of a rotator cuff repair surgery. She ambulates with a walker today. Patient denies any fever, chills, chest pain, shortness of breath or calf pain. The remainder of the review of systems is negative. There are no new illness or injuries to report since last seen in the office. There are no changes to medications, allergies, family or social history. Pain Assessment  3/20/2019   Location of Pain Shoulder   Location Modifiers Right   Severity of Pain 10   Quality of Pain Throbbing   Quality of Pain Comment -   Duration of Pain Persistent   Frequency of Pain Constant   Date Pain First Started -   Aggravating Factors Bending;Stretching;Straightening   Aggravating Factors Comment -   Limiting Behavior Some   Relieving Factors Rest   Relieving Factors Comment -   Result of Injury -   Work-Related Injury -   Type of Injury -     PHYSICAL EXAM:   Visit Vitals  /64   Pulse 65   Temp 97.5 °F (36.4 °C) (Oral)   Resp 16   Ht 4' 11\" (1.499 m)   Wt 177 lb (80.3 kg)   LMP  (LMP Unknown)   SpO2 92%   BMI 35.75 kg/m²     The patient is a well-developed, well-nourished female   in no acute distress. The patient is alert and oriented times three. The patient is alert and oriented times three. Mood and affect are normal.  LYMPHATIC: lymph nodes are not enlarged and are within normal limits  SKIN: normal in color and non tender to palpation. There are no bruises or abrasions noted.    NEUROLOGICAL: Motor sensory exam is within normal limits. Reflexes are equal bilaterally. There is normal sensation to pinprick and light touch  MUSCULOSKELETAL:  Examination Right shoulder   Skin Intact   AC joint tenderness -   Biceps tenderness +   Forward flexion/Elevation    Active abduction    Glenohumeral abduction 90   External rotation ROM 30   Internal rotation ROM 30   Apprehension -   Jennifers Relocation -   Jerk -   Load and Shift -   Obriens -   Speeds -   Impingement sign +   Supraspinatus/Empty Can +   External Rotation Strength -, 5/5   Lift Off/Belly Press -, 5/5   Neurovascular Intact     PROCEDURE: Right Biceps Tendon Injection with Ultrasound Guidance  Indication:Right Biceps Tendon pain/swelling    After sterile prep, 1 cc of Xylocaine and 1 cc of Kenalog were injected into the right biceps tendon. Ultrasound images captured using Zolpy1 Modumetal Loop Ultrasound machine and scanned into patient's chart.        [facility] - [facility]  OFFICE PROCEDURE PROGRESS NOTE        Chart reviewed for the following:  Janel Byrne M.D, have reviewed the History, Physical and updated the Allergic reactions for [place] performed immediately prior to start of procedure:  Janel Byrne M.D, have performed the following reviews on [place] prior to the start of the procedure:            * Patient was identified by name and date of birth   * Agreement on procedure being performed was verified  * Risks and Benefits explained to the patient  * Procedure site verified and marked as necessary  * Patient was positioned for comfort  * Consent was signed and verified     Time: 2:28 PM     Date of procedure: 3/20/2019    Procedure performed by:  Josefina Olivas M.D    Provider assisted by: (see medication administration)    How tolerated by patient: tolerated the procedure well with no complications    Comments: none    IMAGING: CT of right shoulder dated 3/19/19 was reviewed and read:   1. Insertional/articular surface tearing posterior supraspinatus, infraspinatus +/- subscapularis tendons, tracking along infraspinatus myotendinous junction. No contrast opacification of subacromial/subdeltoid bursa with communication with intra-articular contrast to suggest full-thickness rotator cuff tear. 2. Biceps tendon splitting/tear in the bicipital groove with synovitis and/or loose bodies. 3. Irregularity/deformity of anterior inferior labrum, potentially posttraumatic (Bankart lesion), without CT evident Hill-Sachs deformity. An ill-defined density at the inferomedial anterior labrum may reflect contrast/paravertebral labral cyst versus dystrophic calcification. XR of right shoulder dated 12/27/18 was reviewed and read: evidence of a distal clavicle exicison, slight proximal migration of the humeral head. XR of cervical spine dated 12/27/18 was reviewed and read: neck diffuse spondylitic changes throughout the cervical spine. CT of the left shoulder dated 3/6/17 was reviewed and read:   IMPRESSION:  1. No full-thickness rotator cuff rupture. Anterior supraspinatus undersurface partial-thickness tear about 5 x 5 mm. Smaller undersurface tear at the junction of the supraspinatus and infraspinatus insertion. 2. Mild acromioclavicular osteoarthrosis. IMPRESSION:      ICD-10-CM ICD-9-CM    1. Biceps tendinopathy, right M67.921 727.9 TRIAMCINOLONE ACETONIDE INJ      triamcinolone acetonide (KENALOG) 40 mg/mL injection      US GUIDE INJ/ASP/ARTHRO SM JNT/BURSA   2. Synovitis of right shoulder M65.811 726.10 TRIAMCINOLONE ACETONIDE INJ      triamcinolone acetonide (KENALOG) 40 mg/mL injection      US GUIDE INJ/ASP/ARTHRO SM JNT/BURSA        PLAN:   1. The patient presents today with right shoulder pain due to CT documented biceps tendinopathy and I would like to try an injection today. Risk factors include: htn, dm  2. No ultrasound exam indicated today  3.  Yes cortisone injection indicated today R BICEPS TENDON US  4. No Physical/Occupational Therapy indicated today  5. No diagnostic test indicated today:   6. No durable medical equipment indicated today  7. No referral indicated today   8. No medications indicated today:   9. No Narcotic indicated today    RTC 3 weeks if pain continues  Follow-up Disposition: Not on File    Scribed by Parvin Hare[address]) as dictated by Ernesto Gallo MD    I, Dr. Ernesto Gallo, confirm that all documentation is accurate.     Ernesto Gallo M.D.   [facility]

## 2019-04-08 DIAGNOSIS — M50.30 DDD (DEGENERATIVE DISC DISEASE), CERVICAL: ICD-10-CM

## 2019-04-08 DIAGNOSIS — M62.838 MUSCLE SPASM: ICD-10-CM

## 2019-04-08 DIAGNOSIS — M75.112 INCOMPLETE TEAR OF LEFT ROTATOR CUFF: ICD-10-CM

## 2019-04-08 DIAGNOSIS — M54.16 LUMBAR NEURITIS: ICD-10-CM

## 2019-04-08 DIAGNOSIS — F51.01 PRIMARY INSOMNIA: ICD-10-CM

## 2019-04-08 NOTE — TELEPHONE ENCOUNTER
Patient needs a medication refill. Please advise    Requested Prescriptions     Pending Prescriptions Disp Refills    baclofen (LIORESAL) 10 mg tablet 40 Tab 2     Sig: Take 1 Tab by mouth three (3) times daily.  zolpidem (AMBIEN) 10 mg tablet 30 Tab 0     Sig: Take 1 Tab by mouth nightly as needed for Sleep. Max Daily Amount: 10 mg.

## 2019-04-12 NOTE — TELEPHONE ENCOUNTER
PCP: Drew Bob NP    Last appt: 12/6/2018  Future Appointments   Date Time Provider Oren Ramon   4/16/2019  1:50 PM MD Savage Bah [address]   4/22/2019 11:00 AM MD Maxwell Mix       Requested Prescriptions     Pending Prescriptions Disp Refills    baclofen (LIORESAL) 10 mg tablet 40 Tab 2     Sig: Take 1 Tab by mouth three (3) times daily.  zolpidem (AMBIEN) 10 mg tablet 30 Tab 0     Sig: Take 1 Tab by mouth nightly as needed for Sleep. Max Daily Amount: 10 mg.

## 2019-04-14 RX ORDER — BACLOFEN 10 MG/1
10 TABLET ORAL 3 TIMES DAILY
Qty: 40 TAB | Refills: 2 | OUTPATIENT
Start: 2019-04-14

## 2019-04-14 RX ORDER — ZOLPIDEM TARTRATE 10 MG/1
10 TABLET ORAL
Qty: 30 TAB | Refills: 0 | Status: SHIPPED | OUTPATIENT
Start: 2019-04-14 | End: 2019-09-23 | Stop reason: SDUPTHER

## 2019-04-22 ENCOUNTER — TELEPHONE (OUTPATIENT)
Dept: ORTHOPEDIC SURGERY | Age: 59
End: 2019-04-22

## 2019-04-22 ENCOUNTER — OFFICE VISIT (OUTPATIENT)
Dept: ORTHOPEDIC SURGERY | Age: 59
End: 2019-04-22

## 2019-04-22 VITALS
SYSTOLIC BLOOD PRESSURE: 115 MMHG | HEIGHT: 59 IN | HEART RATE: 72 BPM | WEIGHT: 177 LBS | BODY MASS INDEX: 35.68 KG/M2 | OXYGEN SATURATION: 94 % | RESPIRATION RATE: 24 BRPM | DIASTOLIC BLOOD PRESSURE: 74 MMHG | TEMPERATURE: 97.6 F

## 2019-04-22 DIAGNOSIS — M47.817 LUMBOSACRAL SPONDYLOSIS WITHOUT MYELOPATHY: Primary | ICD-10-CM

## 2019-04-22 DIAGNOSIS — M54.2 CERVICALGIA: ICD-10-CM

## 2019-04-22 DIAGNOSIS — M75.41 IMPINGEMENT SYNDROME OF RIGHT SHOULDER: ICD-10-CM

## 2019-04-22 DIAGNOSIS — M54.16 LUMBAR RADICULOPATHY: ICD-10-CM

## 2019-04-22 DIAGNOSIS — M50.30 DDD (DEGENERATIVE DISC DISEASE), CERVICAL: ICD-10-CM

## 2019-04-22 DIAGNOSIS — M75.112 INCOMPLETE TEAR OF LEFT ROTATOR CUFF: ICD-10-CM

## 2019-04-22 RX ORDER — PREGABALIN 300 MG/1
300 CAPSULE ORAL 2 TIMES DAILY
Qty: 60 CAP | Refills: 2 | Status: SHIPPED | OUTPATIENT
Start: 2019-04-22 | End: 2019-08-15 | Stop reason: SDUPTHER

## 2019-04-22 RX ORDER — DULOXETIN HYDROCHLORIDE 30 MG/1
30 CAPSULE, DELAYED RELEASE ORAL DAILY
Qty: 30 CAP | Refills: 2 | Status: SHIPPED | OUTPATIENT
Start: 2019-04-22 | End: 2019-08-15 | Stop reason: SDUPTHER

## 2019-04-22 NOTE — TELEPHONE ENCOUNTER
Patient called in states that she needs her bone scan images and other things sent to [facility] before may 6th. She states that [facility] told her after 5 years they don not have them in their records. Patient can be reached at [phone].

## 2019-04-22 NOTE — LETTER
4/22/19 Patient: Zahra Mcnair YOB: 1960 Date of Visit: 4/22/2019 Rachell Dawson NP 
[address] [id number] [place] [address] VIA In Basket Dear Rachell Dawson NP, Thank you for referring Ms. Zahra Mcnair to [address] [place] [address] for evaluation. My notes for this consultation are attached. If you have questions, please do not hesitate to call me. I look forward to following your patient along with you. Sincerely, Gerard Holly MD
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
## 2019-04-22 NOTE — TELEPHONE ENCOUNTER
Patient needs a medication refill. Please advise    Requested Prescriptions     Pending Prescriptions Disp Refills    baclofen (LIORESAL) 10 mg tablet 40 Tab 2     Sig: Take 1 Tab by mouth three (3) times daily.

## 2019-04-23 RX ORDER — BACLOFEN 10 MG/1
10 TABLET ORAL 3 TIMES DAILY
Qty: 40 TAB | Refills: 2 | OUTPATIENT
Start: 2019-04-23

## 2019-04-23 NOTE — TELEPHONE ENCOUNTER
Please ask patient to follow up with Dr. Leonel Walters as it looks like he ordered this medication for her.  Alfreda Grace

## 2019-04-25 DIAGNOSIS — M75.112 INCOMPLETE TEAR OF LEFT ROTATOR CUFF: ICD-10-CM

## 2019-04-25 DIAGNOSIS — M50.30 DDD (DEGENERATIVE DISC DISEASE), CERVICAL: ICD-10-CM

## 2019-04-25 NOTE — TELEPHONE ENCOUNTER
Last Visit: 3/20/19 with MD Cecile Kincaid  Next Appointment: RTC 3 weeks if pain continued, pt was no show 4/16/19  Previous Refill Encounter(s): 12/27/18 #40 with 2 refills    Requested Prescriptions     Pending Prescriptions Disp Refills    baclofen (LIORESAL) 10 mg tablet 40 Tab 2     Sig: Take 1 Tab by mouth three (3) times daily.

## 2019-04-25 NOTE — TELEPHONE ENCOUNTER
Informed patient to request this refill from Dr Andreina Sandoval office due to he was the prescribing physician.

## 2019-04-26 RX ORDER — BACLOFEN 10 MG/1
10 TABLET ORAL 3 TIMES DAILY
Qty: 40 TAB | Refills: 2 | Status: SHIPPED | OUTPATIENT
Start: 2019-04-26 | End: 2020-01-29 | Stop reason: ALTCHOICE

## 2019-05-09 ENCOUNTER — DOCUMENTATION ONLY (OUTPATIENT)
Dept: ORTHOPEDIC SURGERY | Age: 59
End: 2019-05-09

## 2019-05-09 NOTE — PROGRESS NOTES
PATIENT WAS ORIGINALLY SCHEDULED FOR SPINAL INJECTION FOR TODAY 5/9/2019. SHE WAS CANCELLED FOR TODAY BECAUSE SHE HAD TO BE CLEARED BY CARDIOLOGIST BECAUSE SHE HAD NOT SEEN HIM SINCE 1/2018 AND WE NEEDED CLEARANCE FOR HER TO STOP HER ASPIRIN AND PLAVIX 5 DAYS BEFORE THE PROCEDURE. PT WAS INSTRUCTED TO CALL ME ONCE DHE HAD THE APPT WITH DR DICKENS [facility] AND I WOULD CALL THEMTO GET APPROVAL TO STOP THE BLOOD THINNER AND GET HER R/S FOR HER BLOCK/ I RECEIVED A CALL FROM  [facility] TODAY/ PT HAD SHOWED UP FOR THE INJECTION TODAY. I SPOKE TO PATIENT AND ASKED HER ABOUT OUR PREVIOS PHONE CALL AND SHE SAID THET SHE HAD SEEN DR DICKENS [facility] ON 5/7 AND SHE HAD STOPPED HER PLAVIX AND ASPIRIN ON 5/3 (WITHOUT OK) . I ASKED HER TO WAIT THERE WHOLE I TRIED TO CALL DR Мария Navarro OFFICE SO THAT IF HE HAD CLEARED HER I COULD GET THE [id number] OVER AND PERHAPS SHE WOULD STILL BE ABLE TO GET HER INJECTION TODAY. I CALLED DR CORMIER OFFICE AND SPOKE TO SHANE WHO SAID THAT DR DICKENS [facility] HAD NOT CLEARED HER TO STOP ANYTHING FOR AN INJECTION AND THAT HE IS NOT THERE TODAY. I TOLD HER THAT THE PATIENT HAD STOPPED THE BLOOD THINNERS ON HER OWN AND HAD BEEN OFF OF THEM SINCE 5/3. SHANE SAID THAT SHE SHOULD RESTART THEM AND SHE WOULD TALK TO DR ORTIZHIRIKKI [facility] TOMORROW AND LET ME KNOW.  I CALLED AND ADVISED PATIENT TO RESTART HER PLAVIX AND ASPIRIN AND WE WOULD HAVE TO RESCHEDULE HER BLOCK / SHE HUNG UP ON ME

## 2019-05-13 ENCOUNTER — TELEPHONE (OUTPATIENT)
Dept: ORTHOPEDIC SURGERY | Age: 59
End: 2019-05-13

## 2019-05-23 ENCOUNTER — HOSPITAL ENCOUNTER (OUTPATIENT)
Age: 59
Setting detail: OUTPATIENT SURGERY
Discharge: HOME | End: 2019-05-23
Attending: PHYSICAL MEDICINE & REHABILITATION | Admitting: PHYSICAL MEDICINE & REHABILITATION
Payer: MEDICARE

## 2019-05-23 ENCOUNTER — APPOINTMENT (OUTPATIENT)
Dept: GENERAL RADIOLOGY | Age: 59
End: 2019-05-23
Attending: PHYSICAL MEDICINE & REHABILITATION
Payer: MEDICARE

## 2019-05-23 VITALS
TEMPERATURE: 98.2 F | DIASTOLIC BLOOD PRESSURE: 61 MMHG | RESPIRATION RATE: 14 BRPM | WEIGHT: 177 LBS | HEART RATE: 60 BPM | BODY MASS INDEX: 35.68 KG/M2 | HEIGHT: 59 IN | OXYGEN SATURATION: 94 % | SYSTOLIC BLOOD PRESSURE: 106 MMHG

## 2019-05-23 PROCEDURE — 77030003676 HC NDL SPN MPRI -A: Performed by: PHYSICAL MEDICINE & REHABILITATION

## 2019-05-23 PROCEDURE — 76010000009 HC PAIN MGT 0 TO 30 MIN PROC: Performed by: PHYSICAL MEDICINE & REHABILITATION

## 2019-05-23 PROCEDURE — 74011250636 HC RX REV CODE- 250/636: Performed by: PHYSICAL MEDICINE & REHABILITATION

## 2019-05-23 PROCEDURE — 74011250637 HC RX REV CODE- 250/637: Performed by: PHYSICAL MEDICINE & REHABILITATION

## 2019-05-23 PROCEDURE — 77030039433 HC TY MYLEOGRAM BD -B: Performed by: PHYSICAL MEDICINE & REHABILITATION

## 2019-05-23 RX ORDER — DEXAMETHASONE SODIUM PHOSPHATE 100 MG/10ML
INJECTION INTRAMUSCULAR; INTRAVENOUS AS NEEDED
Status: DISCONTINUED | OUTPATIENT
Start: 2019-05-23 | End: 2019-05-23 | Stop reason: HOSPADM

## 2019-05-23 RX ORDER — DIAZEPAM 5 MG/1
5-20 TABLET ORAL ONCE
Status: COMPLETED | OUTPATIENT
Start: 2019-05-23 | End: 2019-05-23

## 2019-05-23 RX ORDER — LIDOCAINE HYDROCHLORIDE 10 MG/ML
INJECTION, SOLUTION EPIDURAL; INFILTRATION; INTRACAUDAL; PERINEURAL AS NEEDED
Status: DISCONTINUED | OUTPATIENT
Start: 2019-05-23 | End: 2019-05-23 | Stop reason: HOSPADM

## 2019-05-23 RX ADMIN — DIAZEPAM 10 MG: 5 TABLET ORAL at 13:39

## 2019-05-23 NOTE — DISCHARGE INSTRUCTIONS
[facility]   ([place])  Dr. Eliezer Delvalle, Dr. Inna Quintana, Dr. Newton Ours not drive a car, operate heavy machinery or dangerous equipment for 24 hours. * Activity as tolerated; rest for the remainder of the day. * Resume pre-block medications including those for your family doctor. * Do not drink alcoholic beverages for 24 hours. Alcohol and the medications you have received may interact and cause an adverse reaction. * You may feel better this evening and worse tomorrow, as the numbing medications wears off and the steroid has yet to begin to work. After 48 hrs the steroid should begin to release bringing you relief. * You may shower this evening and remove any bandages. * Avoid hot tubs and heating pads for 24 hours. You may use cold packs on the procedure site as tolerated for the first 24 hours. * If a headache develops, drink plenty of fluids and rest.  Take over the counter medications for headache if needed. If the headache continues longer than 24 hours, call MD at the [address] [place] [address]. [phone]    * Continue taking pain medications as needed. * You may resume your regular diet if tolerated. Otherwise, start with sips of water and advance slowly. * If Diabetic: check your blood sugar three times a day for the next 3 days. If your sugar is greater than 300 call your family doctor. If your sugar is greater than 400, have someone transport you to the nearest Emergency Room. * If you experience any of the following problems, Please Call the [address] [place] [address] at [phone].         * Shortness of Breath    * Fever of 101 or higher    * Nausea / Vomiting    * Severe Headache    * Weakness or numbness in arms or legs that is not      resolving    * Prolonged increase in pain greater than 4 days      DISCHARGE SUMMARY from Nurse      PATIENT INSTRUCTIONS:    After oral sedation, for 24 hours or while taking prescription Narcotics:  · Limit your activities  · Do not drive and operate hazardous machinery  · Do not make important personal or business decisions  · Do  not drink alcoholic beverages  · If you have not urinated within 8 hours after discharge, please contact your surgeon on call. Report the following to your surgeon:  · Excessive pain, swelling, redness or odor of or around the surgical area  · Temperature over 101  · Nausea and vomiting lasting longer than 4 hours or if unable to take medications  · Any signs of decreased circulation or nerve impairment to extremity: change in color, persistent  numbness, tingling, coldness or increase pain  · Any questions            What to do at Home:  Recommended activity: Activity as tolerated, NO DRIVING FOR 12 Hours post injection          *  Please give a list of your current medications to your Primary Care Provider. *  Please update this list whenever your medications are discontinued, doses are      changed, or new medications (including over-the-counter products) are added. *  Please carry medication information at all times in case of emergency situations. These are general instructions for a healthy lifestyle:    No smoking/ No tobacco products/ Avoid exposure to second hand smoke    Surgeon General's Warning:  Quitting smoking now greatly reduces serious risk to your health. Obesity, smoking, and sedentary lifestyle greatly increases your risk for illness    A healthy diet, regular physical exercise & weight monitoring are important for maintaining a healthy lifestyle    You may be retaining fluid if you have a history of heart failure or if you experience any of the following symptoms:  Weight gain of 3 pounds or more overnight or 5 pounds in a week, increased swelling in our hands or feet or shortness of breath while lying flat in bed.   Please call your doctor as soon as you notice any of these symptoms; do not wait until your next office visit. Recognize signs and symptoms of STROKE:    F-face looks uneven    A-arms unable to move or move unevenly    S-speech slurred or non-existent    T-time-call 911 as soon as signs and symptoms begin-DO NOT go       Back to bed or wait to see if you get better-TIME IS BRAIN.

## 2019-05-23 NOTE — PROCEDURES
Facet Joint Block Procedure Note    Patient Name: Francesco Carlson    Date of Procedure: May 23, 2019    Preoperative Diagnosis: Spondylosis of lumbosacral region, unspecified spinal osteoarthritis complication status [C18.282]    Post Operative Diagnosis:Spondylosis of lumbosacral region, unspecified spinal osteoarthritis complication status [I08.654]     Procedure:  bilateral  L4-L5 Facet Joint Block  bilateral  L5-S1 Facet Joint Block      Consent: Informed consent was obtained prior to the procedure. The patient was given the opportunity to ask questions regarding the procedure and its associated risks. In addition to the potential risks associated with the procedure itself, the patient was informed both verbally and in writing of potential side effects of the use of glucocorticoids. The patient appeared to comprehend the informed consent and desired to have the procedure perfored. Procedure: The patient was placed in the prone position on the flouroscopy table and the back was prepped and draped in the usual sterile manner. At each blocked level, the exact location of the facet joint was identified with flouroscopy, and after local Lidocaine 1% injection, a #22 gauge spinal needle was then advanced toward the joint. A total of 30 mg of preservative free Dexamethasone and 4 cc of Lidocaine was injected around and equally divided among all of the sites. The patient tolerated the procedure well. The injection area was cleaned and bandaids applied. No excessive bleeding was noted. Patient dressed and was discharged to home with instructions.        Matt Robertson MD  May 23, 2019

## 2019-06-05 ENCOUNTER — TELEPHONE (OUTPATIENT)
Dept: ORTHOPEDIC SURGERY | Facility: CLINIC | Age: 59
End: 2019-06-05

## 2019-06-05 ENCOUNTER — TELEPHONE (OUTPATIENT)
Dept: ORTHOPEDIC SURGERY | Age: 59
End: 2019-06-05

## 2019-06-05 NOTE — TELEPHONE ENCOUNTER
Patient is requesting a refill on her Norco & Celebrex. Please advise and pend new Rx if appropriate.     Last visit:  2/22/19 with MD Eliane Aden  Next appt:  none  Previous refill encounter:  2/22/19 Norco #21, 1/24/19 Celebrex #180    Please advise patient back at [phone]

## 2019-06-05 NOTE — TELEPHONE ENCOUNTER
Pt states she went to [facility] today but they couldn't do anything for her because they did not receive all the requested information from us. She states they need any/all imaging on a disk as well as reports from her surgeries and any in office imaging. Please call pt at [phone].

## 2019-06-06 NOTE — TELEPHONE ENCOUNTER
Patient currently on the phone with our call center to schedule an appointment from another message. Notified them to let the patient know I would get the requested records together and the patient may pick them up at our office. Records placed up front at [facility] and patient is aware she may pick them up today.

## 2019-06-06 NOTE — TELEPHONE ENCOUNTER
Called and left patient a message to please call us back with more specific information on what she need. She has seen several doctors with [address].

## 2019-06-06 NOTE — TELEPHONE ENCOUNTER
Spoke with patient and notified her per PA Suanne Galeazzi she will need to be seen in the office prior to receiving any refills. Patient was transferred to the [facility] to schedule an appointment.

## 2019-06-17 ENCOUNTER — DOCUMENTATION ONLY (OUTPATIENT)
Dept: ORTHOPEDIC SURGERY | Age: 59
End: 2019-06-17

## 2019-06-17 DIAGNOSIS — R92.8 ABNORMAL MAMMOGRAM: ICD-10-CM

## 2019-06-17 DIAGNOSIS — R73.09 ELEVATED HEMOGLOBIN A1C: ICD-10-CM

## 2019-06-17 DIAGNOSIS — M94.9 DISORDER OF BONE AND CARTILAGE: ICD-10-CM

## 2019-06-17 DIAGNOSIS — F51.01 PRIMARY INSOMNIA: ICD-10-CM

## 2019-06-17 DIAGNOSIS — M62.838 MUSCLE SPASM: ICD-10-CM

## 2019-06-17 DIAGNOSIS — E78.49 OTHER HYPERLIPIDEMIA: ICD-10-CM

## 2019-06-17 DIAGNOSIS — I50.22 CHRONIC SYSTOLIC HEART FAILURE (HCC): ICD-10-CM

## 2019-06-17 DIAGNOSIS — M54.16 LUMBAR NEURITIS: ICD-10-CM

## 2019-06-17 DIAGNOSIS — I10 ESSENTIAL HYPERTENSION: ICD-10-CM

## 2019-06-17 DIAGNOSIS — Z86.39 HISTORY OF DIABETES MELLITUS, TYPE II: ICD-10-CM

## 2019-06-17 DIAGNOSIS — M89.9 DISORDER OF BONE AND CARTILAGE: ICD-10-CM

## 2019-06-17 RX ORDER — ROSUVASTATIN CALCIUM 40 MG/1
TABLET, COATED ORAL
Qty: 90 TAB | Refills: 0 | OUTPATIENT
Start: 2019-06-17

## 2019-06-17 RX ORDER — CARBAMAZEPINE 200 MG/1
TABLET ORAL
Qty: 360 TAB | Refills: 0 | Status: SHIPPED | OUTPATIENT
Start: 2019-06-17 | End: 2020-04-29 | Stop reason: ALTCHOICE

## 2019-06-18 NOTE — TELEPHONE ENCOUNTER
Called patient, verified name and . Informed patient that Per NP Carlos Enrique Alejandre she would need to schedule and appointment and be seen  before  Refill can be given. Also reminded patient to complete fasting labs prior to scheduling appointment. Patient verbalized understanding and had no further questions.

## 2019-06-28 DIAGNOSIS — M50.30 DDD (DEGENERATIVE DISC DISEASE), CERVICAL: ICD-10-CM

## 2019-06-28 RX ORDER — BACLOFEN 10 MG/1
10 TABLET ORAL 3 TIMES DAILY
Qty: 40 TAB | Refills: 2 | OUTPATIENT
Start: 2019-06-28

## 2019-06-28 NOTE — TELEPHONE ENCOUNTER
Spoke with patient, she verbalized understanding that we are unable to refill her Rx of Baclofen. Patient has not been seen by Dr. Romana Erickson since March 2019.

## 2019-06-28 NOTE — TELEPHONE ENCOUNTER
Last Visit: 3/20/19 with MD Lisa Braxton  Next Appointment: none  Previous Refill Encounter(s): 4/26/19 #40 with 2 refills    Requested Prescriptions     Pending Prescriptions Disp Refills    baclofen (LIORESAL) 10 mg tablet 40 Tab 2     Sig: Take 1 Tab by mouth three (3) times daily.

## 2019-07-01 ENCOUNTER — OFFICE VISIT (OUTPATIENT)
Dept: ORTHOPEDIC SURGERY | Age: 59
End: 2019-07-01

## 2019-07-01 VITALS
HEIGHT: 59 IN | DIASTOLIC BLOOD PRESSURE: 76 MMHG | WEIGHT: 173.4 LBS | TEMPERATURE: 98.8 F | OXYGEN SATURATION: 99 % | BODY MASS INDEX: 34.96 KG/M2 | SYSTOLIC BLOOD PRESSURE: 123 MMHG | RESPIRATION RATE: 16 BRPM | HEART RATE: 76 BPM

## 2019-07-01 DIAGNOSIS — M54.16 LUMBAR RADICULOPATHY: ICD-10-CM

## 2019-07-01 DIAGNOSIS — M47.817 LUMBOSACRAL SPONDYLOSIS WITHOUT MYELOPATHY: Primary | ICD-10-CM

## 2019-07-01 RX ORDER — BACLOFEN 10 MG/1
10 TABLET ORAL DAILY
Qty: 30 TAB | Refills: 2 | Status: SHIPPED | OUTPATIENT
Start: 2019-07-01 | End: 2019-08-15 | Stop reason: SDUPTHER

## 2019-07-01 RX ORDER — DULOXETIN HYDROCHLORIDE 30 MG/1
30 CAPSULE, DELAYED RELEASE ORAL DAILY
Qty: 30 CAP | Refills: 2 | Status: SHIPPED | OUTPATIENT
Start: 2019-07-01 | End: 2019-08-15 | Stop reason: SDUPTHER

## 2019-07-01 RX ORDER — PREGABALIN 300 MG/1
300 CAPSULE ORAL 2 TIMES DAILY
Qty: 60 CAP | Refills: 2 | Status: SHIPPED | OUTPATIENT
Start: 2019-07-01 | End: 2019-08-15 | Stop reason: SDUPTHER

## 2019-07-01 RX ORDER — MONTELUKAST SODIUM 10 MG/1
TABLET ORAL
COMMUNITY
Start: 2017-03-20 | End: 2019-09-23 | Stop reason: SDUPTHER
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
## 2019-07-01 NOTE — LETTER
7/1/19 Patient: Marco A Kovacs YOB: 1960 Date of Visit: 7/1/2019 Richar Carey NP 
[id number] [place] [address] VIA In Basket Dear Richar Carey NP, Thank you for referring Ms. Marco A Kovacs to [address] [place] [address] for evaluation. My notes for this consultation are attached. If you have questions, please do not hesitate to call me. I look forward to following your patient along with you. Sincerely, Prosper Denis MD

## 2019-07-18 ENCOUNTER — OFFICE VISIT (OUTPATIENT)
Dept: ORTHOPEDIC SURGERY | Age: 59
End: 2019-07-18

## 2019-07-18 VITALS
HEART RATE: 71 BPM | TEMPERATURE: 96.1 F | WEIGHT: 178.4 LBS | DIASTOLIC BLOOD PRESSURE: 74 MMHG | RESPIRATION RATE: 10 BRPM | BODY MASS INDEX: 35.96 KG/M2 | SYSTOLIC BLOOD PRESSURE: 127 MMHG | HEIGHT: 59 IN | OXYGEN SATURATION: 96 %

## 2019-07-18 DIAGNOSIS — M70.61 TROCHANTERIC BURSITIS, RIGHT HIP: Primary | ICD-10-CM

## 2019-07-18 DIAGNOSIS — Z96.652 HISTORY OF LEFT KNEE REPLACEMENT: ICD-10-CM

## 2019-07-18 DIAGNOSIS — M25.551 RIGHT HIP PAIN: ICD-10-CM

## 2019-07-18 RX ORDER — CELECOXIB 200 MG/1
200 CAPSULE ORAL DAILY
Qty: 30 CAP | Refills: 2 | Status: SHIPPED | OUTPATIENT
Start: 2019-07-18 | End: 2019-08-15 | Stop reason: SDUPTHER

## 2019-07-18 RX ORDER — BETAMETHASONE SODIUM PHOSPHATE AND BETAMETHASONE ACETATE 3; 3 MG/ML; MG/ML
6 INJECTION, SUSPENSION INTRA-ARTICULAR; INTRALESIONAL; INTRAMUSCULAR; SOFT TISSUE ONCE
Qty: 1 ML | Refills: 0
Start: 2019-07-18 | End: 2019-07-18

## 2019-07-18 RX ORDER — TRAMADOL HYDROCHLORIDE 50 MG/1
50 TABLET ORAL
Qty: 21 TAB | Refills: 0 | Status: SHIPPED | OUTPATIENT
Start: 2019-07-18 | End: 2019-07-25

## 2019-07-18 NOTE — PATIENT INSTRUCTIONS
You have been provided with an order for durable medical equipment that you may  at an outside facility as our office does not carry the equipment you need. You may pick it up at any medical supply company you like. Listed below are a few different locations for your convenience:    [address] [place] [address] Street  Phone: [phone]    [facility].   [place], [address] [place] [address]  Phone: [phone]    [place] [address]  Phone: [phone]

## 2019-07-18 NOTE — PROGRESS NOTES
1. Have you been to the ER, urgent care clinic since your last visit? Hospitalized since your last visit? No    2. Have you seen or consulted any other health care providers outside of the [address] [place] [address] since your last visit? Include any pap smears or colon screening.  No
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
## 2019-07-22 ENCOUNTER — TELEPHONE (OUTPATIENT)
Dept: ORTHOPEDIC SURGERY | Facility: CLINIC | Age: 59
End: 2019-07-22

## 2019-07-22 NOTE — TELEPHONE ENCOUNTER
Please use Key: [id number] to initiate a prior authorization for Celecoxib through Cover My Meds or contact the pharmacy to change the medication.

## 2019-07-22 NOTE — TELEPHONE ENCOUNTER
PA submitted via Cover My Meds, awaiting response from insurance company. \"Your PA has been faxed to the plan as a paper copy. Please contact the plan directly if you haven't received a determination in a typical timeframe. You will be notified of the determination via fax. \"

## 2019-07-22 NOTE — TELEPHONE ENCOUNTER
Patient called and said that her pharmacy told her she needs Prior Auth for the Celebrex medication that  Prescribed her on 7/18/19. BCBS -Dual Eligible -Healthkeepers ( Medicare Replacement/ Advantage- HMO ) tel [phone]. [facility] pharmacy on [address]. [phone]. Patient tel. [phone].

## 2019-07-25 NOTE — TELEPHONE ENCOUNTER
Called insurance to f/u on PA status. Was transferred back and forth between [address] [place] [address] Rd personnel due to patient having a dual plan. They are requiring the PA form be faxed again to Medicare [phone] ID: [id number]. Form faxed to above number. Awaiting response.

## 2019-07-26 NOTE — TELEPHONE ENCOUNTER
Received notification via fax that patient's insurance has approved the PA for Celebrex. Contacted patient and made her aware, she verbalized understanding.

## 2019-08-15 ENCOUNTER — OFFICE VISIT (OUTPATIENT)
Dept: ORTHOPEDIC SURGERY | Age: 59
End: 2019-08-15

## 2019-08-15 VITALS
SYSTOLIC BLOOD PRESSURE: 122 MMHG | BODY MASS INDEX: 35.88 KG/M2 | TEMPERATURE: 97.6 F | HEIGHT: 59 IN | WEIGHT: 178 LBS | OXYGEN SATURATION: 96 % | RESPIRATION RATE: 16 BRPM | DIASTOLIC BLOOD PRESSURE: 68 MMHG | HEART RATE: 84 BPM

## 2019-08-15 DIAGNOSIS — M70.61 TROCHANTERIC BURSITIS, RIGHT HIP: Primary | ICD-10-CM

## 2019-08-15 DIAGNOSIS — M25.551 RIGHT HIP PAIN: ICD-10-CM

## 2019-08-15 DIAGNOSIS — M54.10 RADICULOPATHY, UNSPECIFIED SPINAL REGION: ICD-10-CM

## 2019-08-15 RX ORDER — METHYLPREDNISOLONE 4 MG/1
TABLET ORAL
Qty: 1 DOSE PACK | Refills: 0 | Status: SHIPPED | OUTPATIENT
Start: 2019-08-15 | End: 2019-10-30 | Stop reason: ALTCHOICE

## 2019-08-15 RX ORDER — CYCLOBENZAPRINE HCL 10 MG
10 TABLET ORAL
Qty: 30 TAB | Refills: 1 | Status: SHIPPED | OUTPATIENT
Start: 2019-08-15 | End: 2019-10-08 | Stop reason: SDUPTHER

## 2019-08-15 RX ORDER — BETAMETHASONE SODIUM PHOSPHATE AND BETAMETHASONE ACETATE 3; 3 MG/ML; MG/ML
6 INJECTION, SUSPENSION INTRA-ARTICULAR; INTRALESIONAL; INTRAMUSCULAR; SOFT TISSUE ONCE
Qty: 1 ML | Refills: 0
Start: 2019-08-15 | End: 2019-08-15

## 2019-08-15 NOTE — PROGRESS NOTES
1. Have you been to the ER, urgent care clinic since your last visit? Hospitalized since your last visit? NO    2. Have you seen or consulted any other health care providers outside of the [address] [place] [address] since your last visit? Include any pap smears or colon screening.  NO
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
## 2019-08-16 LAB
25(OH)D3+25(OH)D2 SERPL-MCNC: 24.1 NG/ML (ref 30–100)
ALBUMIN SERPL-MCNC: 3.7 G/DL (ref 3.5–5.5)
ALBUMIN/GLOB SERPL: 1.4 {RATIO} (ref 1.2–2.2)
ALP SERPL-CCNC: 100 IU/L (ref 39–117)
ALT SERPL-CCNC: 20 IU/L (ref 0–32)
AST SERPL-CCNC: 27 IU/L (ref 0–40)
BASOPHILS # BLD AUTO: 0 X10E3/UL (ref 0–0.2)
BASOPHILS NFR BLD AUTO: 0 %
BILIRUB SERPL-MCNC: <0.2 MG/DL (ref 0–1.2)
BUN SERPL-MCNC: 8 MG/DL (ref 6–24)
BUN/CREAT SERPL: 10 (ref 9–23)
CALCIUM SERPL-MCNC: 8.7 MG/DL (ref 8.7–10.2)
CHLORIDE SERPL-SCNC: 106 MMOL/L (ref 96–106)
CHOLEST SERPL-MCNC: 242 MG/DL (ref 100–199)
CO2 SERPL-SCNC: 24 MMOL/L (ref 20–29)
CREAT SERPL-MCNC: 0.82 MG/DL (ref 0.57–1)
EOSINOPHIL # BLD AUTO: 0.1 X10E3/UL (ref 0–0.4)
EOSINOPHIL NFR BLD AUTO: 3 %
ERYTHROCYTE [DISTWIDTH] IN BLOOD BY AUTOMATED COUNT: 17 % (ref 12.3–15.4)
EST. AVERAGE GLUCOSE BLD GHB EST-MCNC: 134 MG/DL
GLOBULIN SER CALC-MCNC: 2.6 G/DL (ref 1.5–4.5)
GLUCOSE SERPL-MCNC: 89 MG/DL (ref 65–99)
HBA1C MFR BLD: 6.3 % (ref 4.8–5.6)
HCT VFR BLD AUTO: 36.6 % (ref 34–46.6)
HDLC SERPL-MCNC: 54 MG/DL
HGB BLD-MCNC: 11.8 G/DL (ref 11.1–15.9)
IMM GRANULOCYTES # BLD AUTO: 0 X10E3/UL (ref 0–0.1)
IMM GRANULOCYTES NFR BLD AUTO: 0 %
INTERPRETATION, 910389: NORMAL
LDLC SERPL CALC-MCNC: 160 MG/DL (ref 0–99)
LYMPHOCYTES # BLD AUTO: 2.5 X10E3/UL (ref 0.7–3.1)
LYMPHOCYTES NFR BLD AUTO: 53 %
MAGNESIUM SERPL-MCNC: 2.4 MG/DL (ref 1.6–2.3)
MCH RBC QN AUTO: 28.9 PG (ref 26.6–33)
MCHC RBC AUTO-ENTMCNC: 32.2 G/DL (ref 31.5–35.7)
MCV RBC AUTO: 90 FL (ref 79–97)
MONOCYTES # BLD AUTO: 0.3 X10E3/UL (ref 0.1–0.9)
MONOCYTES NFR BLD AUTO: 7 %
NEUTROPHILS # BLD AUTO: 1.7 X10E3/UL (ref 1.4–7)
NEUTROPHILS NFR BLD AUTO: 37 %
PLATELET # BLD AUTO: 283 X10E3/UL (ref 150–450)
POTASSIUM SERPL-SCNC: 3.2 MMOL/L (ref 3.5–5.2)
PROT SERPL-MCNC: 6.3 G/DL (ref 6–8.5)
RBC # BLD AUTO: 4.09 X10E6/UL (ref 3.77–5.28)
SODIUM SERPL-SCNC: 146 MMOL/L (ref 134–144)
TRIGL SERPL-MCNC: 140 MG/DL (ref 0–149)
TSH SERPL DL<=0.005 MIU/L-ACNC: 0.44 UIU/ML (ref 0.45–4.5)
VLDLC SERPL CALC-MCNC: 28 MG/DL (ref 5–40)
WBC # BLD AUTO: 4.6 X10E3/UL (ref 3.4–10.8)

## 2019-08-20 ENCOUNTER — PATIENT OUTREACH (OUTPATIENT)
Dept: FAMILY MEDICINE CLINIC | Age: 59
End: 2019-08-20

## 2019-08-28 ENCOUNTER — OFFICE VISIT (OUTPATIENT)
Dept: FAMILY MEDICINE CLINIC | Age: 59
End: 2019-08-28

## 2019-08-28 VITALS
SYSTOLIC BLOOD PRESSURE: 140 MMHG | WEIGHT: 170 LBS | BODY MASS INDEX: 34.27 KG/M2 | RESPIRATION RATE: 16 BRPM | TEMPERATURE: 98 F | HEIGHT: 59 IN | DIASTOLIC BLOOD PRESSURE: 90 MMHG | OXYGEN SATURATION: 96 % | HEART RATE: 66 BPM

## 2019-08-28 DIAGNOSIS — G47.00 INSOMNIA, UNSPECIFIED TYPE: ICD-10-CM

## 2019-08-28 DIAGNOSIS — E87.6 HYPOKALEMIA: ICD-10-CM

## 2019-08-28 DIAGNOSIS — E55.9 VITAMIN D INSUFFICIENCY: Primary | ICD-10-CM

## 2019-08-28 DIAGNOSIS — E11.40 TYPE 2 DIABETES MELLITUS WITH DIABETIC NEUROPATHY, UNSPECIFIED WHETHER LONG TERM INSULIN USE (HCC): ICD-10-CM

## 2019-08-28 DIAGNOSIS — R79.89 ABNORMAL TSH: ICD-10-CM

## 2019-08-28 DIAGNOSIS — I50.22 CHRONIC SYSTOLIC HEART FAILURE (HCC): ICD-10-CM

## 2019-08-28 DIAGNOSIS — Z51.81 MEDICATION MONITORING ENCOUNTER: ICD-10-CM

## 2019-08-28 DIAGNOSIS — E83.41 HYPERMAGNESEMIA: ICD-10-CM
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
## 2019-08-28 NOTE — PROGRESS NOTES
Xinmartina Felice presents today for   Chief Complaint   Patient presents with    Results     labs completed       Sherita Viveros preferred language for health care discussion is english/other. Is someone accompanying this pt? Yes    Is the patient using any DME equipment during OV? Leg brace and walker noted. Depression Screening:  3 most recent PHQ Screens 8/15/2019   Little interest or pleasure in doing things Not at all   Feeling down, depressed, irritable, or hopeless Not at all   Total Score PHQ 2 0       Learning Assessment:  Learning Assessment 12/6/2018   PRIMARY LEARNER Patient   HIGHEST LEVEL OF EDUCATION - PRIMARY LEARNER  SOME COLLEGE   BARRIERS PRIMARY LEARNER NONE   CO-LEARNER CAREGIVER No   CO-LEARNER NAME -   PRIMARY LANGUAGE ENGLISH    NEED No   LEARNER PREFERENCE PRIMARY DEMONSTRATION   ANSWERED BY patient   RELATIONSHIP SELF       Abuse Screening:  Abuse Screening Questionnaire 12/6/2018   Do you ever feel afraid of your partner? N   Are you in a relationship with someone who physically or mentally threatens you? N   Is it safe for you to go home? Y       Fall Risk  Fall Risk Assessment, last 12 mths 2/13/2014   Able to walk? Yes   Fall in past 12 months? Yes   Fall with injury? No   Number of falls in past 12 months 3         Coordination of Care:  1. Have you been to the ER, urgent care clinic since your last visit? Hospitalized since your last visit? No    2. Have you seen or consulted any other health care providers outside of the [address] [place] [address] [place] since your last visit? Include any pap smears or colon screening. No      Advance Directive:  1. Do you have an advance directive in place? Patient Reply:No    2. If not, would you like material regarding how to put one in place?  Patient Reply: No

## 2019-08-30 DIAGNOSIS — E87.6 HYPOKALEMIA: ICD-10-CM

## 2019-08-30 DIAGNOSIS — E11.40 TYPE 2 DIABETES MELLITUS WITH DIABETIC NEUROPATHY, UNSPECIFIED WHETHER LONG TERM INSULIN USE (HCC): ICD-10-CM

## 2019-08-30 DIAGNOSIS — I50.22 CHRONIC SYSTOLIC HEART FAILURE (HCC): ICD-10-CM

## 2019-08-30 DIAGNOSIS — R79.89 ABNORMAL TSH: ICD-10-CM

## 2019-08-30 DIAGNOSIS — G47.00 INSOMNIA, UNSPECIFIED TYPE: ICD-10-CM

## 2019-08-30 DIAGNOSIS — E83.41 HYPERMAGNESEMIA: ICD-10-CM

## 2019-08-30 DIAGNOSIS — Z51.81 MEDICATION MONITORING ENCOUNTER: ICD-10-CM

## 2019-08-30 RX ORDER — POTASSIUM CHLORIDE 750 MG/1
10 TABLET, EXTENDED RELEASE ORAL DAILY
Qty: 30 TAB | Refills: 1 | Status: SHIPPED | OUTPATIENT
Start: 2019-08-30 | End: 2019-09-23 | Stop reason: SDUPTHER

## 2019-09-23 ENCOUNTER — TELEPHONE (OUTPATIENT)
Dept: FAMILY MEDICINE CLINIC | Age: 59
End: 2019-09-23

## 2019-09-23 ENCOUNTER — OFFICE VISIT (OUTPATIENT)
Dept: FAMILY MEDICINE CLINIC | Age: 59
End: 2019-09-23

## 2019-09-23 VITALS
HEIGHT: 59 IN | RESPIRATION RATE: 20 BRPM | DIASTOLIC BLOOD PRESSURE: 85 MMHG | TEMPERATURE: 97.3 F | HEART RATE: 69 BPM | OXYGEN SATURATION: 99 % | SYSTOLIC BLOOD PRESSURE: 135 MMHG | BODY MASS INDEX: 36.21 KG/M2 | WEIGHT: 179.6 LBS

## 2019-09-23 DIAGNOSIS — J30.89 ENVIRONMENTAL AND SEASONAL ALLERGIES: ICD-10-CM

## 2019-09-23 DIAGNOSIS — M25.552 PAIN OF BOTH HIP JOINTS: ICD-10-CM

## 2019-09-23 DIAGNOSIS — G89.29 CHRONIC PAIN OF LEFT KNEE: ICD-10-CM

## 2019-09-23 DIAGNOSIS — F51.01 PRIMARY INSOMNIA: ICD-10-CM

## 2019-09-23 DIAGNOSIS — E11.40 TYPE 2 DIABETES MELLITUS WITH DIABETIC NEUROPATHY, UNSPECIFIED WHETHER LONG TERM INSULIN USE (HCC): ICD-10-CM

## 2019-09-23 DIAGNOSIS — M25.562 CHRONIC PAIN OF LEFT KNEE: ICD-10-CM

## 2019-09-23 DIAGNOSIS — M62.838 MUSCLE SPASM: ICD-10-CM

## 2019-09-23 DIAGNOSIS — K59.00 CONSTIPATION, UNSPECIFIED CONSTIPATION TYPE: Primary | ICD-10-CM

## 2019-09-23 DIAGNOSIS — Z23 ENCOUNTER FOR IMMUNIZATION: ICD-10-CM

## 2019-09-23 DIAGNOSIS — M54.16 LUMBAR NEURITIS: ICD-10-CM

## 2019-09-23 DIAGNOSIS — M25.551 PAIN OF BOTH HIP JOINTS: ICD-10-CM

## 2019-09-23 DIAGNOSIS — I50.22 CHRONIC SYSTOLIC HEART FAILURE (HCC): ICD-10-CM

## 2019-09-23 DIAGNOSIS — Z12.31 SCREENING MAMMOGRAM, ENCOUNTER FOR: ICD-10-CM

## 2019-09-23 RX ORDER — POTASSIUM CHLORIDE 750 MG/1
10 TABLET, EXTENDED RELEASE ORAL DAILY
Qty: 30 TAB | Refills: 1 | Status: SHIPPED | OUTPATIENT
Start: 2019-09-23 | End: 2019-10-16 | Stop reason: SDUPTHER

## 2019-09-23 RX ORDER — LORATADINE 10 MG/1
10 TABLET ORAL DAILY
Qty: 90 TAB | Refills: 2 | Status: SHIPPED | OUTPATIENT
Start: 2019-09-23 | End: 2020-01-29 | Stop reason: SDUPTHER

## 2019-09-23 RX ORDER — LANCETS
EACH MISCELLANEOUS
Qty: 1 EACH | Refills: 11 | Status: SHIPPED | OUTPATIENT
Start: 2019-09-23 | End: 2019-10-24 | Stop reason: SDUPTHER

## 2019-09-23 RX ORDER — ZOLPIDEM TARTRATE 10 MG/1
10 TABLET ORAL
Qty: 30 TAB | Refills: 0 | Status: SHIPPED | OUTPATIENT
Start: 2019-09-23 | End: 2019-10-23 | Stop reason: SDUPTHER

## 2019-09-23 RX ORDER — INSULIN PUMP SYRINGE, 3 ML
EACH MISCELLANEOUS
Qty: 1 KIT | Refills: 0 | Status: SHIPPED | OUTPATIENT
Start: 2019-09-23 | End: 2019-10-23 | Stop reason: SDUPTHER

## 2019-09-23 RX ORDER — LINACLOTIDE 145 UG/1
CAPSULE, GELATIN COATED ORAL
Qty: 30 CAP | Refills: 2 | Status: SHIPPED | OUTPATIENT
Start: 2019-09-23 | End: 2019-12-17 | Stop reason: SDUPTHER

## 2019-09-23 RX ORDER — MONTELUKAST SODIUM 10 MG/1
TABLET ORAL
Qty: 90 TAB | Refills: 2 | Status: SHIPPED | OUTPATIENT
Start: 2019-09-23 | End: 2020-06-15

## 2019-09-23 NOTE — PROGRESS NOTES
Benigno Shetty 1960 female who presents for routine immunizations. Patient denies any symptoms , reactions or allergies that would exclude them from being immunized today. Risks and adverse reactions were discussed and the VIS was given to them. All questions were addressed. Order placed for Flu,  per Verbal Order from [facility] with read back. Patient was observed for 15 min post injection. There were no reactions observed.     Maine Santos

## 2019-09-23 NOTE — PATIENT INSTRUCTIONS
Body Mass Index: Care Instructions  Your Care Instructions    Body mass index (BMI) can help you see if your weight is raising your risk for health problems. It uses a formula to compare how much you weigh with how tall you are. · A BMI lower than 18.5 is considered underweight. · A BMI between 18.5 and 24.9 is considered healthy. · A BMI between 25 and 29.9 is considered overweight. A BMI of 30 or higher is considered obese. If your BMI is in the normal range, it means that you have a lower risk for weight-related health problems. If your BMI is in the overweight or obese range, you may be at increased risk for weight-related health problems, such as high blood pressure, heart disease, stroke, arthritis or joint pain, and diabetes. If your BMI is in the underweight range, you may be at increased risk for health problems such as fatigue, lower protection (immunity) against illness, muscle loss, bone loss, hair loss, and hormone problems. BMI is just one measure of your risk for weight-related health problems. You may be at higher risk for health problems if you are not active, you eat an unhealthy diet, or you drink too much alcohol or use tobacco products. Follow-up care is a key part of your treatment and safety. Be sure to make and go to all appointments, and call your doctor if you are having problems. It's also a good idea to know your test results and keep a list of the medicines you take. How can you care for yourself at home? · Practice healthy eating habits. This includes eating plenty of fruits, vegetables, whole grains, lean protein, and low-fat dairy. · If your doctor recommends it, get more exercise. Walking is a good choice. Bit by bit, increase the amount you walk every day. Try for at least 30 minutes on most days of the week. · Do not smoke. Smoking can increase your risk for health problems. If you need help quitting, talk to your doctor about stop-smoking programs and medicines. These can increase your chances of quitting for good. · Limit alcohol to 2 drinks a day for men and 1 drink a day for women. Too much alcohol can cause health problems. If you have a BMI higher than 25  · Your doctor may do other tests to check your risk for weight-related health problems. This may include measuring the distance around your waist. A waist measurement of more than 40 inches in men or 35 inches in women can increase the risk of weight-related health problems. · Talk with your doctor about steps you can take to stay healthy or improve your health. You may need to make lifestyle changes to lose weight and stay healthy, such as changing your diet and getting regular exercise. If you have a BMI lower than 18.5  · Your doctor may do other tests to check your risk for health problems. · Talk with your doctor about steps you can take to stay healthy or improve your health. You may need to make lifestyle changes to gain or maintain weight and stay healthy, such as getting more healthy foods in your diet and doing exercises to build muscle. Where can you learn more? Go to http://pramod-bryan.info/. Enter S176 in the search box to learn more about \"Body Mass Index: Care Instructions. \"  Current as of: October 13, 2016  Content Version: 11.4  © 4140-2354 [employer]. Care instructions adapted under license by [employer] (which disclaims liability or warranty for this information). If you have questions about a medical condition or this instruction, always ask your healthcare professional. [place] [address] any warranty or liability for your use of this information. Vaccine Information Statement    Influenza (Flu) Vaccine (Inactivated or Recombinant): What You Need to Know    Many Vaccine Information Statements are available in Irish and other languages.  See www.immunize.org/vis  Hojas de información sobre vacunas están disponibles en español y en muchos otros idiomas. Visite www.immunize.org/vis    1. Why get vaccinated? Influenza vaccine can prevent influenza (flu). Flu is a contagious disease that spreads around the [place] every year, usually between October and May. Anyone can get the flu, but it is more dangerous for some people. Infants and young children, people 72years of age and older, pregnant women, and people with certain health conditions or a weakened immune system are at greatest risk of flu complications. Pneumonia, bronchitis, sinus infections and ear infections are examples of flu-related complications. If you have a medical condition, such as heart disease, cancer or diabetes, flu can make it worse. Flu can cause fever and chills, sore throat, muscle aches, fatigue, cough, headache, and runny or stuffy nose. Some people may have vomiting and diarrhea, though this is more common in children than adults. Each year thousands of people in the [facility] die from flu, and many more are hospitalized. Flu vaccine prevents millions of illnesses and flu-related visits to the doctor each year. 2. Influenza vaccines     CDC recommends everyone 10months of age and older get vaccinated every flu season. Children 6 months through 6years of age may need 2 doses during a single flu season. Everyone else needs only 1 dose each flu season. It takes about 2 weeks for protection to develop after vaccination. There are many flu viruses, and they are always changing. Each year a new flu vaccine is made to protect against three or four viruses that are likely to cause disease in the upcoming flu season. Even when the vaccine doesnt exactly match these viruses, it may still provide some protection. Influenza vaccine does not cause flu. Influenza vaccine may be given at the same time as other vaccines.     3. Talk with your health care provider    Tell your vaccine provider if the person getting the vaccine:   Has had an allergic reaction after a previous dose of influenza vaccine, or has any severe, life-threatening allergies.  Has ever had Guillain-Barré Syndrome (also called GBS). In some cases, your health care provider may decide to postpone influenza vaccination to a future visit. People with minor illnesses, such as a cold, may be vaccinated. People who are moderately or severely ill should usually wait until they recover before getting influenza vaccine. Your health care provider can give you more information. 4. Risks of a reaction     Soreness, redness, and swelling where shot is given, fever, muscle aches, and headache can happen after influenza vaccine.  There may be a very small increased risk of Guillain-Barré Syndrome (GBS) after inactivated influenza vaccine (the flu shot). Dmitri Rolon children who get the flu shot along with pneumococcal vaccine (PCV13), and/or DTaP vaccine at the same time might be slightly more likely to have a seizure caused by fever. Tell your health care provider if a child who is getting flu vaccine has ever had a seizure. People sometimes faint after medical procedures, including vaccination. Tell your provider if you feel dizzy or have vision changes or ringing in the ears. As with any medicine, there is a very remote chance of a vaccine causing a severe allergic reaction, other serious injury, or death. 5. What if there is a serious problem? An allergic reaction could occur after the vaccinated person leaves the clinic. If you see signs of a severe allergic reaction (hives, swelling of the face and throat, difficulty breathing, a fast heartbeat, dizziness, or weakness), call 9-1-1 and get the person to the nearest hospital.    For other signs that concern you, call your health care provider. Adverse reactions should be reported to the Vaccine Adverse Event Reporting System (VAERS).  Your health care provider will usually file this report, or you can do it yourself. Visit the VAERS website at www.vaers. hhs.gov or call 5-499.903.2654. VAERS is only for reporting reactions, and VAERS staff do not give medical advice. 6. The National Vaccine Injury Compensation Program    The [facility] Vaccine Injury Compensation Program (VICP) is a federal program that was created to compensate people who may have been injured by certain vaccines. Visit the VICP website at www.[place].gov/vaccinecompensation or call 1-515.821.3473 to learn about the program and about filing a claim. There is a time limit to file a claim for compensation. 7. How can I learn more?  Ask your health care provider.  Call your local or state health department.  Contact the Centers for Disease Control and Prevention (CDC):  - Call 0-128.152.3890 (1-863-DTW-INFO) or  - Visit CDCs influenza website at www.cdc.gov/flu    Vaccine Information Statement (Interim)  Inactivated Influenza Vaccine   8/15/2019  42 [address] [id number]   Department of Health and Human Services  Centers for Disease Control and Prevention    Office Use Only

## 2019-09-23 NOTE — PROGRESS NOTES
Ssui Chambers presents today for   Chief Complaint   Patient presents with    Follow-up     1 month    Diabetes    Results     discuss lab results       Susi Chambers preferred language for health care discussion is english/other. Is someone accompanying this pt? Yes, mother    Is the patient using any DME equipment during [address]? Yes, cane    Depression Screening:  3 most recent PHQ Screens 8/15/2019   Little interest or pleasure in doing things Not at all   Feeling down, depressed, irritable, or hopeless Not at all   Total Score PHQ 2 0       Learning Assessment:  Learning Assessment 12/6/2018   PRIMARY LEARNER Patient   HIGHEST LEVEL OF EDUCATION - PRIMARY LEARNER  SOME COLLEGE   BARRIERS PRIMARY LEARNER [address] CAREGIVER No   CO-LEARNER NAME -   PRIMARY LANGUAGE ENGLISH    NEED No   LEARNER PREFERENCE PRIMARY DEMONSTRATION   ANSWERED BY patient   RELATIONSHIP SELF       Abuse Screening:  Abuse Screening Questionnaire 12/6/2018   Do you ever feel afraid of your partner? N   Are you in a relationship with someone who physically or mentally threatens you? N   Is it safe for you to go home? Y       Generalized Anxiety  No flowsheet data found. Health Maintenance Due   Topic Date Due    EYE EXAM RETINAL OR DILATED  08[birth year]/1971    Shingrix Vaccine Age 50> (1 of 2) 08/05/2011    FOBT Q 1 YEAR AGE 50-75  08/05/2011    GLAUCOMA SCREENING Q2Y  09/29/2016    MICROALBUMIN Q1  10/17/2017    MEDICARE YEARLY EXAM  05/03/2018    BREAST CANCER SCRN MAMMOGRAM  05/16/2018    Influenza Age 9 to Adult  08/01/2019   . Health Maintenance reviewed and discussed and ordered per Provider. Coordination of Care:  1. Have you been to the ER, urgent care clinic since your last visit? Hospitalized since your last visit? no    2. Have you seen or consulted any other health care providers outside of the [address] [place] [address] since your last visit?   Include any pap smears or colon screening.  no      Advance Directive discussed 8/28/19
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
## 2019-09-26 LAB
DRUGS UR: NORMAL
MAGNESIUM SERPL-MCNC: 2.3 MG/DL (ref 1.6–2.3)
POTASSIUM SERPL-SCNC: 3.9 MMOL/L (ref 3.5–5.2)
SPECIMEN STATUS REPORT, ROLRST: NORMAL
T4 FREE SERPL-MCNC: 0.96 NG/DL (ref 0.82–1.77)
TSH SERPL DL<=0.005 MIU/L-ACNC: 0.45 UIU/ML (ref 0.45–4.5)

## 2019-10-02 ENCOUNTER — OFFICE VISIT (OUTPATIENT)
Dept: ORTHOPEDIC SURGERY | Age: 59
End: 2019-10-02

## 2019-10-02 VITALS
HEART RATE: 98 BPM | BODY MASS INDEX: 35.88 KG/M2 | RESPIRATION RATE: 16 BRPM | OXYGEN SATURATION: 97 % | DIASTOLIC BLOOD PRESSURE: 74 MMHG | WEIGHT: 178 LBS | HEIGHT: 59 IN | SYSTOLIC BLOOD PRESSURE: 134 MMHG

## 2019-10-02 DIAGNOSIS — M47.817 LUMBOSACRAL SPONDYLOSIS WITHOUT MYELOPATHY: Primary | ICD-10-CM

## 2019-10-02 DIAGNOSIS — M54.16 LUMBAR RADICULOPATHY: ICD-10-CM

## 2019-10-02 RX ORDER — DULOXETIN HYDROCHLORIDE 30 MG/1
30 CAPSULE, DELAYED RELEASE ORAL DAILY
Qty: 30 CAP | Refills: 2 | Status: SHIPPED | OUTPATIENT
Start: 2019-10-02 | End: 2019-10-30 | Stop reason: SDUPTHER

## 2019-10-02 RX ORDER — PREGABALIN 300 MG/1
300 CAPSULE ORAL 2 TIMES DAILY
Qty: 60 CAP | Refills: 2 | Status: SHIPPED | OUTPATIENT
Start: 2019-10-02 | End: 2019-10-30 | Stop reason: SDUPTHER

## 2019-10-02 RX ORDER — DULOXETIN HYDROCHLORIDE 30 MG/1
30 CAPSULE, DELAYED RELEASE ORAL DAILY
Status: CANCELLED | OUTPATIENT
Start: 2019-10-02

## 2019-10-02 NOTE — LETTER
10/2/19 Patient: Oscar Oliveira YOB: 1960 Date of Visit: 10/2/2019 Nayeli Germain NP 
[id number] [place] [address] VIA In Basket Dear Nayeli Germain NP, Thank you for referring Ms. Oscar Oliveira to [address] [place] [address] for evaluation. My notes for this consultation are attached. If you have questions, please do not hesitate to call me. I look forward to following your patient along with you. Sincerely, Duarte Payne MD
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
## 2019-10-03 ENCOUNTER — TELEPHONE (OUTPATIENT)
Dept: ORTHOPEDIC SURGERY | Age: 59
End: 2019-10-03

## 2019-10-03 NOTE — TELEPHONE ENCOUNTER
[facility] reports that brand name for rx pregabalin (LYRICA) 300 mg capsule is on back-order, is brand name required? Please advise pharmacy at [phone].

## 2019-10-07 ENCOUNTER — PATIENT OUTREACH (OUTPATIENT)
Dept: FAMILY MEDICINE CLINIC | Age: 59
End: 2019-10-07

## 2019-10-07 NOTE — PROGRESS NOTES
NN health screening:    I don't see the colonoscopy report on chart as requested yet. I've asked office staff to please f/u again.

## 2019-10-08 DIAGNOSIS — M25.551 RIGHT HIP PAIN: ICD-10-CM

## 2019-10-08 RX ORDER — CYCLOBENZAPRINE HCL 10 MG
10 TABLET ORAL
Qty: 30 TAB | Refills: 1 | Status: SHIPPED | OUTPATIENT
Start: 2019-10-08 | End: 2019-12-02 | Stop reason: SDUPTHER

## 2019-10-16 DIAGNOSIS — I50.22 CHRONIC SYSTOLIC HEART FAILURE (HCC): ICD-10-CM

## 2019-10-17 RX ORDER — POTASSIUM CHLORIDE 750 MG/1
TABLET, EXTENDED RELEASE ORAL
Qty: 30 TAB | Refills: 1 | Status: SHIPPED | OUTPATIENT
Start: 2019-10-17 | End: 2019-11-09 | Stop reason: SDUPTHER

## 2019-10-23 ENCOUNTER — OFFICE VISIT (OUTPATIENT)
Dept: FAMILY MEDICINE CLINIC | Age: 59
End: 2019-10-23

## 2019-10-23 VITALS
WEIGHT: 182.6 LBS | TEMPERATURE: 98 F | RESPIRATION RATE: 18 BRPM | HEART RATE: 81 BPM | HEIGHT: 59 IN | BODY MASS INDEX: 36.81 KG/M2 | SYSTOLIC BLOOD PRESSURE: 163 MMHG | DIASTOLIC BLOOD PRESSURE: 88 MMHG

## 2019-10-23 DIAGNOSIS — E11.40 TYPE 2 DIABETES MELLITUS WITH DIABETIC NEUROPATHY, UNSPECIFIED WHETHER LONG TERM INSULIN USE (HCC): ICD-10-CM

## 2019-10-23 DIAGNOSIS — Z51.81 MEDICATION MONITORING ENCOUNTER: ICD-10-CM

## 2019-10-23 DIAGNOSIS — I10 ESSENTIAL HYPERTENSION: ICD-10-CM

## 2019-10-23 DIAGNOSIS — I25.10 CORONARY ARTERY DISEASE INVOLVING NATIVE CORONARY ARTERY OF NATIVE HEART WITHOUT ANGINA PECTORIS: ICD-10-CM

## 2019-10-23 DIAGNOSIS — F51.01 PRIMARY INSOMNIA: ICD-10-CM

## 2019-10-23 DIAGNOSIS — Z00.00 MEDICARE ANNUAL WELLNESS VISIT, SUBSEQUENT: Primary | ICD-10-CM

## 2019-10-23 RX ORDER — INSULIN PUMP SYRINGE, 3 ML
EACH MISCELLANEOUS
Qty: 1 KIT | Refills: 0 | Status: SHIPPED | OUTPATIENT
Start: 2019-10-23 | End: 2020-11-23 | Stop reason: SDUPTHER

## 2019-10-23 RX ORDER — ZOLPIDEM TARTRATE 10 MG/1
10 TABLET ORAL
Qty: 30 TAB | Refills: 0 | Status: SHIPPED | OUTPATIENT
Start: 2019-10-23 | End: 2019-12-02 | Stop reason: SDUPTHER

## 2019-10-23 NOTE — PATIENT INSTRUCTIONS
Medicare Wellness Visit, Female     The best way to live healthy is to have a lifestyle where you eat a well-balanced diet, exercise regularly, limit alcohol use, and quit all forms of tobacco/nicotine, if applicable. Regular preventive services are another way to keep healthy. Preventive services (vaccines, screening tests, monitoring & exams) can help personalize your care plan, which helps you manage your own care. Screening tests can find health problems at the earliest stages, when they are easiest to treat. [facility] Black[facility] follows the current, evidence-based guidelines published by the [facility] Rachid ([facility]) when recommending preventive services for our patients. Because we follow these guidelines, sometimes recommendations change over time as research supports it. (For example, mammograms used to be recommended annually. Even though Medicare will still pay for an annual mammogram, the newer guidelines recommend a mammogram every two years for women of average risk.)  Of course, you and your doctor may decide to screen more often for some diseases, based on your risk and your health status. Preventive services for you include:  - Medicare offers their members a free annual wellness visit, which is time for you and your primary care provider to discuss and plan for your preventive service needs. Take advantage of this benefit every year!  -All adults over the age of 72 should receive the recommended pneumonia vaccines. Current USPSTF guidelines recommend a series of two vaccines for the best pneumonia protection.   -All adults should have a flu vaccine yearly and a tetanus vaccine every 10 years. All adults age 61 and older should receive a shingles vaccine once in their lifetime.    -A bone mass density test is recommended when a woman turns 65 to screen for osteoporosis. This test is only recommended one time, as a screening.  Some providers will use this same test as a disease monitoring tool if you already have osteoporosis. -All adults age 38-68 who are overweight should have a diabetes screening test once every three years.   -Other screening tests and preventive services for persons with diabetes include: an eye exam to screen for diabetic retinopathy, a kidney function test, a foot exam, and stricter control over your cholesterol.   -Cardiovascular screening for adults with routine risk involves an electrocardiogram (ECG) at intervals determined by your doctor.   -Colorectal cancer screenings should be done for adults age 54-65 with no increased risk factors for colorectal cancer. There are a number of acceptable methods of screening for this type of cancer. Each test has its own benefits and drawbacks. Discuss with your doctor what is most appropriate for you during your annual wellness visit. The different tests include: colonoscopy (considered the best screening method), a fecal occult blood test, a fecal DNA test, and sigmoidoscopy. -Breast cancer screenings are recommended every other year for women of normal risk, age 54-69.  -Cervical cancer screenings for women over age 72 are only recommended with certain risk factors.   -All adults born between [facility] should be screened once for Hepatitis C.      Here is a list of your current Health Maintenance items (your personalized list of preventive services) with a due date:  Health Maintenance Due   Topic Date Due    Eye Exam  08/05/1970    Shingles Vaccine (1 of 2) 08/05/2011    Stool testing for trace blood  08/05/2011    Glaucoma Screening   09/29/2016    Albumin Urine Test  10/17/2017    Annual Well Visit  05/03/2018    Mammogram  05/16/2018

## 2019-10-23 NOTE — PROGRESS NOTES
Kirby Dunlap presents today for   Chief Complaint   Patient presents with    Annual Wellness Visit    Medication Refill       Kirby Dunlap preferred language for health care discussion is english/other. Is someone accompanying this pt? no    Is the patient using any DME equipment during [address]? no    Depression Screening:  3 most recent PHQ Screens 8/15/2019   Little interest or pleasure in doing things Not at all   Feeling down, depressed, irritable, or hopeless Not at all   Total Score PHQ 2 0       Learning Assessment:  Learning Assessment 12/6/2018   PRIMARY LEARNER Patient   HIGHEST LEVEL OF EDUCATION - PRIMARY LEARNER  SOME COLLEGE   BARRIERS PRIMARY LEARNER NONE   CO-LEARNER CAREGIVER No   CO-LEARNER NAME -   PRIMARY LANGUAGE ENGLISH    NEED No   LEARNER PREFERENCE PRIMARY DEMONSTRATION   ANSWERED BY patient   RELATIONSHIP SELF       Abuse Screening:  Abuse Screening Questionnaire 12/6/2018   Do you ever feel afraid of your partner? N   Are you in a relationship with someone who physically or mentally threatens you? N   Is it safe for you to go home? Y       Generalized Anxiety  No flowsheet data found. Health Maintenance Due   Topic Date Due    EYE EXAM RETINAL OR DILATED  08/05/1971    Shingrix Vaccine Age 50> (1 of 2) 08/05/2011    FOBT Q 1 YEAR AGE 50-75  08/05/2011    GLAUCOMA SCREENING Q2Y  09/29/2016    MICROALBUMIN Q1  10/17/2017    MEDICARE YEARLY EXAM  05/03/2018    BREAST CANCER SCRN MAMMOGRAM  05/16/2018   . Health Maintenance reviewed and discussed and ordered per Provider. Kirby Dunlap is updated on all     Coordination of Care:  1. Have you been to the ER, urgent care clinic since your last visit? Hospitalized since your last visit? no    2. Have you seen or consulted any other health care providers outside of the [address] [place] [address] since your last visit? Include any pap smears or colon screening. no      Advance Directive:  1.  Do you have an advance directive in place? Patient Reply:no    2. If not, would you like material regarding how to put one in place?  Patient Reply: no

## 2019-10-23 NOTE — PROGRESS NOTES
Yossi Olivares presents today for No chief complaint on file. Is someone accompanying this pt? *** Is the patient using any DME equipment during OV? *** Depression Screening: 
3 most recent PHQ Screens 8/15/2019 Little interest or pleasure in doing things Not at all Feeling down, depressed, irritable, or hopeless Not at all Total Score PHQ 2 0 Learning Assessment: 
Learning Assessment 12/6/2018 PRIMARY LEARNER Patient HIGHEST LEVEL OF EDUCATION - PRIMARY LEARNER  SOME COLLEGE  
BARRIERS PRIMARY LEARNER NONE  
CO-LEARNER CAREGIVER No  
CO-LEARNER NAME -  
PRIMARY LANGUAGE ENGLISH  NEED No  
LEARNER PREFERENCE PRIMARY DEMONSTRATION  
ANSWERED BY patient RELATIONSHIP SELF Abuse Screening: 
Abuse Screening Questionnaire 12/6/2018 Do you ever feel afraid of your partner? Mare Camps Are you in a relationship with someone who physically or mentally threatens you? Mare Camps Is it safe for you to go home? Karime Tomlin Health Maintenance Due Topic Date Due  
 EYE EXAM RETINAL OR DILATED  08/05/1971  Shingrix Vaccine Age 50> (1 of 2) 08/05/2011  
 FOBT Q 1 YEAR AGE 50-75  08/05/2011  GLAUCOMA SCREENING Q2Y  09/29/2016  MICROALBUMIN Q1  10/17/2017  MEDICARE YEARLY EXAM  05/03/2018  BREAST CANCER SCRN MAMMOGRAM  05/16/2018 Daisha De La Torre Health Maintenance reviewed and discussed and ordered per Provider. Yossi Olivares is updated on all  Coordination of Care 1. Have you been to the ER, urgent care clinic since your last visit? Hospitalized since your last visit? *** 
 
2. Have you seen or consulted any other health care providers outside of the [address] [place] [address] since your last visit? Include any pap smears or colon screening. *** Verbal order Per *** ok medication not taking to be deleted, ***, ***, *** Advance Directive: 1. Do you have an advance directive in place?  Patient Reply:*** 
 
 2. If not, would you like material regarding how to put one in place? Patient Reply: *** 2.  Per patient no changes to their ACP contact ***. Last  Checked *** Last UDS Checked *** Last Pain contract signed: ***
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
## 2019-10-24 ENCOUNTER — TELEPHONE (OUTPATIENT)
Dept: FAMILY MEDICINE CLINIC | Age: 59
End: 2019-10-24

## 2019-10-24 DIAGNOSIS — E11.40 TYPE 2 DIABETES MELLITUS WITH DIABETIC NEUROPATHY, UNSPECIFIED WHETHER LONG TERM INSULIN USE (HCC): Primary | ICD-10-CM

## 2019-10-24 RX ORDER — LANCETS
EACH MISCELLANEOUS
Qty: 1 EACH | Refills: 11 | Status: SHIPPED | OUTPATIENT
Start: 2019-10-24

## 2019-10-24 NOTE — TELEPHONE ENCOUNTER
Mrs fish,pt said she needs the disc that goes on her arm that comes with the meter?/  I thought it would have come with meter,pt said she does not have

## 2019-10-25 NOTE — TELEPHONE ENCOUNTER
Talked with pharmacist,when ordering the free style rodolfo,you also have to order 14day sensor-they have to be changed every 14days,so order 2 for a 1mo supply,and then add ?refills--this is what the patient is requesting,the sensors

## 2019-10-30 ENCOUNTER — OFFICE VISIT (OUTPATIENT)
Dept: FAMILY MEDICINE CLINIC | Age: 59
End: 2019-10-30

## 2019-10-30 VITALS
DIASTOLIC BLOOD PRESSURE: 68 MMHG | WEIGHT: 176 LBS | OXYGEN SATURATION: 100 % | HEIGHT: 59 IN | HEART RATE: 74 BPM | BODY MASS INDEX: 35.48 KG/M2 | SYSTOLIC BLOOD PRESSURE: 107 MMHG | RESPIRATION RATE: 20 BRPM | TEMPERATURE: 98.4 F

## 2019-10-30 DIAGNOSIS — Z12.11 COLON CANCER SCREENING: ICD-10-CM

## 2019-10-30 DIAGNOSIS — Z01.419 WELL WOMAN EXAM WITH ROUTINE GYNECOLOGICAL EXAM: Primary | ICD-10-CM

## 2019-10-30 NOTE — PROGRESS NOTES
Otoniel Nunez presents today for   Chief Complaint   Patient presents with    Well Woman     pap       Otoniel Nunez preferred language for health care discussion is english/other. Is someone accompanying this pt? no    Is the patient using any DME equipment during [address]? Yes, leg brace and walker    Depression Screening:  3 most recent PHQ Screens 8/15/2019   Little interest or pleasure in doing things Not at all   Feeling down, depressed, irritable, or hopeless Not at all   Total Score PHQ 2 0       Learning Assessment:  Learning Assessment 12/6/2018   PRIMARY LEARNER Patient   HIGHEST LEVEL OF EDUCATION - PRIMARY LEARNER  SOME COLLEGE   BARRIERS PRIMARY LEARNER [address] CAREGIVER No   CO-LEARNER NAME -   PRIMARY LANGUAGE ENGLISH    NEED No   LEARNER PREFERENCE PRIMARY DEMONSTRATION   ANSWERED BY patient   RELATIONSHIP SELF       Abuse Screening:  Abuse Screening Questionnaire 12/6/2018   Do you ever feel afraid of your partner? N   Are you in a relationship with someone who physically or mentally threatens you? N   Is it safe for you to go home? Y       Generalized Anxiety  No flowsheet data found. Health Maintenance Due   Topic Date Due    EYE EXAM RETINAL OR DILATED  08/05/1971    Shingrix Vaccine Age 50> (1 of 2) 08/05/2011    FOBT Q 1 YEAR AGE 50-75  08/05/2011    GLAUCOMA SCREENING Q2Y  09/29/2016    MICROALBUMIN Q1  10/17/2017    BREAST CANCER SCRN MAMMOGRAM  05/16/2018   . Health Maintenance reviewed and discussed and ordered per Provider. Coordination of Care:  1. Have you been to the ER, urgent care clinic since your last visit? Hospitalized since your last visit? no    2. Have you seen or consulted any other health care providers outside of the [facility] since your last visit? Include any pap smears or colon screening.  no
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
## 2019-11-04 ENCOUNTER — TELEPHONE (OUTPATIENT)
Dept: FAMILY MEDICINE CLINIC | Age: 59
End: 2019-11-04

## 2019-11-07 LAB
CYTOLOGIST CVX/VAG CYTO: ABNORMAL
CYTOLOGY CVX/VAG DOC CYTO: ABNORMAL
DX ICD CODE: ABNORMAL
DX ICD CODE: ABNORMAL
HPV I/H RISK 1 DNA CVX QL PROBE+SIG AMP: NEGATIVE
Lab: ABNORMAL
OTHER STN SPEC: ABNORMAL
PATHOLOGIST CVX/VAG CYTO: ABNORMAL
STAT OF ADQ CVX/VAG CYTO-IMP: ABNORMAL

## 2019-11-09 DIAGNOSIS — I50.22 CHRONIC SYSTOLIC HEART FAILURE (HCC): ICD-10-CM

## 2019-11-11 RX ORDER — POTASSIUM CHLORIDE 750 MG/1
TABLET, EXTENDED RELEASE ORAL
Qty: 30 TAB | Refills: 1 | Status: SHIPPED | OUTPATIENT
Start: 2019-11-11 | End: 2019-12-03 | Stop reason: SDUPTHER

## 2019-11-12 ENCOUNTER — PATIENT OUTREACH (OUTPATIENT)
Dept: FAMILY MEDICINE CLINIC | Age: 59
End: 2019-11-12

## 2019-11-12 NOTE — PROGRESS NOTES
health screening:    Verified with [facility] they have no record of Ms Emily Rivers calling and they do not do out calling. Called Ms Emily Rivers back and provided the direct number to breast center. She promised to schedule today. Colonoscopy now updated in .

## 2019-11-13 ENCOUNTER — TELEPHONE (OUTPATIENT)
Dept: FAMILY MEDICINE CLINIC | Age: 59
End: 2019-11-13

## 2019-11-13 DIAGNOSIS — R87.629 ABNORMAL VAGINAL PAP SMEAR: Primary | ICD-10-CM

## 2019-11-13 NOTE — TELEPHONE ENCOUNTER
Call to patient. She verified her name, , and her address. Discussed her Pap results. She verbalizes understanding. With her recent family history will refer her to GYN.  Alfreda Grace

## 2019-11-13 NOTE — TELEPHONE ENCOUNTER
Because previous order was only for a 14 day supply, I resubmitted another order 11/12/19 for a 1 month supply with additional refills

## 2019-11-22 ENCOUNTER — OFFICE VISIT (OUTPATIENT)
Dept: ORTHOPEDIC SURGERY | Age: 59
End: 2019-11-22

## 2019-11-22 VITALS
HEIGHT: 59 IN | RESPIRATION RATE: 16 BRPM | SYSTOLIC BLOOD PRESSURE: 102 MMHG | DIASTOLIC BLOOD PRESSURE: 68 MMHG | HEART RATE: 76 BPM | BODY MASS INDEX: 36.97 KG/M2 | WEIGHT: 183.4 LBS | TEMPERATURE: 97.3 F

## 2019-11-22 DIAGNOSIS — Z91.81 HISTORY OF RECENT FALL: ICD-10-CM

## 2019-11-22 DIAGNOSIS — M70.61 TROCHANTERIC BURSITIS, RIGHT HIP: ICD-10-CM

## 2019-11-22 DIAGNOSIS — Z96.652 HISTORY OF LEFT KNEE REPLACEMENT: ICD-10-CM

## 2019-11-22 DIAGNOSIS — M25.561 RIGHT KNEE PAIN, UNSPECIFIED CHRONICITY: ICD-10-CM

## 2019-11-22 DIAGNOSIS — M17.11 PRIMARY OSTEOARTHRITIS OF RIGHT KNEE: Primary | ICD-10-CM

## 2019-11-22 DIAGNOSIS — S83.411A SPRAIN OF MEDIAL COLLATERAL LIGAMENT OF RIGHT KNEE, INITIAL ENCOUNTER: ICD-10-CM

## 2019-11-22 RX ORDER — CELECOXIB 200 MG/1
200 CAPSULE ORAL DAILY
Qty: 30 CAP | Refills: 2 | Status: CANCELLED | OUTPATIENT
Start: 2019-11-22 | End: 2020-02-20

## 2019-11-22 RX ORDER — BETAMETHASONE SODIUM PHOSPHATE AND BETAMETHASONE ACETATE 3; 3 MG/ML; MG/ML
6 INJECTION, SUSPENSION INTRA-ARTICULAR; INTRALESIONAL; INTRAMUSCULAR; SOFT TISSUE ONCE
Qty: 1 ML | Refills: 0
Start: 2019-11-22 | End: 2019-11-22

## 2019-11-22 NOTE — PROGRESS NOTES
1. Have you been to the ER, urgent care clinic since your last visit? Hospitalized since your last visit? Yes When: last month Where: Urgent Care Reason for visit: Knee pain    2. Have you seen or consulted any other health care providers outside of the [address] [place] [address] since your last visit? Include any pap smears or colon screening.  No
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
## 2019-11-25 ENCOUNTER — TELEPHONE (OUTPATIENT)
Dept: FAMILY MEDICINE CLINIC | Age: 59
End: 2019-11-25

## 2019-11-25 NOTE — TELEPHONE ENCOUNTER
Raj from [facility] called and stated that an order was sent over on 11/22 for Alcohol Wipes and B.P. Monitor, they would like to know if we have received it. Please advise.

## 2019-12-02 DIAGNOSIS — M25.551 RIGHT HIP PAIN: ICD-10-CM

## 2019-12-02 DIAGNOSIS — F51.01 PRIMARY INSOMNIA: ICD-10-CM

## 2019-12-02 RX ORDER — CYCLOBENZAPRINE HCL 10 MG
10 TABLET ORAL
Qty: 30 TAB | Refills: 1 | Status: SHIPPED | OUTPATIENT
Start: 2019-12-02 | End: 2020-01-29 | Stop reason: ALTCHOICE

## 2019-12-03 DIAGNOSIS — I50.22 CHRONIC SYSTOLIC HEART FAILURE (HCC): ICD-10-CM

## 2019-12-04 RX ORDER — POTASSIUM CHLORIDE 750 MG/1
TABLET, EXTENDED RELEASE ORAL
Qty: 30 TAB | Refills: 1 | Status: SHIPPED | OUTPATIENT
Start: 2019-12-04 | End: 2019-12-30

## 2019-12-08 RX ORDER — ZOLPIDEM TARTRATE 10 MG/1
10 TABLET ORAL
Qty: 30 TAB | Refills: 0 | Status: SHIPPED | OUTPATIENT
Start: 2019-12-08 | End: 2020-01-29 | Stop reason: SDUPTHER

## 2019-12-16 DIAGNOSIS — Z12.31 SCREENING MAMMOGRAM, ENCOUNTER FOR: ICD-10-CM

## 2019-12-17 DIAGNOSIS — K59.00 CONSTIPATION, UNSPECIFIED CONSTIPATION TYPE: ICD-10-CM

## 2019-12-18 RX ORDER — DULOXETIN HYDROCHLORIDE 30 MG/1
CAPSULE, DELAYED RELEASE ORAL
Qty: 30 CAP | Refills: 2 | Status: SHIPPED | OUTPATIENT
Start: 2019-12-18 | End: 2020-02-24

## 2019-12-20 ENCOUNTER — PATIENT OUTREACH (OUTPATIENT)
Dept: FAMILY MEDICINE CLINIC | Age: 59
End: 2019-12-20

## 2019-12-20 RX ORDER — LINACLOTIDE 145 UG/1
CAPSULE, GELATIN COATED ORAL
Qty: 30 CAP | Refills: 2 | Status: SHIPPED | OUTPATIENT
Start: 2019-12-20 | End: 2020-03-15

## 2019-12-27 DIAGNOSIS — I50.22 CHRONIC SYSTOLIC HEART FAILURE (HCC): ICD-10-CM

## 2019-12-30 ENCOUNTER — TELEPHONE (OUTPATIENT)
Dept: FAMILY MEDICINE CLINIC | Age: 59
End: 2019-12-30

## 2019-12-30 RX ORDER — POTASSIUM CHLORIDE 750 MG/1
TABLET, EXTENDED RELEASE ORAL
Qty: 30 TAB | Refills: 1 | Status: SHIPPED | OUTPATIENT
Start: 2019-12-30 | End: 2020-03-23

## 2019-12-30 NOTE — TELEPHONE ENCOUNTER
Pt called and stated that she had an order for medical supplies placed by Dr. Aly Oliveira that was not approved by insurance company. Pt stated that her PCP needs to place order so that she can receive items: Pt states that she needs a raised toilet seat, a walker wheel chair with seat, along with shower seat. Patients insurance doesn't cover [address] Please advise.

## 2020-01-08 ENCOUNTER — TELEPHONE (OUTPATIENT)
Dept: ORTHOPEDIC SURGERY | Facility: CLINIC | Age: 60
End: 2020-01-08

## 2020-01-08 NOTE — TELEPHONE ENCOUNTER
DME orders faxed to [facility]  Phone: [phone] ; Fax: [phone]. Spoke with patient and made her aware, she verbalized understanding.

## 2020-01-08 NOTE — TELEPHONE ENCOUNTER
Patient called stating that she took her DME script to [facility] and was told that they do not accept her insurance. She was wondering if LISE or Arturo Wu can send the DME script could be sent to another facility.  Please advise patient at [phone]

## 2020-01-09 NOTE — TELEPHONE ENCOUNTER
Patient would need a face to face encounter so that all necessary info is noted in records. She has appt on 1/30/20. Do she need separate appt for this.

## 2020-01-22 DIAGNOSIS — M47.817 SPONDYLOSIS OF LUMBOSACRAL REGION WITHOUT MYELOPATHY OR RADICULOPATHY: ICD-10-CM

## 2020-01-22 DIAGNOSIS — M54.16 LUMBAR RADICULOPATHY: ICD-10-CM

## 2020-01-22 RX ORDER — PREGABALIN 300 MG/1
300 CAPSULE ORAL 2 TIMES DAILY
Qty: 60 CAP | Refills: 0 | OUTPATIENT
Start: 2020-01-22

## 2020-01-22 NOTE — TELEPHONE ENCOUNTER
Last Visit: 10/2/19 with MD Nehemiah Cannon  Next Appointment: 1/27/20 with MD Nehemiah Cannon, 1/3/20 no show, 1/15/20 pt cancelled appt  Previous Refill Encounter(s): 10/2/19 #60 with 2 refills    Requested Prescriptions     Pending Prescriptions Disp Refills    pregabalin (LYRICA) 300 mg capsule 60 Cap 0     Sig: Take 1 Cap by mouth two (2) times a day. Max Daily Amount: 600 mg.
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
## 2020-01-27 ENCOUNTER — OFFICE VISIT (OUTPATIENT)
Dept: ORTHOPEDIC SURGERY | Age: 60
End: 2020-01-27

## 2020-01-27 VITALS
DIASTOLIC BLOOD PRESSURE: 95 MMHG | BODY MASS INDEX: 36.81 KG/M2 | HEIGHT: 59 IN | SYSTOLIC BLOOD PRESSURE: 158 MMHG | HEART RATE: 75 BPM | OXYGEN SATURATION: 97 % | WEIGHT: 182.6 LBS

## 2020-01-27 DIAGNOSIS — M47.817 LUMBOSACRAL SPONDYLOSIS WITHOUT MYELOPATHY: ICD-10-CM

## 2020-01-27 DIAGNOSIS — M54.16 LUMBAR NEURITIS: Primary | ICD-10-CM

## 2020-01-27 DIAGNOSIS — M47.817 SPONDYLOSIS OF LUMBOSACRAL REGION WITHOUT MYELOPATHY OR RADICULOPATHY: ICD-10-CM

## 2020-01-27 DIAGNOSIS — M54.16 LUMBAR RADICULOPATHY: ICD-10-CM

## 2020-01-27 RX ORDER — PREGABALIN 300 MG/1
CAPSULE ORAL
Qty: 60 CAP | Refills: 2 | OUTPATIENT
Start: 2020-01-27

## 2020-01-27 RX ORDER — PREGABALIN 300 MG/1
300 CAPSULE ORAL 2 TIMES DAILY
Qty: 60 CAP | Refills: 2 | Status: CANCELLED | OUTPATIENT
Start: 2020-01-27

## 2020-01-27 NOTE — LETTER
1/27/20 Patient: Vijay Marshall YOB: 1960 Date of Visit: 1/27/2020 Akshat Conway NP 
[id number] [place] [address] VIA In Basket Dear Akshat Conway NP, Thank you for referring Ms. Vijay Marshall to [address] for evaluation. My notes for this consultation are attached. If you have questions, please do not hesitate to call me. I look forward to following your patient along with you. Sincerely, Pavithra Zuñiga MD

## 2020-01-28 ENCOUNTER — DOCUMENTATION ONLY (OUTPATIENT)
Dept: ORTHOPEDIC SURGERY | Age: 60
End: 2020-01-28
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
## 2020-01-28 NOTE — PROGRESS NOTES
EMG RLE is scheduled with Dr. Dallin Hayward, [address] [place] [address], [address], [phone] on 02/25/20, arrive 12:45PM, test 1:00PM.

## 2020-01-29 ENCOUNTER — OFFICE VISIT (OUTPATIENT)
Dept: FAMILY MEDICINE CLINIC | Age: 60
End: 2020-01-29

## 2020-01-29 VITALS
BODY MASS INDEX: 36.88 KG/M2 | TEMPERATURE: 98.2 F | OXYGEN SATURATION: 98 % | SYSTOLIC BLOOD PRESSURE: 122 MMHG | DIASTOLIC BLOOD PRESSURE: 78 MMHG | HEIGHT: 59 IN | RESPIRATION RATE: 16 BRPM | HEART RATE: 87 BPM

## 2020-01-29 DIAGNOSIS — F51.01 PRIMARY INSOMNIA: ICD-10-CM

## 2020-01-29 DIAGNOSIS — M25.551 RIGHT HIP PAIN: ICD-10-CM

## 2020-01-29 DIAGNOSIS — L30.9 ECZEMA, UNSPECIFIED TYPE: ICD-10-CM

## 2020-01-29 DIAGNOSIS — Z87.898 HISTORY OF SEIZURES: ICD-10-CM

## 2020-01-29 DIAGNOSIS — E11.40 TYPE 2 DIABETES MELLITUS WITH DIABETIC NEUROPATHY, UNSPECIFIED WHETHER LONG TERM INSULIN USE (HCC): ICD-10-CM
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
## 2020-01-29 DIAGNOSIS — K21.9 GERD (GASTROESOPHAGEAL REFLUX DISEASE): ICD-10-CM

## 2020-01-29 DIAGNOSIS — I69.959 HEMIPLEGIA OF DOMINANT SIDE AS LATE EFFECT FOLLOWING CEREBROVASCULAR DISEASE (HCC): ICD-10-CM

## 2020-01-29 DIAGNOSIS — J30.89 ENVIRONMENTAL AND SEASONAL ALLERGIES: ICD-10-CM

## 2020-01-29 DIAGNOSIS — M50.30 OTHER CERVICAL DISC DEGENERATION, UNSPECIFIED CERVICAL REGION: ICD-10-CM

## 2020-01-29 DIAGNOSIS — M62.830 PARASPINAL MUSCLE SPASM: ICD-10-CM

## 2020-01-29 RX ORDER — LORATADINE 10 MG/1
10 TABLET ORAL DAILY
Qty: 90 TAB | Refills: 2 | Status: SHIPPED | OUTPATIENT
Start: 2020-01-29

## 2020-01-29 RX ORDER — TRIAMCINOLONE ACETONIDE 1 MG/G
OINTMENT TOPICAL 2 TIMES DAILY
Qty: 30 G | Refills: 0 | Status: SHIPPED | OUTPATIENT
Start: 2020-01-29 | End: 2020-08-31 | Stop reason: SDUPTHER

## 2020-01-29 RX ORDER — ZOLPIDEM TARTRATE 10 MG/1
10 TABLET ORAL
Qty: 30 TAB | Refills: 0 | Status: SHIPPED | OUTPATIENT
Start: 2020-01-29 | End: 2020-02-25 | Stop reason: SDUPTHER

## 2020-01-29 RX ORDER — CYCLOBENZAPRINE HCL 10 MG
10 TABLET ORAL
Qty: 30 TAB | Refills: 1 | Status: SHIPPED | OUTPATIENT
Start: 2020-01-29 | End: 2020-04-29 | Stop reason: ALTCHOICE

## 2020-01-29 RX ORDER — OMEPRAZOLE 20 MG/1
20 CAPSULE, DELAYED RELEASE ORAL DAILY
Qty: 30 CAP | Refills: 3 | Status: SHIPPED | OUTPATIENT
Start: 2020-01-29 | End: 2020-04-29 | Stop reason: SDUPTHER

## 2020-01-29 RX ORDER — METHOCARBAMOL 500 MG/1
TABLET, FILM COATED ORAL
Qty: 120 TAB | Refills: 3 | Status: SHIPPED | OUTPATIENT
Start: 2020-01-29 | End: 2020-10-14 | Stop reason: ALTCHOICE

## 2020-01-29 NOTE — PROGRESS NOTES
Cristina Khan presents today for   Chief Complaint   Patient presents with    Osteoarthritis    Coronary Artery Disease    Other     cardiovascular disease    Blood sugar problem     prediabetes    Cholesterol Problem     high chol    Pain (Chronic)    Insomnia       Cristina Khan preferred language for health care discussion is english/other. Is someone accompanying this pt? no    Is the patient using any DME equipment during [address]? Yes, leg brace    Depression Screening:  3 most recent PHQ Screens 1/29/2020   Little interest or pleasure in doing things Not at all   Feeling down, depressed, irritable, or hopeless Not at all   Total Score PHQ 2 0       Learning Assessment:  Learning Assessment 1/29/2020   PRIMARY LEARNER Patient   HIGHEST LEVEL OF EDUCATION - PRIMARY LEARNER  4 YEARS OF COLLEGE   BARRIERS PRIMARY LEARNER NONE   CO-LEARNER CAREGIVER No   CO-LEARNER NAME -   PRIMARY LANGUAGE ENGLISH    NEED No   LEARNER PREFERENCE PRIMARY DEMONSTRATION   ANSWERED BY patient   RELATIONSHIP SELF       Abuse Screening:  Abuse Screening Questionnaire 1/29/2020   Do you ever feel afraid of your partner? N   Are you in a relationship with someone who physically or mentally threatens you? N   Is it safe for you to go home? Y       Generalized Anxiety  No flowsheet data found. Health Maintenance Due   Topic Date Due    EYE EXAM RETINAL OR DILATED  08/05/1971    Shingrix Vaccine Age 50> (1 of 2) 08/05/2011    GLAUCOMA SCREENING Q2Y  09/29/2016    MICROALBUMIN Q1  10/17/2017    HEMOGLOBIN A1C Q6M  02/15/2020   . Health Maintenance reviewed and discussed and ordered per Provider. Coordination of Care:  1. Have you been to the ER, urgent care clinic since your last visit? Hospitalized since your last visit? no    2. Have you seen or consulted any other health care providers outside of the [address] [place] [address] since your last visit? Include any pap smears or colon screening. Yes, gyn      Advance Directive:  1. Do you have an advance directive in place? Patient Reply:no    2. If not, would you like material regarding how to put one in place?  Patient Reply: yes

## 2020-01-30 DIAGNOSIS — M17.11 PRIMARY OSTEOARTHRITIS OF RIGHT KNEE: Primary | ICD-10-CM

## 2020-01-30 LAB
ALBUMIN/CREAT UR: 23 MG/G CREAT (ref 0–29)
CREAT UR-MCNC: 71.2 MG/DL
HBA1C MFR BLD: 6.5 % (ref 4.8–5.6)
MICROALBUMIN UR-MCNC: 16.5 UG/ML

## 2020-02-06 ENCOUNTER — OFFICE VISIT (OUTPATIENT)
Dept: ORTHOPEDIC SURGERY | Facility: CLINIC | Age: 60
End: 2020-02-06

## 2020-02-06 VITALS
RESPIRATION RATE: 16 BRPM | HEIGHT: 59 IN | WEIGHT: 185 LBS | OXYGEN SATURATION: 93 % | DIASTOLIC BLOOD PRESSURE: 86 MMHG | HEART RATE: 80 BPM | TEMPERATURE: 97.1 F | BODY MASS INDEX: 37.29 KG/M2 | SYSTOLIC BLOOD PRESSURE: 142 MMHG

## 2020-02-06 DIAGNOSIS — M25.561 RIGHT KNEE PAIN, UNSPECIFIED CHRONICITY: ICD-10-CM

## 2020-02-06 DIAGNOSIS — M17.11 ARTHRITIS OF RIGHT KNEE: Primary | ICD-10-CM

## 2020-02-06 RX ORDER — BETAMETHASONE SODIUM PHOSPHATE AND BETAMETHASONE ACETATE 3; 3 MG/ML; MG/ML
6 INJECTION, SUSPENSION INTRA-ARTICULAR; INTRALESIONAL; INTRAMUSCULAR; SOFT TISSUE ONCE
Qty: 1 ML | Refills: 0
Start: 2020-02-06 | End: 2020-02-06

## 2020-02-06 NOTE — PROGRESS NOTES
Pre-injection pain score:8    Post-injection pain score:8    Verbal order given by Veronica Villatoro PA-C to draw up 1 ml of Celestone and 3 ml of Xylocaine
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
## 2020-02-06 NOTE — PROGRESS NOTES
1. Have you been to the ER, urgent care clinic since your last visit? Hospitalized since your last visit? No    2. Have you seen or consulted any other health care providers outside of the [address] [place] [address] since your last visit? Include any pap smears or colon screening.  No

## 2020-02-12 DIAGNOSIS — M70.61 TROCHANTERIC BURSITIS, RIGHT HIP: ICD-10-CM

## 2020-02-12 DIAGNOSIS — M25.551 RIGHT HIP PAIN: Primary | ICD-10-CM

## 2020-02-12 NOTE — TELEPHONE ENCOUNTER
Last Visit: 2/6/20 with BERNICE Camacho  Next Appointment: 2/24/20 with BERNICE Camacho  Previous Refill Encounter(s): 7/18/19 #30 with 2 refills    Requested Prescriptions     Pending Prescriptions Disp Refills    celecoxib (CELEBREX) 200 mg capsule 30 Cap 2     Sig: Take 1 Cap by mouth daily for 90 days.  Take with food

## 2020-02-13 RX ORDER — CELECOXIB 200 MG/1
200 CAPSULE ORAL DAILY
Qty: 30 CAP | Refills: 2 | Status: SHIPPED | OUTPATIENT
Start: 2020-02-13 | End: 2020-05-04

## 2020-02-24 ENCOUNTER — OFFICE VISIT (OUTPATIENT)
Dept: ORTHOPEDIC SURGERY | Facility: CLINIC | Age: 60
End: 2020-02-24

## 2020-02-24 VITALS
SYSTOLIC BLOOD PRESSURE: 167 MMHG | RESPIRATION RATE: 18 BRPM | DIASTOLIC BLOOD PRESSURE: 95 MMHG | TEMPERATURE: 98.4 F | HEART RATE: 68 BPM | HEIGHT: 59 IN | OXYGEN SATURATION: 96 % | BODY MASS INDEX: 37.37 KG/M2

## 2020-02-24 DIAGNOSIS — Z96.641 HISTORY OF TOTAL RIGHT HIP REPLACEMENT: ICD-10-CM

## 2020-02-24 DIAGNOSIS — M17.11 ARTHRITIS OF RIGHT KNEE: Primary | ICD-10-CM

## 2020-02-24 DIAGNOSIS — M70.61 TROCHANTERIC BURSITIS, RIGHT HIP: ICD-10-CM

## 2020-02-24 RX ORDER — DULOXETIN HYDROCHLORIDE 30 MG/1
CAPSULE, DELAYED RELEASE ORAL
Qty: 30 CAP | Refills: 2 | Status: SHIPPED | OUTPATIENT
Start: 2020-02-24 | End: 2021-10-13 | Stop reason: ALTCHOICE

## 2020-02-24 RX ORDER — BACLOFEN 10 MG/1
TABLET ORAL
Qty: 30 TAB | Refills: 2 | Status: SHIPPED | OUTPATIENT
Start: 2020-02-24 | End: 2020-04-29 | Stop reason: SDUPTHER

## 2020-02-24 RX ORDER — BETAMETHASONE SODIUM PHOSPHATE AND BETAMETHASONE ACETATE 3; 3 MG/ML; MG/ML
6 INJECTION, SUSPENSION INTRA-ARTICULAR; INTRALESIONAL; INTRAMUSCULAR; SOFT TISSUE ONCE
Qty: 1 ML | Refills: 0
Start: 2020-02-24 | End: 2020-02-24

## 2020-02-24 RX ORDER — HYALURONATE SODIUM 10 MG/ML
2 SYRINGE (ML) INTRAARTICULAR ONCE
Qty: 2 ML | Refills: 0
Start: 2020-02-24 | End: 2020-02-24
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
## 2020-02-25 DIAGNOSIS — F51.01 PRIMARY INSOMNIA: ICD-10-CM

## 2020-02-25 RX ORDER — ZOLPIDEM TARTRATE 10 MG/1
10 TABLET ORAL
Qty: 30 TAB | Refills: 0 | Status: SHIPPED | OUTPATIENT
Start: 2020-02-25 | End: 2020-03-25 | Stop reason: SDUPTHER

## 2020-03-02 ENCOUNTER — OFFICE VISIT (OUTPATIENT)
Dept: ORTHOPEDIC SURGERY | Facility: CLINIC | Age: 60
End: 2020-03-02

## 2020-03-02 VITALS
HEART RATE: 76 BPM | WEIGHT: 182 LBS | DIASTOLIC BLOOD PRESSURE: 85 MMHG | BODY MASS INDEX: 36.69 KG/M2 | SYSTOLIC BLOOD PRESSURE: 144 MMHG | HEIGHT: 59 IN

## 2020-03-02 DIAGNOSIS — M17.11 ARTHRITIS OF RIGHT KNEE: Primary | ICD-10-CM

## 2020-03-02 RX ORDER — HYALURONATE SODIUM 10 MG/ML
2 SYRINGE (ML) INTRAARTICULAR ONCE
Qty: 2 ML | Refills: 0
Start: 2020-03-02 | End: 2020-03-02

## 2020-03-02 NOTE — PROGRESS NOTES
Patient: Maggie Glover                MRN: [id number]       SSN: [id number]  YOB: 1960        AGE: 63 y.o. SEX: female  Body mass index is 36.76 kg/m². PCP: Evelyn Hardy NP  03/02/20        Pt presents today for their 2nd euflexxa  injection for Right knee . There has been no recent fevers/chills, systemic changes, or injuries to report. PE:  General A&Ox3, NAD  Exam of the knees reveals skin intact, no erythema, no ecchymosis, no signs for infection. No cyanosis, clubbing, or edema present distally. Neg calf tenderness, neg homans, no signs for DVT present. A: Right knee OA    P: * Patient was identified by name and date of birth       * Agreement on procedure being performed was verified      * Risks and Benefits explained to the patient      * Procedure site verified and marked as necessary      * Patient was positioned for comfort      * Consent was signed and verified     10:39 AM      the Right knee was injected US guided assistance without complications. Patient was instructed on post injection care.        Judah Morales PA-C

## 2020-03-11 DIAGNOSIS — K59.00 CONSTIPATION, UNSPECIFIED CONSTIPATION TYPE: ICD-10-CM

## 2020-03-12 ENCOUNTER — OFFICE VISIT (OUTPATIENT)
Dept: ORTHOPEDIC SURGERY | Facility: CLINIC | Age: 60
End: 2020-03-12

## 2020-03-12 VITALS
DIASTOLIC BLOOD PRESSURE: 85 MMHG | RESPIRATION RATE: 18 BRPM | TEMPERATURE: 97.8 F | BODY MASS INDEX: 36.73 KG/M2 | HEART RATE: 68 BPM | OXYGEN SATURATION: 98 % | SYSTOLIC BLOOD PRESSURE: 146 MMHG | HEIGHT: 59 IN | WEIGHT: 182.2 LBS

## 2020-03-12 DIAGNOSIS — M17.11 ARTHRITIS OF RIGHT KNEE: Primary | ICD-10-CM

## 2020-03-12 RX ORDER — HYALURONATE SODIUM 10 MG/ML
2 SYRINGE (ML) INTRAARTICULAR ONCE
Qty: 2 ML | Refills: 0
Start: 2020-03-12 | End: 2020-03-12

## 2020-03-12 NOTE — PROGRESS NOTES
Patient: Cullen Rizzo                MRN: [id number]       SSN: [id number]  YOB: 1960        AGE: 63 y.o. SEX: female  Body mass index is 36.8 kg/m². PCP: Qiana Haynes NP  03/12/20        Pt presents today for their 3rd euflexxa  injection for Right knee . There has been no recent fevers/chills, systemic changes, or injuries to report. PE:  General A&Ox3, NAD  Exam of the knees reveals skin intact, no erythema, no ecchymosis, no signs for infection. No cyanosis, clubbing, or edema present distally. Neg calf tenderness, neg homans, no signs for DVT present. A: Right knee OA    P: The Right knee was injected with 2ml of euflexxa with US guided assistance without complications. Patient was instructed on post injection care. Chart reviewed for the following:  Derrick HERNANDEZ PA-C, have reviewed the History, Physical and updated the Allergic reactions for Cullen Rizzo (patient name)?     TIME OUT performed immediately prior to start of procedure:  Derrick HERNANDEZ PA-C, have performed the following reviews on Cullen Rizzo (patient name) prior to the start of the procedure:  ????????  * Patient was identified by name and date of birth   * Agreement on procedure being performed was verified  * Risks and Benefits explained to the patient  * Procedure site verified and marked as necessary  * Patient was positioned for comfort  * Consent was signed and verified    Time: 9:18 AM      Body part: right knee    Medication & Dose: 2ml euflexxa    Date of procedure: 3/12/2020    Procedure performed by: Mo Carrillo PA-C    Provider assisted by: none    Patient assisted by: self    How tolerated by patient: tolerated the procedure well with no complications    Post Procedural Pain Scale: 10    Comments: none    Derrick Kline PA-C

## 2020-03-15 RX ORDER — LINACLOTIDE 145 UG/1
CAPSULE, GELATIN COATED ORAL
Qty: 30 CAP | Refills: 2 | Status: SHIPPED | OUTPATIENT
Start: 2020-03-15 | End: 2020-06-23 | Stop reason: SDUPTHER

## 2020-03-19 ENCOUNTER — TELEPHONE (OUTPATIENT)
Dept: ORTHOPEDIC SURGERY | Age: 60
End: 2020-03-19
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
## 2020-03-20 ENCOUNTER — OFFICE VISIT (OUTPATIENT)
Dept: ORTHOPEDIC SURGERY | Age: 60
End: 2020-03-20

## 2020-03-20 VITALS
WEIGHT: 182.4 LBS | SYSTOLIC BLOOD PRESSURE: 156 MMHG | DIASTOLIC BLOOD PRESSURE: 91 MMHG | TEMPERATURE: 98.7 F | HEIGHT: 59 IN | HEART RATE: 81 BPM | BODY MASS INDEX: 36.77 KG/M2

## 2020-03-20 DIAGNOSIS — M47.812 CERVICAL SPONDYLOSIS WITHOUT MYELOPATHY: ICD-10-CM

## 2020-03-20 DIAGNOSIS — G60.9 IDIOPATHIC PERIPHERAL NEUROPATHY: Primary | ICD-10-CM

## 2020-03-20 DIAGNOSIS — M54.16 LUMBAR NEURITIS: ICD-10-CM

## 2020-03-20 DIAGNOSIS — M47.817 LUMBOSACRAL SPONDYLOSIS WITHOUT MYELOPATHY: ICD-10-CM

## 2020-03-20 DIAGNOSIS — M50.30 DDD (DEGENERATIVE DISC DISEASE), CERVICAL: ICD-10-CM

## 2020-03-20 RX ORDER — DULOXETIN HYDROCHLORIDE 30 MG/1
30 CAPSULE, DELAYED RELEASE ORAL DAILY
Qty: 30 CAP | Refills: 2 | Status: SHIPPED | OUTPATIENT
Start: 2020-03-20 | End: 2020-04-29 | Stop reason: SDUPTHER

## 2020-03-20 RX ORDER — PREGABALIN 300 MG/1
300 CAPSULE ORAL 2 TIMES DAILY
Qty: 60 CAP | Refills: 2 | Status: SHIPPED | OUTPATIENT
Start: 2020-03-20 | End: 2020-04-29 | Stop reason: SDUPTHER

## 2020-03-20 RX ORDER — BACLOFEN 10 MG/1
10 TABLET ORAL 2 TIMES DAILY
Qty: 60 TAB | Refills: 1 | Status: SHIPPED | OUTPATIENT
Start: 2020-03-20 | End: 2020-04-29 | Stop reason: ALTCHOICE

## 2020-03-20 NOTE — LETTER
3/20/20 Patient: Kyler Ansari YOB: 1960 Date of Visit: 3/20/2020 Em Holbrook NP 
[address] [id number] [place] [address] VIA In Basket Dear Em Holbrook NP, Thank you for referring Ms. Kyler Ansari to [address] for evaluation. My notes for this consultation are attached. If you have questions, please do not hesitate to call me. I look forward to following your patient along with you. Sincerely, Jason Alvarez MD

## 2020-03-23 DIAGNOSIS — F51.01 PRIMARY INSOMNIA: ICD-10-CM

## 2020-03-23 DIAGNOSIS — I50.22 CHRONIC SYSTOLIC HEART FAILURE (HCC): ICD-10-CM

## 2020-03-23 RX ORDER — ZOLPIDEM TARTRATE 10 MG/1
10 TABLET ORAL
Qty: 30 TAB | Refills: 0 | Status: CANCELLED | OUTPATIENT
Start: 2020-03-23

## 2020-03-23 RX ORDER — POTASSIUM CHLORIDE 750 MG/1
TABLET, EXTENDED RELEASE ORAL
Qty: 90 TAB | Refills: 0 | Status: SHIPPED | OUTPATIENT
Start: 2020-03-23 | End: 2020-05-26 | Stop reason: SDUPTHER

## 2020-03-24 DIAGNOSIS — F51.01 PRIMARY INSOMNIA: ICD-10-CM

## 2020-03-24 RX ORDER — ZOLPIDEM TARTRATE 10 MG/1
10 TABLET ORAL
Qty: 30 TAB | Refills: 0 | Status: CANCELLED | OUTPATIENT
Start: 2020-03-24

## 2020-03-25 RX ORDER — ZOLPIDEM TARTRATE 10 MG/1
10 TABLET ORAL
Qty: 30 TAB | Refills: 0 | OUTPATIENT
Start: 2020-03-25

## 2020-03-25 RX ORDER — ZOLPIDEM TARTRATE 10 MG/1
10 TABLET ORAL
Qty: 30 TAB | Refills: 0 | Status: SHIPPED | OUTPATIENT
Start: 2020-03-25 | End: 2020-04-29 | Stop reason: SDUPTHER

## 2020-04-29 ENCOUNTER — VIRTUAL VISIT (OUTPATIENT)
Dept: FAMILY MEDICINE CLINIC | Age: 60
End: 2020-04-29

## 2020-04-29 DIAGNOSIS — F51.01 PRIMARY INSOMNIA: Primary | ICD-10-CM

## 2020-04-29 DIAGNOSIS — I50.22 CHRONIC SYSTOLIC HEART FAILURE (HCC): ICD-10-CM

## 2020-04-29 DIAGNOSIS — F43.21 GRIEF: ICD-10-CM

## 2020-04-29 DIAGNOSIS — E11.40 TYPE 2 DIABETES MELLITUS WITH DIABETIC NEUROPATHY, UNSPECIFIED WHETHER LONG TERM INSULIN USE (HCC): ICD-10-CM

## 2020-04-29 DIAGNOSIS — K21.9 GERD (GASTROESOPHAGEAL REFLUX DISEASE): ICD-10-CM

## 2020-04-29 RX ORDER — OMEPRAZOLE 20 MG/1
20 CAPSULE, DELAYED RELEASE ORAL DAILY
Qty: 30 CAP | Refills: 3 | Status: SHIPPED | OUTPATIENT
Start: 2020-04-29 | End: 2020-08-26

## 2020-04-29 RX ORDER — ZOLPIDEM TARTRATE 10 MG/1
10 TABLET ORAL
Qty: 30 TAB | Refills: 0 | Status: SHIPPED | OUTPATIENT
Start: 2020-04-29 | End: 2020-05-28 | Stop reason: SDUPTHER

## 2020-04-29 NOTE — PROGRESS NOTES
Chidi Haro presents today for   Chief Complaint   Patient presents with    Diabetes    Hypertension       Wilson Tahir preferred language for health care discussion is english/other. Is someone accompanying this pt? no    Is the patient using any DME equipment during [address]? no    Depression Screening:  3 most recent PHQ Screens 3/12/2020   PHQ Not Done Patient Decline   Little interest or pleasure in doing things -   Feeling down, depressed, irritable, or hopeless -   Total Score PHQ 2 -       Learning Assessment:  Learning Assessment 1/29/2020   PRIMARY LEARNER Patient   HIGHEST LEVEL OF EDUCATION - PRIMARY LEARNER  4 YEARS OF COLLEGE   BARRIERS PRIMARY LEARNER NONE   CO-LEARNER CAREGIVER No   CO-LEARNER NAME -   PRIMARY LANGUAGE ENGLISH    NEED No   LEARNER PREFERENCE PRIMARY DEMONSTRATION   ANSWERED BY patient   RELATIONSHIP SELF       Abuse Screening:  Abuse Screening Questionnaire 1/29/2020   Do you ever feel afraid of your partner? N   Are you in a relationship with someone who physically or mentally threatens you? N   Is it safe for you to go home? Y       Generalized Anxiety  No flowsheet data found. Health Maintenance Due   Topic Date Due    Eye Exam Retinal or Dilated  08/05/1971    Shingrix Vaccine Age 50> (1 of 2) 08/05/2011    GLAUCOMA SCREENING Q2Y  09/29/2016    Foot Exam Q1  05/16/2020   . Health Maintenance reviewed and discussed and ordered per Provider. Coordination of Care:  1. Have you been to the ER, urgent care clinic since your last visit? Hospitalized since your last visit? no    2. Have you seen or consulted any other health care providers outside of the [address] [place] [address] since your last visit? Include any pap smears or colon screening.  no      Advance Directive:  Discussed 1/16/20

## 2020-04-29 NOTE — PROGRESS NOTES
Genia Beatty is a 63 y.o. female who was seen by synchronous (real-time) audio-video technology on 4/29/2020. Consent: Genia Beatty, who was seen by synchronous (real-time) audio-video technology, and/or her healthcare decision maker, is aware that this patient-initiated, Telehealth encounter on 4/29/2020 is a billable service, with coverage as determined by her insurance carrier. She is aware that she may receive a bill and has provided verbal consent to proceed: Yes. Assessment & Plan:   Diagnoses and all orders for this visit:    1. Primary insomnia  -     zolpidem (AMBIEN) 10 mg tablet; Take 1 Tab by mouth nightly as needed for Sleep. Max Daily Amount: 10 mg.  -     METABOLIC PANEL, COMPREHENSIVE; Future  -     LIPID PANEL; Future  -     CBC WITH AUTOMATED DIFF; Future  -     HEMOGLOBIN A1C WITH EAG; Future    2. GERD (gastroesophageal reflux disease)  -     omeprazole (PRILOSEC) 20 mg capsule; Take 1 Cap by mouth daily.  -     METABOLIC PANEL, COMPREHENSIVE; Future  -     LIPID PANEL; Future  -     CBC WITH AUTOMATED DIFF; Future  -     HEMOGLOBIN A1C WITH EAG; Future    3. Grief  -     METABOLIC PANEL, COMPREHENSIVE; Future  -     LIPID PANEL; Future  -     CBC WITH AUTOMATED DIFF; Future  -     HEMOGLOBIN A1C WITH EAG; Future    4. Type 2 diabetes mellitus with diabetic neuropathy, unspecified whether long term insulin use (HCC)  -     METABOLIC PANEL, COMPREHENSIVE; Future  -     LIPID PANEL; Future  -     CBC WITH AUTOMATED DIFF; Future  -     HEMOGLOBIN A1C WITH EAG; Future    5. Chronic systolic heart failure (HCC)  -     METABOLIC PANEL, COMPREHENSIVE; Future  -     LIPID PANEL; Future  -     CBC WITH AUTOMATED DIFF; Future  -     HEMOGLOBIN A1C WITH EAG; Future           checked with no concerns.      Subjective:   Genia Beatty is a 63 y.o. female who was seen for Diabetes and Hypertension    Reports her blood glucose has been 118 this morning and running around 120 or 121.  Reports she has not concerns with her blood pressure. She denies any chest pain and or shortness of breath. She denies any swelling. She reports she continues to wear her brace to her lower extremity. She denies any fevers or chills. She is requesting her omperazole be refilled due to her GERD. Reports her sister passed on two days ago. She denies any suicidal thoughts or desires to hurt or harm herself or others. She denies any anxiety. She does admit to some insomnia and she is requesting a refill of her Ambien. Prior to Admission medications    Medication Sig Start Date End Date Taking? Authorizing Provider   zolpidem (AMBIEN) 10 mg tablet Take 1 Tab by mouth nightly as needed for Sleep. Max Daily Amount: 10 mg. 4/29/20  Yes Del Rene NP   omeprazole (PRILOSEC) 20 mg capsule Take 1 Cap by mouth daily. 4/29/20  Yes Rosie ENGLE NP   Klor-Con M10 10 mEq tablet TAKE 1 TABLET BY MOUTH EVERY DAY 3/23/20  Yes Rosie ENGLE NP   Linzess 145 mcg cap capsule TAKE 1 CAPSULE BY MOUTH DAILY 3/15/20  Yes Rosie ENGLE NP   DULoxetine (CYMBALTA) 30 mg capsule TAKE 1 CAPSULE BY MOUTH EVERY DAY 2/24/20  Yes Isa Eduardo NP   omega 3-dha-epa-fish oil (FISH OIL) 100-160-1,000 mg cap Take  by mouth. Yes Provider, Historical   celecoxib (CELEBREX) 200 mg capsule Take 1 Cap by mouth daily for 90 days. Take with food 2/13/20 5/13/20 Yes Miriam BAZAN PA-C   loratadine (CLARITIN) 10 mg tablet Take 1 Tab by mouth daily. 1/29/20  Yes Rosie ENGLE NP   triamcinolone acetonide (KENALOG) 0.1 % ointment Apply  to affected area two (2) times a day. use thin layer 1/29/20  Yes Del Anthony NP   pregabalin (LYRICA) 300 mg capsule Take 1 Cap by mouth two (2) times a day.  Max Daily Amount: 600 mg. 1/27/20  Yes Johana Yoon NP   Blood-Gluc Transmitter-Sensor misc Free Style kitty Sensor - 3x daily 11/13/19  Yes Hayden Curls, NP   lancets misc Free style Kitty lancets -test twice a day 10/24/19  Yes Del Reis NP   glucose blood VI test strips (BLOOD GLUCOSE TEST) strip Free style Kitty test strips - test twice a day 10/24/19  Yes John ENGLE NP   Blood-Glucose Meter monitoring kit Free Style Kitty meter - for blood glucose checks twice a day 10/23/19  Yes Del Reis NP   montelukast (SINGULAIR) 10 mg tablet TK 1 T PO D 9/23/19  Yes Del Anthony NP   rosuvastatin (CRESTOR) 40 mg tablet TAKE 1 TABLET BY MOUTH DAILY. Appointment required for additional refills. 3/19/19  Yes John ENGLE NP   diclofenac (VOLTAREN) 1 % gel Apply  to affected area four (4) times daily. Maximum 16 grams per joint per day. Dispense 5 100 gram tubes 7/19/18  Yes Edouard BAZAN PA-C   acetaminophen 325 mg cap Take 1 Tab by Mouth Every 6 Hours As Needed for Pain. 8/14/17  Yes Provider, Historical   ferrous gluconate 324 mg (38 mg iron) tablet Take 324 mg by mouth Daily (before breakfast). Yes Provider, Historical   calcipotriene (DOVONEX) 0.005 % topical cream Use 1 Application to affected area Daily as needed. 5/2/17  Yes Provider, Historical   polyethylene glycol (MIRALAX) 17 gram packet Take 17 g by mouth daily. Yes Provider, Historical   brief disposable (ADULT) misc by Does Not Apply route. Dispense one package of 180 briefs/ diapers. 9/7/17  Yes Emmanuelle Marvin, DO   lisinopril (PRINIVIL, ZESTRIL) 20 mg tablet Take 20 mg by mouth daily. 5/23/17  Yes Provider, Historical   miscellaneous medical supply misc 2 Each by Does Not Apply route daily. 1/27/17  Yes Emmanuelle Marvin, DO   miscellaneous medical supply misc 2 Each by Does Not Apply route daily. 1/12/17  Yes Emmanuelle Marvin, DO   miscellaneous medical supply misc 1 Each by Does Not Apply route daily. 12/9/16  Yes Emmanuelle Marvin, DO   miscellaneous medical supply misc 1 Each by Does Not Apply route daily.  12/9/16  Yes Emmanuelle Marvin, DO   furosemide (LASIX) 40 mg tablet TAKE 1 TABLET BY MOUTH TWICE DAILY AS NEEDED 3/31/16  Yes Emmanuelle Marvin, DO   carvedilol (COREG) 12.5 mg tablet Take 6.25 mg by mouth two (2) times daily (with meals). 11/4/15  Yes Provider, Historical   cyanocobalamin (VITAMIN B-12) 500 mcg tablet Take 500 mcg by mouth daily. Yes Provider, Historical   clopidogrel (PLAVIX) 75 mg tablet Take 1 tablet by mouth daily. 12/12/14  Yes Emmanuelle Marvin, DO   therapeutic multivitamin (THERAGRAN) tablet Take 1 tablet by mouth daily. Yes Provider, Historical   zolpidem (AMBIEN) 10 mg tablet Take 1 Tab by mouth nightly as needed for Sleep. Max Daily Amount: 10 mg. 3/25/20 4/29/20  Mel ENGLE NP   DULoxetine (CYMBALTA) 30 mg capsule Take 1 Cap by mouth daily. 3/20/20 4/29/20  Berenice Sánchez MD   baclofen (LIORESAL) 10 mg tablet Take 1 Tab by mouth two (2) times a day. 3/20/20 4/29/20  Berenice Sánchez MD   pregabalin (LYRICA) 300 mg capsule Take 1 Cap by mouth two (2) times a day. Max Daily Amount: 600 mg. 3/20/20 4/29/20  Lisa Sánchez MD   baclofen (LIORESAL) 10 mg tablet TAKE 1 TABLET BY MOUTH EVERY DAY 2/24/20 4/29/20  Agustin Eduardo, PRATIMA   methocarbamol (ROBAXIN) 500 mg tablet TAKE 1 TABLET BY MOUTH FOUR TIMES DAILY AS NEEDED FOR MUSCLE SPASM 1/29/20   Mel ENGLE NP   omeprazole (PRILOSEC) 20 mg capsule Take 1 Cap by mouth daily. 1/29/20 4/29/20  Mel ENGLE NP   cyclobenzaprine (FLEXERIL) 10 mg tablet TAKE 1 TAB BY MOUTH NIGHTLY AS NEEDED FOR MUSCLE SPASM(S). 1/29/20 4/29/20  Hudson Nunez PA-C   carBAMazepine (TEGRETOL) 200 mg tablet TAKE 1 TABLET BY MOUTH EVERY MORNING, 1 TABLET BY MOUTH EVERY EVENING AND 2 TABLETS BY MOUTH EVERY NIGHT AT BEDTIME 6/17/19 4/29/20  Fe Alexis MD   HYDROcodone-acetaminophen (NORCO) 5-325 mg per tablet Take 1 Tab by mouth every eight (8) hours as needed for Pain. Max Daily Amount: 3 Tabs.  6/8/18 4/29/20  Klaudia Centeno MD   ergocalciferol (ERGOCALCIFEROL) 50,000 unit capsule Take 1 Cap by mouth every seven (7) days. 1/30/17   Shavonne Emmanuelle B, DO   calcium citrate-vitamin d3 (CITRACAL+D) 315-200 mg-unit tab Take 1 tablet by mouth two (2) times daily (with meals). 4/29/20  Provider, Historical     Allergies   Allergen Reactions    Dextromethorphan-Guaifenesin Other (comments)    Aspirin Hives           Review of Systems   Constitutional: Negative for fever. HENT: Negative for congestion. Eyes: Negative for blurred vision. Respiratory: Negative for cough and shortness of breath. Cardiovascular: Negative for chest pain. Gastrointestinal: Negative for abdominal pain, nausea and vomiting. Genitourinary: Negative. Musculoskeletal: Negative for falls. Neurological: Negative for dizziness. Psychiatric/Behavioral: Negative for substance abuse and suicidal ideas. The patient has insomnia. The patient is not nervous/anxious.         Objective:   Vital Signs: (As obtained by patient/caregiver at home)  Visit Vitals  LMP  (LMP Unknown)        [INSTRUCTIONS:  \"[x]\" Indicates a positive item  \"[]\" Indicates a negative item  -- DELETE ALL ITEMS NOT EXAMINED]    Constitutional: [x] Appears well-developed and well-nourished [x] No apparent distress      [] Abnormal -     Mental status: [x] Alert and awake  [x] Oriented to person/place/time [x] Able to follow commands    [] Abnormal -     Eyes:   EOM    [x]  Normal    [] Abnormal -   Sclera  [x]  Normal    [] Abnormal -          Discharge [x]  None visible   [] Abnormal -     HENT: [x] Normocephalic, atraumatic  [] Abnormal -   [x] Mouth/Throat: Mucous membranes are moist    External Ears [x] Normal  [] Abnormal -    Neck: [x] No visualized mass [] Abnormal -     Pulmonary/Chest: [x] Respiratory effort normal   [x] No visualized signs of difficulty breathing or respiratory distress        [] Abnormal -      Musculoskeletal:   [x] Normal gait with no signs of ataxia         [x] Normal range of motion of neck        [] Abnormal -     Neurological:        [x] No Facial Asymmetry (Cranial nerve 7 motor function) (limited exam due to video visit)          [x] No gaze palsy        [] Abnormal -          Skin:        [x] No significant exanthematous lesions or discoloration noted on facial skin         [] Abnormal -            Psychiatric:       [x] Normal Affect [] Abnormal -        [x] No Hallucinations    We discussed the expected course, resolution and complications of the diagnosis(es) in detail. Medication risks, benefits, costs, interactions, and alternatives were discussed as indicated. I advised her to contact the office if her condition worsens, changes or fails to improve as anticipated. She expressed understanding with the diagnosis(es) and plan. Jaelyn Weiner is a 63 y.o. female who was evaluated by a video visit encounter for concerns as above. Patient identification was verified prior to start of the visit. A caregiver was present when appropriate. Due to this being a TeleHealth encounter ([facility]), evaluation of the following organ systems was limited: Vitals/Constitutional/EENT/Resp/CV/GI//MS/Neuro/Skin/Heme-Lymph-Imm. Pursuant to the emergency declaration under the [facility], 1135 waiver authority and the [place] and [employer] Act, this Virtual  Visit was conducted, with patient's (and/or legal guardian's) consent, to reduce the patient's risk of exposure to COVID-19 and provide necessary medical care. Services were provided through a video synchronous discussion virtually to substitute for in-person clinic visit. Patient and provider were located at their individual homes.       Shelley Metz NP

## 2020-04-30 ENCOUNTER — VIRTUAL VISIT (OUTPATIENT)
Dept: ORTHOPEDIC SURGERY | Age: 60
End: 2020-04-30

## 2020-04-30 DIAGNOSIS — M70.61 TROCHANTERIC BURSITIS, RIGHT HIP: ICD-10-CM

## 2020-04-30 DIAGNOSIS — M17.11 PRIMARY OSTEOARTHRITIS OF RIGHT KNEE: Primary | ICD-10-CM

## 2020-04-30 DIAGNOSIS — M25.551 RIGHT HIP PAIN: ICD-10-CM

## 2020-04-30 RX ORDER — CELECOXIB 200 MG/1
200 CAPSULE ORAL 2 TIMES DAILY
Qty: 60 CAP | Refills: 2 | Status: SHIPPED | OUTPATIENT
Start: 2020-04-30 | End: 2020-05-07 | Stop reason: SDUPTHER

## 2020-04-30 RX ORDER — CELECOXIB 200 MG/1
200 CAPSULE ORAL DAILY
Qty: 30 CAP | Refills: 2 | Status: CANCELLED | OUTPATIENT
Start: 2020-04-30 | End: 2020-07-29
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
## 2020-05-02 DIAGNOSIS — M70.61 TROCHANTERIC BURSITIS, RIGHT HIP: ICD-10-CM

## 2020-05-02 DIAGNOSIS — M25.551 RIGHT HIP PAIN: ICD-10-CM

## 2020-05-04 RX ORDER — CELECOXIB 200 MG/1
200 CAPSULE ORAL DAILY
Qty: 30 CAP | Refills: 2 | Status: SHIPPED | OUTPATIENT
Start: 2020-05-04 | End: 2020-05-19

## 2020-05-07 ENCOUNTER — VIRTUAL VISIT (OUTPATIENT)
Dept: FAMILY MEDICINE CLINIC | Age: 60
End: 2020-05-07

## 2020-05-07 VITALS — TEMPERATURE: 96.2 F | DIASTOLIC BLOOD PRESSURE: 87 MMHG | SYSTOLIC BLOOD PRESSURE: 146 MMHG

## 2020-05-07 DIAGNOSIS — T78.40XA ALLERGIC REACTION, INITIAL ENCOUNTER: Primary | ICD-10-CM

## 2020-05-07 RX ORDER — HYDROXYZINE 25 MG/1
25 TABLET, FILM COATED ORAL
Qty: 30 TAB | Refills: 0 | Status: SHIPPED | OUTPATIENT
Start: 2020-05-07 | End: 2020-05-19

## 2020-05-07 RX ORDER — EPINEPHRINE 0.3 MG/.3ML
0.3 INJECTION SUBCUTANEOUS
Qty: 1 SYRINGE | Refills: 0 | Status: SHIPPED | OUTPATIENT
Start: 2020-05-07 | End: 2020-05-07
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
## 2020-05-07 NOTE — PROGRESS NOTES
Dory Cristina presents today for   Chief Complaint   Patient presents with    Blister     left ear, sx started yesterday. Seen at [facility] ER and treated with Claritin    Facial Swelling       Dory Cristina preferred language for health care discussion is english/other. Is someone accompanying this pt? no    Is the patient using any DME equipment during [address]? no    Depression Screening:  3 most recent PHQ Screens 3/12/2020   PHQ Not Done Patient Decline   Little interest or pleasure in doing things -   Feeling down, depressed, irritable, or hopeless -   Total Score PHQ 2 -       Learning Assessment:  Learning Assessment 1/29/2020   PRIMARY LEARNER Patient   HIGHEST LEVEL OF EDUCATION - PRIMARY LEARNER  4 YEARS OF COLLEGE   BARRIERS PRIMARY LEARNER NONE   CO-LEARNER CAREGIVER No   CO-LEARNER NAME -   PRIMARY LANGUAGE ENGLISH    NEED No   LEARNER PREFERENCE PRIMARY DEMONSTRATION   ANSWERED BY patient   RELATIONSHIP SELF       Abuse Screening:  Abuse Screening Questionnaire 1/29/2020   Do you ever feel afraid of your partner? N   Are you in a relationship with someone who physically or mentally threatens you? N   Is it safe for you to go home? Y       Generalized Anxiety  No flowsheet data found. Health Maintenance Due   Topic Date Due    Eye Exam Retinal or Dilated  08/05/1970    Shingrix Vaccine Age 50> (1 of 2) 08/05/2011    GLAUCOMA SCREENING Q2Y  09/29/2016    Foot Exam Q1  05/16/2020   . Health Maintenance reviewed and discussed and ordered per Provider. Coordination of Care:  1. Have you been to the ER, urgent care clinic since your last visit? Hospitalized since your last visit? Yes, [facility]    2. Have you seen or consulted any other health care providers outside of the [address] [place] [address] since your last visit? Include any pap smears or colon screening.  no      Advance Directive:  Discussed 1/29/20

## 2020-05-11 ENCOUNTER — TELEPHONE (OUTPATIENT)
Dept: FAMILY MEDICINE CLINIC | Age: 60
End: 2020-05-11

## 2020-05-12 ENCOUNTER — APPOINTMENT (OUTPATIENT)
Dept: CT IMAGING | Age: 60
DRG: 246 | End: 2020-05-12
Attending: GENERAL PRACTICE
Payer: MEDICARE

## 2020-05-12 ENCOUNTER — HOSPITAL ENCOUNTER (INPATIENT)
Age: 60
LOS: 7 days | Discharge: HOME HEALTH | DRG: 246 | End: 2020-05-19
Attending: EMERGENCY MEDICINE | Admitting: INTERNAL MEDICINE
Payer: MEDICARE

## 2020-05-12 ENCOUNTER — APPOINTMENT (OUTPATIENT)
Dept: GENERAL RADIOLOGY | Age: 60
DRG: 246 | End: 2020-05-12
Attending: GENERAL PRACTICE
Payer: MEDICARE

## 2020-05-12 DIAGNOSIS — R06.03 RESPIRATORY DISTRESS: ICD-10-CM

## 2020-05-12 DIAGNOSIS — I21.4 NSTEMI (NON-ST ELEVATED MYOCARDIAL INFARCTION) (HCC): Primary | ICD-10-CM

## 2020-05-12 DIAGNOSIS — I50.9 CHF (CONGESTIVE HEART FAILURE) (HCC): ICD-10-CM

## 2020-05-12 DIAGNOSIS — R41.82 ALTERED MENTAL STATUS, UNSPECIFIED ALTERED MENTAL STATUS TYPE: ICD-10-CM

## 2020-05-12 DIAGNOSIS — E87.20 LACTIC ACIDOSIS: ICD-10-CM

## 2020-05-12 PROBLEM — R55 SYNCOPE AND COLLAPSE: Status: ACTIVE | Noted: 2020-05-12

## 2020-05-12 LAB
ALBUMIN SERPL-MCNC: 2.9 G/DL (ref 3.4–5)
ALBUMIN/GLOB SERPL: 0.6 {RATIO} (ref 0.8–1.7)
ALP SERPL-CCNC: 188 U/L (ref 45–117)
ALT SERPL-CCNC: 20 U/L (ref 13–56)
ANION GAP SERPL CALC-SCNC: 7 MMOL/L (ref 3–18)
APTT PPP: 31.2 SEC (ref 23–36.4)
APTT PPP: 39.5 SEC (ref 23–36.4)
AST SERPL-CCNC: 42 U/L (ref 10–38)
ATRIAL RATE: 115 BPM
BASOPHILS # BLD: 0 K/UL (ref 0–0.06)
BASOPHILS NFR BLD: 0 % (ref 0–3)
BILIRUB SERPL-MCNC: 0.3 MG/DL (ref 0.2–1)
BUN SERPL-MCNC: 8 MG/DL (ref 7–18)
BUN/CREAT SERPL: 7 (ref 12–20)
CALCIUM SERPL-MCNC: 8.3 MG/DL (ref 8.5–10.1)
CALCULATED P AXIS, ECG09: 46 DEGREES
CALCULATED R AXIS, ECG10: 22 DEGREES
CALCULATED T AXIS, ECG11: -30 DEGREES
CHLORIDE SERPL-SCNC: 103 MMOL/L (ref 100–111)
CK MB CFR SERPL CALC: 4.8 % (ref 0–4)
CK MB SERPL-MCNC: 20.4 NG/ML (ref 5–25)
CK SERPL-CCNC: 427 U/L (ref 26–192)
CO2 SERPL-SCNC: 27 MMOL/L (ref 21–32)
CREAT SERPL-MCNC: 1.11 MG/DL (ref 0.6–1.3)
DIAGNOSIS, 93000: NORMAL
DIFFERENTIAL METHOD BLD: ABNORMAL
EOSINOPHIL # BLD: 0 K/UL (ref 0–0.4)
EOSINOPHIL NFR BLD: 0 % (ref 0–5)
ERYTHROCYTE [DISTWIDTH] IN BLOOD BY AUTOMATED COUNT: 16.9 % (ref 11.6–14.5)
ETHANOL SERPL-MCNC: <3 MG/DL (ref 0–3)
GLOBULIN SER CALC-MCNC: 4.9 G/DL (ref 2–4)
GLUCOSE SERPL-MCNC: 209 MG/DL (ref 74–99)
HCT VFR BLD AUTO: 32.8 % (ref 35–45)
HGB BLD-MCNC: 10.6 G/DL (ref 12–16)
LACTATE BLD-SCNC: 0.84 MMOL/L (ref 0.4–2)
LACTATE BLD-SCNC: 4.74 MMOL/L (ref 0.4–2)
LYMPHOCYTES # BLD: 1.4 K/UL (ref 0.8–3.5)
LYMPHOCYTES NFR BLD: 8 % (ref 20–51)
MCH RBC QN AUTO: 25.4 PG (ref 24–34)
MCHC RBC AUTO-ENTMCNC: 32.3 G/DL (ref 31–37)
MCV RBC AUTO: 78.5 FL (ref 74–97)
MONOCYTES # BLD: 0.3 K/UL (ref 0–1)
MONOCYTES NFR BLD: 2 % (ref 2–9)
NEUTS SEG # BLD: 15.2 K/UL (ref 1.8–8)
NEUTS SEG NFR BLD: 90 % (ref 42–75)
NRBC BLD-RTO: 1 PER 100 WBC
P-R INTERVAL, ECG05: 176 MS
PLATELET # BLD AUTO: 300 K/UL (ref 135–420)
PLATELET COMMENTS,PCOM: ABNORMAL
PMV BLD AUTO: 10.2 FL (ref 9.2–11.8)
POTASSIUM SERPL-SCNC: 4.4 MMOL/L (ref 3.5–5.5)
PROT SERPL-MCNC: 7.8 G/DL (ref 6.4–8.2)
Q-T INTERVAL, ECG07: 338 MS
QRS DURATION, ECG06: 86 MS
QTC CALCULATION (BEZET), ECG08: 467 MS
RBC # BLD AUTO: 4.18 M/UL (ref 4.2–5.3)
RBC MORPH BLD: ABNORMAL
SODIUM SERPL-SCNC: 137 MMOL/L (ref 136–145)
TROPONIN I SERPL-MCNC: 0.5 NG/ML (ref 0–0.04)
TROPONIN I SERPL-MCNC: 3.79 NG/ML (ref 0–0.04)
VENTRICULAR RATE, ECG03: 115 BPM
WBC # BLD AUTO: 16.9 K/UL (ref 4.6–13.2)

## 2020-05-12 PROCEDURE — 70450 CT HEAD/BRAIN W/O DYE: CPT

## 2020-05-12 PROCEDURE — 87635 SARS-COV-2 COVID-19 AMP PRB: CPT

## 2020-05-12 PROCEDURE — 74011250637 HC RX REV CODE- 250/637: Performed by: GENERAL PRACTICE

## 2020-05-12 PROCEDURE — 85025 COMPLETE CBC W/AUTO DIFF WBC: CPT

## 2020-05-12 PROCEDURE — 80053 COMPREHEN METABOLIC PANEL: CPT

## 2020-05-12 PROCEDURE — 74011636320 HC RX REV CODE- 636/320: Performed by: EMERGENCY MEDICINE

## 2020-05-12 PROCEDURE — 84484 ASSAY OF TROPONIN QUANT: CPT

## 2020-05-12 PROCEDURE — 83605 ASSAY OF LACTIC ACID: CPT

## 2020-05-12 PROCEDURE — 74011000258 HC RX REV CODE- 258: Performed by: EMERGENCY MEDICINE

## 2020-05-12 PROCEDURE — 82550 ASSAY OF CK (CPK): CPT

## 2020-05-12 PROCEDURE — 71275 CT ANGIOGRAPHY CHEST: CPT

## 2020-05-12 PROCEDURE — 74011250636 HC RX REV CODE- 250/636: Performed by: EMERGENCY MEDICINE

## 2020-05-12 PROCEDURE — 71045 X-RAY EXAM CHEST 1 VIEW: CPT

## 2020-05-12 PROCEDURE — 77030038269 HC DRN EXT URIN PURWCK BARD -A

## 2020-05-12 PROCEDURE — 80307 DRUG TEST PRSMV CHEM ANLYZR: CPT

## 2020-05-12 PROCEDURE — 87086 URINE CULTURE/COLONY COUNT: CPT

## 2020-05-12 PROCEDURE — 93005 ELECTROCARDIOGRAM TRACING: CPT

## 2020-05-12 PROCEDURE — 94762 N-INVAS EAR/PLS OXIMTRY CONT: CPT

## 2020-05-12 PROCEDURE — 96365 THER/PROPH/DIAG IV INF INIT: CPT

## 2020-05-12 PROCEDURE — 65660000000 HC RM CCU STEPDOWN

## 2020-05-12 PROCEDURE — 85730 THROMBOPLASTIN TIME PARTIAL: CPT

## 2020-05-12 PROCEDURE — C1874 STENT, COATED/COV W/DEL SYS: HCPCS

## 2020-05-12 PROCEDURE — 96375 TX/PRO/DX INJ NEW DRUG ADDON: CPT

## 2020-05-12 PROCEDURE — 99285 EMERGENCY DEPT VISIT HI MDM: CPT

## 2020-05-12 PROCEDURE — 36415 COLL VENOUS BLD VENIPUNCTURE: CPT

## 2020-05-12 PROCEDURE — 87040 BLOOD CULTURE FOR BACTERIA: CPT

## 2020-05-12 PROCEDURE — 74011250637 HC RX REV CODE- 250/637: Performed by: EMERGENCY MEDICINE

## 2020-05-12 RX ORDER — CALCIUM CARBONATE/VITAMIN D3 600MG-5MCG
1 TABLET ORAL DAILY
Status: DISCONTINUED | OUTPATIENT
Start: 2020-05-13 | End: 2020-05-19 | Stop reason: HOSPADM

## 2020-05-12 RX ORDER — VANCOMYCIN 2 GRAM/500 ML IN 0.9 % SODIUM CHLORIDE INTRAVENOUS
2000 ONCE
Status: COMPLETED | OUTPATIENT
Start: 2020-05-12 | End: 2020-05-12

## 2020-05-12 RX ORDER — FUROSEMIDE 20 MG/1
20 TABLET ORAL 2 TIMES DAILY
Status: DISCONTINUED | OUTPATIENT
Start: 2020-05-12 | End: 2020-05-14

## 2020-05-12 RX ORDER — CLOPIDOGREL BISULFATE 75 MG/1
75 TABLET ORAL DAILY
Status: DISCONTINUED | OUTPATIENT
Start: 2020-05-13 | End: 2020-05-19 | Stop reason: HOSPADM

## 2020-05-12 RX ORDER — LANOLIN ALCOHOL/MO/W.PET/CERES
500 CREAM (GRAM) TOPICAL DAILY
Status: DISCONTINUED | OUTPATIENT
Start: 2020-05-13 | End: 2020-05-19 | Stop reason: HOSPADM

## 2020-05-12 RX ORDER — ASCORBIC ACID 500 MG
500 TABLET ORAL DAILY
COMMUNITY

## 2020-05-12 RX ORDER — BACLOFEN 10 MG/1
10 TABLET ORAL 4 TIMES DAILY
Status: ON HOLD | COMMUNITY
End: 2020-05-18

## 2020-05-12 RX ORDER — HEPARIN SODIUM 1000 [USP'U]/ML
50 INJECTION, SOLUTION INTRAVENOUS; SUBCUTANEOUS ONCE
Status: COMPLETED | OUTPATIENT
Start: 2020-05-12 | End: 2020-05-12

## 2020-05-12 RX ORDER — THERA TABS 400 MCG
1 TAB ORAL DAILY
Status: DISCONTINUED | OUTPATIENT
Start: 2020-05-13 | End: 2020-05-19 | Stop reason: HOSPADM

## 2020-05-12 RX ORDER — ASCORBIC ACID 250 MG
500 TABLET ORAL DAILY
Status: DISCONTINUED | OUTPATIENT
Start: 2020-05-13 | End: 2020-05-19 | Stop reason: HOSPADM

## 2020-05-12 RX ORDER — DIPHENHYDRAMINE HCL 25 MG
25 CAPSULE ORAL
Status: COMPLETED | OUTPATIENT
Start: 2020-05-12 | End: 2020-05-12

## 2020-05-12 RX ORDER — VANCOMYCIN HYDROCHLORIDE
1250 EVERY 24 HOURS
Status: DISCONTINUED | OUTPATIENT
Start: 2020-05-13 | End: 2020-05-13

## 2020-05-12 RX ORDER — POLYETHYLENE GLYCOL 3350 17 G/17G
17 POWDER, FOR SOLUTION ORAL DAILY
Status: DISCONTINUED | OUTPATIENT
Start: 2020-05-13 | End: 2020-05-13

## 2020-05-12 RX ORDER — PREGABALIN 75 MG/1
150 CAPSULE ORAL 2 TIMES DAILY
Status: DISCONTINUED | OUTPATIENT
Start: 2020-05-12 | End: 2020-05-15

## 2020-05-12 RX ORDER — BACLOFEN 10 MG/1
10 TABLET ORAL 4 TIMES DAILY
Status: DISCONTINUED | OUTPATIENT
Start: 2020-05-12 | End: 2020-05-12

## 2020-05-12 RX ORDER — GLUCOSAM/CHONDRO/HERB 149/HYAL 750-100 MG
1 TABLET ORAL DAILY
Status: DISCONTINUED | OUTPATIENT
Start: 2020-05-13 | End: 2020-05-19 | Stop reason: HOSPADM

## 2020-05-12 RX ORDER — ASPIRIN 325 MG
325 TABLET ORAL
Status: COMPLETED | OUTPATIENT
Start: 2020-05-12 | End: 2020-05-12

## 2020-05-12 RX ORDER — ACETAMINOPHEN 325 MG/1
650 TABLET ORAL
Status: DISCONTINUED | OUTPATIENT
Start: 2020-05-12 | End: 2020-05-19 | Stop reason: HOSPADM

## 2020-05-12 RX ORDER — ROSUVASTATIN CALCIUM 20 MG/1
40 TABLET, COATED ORAL
Status: DISCONTINUED | OUTPATIENT
Start: 2020-05-13 | End: 2020-05-19 | Stop reason: HOSPADM

## 2020-05-12 RX ORDER — CARVEDILOL 6.25 MG/1
6.25 TABLET ORAL 2 TIMES DAILY WITH MEALS
Status: DISCONTINUED | OUTPATIENT
Start: 2020-05-12 | End: 2020-05-18

## 2020-05-12 RX ORDER — NALOXONE HYDROCHLORIDE 0.4 MG/ML
0.4 INJECTION, SOLUTION INTRAMUSCULAR; INTRAVENOUS; SUBCUTANEOUS AS NEEDED
Status: DISCONTINUED | OUTPATIENT
Start: 2020-05-12 | End: 2020-05-19 | Stop reason: HOSPADM

## 2020-05-12 RX ORDER — HEPARIN SODIUM 1000 [USP'U]/ML
3000 INJECTION, SOLUTION INTRAVENOUS; SUBCUTANEOUS ONCE
Status: COMPLETED | OUTPATIENT
Start: 2020-05-12 | End: 2020-05-12

## 2020-05-12 RX ORDER — PANTOPRAZOLE SODIUM 20 MG/1
20 TABLET, DELAYED RELEASE ORAL
Status: DISCONTINUED | OUTPATIENT
Start: 2020-05-13 | End: 2020-05-19 | Stop reason: HOSPADM

## 2020-05-12 RX ORDER — CALCIUM CARBONATE/VITAMIN D3 600 MG-125
500 TABLET ORAL
COMMUNITY
End: 2021-06-03 | Stop reason: SDUPTHER

## 2020-05-12 RX ORDER — BACLOFEN 10 MG/1
5 TABLET ORAL
Status: DISCONTINUED | OUTPATIENT
Start: 2020-05-12 | End: 2020-05-19 | Stop reason: HOSPADM

## 2020-05-12 RX ORDER — FUROSEMIDE 20 MG/1
20 TABLET ORAL 2 TIMES DAILY
COMMUNITY
End: 2020-05-19

## 2020-05-12 RX ORDER — HEPARIN SODIUM 10000 [USP'U]/100ML
12-25 INJECTION, SOLUTION INTRAVENOUS
Status: DISCONTINUED | OUTPATIENT
Start: 2020-05-12 | End: 2020-05-17

## 2020-05-12 RX ADMIN — PREGABALIN 150 MG: 75 CAPSULE ORAL at 17:26

## 2020-05-12 RX ADMIN — PIPERACILLIN SODIUM AND TAZOBACTAM SODIUM 4.5 G: 4; .5 INJECTION, POWDER, LYOPHILIZED, FOR SOLUTION INTRAVENOUS at 06:20

## 2020-05-12 RX ADMIN — IOPAMIDOL 95 ML: 755 INJECTION, SOLUTION INTRAVENOUS at 08:00

## 2020-05-12 RX ADMIN — CARVEDILOL 6.25 MG: 6.25 TABLET, FILM COATED ORAL at 17:23

## 2020-05-12 RX ADMIN — FUROSEMIDE 20 MG: 20 TABLET ORAL at 17:27

## 2020-05-12 RX ADMIN — ASPIRIN 325 MG ORAL TABLET 325 MG: 325 PILL ORAL at 06:20

## 2020-05-12 RX ADMIN — AZITHROMYCIN MONOHYDRATE 500 MG: 500 INJECTION, POWDER, LYOPHILIZED, FOR SOLUTION INTRAVENOUS at 17:22

## 2020-05-12 RX ADMIN — PIPERACILLIN SODIUM AND TAZOBACTAM SODIUM 4.5 G: 4; .5 INJECTION, POWDER, LYOPHILIZED, FOR SOLUTION INTRAVENOUS at 20:17

## 2020-05-12 RX ADMIN — HEPARIN SODIUM 3000 UNITS: 1000 INJECTION, SOLUTION INTRAVENOUS; SUBCUTANEOUS at 18:00

## 2020-05-12 RX ADMIN — ACETAMINOPHEN 650 MG: 325 TABLET ORAL at 17:51

## 2020-05-12 RX ADMIN — ACETAMINOPHEN 650 MG: 325 TABLET ORAL at 23:56

## 2020-05-12 RX ADMIN — PIPERACILLIN SODIUM AND TAZOBACTAM SODIUM 4.5 G: 4; .5 INJECTION, POWDER, LYOPHILIZED, FOR SOLUTION INTRAVENOUS at 13:34

## 2020-05-12 RX ADMIN — VANCOMYCIN HYDROCHLORIDE 2000 MG: 10 INJECTION, POWDER, LYOPHILIZED, FOR SOLUTION INTRAVENOUS at 06:36

## 2020-05-12 RX ADMIN — DIPHENHYDRAMINE HYDROCHLORIDE 25 MG: 25 CAPSULE ORAL at 06:20

## 2020-05-12 RX ADMIN — SODIUM CHLORIDE 1000 ML: 900 INJECTION, SOLUTION INTRAVENOUS at 06:20

## 2020-05-12 RX ADMIN — HEPARIN SODIUM 4000 UNITS: 1000 INJECTION, SOLUTION INTRAVENOUS; SUBCUTANEOUS at 09:14

## 2020-05-12 RX ADMIN — HEPARIN SODIUM 12 UNITS/KG/HR: 10000 INJECTION, SOLUTION INTRAVENOUS at 09:14

## 2020-05-12 NOTE — Clinical Note
TRANSFER - OUT REPORT:     Verbal report given to: Ny Gómez RN. Report consisted of patient's Situation, Background, Assessment and   Recommendations(SBAR). Opportunity for questions and clarification was provided. Patient transported with a Cardiac Cath Tech / Patient Care Tech. Patient transported to: [address]
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
## 2020-05-12 NOTE — Clinical Note
TRANSFER - IN REPORT:     Verbal report received from: Gilberto Watkins, [facility]. Report consisted of patient's Situation, Background, Assessment and   Recommendations(SBAR). Opportunity for questions and clarification was provided. Assessment completed upon patient's arrival to unit and care assumed. Patient transported with a Cardiac Cath Tech / Patient Care Tech.
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
## 2020-05-12 NOTE — PROGRESS NOTES
1300: Pt arrived to the unit. Appears to be in stable condition. No acute distress noted. Pt remains on 3 l nc and also D.O.E. No c/o pain at this time. 05/12/20 1317   Vital Signs   Temp 98.6 °F (37 °C)   Temp Source Oral   Pulse (Heart Rate) 85   Resp Rate 20   O2 Sat (%) 98 %   Level of Consciousness Alert   /77   MAP (Calculated) 95   MEWS Score 1     Updated lactic  Results for Feliciano Posey (MRN [id number]) as of 5/12/2020 15:20   Ref.  Range 5/12/2020 05:15 5/12/2020 12:24   Lactic Acid (POC) Latest Ref Range: 0.40 - 2.00 mmol/L 4.74 (HH) 0.84

## 2020-05-12 NOTE — CONSULTS
[facility] - Consult Note    Consultation request by No admitting provider for patient encounter. for advice/opinion related to evaluating No admission diagnoses are documented for this encounter.     Date of  Admission: 5/12/2020  4:23 AM   Primary Care Physician:  Shy Mendez NP     Assessment:     Patient Active Problem List   Diagnosis Code    Osteoarthritis M19.90    Chronic pain G89.29    Coronary artery disease I25.10    Hyperlipidemia E78.5    Hot flashes R23.2    Family history of diabetes mellitus Z83.3    Family history of cancer Z80.9    Morbid obesity with BMI of 40.0-44.9, adult ([facility] E66.01, Z68.41    Diabetes mellitus type 2 in obese ([facility] E11.69, E66.9    Morbid obesity ([facility] E66.01    Neck pain M54.2    Acute chest pain R07.9    Chronic coronary artery disease I25.10    Generalized ischemic myocardial dysfunction I25.5    Chest pain R07.9    Chronic systolic heart failure ([facility]) I50.22    Skin sensation disturbance R20.9    Drug psychosis ([facility]) F19.959    Fever R50.9    Pain of foot M79.673    Bariatric surgery status Z98.84    Hemiplegia of dominant side as late effect following cerebrovascular disease ([facility]) I69.959    Hypertension I10    Automatic implantable cardioverter-defibrillator in situ Z95.810    Neuropathy G62.9    Degenerative joint disease of pelvic region M16.10    Retention of urine R33.9    ST elevation myocardial infarction (STEMI) ([facility]) I21.3    History of repair of hip joint Z98.890    History of total hip replacement Z96.649    Syncope R55    Thoracic and lumbosacral neuritis M54.14, M54.17    Lumbosacral spondylosis without myelopathy M47.817    Lumbar neuritis M54.16    Spondylosis of lumbosacral region without myelopathy or radiculopathy M47.817    Radiculopathy, thoracic region M54.14    Spondylosis without myelopathy or radiculopathy, lumbosacral region M47.817    Spondylosis of cervical region without myelopathy or radiculopathy M47.812    Cervical neuritis M54.12    DDD (degenerative disc disease), cervical M50.30    Spondylosis without myelopathy or radiculopathy, cervical region M47.812    Radiculopathy, cervical M54.12    Other cervical disc degeneration, unspecified cervical region M50.30    Incomplete tear of left rotator cuff M75.112    Spondylosis of lumbosacral joint without myelopathy or radiculopathy M47.817    Nontraumatic incomplete tear of rotator cuff M75.110    Cervical spondylosis without myelopathy M47.812    Type 2 diabetes mellitus with diabetic neuropathy (HCC) E11.40    Urinary incontinence R32    Cervical radiculopathy M54.12    Lumbar radiculopathy M54.16    HNP (herniated nucleus pulposus), cervical M50.20       -Unresponsiveness, EMS was called for AMS, patient was unresponsive with agonal breathing. Work up for possible infectious process, versus PE, versus seizure, versus medication (chronic pain). Covid testing pending  -Indeterminate troponin, do not suspect primary cardiac event, ECG with nonspecific ST wave abnormalities, known CAD as noted below.  -Shortness of breath, CTA pending to rule out PE, possible PNA. -Fever 100.2 with Leukocytosis, possible PNA. -Recent knee surgery. -H/o ICMY which has improved over the years, EF 50% 5/2017.  -Medtronic single lead AICD, normal function on interrogation this admission. -CAD s/p anterior wall MI 6/2013 s/p PCI to LAD. -Hypertension. Stable. -Diabetes mellitus. HgbA1c 6.5.  -Dyslipidemia. On statin.  -Chronic pain, extensive orthopedic history. Primary cardiology [facility]     Plan: Will continue serial biomarkers and ECGs. Will review echocardiogram once complete. Normal AICD function on interrogation this admission without recent events. Would continue infectious work and treatment and Covid testing per primary team.  Patient has been started on heparin infusion.   Resume home ASA, plavix, statin, BB if able to tolerate oral medications. History of Present Illness: This is a 63 y.o. female admitted for No admission diagnoses are documented for this encounter. .      Patient complains of:  AMS. Patient poor historian. Patient has recent SOB, worse last night. EMS was called by family as patient was not responsive. Patient had agonal breathing. Patient is found to have fever and leukocytosis. Patient has possible pna, patient is being ruled out for PE. Patient is noted to have indeterminate troponin. ECG on field reportedly conserning for ST elevations. ECG here with nonspecific ST wave abnormalities. Patient denies CP. Cardiac risk factors: dyslipidemia, diabetes mellitus, hypertension    Cath 2015  Findings:   Hemodynamics:  1. LVEDP was 8 mmHg, there was no change post LV-gram  2. There was no transvalvular aortic gradient on pull back  3. There was no mitral regurgitation on LV-gram  4. LV-gram revealed a normal LV function with an ejection   fraction of 45% with apical hypokinesis    Angiographic  1. Left main is angiographically normal- short then bifuctates   into LAD and LCX  2. LAD is angiographically normal- tapers from LMA, 30% stenosis   just before previously placed stent in LAD. LAD stent appeart   patent. This vessel tapers significantly on course to apex ( << 2   mm)  3. Left circumflex has luminal irregularities with THOM 3 flow  4. RCA is very small non dominant with diffuse disease    Echo 2017  FINDINGS:     Left Ventricle: The left ventricle was normal in size. Mild left ventricular hypertrophy present. Left Ventricle Hypokinesis of the left ventricular apex. Mildly reduced left ventricular systolic function. Grade I   Function: (Mild) diastolic dysfunction. LVEF: 50 %       Left Atrium: The left atrium was normal in size. Right Ventricle: The right ventricle was normal in size.  Normal right ventricular global systolic function. TAPSE is   2.02 cm and TAPSV is 10.1 cm/s.    Right Atrium: The right atrium was normal in size.  Catheter/pacemaker wire present in the right atrial cavity. Mitral Valve: The mitral leaflets were normal.  No evidence of mitral regurgitation. Aortic Valve: The aortic valve leaflets were normal.  No evidence of aortic regurgitation or stenosis. Tricuspid Valve: The tricuspid valve was structurally normal.  Trace tricuspid regurgitation. Normal pulmonary   artery pressure of 25 mmHg. Pulmonic Valve: The pulmonic valve was structurally normal.  No evidence of pulmonic regurgitation. Aorta: The aorta was normal.   Pericardium: The pericardium was normal.   Masses / Shunts: No masses, shunts or thrombi seen. Review of Symptoms:  Unable to provide reliable 10 point ROS.      Past Medical History:     Past Medical History:   Diagnosis Date    Arm pain     Arrhythmia      Medtronic ICD     Arthritis     ALL OVER    CAD (coronary artery disease)     STENTS PLACED X2    Chronic pain     KNEE & LOWER BACK    Diabetes (HCC)     GERD (gastroesophageal reflux disease)     H/O gastric bypass     Heart attack ([address]     Heart failure ([address]     ischemic cardiomyopathy    Hypertension     Nerve damage     in bilat legs and feet    Spinal cord injury          Social History:     Social History     Socioeconomic History    Marital status: SINGLE     Spouse name: Not on file    Number of children: Not on file    Years of education: Not on file    Highest education level: Not on file   Tobacco Use    Smoking status: Former Smoker     Last attempt to quit: 2013     Years since quittin.9    Smokeless tobacco: Never Used   Substance and Sexual Activity    Alcohol use: No    Drug use: No    Sexual activity: Never     Comment: Hysterectomy        Family History:     Family History   Problem Relation Age of Onset    Diabetes Mother     High Cholesterol Mother     Hypertension Mother     Lupus Mother     Diabetes Father    [address] [facility] Cancer Father     Diabetes Sister     Hypertension Sister     Diabetes Brother     Hypertension Brother     Hypertension Sister     Anemia Sister     Heart Disease Other     Other Other         Arthritis    Cancer Maternal Grandfather         Medications: Allergies   Allergen Reactions    Dextromethorphan-Guaifenesin Other (comments)    Aspirin Hives        Current Facility-Administered Medications   Medication Dose Route Frequency    piperacillin-tazobactam (ZOSYN) 4.5 g in 0.9% sodium chloride (MBP/ADV) 100 mL MBP  4.5 g IntraVENous Q6H    vancomycin (VANCOCIN) 2000 mg in  ml infusion  2,000 mg IntraVENous ONCE    VANCOMYCIN INFORMATION NOTE   Other Rx Dosing/Monitoring     Current Outpatient Medications   Medication Sig    hydrOXYzine HCL (ATARAX) 25 mg tablet Take 1 Tab by mouth three (3) times daily as needed for Itching for up to 10 days.  celecoxib (CELEBREX) 200 mg capsule TAKE 1 CAP BY MOUTH DAILY FOR 90 DAYS. TAKE WITH FOOD    zolpidem (AMBIEN) 10 mg tablet Take 1 Tab by mouth nightly as needed for Sleep. Max Daily Amount: 10 mg.    omeprazole (PRILOSEC) 20 mg capsule Take 1 Cap by mouth daily.  Klor-Con M10 10 mEq tablet TAKE 1 TABLET BY MOUTH EVERY DAY    Linzess 145 mcg cap capsule TAKE 1 CAPSULE BY MOUTH DAILY    DULoxetine (CYMBALTA) 30 mg capsule TAKE 1 CAPSULE BY MOUTH EVERY DAY    omega 3-dha-epa-fish oil (FISH OIL) 100-160-1,000 mg cap Take  by mouth.  loratadine (CLARITIN) 10 mg tablet Take 1 Tab by mouth daily.  methocarbamol (ROBAXIN) 500 mg tablet TAKE 1 TABLET BY MOUTH FOUR TIMES DAILY AS NEEDED FOR MUSCLE SPASM    triamcinolone acetonide (KENALOG) 0.1 % ointment Apply  to affected area two (2) times a day. use thin layer    pregabalin (LYRICA) 300 mg capsule Take 1 Cap by mouth two (2) times a day. Max Daily Amount: 600 mg.     Blood-Gluc Transmitter-Sensor misc Free Style kitty Sensor - 3x daily    lancets misc Free style Kitty lancets -test twice a day    glucose blood VI test strips (BLOOD GLUCOSE TEST) strip Free style Kitty test strips - test twice a day    Blood-Glucose Meter monitoring kit Free Style Kitty meter - for blood glucose checks twice a day    montelukast (SINGULAIR) 10 mg tablet TK 1 T PO D    rosuvastatin (CRESTOR) 40 mg tablet TAKE 1 TABLET BY MOUTH DAILY. Appointment required for additional refills.  diclofenac (VOLTAREN) 1 % gel Apply  to affected area four (4) times daily. Maximum 16 grams per joint per day. Dispense 5 100 gram tubes    acetaminophen 325 mg cap Take 1 Tab by Mouth Every 6 Hours As Needed for Pain.  ferrous gluconate 324 mg (38 mg iron) tablet Take 324 mg by mouth Daily (before breakfast).  calcipotriene (DOVONEX) 0.005 % topical cream Use 1 Application to affected area Daily as needed.  polyethylene glycol (MIRALAX) 17 gram packet Take 17 g by mouth daily.  brief disposable (ADULT) misc by Does Not Apply route. Dispense one package of 180 briefs/ diapers.  lisinopril (PRINIVIL, ZESTRIL) 20 mg tablet Take 20 mg by mouth daily.  ergocalciferol (ERGOCALCIFEROL) 50,000 unit capsule Take 1 Cap by mouth every seven (7) days.  miscellaneous medical supply misc 2 Each by Does Not Apply route daily.  miscellaneous medical supply misc 2 Each by Does Not Apply route daily.  miscellaneous medical supply misc 1 Each by Does Not Apply route daily.  miscellaneous medical supply misc 1 Each by Does Not Apply route daily.  furosemide (LASIX) 40 mg tablet TAKE 1 TABLET BY MOUTH TWICE DAILY AS NEEDED    carvedilol (COREG) 12.5 mg tablet Take 6.25 mg by mouth two (2) times daily (with meals).  cyanocobalamin (VITAMIN B-12) 500 mcg tablet Take 500 mcg by mouth daily.  clopidogrel (PLAVIX) 75 mg tablet Take 1 tablet by mouth daily.  therapeutic multivitamin (THERAGRAN) tablet Take 1 tablet by mouth daily.          Physical Exam:     Visit Vitals  /80   Pulse 86   Temp 100.2 °F (37.9 °C)   Resp 19   SpO2 99%     BP Readings from Last 3 Encounters:   05/12/20 129/80   05/07/20 146/87   03/20/20 (!) 156/91     Pulse Readings from Last 3 Encounters:   05/12/20 86   03/20/20 81   03/12/20 68     Wt Readings from Last 3 Encounters:   03/20/20 82.7 kg (182 lb 6.4 oz)   03/12/20 82.6 kg (182 lb 3.2 oz)   03/02/20 82.6 kg (182 lb)       General:  no distress  Neck:  no JVD  Lungs:  clear to auscultation bilaterally  Heart:  regular rate and rhythm, S1, S2 normal, no murmur, click, rub or gallop  Abdomen:  abdomen is soft without significant tenderness, masses, organomegaly or guarding  Extremities:  extremities normal, atraumatic, no cyanosis or edema  Skin: Warm and dry.  no hyperpigmentation, vitiligo, or suspicious lesions  Neuro: sleepy on exam  Psych: non focal     Data Review:     Recent Labs     05/12/20  0505   WBC 16.9*   HGB 10.6*   HCT 32.8*        Recent Labs     05/12/20  0505      K 4.4      CO2 27   *   BUN 8   CREA 1.11   CA 8.3*   ALB 2.9*   SGOT 42*   ALT 20       Results for orders placed or performed during the hospital encounter of 07/26/16   EKG, 12 LEAD, INITIAL   Result Value Ref Range    Ventricular Rate 59 BPM    Atrial Rate 59 BPM    P-R Interval 180 ms    QRS Duration 76 ms    Q-T Interval 468 ms    QTC Calculation (Bezet) 463 ms    Calculated P Axis 48 degrees    Calculated R Axis -2 degrees    Calculated T Axis 117 degrees    Diagnosis       Sinus bradycardia  Septal infarct (cited on or before 14-JUL-2014)  ST & T wave abnormality, consider anterolateral ischemia  Abnormal ECG  When compared with ECG of 14-JUL-2014 13:01,  Serial changes of Septal infarct present  Confirmed by Lenita Phoenix MD, [facility] ([id number]) on 7/26/2016 4:52:10 PM         All Cardiac Markers in the last 24 hours:    Lab Results   Component Value Date/Time    TROIQ 0.50 (H) 05/12/2020 05:05 AM       Last Lipid:    Lab Results   Component Value Date/Time    Cholesterol, total 242 (H) 08/15/2019 10:56 AM    HDL Cholesterol 54 08/15/2019 10:56 AM    LDL, calculated 160 (H) 08/15/2019 10:56 AM    Triglyceride 140 08/15/2019 10:56 AM    CHOL/HDL Ratio 3.8 03/07/2018 11:22 AM       Signed By: BERNICE Aguilar     May 12, 2020

## 2020-05-12 NOTE — Clinical Note
Aspirin Registry Question:   Aspirin was discussed with physician prior to the procedure. Aspirin was not given prior to the procedure.  Patient allergy

## 2020-05-12 NOTE — PROGRESS NOTES
Speech Therapy Note:     SLP evaluation orders received and attempted; however,      []  Lethargic, unable to be alerted enough for safe PO intake  []  Refused participation  []  Off the unit  []  NPO for procedure  [x]  Other: involved in direct nursing care      Will re-attempt next date as pt able to participate.       Thank you for this referral,   Deshaun Castaneda M.S., [address] [facility]  Speech-Language Pathologist

## 2020-05-12 NOTE — ED NOTES
TRANSFER - OUT REPORT:    Verbal report given to DAVID Esposito RN on Jaelyn Weiner  being transferred to [address] [place] [address]  for routine progression of care       Report consisted of patients Situation, Background, Assessment and   Recommendations(SBAR). Information from the following report(s) ED Summary was reviewed with the receiving nurse. Lines:   Peripheral IV 05/12/20 Right Antecubital (Active)   Site Assessment Clean, dry, & intact 5/12/2020  5:17 AM   Phlebitis Assessment 0 5/12/2020  5:17 AM   Infiltration Assessment 0 5/12/2020  5:17 AM   Dressing Status Clean, dry, & intact 5/12/2020  5:17 AM   Dressing Type Transparent 5/12/2020  5:17 AM   Hub Color/Line Status Pink;Patent; Flushed 5/12/2020  5:17 AM       Peripheral IV 05/12/20 Left Antecubital (Active)        Opportunity for questions and clarification was provided.       Patient transported with:   Registered Nurse

## 2020-05-12 NOTE — PROGRESS NOTES
Problem: Falls - Risk of  Goal: *Absence of Falls  Description: Document Leafy Noble Fall Risk and appropriate interventions in the flowsheet.   Outcome: Progressing Towards Goal  Note: Fall Risk Interventions:            Medication Interventions: Bed/chair exit alarm, Teach patient to arise slowly, Patient to call before getting OOB    Elimination Interventions: Call light in reach, Bed/chair exit alarm, Toileting schedule/hourly rounds              Problem: Patient Education: Go to Patient Education Activity  Goal: Patient/Family Education  Outcome: Progressing Towards Goal

## 2020-05-12 NOTE — ED TRIAGE NOTES
Pt arrives to ED after collapsing at home. Per EMS, she was having SOB at the time of arrival. Pt placed on non-rebreather and lethargic upon arrival. Pt had knee surgery recently.

## 2020-05-12 NOTE — Clinical Note
Lesion located in the Ostium Cx Distal OM 1. Balloon inflated using multiple inflations inflation technique. Lesion #1: Pressure = 6 neo; Duration = 5 sec. Inflation 2: Pressure = 6 neo; Duration = 10 sec.

## 2020-05-12 NOTE — ROUTINE PROCESS
Bedside shift change report received from [address]. Report included SBAR, Kardex, MAR, Recent Results, and Plan of Care. Heparin gtt rate verified with offgoing nurse.

## 2020-05-12 NOTE — Clinical Note
Bilateral groin prepped with ChloraPrep and draped. Wet prep solution applied at: 1242. Wet prep solution dried at: 1245. Wet prep elapsed drying time: 3 mins.

## 2020-05-12 NOTE — Clinical Note
Lesion: Located in the Distal OM 1. Stent deployed. First inflation pressure = 12 neo; inflation time: 26 sec.

## 2020-05-12 NOTE — Clinical Note
Lesion located in the Distal OM 1. Balloon inflated using multiple inflations inflation technique. Lesion #1: Pressure = 6 neo; Duration = 8 sec. Inflation 2: Pressure = 8 neo; Duration = 13 sec.

## 2020-05-12 NOTE — Clinical Note
Contrast Dose Calculator:   Patient's age: 63.   Patient's sex: Female. Patient weight (kg) = 85. Creatinine level (mg/dL) = 0.86. Creatinine clearance (mL/min): 96.   Contrast concentration (mg/mL) = 300. MACD = 300 mL. Max Contrast dose per Creatinine Cl calculator = 216 mL.

## 2020-05-13 ENCOUNTER — APPOINTMENT (OUTPATIENT)
Dept: NON INVASIVE DIAGNOSTICS | Age: 60
DRG: 246 | End: 2020-05-13
Attending: PHYSICIAN ASSISTANT
Payer: MEDICARE

## 2020-05-13 LAB
ALBUMIN SERPL-MCNC: 2.2 G/DL (ref 3.4–5)
ALBUMIN/GLOB SERPL: 0.6 {RATIO} (ref 0.8–1.7)
ALP SERPL-CCNC: 128 U/L (ref 45–117)
ALT SERPL-CCNC: 15 U/L (ref 13–56)
ANION GAP SERPL CALC-SCNC: 6 MMOL/L (ref 3–18)
APTT PPP: 57.2 SEC (ref 23–36.4)
APTT PPP: 68.4 SEC (ref 23–36.4)
APTT PPP: 93.2 SEC (ref 23–36.4)
AST SERPL-CCNC: 37 U/L (ref 10–38)
ATRIAL RATE: 81 BPM
BASOPHILS # BLD: 0 K/UL (ref 0–0.1)
BASOPHILS NFR BLD: 0 % (ref 0–2)
BILIRUB SERPL-MCNC: 0.5 MG/DL (ref 0.2–1)
BNP SERPL-MCNC: ABNORMAL PG/ML (ref 0–900)
BUN SERPL-MCNC: 6 MG/DL (ref 7–18)
BUN/CREAT SERPL: 8 (ref 12–20)
C DIFF GDH STL QL: NEGATIVE
C DIFF TOX A+B STL QL IA: NEGATIVE
CALCIUM SERPL-MCNC: 7.5 MG/DL (ref 8.5–10.1)
CALCULATED P AXIS, ECG09: 52 DEGREES
CALCULATED R AXIS, ECG10: 28 DEGREES
CALCULATED T AXIS, ECG11: 180 DEGREES
CHLORIDE SERPL-SCNC: 108 MMOL/L (ref 100–111)
CK MB CFR SERPL CALC: 3.9 % (ref 0–4)
CK MB SERPL-MCNC: 15.7 NG/ML (ref 5–25)
CK SERPL-CCNC: 403 U/L (ref 26–192)
CO2 SERPL-SCNC: 28 MMOL/L (ref 21–32)
CREAT SERPL-MCNC: 0.77 MG/DL (ref 0.6–1.3)
DIAGNOSIS, 93000: NORMAL
DIFFERENTIAL METHOD BLD: ABNORMAL
ECHO AO ROOT DIAM: 2.79 CM
ECHO LA AREA 4C: 15.9 CM2
ECHO LA VOL 2C: 47.09 ML (ref 22–52)
ECHO LA VOL 4C: 39.64 ML (ref 22–52)
ECHO LA VOL BP: 44.69 ML (ref 22–52)
ECHO LA VOL/BSA BIPLANE: 25.47 ML/M2 (ref 16–28)
ECHO LA VOLUME INDEX A2C: 26.83 ML/M2 (ref 16–28)
ECHO LA VOLUME INDEX A4C: 22.59 ML/M2 (ref 16–28)
ECHO LV E' LATERAL VELOCITY: 6.03 CM/S
ECHO LV E' SEPTAL VELOCITY: 5.42 CM/S
ECHO LV EDV A2C: 116.7 ML
ECHO LV EDV A4C: 148.8 ML
ECHO LV EDV BP: 133.5 ML (ref 56–104)
ECHO LV EDV INDEX A4C: 84.8 ML/M2
ECHO LV EDV INDEX BP: 76.1 ML/M2
ECHO LV EDV NDEX A2C: 66.5 ML/M2
ECHO LV EDV TEICHHOLZ: 0.94 ML
ECHO LV EJECTION FRACTION A2C: 42 %
ECHO LV EJECTION FRACTION A4C: 49 %
ECHO LV EJECTION FRACTION BIPLANE: 45.5 % (ref 55–100)
ECHO LV ESV A2C: 67.7 ML
ECHO LV ESV A4C: 75.5 ML
ECHO LV ESV BP: 72.8 ML (ref 19–49)
ECHO LV ESV INDEX A2C: 38.6 ML/M2
ECHO LV ESV INDEX A4C: 43 ML/M2
ECHO LV ESV INDEX BP: 41.5 ML/M2
ECHO LV ESV TEICHHOLZ: 0.46 ML
ECHO LV INTERNAL DIMENSION DIASTOLIC: 5.4 CM (ref 3.9–5.3)
ECHO LV INTERNAL DIMENSION SYSTOLIC: 3.97 CM
ECHO LV IVSD: 0.95 CM (ref 0.6–0.9)
ECHO LV MASS 2D: 239.9 G (ref 67–162)
ECHO LV MASS INDEX 2D: 136.7 G/M2 (ref 43–95)
ECHO LV POSTERIOR WALL DIASTOLIC: 1.02 CM (ref 0.6–0.9)
ECHO LVOT DIAM: 2.02 CM
ECHO LVOT PEAK GRADIENT: 2.9 MMHG
ECHO LVOT PEAK VELOCITY: 85.81 CM/S
ECHO LVOT VTI: 15.42 CM
ECHO MV A VELOCITY: 70.52 CM/S
ECHO MV E DECELERATION TIME (DT): 110.9 MS
ECHO MV E VELOCITY: 76.67 CM/S
ECHO MV E/A RATIO: 1.09
ECHO MV E/E' LATERAL: 12.71
ECHO MV E/E' RATIO (AVERAGED): 13.43
ECHO MV E/E' SEPTAL: 14.15
ECHO RV TAPSE: 2.62 CM (ref 1.5–2)
ECHO TV REGURGITANT MAX VELOCITY: 249.65 CM/S
ECHO TV REGURGITANT PEAK GRADIENT: 24.9 MMHG
EOSINOPHIL # BLD: 0 K/UL (ref 0–0.4)
EOSINOPHIL NFR BLD: 1 % (ref 0–5)
ERYTHROCYTE [DISTWIDTH] IN BLOOD BY AUTOMATED COUNT: 17 % (ref 11.6–14.5)
GLOBULIN SER CALC-MCNC: 3.9 G/DL (ref 2–4)
GLUCOSE SERPL-MCNC: 98 MG/DL (ref 74–99)
HCT VFR BLD AUTO: 26.5 % (ref 35–45)
HEMOCCULT STL QL: NEGATIVE
HGB BLD-MCNC: 8.3 G/DL (ref 12–16)
INR PPP: 1.3 (ref 0.8–1.2)
INTERPRETATION: NORMAL
LVFS 2D: 26.44 %
LVOT MG: 1.57 MMHG
LVOT MV: 0.59 CM/S
LVSV (MOD BI): 33.3 ML
LVSV (MOD SINGLE 4C): 40.21 ML
LVSV (MOD SINGLE): 26.86 ML
LVSV (TEICH): 39.72 ML
LYMPHOCYTES # BLD: 2.1 K/UL (ref 0.9–3.6)
LYMPHOCYTES NFR BLD: 30 % (ref 21–52)
MAGNESIUM SERPL-MCNC: 2.2 MG/DL (ref 1.6–2.6)
MCH RBC QN AUTO: 24.3 PG (ref 24–34)
MCHC RBC AUTO-ENTMCNC: 31.3 G/DL (ref 31–37)
MCV RBC AUTO: 77.7 FL (ref 74–97)
MONOCYTES # BLD: 0.5 K/UL (ref 0.05–1.2)
MONOCYTES NFR BLD: 7 % (ref 3–10)
MV DEC SLOPE: 6.91
NEUTS SEG # BLD: 4.4 K/UL (ref 1.8–8)
NEUTS SEG NFR BLD: 62 % (ref 40–73)
P-R INTERVAL, ECG05: 176 MS
PLATELET # BLD AUTO: 260 K/UL (ref 135–420)
PMV BLD AUTO: 10.3 FL (ref 9.2–11.8)
POTASSIUM SERPL-SCNC: 3 MMOL/L (ref 3.5–5.5)
PROT SERPL-MCNC: 6.1 G/DL (ref 6.4–8.2)
PROTHROMBIN TIME: 16 SEC (ref 11.5–15.2)
Q-T INTERVAL, ECG07: 440 MS
QRS DURATION, ECG06: 82 MS
QTC CALCULATION (BEZET), ECG08: 511 MS
RBC # BLD AUTO: 3.41 M/UL (ref 4.2–5.3)
SODIUM SERPL-SCNC: 142 MMOL/L (ref 136–145)
TROPONIN I SERPL-MCNC: 6.88 NG/ML (ref 0–0.04)
VENTRICULAR RATE, ECG03: 81 BPM
WBC # BLD AUTO: 7.1 K/UL (ref 4.6–13.2)

## 2020-05-13 PROCEDURE — 83735 ASSAY OF MAGNESIUM: CPT

## 2020-05-13 PROCEDURE — 85730 THROMBOPLASTIN TIME PARTIAL: CPT

## 2020-05-13 PROCEDURE — 74011250636 HC RX REV CODE- 250/636: Performed by: EMERGENCY MEDICINE

## 2020-05-13 PROCEDURE — 93306 TTE W/DOPPLER COMPLETE: CPT

## 2020-05-13 PROCEDURE — 82550 ASSAY OF CK (CPK): CPT

## 2020-05-13 PROCEDURE — 74011250636 HC RX REV CODE- 250/636: Performed by: INTERNAL MEDICINE

## 2020-05-13 PROCEDURE — 92610 EVALUATE SWALLOWING FUNCTION: CPT

## 2020-05-13 PROCEDURE — 74011250637 HC RX REV CODE- 250/637: Performed by: HOSPITALIST

## 2020-05-13 PROCEDURE — 74011250637 HC RX REV CODE- 250/637: Performed by: INTERNAL MEDICINE

## 2020-05-13 PROCEDURE — 83880 ASSAY OF NATRIURETIC PEPTIDE: CPT

## 2020-05-13 PROCEDURE — 36415 COLL VENOUS BLD VENIPUNCTURE: CPT

## 2020-05-13 PROCEDURE — 85025 COMPLETE CBC W/AUTO DIFF WBC: CPT

## 2020-05-13 PROCEDURE — 77030038269 HC DRN EXT URIN PURWCK BARD -A

## 2020-05-13 PROCEDURE — 77010033678 HC OXYGEN DAILY

## 2020-05-13 PROCEDURE — 93005 ELECTROCARDIOGRAM TRACING: CPT

## 2020-05-13 PROCEDURE — 0107U C DIFF TOX AG DETCJ IA STOOL: CPT

## 2020-05-13 PROCEDURE — 80053 COMPREHEN METABOLIC PANEL: CPT

## 2020-05-13 PROCEDURE — 85610 PROTHROMBIN TIME: CPT

## 2020-05-13 PROCEDURE — 82272 OCCULT BLD FECES 1-3 TESTS: CPT

## 2020-05-13 PROCEDURE — 74011000258 HC RX REV CODE- 258: Performed by: EMERGENCY MEDICINE

## 2020-05-13 PROCEDURE — 65660000000 HC RM CCU STEPDOWN

## 2020-05-13 PROCEDURE — 74011250637 HC RX REV CODE- 250/637: Performed by: EMERGENCY MEDICINE

## 2020-05-13 RX ORDER — HEPARIN SODIUM 5000 [USP'U]/ML
INJECTION, SOLUTION INTRAVENOUS; SUBCUTANEOUS
Status: DISCONTINUED
Start: 2020-05-13 | End: 2020-05-13 | Stop reason: WASHOUT

## 2020-05-13 RX ORDER — HEPARIN SODIUM 1000 [USP'U]/ML
3000 INJECTION, SOLUTION INTRAVENOUS; SUBCUTANEOUS ONCE
Status: COMPLETED | OUTPATIENT
Start: 2020-05-13 | End: 2020-05-13

## 2020-05-13 RX ORDER — POTASSIUM CHLORIDE 20 MEQ/1
40 TABLET, EXTENDED RELEASE ORAL
Status: COMPLETED | OUTPATIENT
Start: 2020-05-13 | End: 2020-05-13

## 2020-05-13 RX ADMIN — HEPARIN SODIUM 3000 UNITS: 1000 INJECTION, SOLUTION INTRAVENOUS; SUBCUTANEOUS at 12:17

## 2020-05-13 RX ADMIN — Medication 500 MG: at 08:32

## 2020-05-13 RX ADMIN — PREGABALIN 150 MG: 75 CAPSULE ORAL at 08:32

## 2020-05-13 RX ADMIN — FUROSEMIDE 20 MG: 20 TABLET ORAL at 17:58

## 2020-05-13 RX ADMIN — FUROSEMIDE 20 MG: 20 TABLET ORAL at 08:33

## 2020-05-13 RX ADMIN — ROSUVASTATIN 40 MG: 20 TABLET, FILM COATED ORAL at 21:21

## 2020-05-13 RX ADMIN — OMEGA-3 FATTY ACIDS CAP 1000 MG 1 CAPSULE: 1000 CAP at 08:32

## 2020-05-13 RX ADMIN — PIPERACILLIN SODIUM AND TAZOBACTAM SODIUM 4.5 G: 4; .5 INJECTION, POWDER, LYOPHILIZED, FOR SOLUTION INTRAVENOUS at 17:58

## 2020-05-13 RX ADMIN — Medication 1 CAPSULE: at 18:00

## 2020-05-13 RX ADMIN — THERA TABS 1 TABLET: TAB at 08:33

## 2020-05-13 RX ADMIN — CYANOCOBALAMIN TAB 1000 MCG 500 MCG: 1000 TAB at 08:33

## 2020-05-13 RX ADMIN — Medication 1 TABLET: at 08:33

## 2020-05-13 RX ADMIN — POTASSIUM CHLORIDE 40 MEQ: 1500 TABLET, EXTENDED RELEASE ORAL at 21:21

## 2020-05-13 RX ADMIN — VANCOMYCIN HYDROCHLORIDE 1250 MG: 10 INJECTION, POWDER, LYOPHILIZED, FOR SOLUTION INTRAVENOUS at 06:07

## 2020-05-13 RX ADMIN — CLOPIDOGREL BISULFATE 75 MG: 75 TABLET ORAL at 08:32

## 2020-05-13 RX ADMIN — CARVEDILOL 6.25 MG: 6.25 TABLET, FILM COATED ORAL at 17:58

## 2020-05-13 RX ADMIN — PANTOPRAZOLE SODIUM 20 MG: 20 TABLET, DELAYED RELEASE ORAL at 08:32

## 2020-05-13 RX ADMIN — POLYETHYLENE GLYCOL 3350 17 G: 17 POWDER, FOR SOLUTION ORAL at 08:33

## 2020-05-13 RX ADMIN — PREGABALIN 150 MG: 75 CAPSULE ORAL at 17:58

## 2020-05-13 RX ADMIN — PIPERACILLIN SODIUM AND TAZOBACTAM SODIUM 4.5 G: 4; .5 INJECTION, POWDER, LYOPHILIZED, FOR SOLUTION INTRAVENOUS at 05:48

## 2020-05-13 RX ADMIN — PIPERACILLIN SODIUM AND TAZOBACTAM SODIUM 4.5 G: 4; .5 INJECTION, POWDER, LYOPHILIZED, FOR SOLUTION INTRAVENOUS at 23:12

## 2020-05-13 RX ADMIN — PIPERACILLIN SODIUM AND TAZOBACTAM SODIUM 4.5 G: 4; .5 INJECTION, POWDER, LYOPHILIZED, FOR SOLUTION INTRAVENOUS at 00:56

## 2020-05-13 RX ADMIN — ACETAMINOPHEN 650 MG: 325 TABLET ORAL at 08:32

## 2020-05-13 RX ADMIN — ACETAMINOPHEN 650 MG: 325 TABLET ORAL at 17:58

## 2020-05-13 RX ADMIN — CARVEDILOL 6.25 MG: 6.25 TABLET, FILM COATED ORAL at 08:32

## 2020-05-13 RX ADMIN — AZITHROMYCIN MONOHYDRATE 500 MG: 500 INJECTION, POWDER, LYOPHILIZED, FOR SOLUTION INTRAVENOUS at 12:05

## 2020-05-13 RX ADMIN — LINACLOTIDE 145 MCG: 145 CAPSULE, GELATIN COATED ORAL at 08:35

## 2020-05-13 RX ADMIN — PIPERACILLIN SODIUM AND TAZOBACTAM SODIUM 4.5 G: 4; .5 INJECTION, POWDER, LYOPHILIZED, FOR SOLUTION INTRAVENOUS at 12:06
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
## 2020-05-13 NOTE — ACP (ADVANCE CARE PLANNING)
Advance Care Planning       Primary healthcare agent:  Name: Ramón Manuel   Relationship: son Phone number: [phone]    Secondary healthcare agent:  Name: Sojna Roldan  Relationship: mother Phone number: [phone]          [facility] Clinical Specialist  Conversation Note      Date of ACP Conversation: 5/12/2020    Conversation Conducted with:   Patient with Decision Making Capacity    ACP Clinical Specialist: Tita Wilson    *When Decision Maker makes decisions on behalf of the incapacitated patient: Decision Maker is asked to consider and make decisions based on patient values, known preferences, or best interests. Health Care Decision Maker:    Current Designated Health Care Decision Maker:   (If there is a valid [place] named in the [address] [employer]\" box in the ACP activity, but it is not visible above, be sure to open that field and then select the health care decision maker relationship (ie \"primary\") in the blank space to the right of the name.) Validate  this information as still accurate & up-to-date; edit [place] field as needed.)    Note: Assess and validate information in current ACP documents, as indicated. If no Decision Maker listed above or available through scanned documents, then:    If no Authorized Decision Maker has previously been identified, then patient chooses [place]:  \"Who would you like to name as your primary health care decision-maker? \"    Name: Ramón Manuel   Relationship: son Phone number: [phone]  Donne Even this person be reached easily? \" YES  \"Who would you like to name as your back-up decision maker? \"   Name: Sonja Roldan  Relationship: mother Phone number: [phone]  Donne Even this person be reached easily? \" YES    Note: If the relationship of these Decision-Makers to the patient does NOT follow your state's Next of Kin hierarchy, recommend that patient complete ACP document that meets state-specific requirements to allow them to act on the patient's behalf when appropriate.

## 2020-05-13 NOTE — PROGRESS NOTES
Speech Therapy Note:    SLP orders received and attempted however, patient:      []  Lethargic, unable to be alerted enough for safe PO intake  []  Refused participation  []  Off the unit  []  NPO for procedure  [x]  Other: receiving echocardiogram     SLP will f/u later this day or as medically indicated.  Thank you for this referral.     Graham Murcia M.S., [address] [facility]  Speech Language Pathologist

## 2020-05-13 NOTE — PROGRESS NOTES
1700 - Patient has had multiple liquid stools since noon today. Paula Tamayo. notified. 1825 - Stool for occult blood and C. Diff sent to lab via non-patient transport. Unable to collect urine at this time due to contamination.

## 2020-05-13 NOTE — PROGRESS NOTES
Hospitalist Progress Note    Patient: Fabiana Lopez Age: 63 y.o. : 1960 MR#: [id number] SSN: [id number]  Date/Time: 2020 8:59 AM    DOA: 2020  PCP: Gab Caro NP    Subjective:     Feels better today, does not have shortness of breath, no chest pain. Her troponin elevated yesterday and Hept gtt has been on. No headache. No blood in stool   No fever, her leukocytosis resolved   Still on IV antibiotics, no growth on her blood culture   CTA chest reviewed, no PE. She does have right leg swelling in the last few days. Still smoking tobacco         Interval Hospital Course:  63 y.o female with HTN, CAD, chronic diastolic CHF, hyperlipidemia, h/o gastric bypass surgery, DM2, active tobacco smoking, presented to the ER with shortness of breath. Per note, her family found her to be moaning in her bed and not responsive to call. EMS found her to have agonal breathing. In the ER, CT head was benign, CTA chest without PE. EKG with non-specific finding      ROS: no fever/chills, no headache, no dizziness, no facial pain, no sinus congestion,   No swallowing pain, No chest pain, no palpitation, + shortness of breath, no abd pain,  No diarrhea, no urinary complaint, no leg pain or swelling      Assessment/Plan:     1. NSTEMI with CAD   2. Syncope and collapse   3. AMS, unresponsiveness prior to arrival, resolved   4. Suspected aspiration pneumonia vs COVID-19 respiratory infection   5. SIRS with leukocytosis, fever, tachypnea, resolved   6. H/o Ischemic cardiomyopathy with AICD present, normal function on interrogation   7. Tobacco dependence   8. Hyperglycemia with DM2   9. Hypokalemia   10. Elevated lactic acid on presentation   11. Normocytic anemia       Cont heparin gtt. Cardiology consult follows. Check probnp, Echo follow up. Continue IV antibiotics, COVID-19 test pending, can stop vancomycin   ICS, weaning off oxygen as tolerated.    Continue Carvedilol, plavix, crestor, lasix, lyrica   Continue vit c, zinc, PPI  Appreciate speech evaluation   Check daily lab, and anemia lab   Check duplex of leg to rule out clot   Risks associate with tobacco smoking educated, advised on tobacco smoking cessation (<7min)    Full code     Additional Notes:    Time spent >30 minutes    Case discussed with:  [x]Patient  [x]Family  [x]Nursing  []Case Management  DVT Prophylaxis:  []Lovenox  []Hep SQ  []SCDs  []Coumadin   [x]On Heparin gtt    Signed By: Beth Friedman MD     May 13, 2020 8:59 AM              Objective:   VS:   Visit Vitals  /84 (BP 1 Location: Left arm, BP Patient Position: At rest)   Pulse 90   Temp 99.3 °F (37.4 °C)   Resp 19   Ht 4' 11.02\" (1.499 m)   Wt 80 kg (176 lb 5.9 oz)   LMP  (LMP Unknown)   SpO2 97%   Breastfeeding No   BMI 35.60 kg/m²      Tmax/24hrs: Temp (24hrs), Av °F (37.2 °C), Min:98.6 °F (37 °C), Max:99.3 °F (37.4 °C)      Intake/Output Summary (Last 24 hours) at 2020 0859  Last data filed at 2020 1920  Gross per 24 hour   Intake --   Output 500 ml   Net -500 ml       Tele: sinus  General:  Cooperative, Not in acute distress, speaks in full sentence while in bed  HEENT: PERRL, EOMI, supple neck, no JVD, dry oral mucosa  Cardiovascular: S1S2 regular, no rub/gallop   Pulmonary: Clear air entry bilaterally, no wheezing, ++ crackle  GI:  Soft, non tender, non distended, +bs, no guarding   Extremities:  ++ pedal edema, +distal pulses appreciated   Neuro: AOx3, moving all extremities, no gross deficit.      Additional:       Current Facility-Administered Medications   Medication Dose Route Frequency    piperacillin-tazobactam (ZOSYN) 4.5 g in 0.9% sodium chloride (MBP/ADV) 100 mL MBP  4.5 g IntraVENous Q6H    heparin 25,000 units in D5W 250 ml infusion  12-25 Units/kg/hr IntraVENous TITRATE    vancomycin (VANCOCIN) 1250 mg in  ml infusion  1,250 mg IntraVENous Q24H    azithromycin (ZITHROMAX) 500 mg in 0.9% sodium chloride 250 mL IVPB  500 mg IntraVENous Q24H    ascorbic acid (vitamin C) (VITAMIN C) tablet 500 mg  500 mg Oral DAILY    calcium-vitamin D 600 mg(1,500mg) -200 unit per tablet 1 Tab  1 Tab Oral DAILY    carvediloL (COREG) tablet 6.25 mg  6.25 mg Oral BID WITH MEALS    clopidogreL (PLAVIX) tablet 75 mg  75 mg Oral DAILY    cyanocobalamin tablet 500 mcg  500 mcg Oral DAILY    furosemide (LASIX) tablet 20 mg  20 mg Oral BID    linaCLOtide (LINZESS) capsule 145 mcg  145 mcg Oral ACB    omega 3-DHA-EPA-fish oil 1,000 mg (120 mg-180 mg) capsule 1 Cap  1 Cap Oral DAILY    pantoprazole (PROTONIX) tablet 20 mg  20 mg Oral ACB    polyethylene glycol (MIRALAX) packet 17 g  17 g Oral DAILY    pregabalin (LYRICA) capsule 150 mg  150 mg Oral BID    rosuvastatin (CRESTOR) tablet 40 mg  40 mg Oral QHS    therapeutic multivitamin (THERAGRAN) tablet 1 Tab  1 Tab Oral DAILY    acetaminophen (TYLENOL) tablet 650 mg  650 mg Oral Q6H PRN    naloxone (NARCAN) injection 0.4 mg  0.4 mg IntraVENous PRN    baclofen (LIORESAL) tablet 5 mg  5 mg Oral QID PRN            Lab/Data Review:  Labs: Results:       Chemistry Recent Labs     05/13/20  0132 05/12/20  0505   GLU 98 209*    137   K 3.0* 4.4    103   CO2 28 27   BUN 6* 8   CREA 0.77 1.11   BUCR 8* 7*   AGAP 6 7   CA 7.5* 8.3*     Recent Labs     05/13/20  0132 05/12/20  0505   ALT 15 20   SGOT 37 42*   TP 6.1* 7.8   ALB 2.2* 2.9*   GLOB 3.9 4.9*   AGRAT 0.6* 0.6*      CBC w/Diff Recent Labs     05/13/20  0132 05/12/20  0505   WBC 7.1 16.9*   RBC 3.41* 4.18*   HGB 8.3* 10.6*   HCT 26.5* 32.8*   MCV 77.7 78.5   MCH 24.3 25.4   MCHC 31.3 32.3   RDW 17.0* 16.9*    300   GRANS 62 90*   LYMPH 30 8*   EOS 1 0      Coagulation Recent Labs     05/13/20  0132 05/12/20  1614   PTP 16.0*  --    INR 1.3*  --    APTT 68.4* 39.5*       Iron/Ferritin Lab Results   Component Value Date/Time    Iron 50 01/05/2016 10:48 AM       BNP    Cardiac Enzymes Lab Results   Component Value Date/Time     (H) 05/13/2020 01:32 AM    CK - MB 15.7 (H) 05/13/2020 01:32 AM    CK-MB Index 3.9 05/13/2020 01:32 AM    Troponin-I, QT 6.88 (HH) 05/13/2020 01:32 AM        Lactic Acid    Thyroid Studies          All Micro Results     Procedure Component Value Units Date/Time    CULTURE, BLOOD [[id number]] Collected:  05/12/20 0610    Order Status:  Completed Specimen:  Blood Updated:  05/13/20 0615     Special Requests: NO SPECIAL REQUESTS        Culture result: NO GROWTH AFTER 19 HOURS       CULTURE, BLOOD [[id number]] Collected:  05/12/20 0625    Order Status:  Completed Specimen:  Blood Updated:  05/13/20 0615     Special Requests: NO SPECIAL REQUESTS        Culture result: NO GROWTH AFTER 19 HOURS       CULTURE, URINE [[id number]]     Order Status:  Sent Specimen:  Clean catch     CULTURE, RESPIRATORY/SPUTUM/BRONCH Blondell Leghorn [[id number]]     Order Status:  Sent Specimen:  Sputum             Images:    CT (Most Recent). CT Results (most recent):  Results from [place] encounter on 05/12/20   CTA CHEST W OR W WO CONT    Narrative EXAM: CTA CHEST W OR W WO CONT    INDICATIONS: PE ; collapsed at home, with demonstrated dyspnea; lethargy; recent  knee surgery    TECHNIQUE: Utilizing pulmonary embolus protocol, thin section axial images were  obtained from the thoracic inlet into the upper abdomen after the uneventful  administration of IV contrast. Coronal and sagittal maximum intensity projection  (MIP) post-processed images were generated to better define pulmonary artery  anatomy and enhance sensitivity for detection of pulmonary emboli. Contrast used: 95 cc Isovue 300      CT scans at this facility are performed using dose optimization technique as  appropriate with performed exam, to include automated exposure control,  adjustment of mA and/or kV according to patient's size (including appropriate  matching for site-specific examinations), or use of iterative reconstruction  technique.     COMPARISON: Current and prior chest x-ray    FINDINGS:    Quality: Adequate     Pulmonary arteries: Negative for acute PE. Normal caliber main pulmonary  arteries. Mediastinum: No adenopathy. Normal range heart size. There is coronary  atherosclerosis. No pericardial effusion. Single lead ICD noted. Small hiatal  hernia. Lungs/pleura: Minimal pleural fluid bilaterally, left more than right. Mild  bibasilar dependent haziness with some surrounding groundglass inflammation,  left more than right. Lesser amount of similar haziness within the right middle  lobe and left upper lobe. Chest soft tissues: Unremarkable. Abdomen: No acute findings. Previous gastric bypass and cholecystectomy. Bones: No acute findings. Unremarkable for age. Impression IMPRESSION[de-identified]    1.  Negative for acute PE     2. Minimal volume bilateral pleural effusions    3. Haziness within the lower lungs. While some of this is dependent atelectasis,  suspect an element of mild interstitial edema as well. XRAY (Most Recent)      EKG No results found for this or any previous visit. 2D ECHO 05/12/20   ECHO ADULT COMPLETE 05/13/2020 5/13/2020    Narrative · Normal cavity size, wall thickness and systolic function (ejection   fraction normal). Calculated left ventricular ejection fraction is 50%. Visually measured ejection fraction. Abnormal left ventricular wall   motion. · Pacer/ICD present. · Mild mitral valve regurgitation is present. · Pulmonary arterial systolic pressure is 27 mmHg.         Signed by: James Conn MD

## 2020-05-13 NOTE — ROUTINE PROCESS
Bedside shift change report given to Diego ardon RN. Report included SBAR, Kardex, MAR, Recent Results, and Plan of Care.

## 2020-05-13 NOTE — PROGRESS NOTES
[facility] - Progress Note  Admit Date: 5/12/2020    Assessment:     Hospital Problems  Date Reviewed: 5/7/2020          Codes Class Noted POA    NSTEMI (non-ST elevated myocardial infarction) [facility]) ICD-10-CM: I21.4  ICD-9-CM: 410.70  5/12/2020 Unknown        Syncope and collapse ICD-10-CM: R55  ICD-9-CM: 780.2  5/12/2020 Unknown              -Unresponsiveness, EMS was called for AMS at 430 AM 5/12, patient was unresponsive with agonal breathing. Patient was placed on nonrebreather. Patient was worked up for possible infectious process (fever and leukocytosis), versus PE (negative CTA), versus seizure, versus medication (chronic pain). Covid testing pending  -NSTEMI, with rising troponin overnight, remains on heparin infusion, ECG with nonspecific ST wave abnormalities with concern of subtle inferolateral ST elevations, known CAD as noted below. Symptoms very different than previous anginal complaints.  -Shortness of breath, CTA ruled out PE, possible PNA on abx, Covid testing pending.  -Fever 100.2 with Leukocytosis on admission, possible PNA. -Recent knee surgery. -H/o ICMY which has improved over the years, EF 50% 5/2017.  -[employer] single lead AICD, normal function on interrogation this admission. -CAD s/p anterior wall MI 6/2013 s/p PCI to LAD. Cath 2015    Cath 2015    Findings:   Hemodynamics:  1. LVEDP was 8 mmHg, there was no change post LV-gram  2. There was no transvalvular aortic gradient on pull back  3. There was no mitral regurgitation on LV-gram  4. LV-gram revealed a normal LV function with an ejection   fraction of 45% with apical hypokinesis    Angiographic  1. Left main is angiographically normal- short then bifuctates   into LAD and LCX  2. LAD is angiographically normal- tapers from LMA, 30% stenosis   just before previously placed stent in LAD. LAD stent appeart   patent. This vessel tapers significantly on course to apex ( << 2   mm)  3.  Left circumflex has luminal irregularities with THOM 3 flow  4. RCA is very small non dominant with diffuse disease  -Hypertension. Stable. -Diabetes mellitus. HgbA1c 6.5.  -Dyslipidemia. On statin.  -Chronic pain, extensive orthopedic history.  -Hypokalemia, replace as needed.     Primary cardiology [facility] cardiology    Plan:     Noted rising troponin. Follow up ECG and Echo pending. Patient remains on heparin drip. Patient is continued on plavix (ASA allergy), statin, BB. Patient denies any recent CP which was previous anginal equivalent. Noted continued low grade temp on tylenol, continued on abx, covid testing pending. Noted decline in Hgb this AM, will continue to trend. Noted low potassium this AM, replace as needed. Patient is continued on maintenance lasix. Subjective:     Patient much more awake today and able to provide more history, reports the day before yesterday she felt a little more SOB than normal. She went to bed and woke up yesterday morning gasping for air. She denied any CP. She does not remember much after than. EMS was called but she does not recall events until later in the emergency room.  Patient reports symptoms very different than previous MI.     Objective:      Patient Vitals for the past 8 hrs:   Temp Pulse Resp BP SpO2   05/13/20 0830 99.3 °F (37.4 °C) 90 19 155/84 97 %   05/13/20 0359 99 °F (37.2 °C) 73 18 124/77 98 %         Patient Vitals for the past 96 hrs:   Weight   05/12/20 0813 80 kg (176 lb 5.9 oz)                    Intake/Output Summary (Last 24 hours) at 5/13/2020 1008  Last data filed at 5/12/2020 1920  Gross per 24 hour   Intake --   Output 500 ml   Net -500 ml       Physical Exam:  General:  alert, cooperative, no distress, appears stated age  Neck:  no JVD  Lungs:  clear to auscultation bilaterally  Heart:  regular rate and rhythm  Abdomen:  abdomen is soft without significant tenderness, masses, organomegaly or guarding  Extremities:  extremities normal, atraumatic, no cyanosis or edema    Data Review:     Labs: Results:       Chemistry Recent Labs     05/13/20 0132 05/12/20  0505   GLU 98 209*    137   K 3.0* 4.4    103   CO2 28 27   BUN 6* 8   CREA 0.77 1.11   CA 7.5* 8.3*   MG 2.2  --    AGAP 6 7   BUCR 8* 7*   * 188*   TP 6.1* 7.8   ALB 2.2* 2.9*   GLOB 3.9 4.9*   AGRAT 0.6* 0.6*      CBC w/Diff Recent Labs     05/13/20 0132 05/12/20  0505   WBC 7.1 16.9*   RBC 3.41* 4.18*   HGB 8.3* 10.6*   HCT 26.5* 32.8*    300   GRANS 62 90*   LYMPH 30 8*   EOS 1 0      Cardiac Enzymes Lab Results   Component Value Date/Time     (H) 05/13/2020 01:32 AM     (H) 05/12/2020 04:15 PM    CKMB 15.7 (H) 05/13/2020 01:32 AM    CKMB 20.4 (H) 05/12/2020 04:15 PM    CKND1 3.9 05/13/2020 01:32 AM    CKND1 4.8 (H) 05/12/2020 04:15 PM    TROIQ 6.88 (HH) 05/13/2020 01:32 AM    TROIQ 3.79 (HH) 05/12/2020 04:15 PM      Coagulation Recent Labs     05/13/20 0132 05/12/20  1614   PTP 16.0*  --    INR 1.3*  --    APTT 68.4* 39.5*       Lipid Panel Lab Results   Component Value Date/Time    Cholesterol, total 242 (H) 08/15/2019 10:56 AM    HDL Cholesterol 54 08/15/2019 10:56 AM    LDL, calculated 160 (H) 08/15/2019 10:56 AM    VLDL, calculated 28 08/15/2019 10:56 AM    Triglyceride 140 08/15/2019 10:56 AM    CHOL/HDL Ratio 3.8 03/07/2018 11:22 AM      BNP No results found for: BNP, BNPP, XBNPT   Liver Enzymes Recent Labs     05/13/20 0132   TP 6.1*   ALB 2.2*   *   SGOT 37      Digoxin    Thyroid Studies Lab Results   Component Value Date/Time    TSH 0.453 09/20/2019 12:00 AM          Signed By: BERNICE Gotti     May 13, 2020

## 2020-05-13 NOTE — PROGRESS NOTES
Patient has personal care aids 7 days/week 5 hours/day through  Care. Patient lives with niece, mother is also local and helps as needed. Mother transports patient. Will need to monitor for oxygen needs at d/c time. 2  Reason for Admission:  NSTEMI (non-ST elevated myocardial infarction) ([facility] [address] [I21.4]  Syncope and collapse [R55]                 RRAT Score:    24%            Plan for utilizing home health:    Has aids through [facility]. If skilled care is needed will need FOC                      Likelihood of Readmission:   MODERATE                         Transition of Care Plan:              Initial assessment completed with patient. Cognitive status of patient: oriented to time, place, person and situation. Face sheet information confirmed:  yes. The patient designates son Ramón Manuel [phone] and mother Sonja Roldan [phone] to participate in her discharge plan and to receive any needed information. This patient lives in a single family home with niece. Patient is not able to navigate steps as needed. Prior to hospitalization, patient was considered to be independent with ADLs/IADLS : no . If not independent,  patient needs assist with : has aid to assist in home as needed. Patient has a current ACP document on file: no but appointed healthcare decision makers pver phone. The mother will be available to transport patient home upon discharge. The patient already has Aliza Christianson chair, [facility],  medical equipment available in the home. Patient is not currently active with home health. Patient has not stayed in a skilled nursing facility or rehab. This patient is on dialysis :no    Currently, the discharge plan is Home and Home with [address] [place] [address] Andrea Muse. The patient states that she can obtain her medications from the pharmacy, and take her medications as directed.     Patient's current insurance is [employer] medicare an [facility] Management Interventions  PCP Verified by CM:  Yes  Palliative Care Criteria Met (RRAT>21 & CHF Dx)?: No  Mode of Transport at Discharge: Self  Transition of Care Consult (CM Consult): Home Health, Discharge Planning  Physical Therapy Consult: Yes  Occupational Therapy Consult: Yes  Current Support Network: Relative's Home  Confirm Follow Up Transport: Family  Discharge Location  Discharge Placement: Home with home health        CHANDRAKANT Jones  Case Management  [phone]

## 2020-05-13 NOTE — ROUTINE PROCESS
Spoke with Dr. Esha Huitron regarding venous duplex exam.  It is okay to wait for Covid results before performing venous duplex exam. Results should be in by tomorrow.

## 2020-05-14 ENCOUNTER — APPOINTMENT (OUTPATIENT)
Dept: VASCULAR SURGERY | Age: 60
DRG: 246 | End: 2020-05-14
Attending: INTERNAL MEDICINE
Payer: MEDICARE

## 2020-05-14 LAB
AMPHET UR QL SCN: NEGATIVE
ANION GAP SERPL CALC-SCNC: 6 MMOL/L (ref 3–18)
APTT PPP: 162.5 SEC (ref 23–36.4)
APTT PPP: 63.6 SEC (ref 23–36.4)
BARBITURATES UR QL SCN: NEGATIVE
BENZODIAZ UR QL: NEGATIVE
BUN SERPL-MCNC: 7 MG/DL (ref 7–18)
BUN/CREAT SERPL: 9 (ref 12–20)
CALCIUM SERPL-MCNC: 8 MG/DL (ref 8.5–10.1)
CANNABINOIDS UR QL SCN: NEGATIVE
CHLORIDE SERPL-SCNC: 106 MMOL/L (ref 100–111)
CO2 SERPL-SCNC: 27 MMOL/L (ref 21–32)
COCAINE UR QL SCN: NEGATIVE
CREAT SERPL-MCNC: 0.81 MG/DL (ref 0.6–1.3)
CRP SERPL-MCNC: 11.1 MG/DL (ref 0–0.3)
D DIMER PPP FEU-MCNC: 0.92 UG/ML(FEU)
FERRITIN SERPL-MCNC: 57 NG/ML (ref 8–388)
FOLATE SERPL-MCNC: 9.8 NG/ML (ref 3.1–17.5)
GLUCOSE SERPL-MCNC: 87 MG/DL (ref 74–99)
HCT VFR BLD AUTO: 24.8 % (ref 35–45)
HDSCOM,HDSCOM: NORMAL
HGB BLD-MCNC: 7.9 G/DL (ref 12–16)
IRON SATN MFR SERPL: 3 % (ref 20–50)
IRON SERPL-MCNC: 10 UG/DL (ref 50–175)
LDH SERPL L TO P-CCNC: 339 U/L (ref 81–234)
METHADONE UR QL: NEGATIVE
OPIATES UR QL: NEGATIVE
PCP UR QL: NEGATIVE
POTASSIUM SERPL-SCNC: 2.9 MMOL/L (ref 3.5–5.5)
SODIUM SERPL-SCNC: 139 MMOL/L (ref 136–145)
TIBC SERPL-MCNC: 308 UG/DL (ref 250–450)
TROPONIN I SERPL-MCNC: 4.03 NG/ML (ref 0–0.04)
VIT B12 SERPL-MCNC: 348 PG/ML (ref 211–911)

## 2020-05-14 PROCEDURE — 83540 ASSAY OF IRON: CPT

## 2020-05-14 PROCEDURE — 85730 THROMBOPLASTIN TIME PARTIAL: CPT

## 2020-05-14 PROCEDURE — 74011250637 HC RX REV CODE- 250/637: Performed by: EMERGENCY MEDICINE

## 2020-05-14 PROCEDURE — 82728 ASSAY OF FERRITIN: CPT

## 2020-05-14 PROCEDURE — 74011000250 HC RX REV CODE- 250: Performed by: INTERNAL MEDICINE

## 2020-05-14 PROCEDURE — 86140 C-REACTIVE PROTEIN: CPT

## 2020-05-14 PROCEDURE — 80048 BASIC METABOLIC PNL TOTAL CA: CPT

## 2020-05-14 PROCEDURE — 74011250637 HC RX REV CODE- 250/637: Performed by: INTERNAL MEDICINE

## 2020-05-14 PROCEDURE — 36415 COLL VENOUS BLD VENIPUNCTURE: CPT

## 2020-05-14 PROCEDURE — 83615 LACTATE (LD) (LDH) ENZYME: CPT

## 2020-05-14 PROCEDURE — 74011000258 HC RX REV CODE- 258: Performed by: INTERNAL MEDICINE

## 2020-05-14 PROCEDURE — 65660000000 HC RM CCU STEPDOWN

## 2020-05-14 PROCEDURE — 85018 HEMOGLOBIN: CPT

## 2020-05-14 PROCEDURE — 74011000258 HC RX REV CODE- 258: Performed by: EMERGENCY MEDICINE

## 2020-05-14 PROCEDURE — 77030038269 HC DRN EXT URIN PURWCK BARD -A

## 2020-05-14 PROCEDURE — 82607 VITAMIN B-12: CPT

## 2020-05-14 PROCEDURE — 84484 ASSAY OF TROPONIN QUANT: CPT

## 2020-05-14 PROCEDURE — 74011250636 HC RX REV CODE- 250/636: Performed by: INTERNAL MEDICINE

## 2020-05-14 PROCEDURE — 74011250636 HC RX REV CODE- 250/636: Performed by: EMERGENCY MEDICINE

## 2020-05-14 PROCEDURE — 85379 FIBRIN DEGRADATION QUANT: CPT

## 2020-05-14 RX ORDER — FUROSEMIDE 40 MG/1
40 TABLET ORAL 2 TIMES DAILY
Status: DISCONTINUED | OUTPATIENT
Start: 2020-05-14 | End: 2020-05-19

## 2020-05-14 RX ORDER — HEPARIN SODIUM 1000 [USP'U]/ML
3000 INJECTION, SOLUTION INTRAVENOUS; SUBCUTANEOUS ONCE
Status: COMPLETED | OUTPATIENT
Start: 2020-05-14 | End: 2020-05-14

## 2020-05-14 RX ORDER — POTASSIUM CHLORIDE 20 MEQ/1
40 TABLET, EXTENDED RELEASE ORAL DAILY
Status: DISCONTINUED | OUTPATIENT
Start: 2020-05-15 | End: 2020-05-15

## 2020-05-14 RX ORDER — POTASSIUM CHLORIDE 20 MEQ/1
20 TABLET, EXTENDED RELEASE ORAL
Status: COMPLETED | OUTPATIENT
Start: 2020-05-14 | End: 2020-05-14

## 2020-05-14 RX ORDER — LANOLIN ALCOHOL/MO/W.PET/CERES
2 CREAM (GRAM) TOPICAL EVERY OTHER DAY
Status: DISCONTINUED | OUTPATIENT
Start: 2020-05-14 | End: 2020-05-19 | Stop reason: HOSPADM

## 2020-05-14 RX ADMIN — Medication 1 TABLET: at 08:37

## 2020-05-14 RX ADMIN — ROSUVASTATIN 40 MG: 20 TABLET, FILM COATED ORAL at 22:00

## 2020-05-14 RX ADMIN — PREGABALIN 150 MG: 75 CAPSULE ORAL at 17:41

## 2020-05-14 RX ADMIN — CYANOCOBALAMIN TAB 1000 MCG 500 MCG: 1000 TAB at 08:37

## 2020-05-14 RX ADMIN — HEPARIN SODIUM 3000 UNITS: 1000 INJECTION, SOLUTION INTRAVENOUS; SUBCUTANEOUS at 20:47

## 2020-05-14 RX ADMIN — POTASSIUM CHLORIDE 20 MEQ: 1500 TABLET, EXTENDED RELEASE ORAL at 10:54

## 2020-05-14 RX ADMIN — POTASSIUM CHLORIDE: 2 INJECTION, SOLUTION, CONCENTRATE INTRAVENOUS at 13:51

## 2020-05-14 RX ADMIN — PIPERACILLIN SODIUM AND TAZOBACTAM SODIUM 4.5 G: 4; .5 INJECTION, POWDER, LYOPHILIZED, FOR SOLUTION INTRAVENOUS at 17:42

## 2020-05-14 RX ADMIN — CLOPIDOGREL BISULFATE 75 MG: 75 TABLET ORAL at 08:37

## 2020-05-14 RX ADMIN — PIPERACILLIN SODIUM AND TAZOBACTAM SODIUM 4.5 G: 4; .5 INJECTION, POWDER, LYOPHILIZED, FOR SOLUTION INTRAVENOUS at 12:51

## 2020-05-14 RX ADMIN — LINACLOTIDE 145 MCG: 145 CAPSULE, GELATIN COATED ORAL at 08:39

## 2020-05-14 RX ADMIN — AZITHROMYCIN MONOHYDRATE 500 MG: 500 INJECTION, POWDER, LYOPHILIZED, FOR SOLUTION INTRAVENOUS at 17:40

## 2020-05-14 RX ADMIN — CARVEDILOL 6.25 MG: 6.25 TABLET, FILM COATED ORAL at 08:37

## 2020-05-14 RX ADMIN — CARVEDILOL 6.25 MG: 6.25 TABLET, FILM COATED ORAL at 17:41

## 2020-05-14 RX ADMIN — ACETAMINOPHEN 650 MG: 325 TABLET ORAL at 03:57

## 2020-05-14 RX ADMIN — POTASSIUM CHLORIDE: 2 INJECTION, SOLUTION, CONCENTRATE INTRAVENOUS at 11:55

## 2020-05-14 RX ADMIN — POTASSIUM CHLORIDE: 2 INJECTION, SOLUTION, CONCENTRATE INTRAVENOUS at 10:55

## 2020-05-14 RX ADMIN — Medication 1 CAPSULE: at 08:37

## 2020-05-14 RX ADMIN — POTASSIUM CHLORIDE: 2 INJECTION, SOLUTION, CONCENTRATE INTRAVENOUS at 12:51

## 2020-05-14 RX ADMIN — Medication 500 MG: at 08:37

## 2020-05-14 RX ADMIN — FERROUS SULFATE TAB 325 MG (65 MG ELEMENTAL FE) 650 MG: 325 (65 FE) TAB at 17:41

## 2020-05-14 RX ADMIN — FUROSEMIDE 20 MG: 20 TABLET ORAL at 08:37

## 2020-05-14 RX ADMIN — OMEGA-3 FATTY ACIDS CAP 1000 MG 1 CAPSULE: 1000 CAP at 08:37

## 2020-05-14 RX ADMIN — FUROSEMIDE 40 MG: 40 TABLET ORAL at 17:42

## 2020-05-14 RX ADMIN — PREGABALIN 150 MG: 75 CAPSULE ORAL at 08:37

## 2020-05-14 RX ADMIN — THERA TABS 1 TABLET: TAB at 08:37

## 2020-05-14 RX ADMIN — PIPERACILLIN SODIUM AND TAZOBACTAM SODIUM 4.5 G: 4; .5 INJECTION, POWDER, LYOPHILIZED, FOR SOLUTION INTRAVENOUS at 06:00

## 2020-05-14 RX ADMIN — PANTOPRAZOLE SODIUM 20 MG: 20 TABLET, DELAYED RELEASE ORAL at 08:37

## 2020-05-14 RX ADMIN — HEPARIN SODIUM 1460 UNITS/HR: 10000 INJECTION, SOLUTION INTRAVENOUS at 04:03

## 2020-05-14 NOTE — ROUTINE PROCESS
Pt is alert and oriented x4. Pt denies pain. Bed locked/low position. Call bell in reach. Potassium level 3.0 called to Dr Hamida Estrada with order received. 4614 Family member Sloane Mckee called for an update plan of care. 6391 Bedside shift change report given to [address] (oncoming nurse) by Marie Kumar (offgoing nurse). Report given with SBAR, Kardex, Intake/Output, MAR and Recent Results  0815 Potassium 2.9  and Troponin 4.03 result called to Dr Shell Rosenberg with order to call Dr Oralia Valencia for lab results. Tried to page Dr Oralia Valencia. 3850 Report given to Jignesh Vivas to follow up with Dr Oralia Valencia.

## 2020-05-14 NOTE — PROGRESS NOTES
[facility] - Progress Note  Admit Date: 5/12/2020    Assessment:     Hospital Problems  Date Reviewed: 5/7/2020          Codes Class Noted POA    NSTEMI (non-ST elevated myocardial infarction) [facility]) ICD-10-CM: I21.4  ICD-9-CM: 410.70  5/12/2020 Unknown        Syncope and collapse ICD-10-CM: R55  ICD-9-CM: 780.2  5/12/2020 Unknown                 -Unresponsiveness, EMS was called for AMS at 430 AM 5/12, patient was unresponsive with agonal breathing. Patient was placed on nonrebreather. Patient was worked up for possible infectious process (fever and leukocytosis), versus PE (negative CTA), versus seizure, versus medication (chronic pain). Covid testing pending  -NSTEMI, with rising troponin, remains on heparin infusion, ECG with nonspecific ST wave abnormalities with concern of subtle inferolateral ST elevations, known CAD as noted below. Symptoms very different than previous anginal complaints. Echo this admission shows a mildly depressed LVEF around 45-50% with no regional wall motion abnormalities, known chronic hypokinesis of her LV apex since her infarct.  -Shortness of breath, CTA ruled out PE, possible PNA on abx, Covid testing pending.  -Fever 100.2 with Leukocytosis on admission, possible PNA. -Peripheral vascular disease s/p vascular procedure done at [place] [id number] last week where she underwent PTA and atherectomy to her right SFA. She was then in the emergency room there 2 days later with what she thought was an allergic reaction to something during the procedure she states that her face was swollen and her neck had a rash and she was treated with prednisone and Benadryl and discharged home. -H/o ICMY which has improved over the years, EF 45-50% by echo this admission, EF 50% 5/2017. Noted elevated BNP, does not appear significantly volume overloaded at this time. -[employer] single lead AICD, normal function on interrogation this admission.   -CAD s/p anterior wall MI 6/2013 s/p PCI to LAD.    Cath 2015     Findings:   Hemodynamics:  1. LVEDP was 8 mmHg, there was no change post LV-gram  2. There was no transvalvular aortic gradient on pull back  3. There was no mitral regurgitation on LV-gram  4. LV-gram revealed a normal LV function with an ejection   fraction of 45% with apical hypokinesis    Angiographic  1. Left main is angiographically normal- short then bifuctates   into LAD and LCX  2. LAD is angiographically normal- tapers from LMA, 30% stenosis   just before previously placed stent in LAD. LAD stent appeart   patent. This vessel tapers significantly on course to apex ( << 2   mm)  3. Left circumflex has luminal irregularities with THOM 3 flow  4. RCA is very small non dominant with diffuse disease  -Hypertension. Stable. -Diabetes mellitus. HgbA1c 6.5.  -Dyslipidemia. On statin.  -Chronic pain, extensive orthopedic history.  -Hypokalemia, replace as needed.  -Anemia, noted hgb 7.9, stool negative, continue to trend. -ASA allergy     Primary cardiology [facility]    Plan:     Troponin trending down, remains on heparin drip, have discussed possible cardiac catheterization once Covid has been ruled out. Will need to be pretreated for possible contrast allergy. Continue plavix (ASA allergy), coreg, statin. Noted recurrent fevers overnight. Continued on antibiotics. Covid testing pending. Continued on maintenance lasix. Continue electrolyte replacement as needed. Noted Hgb 7.9, stool negative would continue to trend. Subjective:     No new complaints. Denies CP or SOB.     Objective:      Patient Vitals for the past 8 hrs:   Temp Pulse Resp BP SpO2   05/14/20 1119 98.2 °F (36.8 °C) 64 20 127/75 96 %   05/14/20 0758 98.9 °F (37.2 °C) 67 20 120/66 98 %   05/14/20 0344 100.3 °F (37.9 °C) 81 18 105/65 99 %         Patient Vitals for the past 96 hrs:   Weight   05/13/20 1115 80.7 kg (178 lb)   05/12/20 0813 80 kg (176 lb 5.9 oz)                    Intake/Output Summary (Last 24 hours) at 5/14/2020 1123  Last data filed at 5/14/2020 0600  Gross per 24 hour   Intake 920 ml   Output 802 ml   Net 118 ml       Physical Exam:  General:  alert, cooperative, no distress, appears stated age  Neck:  no JVD  Lungs:  clear to auscultation bilaterally  Heart:  regular rate and rhythm  Abdomen:  abdomen is soft without significant tenderness, masses, organomegaly or guarding  Extremities:  extremities normal, atraumatic, no cyanosis or edema    Data Review:     Labs: Results:       Chemistry Recent Labs     05/14/20 0606 05/13/20 0132 05/12/20  0505   GLU 87 98 209*    142 137   K 2.9* 3.0* 4.4    108 103   CO2 27 28 27   BUN 7 6* 8   CREA 0.81 0.77 1.11   CA 8.0* 7.5* 8.3*   MG  --  2.2  --    AGAP 6 6 7   BUCR 9* 8* 7*   AP  --  128* 188*   TP  --  6.1* 7.8   ALB  --  2.2* 2.9*   GLOB  --  3.9 4.9*   AGRAT  --  0.6* 0.6*      CBC w/Diff Recent Labs     05/14/20 0606 05/13/20 0132 05/12/20  0505   WBC  --  7.1 16.9*   RBC  --  3.41* 4.18*   HGB 7.9* 8.3* 10.6*   HCT 24.8* 26.5* 32.8*   PLT  --  260 300   GRANS  --  62 90*   LYMPH  --  30 8*   EOS  --  1 0      Cardiac Enzymes Lab Results   Component Value Date/Time    TROIQ 4.03 (HH) 05/14/2020 06:06 AM      Coagulation Recent Labs     05/14/20 0606 05/13/20 1944 05/13/20 0132   PTP  --   --   --  16.0*   INR  --   --   --  1.3*   APTT 162.5* 93.2*   < > 68.4*    < > = values in this interval not displayed.        Lipid Panel Lab Results   Component Value Date/Time    Cholesterol, total 242 (H) 08/15/2019 10:56 AM    HDL Cholesterol 54 08/15/2019 10:56 AM    LDL, calculated 160 (H) 08/15/2019 10:56 AM    VLDL, calculated 28 08/15/2019 10:56 AM    Triglyceride 140 08/15/2019 10:56 AM    CHOL/HDL Ratio 3.8 03/07/2018 11:22 AM      BNP No results found for: BNP, BNPP, XBNPT   Liver Enzymes Recent Labs     05/13/20  0132   TP 6.1*   ALB 2.2*   *   SGOT 37      Digoxin    Thyroid Studies Lab Results   Component Value Date/Time TSH 0.453 09/20/2019 12:00 AM          Signed By: BERNICE Reynoso     May 14, 2020

## 2020-05-14 NOTE — PROGRESS NOTES
Hospitalist Progress Note    Patient: Dayana Leigh Age: 63 y.o. : 1960 MR#: [id number] SSN: [id number]  Date/Time: 2020 1:31 PM    DOA: 2020  PCP: Todd Ch, PRATIMA    Subjective:     Feels better overall. No shortness of breath, no chest pain. Still on Hep gtt. No bleeding report. Diarrhea resolved. Neg C.diff, no fever, no leukocytosis   No growth on blood culture       Interval Hospital Course:  63 y.o female with HTN, CAD, chronic diastolic CHF, hyperlipidemia, h/o gastric bypass surgery, DM2, active tobacco smoking, presented to the ER with shortness of breath. Per note, her family found her to be moaning in her bed and not responsive to call. EMS found her to have agonal breathing. In the ER, CT head was benign, CTA chest without PE. EKG with non-specific finding      ROS: no fever/chills, no headache, no dizziness, no facial pain, no sinus congestion,   No swallowing pain, No chest pain, no palpitation, no shortness of breath, no abd pain,  No diarrhea, no urinary complaint, no leg pain or swelling      Assessment/Plan:     1. NSTEMI with CAD   2. Syncope and collapse   3. AMS, unresponsiveness prior to arrival, resolved   4. Suspected aspiration pneumonia vs COVID-19 respiratory infection   5. SIRS with leukocytosis, fever, tachypnea, resolved   6. H/o Ischemic cardiomyopathy with AICD present, normal function on interrogation   7. Tobacco dependence   8. Hyperglycemia with DM2   9. Hypokalemia   10. Elevated lactic acid on presentation   11. Normocytic anemia, iron deficiency anemia      Cont heparin gtt. Cardiology follows. Note elevated probnp, Echo with depressed EF  Consider cardiac cath next, increase her lasix 40mg BID  Continue IV antibiotics, COVID-19 test pending, stopped vancomycin   ICS, weaning off oxygen as tolerated.    Continue Carvedilol, plavix, crestor, lasix, lyrica   Continue vit c, zinc, PPI, add Iron supplement, might need Venofer   Appreciate speech evaluation   Check daily lab, and anemia lab   Check duplex of leg to rule out clot   Risks associate with tobacco smoking educated, advised on tobacco smoking cessation (<7min)    Full code     Additional Notes:    Time spent >30 minutes    Case discussed with:  [x]Patient  [x]Family  [x]Nursing  []Case Management  DVT Prophylaxis:  []Lovenox  []Hep SQ  []SCDs  []Coumadin   [x]On Heparin gtt    Signed By: Lissa Patel MD     May 14, 2020 1:31 PM              Objective:   VS:   Visit Vitals  /75 (BP 1 Location: Right arm, BP Patient Position: At rest)   Pulse 64   Temp 98.2 °F (36.8 °C)   Resp 20   Ht 4' 11\" (1.499 m)   Wt 80.7 kg (178 lb)   LMP  (LMP Unknown)   SpO2 96%   Breastfeeding No   BMI 35.95 kg/m²      Tmax/24hrs: Temp (24hrs), Av.4 °F (37.4 °C), Min:98.2 °F (36.8 °C), Max:101.1 °F (38.4 °C)      Intake/Output Summary (Last 24 hours) at 2020 1331  Last data filed at 2020 0600  Gross per 24 hour   Intake 920 ml   Output 802 ml   Net 118 ml       Tele: sinus  General:  Cooperative, Not in acute distress, speaks in full sentence while in bed  HEENT: PERRL, EOMI, supple neck, no JVD, dry oral mucosa  Cardiovascular: S1S2 regular, no rub/gallop   Pulmonary: Clear air entry bilaterally, no wheezing, ++ crackle  GI:  Soft, non tender, non distended, +bs, no guarding   Extremities:  ++ pedal edema, +distal pulses appreciated   Neuro: AOx3, moving all extremities, no gross deficit.      Additional:       Current Facility-Administered Medications   Medication Dose Route Frequency    potassium chloride 10 mEq, lidocaine (PF) (XYLOCAINE) 10 mg/mL (1 %) 1 mL in 0.9% sodium chloride 100 mL IVPB   IntraVENous Q1H    furosemide (LASIX) tablet 40 mg  40 mg Oral BID    [START ON 5/15/2020] potassium chloride (K-DUR, KLOR-CON) SR tablet 40 mEq  40 mEq Oral DAILY    ferrous sulfate tablet 650 mg  2 Tab Oral EVERY OTHER DAY    lactobacillus sp. 50 billion cpu (BIO-K PLUS) capsule 1 Cap  1 Cap Oral DAILY    piperacillin-tazobactam (ZOSYN) 4.5 g in 0.9% sodium chloride (MBP/ADV) 100 mL MBP  4.5 g IntraVENous Q6H    heparin 25,000 units in D5W 250 ml infusion  12-25 Units/kg/hr IntraVENous TITRATE    azithromycin (ZITHROMAX) 500 mg in 0.9% sodium chloride 250 mL IVPB  500 mg IntraVENous Q24H    ascorbic acid (vitamin C) (VITAMIN C) tablet 500 mg  500 mg Oral DAILY    calcium-vitamin D 600 mg(1,500mg) -200 unit per tablet 1 Tab  1 Tab Oral DAILY    carvediloL (COREG) tablet 6.25 mg  6.25 mg Oral BID WITH MEALS    clopidogreL (PLAVIX) tablet 75 mg  75 mg Oral DAILY    cyanocobalamin tablet 500 mcg  500 mcg Oral DAILY    linaCLOtide (LINZESS) capsule 145 mcg  145 mcg Oral ACB    omega 3-DHA-EPA-fish oil 1,000 mg (120 mg-180 mg) capsule 1 Cap  1 Cap Oral DAILY    pantoprazole (PROTONIX) tablet 20 mg  20 mg Oral ACB    pregabalin (LYRICA) capsule 150 mg  150 mg Oral BID    rosuvastatin (CRESTOR) tablet 40 mg  40 mg Oral QHS    therapeutic multivitamin (THERAGRAN) tablet 1 Tab  1 Tab Oral DAILY    acetaminophen (TYLENOL) tablet 650 mg  650 mg Oral Q6H PRN    naloxone (NARCAN) injection 0.4 mg  0.4 mg IntraVENous PRN    baclofen (LIORESAL) tablet 5 mg  5 mg Oral QID PRN            Lab/Data Review:  Labs: Results:       Chemistry Recent Labs     05/14/20 0606 05/13/20 0132 05/12/20  0505   GLU 87 98 209*    142 137   K 2.9* 3.0* 4.4    108 103   CO2 27 28 27   BUN 7 6* 8   CREA 0.81 0.77 1.11   BUCR 9* 8* 7*   AGAP 6 6 7   CA 8.0* 7.5* 8.3*     Recent Labs     05/13/20 0132 05/12/20  0505   ALT 15 20   SGOT 37 42*   TP 6.1* 7.8   ALB 2.2* 2.9*   GLOB 3.9 4.9*   AGRAT 0.6* 0.6*      CBC w/Diff Recent Labs     05/14/20  0606 05/13/20 0132 05/12/20  0505   WBC  --  7.1 16.9*   RBC  --  3.41* 4.18*   HGB 7.9* 8.3* 10.6*   HCT 24.8* 26.5* 32.8*   MCV  --  77.7 78.5   MCH  --  24.3 25.4   MCHC  --  31.3 32.3   RDW  --  17.0* 16.9*   PLT  --  260 300   GRANS  --  62 90*   LYMPH  --  30 8*   EOS --  1 0      Coagulation Recent Labs     05/14/20  0606 05/13/20  1944  05/13/20  0132   PTP  --   --   --  16.0*   INR  --   --   --  1.3*   APTT 162.5* 93.2*   < > 68.4*    < > = values in this interval not displayed. Iron/Ferritin Lab Results   Component Value Date/Time    Iron 10 (L) 05/14/2020 06:06 AM    TIBC 308 05/14/2020 06:06 AM    Iron % saturation 3 (L) 05/14/2020 06:06 AM    Ferritin 57 05/14/2020 06:06 AM       BNP    Cardiac Enzymes Lab Results   Component Value Date/Time     (H) 05/13/2020 01:32 AM    CK - MB 15.7 (H) 05/13/2020 01:32 AM    CK-MB Index 3.9 05/13/2020 01:32 AM    Troponin-I, QT 4.03 (HH) 05/14/2020 06:06 AM        Lactic Acid    Thyroid Studies          All Micro Results     Procedure Component Value Units Date/Time    CULTURE, URINE [[id number]] Collected:  05/12/20 0345    Order Status:  Completed Specimen:  Clean catch Updated:  05/14/20 0739    CULTURE, BLOOD [[id number]] Collected:  05/12/20 0610    Order Status:  Completed Specimen:  Blood Updated:  05/14/20 0646     Special Requests: NO SPECIAL REQUESTS        Culture result: NO GROWTH 2 DAYS       CULTURE, BLOOD [[id number]] Collected:  05/12/20 0625    Order Status:  Completed Specimen:  Blood Updated:  05/14/20 0646     Special Requests: NO SPECIAL REQUESTS        Culture result: NO GROWTH 2 DAYS       C. DIFFICILE AG & TOXIN A/B [[id number]] Collected:  05/13/20 1750    Order Status:  Completed Specimen:  Stool Updated:  05/13/20 0850     [address] [facility] ANTIGEN Negative        C. difficile toxin Negative        INTERPRETATION       NEGATIVE FOR TOXIGENIC C. DIFFICILE          CULTURE, RESPIRATORY/SPUTUM/BRONCH Benetta Balling [[id number]]     Order Status:  Sent Specimen:  Sputum             Images:    CT (Most Recent).  CT Results (most recent):  Results from Hospital Encounter encounter on 05/12/20   CTA CHEST W OR W WO CONT    Narrative EXAM: CTA CHEST W OR W WO CONT    INDICATIONS: PE ; collapsed at home, with demonstrated dyspnea; lethargy; recent  knee surgery    TECHNIQUE: Utilizing pulmonary embolus protocol, thin section axial images were  obtained from the thoracic inlet into the upper abdomen after the uneventful  administration of IV contrast. Coronal and sagittal maximum intensity projection  (MIP) post-processed images were generated to better define pulmonary artery  anatomy and enhance sensitivity for detection of pulmonary emboli. Contrast used: 95 cc Isovue 300      CT scans at this facility are performed using dose optimization technique as  appropriate with performed exam, to include automated exposure control,  adjustment of mA and/or kV according to patient's size (including appropriate  matching for site-specific examinations), or use of iterative reconstruction  technique. COMPARISON: Current and prior chest x-ray    FINDINGS:    Quality: Adequate     Pulmonary arteries: Negative for acute PE. Normal caliber main pulmonary  arteries. Mediastinum: No adenopathy. Normal range heart size. There is coronary  atherosclerosis. No pericardial effusion. Single lead ICD noted. Small hiatal  hernia. Lungs/pleura: Minimal pleural fluid bilaterally, left more than right. Mild  bibasilar dependent haziness with some surrounding groundglass inflammation,  left more than right. Lesser amount of similar haziness within the right middle  lobe and left upper lobe. Chest soft tissues: Unremarkable. Abdomen: No acute findings. Previous gastric bypass and cholecystectomy. Bones: No acute findings. Unremarkable for age. Impression IMPRESSION[de-identified]    1.  Negative for acute PE     2. Minimal volume bilateral pleural effusions    3. Haziness within the lower lungs. While some of this is dependent atelectasis,  suspect an element of mild interstitial edema as well. XRAY (Most Recent)      EKG No results found for this or any previous visit.      2D ECHO 05/12/20   ECHO ADULT COMPLETE 05/13/2020 5/13/2020 Narrative · Normal cavity size, wall thickness and systolic function (ejection   fraction normal). Calculated left ventricular ejection fraction is 50%. Visually measured ejection fraction. Abnormal left ventricular wall   motion. · Pacer/ICD present. · Mild mitral valve regurgitation is present. · Pulmonary arterial systolic pressure is 27 mmHg.         Signed by: Yolanda Lopez MD

## 2020-05-14 NOTE — PROGRESS NOTES
Received call from 's insurance [phone], [facility] [address]. Requested personal care hours to be increased. Jeremias Steele to look into this.  Ashley Vale, [place], [place]- [phone]

## 2020-05-15 ENCOUNTER — APPOINTMENT (OUTPATIENT)
Dept: VASCULAR SURGERY | Age: 60
DRG: 246 | End: 2020-05-15
Attending: INTERNAL MEDICINE
Payer: MEDICARE

## 2020-05-15 LAB
APTT PPP: 137.5 SEC (ref 23–36.4)
APTT PPP: 71.8 SEC (ref 23–36.4)
APTT PPP: 79.7 SEC (ref 23–36.4)
BACTERIA SPEC CULT: NORMAL
D DIMER PPP FEU-MCNC: 1.32 UG/ML(FEU)
ERYTHROCYTE [DISTWIDTH] IN BLOOD BY AUTOMATED COUNT: 17 % (ref 11.6–14.5)
FERRITIN SERPL-MCNC: 66 NG/ML (ref 8–388)
HCT VFR BLD AUTO: 26.5 % (ref 35–45)
HGB BLD-MCNC: 8.4 G/DL (ref 12–16)
LDH SERPL L TO P-CCNC: 260 U/L (ref 81–234)
MCH RBC QN AUTO: 24.6 PG (ref 24–34)
MCHC RBC AUTO-ENTMCNC: 31.7 G/DL (ref 31–37)
MCV RBC AUTO: 77.7 FL (ref 74–97)
PLATELET # BLD AUTO: 373 K/UL (ref 135–420)
PMV BLD AUTO: 10.8 FL (ref 9.2–11.8)
POTASSIUM SERPL-SCNC: 3.3 MMOL/L (ref 3.5–5.5)
RBC # BLD AUTO: 3.41 M/UL (ref 4.2–5.3)
SERVICE CMNT-IMP: NORMAL
WBC # BLD AUTO: 6.9 K/UL (ref 4.6–13.2)

## 2020-05-15 PROCEDURE — 93970 EXTREMITY STUDY: CPT

## 2020-05-15 PROCEDURE — 84132 ASSAY OF SERUM POTASSIUM: CPT

## 2020-05-15 PROCEDURE — 74011250636 HC RX REV CODE- 250/636: Performed by: EMERGENCY MEDICINE

## 2020-05-15 PROCEDURE — 74011250637 HC RX REV CODE- 250/637: Performed by: EMERGENCY MEDICINE

## 2020-05-15 PROCEDURE — 74011250637 HC RX REV CODE- 250/637: Performed by: INTERNAL MEDICINE

## 2020-05-15 PROCEDURE — 36415 COLL VENOUS BLD VENIPUNCTURE: CPT

## 2020-05-15 PROCEDURE — 74011000258 HC RX REV CODE- 258: Performed by: EMERGENCY MEDICINE

## 2020-05-15 PROCEDURE — 82728 ASSAY OF FERRITIN: CPT

## 2020-05-15 PROCEDURE — 65660000000 HC RM CCU STEPDOWN

## 2020-05-15 PROCEDURE — 77010033678 HC OXYGEN DAILY

## 2020-05-15 PROCEDURE — 85379 FIBRIN DEGRADATION QUANT: CPT

## 2020-05-15 PROCEDURE — 85730 THROMBOPLASTIN TIME PARTIAL: CPT

## 2020-05-15 PROCEDURE — 85027 COMPLETE CBC AUTOMATED: CPT

## 2020-05-15 PROCEDURE — 83615 LACTATE (LD) (LDH) ENZYME: CPT

## 2020-05-15 PROCEDURE — 77030038269 HC DRN EXT URIN PURWCK BARD -A

## 2020-05-15 RX ORDER — POTASSIUM CHLORIDE 20 MEQ/1
40 TABLET, EXTENDED RELEASE ORAL 2 TIMES DAILY
Status: COMPLETED | OUTPATIENT
Start: 2020-05-15 | End: 2020-05-16

## 2020-05-15 RX ORDER — PREGABALIN 75 MG/1
150 CAPSULE ORAL 2 TIMES DAILY
Status: DISCONTINUED | OUTPATIENT
Start: 2020-05-15 | End: 2020-05-19 | Stop reason: HOSPADM

## 2020-05-15 RX ADMIN — Medication 1 CAPSULE: at 11:16

## 2020-05-15 RX ADMIN — BACLOFEN 5 MG: 10 TABLET ORAL at 17:03

## 2020-05-15 RX ADMIN — PREGABALIN 150 MG: 75 CAPSULE ORAL at 21:06

## 2020-05-15 RX ADMIN — PREGABALIN 150 MG: 75 CAPSULE ORAL at 11:15

## 2020-05-15 RX ADMIN — CARVEDILOL 6.25 MG: 6.25 TABLET, FILM COATED ORAL at 17:01

## 2020-05-15 RX ADMIN — FUROSEMIDE 40 MG: 40 TABLET ORAL at 11:16

## 2020-05-15 RX ADMIN — PIPERACILLIN SODIUM AND TAZOBACTAM SODIUM 4.5 G: 4; .5 INJECTION, POWDER, LYOPHILIZED, FOR SOLUTION INTRAVENOUS at 11:14

## 2020-05-15 RX ADMIN — THERA TABS 1 TABLET: TAB at 11:16

## 2020-05-15 RX ADMIN — ACETAMINOPHEN 650 MG: 325 TABLET ORAL at 17:01

## 2020-05-15 RX ADMIN — HEPARIN SODIUM 1360 UNITS/HR: 10000 INJECTION, SOLUTION INTRAVENOUS at 04:38

## 2020-05-15 RX ADMIN — OMEGA-3 FATTY ACIDS CAP 1000 MG 1 CAPSULE: 1000 CAP at 11:15

## 2020-05-15 RX ADMIN — PIPERACILLIN SODIUM AND TAZOBACTAM SODIUM 4.5 G: 4; .5 INJECTION, POWDER, LYOPHILIZED, FOR SOLUTION INTRAVENOUS at 06:00

## 2020-05-15 RX ADMIN — ROSUVASTATIN 40 MG: 20 TABLET, FILM COATED ORAL at 21:06

## 2020-05-15 RX ADMIN — CARVEDILOL 6.25 MG: 6.25 TABLET, FILM COATED ORAL at 11:15

## 2020-05-15 RX ADMIN — AZITHROMYCIN MONOHYDRATE 500 MG: 500 INJECTION, POWDER, LYOPHILIZED, FOR SOLUTION INTRAVENOUS at 13:00

## 2020-05-15 RX ADMIN — CYANOCOBALAMIN TAB 1000 MCG 500 MCG: 1000 TAB at 11:15

## 2020-05-15 RX ADMIN — POTASSIUM CHLORIDE 40 MEQ: 1500 TABLET, EXTENDED RELEASE ORAL at 17:11

## 2020-05-15 RX ADMIN — PIPERACILLIN SODIUM AND TAZOBACTAM SODIUM 4.5 G: 4; .5 INJECTION, POWDER, LYOPHILIZED, FOR SOLUTION INTRAVENOUS at 17:07

## 2020-05-15 RX ADMIN — LINACLOTIDE 145 MCG: 145 CAPSULE, GELATIN COATED ORAL at 11:23

## 2020-05-15 RX ADMIN — Medication 1 TABLET: at 11:16

## 2020-05-15 RX ADMIN — POTASSIUM CHLORIDE 40 MEQ: 1500 TABLET, EXTENDED RELEASE ORAL at 11:16

## 2020-05-15 RX ADMIN — CLOPIDOGREL BISULFATE 75 MG: 75 TABLET ORAL at 11:15

## 2020-05-15 RX ADMIN — FUROSEMIDE 40 MG: 40 TABLET ORAL at 17:01

## 2020-05-15 RX ADMIN — PANTOPRAZOLE SODIUM 20 MG: 20 TABLET, DELAYED RELEASE ORAL at 11:15

## 2020-05-15 RX ADMIN — Medication 500 MG: at 11:15

## 2020-05-15 RX ADMIN — PIPERACILLIN SODIUM AND TAZOBACTAM SODIUM 4.5 G: 4; .5 INJECTION, POWDER, LYOPHILIZED, FOR SOLUTION INTRAVENOUS at 00:34

## 2020-05-15 RX ADMIN — ACETAMINOPHEN 650 MG: 325 TABLET ORAL at 11:15

## 2020-05-15 NOTE — PROGRESS NOTES
Hospitalist Progress Note    Patient: Francie Bui Age: 63 y.o. : 1960 MR#: [id number] SSN: [id number]  Date/Time: 5/15/2020 8:42 AM    DOA: 2020  PCP: Grisel Dowd NP    Subjective:     Pending COVID-19 test, was plan for cardiac cath today if negative but still no COVID-19 result. Otherwise, she feels ok without complaint. No chest pain, no shortness of breath  Very difficult to get lab due to hard stick today. Still on Hep gtt. No bleeding report. Diarrhea resolved. Neg C.diff, no fever, no leukocytosis   No growth on blood culture       Interval Hospital Course:  63 y.o female with HTN, CAD, chronic diastolic CHF, hyperlipidemia, h/o gastric bypass surgery, DM2, active tobacco smoking, presented to the ER with shortness of breath. Per note, her family found her to be moaning in her bed and not responsive to call. EMS found her to have agonal breathing. In the ER, CT head was benign, CTA chest without PE. EKG with non-specific finding      ROS: no fever/chills, no headache, no dizziness, no facial pain, no sinus congestion,   No swallowing pain, No chest pain, no palpitation, no shortness of breath, no abd pain,  No diarrhea, no urinary complaint, no leg pain or swelling    Assessment/Plan:     1. NSTEMI with CAD   2. Syncope and collapse, resolved   3. AMS, unresponsiveness prior to arrival, resolved   4. Suspected aspiration pneumonia vs COVID-19 respiratory infection   5. SIRS with leukocytosis, fever, tachypnea, resolved   6. H/o Ischemic cardiomyopathy with AICD present, normal function on interrogation   7. Tobacco dependence   8. Hyperglycemia with DM2   9. Hypokalemia   10. Elevated lactic acid on presentation   11. Normocytic anemia, iron deficiency anemia      Cont heparin gtt. Cardiology follows.  Note elevated probnp, Echo with depressed EF  Cardiac cath deferred to Monday per cardiology, increase her lasix 40mg BID  Cont IV antibiotics, COVID-19 test pending, stopped vancomycin   ICS, weaning off oxygen as tolerated. Continue Carvedilol, plavix, crestor, lasix, lyrica   Continue vit c, zinc, PPI, add Iron supplement, might need Venofer   Appreciate speech evaluation   Check daily lab, and anemia lab   Risks associate with tobacco smoking educated, advised on tobacco smoking cessation (<7min)    Full code     Additional Notes:    Time spent >30 minutes    Case discussed with:  [x]Patient  [x]Family  [x]Nursing  []Case Management  DVT Prophylaxis:  []Lovenox  []Hep SQ  []SCDs  []Coumadin   [x]On Heparin gtt    Signed By: Paramjit Dawson MD     May 15, 2020 8:42 AM              Objective:   VS:   Visit Vitals  /77 (BP 1 Location: Right arm, BP Patient Position: Sitting)   Pulse 64   Temp 98.4 °F (36.9 °C)   Resp 20   Ht 4' 11\" (1.499 m)   Wt 84.4 kg (186 lb)   LMP  (LMP Unknown)   SpO2 99%   Breastfeeding No   BMI 37.57 kg/m²      Tmax/24hrs: Temp (24hrs), Av.5 °F (36.9 °C), Min:97.9 °F (36.6 °C), Max:99 °F (37.2 °C)      Intake/Output Summary (Last 24 hours) at 5/15/2020 0842  Last data filed at 5/15/2020 6298  Gross per 24 hour   Intake 1056.44 ml   Output 2200 ml   Net -1143.56 ml       Tele: sinus  General:  Cooperative, Not in acute distress, speaks in full sentence while in bed  HEENT: PERRL, EOMI, supple neck, no JVD, dry oral mucosa  Cardiovascular: S1S2 regular, no rub/gallop   Pulmonary: Clear air entry bilaterally, no wheezing, ++ crackle  GI:  Soft, non tender, non distended, +bs, no guarding   Extremities:  ++ pedal edema, +distal pulses appreciated   Neuro: AOx3, moving all extremities, no gross deficit.      Additional:       Current Facility-Administered Medications   Medication Dose Route Frequency    furosemide (LASIX) tablet 40 mg  40 mg Oral BID    potassium chloride (K-DUR, KLOR-CON) SR tablet 40 mEq  40 mEq Oral DAILY    ferrous sulfate tablet 650 mg  2 Tab Oral EVERY OTHER DAY    lactobacillus sp. 50 billion cpu (BIO-K PLUS) capsule 1 Cap 1 Cap Oral DAILY    piperacillin-tazobactam (ZOSYN) 4.5 g in 0.9% sodium chloride (MBP/ADV) 100 mL MBP  4.5 g IntraVENous Q6H    heparin 25,000 units in D5W 250 ml infusion  12-25 Units/kg/hr IntraVENous TITRATE    azithromycin (ZITHROMAX) 500 mg in 0.9% sodium chloride 250 mL IVPB  500 mg IntraVENous Q24H    ascorbic acid (vitamin C) (VITAMIN C) tablet 500 mg  500 mg Oral DAILY    calcium-vitamin D 600 mg(1,500mg) -200 unit per tablet 1 Tab  1 Tab Oral DAILY    carvediloL (COREG) tablet 6.25 mg  6.25 mg Oral BID WITH MEALS    clopidogreL (PLAVIX) tablet 75 mg  75 mg Oral DAILY    cyanocobalamin tablet 500 mcg  500 mcg Oral DAILY    linaCLOtide (LINZESS) capsule 145 mcg  145 mcg Oral ACB    omega 3-DHA-EPA-fish oil 1,000 mg (120 mg-180 mg) capsule 1 Cap  1 Cap Oral DAILY    pantoprazole (PROTONIX) tablet 20 mg  20 mg Oral ACB    pregabalin (LYRICA) capsule 150 mg  150 mg Oral BID    rosuvastatin (CRESTOR) tablet 40 mg  40 mg Oral QHS    therapeutic multivitamin (THERAGRAN) tablet 1 Tab  1 Tab Oral DAILY    acetaminophen (TYLENOL) tablet 650 mg  650 mg Oral Q6H PRN    naloxone (NARCAN) injection 0.4 mg  0.4 mg IntraVENous PRN    baclofen (LIORESAL) tablet 5 mg  5 mg Oral QID PRN            Lab/Data Review:  Labs: Results:       Chemistry Recent Labs     05/14/20  0606 05/13/20 0132   GLU 87 98    142   K 2.9* 3.0*    108   CO2 27 28   BUN 7 6*   CREA 0.81 0.77   BUCR 9* 8*   AGAP 6 6   CA 8.0* 7.5*     Recent Labs     05/13/20 0132   ALT 15   SGOT 37   TP 6.1*   ALB 2.2*   GLOB 3.9   AGRAT 0.6*      CBC w/Diff Recent Labs     05/15/20  0312 05/14/20  0606 05/13/20 0132   WBC 6.9  --  7.1   RBC 3.41*  --  3.41*   HGB 8.4* 7.9* 8.3*   HCT 26.5* 24.8* 26.5*   MCV 77.7  --  77.7   MCH 24.6  --  24.3   MCHC 31.7  --  31.3   RDW 17.0*  --  17.0*     --  260   GRANS  --   --  62   LYMPH  --   --  30   EOS  --   --  1      Coagulation Recent Labs     05/15/20  0312 05/14/20  4550 05/13/20  0132   PTP  --   --   --  16.0*   INR  --   --   --  1.3*   APTT 137.5* 63.6*   < > 68.4*    < > = values in this interval not displayed. Iron/Ferritin Lab Results   Component Value Date/Time    Iron 10 (L) 05/14/2020 06:06 AM    TIBC 308 05/14/2020 06:06 AM    Iron % saturation 3 (L) 05/14/2020 06:06 AM    Ferritin 66 05/15/2020 03:12 AM       BNP    Cardiac Enzymes Lab Results   Component Value Date/Time     (H) 05/13/2020 01:32 AM    CK - MB 15.7 (H) 05/13/2020 01:32 AM    CK-MB Index 3.9 05/13/2020 01:32 AM    Troponin-I, QT 4.03 (HH) 05/14/2020 06:06 AM        Lactic Acid    Thyroid Studies          All Micro Results     Procedure Component Value Units Date/Time    CULTURE, BLOOD [[id number]] Collected:  05/12/20 0610    Order Status:  Completed Specimen:  Blood Updated:  05/15/20 0641     Special Requests: NO SPECIAL REQUESTS        Culture result: NO GROWTH 3 DAYS       CULTURE, BLOOD [[id number]] Collected:  05/12/20 0625    Order Status:  Completed Specimen:  Blood Updated:  05/15/20 0641     Special Requests: NO SPECIAL REQUESTS        Culture result: NO GROWTH 3 DAYS       CULTURE, URINE [[id number]] Collected:  05/12/20 0345    Order Status:  Completed Specimen:  Clean catch Updated:  05/14/20 1527    C. DIFFICILE AG & TOXIN A/B [[id number]] Collected:  05/13/20 1750    Order Status:  Completed Specimen:  Stool Updated:  05/13/20 2133     GDH ANTIGEN Negative        C. difficile toxin Negative        INTERPRETATION       NEGATIVE FOR TOXIGENIC C. DIFFICILE          CULTURE, RESPIRATORY/SPUTUM/BRONCH Chioma Khan [[id number]] Collected:  05/12/20 1230    Order Status:  Canceled Specimen:  Sputum             Images:    CT (Most Recent).  CT Results (most recent):  Results from Hospital Encounter encounter on 05/12/20   CTA CHEST W OR W WO CONT    Narrative EXAM: CTA CHEST W OR W WO CONT    INDICATIONS: PE ; collapsed at home, with demonstrated dyspnea; lethargy; recent  knee surgery    TECHNIQUE: Utilizing pulmonary embolus protocol, thin section axial images were  obtained from the thoracic inlet into the upper abdomen after the uneventful  administration of IV contrast. Coronal and sagittal maximum intensity projection  (MIP) post-processed images were generated to better define pulmonary artery  anatomy and enhance sensitivity for detection of pulmonary emboli. Contrast used: 95 cc Isovue 300      CT scans at this facility are performed using dose optimization technique as  appropriate with performed exam, to include automated exposure control,  adjustment of mA and/or kV according to patient's size (including appropriate  matching for site-specific examinations), or use of iterative reconstruction  technique. COMPARISON: Current and prior chest x-ray    FINDINGS:    Quality: Adequate     Pulmonary arteries: Negative for acute PE. Normal caliber main pulmonary  arteries. Mediastinum: No adenopathy. Normal range heart size. There is coronary  atherosclerosis. No pericardial effusion. Single lead ICD noted. Small hiatal  hernia. Lungs/pleura: Minimal pleural fluid bilaterally, left more than right. Mild  bibasilar dependent haziness with some surrounding groundglass inflammation,  left more than right. Lesser amount of similar haziness within the right middle  lobe and left upper lobe. Chest soft tissues: Unremarkable. Abdomen: No acute findings. Previous gastric bypass and cholecystectomy. Bones: No acute findings. Unremarkable for age. Impression IMPRESSION[de-identified]    1.  Negative for acute PE     2. Minimal volume bilateral pleural effusions    3. Haziness within the lower lungs. While some of this is dependent atelectasis,  suspect an element of mild interstitial edema as well. XRAY (Most Recent)      EKG No results found for this or any previous visit.      2D ECHO 05/12/20   ECHO ADULT COMPLETE 05/13/2020 5/13/2020    Narrative · Normal cavity size, wall thickness and systolic function (ejection   fraction normal). Calculated left ventricular ejection fraction is 50%. Visually measured ejection fraction. Abnormal left ventricular wall   motion. · Pacer/ICD present. · Mild mitral valve regurgitation is present. · Pulmonary arterial systolic pressure is 27 mmHg.         Signed by: South Connelly MD

## 2020-05-15 NOTE — ROUTINE PROCESS
Bedside and Verbal shift change report given to Delphine Schultz RN (oncoming nurse) by Shaunna Flores RN   (offgoing nurse). Report included the following information SBAR and Kardex.

## 2020-05-15 NOTE — ROUTINE PROCESS
Bedside and verbal report received from [address] (offgoing nurse). Report included the following information; SBAR, MAR, LABS, Intake/output, Kardex, and summary of care. The is an COVID r/o floor. Report done on the outside of room. Assessment done. Patient alert and  sitting on the side of the bed. Bed is in low position, call bell in reach. Bedside commode near the bed. Will continue to monitor. 6165  Patient was schedule to have Venofer IV ran, Patient needed another IV started. Not compatibility with the Heparin drip. Needed to start another IV. She refuse to let me start another IV. Dr. Phuong Castro was called. I will try later.

## 2020-05-15 NOTE — PROGRESS NOTES
[facility] - Progress Note  Admit Date: 5/12/2020    Follow up NSTEMI, found down, h/o CAD    Assessment:     Hospital Problems  Date Reviewed: 5/7/2020          Codes Class Noted POA    NSTEMI (non-ST elevated myocardial infarction) [facility]) ICD-10-CM: I21.4  ICD-9-CM: 410.70  5/12/2020 Unknown        Syncope and collapse ICD-10-CM: R55  ICD-9-CM: 780.2  5/12/2020 Unknown          -Unresponsiveness, EMS was called for AMS at 430 AM 5/12, patient was unresponsive with agonal breathing. Patient was placed on nonrebreather. Patient was worked up for possible infectious process (fever and leukocytosis), versus PE (negative CTA), versus seizure, versus medication (chronic pain). Covid testing pending  -NSTEMI, with rising troponin, remains on heparin infusion, ECG with nonspecific ST wave abnormalities with concern of subtle inferolateral ST elevations, known CAD as noted below. Symptoms very different than previous anginal complaints. Echo this admission shows a mildly depressed LVEF around 45-50% with no regional wall motion abnormalities, known chronic hypokinesis of her LV apex since her infarct.  -Shortness of breath, CTA ruled out PE, possible PNA on abx, Covid testing pending.  -Fever 100.2 with Leukocytosis on admission, possible PNA. -Peripheral vascular disease s/p vascular procedure done at [address] [place] [address] where she underwent PTA and atherectomy to her right SFA.  She was then in the emergency room there 2 days later with what she thought was an allergic reaction to something during the procedure she states that her face was swollen and her neck had a rash and she was treated with prednisone and Benadryl and discharged home. -H/o ICMY which has improved over the years, EF 45-50% by echo this admission, EF 50% 5/2017. Noted elevated BNP, does not appear significantly volume overloaded at this time. -[employer] single lead AICD, normal function on interrogation this admission.   -CAD s/p anterior wall MI 6/2013 s/p PCI to LAD.     Cath 2015     Findings:   Hemodynamics:  1. LVEDP was 8 mmHg, there was no change post LV-gram  2. There was no transvalvular aortic gradient on pull back  3. There was no mitral regurgitation on LV-gram  4. LV-gram revealed a normal LV function with an ejection   fraction of 45% with apical hypokinesis    Angiographic  1. Left main is angiographically normal- short then bifuctates   into LAD and LCX  2. LAD is angiographically normal- tapers from LMA, 30% stenosis   just before previously placed stent in LAD. LAD stent appeart   patent. This vessel tapers significantly on course to apex ( << 2   mm)  3. Left circumflex has luminal irregularities with THOM 3 flow  4. RCA is very small non dominant with diffuse disease  -Hypertension. Stable. -Diabetes mellitus. HgbA1c 6.5.  -Dyslipidemia. On statin.  -Chronic pain, extensive orthopedic history.  -Hypokalemia, replace as needed.  -Anemia, noted hgb 7.9, stool negative, continue to trend. -ASA allergy     Primary cardiology [facility]    Plan: Will tentatively plan to for cath Monday morning  Covid testing still pending, also watching her Hb (but no obvious signs of bleeding and stable ~8)  She is hemodynamically stable and CP free (but also admits she never had CP with her prior CV event)  Will reeval Monday, please call if questions/ problems    Subjective:     No new complaints.  No CP, no signs of bleeding    Objective:      Patient Vitals for the past 8 hrs:   Temp Pulse Resp BP SpO2   05/15/20 0750 98.4 °F (36.9 °C) 64 20 141/77 99 %         Patient Vitals for the past 96 hrs:   Weight   05/15/20 0400 84.4 kg (186 lb)   05/13/20 1115 80.7 kg (178 lb)   05/12/20 0813 80 kg (176 lb 5.9 oz)         Intake/Output Summary (Last 24 hours) at 5/15/2020 1224  Last data filed at 5/15/2020 1052  Gross per 24 hour   Intake 1096.44 ml   Output 1900 ml   Net -803.56 ml       Physical Exam:  General:  cooperative, no distress, appears stated age  Neck:  nontender  Lungs:  clear to auscultation bilaterally  Heart:  regular rate and rhythm, S1, S2 normal, no murmur, click, rub or gallop  Abdomen:  abdomen is soft without significant tenderness, masses, organomegaly or guarding  Extremities:  extremities normal, atraumatic, no cyanosis or edema    Data Review:     Labs: Results:       Chemistry Recent Labs     05/15/20  0312 05/14/20  0606 05/13/20  0132   GLU  --  87 98   NA  --  139 142   K 3.3* 2.9* 3.0*   CL  --  106 108   CO2  --  27 28   BUN  --  7 6*   CREA  --  0.81 0.77   CA  --  8.0* 7.5*   MG  --   --  2.2   AGAP  --  6 6   BUCR  --  9* 8*   AP  --   --  128*   TP  --   --  6.1*   ALB  --   --  2.2*   GLOB  --   --  3.9   AGRAT  --   --  0.6*      CBC w/Diff Recent Labs     05/15/20  0312 05/14/20  0606 05/13/20  0132   WBC 6.9  --  7.1   RBC 3.41*  --  3.41*   HGB 8.4* 7.9* 8.3*   HCT 26.5* 24.8* 26.5*     --  260   GRANS  --   --  62   LYMPH  --   --  30   EOS  --   --  1      Cardiac Enzymes No results found for: CPK, CK, CKMMB, CKMB, RCK3, CKMBT, CKNDX, CKND1, ASMITA, TROPT, TROIQ, CORKY, TROPT, TNIPOC, BNP, BNPP   Coagulation Recent Labs     05/15/20  0312 05/14/20  1851  05/13/20 0132   PTP  --   --   --  16.0*   INR  --   --   --  1.3*   APTT 137.5* 63.6*   < > 68.4*    < > = values in this interval not displayed.        Lipid Panel Lab Results   Component Value Date/Time    Cholesterol, total 242 (H) 08/15/2019 10:56 AM    HDL Cholesterol 54 08/15/2019 10:56 AM    LDL, calculated 160 (H) 08/15/2019 10:56 AM    VLDL, calculated 28 08/15/2019 10:56 AM    Triglyceride 140 08/15/2019 10:56 AM    CHOL/HDL Ratio 3.8 03/07/2018 11:22 AM      BNP No results found for: BNP, BNPP, XBNPT   Liver Enzymes Recent Labs     05/13/20  0132   TP 6.1*   ALB 2.2*   *   SGOT 37      Digoxin    Thyroid Studies Lab Results   Component Value Date/Time    TSH 0.453 09/20/2019 12:00 AM          Signed By: Amber Encinas DO     May 15, 2020

## 2020-05-16 ENCOUNTER — APPOINTMENT (OUTPATIENT)
Dept: GENERAL RADIOLOGY | Age: 60
DRG: 246 | End: 2020-05-16
Attending: INTERNAL MEDICINE
Payer: MEDICARE

## 2020-05-16 LAB
ANION GAP SERPL CALC-SCNC: 7 MMOL/L (ref 3–18)
APTT PPP: 94 SEC (ref 23–36.4)
BUN SERPL-MCNC: 7 MG/DL (ref 7–18)
BUN/CREAT SERPL: 10 (ref 12–20)
CALCIUM SERPL-MCNC: 8.6 MG/DL (ref 8.5–10.1)
CHLORIDE SERPL-SCNC: 106 MMOL/L (ref 100–111)
CO2 SERPL-SCNC: 26 MMOL/L (ref 21–32)
CREAT SERPL-MCNC: 0.73 MG/DL (ref 0.6–1.3)
D DIMER PPP FEU-MCNC: 1.37 UG/ML(FEU)
ERYTHROCYTE [DISTWIDTH] IN BLOOD BY AUTOMATED COUNT: 17 % (ref 11.6–14.5)
FERRITIN SERPL-MCNC: 87 NG/ML (ref 8–388)
GLUCOSE SERPL-MCNC: 86 MG/DL (ref 74–99)
HCT VFR BLD AUTO: 27.3 % (ref 35–45)
HGB BLD-MCNC: 8.6 G/DL (ref 12–16)
LDH SERPL L TO P-CCNC: 319 U/L (ref 81–234)
MCH RBC QN AUTO: 24.2 PG (ref 24–34)
MCHC RBC AUTO-ENTMCNC: 31.5 G/DL (ref 31–37)
MCV RBC AUTO: 76.9 FL (ref 74–97)
PLATELET # BLD AUTO: 433 K/UL (ref 135–420)
PMV BLD AUTO: 11 FL (ref 9.2–11.8)
POTASSIUM SERPL-SCNC: 3.4 MMOL/L (ref 3.5–5.5)
PROCALCITONIN SERPL-MCNC: 1.07 NG/ML
RBC # BLD AUTO: 3.55 M/UL (ref 4.2–5.3)
SODIUM SERPL-SCNC: 139 MMOL/L (ref 136–145)
WBC # BLD AUTO: 6.1 K/UL (ref 4.6–13.2)

## 2020-05-16 PROCEDURE — 65660000000 HC RM CCU STEPDOWN

## 2020-05-16 PROCEDURE — 85379 FIBRIN DEGRADATION QUANT: CPT

## 2020-05-16 PROCEDURE — 74011250637 HC RX REV CODE- 250/637: Performed by: INTERNAL MEDICINE

## 2020-05-16 PROCEDURE — 77010033678 HC OXYGEN DAILY

## 2020-05-16 PROCEDURE — 83615 LACTATE (LD) (LDH) ENZYME: CPT

## 2020-05-16 PROCEDURE — 85027 COMPLETE CBC AUTOMATED: CPT

## 2020-05-16 PROCEDURE — 74011250637 HC RX REV CODE- 250/637: Performed by: EMERGENCY MEDICINE

## 2020-05-16 PROCEDURE — 82728 ASSAY OF FERRITIN: CPT

## 2020-05-16 PROCEDURE — 80048 BASIC METABOLIC PNL TOTAL CA: CPT

## 2020-05-16 PROCEDURE — 84145 PROCALCITONIN (PCT): CPT

## 2020-05-16 PROCEDURE — 71045 X-RAY EXAM CHEST 1 VIEW: CPT

## 2020-05-16 PROCEDURE — 74011000258 HC RX REV CODE- 258: Performed by: EMERGENCY MEDICINE

## 2020-05-16 PROCEDURE — 85730 THROMBOPLASTIN TIME PARTIAL: CPT

## 2020-05-16 PROCEDURE — 36415 COLL VENOUS BLD VENIPUNCTURE: CPT

## 2020-05-16 PROCEDURE — 74011250636 HC RX REV CODE- 250/636: Performed by: EMERGENCY MEDICINE

## 2020-05-16 RX ORDER — AMOXICILLIN AND CLAVULANATE POTASSIUM 875; 125 MG/1; MG/1
1 TABLET, FILM COATED ORAL EVERY 12 HOURS
Status: DISCONTINUED | OUTPATIENT
Start: 2020-05-16 | End: 2020-05-19 | Stop reason: HOSPADM

## 2020-05-16 RX ORDER — POTASSIUM CHLORIDE 20 MEQ/1
40 TABLET, EXTENDED RELEASE ORAL 2 TIMES DAILY
Status: COMPLETED | OUTPATIENT
Start: 2020-05-16 | End: 2020-05-17

## 2020-05-16 RX ADMIN — CYANOCOBALAMIN TAB 1000 MCG 500 MCG: 1000 TAB at 09:18

## 2020-05-16 RX ADMIN — THERA TABS 1 TABLET: TAB at 09:18

## 2020-05-16 RX ADMIN — FUROSEMIDE 40 MG: 40 TABLET ORAL at 18:00

## 2020-05-16 RX ADMIN — POTASSIUM CHLORIDE 40 MEQ: 1500 TABLET, EXTENDED RELEASE ORAL at 21:34

## 2020-05-16 RX ADMIN — POTASSIUM CHLORIDE 40 MEQ: 1500 TABLET, EXTENDED RELEASE ORAL at 09:18

## 2020-05-16 RX ADMIN — CARVEDILOL 6.25 MG: 6.25 TABLET, FILM COATED ORAL at 09:18

## 2020-05-16 RX ADMIN — FERROUS SULFATE TAB 325 MG (65 MG ELEMENTAL FE) 650 MG: 325 (65 FE) TAB at 16:07

## 2020-05-16 RX ADMIN — BACLOFEN 5 MG: 10 TABLET ORAL at 16:07

## 2020-05-16 RX ADMIN — BACLOFEN 5 MG: 10 TABLET ORAL at 21:34

## 2020-05-16 RX ADMIN — LINACLOTIDE 145 MCG: 145 CAPSULE, GELATIN COATED ORAL at 09:18

## 2020-05-16 RX ADMIN — HEPARIN SODIUM 1260 UNITS/HR: 10000 INJECTION, SOLUTION INTRAVENOUS at 00:56

## 2020-05-16 RX ADMIN — ROSUVASTATIN 40 MG: 20 TABLET, FILM COATED ORAL at 21:33

## 2020-05-16 RX ADMIN — PANTOPRAZOLE SODIUM 20 MG: 20 TABLET, DELAYED RELEASE ORAL at 09:18

## 2020-05-16 RX ADMIN — CLOPIDOGREL BISULFATE 75 MG: 75 TABLET ORAL at 09:18

## 2020-05-16 RX ADMIN — PREGABALIN 150 MG: 75 CAPSULE ORAL at 09:18

## 2020-05-16 RX ADMIN — CARVEDILOL 6.25 MG: 6.25 TABLET, FILM COATED ORAL at 16:07

## 2020-05-16 RX ADMIN — Medication 1 TABLET: at 09:18

## 2020-05-16 RX ADMIN — PIPERACILLIN SODIUM AND TAZOBACTAM SODIUM 4.5 G: 4; .5 INJECTION, POWDER, LYOPHILIZED, FOR SOLUTION INTRAVENOUS at 00:42

## 2020-05-16 RX ADMIN — AMOXICILLIN AND CLAVULANATE POTASSIUM 1 TABLET: 875; 125 TABLET, FILM COATED ORAL at 21:34

## 2020-05-16 RX ADMIN — Medication 1 CAPSULE: at 09:18

## 2020-05-16 RX ADMIN — FUROSEMIDE 40 MG: 40 TABLET ORAL at 09:18

## 2020-05-16 RX ADMIN — Medication 500 MG: at 09:17

## 2020-05-16 RX ADMIN — ACETAMINOPHEN 650 MG: 325 TABLET ORAL at 16:06

## 2020-05-16 RX ADMIN — PREGABALIN 150 MG: 75 CAPSULE ORAL at 21:30

## 2020-05-16 RX ADMIN — PIPERACILLIN SODIUM AND TAZOBACTAM SODIUM 4.5 G: 4; .5 INJECTION, POWDER, LYOPHILIZED, FOR SOLUTION INTRAVENOUS at 06:13

## 2020-05-16 RX ADMIN — OMEGA-3 FATTY ACIDS CAP 1000 MG 1 CAPSULE: 1000 CAP at 09:18

## 2020-05-16 NOTE — PROGRESS NOTES
Problem: Falls - Risk of  Goal: *Absence of Falls  Description: Document Jimmy Elam Fall Risk and appropriate interventions in the flowsheet.   Note: Fall Risk Interventions:  Mobility Interventions: Communicate number of staff needed for ambulation/transfer, Patient to call before getting OOB         Medication Interventions: Evaluate medications/consider consulting pharmacy    Elimination Interventions: Call light in reach, Patient to call for help with toileting needs    History of Falls Interventions: Evaluate medications/consider consulting pharmacy

## 2020-05-16 NOTE — PROGRESS NOTES
Nutrition:    Received call from dining associate, pt c/o meals, dietary options & current diet order. Previously on soft solid, DM diet with FR. Diet liberalized by MD and pt's dinner order received and communicated with kitchen staff.       Louise Whitman RD

## 2020-05-16 NOTE — ROUTINE PROCESS
Bedside and Verbal shift change report given to [address] (oncoming nurse) by Derrek Mensah RN (offgoing nurse). Report included the following information SBAR, Kardex, Intake/Output, MAR and Recent Results. Patient resting quietly in bed. Call light in reach. No distress or sob at this time.

## 2020-05-16 NOTE — PROGRESS NOTES
Hospitalist Progress Note    Patient: Arian Stephen Age: 63 y.o. : 1960 MR#: [id number] SSN: [id number]  Date/Time: 2020 9:50 AM    DOA: 2020  PCP: Genie Ba NP    Subjective:     Pt is upset that her COVID-19 test is stil pending. Otherwise, no other complaint. No cough, no shortness of breath, no chest pain. No diarrhea. No fever. Still on Hep gtt, but feels that she has too many lab done   No growth on blood culture       Interval Hospital Course:  63 y.o female with HTN, CAD, chronic diastolic CHF, hyperlipidemia, h/o gastric bypass surgery, DM2, active tobacco smoking, presented to the ER with shortness of breath. Per note, her family found her to be moaning in her bed and not responsive to call. EMS found her to have agonal breathing. In the ER, CT head was benign, CTA chest without PE. EKG with non-specific finding      ROS: no fever/chills, no headache, no dizziness, no facial pain, no sinus congestion,   No swallowing pain, No chest pain, no palpitation, no shortness of breath, no abd pain,  No diarrhea, no urinary complaint, no leg pain or swelling    Assessment/Plan:     1. NSTEMI with CAD   2. Syncope and collapse, resolved   3. AMS, unresponsiveness prior to arrival, resolved   4. Suspected aspiration pneumonia vs COVID-19 respiratory infection   5. SIRS with leukocytosis, fever, tachypnea, resolved   6. H/o Ischemic cardiomyopathy with AICD present, normal function on interrogation   7. Tobacco dependence   8. Hyperglycemia with DM2   9. Hypokalemia   10. Elevated lactic acid on presentation   11. Normocytic anemia, iron deficiency anemia    Stop zosyn, can switch to augmentin for 4 more days. Stop azithromycin  Cardiac cath on Monday per cardiology, increase her lasix 40mg BID  Cont heparin gtt. Cardiology follows.  Note elevated probnp, Echo with depressed EF  F/u on COVID-19 test pending  ICS, weaning off oxygen  Continue Carvedilol, plavix, crestor, lasix, lyrica   Continue vit c, zinc, PPI, add Iron supplement, might need Venofer   Appreciate speech evaluation   Risks associate with tobacco smoking educated, advised on tobacco smoking cessation (<7min)    Full code     Additional Notes:    Time spent >30 minutes    Case discussed with:  [x]Patient  [x]Family  [x]Nursing  []Case Management  DVT Prophylaxis:  []Lovenox  []Hep SQ  []SCDs  []Coumadin   [x]On Heparin gtt    Signed By: Gail Esteves MD     May 16, 2020 9:50 AM              Objective:   VS:   Visit Vitals  /90 (BP 1 Location: Right arm, BP Patient Position: At rest)   Pulse 82   Temp 98.8 °F (37.1 °C)   Resp 18   Ht 4' 11\" (1.499 m)   Wt 84.4 kg (186 lb)   LMP  (LMP Unknown)   SpO2 92%   Breastfeeding No   BMI 37.57 kg/m²      Tmax/24hrs: Temp (24hrs), Av.3 °F (36.8 °C), Min:97.1 °F (36.2 °C), Max:98.8 °F (37.1 °C)      Intake/Output Summary (Last 24 hours) at 2020 0950  Last data filed at 5/15/2020 1052  Gross per 24 hour   Intake 280 ml   Output --   Net 280 ml       Tele: sinus  General:  Cooperative, Not in acute distress, speaks in full sentence while in bed  HEENT: PERRL, EOMI, supple neck, no JVD, dry oral mucosa  Cardiovascular: S1S2 regular, no rub/gallop   Pulmonary: Clear air entry bilaterally, no wheezing, ++ crackle  GI:  Soft, non tender, non distended, +bs, no guarding   Extremities:  ++ pedal edema, +distal pulses appreciated   Neuro: AOx3, moving all extremities, no gross deficit.      Additional:       Current Facility-Administered Medications   Medication Dose Route Frequency    potassium chloride (K-DUR, KLOR-CON) SR tablet 40 mEq  40 mEq Oral BID    iron sucrose (VENOFER) 400 mg in 0.9% sodium chloride 250 mL IVPB  400 mg IntraVENous Q24H    pregabalin (LYRICA) capsule 150 mg  150 mg Oral BID    furosemide (LASIX) tablet 40 mg  40 mg Oral BID    ferrous sulfate tablet 650 mg  2 Tab Oral EVERY OTHER DAY    lactobacillus sp. 50 billion cpu (BIO-K PLUS) capsule 1 Cap  1 Cap Oral DAILY    piperacillin-tazobactam (ZOSYN) 4.5 g in 0.9% sodium chloride (MBP/ADV) 100 mL MBP  4.5 g IntraVENous Q6H    heparin 25,000 units in D5W 250 ml infusion  12-25 Units/kg/hr IntraVENous TITRATE    azithromycin (ZITHROMAX) 500 mg in 0.9% sodium chloride 250 mL IVPB  500 mg IntraVENous Q24H    ascorbic acid (vitamin C) (VITAMIN C) tablet 500 mg  500 mg Oral DAILY    calcium-vitamin D 600 mg(1,500mg) -200 unit per tablet 1 Tab  1 Tab Oral DAILY    carvediloL (COREG) tablet 6.25 mg  6.25 mg Oral BID WITH MEALS    clopidogreL (PLAVIX) tablet 75 mg  75 mg Oral DAILY    cyanocobalamin tablet 500 mcg  500 mcg Oral DAILY    linaCLOtide (LINZESS) capsule 145 mcg  145 mcg Oral ACB    omega 3-DHA-EPA-fish oil 1,000 mg (120 mg-180 mg) capsule 1 Cap  1 Cap Oral DAILY    pantoprazole (PROTONIX) tablet 20 mg  20 mg Oral ACB    rosuvastatin (CRESTOR) tablet 40 mg  40 mg Oral QHS    therapeutic multivitamin (THERAGRAN) tablet 1 Tab  1 Tab Oral DAILY    acetaminophen (TYLENOL) tablet 650 mg  650 mg Oral Q6H PRN    naloxone (NARCAN) injection 0.4 mg  0.4 mg IntraVENous PRN    baclofen (LIORESAL) tablet 5 mg  5 mg Oral QID PRN            Lab/Data Review:  Labs: Results:       Chemistry Recent Labs     05/16/20  0459 05/15/20  0312 05/14/20  0606   GLU 86  --  87     --  139   K 3.4* 3.3* 2.9*     --  106   CO2 26  --  27   BUN 7  --  7   CREA 0.73  --  0.81   BUCR 10*  --  9*   AGAP 7  --  6   CA 8.6  --  8.0*     No results for input(s): TBIL, ALT, SGOT, ALKP, TP, ALB, GLOB, AGRAT in the last 72 hours. CBC w/Diff Recent Labs     05/16/20  0459 05/15/20  0312  05/14/20  0606   WBC 6.1 6.9  --   --    RBC 3.55* 3.41*  --   --    HGB 8.6* 8.4*  --  7.9*   HCT 27.3* 26.5*  --  24.8*   MCV 76.9 77.7   < >  --    MCH 24.2 24.6   < >  --    MCHC 31.5 31.7   < >  --    RDW 17.0* 17.0*   < >  --    * 373  --   --     < > = values in this interval not displayed.       Coagulation Recent Labs     05/16/20  0459 05/15/20  2245   APTT 94.0* 79.7*       Iron/Ferritin Lab Results   Component Value Date/Time    Iron 10 (L) 05/14/2020 06:06 AM    TIBC 308 05/14/2020 06:06 AM    Iron % saturation 3 (L) 05/14/2020 06:06 AM    Ferritin 87 05/16/2020 04:59 AM       BNP    Cardiac Enzymes Lab Results   Component Value Date/Time     (H) 05/13/2020 01:32 AM    CK - MB 15.7 (H) 05/13/2020 01:32 AM    CK-MB Index 3.9 05/13/2020 01:32 AM    Troponin-I, QT 4.03 (HH) 05/14/2020 06:06 AM        Lactic Acid    Thyroid Studies          All Micro Results     Procedure Component Value Units Date/Time    CULTURE, BLOOD [[id number]] Collected:  05/12/20 0610    Order Status:  Completed Specimen:  Blood Updated:  05/16/20 0605     Special Requests: NO SPECIAL REQUESTS        Culture result: NO GROWTH 4 DAYS       CULTURE, BLOOD [[id number]] Collected:  05/12/20 0625    Order Status:  Completed Specimen:  Blood Updated:  05/16/20 0605     Special Requests: NO SPECIAL REQUESTS        Culture result: NO GROWTH 4 DAYS       CULTURE, URINE [[id number]] Collected:  05/12/20 0345    Order Status:  Completed Specimen:  Clean catch Updated:  05/15/20 1229     Special Requests: NO SPECIAL REQUESTS        Culture result: No growth (<1,000 CFU/ML)       C. DIFFICILE AG & TOXIN A/B [[id number]] Collected:  05/13/20 1750    Order Status:  Completed Specimen:  Stool Updated:  05/13/20 1883     [address] ANTIGEN Negative        C. difficile toxin Negative        INTERPRETATION       NEGATIVE FOR TOXIGENIC C. DIFFICILE          CULTURE, RESPIRATORY/SPUTUM/BRONCH [facility] STAIN [[id number]] Collected:  05/12/20 1230    Order Status:  Canceled Specimen:  Sputum             Images:    CT (Most Recent).  CT Results (most recent):  Results from Hospital Encounter encounter on 05/12/20   CTA CHEST W OR W WO CONT    Narrative EXAM: CTA CHEST W OR W WO CONT    INDICATIONS: PE ; collapsed at home, with demonstrated dyspnea; lethargy; recent  knee surgery    TECHNIQUE: Utilizing pulmonary embolus protocol, thin section axial images were  obtained from the thoracic inlet into the upper abdomen after the uneventful  administration of IV contrast. Coronal and sagittal maximum intensity projection  (MIP) post-processed images were generated to better define pulmonary artery  anatomy and enhance sensitivity for detection of pulmonary emboli. Contrast used: 95 cc Isovue 300      CT scans at this facility are performed using dose optimization technique as  appropriate with performed exam, to include automated exposure control,  adjustment of mA and/or kV according to patient's size (including appropriate  matching for site-specific examinations), or use of iterative reconstruction  technique. COMPARISON: Current and prior chest x-ray    FINDINGS:    Quality: Adequate     Pulmonary arteries: Negative for acute PE. Normal caliber main pulmonary  arteries. Mediastinum: No adenopathy. Normal range heart size. There is coronary  atherosclerosis. No pericardial effusion. Single lead ICD noted. Small hiatal  hernia. Lungs/pleura: Minimal pleural fluid bilaterally, left more than right. Mild  bibasilar dependent haziness with some surrounding groundglass inflammation,  left more than right. Lesser amount of similar haziness within the right middle  lobe and left upper lobe. Chest soft tissues: Unremarkable. Abdomen: No acute findings. Previous gastric bypass and cholecystectomy. Bones: No acute findings. Unremarkable for age. Impression IMPRESSION[de-identified]    1.  Negative for acute PE     2. Minimal volume bilateral pleural effusions    3. Haziness within the lower lungs. While some of this is dependent atelectasis,  suspect an element of mild interstitial edema as well. XRAY (Most Recent)      EKG No results found for this or any previous visit.      2D ECHO 05/12/20   ECHO ADULT COMPLETE 05/13/2020 5/13/2020    Narrative · Normal cavity size, wall thickness and systolic function (ejection   fraction normal). Calculated left ventricular ejection fraction is 50%. Visually measured ejection fraction. Abnormal left ventricular wall   motion. · Pacer/ICD present. · Mild mitral valve regurgitation is present. · Pulmonary arterial systolic pressure is 27 mmHg.         Signed by: Fina Rodriguez MD

## 2020-05-17 LAB
ANION GAP SERPL CALC-SCNC: 5 MMOL/L (ref 3–18)
APTT PPP: 73.2 SEC (ref 23–36.4)
BUN SERPL-MCNC: 7 MG/DL (ref 7–18)
BUN/CREAT SERPL: 9 (ref 12–20)
CALCIUM SERPL-MCNC: 8.9 MG/DL (ref 8.5–10.1)
CHLORIDE SERPL-SCNC: 106 MMOL/L (ref 100–111)
CO2 SERPL-SCNC: 28 MMOL/L (ref 21–32)
CREAT SERPL-MCNC: 0.75 MG/DL (ref 0.6–1.3)
D DIMER PPP FEU-MCNC: 1.53 UG/ML(FEU)
ERYTHROCYTE [DISTWIDTH] IN BLOOD BY AUTOMATED COUNT: 16.9 % (ref 11.6–14.5)
FERRITIN SERPL-MCNC: 71 NG/ML (ref 8–388)
GLUCOSE SERPL-MCNC: 99 MG/DL (ref 74–99)
HCT VFR BLD AUTO: 28.3 % (ref 35–45)
HGB BLD-MCNC: 8.9 G/DL (ref 12–16)
LDH SERPL L TO P-CCNC: 276 U/L (ref 81–234)
MCH RBC QN AUTO: 24 PG (ref 24–34)
MCHC RBC AUTO-ENTMCNC: 31.4 G/DL (ref 31–37)
MCV RBC AUTO: 76.3 FL (ref 74–97)
PLATELET # BLD AUTO: 464 K/UL (ref 135–420)
PMV BLD AUTO: 10.7 FL (ref 9.2–11.8)
POTASSIUM SERPL-SCNC: 3.4 MMOL/L (ref 3.5–5.5)
RBC # BLD AUTO: 3.71 M/UL (ref 4.2–5.3)
SODIUM SERPL-SCNC: 139 MMOL/L (ref 136–145)
WBC # BLD AUTO: 5.6 K/UL (ref 4.6–13.2)

## 2020-05-17 PROCEDURE — 74011250636 HC RX REV CODE- 250/636: Performed by: EMERGENCY MEDICINE

## 2020-05-17 PROCEDURE — 74011250637 HC RX REV CODE- 250/637: Performed by: INTERNAL MEDICINE

## 2020-05-17 PROCEDURE — 65660000000 HC RM CCU STEPDOWN

## 2020-05-17 PROCEDURE — 85379 FIBRIN DEGRADATION QUANT: CPT

## 2020-05-17 PROCEDURE — 36415 COLL VENOUS BLD VENIPUNCTURE: CPT

## 2020-05-17 PROCEDURE — 85027 COMPLETE CBC AUTOMATED: CPT

## 2020-05-17 PROCEDURE — 74011250637 HC RX REV CODE- 250/637: Performed by: EMERGENCY MEDICINE

## 2020-05-17 PROCEDURE — 85730 THROMBOPLASTIN TIME PARTIAL: CPT

## 2020-05-17 PROCEDURE — 80048 BASIC METABOLIC PNL TOTAL CA: CPT

## 2020-05-17 PROCEDURE — 82728 ASSAY OF FERRITIN: CPT

## 2020-05-17 PROCEDURE — 83615 LACTATE (LD) (LDH) ENZYME: CPT

## 2020-05-17 PROCEDURE — 74011250636 HC RX REV CODE- 250/636: Performed by: INTERNAL MEDICINE

## 2020-05-17 RX ORDER — ENOXAPARIN SODIUM 100 MG/ML
80 INJECTION SUBCUTANEOUS EVERY 12 HOURS
Status: DISCONTINUED | OUTPATIENT
Start: 2020-05-17 | End: 2020-05-18

## 2020-05-17 RX ADMIN — CARVEDILOL 6.25 MG: 6.25 TABLET, FILM COATED ORAL at 08:39

## 2020-05-17 RX ADMIN — OMEGA-3 FATTY ACIDS CAP 1000 MG 1 CAPSULE: 1000 CAP at 08:40

## 2020-05-17 RX ADMIN — PANTOPRAZOLE SODIUM 20 MG: 20 TABLET, DELAYED RELEASE ORAL at 08:40

## 2020-05-17 RX ADMIN — Medication 1 TABLET: at 08:39

## 2020-05-17 RX ADMIN — THERA TABS 1 TABLET: TAB at 08:40

## 2020-05-17 RX ADMIN — PREGABALIN 150 MG: 75 CAPSULE ORAL at 21:51

## 2020-05-17 RX ADMIN — HEPARIN SODIUM 1260 UNITS/KG/HR: 10000 INJECTION, SOLUTION INTRAVENOUS at 00:00

## 2020-05-17 RX ADMIN — POTASSIUM CHLORIDE 40 MEQ: 1500 TABLET, EXTENDED RELEASE ORAL at 08:40

## 2020-05-17 RX ADMIN — POTASSIUM CHLORIDE 40 MEQ: 1500 TABLET, EXTENDED RELEASE ORAL at 21:51

## 2020-05-17 RX ADMIN — AMOXICILLIN AND CLAVULANATE POTASSIUM 1 TABLET: 875; 125 TABLET, FILM COATED ORAL at 08:40

## 2020-05-17 RX ADMIN — ROSUVASTATIN 40 MG: 20 TABLET, FILM COATED ORAL at 21:51

## 2020-05-17 RX ADMIN — PREGABALIN 150 MG: 75 CAPSULE ORAL at 08:39

## 2020-05-17 RX ADMIN — ACETAMINOPHEN 650 MG: 325 TABLET ORAL at 22:01

## 2020-05-17 RX ADMIN — CARVEDILOL 6.25 MG: 6.25 TABLET, FILM COATED ORAL at 18:03

## 2020-05-17 RX ADMIN — ENOXAPARIN SODIUM 80 MG: 80 INJECTION SUBCUTANEOUS at 15:51

## 2020-05-17 RX ADMIN — AMOXICILLIN AND CLAVULANATE POTASSIUM 1 TABLET: 875; 125 TABLET, FILM COATED ORAL at 21:51

## 2020-05-17 RX ADMIN — ACETAMINOPHEN 650 MG: 325 TABLET ORAL at 15:51

## 2020-05-17 RX ADMIN — CYANOCOBALAMIN TAB 1000 MCG 500 MCG: 1000 TAB at 08:39

## 2020-05-17 RX ADMIN — FUROSEMIDE 40 MG: 40 TABLET ORAL at 08:39

## 2020-05-17 RX ADMIN — CLOPIDOGREL BISULFATE 75 MG: 75 TABLET ORAL at 08:40

## 2020-05-17 RX ADMIN — FUROSEMIDE 40 MG: 40 TABLET ORAL at 18:03

## 2020-05-17 RX ADMIN — BACLOFEN 5 MG: 10 TABLET ORAL at 21:51

## 2020-05-17 RX ADMIN — Medication 500 MG: at 08:40

## 2020-05-17 RX ADMIN — LINACLOTIDE 145 MCG: 145 CAPSULE, GELATIN COATED ORAL at 08:40

## 2020-05-17 RX ADMIN — BACLOFEN 5 MG: 10 TABLET ORAL at 15:51

## 2020-05-17 RX ADMIN — Medication 1 CAPSULE: at 08:40

## 2020-05-17 NOTE — ROUTINE PROCESS
Bedside and Verbal shift change report given to Danay Antolin, RN (oncoming nurse) by Silverio Rosa RN   (offgoing nurse). Report included the following information SBAR and Kardex.

## 2020-05-17 NOTE — PROGRESS NOTES
Hospitalist Progress Note    Patient: Carlotta Evangelista Age: 63 y.o. : 1960 MR#: [id number] SSN: [id number]  Date/Time: 2020 8:19 AM    DOA: 2020  PCP: Stacey Murcia NP    Subjective:     No overnight event. No fever. No chest pain, no shortness of breath, has been off O2 supplement. Off Zosyn, tolerates Augmentin   Still on Hep gtt, cardiology planned for cardiac cath when her COVID-19 test is available  No growth on blood culture       Interval Hospital Course:  63 y.o female with HTN, CAD, chronic diastolic CHF, hyperlipidemia, h/o gastric bypass surgery, DM2, active tobacco smoking, presented to the ER with shortness of breath. Per note, her family found her to be moaning in her bed and not responsive to call. EMS found her to have agonal breathing. In the ER, CT head was benign, CTA chest without PE. EKG with non-specific finding      ROS: no fever/chills, no headache, no dizziness, no facial pain, no sinus congestion,   No swallowing pain, No chest pain, no palpitation, no shortness of breath, no abd pain,  No diarrhea, no urinary complaint, no leg pain or swelling    Assessment/Plan:     1. NSTEMI with CAD, troponin downtrended   2. Syncope and collapse, resolved   3. AMS, unresponsiveness prior to arrival, resolved   4. Suspected aspiration pneumonia vs COVID-19 respiratory infection   5. SIRS with leukocytosis, fever, tachypnea, resolved   6. H/o Ischemic cardiomyopathy with AICD present, normal function on interrogation   7. Tobacco dependence   8. Hyperglycemia with DM2   9. Hypokalemia   10. Elevated lactic acid on presentation   11. Normocytic anemia, iron deficiency anemia    Continue augmentin for 3 more days. Cardiac cath on Monday per cardiology, increase her lasix 40mg BID  Will be NPO after midnight  Cont heparin gtt. Cardiology follows.  Note elevated probnp, Echo with depressed EF  F/u on COVID-19 test pending  ICS, Off oxygen supplement  Continue Carvedilol, plavix, crestor, lasix, lyrica   Continue vit c, zinc, PPI, Iron supplement, Ordered Venofer x3  Appreciate speech evaluation   Risks associate with tobacco smoking educated, advised on tobacco smoking cessation (<7min)    Full code     Additional Notes:    Time spent >30 minutes    Case discussed with:  [x]Patient  [x]Family  [x]Nursing  []Case Management  DVT Prophylaxis:  []Lovenox  []Hep SQ  []SCDs  []Coumadin   [x]On Heparin gtt    Signed By: Gui Salas MD     May 17, 2020 8:19 AM              Objective:   VS:   Visit Vitals  /78 (BP 1 Location: Left arm, BP Patient Position: At rest)   Pulse 82   Temp 99.2 °F (37.3 °C)   Resp 18   Ht 4' 11\" (1.499 m)   Wt 79.3 kg (174 lb 14.4 oz)   LMP  (LMP Unknown)   SpO2 100%   Breastfeeding No   BMI 35.33 kg/m²      Tmax/24hrs: Temp (24hrs), Av.5 °F (36.9 °C), Min:97.7 °F (36.5 °C), Max:99.2 °F (37.3 °C)      Intake/Output Summary (Last 24 hours) at 2020 0819  Last data filed at 2020 0200  Gross per 24 hour   Intake --   Output 500 ml   Net -500 ml       Tele: sinus  General:  Cooperative, Not in acute distress, speaks in full sentence while in bed  HEENT: PERRL, EOMI, supple neck, no JVD, dry oral mucosa  Cardiovascular: S1S2 regular, no rub/gallop   Pulmonary: Clear air entry bilaterally, no wheezing, ++ crackle  GI:  Soft, non tender, non distended, +bs, no guarding   Extremities:  ++ pedal edema, +distal pulses appreciated   Neuro: AOx3, moving all extremities, no gross deficit.      Additional:       Current Facility-Administered Medications   Medication Dose Route Frequency    potassium chloride (K-DUR, KLOR-CON) SR tablet 40 mEq  40 mEq Oral BID    amoxicillin-clavulanate (AUGMENTIN) 875-125 mg per tablet 1 Tab  1 Tab Oral Q12H    iron sucrose (VENOFER) 400 mg in 0.9% sodium chloride 250 mL IVPB  400 mg IntraVENous Q24H    pregabalin (LYRICA) capsule 150 mg  150 mg Oral BID    furosemide (LASIX) tablet 40 mg  40 mg Oral BID    ferrous sulfate tablet 650 mg  2 Tab Oral EVERY OTHER DAY    lactobacillus sp. 50 billion cpu (BIO-K PLUS) capsule 1 Cap  1 Cap Oral DAILY    heparin 25,000 units in D5W 250 ml infusion  12-25 Units/kg/hr IntraVENous TITRATE    ascorbic acid (vitamin C) (VITAMIN C) tablet 500 mg  500 mg Oral DAILY    calcium-vitamin D 600 mg(1,500mg) -200 unit per tablet 1 Tab  1 Tab Oral DAILY    carvediloL (COREG) tablet 6.25 mg  6.25 mg Oral BID WITH MEALS    clopidogreL (PLAVIX) tablet 75 mg  75 mg Oral DAILY    cyanocobalamin tablet 500 mcg  500 mcg Oral DAILY    linaCLOtide (LINZESS) capsule 145 mcg  145 mcg Oral ACB    omega 3-DHA-EPA-fish oil 1,000 mg (120 mg-180 mg) capsule 1 Cap  1 Cap Oral DAILY    pantoprazole (PROTONIX) tablet 20 mg  20 mg Oral ACB    rosuvastatin (CRESTOR) tablet 40 mg  40 mg Oral QHS    therapeutic multivitamin (THERAGRAN) tablet 1 Tab  1 Tab Oral DAILY    acetaminophen (TYLENOL) tablet 650 mg  650 mg Oral Q6H PRN    naloxone (NARCAN) injection 0.4 mg  0.4 mg IntraVENous PRN    baclofen (LIORESAL) tablet 5 mg  5 mg Oral QID PRN            Lab/Data Review:  Labs: Results:       Chemistry Recent Labs     05/17/20  0328 05/16/20  0459 05/15/20  0312   GLU 99 86  --     139  --    K 3.4* 3.4* 3.3*    106  --    CO2 28 26  --    BUN 7 7  --    CREA 0.75 0.73  --    BUCR 9* 10*  --    AGAP 5 7  --    CA 8.9 8.6  --      No results for input(s): TBIL, ALT, SGOT, ALKP, TP, ALB, GLOB, AGRAT in the last 72 hours.    CBC w/Diff Recent Labs     05/17/20  0328 05/16/20  0459 05/15/20  0312   WBC 5.6 6.1 6.9   RBC 3.71* 3.55* 3.41*   HGB 8.9* 8.6* 8.4*   HCT 28.3* 27.3* 26.5*   MCV 76.3 76.9 77.7   MCH 24.0 24.2 24.6   MCHC 31.4 31.5 31.7   RDW 16.9* 17.0* 17.0*   * 433* 373      Coagulation Recent Labs     05/17/20  0328 05/16/20  0459   APTT 73.2* 94.0*       Iron/Ferritin Lab Results   Component Value Date/Time    Iron 10 (L) 05/14/2020 06:06 AM    TIBC 308 05/14/2020 06:06 AM    Iron % saturation 3 (L) 05/14/2020 06:06 AM    Ferritin 71 05/17/2020 03:28 AM       BNP    Cardiac Enzymes Lab Results   Component Value Date/Time     (H) 05/13/2020 01:32 AM    CK - MB 15.7 (H) 05/13/2020 01:32 AM    CK-MB Index 3.9 05/13/2020 01:32 AM    Troponin-I, QT 4.03 (HH) 05/14/2020 06:06 AM        Lactic Acid    Thyroid Studies          All Micro Results     Procedure Component Value Units Date/Time    CULTURE, BLOOD [[id number]] Collected:  05/12/20 0610    Order Status:  Completed Specimen:  Blood Updated:  05/17/20 0643     Special Requests: NO SPECIAL REQUESTS        Culture result: NO GROWTH 5 DAYS       CULTURE, BLOOD [[id number]] Collected:  05/12/20 0625    Order Status:  Completed Specimen:  Blood Updated:  05/17/20 0643     Special Requests: NO SPECIAL REQUESTS        Culture result: NO GROWTH 5 DAYS       CULTURE, URINE [[id number]] Collected:  05/12/20 0345    Order Status:  Completed Specimen:  Clean catch Updated:  05/15/20 1229     Special Requests: NO SPECIAL REQUESTS        Culture result: No growth (<1,000 CFU/ML)       C. DIFFICILE AG & TOXIN A/B [[id number]] Collected:  05/13/20 1750    Order Status:  Completed Specimen:  Stool Updated:  05/13/20 2131     [address] ANTIGEN Negative        C. difficile toxin Negative        INTERPRETATION       NEGATIVE FOR TOXIGENIC C. DIFFICILE          CULTURE, RESPIRATORY/SPUTUM/BRONCH [facility] STAIN [[id number]] Collected:  05/12/20 1230    Order Status:  Canceled Specimen:  Sputum             Images:    CT (Most Recent).  CT Results (most recent):  Results from Hospital Encounter encounter on 05/12/20   CTA CHEST W OR W WO CONT    Narrative EXAM: CTA CHEST W OR W WO CONT    INDICATIONS: PE ; collapsed at home, with demonstrated dyspnea; lethargy; recent  knee surgery    TECHNIQUE: Utilizing pulmonary embolus protocol, thin section axial images were  obtained from the thoracic inlet into the upper abdomen after the uneventful  administration of IV contrast. Coronal and sagittal maximum intensity projection  (MIP) post-processed images were generated to better define pulmonary artery  anatomy and enhance sensitivity for detection of pulmonary emboli. Contrast used: 95 cc Isovue 300      CT scans at this facility are performed using dose optimization technique as  appropriate with performed exam, to include automated exposure control,  adjustment of mA and/or kV according to patient's size (including appropriate  matching for site-specific examinations), or use of iterative reconstruction  technique. COMPARISON: Current and prior chest x-ray    FINDINGS:    Quality: Adequate     Pulmonary arteries: Negative for acute PE. Normal caliber main pulmonary  arteries. Mediastinum: No adenopathy. Normal range heart size. There is coronary  atherosclerosis. No pericardial effusion. Single lead ICD noted. Small hiatal  hernia. Lungs/pleura: Minimal pleural fluid bilaterally, left more than right. Mild  bibasilar dependent haziness with some surrounding groundglass inflammation,  left more than right. Lesser amount of similar haziness within the right middle  lobe and left upper lobe. Chest soft tissues: Unremarkable. Abdomen: No acute findings. Previous gastric bypass and cholecystectomy. Bones: No acute findings. Unremarkable for age. Impression IMPRESSION[de-identified]    1.  Negative for acute PE     2. Minimal volume bilateral pleural effusions    3. Haziness within the lower lungs. While some of this is dependent atelectasis,  suspect an element of mild interstitial edema as well. XRAY (Most Recent)      EKG No results found for this or any previous visit. 2D ECHO 05/12/20   ECHO ADULT COMPLETE 05/13/2020 5/13/2020    Narrative · Normal cavity size, wall thickness and systolic function (ejection   fraction normal). Calculated left ventricular ejection fraction is 50%. Visually measured ejection fraction.  Abnormal left ventricular wall motion. · Pacer/ICD present. · Mild mitral valve regurgitation is present. · Pulmonary arterial systolic pressure is 27 mmHg.         Signed by: Yolanda Lopez MD

## 2020-05-18 PROBLEM — I25.10 CAD (CORONARY ARTERY DISEASE): Status: ACTIVE | Noted: 2020-05-18

## 2020-05-18 LAB
ANION GAP SERPL CALC-SCNC: 6 MMOL/L (ref 3–18)
APTT PPP: 46.5 SEC (ref 23–36.4)
BACTERIA SPEC CULT: NORMAL
BACTERIA SPEC CULT: NORMAL
BUN SERPL-MCNC: 9 MG/DL (ref 7–18)
BUN/CREAT SERPL: 10 (ref 12–20)
CALCIUM SERPL-MCNC: 8.6 MG/DL (ref 8.5–10.1)
CHLORIDE SERPL-SCNC: 108 MMOL/L (ref 100–111)
CO2 SERPL-SCNC: 29 MMOL/L (ref 21–32)
COVID-19 RAPID TEST, COVR: NOT DETECTED
CREAT SERPL-MCNC: 0.86 MG/DL (ref 0.6–1.3)
D DIMER PPP FEU-MCNC: 1.68 UG/ML(FEU)
ERYTHROCYTE [DISTWIDTH] IN BLOOD BY AUTOMATED COUNT: 17.1 % (ref 11.6–14.5)
FERRITIN SERPL-MCNC: 64 NG/ML (ref 8–388)
GLUCOSE SERPL-MCNC: 97 MG/DL (ref 74–99)
HCT VFR BLD AUTO: 27.9 % (ref 35–45)
HGB BLD-MCNC: 8.8 G/DL (ref 12–16)
LDH SERPL L TO P-CCNC: 242 U/L (ref 81–234)
MCH RBC QN AUTO: 24.3 PG (ref 24–34)
MCHC RBC AUTO-ENTMCNC: 31.5 G/DL (ref 31–37)
MCV RBC AUTO: 77.1 FL (ref 74–97)
PLATELET # BLD AUTO: 495 K/UL (ref 135–420)
PMV BLD AUTO: 10.4 FL (ref 9.2–11.8)
POTASSIUM SERPL-SCNC: 4.1 MMOL/L (ref 3.5–5.5)
RBC # BLD AUTO: 3.62 M/UL (ref 4.2–5.3)
SARS-COV-2, COV2NT: NOT DETECTED
SERVICE CMNT-IMP: NORMAL
SERVICE CMNT-IMP: NORMAL
SODIUM SERPL-SCNC: 143 MMOL/L (ref 136–145)
SOURCE, COVRS: NORMAL
SOURCE, COVRS: NORMAL
WBC # BLD AUTO: 5.2 K/UL (ref 4.6–13.2)

## 2020-05-18 PROCEDURE — C1894 INTRO/SHEATH, NON-LASER: HCPCS | Performed by: INTERNAL MEDICINE

## 2020-05-18 PROCEDURE — B2111ZZ FLUOROSCOPY OF MULTIPLE CORONARY ARTERIES USING LOW OSMOLAR CONTRAST: ICD-10-PCS | Performed by: INTERNAL MEDICINE

## 2020-05-18 PROCEDURE — 87635 SARS-COV-2 COVID-19 AMP PRB: CPT

## 2020-05-18 PROCEDURE — 74011000258 HC RX REV CODE- 258: Performed by: INTERNAL MEDICINE

## 2020-05-18 PROCEDURE — B2151ZZ FLUOROSCOPY OF LEFT HEART USING LOW OSMOLAR CONTRAST: ICD-10-PCS | Performed by: INTERNAL MEDICINE

## 2020-05-18 PROCEDURE — 36415 COLL VENOUS BLD VENIPUNCTURE: CPT

## 2020-05-18 PROCEDURE — 4A023N7 MEASUREMENT OF CARDIAC SAMPLING AND PRESSURE, LEFT HEART, PERCUTANEOUS APPROACH: ICD-10-PCS | Performed by: INTERNAL MEDICINE

## 2020-05-18 PROCEDURE — 92928 PRQ TCAT PLMT NTRAC ST 1 LES: CPT | Performed by: INTERNAL MEDICINE

## 2020-05-18 PROCEDURE — 80048 BASIC METABOLIC PNL TOTAL CA: CPT

## 2020-05-18 PROCEDURE — 99153 MOD SED SAME PHYS/QHP EA: CPT | Performed by: INTERNAL MEDICINE

## 2020-05-18 PROCEDURE — 65660000004 HC RM CVT STEPDOWN

## 2020-05-18 PROCEDURE — 83615 LACTATE (LD) (LDH) ENZYME: CPT

## 2020-05-18 PROCEDURE — C1725 CATH, TRANSLUMIN NON-LASER: HCPCS | Performed by: INTERNAL MEDICINE

## 2020-05-18 PROCEDURE — 74011636320 HC RX REV CODE- 636/320: Performed by: INTERNAL MEDICINE

## 2020-05-18 PROCEDURE — 77030012468 HC VLV BLEEDBK CNTRL ABBT -B: Performed by: INTERNAL MEDICINE

## 2020-05-18 PROCEDURE — 74011250636 HC RX REV CODE- 250/636: Performed by: INTERNAL MEDICINE

## 2020-05-18 PROCEDURE — 85379 FIBRIN DEGRADATION QUANT: CPT

## 2020-05-18 PROCEDURE — C1887 CATHETER, GUIDING: HCPCS | Performed by: INTERNAL MEDICINE

## 2020-05-18 PROCEDURE — 77030013797 HC KT TRNSDUC PRSSR EDWD -A: Performed by: INTERNAL MEDICINE

## 2020-05-18 PROCEDURE — 74011000250 HC RX REV CODE- 250: Performed by: INTERNAL MEDICINE

## 2020-05-18 PROCEDURE — 85730 THROMBOPLASTIN TIME PARTIAL: CPT

## 2020-05-18 PROCEDURE — 99218 HC RM OBSERVATION: CPT

## 2020-05-18 PROCEDURE — 85027 COMPLETE CBC AUTOMATED: CPT

## 2020-05-18 PROCEDURE — 99152 MOD SED SAME PHYS/QHP 5/>YRS: CPT | Performed by: INTERNAL MEDICINE

## 2020-05-18 PROCEDURE — 74011250637 HC RX REV CODE- 250/637: Performed by: INTERNAL MEDICINE

## 2020-05-18 PROCEDURE — 74011250637 HC RX REV CODE- 250/637: Performed by: EMERGENCY MEDICINE

## 2020-05-18 PROCEDURE — 77030013519 HC DEV INFL BASIX MRTM -B: Performed by: INTERNAL MEDICINE

## 2020-05-18 PROCEDURE — 77030004558 HC CATH ANGI DX SUPR TORQ CARD -A: Performed by: INTERNAL MEDICINE

## 2020-05-18 PROCEDURE — 93458 L HRT ARTERY/VENTRICLE ANGIO: CPT | Performed by: INTERNAL MEDICINE

## 2020-05-18 PROCEDURE — 027034Z DILATION OF CORONARY ARTERY, ONE ARTERY WITH DRUG-ELUTING INTRALUMINAL DEVICE, PERCUTANEOUS APPROACH: ICD-10-PCS | Performed by: INTERNAL MEDICINE

## 2020-05-18 PROCEDURE — C1760 CLOSURE DEV, VASC: HCPCS | Performed by: INTERNAL MEDICINE

## 2020-05-18 PROCEDURE — C1769 GUIDE WIRE: HCPCS | Performed by: INTERNAL MEDICINE

## 2020-05-18 PROCEDURE — 82728 ASSAY OF FERRITIN: CPT

## 2020-05-18 DEVICE — XIENCE SIERRA™ EVEROLIMUS ELUTING CORONARY STENT SYSTEM 2.50 MM X 12 MM / RAPID-EXCHANGE
Type: IMPLANTABLE DEVICE | Status: FUNCTIONAL
Brand: XIENCE SIERRA™

## 2020-05-18 RX ORDER — LIDOCAINE HYDROCHLORIDE 10 MG/ML
INJECTION, SOLUTION EPIDURAL; INFILTRATION; INTRACAUDAL; PERINEURAL AS NEEDED
Status: DISCONTINUED | OUTPATIENT
Start: 2020-05-18 | End: 2020-05-18 | Stop reason: HOSPADM

## 2020-05-18 RX ORDER — NITROGLYCERIN 400 UG/1
1 SPRAY ORAL
Status: DISCONTINUED | OUTPATIENT
Start: 2020-05-18 | End: 2020-05-19 | Stop reason: HOSPADM

## 2020-05-18 RX ORDER — SODIUM CHLORIDE 0.9 % (FLUSH) 0.9 %
5-40 SYRINGE (ML) INJECTION AS NEEDED
Status: DISCONTINUED | OUTPATIENT
Start: 2020-05-18 | End: 2020-05-19 | Stop reason: HOSPADM

## 2020-05-18 RX ORDER — SODIUM CHLORIDE 450 MG/100ML
150 INJECTION, SOLUTION INTRAVENOUS CONTINUOUS
Status: DISPENSED | OUTPATIENT
Start: 2020-05-18 | End: 2020-05-18

## 2020-05-18 RX ORDER — CARVEDILOL 12.5 MG/1
12.5 TABLET ORAL 2 TIMES DAILY WITH MEALS
Status: DISCONTINUED | OUTPATIENT
Start: 2020-05-18 | End: 2020-05-19 | Stop reason: HOSPADM

## 2020-05-18 RX ORDER — FENTANYL CITRATE 50 UG/ML
INJECTION, SOLUTION INTRAMUSCULAR; INTRAVENOUS AS NEEDED
Status: DISCONTINUED | OUTPATIENT
Start: 2020-05-18 | End: 2020-05-18 | Stop reason: HOSPADM

## 2020-05-18 RX ORDER — BIVALIRUDIN 250 MG/5ML
INJECTION, POWDER, LYOPHILIZED, FOR SOLUTION INTRAVENOUS AS NEEDED
Status: DISCONTINUED | OUTPATIENT
Start: 2020-05-18 | End: 2020-05-18 | Stop reason: HOSPADM

## 2020-05-18 RX ORDER — CLOPIDOGREL 300 MG/1
300 TABLET, FILM COATED ORAL ONCE
Status: DISCONTINUED | OUTPATIENT
Start: 2020-05-18 | End: 2020-05-18 | Stop reason: SDUPTHER

## 2020-05-18 RX ORDER — CLOPIDOGREL 300 MG/1
TABLET, FILM COATED ORAL AS NEEDED
Status: DISCONTINUED | OUTPATIENT
Start: 2020-05-18 | End: 2020-05-18 | Stop reason: HOSPADM

## 2020-05-18 RX ORDER — MIDAZOLAM HYDROCHLORIDE 1 MG/ML
INJECTION, SOLUTION INTRAMUSCULAR; INTRAVENOUS AS NEEDED
Status: DISCONTINUED | OUTPATIENT
Start: 2020-05-18 | End: 2020-05-18 | Stop reason: HOSPADM

## 2020-05-18 RX ORDER — SODIUM CHLORIDE 0.9 % (FLUSH) 0.9 %
5-40 SYRINGE (ML) INJECTION EVERY 8 HOURS
Status: DISCONTINUED | OUTPATIENT
Start: 2020-05-18 | End: 2020-05-19 | Stop reason: HOSPADM

## 2020-05-18 RX ADMIN — PREGABALIN 150 MG: 75 CAPSULE ORAL at 17:11

## 2020-05-18 RX ADMIN — CLOPIDOGREL BISULFATE 75 MG: 75 TABLET ORAL at 12:14

## 2020-05-18 RX ADMIN — ENOXAPARIN SODIUM 80 MG: 80 INJECTION SUBCUTANEOUS at 00:59

## 2020-05-18 RX ADMIN — CARVEDILOL 12.5 MG: 12.5 TABLET, FILM COATED ORAL at 16:25

## 2020-05-18 RX ADMIN — AMOXICILLIN AND CLAVULANATE POTASSIUM 1 TABLET: 875; 125 TABLET, FILM COATED ORAL at 21:03

## 2020-05-18 RX ADMIN — Medication 10 ML: at 21:14

## 2020-05-18 RX ADMIN — ACETAMINOPHEN 650 MG: 325 TABLET ORAL at 21:13

## 2020-05-18 RX ADMIN — ROSUVASTATIN 40 MG: 20 TABLET, FILM COATED ORAL at 21:03

## 2020-05-18 RX ADMIN — FUROSEMIDE 40 MG: 40 TABLET ORAL at 18:20

## 2020-05-18 NOTE — PROGRESS NOTES
TRANSFER - OUT REPORT:    Verbal report given to Mecca Rodriguez RN (name) on Zach Blackwell  being transferred to CVT ICU (unit) for routine progression of care. Mecca Rodriguez was made aware of groin access that was closed with Perclose and currently has no bleeding, no hematoma, and dressing is dry and intact. Report consisted of patients Situation, Background, Assessment and   Recommendations(SBAR). Information from the following report(s) SBAR, Procedure Summary, Intake/Output, MAR and Pre Procedure Checklist was reviewed with the receiving nurse. Lines:   Peripheral IV 05/12/20 Left Antecubital (Active)   Site Assessment Clean, dry, & intact 5/17/2020  1:13 PM   Phlebitis Assessment 0 5/17/2020  1:13 PM   Infiltration Assessment 0 5/17/2020  1:13 PM   Dressing Status Clean, dry, & intact 5/17/2020  1:13 PM   Dressing Type Transparent 5/17/2020  1:13 PM   Hub Color/Line Status Infusing;Patent 5/17/2020  1:13 PM   Action Taken Open ports on tubing capped 5/13/2020  8:04 PM   Alcohol Cap Used Yes 5/17/2020  1:13 PM       Peripheral IV 05/18/20 Right Antecubital (Active)   Site Assessment Clean, dry, & intact 5/18/2020 12:42 PM   Phlebitis Assessment 0 5/18/2020 12:42 PM   Infiltration Assessment 0 5/18/2020 12:42 PM   Dressing Status Clean, dry, & intact 5/18/2020 12:42 PM   Dressing Type Transparent 5/18/2020 12:42 PM   Hub Color/Line Status Blue 5/18/2020 12:42 PM        Opportunity for questions given and clarification was provided for all questions asked. Nurse stated that he no more at this time.     Patient transported with:   O2 @ 2 liters  Registered Nurse   Patient Chart

## 2020-05-18 NOTE — ROUTINE PROCESS
Bedside and Verbal shift change report given to Loan Gonzales RN (oncoming nurse) by Emma Jacobs RN   (offgoing nurse). Report included the following information SBAR and Kardex.

## 2020-05-18 NOTE — PROGRESS NOTES
[facility]  -  Progress Note      Patient: Genia Beatty MRN: [id number]  SSN: [id number]    YOB: 1960  Age: 63 y.o. Sex: female      Admit Date: 5/12/2020    Assessment:     Hospital Problems  Date Reviewed: 5/7/2020          Codes Class Noted POA    NSTEMI (non-ST elevated myocardial infarction) [facility]) ICD-10-CM: I21.4  ICD-9-CM: 410.70  5/12/2020 Unknown        Syncope and collapse ICD-10-CM: R55  ICD-9-CM: 780.2  5/12/2020 Unknown            -Unresponsiveness, EMS was called for AMS, patient was unresponsive with agonal breathing. Work up for possible infectious process, versus PE, versus seizure, versus medication (chronic pain). Covid testing pending  -Indeterminate troponin, do not suspect primary cardiac event, ECG with nonspecific ST wave abnormalities, known CAD as noted below.  -Shortness of breath, CTA pending to rule out PE, possible PNA. -Fever 100.2 with Leukocytosis, possible PNA. -Recent knee surgery. -H/o ICMY which has improved over the years, EF 50% 5/2017.  -[employer] single lead AICD, normal function on interrogation this admission. -CAD s/p anterior wall MI 6/2013 s/p PCI to LAD. -Hypertension. Stable. -Diabetes mellitus. HgbA1c 6.5.  -Dyslipidemia. On statin.  -Chronic pain, extensive orthopedic history.     Primary cardiology [facility]    Plan:     Awaiting rapid testing as prior test not back. If negative will proceed with cath today. More recommendations pending test results and eventual cath.     Subjective:     Still awaiting COVID-19 testing  No symptoms or complaints this am.     Objective:      Patient Vitals for the past 8 hrs:   Temp Pulse Resp BP SpO2   05/18/20 0829 98.3 °F (36.8 °C) 80 18 125/64 91 %   05/18/20 0400 98 °F (36.7 °C) 68 18 123/67 100 %         Patient Vitals for the past 96 hrs:   Weight   05/18/20 0713 85.3 kg (188 lb 1.6 oz)   05/17/20 0353 79.3 kg (174 lb 14.4 oz)   05/15/20 0400 84.4 kg (186 lb) Intake/Output Summary (Last 24 hours) at 5/18/2020 1023  Last data filed at 5/18/2020 0000  Gross per 24 hour   Intake --   Output 400 ml   Net -400 ml       Physical Exam:  General:  alert, cooperative, no distress, appears stated age  Neck:  nontender, no JVD  Lungs:  clear to auscultation bilaterally  Heart:  regular rate and rhythm, S1, S2 normal, no murmur, click, rub or gallop  Extremities:  extremities normal, atraumatic, no cyanosis or edema    Data Review:     Labs: Results:       Chemistry Recent Labs     05/18/20 0345 05/17/20 0328 05/16/20 0459   GLU 97 99 86    139 139   K 4.1 3.4* 3.4*    106 106   CO2 29 28 26   BUN 9 7 7   CREA 0.86 0.75 0.73   CA 8.6 8.9 8.6   AGAP 6 5 7   BUCR 10* 9* 10*      CBC w/Diff Recent Labs     05/18/20 0345 05/17/20 0328 05/16/20 0459   WBC 5.2 5.6 6.1   RBC 3.62* 3.71* 3.55*   HGB 8.8* 8.9* 8.6*   HCT 27.9* 28.3* 27.3*   * 464* 433*      Cardiac Enzymes No results found for: CPK, CK, CKMMB, CKMB, RCK3, CKMBT, CKNDX, CKND1, ASMITA, TROPT, TROIQ, CORKY, TROPT, TNIPOC, BNP, BNPP   Coagulation Recent Labs     05/18/20 0345 05/17/20 0328   APTT 46.5* 73.2*       Lipid Panel Lab Results   Component Value Date/Time    Cholesterol, total 242 (H) 08/15/2019 10:56 AM    HDL Cholesterol 54 08/15/2019 10:56 AM    LDL, calculated 160 (H) 08/15/2019 10:56 AM    VLDL, calculated 28 08/15/2019 10:56 AM    Triglyceride 140 08/15/2019 10:56 AM    CHOL/HDL Ratio 3.8 03/07/2018 11:22 AM      BNP No results found for: BNP, BNPP, XBNPT   Liver Enzymes No results for input(s): TP, ALB, TBIL, AP, SGOT, GPT in the last 72 hours.     No lab exists for component: DBIL   Digoxin    Thyroid Studies Lab Results   Component Value Date/Time    TSH 0.453 09/20/2019 12:00 AM

## 2020-05-18 NOTE — PROGRESS NOTES
TRANSFER - IN REPORT:    Verbal report received from Rubina Leonard (name) on Parminder Fuse  being received from Cath Lab (unit) for routine post - op      Report consisted of patients Situation, Background, Assessment and   Recommendations(SBAR). Information from the following report(s) SBAR, Procedure Summary and MAR was reviewed with the receiving nurse. Opportunity for questions and clarification was provided. Assessment completed upon patients arrival to unit and care assumed. Patient had a LHC today with no complications. Access was to the right groin and was closed with perclose. One stent was placed to the OM Branch off the Circ. See MAR for medications given during procedure.  Last set of vitals are as follows:    BP - 164/77  HR - 64 with Bigeminy  94% RA

## 2020-05-18 NOTE — PROGRESS NOTES
Hospitalist Progress Note    Patient: Patricia Aly Age: 63 y.o. : 1960 MR#: [id number] SSN: [id number]  Date/Time: 2020 8:19 AM    DOA: 2020  PCP: Titi Salazar, NP    Subjective:     Irritable. Waiting for potential cardiac cath. No current CP or SOB. Hungry. Occasional cough. Interval Hospital Course:  63 y.o female with HTN, CAD, chronic diastolic CHF, hyperlipidemia, h/o gastric bypass surgery, DM2, active tobacco smoking, presented to the ER with shortness of breath. Per note, her family found her to be moaning in her bed and not responsive to call. EMS found her to have agonal breathing. In the ER, CT head was benign, CTA chest without PE. EKG with non-specific finding. Troponins elevated. Plans for cardiac cath. Currently on Lovenox for anticoagulation. ROS: no fever/chills, no headache, no dizziness, no facial pain, no sinus congestion,   No swallowing pain, No chest pain, no palpitation, no shortness of breath, no abd pain,  No diarrhea, no urinary complaint, no leg pain or swelling    Assessment/Plan:     1. NSTEMI with CAD, troponin downtrended : BNP elevated,  TTE with EF 5-%  2. Syncope and collapse, resolved   3. AMS, unresponsiveness prior to arrival, resolved   4. Suspected aspiration pneumonia vs COVID-19 respiratory infection   5. SIRS with leukocytosis, fever, tachypnea, resolved   6. H/o Ischemic cardiomyopathy with AICD present, normal function on interrogation   7. Tobacco dependence   8. Hyperglycemia with DM2   9. Hypokalemia   10. Elevated lactic acid on presentation   11. Normocytic anemia, iron deficiency anemia    Continue augmentin for 2 more days.   Cardiac cath on Monday per cardiology,  Lasix 40mg BID  Post- Cath protocol as per cardiology   ICS  Continue Carvedilol, plavix, crestor, lasix, lyrica   Continue vit c, zinc, PPI, Iron supplement, Ordered Venofer x3  Appreciate speech evaluation   Risks associate with tobacco smoking educated, advised on tobacco smoking cessation (<7min)    Transfer to stepdown post-cath. Cardiac diet post cath. Full code    [facility] Rapid COVID test NEGATIVE   [facility] COVID: pending    Additional Notes:    Time spent >30 minutes    Case discussed with:  [x]Patient  [x]Family  [x]Nursing  []Case Management  DVT Prophylaxis:  []Lovenox  []Hep SQ  []SCDs  []Coumadin   [x]On Heparin gtt    Signed By: Sanchez Villagomez MD     May 18, 2020 8:19 AM              Objective:   VS:   Visit Vitals  /64 (BP 1 Location: Left arm, BP Patient Position: At rest)   Pulse 80   Temp 98.3 °F (36.8 °C)   Resp 18   Ht 4' 11\" (1.499 m)   Wt 85.3 kg (188 lb 1.6 oz)   LMP  (LMP Unknown)   SpO2 91%   Breastfeeding No   BMI 37.99 kg/m²      Tmax/24hrs: Temp (24hrs), Av.1 °F (36.7 °C), Min:97.4 °F (36.3 °C), Max:98.6 °F (37 °C)      Intake/Output Summary (Last 24 hours) at 2020 1337  Last data filed at 2020 0000  Gross per 24 hour   Intake --   Output 400 ml   Net -400 ml       Tele: sinus  General:  Cooperative, Not in acute distress, speaks in full sentence while in bed  HEENT: PERRL, EOMI, supple neck, no JVD, dry oral mucosa  Cardiovascular: S1S2 regular, no rub/gallop   Pulmonary: Clear air entry bilaterally, no wheezing, ++ crackle  GI:  Soft, non tender, non distended, +bs, no guarding   Extremities:  ++ pedal edema, +distal pulses appreciated   Neuro: AOx3, moving all extremities, no gross deficit.      Additional:       Current Facility-Administered Medications   Medication Dose Route Frequency    midazolam (VERSED) injection    PRN    fentaNYL citrate (PF) injection    PRN    lidocaine (PF) (XYLOCAINE) 10 mg/mL (1 %) injection    PRN    bivalirudin (ANGIOMAX) injection    PRN    bivalirudin (ANGIOMAX) 250 mg in 0.9% sodium chloride (MBP/ADV) 50 mL infusion    CONTINUOUS    nitroglycerine compounded injection    PRN    enoxaparin (LOVENOX) injection 80 mg  80 mg SubCUTAneous Q12H    amoxicillin-clavulanate (AUGMENTIN) 875-125 mg per tablet 1 Tab  1 Tab Oral Q12H    iron sucrose (VENOFER) 400 mg in 0.9% sodium chloride 250 mL IVPB  400 mg IntraVENous Q24H    pregabalin (LYRICA) capsule 150 mg  150 mg Oral BID    furosemide (LASIX) tablet 40 mg  40 mg Oral BID    ferrous sulfate tablet 650 mg  2 Tab Oral EVERY OTHER DAY    lactobacillus sp. 50 billion cpu (BIO-K PLUS) capsule 1 Cap  1 Cap Oral DAILY    ascorbic acid (vitamin C) (VITAMIN C) tablet 500 mg  500 mg Oral DAILY    calcium-vitamin D 600 mg(1,500mg) -200 unit per tablet 1 Tab  1 Tab Oral DAILY    carvediloL (COREG) tablet 6.25 mg  6.25 mg Oral BID WITH MEALS    clopidogreL (PLAVIX) tablet 75 mg  75 mg Oral DAILY    cyanocobalamin tablet 500 mcg  500 mcg Oral DAILY    linaCLOtide (LINZESS) capsule 145 mcg  145 mcg Oral ACB    omega 3-DHA-EPA-fish oil 1,000 mg (120 mg-180 mg) capsule 1 Cap  1 Cap Oral DAILY    pantoprazole (PROTONIX) tablet 20 mg  20 mg Oral ACB    rosuvastatin (CRESTOR) tablet 40 mg  40 mg Oral QHS    therapeutic multivitamin (THERAGRAN) tablet 1 Tab  1 Tab Oral DAILY    acetaminophen (TYLENOL) tablet 650 mg  650 mg Oral Q6H PRN    naloxone (NARCAN) injection 0.4 mg  0.4 mg IntraVENous PRN    baclofen (LIORESAL) tablet 5 mg  5 mg Oral QID PRN            Lab/Data Review:  Labs: Results:       Chemistry Recent Labs     05/18/20 0345 05/17/20 0328 05/16/20  0459   GLU 97 99 86    139 139   K 4.1 3.4* 3.4*    106 106   CO2 29 28 26   BUN 9 7 7   CREA 0.86 0.75 0.73   BUCR 10* 9* 10*   AGAP 6 5 7   CA 8.6 8.9 8.6     No results for input(s): TBIL, ALT, SGOT, ALKP, TP, ALB, GLOB, AGRAT in the last 72 hours.    CBC w/Diff Recent Labs     05/18/20  0345 05/17/20  0328 05/16/20  0459   WBC 5.2 5.6 6.1   RBC 3.62* 3.71* 3.55*   HGB 8.8* 8.9* 8.6*   HCT 27.9* 28.3* 27.3*   MCV 77.1 76.3 76.9   MCH 24.3 24.0 24.2   MCHC 31.5 31.4 31.5   RDW 17.1* 16.9* 17.0*   * 464* 433*      Coagulation Recent Labs     05/18/20  0345 05/17/20  0328   APTT 46.5* 73.2*       Iron/Ferritin Lab Results   Component Value Date/Time    Iron 10 (L) 05/14/2020 06:06 AM    TIBC 308 05/14/2020 06:06 AM    Iron % saturation 3 (L) 05/14/2020 06:06 AM    Ferritin 64 05/18/2020 03:45 AM       BNP    Cardiac Enzymes Lab Results   Component Value Date/Time     (H) 05/13/2020 01:32 AM    CK - MB 15.7 (H) 05/13/2020 01:32 AM    CK-MB Index 3.9 05/13/2020 01:32 AM    Troponin-I, QT 4.03 (HH) 05/14/2020 06:06 AM        Lactic Acid    Thyroid Studies          All Micro Results     Procedure Component Value Units Date/Time    CULTURE, BLOOD [[id number]] Collected:  05/12/20 0625    Order Status:  Completed Specimen:  Blood Updated:  05/18/20 0705     Special Requests: NO SPECIAL REQUESTS        Culture result: NO GROWTH 6 DAYS       CULTURE, BLOOD [[id number]] Collected:  05/12/20 0610    Order Status:  Completed Specimen:  Blood Updated:  05/18/20 0705     Special Requests: NO SPECIAL REQUESTS        Culture result: NO GROWTH 6 DAYS       CULTURE, URINE [[id number]] Collected:  05/12/20 0345    Order Status:  Completed Specimen:  Clean catch Updated:  05/15/20 1229     Special Requests: NO SPECIAL REQUESTS        Culture result: No growth (<1,000 CFU/ML)       C. DIFFICILE AG & TOXIN A/B [[id number]] Collected:  05/13/20 1750    Order Status:  Completed Specimen:  Stool Updated:  05/13/20 7034     [address] [facility] ANTIGEN Negative        C. difficile toxin Negative        INTERPRETATION       NEGATIVE FOR TOXIGENIC C. DIFFICILE          CULTURE, RESPIRATORY/SPUTUM/BRONCH Oralia Chyle STAIN [[id number]] Collected:  05/12/20 1230    Order Status:  Canceled Specimen:  Sputum             Images:    CT (Most Recent).  CT Results (most recent):  Results from Hospital Encounter encounter on 05/12/20   CTA CHEST W OR W WO CONT    Narrative EXAM: CTA CHEST W OR W WO CONT    INDICATIONS: PE ; collapsed at home, with demonstrated dyspnea; lethargy; recent  knee surgery    TECHNIQUE: Utilizing pulmonary embolus protocol, thin section axial images were  obtained from the thoracic inlet into the upper abdomen after the uneventful  administration of IV contrast. Coronal and sagittal maximum intensity projection  (MIP) post-processed images were generated to better define pulmonary artery  anatomy and enhance sensitivity for detection of pulmonary emboli. Contrast used: 95 cc Isovue 300      CT scans at this facility are performed using dose optimization technique as  appropriate with performed exam, to include automated exposure control,  adjustment of mA and/or kV according to patient's size (including appropriate  matching for site-specific examinations), or use of iterative reconstruction  technique. COMPARISON: Current and prior chest x-ray    FINDINGS:    Quality: Adequate     Pulmonary arteries: Negative for acute PE. Normal caliber main pulmonary  arteries. Mediastinum: No adenopathy. Normal range heart size. There is coronary  atherosclerosis. No pericardial effusion. Single lead ICD noted. Small hiatal  hernia. Lungs/pleura: Minimal pleural fluid bilaterally, left more than right. Mild  bibasilar dependent haziness with some surrounding groundglass inflammation,  left more than right. Lesser amount of similar haziness within the right middle  lobe and left upper lobe. Chest soft tissues: Unremarkable. Abdomen: No acute findings. Previous gastric bypass and cholecystectomy. Bones: No acute findings. Unremarkable for age. Impression IMPRESSION[de-identified]    1.  Negative for acute PE     2. Minimal volume bilateral pleural effusions    3. Haziness within the lower lungs. While some of this is dependent atelectasis,  suspect an element of mild interstitial edema as well. XRAY (Most Recent)      EKG No results found for this or any previous visit.      2D ECHO 05/12/20   ECHO ADULT COMPLETE 05/13/2020 5/13/2020    Narrative · Normal cavity size, wall thickness and systolic function (ejection   fraction normal). Calculated left ventricular ejection fraction is 50%. Visually measured ejection fraction. Abnormal left ventricular wall   motion. · Pacer/ICD present. · Mild mitral valve regurgitation is present. · Pulmonary arterial systolic pressure is 27 mmHg.         Signed by: Bita Rocha MD

## 2020-05-18 NOTE — PROGRESS NOTES
TRANSFER - IN REPORT:    Verbal report received from Alfredo Rodgers RN (name) on Carlotta Evangelista  being received from [address] [place] [address] (unit) for ordered procedure      Report consisted of patients Situation, Background, Assessment and   Recommendations(SBAR). Information from the following report(s) SBAR, Intake/Output and MAR was reviewed with the receiving nurse. Opportunity for questions and clarification was provided. Assessment completed upon patients arrival to unit and care assumed. Per Nurse, Patient is A&O x4, and anble to consent for the procedure herself, and Patient is able to walk with assistance. Patient has a 20g IV in the Left AC but has very little veins and has been refusing for a second line to be placed. Patient was shaved on Friday but has not been reshaved for today. Heparin was stopped on Sunday. No medications given at this time but nurse was instructed to give ordered Plavix. Patient is denying any pain at this time.

## 2020-05-18 NOTE — PROGRESS NOTES
1200 - COVID rapid test resulted negative. 1214 - Plavix 75 mg PO given. Patient transferred to Cath lab via Cath Lab staff.

## 2020-05-18 NOTE — PROGRESS NOTES
Reviewed patients chart. Cardiology planned for cardiac cath when her COVID-19 test is available. Result still pending. Will continue to monitor for further discharge needs. Plan home with home health.     Rolanda Vazquez RN BSN  Care Manager  [phone]

## 2020-05-19 VITALS
HEART RATE: 71 BPM | OXYGEN SATURATION: 97 % | TEMPERATURE: 98.4 F | SYSTOLIC BLOOD PRESSURE: 95 MMHG | WEIGHT: 177.9 LBS | RESPIRATION RATE: 22 BRPM | HEIGHT: 59 IN | DIASTOLIC BLOOD PRESSURE: 56 MMHG | BODY MASS INDEX: 35.86 KG/M2

## 2020-05-19 LAB
ANION GAP SERPL CALC-SCNC: 8 MMOL/L (ref 3–18)
ATRIAL RATE: 73 BPM
BUN SERPL-MCNC: 8 MG/DL (ref 7–18)
BUN/CREAT SERPL: 9 (ref 12–20)
CALCIUM SERPL-MCNC: 8.7 MG/DL (ref 8.5–10.1)
CALCULATED P AXIS, ECG09: 21 DEGREES
CALCULATED R AXIS, ECG10: -16 DEGREES
CALCULATED T AXIS, ECG11: -145 DEGREES
CHLORIDE SERPL-SCNC: 108 MMOL/L (ref 100–111)
CHOLEST SERPL-MCNC: 112 MG/DL
CO2 SERPL-SCNC: 24 MMOL/L (ref 21–32)
CREAT SERPL-MCNC: 0.87 MG/DL (ref 0.6–1.3)
D DIMER PPP FEU-MCNC: 1.56 UG/ML(FEU)
DIAGNOSIS, 93000: NORMAL
FERRITIN SERPL-MCNC: 66 NG/ML (ref 8–388)
GLUCOSE SERPL-MCNC: 150 MG/DL (ref 74–99)
HDLC SERPL-MCNC: 32 MG/DL (ref 40–60)
HDLC SERPL: 3.5 {RATIO} (ref 0–5)
LDH SERPL L TO P-CCNC: 302 U/L (ref 81–234)
LDLC SERPL CALC-MCNC: 59 MG/DL (ref 0–100)
LIPID PROFILE,FLP: ABNORMAL
MAGNESIUM SERPL-MCNC: 2.1 MG/DL (ref 1.6–2.6)
P-R INTERVAL, ECG05: 160 MS
POTASSIUM SERPL-SCNC: 3.8 MMOL/L (ref 3.5–5.5)
Q-T INTERVAL, ECG07: 464 MS
QRS DURATION, ECG06: 84 MS
QTC CALCULATION (BEZET), ECG08: 511 MS
SODIUM SERPL-SCNC: 140 MMOL/L (ref 136–145)
TRIGL SERPL-MCNC: 105 MG/DL (ref ?–150)
VENTRICULAR RATE, ECG03: 73 BPM
VLDLC SERPL CALC-MCNC: 21 MG/DL

## 2020-05-19 PROCEDURE — 82728 ASSAY OF FERRITIN: CPT

## 2020-05-19 PROCEDURE — 99218 HC RM OBSERVATION: CPT

## 2020-05-19 PROCEDURE — 74011250637 HC RX REV CODE- 250/637: Performed by: EMERGENCY MEDICINE

## 2020-05-19 PROCEDURE — 83735 ASSAY OF MAGNESIUM: CPT

## 2020-05-19 PROCEDURE — 85379 FIBRIN DEGRADATION QUANT: CPT

## 2020-05-19 PROCEDURE — 80048 BASIC METABOLIC PNL TOTAL CA: CPT

## 2020-05-19 PROCEDURE — 74011250637 HC RX REV CODE- 250/637: Performed by: INTERNAL MEDICINE

## 2020-05-19 PROCEDURE — 36415 COLL VENOUS BLD VENIPUNCTURE: CPT

## 2020-05-19 PROCEDURE — 83615 LACTATE (LD) (LDH) ENZYME: CPT

## 2020-05-19 PROCEDURE — 80061 LIPID PANEL: CPT

## 2020-05-19 PROCEDURE — 93005 ELECTROCARDIOGRAM TRACING: CPT

## 2020-05-19 RX ORDER — SPIRONOLACTONE 25 MG/1
25 TABLET ORAL DAILY
Status: DISCONTINUED | OUTPATIENT
Start: 2020-05-19 | End: 2020-05-19 | Stop reason: HOSPADM

## 2020-05-19 RX ORDER — LANOLIN ALCOHOL/MO/W.PET/CERES
650 CREAM (GRAM) TOPICAL EVERY OTHER DAY
Qty: 30 TAB | Refills: 0 | Status: SHIPPED | OUTPATIENT
Start: 2020-05-20 | End: 2020-08-11 | Stop reason: SDUPTHER

## 2020-05-19 RX ORDER — FUROSEMIDE 40 MG/1
TABLET ORAL
Qty: 30 TAB | Refills: 0 | Status: SHIPPED | OUTPATIENT
Start: 2020-05-20

## 2020-05-19 RX ORDER — FUROSEMIDE 40 MG/1
40 TABLET ORAL DAILY
Status: DISCONTINUED | OUTPATIENT
Start: 2020-05-20 | End: 2020-05-19 | Stop reason: HOSPADM

## 2020-05-19 RX ORDER — SPIRONOLACTONE 25 MG/1
25 TABLET ORAL DAILY
Qty: 30 TAB | Refills: 0 | Status: SHIPPED | OUTPATIENT
Start: 2020-05-19 | End: 2020-06-23 | Stop reason: ALTCHOICE

## 2020-05-19 RX ORDER — AMOXICILLIN AND CLAVULANATE POTASSIUM 875; 125 MG/1; MG/1
1 TABLET, FILM COATED ORAL EVERY 12 HOURS
Qty: 2 TAB | Refills: 0 | Status: SHIPPED | OUTPATIENT
Start: 2020-05-19 | End: 2020-05-20

## 2020-05-19 RX ADMIN — LINACLOTIDE 145 MCG: 145 CAPSULE, GELATIN COATED ORAL at 08:20

## 2020-05-19 RX ADMIN — OMEGA-3 FATTY ACIDS CAP 1000 MG 1 CAPSULE: 1000 CAP at 08:20

## 2020-05-19 RX ADMIN — SPIRONOLACTONE 25 MG: 25 TABLET ORAL at 12:16

## 2020-05-19 RX ADMIN — THERA TABS 1 TABLET: TAB at 08:20

## 2020-05-19 RX ADMIN — CLOPIDOGREL BISULFATE 75 MG: 75 TABLET ORAL at 08:20

## 2020-05-19 RX ADMIN — CYANOCOBALAMIN TAB 1000 MCG 500 MCG: 1000 TAB at 08:20

## 2020-05-19 RX ADMIN — Medication 1 TABLET: at 08:20

## 2020-05-19 RX ADMIN — Medication 10 ML: at 06:00

## 2020-05-19 RX ADMIN — PANTOPRAZOLE SODIUM 20 MG: 20 TABLET, DELAYED RELEASE ORAL at 08:20

## 2020-05-19 RX ADMIN — CARVEDILOL 12.5 MG: 12.5 TABLET, FILM COATED ORAL at 08:20

## 2020-05-19 RX ADMIN — AMOXICILLIN AND CLAVULANATE POTASSIUM 1 TABLET: 875; 125 TABLET, FILM COATED ORAL at 08:20

## 2020-05-19 RX ADMIN — ACETAMINOPHEN 650 MG: 325 TABLET ORAL at 03:11

## 2020-05-19 RX ADMIN — FUROSEMIDE 40 MG: 40 TABLET ORAL at 08:20

## 2020-05-19 RX ADMIN — PREGABALIN 150 MG: 75 CAPSULE ORAL at 08:20

## 2020-05-19 RX ADMIN — Medication 1 CAPSULE: at 08:20

## 2020-05-19 RX ADMIN — Medication 500 MG: at 08:20

## 2020-05-19 NOTE — DISCHARGE INSTRUCTIONS
Discharge Instructions    Patient: Genia Beatty MRN: [id number]  [facility]: [id number]    YOB: 1960  Age: 63 y.o. Sex: female    DOA: 5/12/2020 LOS:  LOS: 7 days   Discharge Date:      ACUTE DIAGNOSES:  1.  NSTEMI with CAD, s/p cardiac catheterization and stent placement  2. Syncope and collapse, resolved   3. Altered mental status, unresponsiveness prior to arrival, resolved   4. Aspiration pneumonia, Negative COVID-19 respiratory infection   5. SIRS with leukocytosis, fever, tachypnea, resolved   6. H/o Ischemic cardiomyopathy with AICD present, normal function on interrogation   7. Tobacco dependence   8. Hyperglycemia with DM2   9. Hypokalemia   10. Elevated lactic acid on presentation   11. Normocytic anemia, iron deficiency anemia          DISCHARGE MEDICATIONS:         · It is important that you take the medication exactly as they are prescribed. · Keep your medication in the bottles provided by the pharmacist and keep a list of the medication names, dosages, and times to be taken in your wallet. · Do not take other medications without consulting your doctor. DIET:  Cardiac Diet, Diabetic Diet and Low fat, Low cholesterol    ACTIVITY: Activity as tolerated, please stop smoking tobacco    ADDITIONAL INFORMATION: If you experience any of the following symptoms then please call your primary care physician or return to the emergency room if you cannot get hold of your doctor: Fever, chills, nausea, vomiting, diarrhea, change in mentation, falling, bleeding, shortness of breath. FOLLOW UP CARE:  Dr. Willie Barcenas, NP  you are to call and set up an appointment to see them in 1-2 weeks. Follow-up with your Cardiologist at [facility] or follow up with Dr. Vargas Anne as needed in 2 weeks       Information obtained by :  I understand that if any problems occur once I am at home I am to contact my physician. I understand and acknowledge receipt of the instructions indicated above. Physician's or R.N.'s Signature                                                                  Date/Time                                                                                                                                              Patient or Representative Signature                                                          Date/Time    Praveen Lux MD  5/19/2020  12:23 PM

## 2020-05-19 NOTE — PROGRESS NOTES
1915-Assumed care of pt.  2000-Pt assessment done. VS stable. Pt's right groin site dressing clean, dry, intact with no sign of hematoma. Pt ambulates to toilet with assistance. 2103-Scheduled meds given and Tylenol given per pt request for 7/10 pain to right groin. 0000-Pt resting in bed. VS stable. Pt denies needs at this time. 0400-Pt resting in bed. VS stable. Pt offered assistance with bath this morning and pt states she will get up to bath later this morning. 0600-Pt assisted with complete bath. 0655-EKG done. Bedside shift change report given to SHERRIE Flor (oncoming nurse) by Mita Velasco RN (offgoing nurse). Report included the following information SBAR, Kardex, Intake/Output, MAR and Cardiac Rhythm Bigeminy.

## 2020-05-19 NOTE — PROGRESS NOTES
OT order received and chart reviewed. Spoke with nursing and patient has no skilled OT needs at this time. Plan to discharge home; will discontinue the OT order.   Thank you for the referral.  Lenora Montero MS OTR/L
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
## 2020-05-19 NOTE — PROGRESS NOTES
Discharge already in, patient requesting walker, but has not been seen by therapy, have reached out to attending and PT/OT to try and get patient seen today.        CHANDRAKANT Soto  Case Management  [phone]

## 2020-05-19 NOTE — PROGRESS NOTES
PT order received and chart reviewed. RN states patient has a walker at home and does not need PT eval at this time. Will discontinue PT order.      Hayder Johnson PT, DPT
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
## 2020-05-19 NOTE — PROGRESS NOTES
[facility] - Progress Note  Admit Date: 5/12/2020    Assessment:     Hospital Problems  Date Reviewed: 5/7/2020          Codes Class Noted POA    CAD (coronary artery disease) ICD-10-CM: I25.10  ICD-9-CM: 414.00  5/18/2020 Unknown        NSTEMI (non-ST elevated myocardial infarction) [facility]) ICD-10-CM: I21.4  ICD-9-CM: 410.70  5/12/2020 Unknown        Syncope and collapse ICD-10-CM: R55  ICD-9-CM: 780.2  5/12/2020 Unknown                  -Unresponsiveness, EMS was called for AMS at 430 AM 5/12, patient was unresponsive with agonal breathing. Patient was placed on nonrebreather. Patient was worked up for possible infectious process (fever and leukocytosis), versus PE (negative CTA), versus seizure, versus medication (chronic pain). Covid testing rapid testing 5/18 negative. -NSTEMI, peak troponin 6.88, ECG with nonspecific ST wave abnormalities with concern of subtle inferolateral ST elevations on admission, known CAD as noted below. Symptoms very different than previous anginal complaints. 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 Now s/p PCI to OM with JAMES as noted below:  -CAD s/p anterior wall MI 6/2013 s/p PCI to LAD. Cath this admission: Proximal LAD stent from previous is widely patent. Circumflex has moderate to large size OM with bifurcation subtotal stenosis in the OM. Successful angioplasty and stent of OM 99% to residual 0% using 2.5 mm JAMES. -Shortness of breath, CTA ruled out PE, possible PNA on abx, Covid testing negative 5/18.  -Fever 100.2 with Leukocytosis on admission, possible PNA.   -Peripheral vascular disease s/p vascular procedure done at [address] [place] [address] where she underwent PTA and atherectomy to her right SFA.  She was then in the emergency room there 2 days later with what she thought was an allergic reaction to something during the procedure she states that her face was swollen and her neck had a rash and she was treated with prednisone and Benadryl and discharged home. -H/o ICMY which has improved over the years, EF 45-50% by echo this admission, but noted EF 20-25% by LV gram this admission. EF 50% 5/2017. Noted elevated BNP, does not appear significantly volume overloaded at this time. -[employer] single lead AICD, normal function on interrogation this admission.  -Hypertension. Stable. -Diabetes mellitus. HgbA1c 6.5.  -Dyslipidemia. On statin.  -Chronic pain, extensive orthopedic history.  -Hypokalemia, replace as needed.  -Anemia, noted hgb 7.9, stool negative, remains stable. -ASA allergy     Primary cardiology [facility]       Plan:     Patient feeling well s/p PCI. Patient is continued on coreg. Patient is continued on plavix, no ASA given allergy. Patient is continued on crestor. Noted EF 20-25% by LV gram yesterday (EF 50% by echo this admission). Volume status appears stable, continued on maintenance lasix. Patient is currently not on ace, patient with recent allergic reaction s/p vascular procedure with concerns of angioedema, appears ace was continued at that time with low concerns of ace reaction, would resume unless contraindication. Patient with frequent PVCs, follow up BMP pending, may consider addition of aldactone. Subjective:     No CP, No SOB, frequent PVCs.      Objective:      Patient Vitals for the past 8 hrs:   Temp Pulse Resp BP SpO2   05/19/20 0727 -- 77 19 133/60 --   05/19/20 0504 -- 80 12 137/55 --   05/19/20 0400 98 °F (36.7 °C) 87 19 146/48 --   05/19/20 0300 -- 77 21 129/70 --   05/19/20 0200 -- 96 20 (!) 144/92 --   05/19/20 0100 -- 69 21 116/53 --   05/19/20 0001 -- 76 17 -- --   05/19/20 0000 98.3 °F (36.8 °C) 76 17 127/44 95 %         Patient Vitals for the past 96 hrs:   Weight   05/19/20 0623 80.7 kg (177 lb 14.4 oz)   05/18/20 0713 85.3 kg (188 lb 1.6 oz)   05/17/20 0353 79.3 kg (174 lb 14.4 oz) Intake/Output Summary (Last 24 hours) at 5/19/2020 0754  Last data filed at 5/19/2020 0734  Gross per 24 hour   Intake --   Output 1100 ml   Net -1100 ml       Physical Exam:  General:  alert, cooperative, no distress, appears stated age  Neck:  no JVD  Lungs:  clear to auscultation bilaterally  Heart:  regular rate and rhythm  Abdomen:  abdomen is soft without significant tenderness, masses, organomegaly or guarding  Extremities:  extremities normal, atraumatic, no cyanosis or edema. Right groin soft without hematoma. Data Review:     Labs: Results:       Chemistry Recent Labs     05/18/20  0345 05/17/20  0328   GLU 97 99    139   K 4.1 3.4*    106   CO2 29 28   BUN 9 7   CREA 0.86 0.75   CA 8.6 8.9   AGAP 6 5   BUCR 10* 9*      CBC w/Diff Recent Labs     05/18/20 0345 05/17/20  0328   WBC 5.2 5.6   RBC 3.62* 3.71*   HGB 8.8* 8.9*   HCT 27.9* 28.3*   * 464*      Cardiac Enzymes No results found for: CPK, CK, CKMMB, CKMB, RCK3, CKMBT, CKNDX, CKND1, ASMITA, TROPT, TROIQ, CORKY, TROPT, TNIPOC, BNP, BNPP   Coagulation Recent Labs     05/18/20 0345 05/17/20  0328   APTT 46.5* 73.2*       Lipid Panel Lab Results   Component Value Date/Time    Cholesterol, total 242 (H) 08/15/2019 10:56 AM    HDL Cholesterol 54 08/15/2019 10:56 AM    LDL, calculated 160 (H) 08/15/2019 10:56 AM    VLDL, calculated 28 08/15/2019 10:56 AM    Triglyceride 140 08/15/2019 10:56 AM    CHOL/HDL Ratio 3.8 03/07/2018 11:22 AM      BNP No results found for: BNP, BNPP, XBNPT   Liver Enzymes No results for input(s): TP, ALB, TBIL, AP, SGOT, GPT in the last 72 hours.     No lab exists for component: DBIL   Digoxin    Thyroid Studies Lab Results   Component Value Date/Time    TSH 0.453 09/20/2019 12:00 AM          Signed By: BERNICE Dietz     May 19, 2020

## 2020-05-20 ENCOUNTER — PATIENT OUTREACH (OUTPATIENT)
Dept: CASE MANAGEMENT | Age: 60
End: 2020-05-20

## 2020-05-20 NOTE — PROGRESS NOTES
Patient contacted regarding recent discharge and COVID-19 risk   Care Transition Nurse/ Ambulatory Care Manager contacted the patient by telephone to perform post discharge assessment. Verified name and  with patient as identifiers. Patient has following risk factors of: diabetes. CTN/ACM reviewed discharge instructions, medical action plan and red flags related to discharge diagnosis. Reviewed and educated them on any new and changed medications related to discharge diagnosis. Advised obtaining a 90-day supply of all daily and as-needed medications. Education provided regarding infection prevention, and signs and symptoms of COVID-19 and when to seek medical attention with patient who verbalized understanding. Discussed exposure protocols and quarantine from [address] you at higher risk for severe illness  and given an opportunity for questions and concerns. The patient agrees to contact the COVID-19 hotline [phone] or PCP office for questions related to their healthcare. CTN/ACM provided contact information for future reference. From CDC: Are you at higher risk for severe illness?  Wash your hands often.  Avoid close contact (6 feet, which is about two arm lengths) with people who are sick.  Put distance between yourself and other people if COVID-19 is spreading in your community.  Clean and disinfect frequently touched surfaces.  Avoid all cruise travel and non-essential air travel.  Call your healthcare professional if you have concerns about COVID-19 and your underlying condition or if you are sick. For more information on steps you can take to protect yourself, see CDC's How to Protect Yourself      Patient/family/caregiver given information for [facility] and agrees to enroll no      Plan for follow-up call in 7-14 days based on severity of symptoms and risk factors.

## 2020-05-21 ENCOUNTER — VIRTUAL VISIT (OUTPATIENT)
Dept: FAMILY MEDICINE CLINIC | Age: 60
End: 2020-05-21

## 2020-05-21 DIAGNOSIS — F17.200 TOBACCO DEPENDENCE: ICD-10-CM

## 2020-05-21 DIAGNOSIS — R55 SYNCOPE AND COLLAPSE: ICD-10-CM

## 2020-05-21 DIAGNOSIS — Z09 HOSPITAL DISCHARGE FOLLOW-UP: ICD-10-CM

## 2020-05-21 DIAGNOSIS — J69.0 ASPIRATION PNEUMONIA, UNSPECIFIED ASPIRATION PNEUMONIA TYPE, UNSPECIFIED LATERALITY, UNSPECIFIED PART OF LUNG (HCC): ICD-10-CM

## 2020-05-21 DIAGNOSIS — I25.2 H/O NON-ST ELEVATION MYOCARDIAL INFARCTION (NSTEMI): Primary | ICD-10-CM

## 2020-05-21 DIAGNOSIS — E11.40 TYPE 2 DIABETES MELLITUS WITH DIABETIC NEUROPATHY, UNSPECIFIED WHETHER LONG TERM INSULIN USE (HCC): ICD-10-CM
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
## 2020-05-21 NOTE — PROGRESS NOTES
Dory Cristina presents today for   Chief Complaint   Patient presents with    Follow-up     2 week    Allergic Reaction   [facility] Follow Up     dx with a 'mild' heart attack and pneumonia, was admitted to [facility]       Dory Cristina preferred language for health care discussion is english/other. Is someone accompanying this pt? no    Is the patient using any DME equipment during [address]? no    Depression Screening:  3 most recent PHQ Screens 3/12/2020   PHQ Not Done Patient Decline   Little interest or pleasure in doing things -   Feeling down, depressed, irritable, or hopeless -   Total Score PHQ 2 -       Learning Assessment:  Learning Assessment 1/29/2020   PRIMARY LEARNER Patient   HIGHEST LEVEL OF EDUCATION - PRIMARY LEARNER  4 YEARS OF COLLEGE   BARRIERS PRIMARY LEARNER NONE   CO-LEARNER CAREGIVER No   CO-LEARNER NAME -   PRIMARY LANGUAGE ENGLISH    NEED No   LEARNER PREFERENCE PRIMARY DEMONSTRATION   ANSWERED BY patient   RELATIONSHIP SELF       Abuse Screening:  Abuse Screening Questionnaire 1/29/2020   Do you ever feel afraid of your partner? N   Are you in a relationship with someone who physically or mentally threatens you? N   Is it safe for you to go home? Y       Generalized Anxiety  No flowsheet data found. Health Maintenance Due   Topic Date Due    Eye Exam Retinal or Dilated  08/05/1970    Shingrix Vaccine Age 50> (1 of 2) 08/05/2011    GLAUCOMA SCREENING Q2Y  09/29/2016    Foot Exam Q1  05/16/2020   . Health Maintenance reviewed and discussed and ordered per Provider. Coordination of Care:  1. Have you been to the ER, urgent care clinic since your last visit? Hospitalized since your last visit? Yes, [facility]    2. Have you seen or consulted any other health care providers outside of the [address] [place] [address] since your last visit? Include any pap smears or colon screening.  no

## 2020-05-26 ENCOUNTER — VIRTUAL VISIT (OUTPATIENT)
Dept: FAMILY MEDICINE CLINIC | Age: 60
End: 2020-05-26

## 2020-05-26 DIAGNOSIS — E11.40 TYPE 2 DIABETES MELLITUS WITH DIABETIC NEUROPATHY, UNSPECIFIED WHETHER LONG TERM INSULIN USE (HCC): ICD-10-CM

## 2020-05-26 DIAGNOSIS — R55 SYNCOPE AND COLLAPSE: ICD-10-CM

## 2020-05-26 DIAGNOSIS — I25.2 H/O NON-ST ELEVATION MYOCARDIAL INFARCTION (NSTEMI): Primary | ICD-10-CM

## 2020-05-26 DIAGNOSIS — I50.22 CHRONIC SYSTOLIC HEART FAILURE (HCC): ICD-10-CM

## 2020-05-26 DIAGNOSIS — I10 ESSENTIAL HYPERTENSION: ICD-10-CM

## 2020-05-26 DIAGNOSIS — J69.0 ASPIRATION PNEUMONIA, UNSPECIFIED ASPIRATION PNEUMONIA TYPE, UNSPECIFIED LATERALITY, UNSPECIFIED PART OF LUNG (HCC): ICD-10-CM

## 2020-05-26 DIAGNOSIS — F17.200 TOBACCO DEPENDENCE: ICD-10-CM

## 2020-05-26 RX ORDER — POTASSIUM CHLORIDE 750 MG/1
10 TABLET, EXTENDED RELEASE ORAL 2 TIMES DAILY
Qty: 180 TAB | Refills: 0 | Status: SHIPPED | OUTPATIENT
Start: 2020-05-26 | End: 2020-08-25 | Stop reason: SDUPTHER

## 2020-05-26 NOTE — PROGRESS NOTES
Dayana Leigh presents today for   Chief Complaint   Patient presents with    Follow-up     1 week       Dayana Leigh preferred language for health care discussion is english/other. Is someone accompanying this pt? no    Is the patient using any DME equipment during [address]? no    Depression Screening:  3 most recent PHQ Screens 3/12/2020   PHQ Not Done Patient Decline   Little interest or pleasure in doing things -   Feeling down, depressed, irritable, or hopeless -   Total Score PHQ 2 -       Learning Assessment:  Learning Assessment 1/29/2020   PRIMARY LEARNER Patient   HIGHEST LEVEL OF EDUCATION - PRIMARY LEARNER  4 YEARS OF COLLEGE   BARRIERS PRIMARY LEARNER NONE   CO-LEARNER CAREGIVER No   CO-LEARNER NAME -   PRIMARY LANGUAGE ENGLISH    NEED No   LEARNER PREFERENCE PRIMARY DEMONSTRATION   ANSWERED BY patient   RELATIONSHIP SELF       Abuse Screening:  Abuse Screening Questionnaire 1/29/2020   Do you ever feel afraid of your partner? N   Are you in a relationship with someone who physically or mentally threatens you? N   Is it safe for you to go home? Y       Generalized Anxiety  No flowsheet data found. Health Maintenance Due   Topic Date Due    Eye Exam Retinal or Dilated  08[birth year]05/1971    Shingrix Vaccine Age 50> (1 of 2) 08/05/2011    GLAUCOMA SCREENING Q2Y  09/29/2016    Foot Exam Q1  05/16/2020   . Health Maintenance reviewed and discussed and ordered per Provider. Coordination of Care:  1. Have you been to the ER, urgent care clinic since your last visit? Hospitalized since your last visit? no    2. Have you seen or consulted any other health care providers outside of the [address] [place] [address] since your last visit? Include any pap smears or colon screening.  no
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
## 2020-05-28 DIAGNOSIS — F51.01 PRIMARY INSOMNIA: ICD-10-CM

## 2020-06-01 RX ORDER — ZOLPIDEM TARTRATE 10 MG/1
10 TABLET ORAL
Qty: 30 TAB | Refills: 0 | Status: SHIPPED | OUTPATIENT
Start: 2020-06-01 | End: 2020-06-30 | Stop reason: SDUPTHER

## 2020-06-03 ENCOUNTER — TELEPHONE (OUTPATIENT)
Dept: CARDIAC REHAB | Age: 60
End: 2020-06-03

## 2020-06-03 NOTE — TELEPHONE ENCOUNTER
[facility]iac Rehab called patient and spoke to her about the program. She is interested, but lives closer to [place] [address] and wants to send her information to that clinic. Information faxed to [place] [id number], confirmed receipt with Clint Vallejo.     Thank you,  Danna Malave

## 2020-06-12 DIAGNOSIS — E11.40 TYPE 2 DIABETES MELLITUS WITH DIABETIC NEUROPATHY, UNSPECIFIED WHETHER LONG TERM INSULIN USE (HCC): ICD-10-CM

## 2020-06-15 ENCOUNTER — TELEPHONE (OUTPATIENT)
Dept: FAMILY MEDICINE CLINIC | Age: 60
End: 2020-06-15

## 2020-06-15 RX ORDER — BLOOD SUGAR DIAGNOSTIC
STRIP MISCELLANEOUS
Qty: 200 STRIP | Refills: 2 | Status: SHIPPED | OUTPATIENT
Start: 2020-06-15

## 2020-06-15 RX ORDER — MONTELUKAST SODIUM 10 MG/1
TABLET ORAL
Qty: 90 TAB | Refills: 2 | Status: SHIPPED | OUTPATIENT
Start: 2020-06-15 | End: 2021-06-03

## 2020-06-18 RX ORDER — HYDROXYZINE 25 MG/1
TABLET, FILM COATED ORAL
COMMUNITY
End: 2020-06-18 | Stop reason: SDUPTHER

## 2020-06-18 NOTE — TELEPHONE ENCOUNTER
Talked to patient, she is requesting a refill on hydroxyzine which you prescribed in May for itching.

## 2020-06-19 RX ORDER — HYDROXYZINE 25 MG/1
25 TABLET, FILM COATED ORAL
Qty: 30 TAB | Refills: 0 | Status: SHIPPED | OUTPATIENT
Start: 2020-06-19 | End: 2020-07-29 | Stop reason: SDUPTHER

## 2020-06-23 ENCOUNTER — VIRTUAL VISIT (OUTPATIENT)
Dept: FAMILY MEDICINE CLINIC | Age: 60
End: 2020-06-23

## 2020-06-23 DIAGNOSIS — I10 ESSENTIAL HYPERTENSION: ICD-10-CM

## 2020-06-23 DIAGNOSIS — K59.00 CONSTIPATION, UNSPECIFIED CONSTIPATION TYPE: ICD-10-CM

## 2020-06-23 DIAGNOSIS — E11.40 TYPE 2 DIABETES MELLITUS WITH DIABETIC NEUROPATHY, UNSPECIFIED WHETHER LONG TERM INSULIN USE (HCC): Primary | ICD-10-CM
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
## 2020-06-23 DIAGNOSIS — Z71.2 ENCOUNTER TO DISCUSS TEST RESULTS: ICD-10-CM

## 2020-06-23 LAB
ALBUMIN SERPL-MCNC: 4 G/DL (ref 3.8–4.9)
ALBUMIN/GLOB SERPL: 1.5 {RATIO} (ref 1.2–2.2)
ALP SERPL-CCNC: 112 IU/L (ref 39–117)
ALT SERPL-CCNC: 13 IU/L (ref 0–32)
AST SERPL-CCNC: 15 IU/L (ref 0–40)
BILIRUB SERPL-MCNC: <0.2 MG/DL (ref 0–1.2)
BUN SERPL-MCNC: 8 MG/DL (ref 6–24)
BUN/CREAT SERPL: 11 (ref 9–23)
CALCIUM SERPL-MCNC: 9.2 MG/DL (ref 8.7–10.2)
CHLORIDE SERPL-SCNC: 102 MMOL/L (ref 96–106)
CO2 SERPL-SCNC: 26 MMOL/L (ref 20–29)
CREAT SERPL-MCNC: 0.71 MG/DL (ref 0.57–1)
EST. AVERAGE GLUCOSE BLD GHB EST-MCNC: 131 MG/DL
GLOBULIN SER CALC-MCNC: 2.7 G/DL (ref 1.5–4.5)
GLUCOSE SERPL-MCNC: 110 MG/DL (ref 65–99)
HBA1C MFR BLD: 6.2 % (ref 4.8–5.6)
MAGNESIUM SERPL-MCNC: 2.4 MG/DL (ref 1.6–2.3)
POTASSIUM SERPL-SCNC: 4 MMOL/L (ref 3.5–5.2)
PROT SERPL-MCNC: 6.7 G/DL (ref 6–8.5)
SODIUM SERPL-SCNC: 140 MMOL/L (ref 134–144)
SPECIMEN STATUS REPORT, ROLRST: NORMAL

## 2020-06-23 RX ORDER — HYDROXYZINE 25 MG/1
25 TABLET, FILM COATED ORAL
Qty: 30 TAB | Refills: 0 | Status: CANCELLED | OUTPATIENT
Start: 2020-06-23

## 2020-06-23 RX ORDER — LINACLOTIDE 145 UG/1
CAPSULE, GELATIN COATED ORAL
Qty: 30 CAP | Refills: 2 | Status: SHIPPED | OUTPATIENT
Start: 2020-06-23 | End: 2020-09-25

## 2020-06-23 NOTE — PROGRESS NOTES
Myrna Dorsey presents today for   Chief Complaint   Patient presents with    Hypertension    Diabetes       Myrna Dorsey preferred language for health care discussion is english/other. Is someone accompanying this pt? no    Is the patient using any DME equipment during [address]? no    Depression Screening:  3 most recent PHQ Screens 3/12/2020   PHQ Not Done Patient Decline   Little interest or pleasure in doing things -   Feeling down, depressed, irritable, or hopeless -   Total Score PHQ 2 -       Learning Assessment:  Learning Assessment 1/29/2020   PRIMARY LEARNER Patient   HIGHEST LEVEL OF EDUCATION - PRIMARY LEARNER  4 YEARS OF COLLEGE   BARRIERS PRIMARY LEARNER NONE   CO-LEARNER CAREGIVER No   CO-LEARNER NAME -   PRIMARY LANGUAGE ENGLISH    NEED No   LEARNER PREFERENCE PRIMARY DEMONSTRATION   ANSWERED BY patient   RELATIONSHIP SELF       Abuse Screening:  Abuse Screening Questionnaire 1/29/2020   Do you ever feel afraid of your partner? N   Are you in a relationship with someone who physically or mentally threatens you? N   Is it safe for you to go home? Y       Generalized Anxiety  No flowsheet data found. Health Maintenance Due   Topic Date Due    Eye Exam Retinal or Dilated  08/05/1970    Shingrix Vaccine Age 50> (1 of 2) 08/05/2011    GLAUCOMA SCREENING Q2Y  09/29/2016    Foot Exam Q1  05/16/2020   . Health Maintenance reviewed and discussed and ordered per Provider. Coordination of Care:  1. Have you been to the ER, urgent care clinic since your last visit? Hospitalized since your last visit? no    2. Have you seen or consulted any other health care providers outside of the [address] [place] [address] since your last visit? Include any pap smears or colon screening.  no      Advance Directive:  Discussed 1/29/20
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
## 2020-06-30 DIAGNOSIS — F51.01 PRIMARY INSOMNIA: ICD-10-CM

## 2020-07-01 ENCOUNTER — OFFICE VISIT (OUTPATIENT)
Dept: ORTHOPEDIC SURGERY | Facility: CLINIC | Age: 60
End: 2020-07-01

## 2020-07-01 VITALS
HEIGHT: 59 IN | DIASTOLIC BLOOD PRESSURE: 59 MMHG | BODY MASS INDEX: 36.93 KG/M2 | WEIGHT: 183.2 LBS | TEMPERATURE: 97.7 F | SYSTOLIC BLOOD PRESSURE: 111 MMHG | HEART RATE: 68 BPM

## 2020-07-01 DIAGNOSIS — Z96.641 HISTORY OF TOTAL RIGHT HIP REPLACEMENT: Primary | ICD-10-CM

## 2020-07-01 DIAGNOSIS — M70.61 TROCHANTERIC BURSITIS, RIGHT HIP: ICD-10-CM

## 2020-07-01 DIAGNOSIS — M25.551 RIGHT HIP PAIN: ICD-10-CM

## 2020-07-01 DIAGNOSIS — Z96.652 HISTORY OF LEFT KNEE REPLACEMENT: ICD-10-CM

## 2020-07-01 RX ORDER — BETAMETHASONE SODIUM PHOSPHATE AND BETAMETHASONE ACETATE 3; 3 MG/ML; MG/ML
6 INJECTION, SUSPENSION INTRA-ARTICULAR; INTRALESIONAL; INTRAMUSCULAR; SOFT TISSUE ONCE
Qty: 1 ML | Refills: 0
Start: 2020-07-01 | End: 2020-07-01
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
## 2020-07-02 RX ORDER — ZOLPIDEM TARTRATE 10 MG/1
10 TABLET ORAL
Qty: 30 TAB | Refills: 0 | Status: SHIPPED | OUTPATIENT
Start: 2020-07-02 | End: 2020-07-29 | Stop reason: SDUPTHER

## 2020-07-14 DIAGNOSIS — M47.817 SPONDYLOSIS OF LUMBOSACRAL REGION WITHOUT MYELOPATHY OR RADICULOPATHY: ICD-10-CM

## 2020-07-14 DIAGNOSIS — M54.16 LUMBAR RADICULOPATHY: ICD-10-CM

## 2020-07-14 RX ORDER — PREGABALIN 300 MG/1
300 CAPSULE ORAL 2 TIMES DAILY
Qty: 60 CAP | Refills: 0 | Status: SHIPPED | OUTPATIENT
Start: 2020-07-14 | End: 2021-10-04 | Stop reason: SDUPTHER

## 2020-07-14 NOTE — TELEPHONE ENCOUNTER
Patient states she will run out of medication tomorrow    Last Visit: 3/20/20 with MD Thomas Gomez  Next Appointment: 7/20/20 with MD Thomas Gomez  Previous Refill Encounter(s): 1/27/20 #60 with 2 refills    Requested Prescriptions     Pending Prescriptions Disp Refills    pregabalin (Lyrica) 300 mg capsule 60 Cap 2     Sig: Take 1 Cap by mouth two (2) times a day. Max Daily Amount: 600 mg.
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
## 2020-07-20 ENCOUNTER — OFFICE VISIT (OUTPATIENT)
Dept: ORTHOPEDIC SURGERY | Age: 60
End: 2020-07-20

## 2020-07-20 VITALS
SYSTOLIC BLOOD PRESSURE: 115 MMHG | HEART RATE: 70 BPM | DIASTOLIC BLOOD PRESSURE: 63 MMHG | HEIGHT: 59 IN | BODY MASS INDEX: 36.61 KG/M2 | TEMPERATURE: 98.5 F | OXYGEN SATURATION: 100 % | WEIGHT: 181.6 LBS

## 2020-07-20 DIAGNOSIS — G60.9 IDIOPATHIC PERIPHERAL NEUROPATHY: Primary | ICD-10-CM

## 2020-07-20 DIAGNOSIS — M47.817 LUMBOSACRAL SPONDYLOSIS WITHOUT MYELOPATHY: ICD-10-CM

## 2020-07-20 DIAGNOSIS — M47.812 CERVICAL SPONDYLOSIS WITHOUT MYELOPATHY: ICD-10-CM

## 2020-07-20 DIAGNOSIS — M50.30 DDD (DEGENERATIVE DISC DISEASE), CERVICAL: ICD-10-CM

## 2020-07-20 DIAGNOSIS — M54.16 LUMBAR NEURITIS: ICD-10-CM

## 2020-07-20 RX ORDER — DULOXETIN HYDROCHLORIDE 30 MG/1
30 CAPSULE, DELAYED RELEASE ORAL DAILY
Qty: 30 CAP | Refills: 2 | Status: SHIPPED | OUTPATIENT
Start: 2020-07-20 | End: 2020-07-29 | Stop reason: SDUPTHER

## 2020-07-20 RX ORDER — PREGABALIN 300 MG/1
300 CAPSULE ORAL 2 TIMES DAILY
Qty: 60 CAP | Refills: 2 | Status: SHIPPED | OUTPATIENT
Start: 2020-07-20 | End: 2020-07-29 | Stop reason: SDUPTHER

## 2020-07-20 NOTE — LETTER
7/20/20 Patient: Keith Damon YOB: 1960 Date of Visit: 7/20/2020 Rosalino Umana NP 
[address] [id number] [place] [address] VIA In Basket Dear Rosalino Umana NP, Thank you for referring Ms. Keith Damon to [address] for evaluation. My notes for this consultation are attached. If you have questions, please do not hesitate to call me. I look forward to following your patient along with you. Sincerely, Liya Hanks MD

## 2020-07-29 ENCOUNTER — DOCUMENTATION ONLY (OUTPATIENT)
Dept: ORTHOPEDIC SURGERY | Age: 60
End: 2020-07-29

## 2020-07-29 ENCOUNTER — VIRTUAL VISIT (OUTPATIENT)
Dept: FAMILY MEDICINE CLINIC | Age: 60
End: 2020-07-29

## 2020-07-29 DIAGNOSIS — F51.01 PRIMARY INSOMNIA: ICD-10-CM

## 2020-07-29 DIAGNOSIS — I25.2 H/O NON-ST ELEVATION MYOCARDIAL INFARCTION (NSTEMI): ICD-10-CM

## 2020-07-29 DIAGNOSIS — E11.40 TYPE 2 DIABETES MELLITUS WITH DIABETIC NEUROPATHY, UNSPECIFIED WHETHER LONG TERM INSULIN USE (HCC): ICD-10-CM

## 2020-07-29 DIAGNOSIS — I10 ESSENTIAL HYPERTENSION: ICD-10-CM

## 2020-07-29 DIAGNOSIS — Z02.89 ENCOUNTER FOR COMPLETION OF FORM WITH PATIENT: Primary | ICD-10-CM

## 2020-07-29 RX ORDER — HYDROXYZINE 25 MG/1
25 TABLET, FILM COATED ORAL
Qty: 30 TAB | Refills: 0 | Status: SHIPPED | OUTPATIENT
Start: 2020-07-29 | End: 2020-08-25 | Stop reason: SDUPTHER

## 2020-07-29 RX ORDER — ZOLPIDEM TARTRATE 10 MG/1
10 TABLET ORAL
Qty: 30 TAB | Refills: 0 | Status: CANCELLED | OUTPATIENT
Start: 2020-07-29

## 2020-07-29 RX ORDER — HYDROXYZINE 25 MG/1
25 TABLET, FILM COATED ORAL
Qty: 30 TAB | Refills: 0 | Status: CANCELLED | OUTPATIENT
Start: 2020-07-29

## 2020-07-29 RX ORDER — ZOLPIDEM TARTRATE 10 MG/1
10 TABLET ORAL
Qty: 30 TAB | Refills: 0 | Status: SHIPPED | OUTPATIENT
Start: 2020-07-29 | End: 2020-08-31 | Stop reason: SDUPTHER

## 2020-07-29 NOTE — PROGRESS NOTES
Myelogram/CT Lumbar Spine with contrast is scheduled at [facility], [id number], on 08/14/20, arrive 7:00AM, test 9:00AM. Chelsey Gomez pre-authorization [id number], effective 07/29/20-09/26/20.  [employer] pre-authorization [id number], effective 07/29/20-09/26/20

## 2020-07-29 NOTE — PROGRESS NOTES
Kirby Dunlap presents today for   Chief Complaint   Patient presents with    Form Completion     FMLA form for son, Carmen Doyle preferred language for health care discussion is english/other. Is someone accompanying this pt? no    Is the patient using any DME equipment during [address]? no    Depression Screening:  3 most recent PHQ Screens 3/12/2020   PHQ Not Done Patient Decline   Little interest or pleasure in doing things -   Feeling down, depressed, irritable, or hopeless -   Total Score PHQ 2 -       Learning Assessment:  Learning Assessment 1/29/2020   PRIMARY LEARNER Patient   HIGHEST LEVEL OF EDUCATION - PRIMARY LEARNER  4 YEARS OF COLLEGE   BARRIERS PRIMARY LEARNER NONE   CO-LEARNER CAREGIVER No   CO-LEARNER NAME -   PRIMARY LANGUAGE ENGLISH    NEED No   LEARNER PREFERENCE PRIMARY DEMONSTRATION   ANSWERED BY patient   RELATIONSHIP SELF       Abuse Screening:  Abuse Screening Questionnaire 1/29/2020   Do you ever feel afraid of your partner? N   Are you in a relationship with someone who physically or mentally threatens you? N   Is it safe for you to go home? Y       Generalized Anxiety  No flowsheet data found. Health Maintenance Due   Topic Date Due    Eye Exam Retinal or Dilated  08/05/1971    Shingrix Vaccine Age 50> (1 of 2) 08/05/2011    GLAUCOMA SCREENING Q2Y  09/29/2016    Foot Exam Q1  05/16/2020   . Health Maintenance reviewed and discussed and ordered per Provider. Coordination of Care:  1. Have you been to the ER, urgent care clinic since your last visit? Hospitalized since your last visit? no    2. Have you seen or consulted any other health care providers outside of the [address] [place] [address] since your last visit? Include any pap smears or colon screening.  no      Advance Directive:  Discussed 1/29/20
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
## 2020-07-30 ENCOUNTER — TELEPHONE (OUTPATIENT)
Dept: FAMILY MEDICINE CLINIC | Age: 60
End: 2020-07-30

## 2020-07-30 ENCOUNTER — DOCUMENTATION ONLY (OUTPATIENT)
Dept: FAMILY MEDICINE CLINIC | Age: 60
End: 2020-07-30

## 2020-07-30 NOTE — TELEPHONE ENCOUNTER
Spoke with son. He verified patient's name, address, and . He indicates that the paper is because patient is high risk for COVID and not FMLA.  Alfreda Grace

## 2020-07-30 NOTE — TELEPHONE ENCOUNTER
Patient's son, Rosalva Garcia, states his mother told him he needed to call to answer some questions regarding his [facility] paperwork.      He can be reached at [phone]

## 2020-08-06 ENCOUNTER — OFFICE VISIT (OUTPATIENT)
Dept: ORTHOPEDIC SURGERY | Facility: CLINIC | Age: 60
End: 2020-08-06

## 2020-08-06 VITALS
TEMPERATURE: 98.5 F | OXYGEN SATURATION: 100 % | SYSTOLIC BLOOD PRESSURE: 132 MMHG | DIASTOLIC BLOOD PRESSURE: 74 MMHG | BODY MASS INDEX: 36.89 KG/M2 | HEART RATE: 67 BPM | HEIGHT: 59 IN | WEIGHT: 183 LBS

## 2020-08-06 DIAGNOSIS — G89.29 CHRONIC PAIN OF LEFT KNEE: ICD-10-CM

## 2020-08-06 DIAGNOSIS — M25.562 CHRONIC PAIN OF LEFT KNEE: ICD-10-CM

## 2020-08-06 DIAGNOSIS — Z96.652 HISTORY OF LEFT KNEE REPLACEMENT: Primary | ICD-10-CM
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
## 2020-08-11 ENCOUNTER — OFFICE VISIT (OUTPATIENT)
Dept: ORTHOPEDIC SURGERY | Facility: CLINIC | Age: 60
End: 2020-08-11

## 2020-08-11 VITALS
HEART RATE: 62 BPM | WEIGHT: 181 LBS | HEIGHT: 59 IN | SYSTOLIC BLOOD PRESSURE: 117 MMHG | BODY MASS INDEX: 36.49 KG/M2 | TEMPERATURE: 97.7 F | DIASTOLIC BLOOD PRESSURE: 68 MMHG | OXYGEN SATURATION: 99 %

## 2020-08-11 DIAGNOSIS — Z96.652 HISTORY OF LEFT KNEE REPLACEMENT: ICD-10-CM

## 2020-08-11 DIAGNOSIS — M25.562 ACUTE PAIN OF LEFT KNEE: Primary | ICD-10-CM

## 2020-08-11 DIAGNOSIS — E11.40 TYPE 2 DIABETES MELLITUS WITH DIABETIC NEUROPATHY, UNSPECIFIED WHETHER LONG TERM INSULIN USE (HCC): ICD-10-CM

## 2020-08-11 RX ORDER — LANOLIN ALCOHOL/MO/W.PET/CERES
650 CREAM (GRAM) TOPICAL EVERY OTHER DAY
Qty: 30 TAB | Refills: 0 | Status: SHIPPED | OUTPATIENT
Start: 2020-08-11 | End: 2020-09-04 | Stop reason: SDUPTHER

## 2020-08-11 RX ORDER — HYDROCODONE BITARTRATE AND ACETAMINOPHEN 7.5; 325 MG/1; MG/1
1 TABLET ORAL
Qty: 15 TAB | Refills: 0 | Status: SHIPPED | OUTPATIENT
Start: 2020-08-11 | End: 2020-08-16

## 2020-08-11 NOTE — PROGRESS NOTES
Chirag Rosen  1960   Chief Complaint   Patient presents with    Knee Pain     left        HISTORY OF PRESENT ILLNESS  Chirag Rosen is a 62 y.o. female who presents today for reevaluation of left knee pain. She has a history of left total knee arthroplasty in 2009 and revision left total knee in 2010. She has had problems recently and spoke with BERNICE Mariano about another revision. They will likely be referring to another physician. BERNICE Mariano ask to see her back in 6 weeks times. She fell yesterday and her left knee bent behind her during the fall. She was getting out of bed when her knee gave out on her causing her to fall. She has had 10/10 pain since then. She has taken tylenol and worn her brace but that has not helped. She comes in today for xrays to make sure she has not fracture or damaged her total knee. Patient denies any fever, chills, chest pain, shortness of breath or calf pain. There are no new illness or injuries to report since last seen in the office. No changes in medications, allergies, social or family history. PHYSICAL EXAM:   Visit Vitals  /68   Pulse 62   Temp 97.7 °F (36.5 °C)   Ht 4' 11\" (1.499 m)   Wt 181 lb (82.1 kg)   LMP  (LMP Unknown)   SpO2 99%   BMI 36.56 kg/m²     The patient is a well-developed, well-nourished female   in no acute distress. The patient is alert and oriented times three. Mood and affect are normal.  LYMPHATIC: lymph nodes are not enlarged and are within normal limits  SKIN: normal in color and non tender to palpation. There are no bruises or abrasions noted. NEUROLOGICAL: Motor sensory exam is within normal limits. Reflexes are equal bilaterally.  There is normal sensation to pinprick and light touch  MUSCULOSKELETAL:  Examination Left knee   Skin Intact   Range of motion -5-90   Effusion mild   Medial joint line tenderness +   Lateral joint line tenderness +   Tenderness Pes Bursa -   Tenderness insertion MCL +   Tenderness insertion LCL +   Janiss -   Patella crepitus -   Patella grind -   Lachman Not assessed   Pivot shift Not assessed   Anterior drawer Not assessed   Posterior drawer Not assessed   Varus stress -   Valgus stress -   Neurovascular Intact   Calf Swelling and Tenderness to Palpation -   Makenna's Test -   Hamstring Cord Tightness -      Globally tender around the knee and thigh, no erythema, induration or signs for infection    IMAGING: 3 views of the left knee 8/11/20 show total knee components in good placement. No evidence for fracture or dislocation. No changes when compared to imaging on 7/1/20    IMPRESSION:      ICD-10-CM ICD-9-CM    1. Acute pain of left knee  M25.562 719.46 AMB POC X-RAY KNEE 3 VIEW   2. History of left knee replacement  Z96.652 V43.65         PLAN:   Pt having left knee pain  xrays arm negative today. I recommend conservative treatment for her pain today. Will send a couple days of pain medication to help with acute pain since fall. norco 7.5 mg Patient given pain medication for short term acute pain relief. Goal is to treat patient according to above plan and to ultimately have patient off all pain medications once appropriate. If chronic pain management is required beyond what is expected for current orthopedic problem, will refer patient to pain management.  was reviewed and will be reviewed with every medication refill request.   She will also rest, ice and elevate to help with pain and swelling. She will also wear her brace and work on ROM so her knee does not stiffen up anymore.   RTC PRN, she will keep her follow up appt with [facility]

## 2020-08-12 NOTE — TELEPHONE ENCOUNTER
Blood gluc transmitter filled last week. I have given a refill of ferrous sulfate. Patient needs a follow up appointment. She is requesting an epi pen refill. Has this  or has she used this? Please have her make a virtual follow up appointment.  [facility]

## 2020-08-13 RX ORDER — BACLOFEN 10 MG/1
TABLET ORAL
Qty: 60 TAB | Refills: 1 | Status: SHIPPED | OUTPATIENT
Start: 2020-08-13 | End: 2020-10-14

## 2020-08-17 NOTE — TELEPHONE ENCOUNTER
Patient stated she used epi pen in June for an insect bite, states she was recently bitten by a spider but took benadryl for the swelling.  Appt scheduled for 8/25/20
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
## 2020-08-19 ENCOUNTER — OFFICE VISIT (OUTPATIENT)
Dept: ORTHOPEDIC SURGERY | Age: 60
End: 2020-08-19

## 2020-08-19 VITALS
TEMPERATURE: 97.1 F | OXYGEN SATURATION: 99 % | SYSTOLIC BLOOD PRESSURE: 145 MMHG | HEART RATE: 59 BPM | DIASTOLIC BLOOD PRESSURE: 81 MMHG

## 2020-08-19 DIAGNOSIS — M47.817 LUMBOSACRAL SPONDYLOSIS WITHOUT MYELOPATHY: ICD-10-CM

## 2020-08-19 DIAGNOSIS — M50.30 DDD (DEGENERATIVE DISC DISEASE), CERVICAL: ICD-10-CM

## 2020-08-19 DIAGNOSIS — M47.812 CERVICAL SPONDYLOSIS WITHOUT MYELOPATHY: ICD-10-CM

## 2020-08-19 DIAGNOSIS — G60.9 IDIOPATHIC PERIPHERAL NEUROPATHY: Primary | ICD-10-CM

## 2020-08-19 DIAGNOSIS — M54.16 LUMBAR NEURITIS: ICD-10-CM

## 2020-08-19 NOTE — LETTER
8/19/20 Patient: Calvin Hammans YOB: 1960 Date of Visit: 8/19/2020 Astrid Bar NP 
[address] [id number] [place] [address] VIA In Basket Dear Astrid Bar NP, Thank you for referring Ms. Calvin Hammans to [address] for evaluation. My notes for this consultation are attached. If you have questions, please do not hesitate to call me. I look forward to following your patient along with you. Sincerely, Mamadou Shah MD

## 2020-08-24 DIAGNOSIS — I50.22 CHRONIC SYSTOLIC HEART FAILURE (HCC): ICD-10-CM

## 2020-08-25 ENCOUNTER — VIRTUAL VISIT (OUTPATIENT)
Dept: FAMILY MEDICINE CLINIC | Age: 60
End: 2020-08-25

## 2020-08-25 DIAGNOSIS — L29.9 ITCHING: ICD-10-CM

## 2020-08-25 DIAGNOSIS — I50.22 CHRONIC SYSTOLIC HEART FAILURE (HCC): ICD-10-CM

## 2020-08-25 DIAGNOSIS — E11.40 TYPE 2 DIABETES MELLITUS WITH DIABETIC NEUROPATHY, UNSPECIFIED WHETHER LONG TERM INSULIN USE (HCC): Primary | ICD-10-CM

## 2020-08-25 DIAGNOSIS — I25.10 CORONARY ARTERY DISEASE INVOLVING NATIVE CORONARY ARTERY OF NATIVE HEART WITHOUT ANGINA PECTORIS: ICD-10-CM

## 2020-08-25 DIAGNOSIS — Z88.9 HISTORY OF ALLERGIC REACTION: ICD-10-CM

## 2020-08-25 DIAGNOSIS — I10 ESSENTIAL HYPERTENSION: ICD-10-CM

## 2020-08-25 RX ORDER — EPINEPHRINE 0.3 MG/.3ML
0.3 INJECTION SUBCUTANEOUS
Qty: 1 SYRINGE | Refills: 0 | Status: SHIPPED | OUTPATIENT
Start: 2020-08-25 | End: 2020-08-25

## 2020-08-25 RX ORDER — POTASSIUM CHLORIDE 750 MG/1
10 TABLET, EXTENDED RELEASE ORAL 2 TIMES DAILY
Qty: 180 TAB | Refills: 0 | OUTPATIENT
Start: 2020-08-25

## 2020-08-25 RX ORDER — POTASSIUM CHLORIDE 750 MG/1
10 TABLET, EXTENDED RELEASE ORAL 2 TIMES DAILY
Qty: 180 TAB | Refills: 0 | Status: SHIPPED | OUTPATIENT
Start: 2020-08-25 | End: 2020-11-30

## 2020-08-25 RX ORDER — HYDROXYZINE 25 MG/1
25 TABLET, FILM COATED ORAL
Qty: 30 TAB | Refills: 0 | Status: SHIPPED | OUTPATIENT
Start: 2020-08-25 | End: 2020-10-28 | Stop reason: SDUPTHER

## 2020-08-25 RX ORDER — GLIPIZIDE 5 MG/1
TABLET ORAL
Qty: 15 TAB | Refills: 0 | Status: SHIPPED | OUTPATIENT
Start: 2020-08-25 | End: 2020-09-04 | Stop reason: SDUPTHER

## 2020-08-25 NOTE — PROGRESS NOTES
Tigist Gomez presents today for   Chief Complaint   Patient presents with    Diabetes       Tigist Gomez preferred language for health care discussion is english/other. Is someone accompanying this pt? no    Is the patient using any DME equipment during [address]? no    Depression Screening:  3 most recent PHQ Screens 3/12/2020   PHQ Not Done Patient Decline   Little interest or pleasure in doing things -   Feeling down, depressed, irritable, or hopeless -   Total Score PHQ 2 -       Learning Assessment:  Learning Assessment 1/29/2020   PRIMARY LEARNER Patient   HIGHEST LEVEL OF EDUCATION - PRIMARY LEARNER  4 YEARS OF COLLEGE   BARRIERS PRIMARY LEARNER NONE   CO-LEARNER CAREGIVER No   CO-LEARNER NAME -   PRIMARY LANGUAGE ENGLISH    NEED No   LEARNER PREFERENCE PRIMARY DEMONSTRATION   ANSWERED BY patient   RELATIONSHIP SELF       Abuse Screening:  Abuse Screening Questionnaire 1/29/2020   Do you ever feel afraid of your partner? N   Are you in a relationship with someone who physically or mentally threatens you? N   Is it safe for you to go home? Y       Generalized Anxiety  No flowsheet data found. Health Maintenance Due   Topic Date Due    Eye Exam Retinal or Dilated  08/05/1971    Shingrix Vaccine Age 50> (1 of 2) 08/05/2011    GLAUCOMA SCREENING Q2Y  09/29/2016    Foot Exam Q1  05/16/2020   . Health Maintenance reviewed and discussed and ordered per Provider. Coordination of Care:  1. Have you been to the ER, urgent care clinic since your last visit? Hospitalized since your last visit? no    2. Have you seen or consulted any other health care providers outside of the [address] [place] [address] since your last visit? Include any pap smears or colon screening.  no      Advance Directive:  Discussed 1/29/20

## 2020-08-26 DIAGNOSIS — K21.9 GERD (GASTROESOPHAGEAL REFLUX DISEASE): ICD-10-CM

## 2020-08-26 RX ORDER — OMEPRAZOLE 20 MG/1
CAPSULE, DELAYED RELEASE ORAL
Qty: 90 CAP | Refills: 1 | Status: SHIPPED | OUTPATIENT
Start: 2020-08-26 | End: 2020-09-21
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
## 2020-08-27 ENCOUNTER — TELEPHONE (OUTPATIENT)
Dept: ORTHOPEDIC SURGERY | Age: 60
End: 2020-08-27

## 2020-08-27 NOTE — TELEPHONE ENCOUNTER
Dr Ernie Resendiz , just wanted to make you aware that this patient is not able to have injection/stop Plavix because she had  a heart stent put in 5/2020 per DR [place]. Patient did not mention to me, and I se no mention of this in your office note from 8/19.

## 2020-09-01 DIAGNOSIS — M54.16 LUMBAR NEURITIS: ICD-10-CM

## 2020-09-01 DIAGNOSIS — M47.812 CERVICAL SPONDYLOSIS WITHOUT MYELOPATHY: ICD-10-CM

## 2020-09-01 DIAGNOSIS — M50.30 DDD (DEGENERATIVE DISC DISEASE), CERVICAL: ICD-10-CM

## 2020-09-01 DIAGNOSIS — M47.817 LUMBOSACRAL SPONDYLOSIS WITHOUT MYELOPATHY: ICD-10-CM

## 2020-09-01 DIAGNOSIS — G60.9 IDIOPATHIC PERIPHERAL NEUROPATHY: ICD-10-CM

## 2020-09-02 DIAGNOSIS — E11.40 TYPE 2 DIABETES MELLITUS WITH DIABETIC NEUROPATHY, UNSPECIFIED WHETHER LONG TERM INSULIN USE (HCC): ICD-10-CM

## 2020-09-04 RX ORDER — GLIPIZIDE 5 MG/1
TABLET ORAL
Qty: 15 TAB | Refills: 0 | Status: SHIPPED | OUTPATIENT
Start: 2020-09-04 | End: 2020-09-07 | Stop reason: SDUPTHER

## 2020-09-04 RX ORDER — LANOLIN ALCOHOL/MO/W.PET/CERES
CREAM (GRAM) TOPICAL
Qty: 30 TAB | Refills: 0 | Status: SHIPPED | OUTPATIENT
Start: 2020-09-04 | End: 2020-09-28

## 2020-09-07 DIAGNOSIS — M25.551 RIGHT HIP PAIN: ICD-10-CM

## 2020-09-07 DIAGNOSIS — E11.40 TYPE 2 DIABETES MELLITUS WITH DIABETIC NEUROPATHY, UNSPECIFIED WHETHER LONG TERM INSULIN USE (HCC): ICD-10-CM

## 2020-09-07 DIAGNOSIS — M70.61 TROCHANTERIC BURSITIS, RIGHT HIP: ICD-10-CM

## 2020-09-07 RX ORDER — GLIPIZIDE 5 MG/1
TABLET ORAL
Qty: 15 TAB | Refills: 0 | Status: SHIPPED | OUTPATIENT
Start: 2020-09-07 | End: 2020-09-29

## 2020-09-07 RX ORDER — CELECOXIB 200 MG/1
200 CAPSULE ORAL DAILY
Qty: 30 CAP | Refills: 2 | Status: SHIPPED | OUTPATIENT
Start: 2020-09-07 | End: 2020-11-30

## 2020-09-14 ENCOUNTER — VIRTUAL VISIT (OUTPATIENT)
Dept: FAMILY MEDICINE CLINIC | Age: 60
End: 2020-09-14

## 2020-09-14 DIAGNOSIS — I50.22 CHRONIC SYSTOLIC HEART FAILURE (HCC): ICD-10-CM

## 2020-09-14 DIAGNOSIS — I25.10 CORONARY ARTERY DISEASE INVOLVING NATIVE CORONARY ARTERY OF NATIVE HEART WITHOUT ANGINA PECTORIS: ICD-10-CM

## 2020-09-14 DIAGNOSIS — I10 ESSENTIAL HYPERTENSION: ICD-10-CM

## 2020-09-14 DIAGNOSIS — E11.40 TYPE 2 DIABETES MELLITUS WITH DIABETIC NEUROPATHY, UNSPECIFIED WHETHER LONG TERM INSULIN USE (HCC): Primary | ICD-10-CM

## 2020-09-14 NOTE — PROGRESS NOTES
Oscar Telles presents today for   Chief Complaint   Patient presents with    Diabetes       Oscar Telles preferred language for health care discussion is english/other. Is someone accompanying this pt? no    Is the patient using any DME equipment during [address]? no    Depression Screening:  3 most recent PHQ Screens 3/12/2020   PHQ Not Done Patient Decline   Little interest or pleasure in doing things -   Feeling down, depressed, irritable, or hopeless -   Total Score PHQ 2 -       Learning Assessment:  Learning Assessment 1/29/2020   PRIMARY LEARNER Patient   HIGHEST LEVEL OF EDUCATION - PRIMARY LEARNER  4 YEARS OF COLLEGE   BARRIERS PRIMARY LEARNER NONE   CO-LEARNER CAREGIVER No   CO-LEARNER NAME -   PRIMARY LANGUAGE ENGLISH    NEED No   LEARNER PREFERENCE PRIMARY DEMONSTRATION   ANSWERED BY patient   RELATIONSHIP SELF       Abuse Screening:  Abuse Screening Questionnaire 1/29/2020   Do you ever feel afraid of your partner? N   Are you in a relationship with someone who physically or mentally threatens you? N   Is it safe for you to go home? Y       Generalized Anxiety  No flowsheet data found. Health Maintenance Due   Topic Date Due    Eye Exam Retinal or Dilated  08/05/1970    Shingrix Vaccine Age 50> (1 of 2) 08/05/2011    GLAUCOMA SCREENING Q2Y  09/29/2016    Foot Exam Q1  05/16/2020    Flu Vaccine (1) 09/01/2020   . Health Maintenance reviewed and discussed and ordered per Provider. Coordination of Care:  1. Have you been to the ER, urgent care clinic since your last visit? Hospitalized since your last visit? no    2. Have you seen or consulted any other health care providers outside of the [address] [place] [address] since your last visit? Include any pap smears or colon screening.  no      Advance Directive:  Discussed 1/29/20
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
## 2020-09-15 ENCOUNTER — CLINICAL SUPPORT (OUTPATIENT)
Dept: SURGERY | Age: 60
End: 2020-09-15

## 2020-09-15 VITALS
HEART RATE: 87 BPM | OXYGEN SATURATION: 100 % | BODY MASS INDEX: 36.89 KG/M2 | RESPIRATION RATE: 18 BRPM | HEIGHT: 59 IN | WEIGHT: 183 LBS | TEMPERATURE: 97.5 F

## 2020-09-15 DIAGNOSIS — Z01.818 PRE-OP TESTING: Primary | ICD-10-CM

## 2020-09-15 DIAGNOSIS — Z12.11 COLON CANCER SCREENING: ICD-10-CM
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
## 2020-09-15 NOTE — PROGRESS NOTES
Patient will be scheduled at [facility] due to having a pacemaker w/defibrilator.

## 2020-09-17 ENCOUNTER — OFFICE VISIT (OUTPATIENT)
Dept: ORTHOPEDIC SURGERY | Facility: CLINIC | Age: 60
End: 2020-09-17

## 2020-09-17 VITALS
SYSTOLIC BLOOD PRESSURE: 129 MMHG | HEART RATE: 58 BPM | BODY MASS INDEX: 37.5 KG/M2 | OXYGEN SATURATION: 96 % | WEIGHT: 186 LBS | RESPIRATION RATE: 16 BRPM | DIASTOLIC BLOOD PRESSURE: 71 MMHG | TEMPERATURE: 96.7 F | HEIGHT: 59 IN

## 2020-09-17 DIAGNOSIS — Z96.643 HISTORY OF BILATERAL HIP REPLACEMENTS: ICD-10-CM

## 2020-09-17 DIAGNOSIS — M25.562 LEFT KNEE PAIN, UNSPECIFIED CHRONICITY: ICD-10-CM

## 2020-09-17 DIAGNOSIS — M70.61 TROCHANTERIC BURSITIS, RIGHT HIP: Primary | ICD-10-CM

## 2020-09-17 DIAGNOSIS — Z96.652 HISTORY OF LEFT KNEE REPLACEMENT: ICD-10-CM

## 2020-09-17 RX ORDER — BETAMETHASONE SODIUM PHOSPHATE AND BETAMETHASONE ACETATE 3; 3 MG/ML; MG/ML
6 INJECTION, SUSPENSION INTRA-ARTICULAR; INTRALESIONAL; INTRAMUSCULAR; SOFT TISSUE ONCE
Qty: 1 ML | Refills: 0
Start: 2020-09-17 | End: 2020-09-17
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
## 2020-09-18 ENCOUNTER — OFFICE VISIT (OUTPATIENT)
Dept: ORTHOPEDIC SURGERY | Age: 60
End: 2020-09-18

## 2020-09-18 ENCOUNTER — TELEPHONE (OUTPATIENT)
Dept: ORTHOPEDIC SURGERY | Age: 60
End: 2020-09-18

## 2020-09-18 VITALS
DIASTOLIC BLOOD PRESSURE: 74 MMHG | HEART RATE: 63 BPM | TEMPERATURE: 98.2 F | WEIGHT: 187 LBS | SYSTOLIC BLOOD PRESSURE: 143 MMHG | OXYGEN SATURATION: 96 % | BODY MASS INDEX: 37.77 KG/M2

## 2020-09-18 DIAGNOSIS — G60.9 IDIOPATHIC PERIPHERAL NEUROPATHY: Primary | ICD-10-CM

## 2020-09-18 DIAGNOSIS — M47.817 LUMBOSACRAL SPONDYLOSIS WITHOUT MYELOPATHY: ICD-10-CM

## 2020-09-18 DIAGNOSIS — M54.16 LUMBAR NEURITIS: ICD-10-CM

## 2020-09-18 DIAGNOSIS — M47.812 CERVICAL SPONDYLOSIS WITHOUT MYELOPATHY: ICD-10-CM

## 2020-09-18 DIAGNOSIS — M50.30 DDD (DEGENERATIVE DISC DISEASE), CERVICAL: ICD-10-CM

## 2020-09-18 RX ORDER — PREGABALIN 300 MG/1
300 CAPSULE ORAL 2 TIMES DAILY
Qty: 60 CAP | Refills: 1 | Status: SHIPPED | OUTPATIENT
Start: 2020-10-18 | End: 2020-11-23 | Stop reason: SDUPTHER

## 2020-09-18 RX ORDER — DULOXETIN HYDROCHLORIDE 30 MG/1
30 CAPSULE, DELAYED RELEASE ORAL DAILY
Qty: 30 CAP | Refills: 1 | Status: SHIPPED | OUTPATIENT
Start: 2020-10-18 | End: 2020-11-23 | Stop reason: SDUPTHER

## 2020-09-18 NOTE — TELEPHONE ENCOUNTER
I received a VM from Brisa Ramires at [facility] Dr. Edilia Bear office, she stated that the patient has already been seen there and reviewed by the  Natividad Santana (couldn't understand last name) but the patient knee was deemed not fixable. She would like to know if [place] wants her to still give patient an appt.

## 2020-09-18 NOTE — LETTER
9/18/20 Patient: Stacey Holland YOB: 1960 Date of Visit: 9/18/2020 Ankur Shore NP 
[id number] [place] [address] VIA In Basket Dear Ankur Shore NP, Thank you for referring Ms. Stacey Holland to [address] for evaluation. My notes for this consultation are attached. If you have questions, please do not hesitate to call me. I look forward to following your patient along with you. Sincerely, Anthony Lilly MD

## 2020-09-20 DIAGNOSIS — K21.9 GERD (GASTROESOPHAGEAL REFLUX DISEASE): ICD-10-CM

## 2020-09-21 RX ORDER — OMEPRAZOLE 20 MG/1
CAPSULE, DELAYED RELEASE ORAL
Qty: 90 CAP | Refills: 1 | Status: SHIPPED | OUTPATIENT
Start: 2020-09-21 | End: 2021-05-16 | Stop reason: SDUPTHER

## 2020-09-29 DIAGNOSIS — E11.40 TYPE 2 DIABETES MELLITUS WITH DIABETIC NEUROPATHY, UNSPECIFIED WHETHER LONG TERM INSULIN USE (HCC): ICD-10-CM

## 2020-09-29 RX ORDER — GLIPIZIDE 5 MG/1
TABLET ORAL
Qty: 15 TAB | Refills: 0 | Status: SHIPPED | OUTPATIENT
Start: 2020-09-29 | End: 2020-10-11

## 2020-09-29 NOTE — TELEPHONE ENCOUNTER
I have given her another 30 day supply but she needs an appointment virtual to follow up on her blood glucose levels and logs.  Alfreda Grace

## 2020-09-30 ENCOUNTER — TELEPHONE (OUTPATIENT)
Dept: FAMILY MEDICINE CLINIC | Age: 60
End: 2020-09-30

## 2020-09-30 DIAGNOSIS — F51.01 PRIMARY INSOMNIA: ICD-10-CM

## 2020-09-30 RX ORDER — ZOLPIDEM TARTRATE 10 MG/1
10 TABLET ORAL
Qty: 30 TAB | Refills: 0 | Status: SHIPPED | OUTPATIENT
Start: 2020-09-30 | End: 2020-10-28 | Stop reason: SDUPTHER

## 2020-10-08 DIAGNOSIS — E11.40 TYPE 2 DIABETES MELLITUS WITH DIABETIC NEUROPATHY, UNSPECIFIED WHETHER LONG TERM INSULIN USE (HCC): ICD-10-CM

## 2020-10-11 RX ORDER — GLIPIZIDE 5 MG/1
TABLET ORAL
Qty: 15 TAB | Refills: 0 | Status: SHIPPED | OUTPATIENT
Start: 2020-10-11 | End: 2020-10-19

## 2020-10-14 RX ORDER — BACLOFEN 10 MG/1
TABLET ORAL
Qty: 60 TAB | Refills: 1 | Status: SHIPPED | OUTPATIENT
Start: 2020-10-14 | End: 2021-01-06 | Stop reason: SDUPTHER

## 2020-10-19 DIAGNOSIS — E11.40 TYPE 2 DIABETES MELLITUS WITH DIABETIC NEUROPATHY, UNSPECIFIED WHETHER LONG TERM INSULIN USE (HCC): ICD-10-CM

## 2020-10-19 RX ORDER — GLIPIZIDE 5 MG/1
TABLET ORAL
Qty: 15 TAB | Refills: 0 | Status: SHIPPED | OUTPATIENT
Start: 2020-10-19 | End: 2020-12-05

## 2020-10-20 DIAGNOSIS — E11.40 TYPE 2 DIABETES MELLITUS WITH DIABETIC NEUROPATHY, UNSPECIFIED WHETHER LONG TERM INSULIN USE (HCC): ICD-10-CM

## 2020-10-28 DIAGNOSIS — L29.9 ITCHING: ICD-10-CM

## 2020-10-28 DIAGNOSIS — F51.01 PRIMARY INSOMNIA: ICD-10-CM

## 2020-10-28 RX ORDER — HYDROXYZINE 25 MG/1
25 TABLET, FILM COATED ORAL
Qty: 30 TAB | Refills: 0 | Status: SHIPPED | OUTPATIENT
Start: 2020-10-28 | End: 2020-11-23 | Stop reason: SDUPTHER

## 2020-10-28 RX ORDER — ZOLPIDEM TARTRATE 10 MG/1
10 TABLET ORAL
Qty: 30 TAB | Refills: 0 | Status: SHIPPED | OUTPATIENT
Start: 2020-10-28 | End: 2020-11-23 | Stop reason: SDUPTHER
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
## 2020-11-23 ENCOUNTER — VIRTUAL VISIT (OUTPATIENT)
Dept: FAMILY MEDICINE CLINIC | Age: 60
End: 2020-11-23
Payer: MEDICARE

## 2020-11-23 DIAGNOSIS — E11.40 TYPE 2 DIABETES MELLITUS WITH DIABETIC NEUROPATHY, UNSPECIFIED WHETHER LONG TERM INSULIN USE (HCC): ICD-10-CM

## 2020-11-23 DIAGNOSIS — N39.0 URINARY TRACT INFECTION WITHOUT HEMATURIA, SITE UNSPECIFIED: ICD-10-CM

## 2020-11-23 DIAGNOSIS — I10 ESSENTIAL HYPERTENSION: ICD-10-CM

## 2020-11-23 DIAGNOSIS — F51.01 PRIMARY INSOMNIA: Primary | ICD-10-CM

## 2020-11-23 DIAGNOSIS — L29.9 ITCHING: ICD-10-CM

## 2020-11-23 DIAGNOSIS — Z51.81 MEDICATION MONITORING ENCOUNTER: ICD-10-CM

## 2020-11-23 PROCEDURE — 99214 OFFICE O/P EST MOD 30 MIN: CPT | Performed by: NURSE PRACTITIONER

## 2020-11-23 PROCEDURE — G8427 DOCREV CUR MEDS BY ELIG CLIN: HCPCS | Performed by: NURSE PRACTITIONER

## 2020-11-23 PROCEDURE — 2022F DILAT RTA XM EVC RTNOPTHY: CPT | Performed by: NURSE PRACTITIONER

## 2020-11-23 PROCEDURE — 3044F HG A1C LEVEL LT 7.0%: CPT | Performed by: NURSE PRACTITIONER

## 2020-11-23 PROCEDURE — 3017F COLORECTAL CA SCREEN DOC REV: CPT | Performed by: NURSE PRACTITIONER

## 2020-11-23 PROCEDURE — G8756 NO BP MEASURE DOC: HCPCS | Performed by: NURSE PRACTITIONER

## 2020-11-23 PROCEDURE — G9899 SCRN MAM PERF RSLTS DOC: HCPCS | Performed by: NURSE PRACTITIONER

## 2020-11-23 PROCEDURE — G8432 DEP SCR NOT DOC, RNG: HCPCS | Performed by: NURSE PRACTITIONER

## 2020-11-23 RX ORDER — ZOLPIDEM TARTRATE 10 MG/1
10 TABLET ORAL
Qty: 30 TAB | Refills: 0 | Status: SHIPPED | OUTPATIENT
Start: 2020-11-23 | End: 2020-12-28 | Stop reason: SDUPTHER

## 2020-11-23 RX ORDER — HYDROXYZINE 25 MG/1
25 TABLET, FILM COATED ORAL
Qty: 30 TAB | Refills: 0 | Status: SHIPPED | OUTPATIENT
Start: 2020-11-23 | End: 2020-12-28 | Stop reason: SDUPTHER

## 2020-11-23 RX ORDER — HYDROCODONE BITARTRATE AND ACETAMINOPHEN 5; 325 MG/1; MG/1
TABLET ORAL
COMMUNITY
Start: 2020-11-04 | End: 2021-01-14 | Stop reason: CLARIF

## 2020-11-23 RX ORDER — INSULIN PUMP SYRINGE, 3 ML
EACH MISCELLANEOUS
Qty: 1 KIT | Refills: 0 | Status: SHIPPED | OUTPATIENT
Start: 2020-11-23

## 2020-11-23 RX ORDER — NITROFURANTOIN 25; 75 MG/1; MG/1
100 CAPSULE ORAL 2 TIMES DAILY
Qty: 10 CAP | Refills: 0 | Status: SHIPPED | OUTPATIENT
Start: 2020-11-23 | End: 2020-12-16 | Stop reason: ALTCHOICE

## 2020-11-23 NOTE — PROGRESS NOTES
Feliciano Parkinson presents today for   Chief Complaint   Patient presents with    Diabetes    Bladder Infection     patient think she may have a UTI, burning urination and low abdominal pain       Feliciano Parkinson preferred language for health care discussion is english/other. Is someone accompanying this pt? no    Is the patient using any DME equipment during [address]? no    Depression Screening:  3 most recent PHQ Screens 9/17/2020   PHQ Not Done -   Little interest or pleasure in doing things Not at all   Feeling down, depressed, irritable, or hopeless Not at all   Total Score PHQ 2 0       Learning Assessment:  Learning Assessment 1/29/2020   PRIMARY LEARNER Patient   HIGHEST LEVEL OF EDUCATION - PRIMARY LEARNER  4 YEARS OF COLLEGE   BARRIERS PRIMARY LEARNER NONE   CO-LEARNER CAREGIVER No   CO-LEARNER NAME -   PRIMARY LANGUAGE ENGLISH    NEED No   LEARNER PREFERENCE PRIMARY DEMONSTRATION   ANSWERED BY patient   RELATIONSHIP SELF       Abuse Screening:  Abuse Screening Questionnaire 1/29/2020   Do you ever feel afraid of your partner? N   Are you in a relationship with someone who physically or mentally threatens you? N   Is it safe for you to go home? Y       Generalized Anxiety  No flowsheet data found. Health Maintenance Due   Topic Date Due    Eye Exam Retinal or Dilated  08/05/1970    Shingrix Vaccine Age 50> (1 of 2) 08/05/2011    GLAUCOMA SCREENING Q2Y  09/29/2016    Foot Exam Q1  05/16/2020    Medicare Yearly Exam  10/23/2020   . Health Maintenance reviewed and discussed and ordered per Provider. Coordination of Care:  1. Have you been to the ER, urgent care clinic since your last visit? Hospitalized since your last visit? no    2. Have you seen or consulted any other health care providers outside of the [address] [place] [address] since your last visit? Include any pap smears or colon screening.  no      Advance Directive:  Discussed 1/29/20

## 2020-11-24 DIAGNOSIS — M89.9 DISORDER OF BONE AND CARTILAGE: ICD-10-CM

## 2020-11-24 DIAGNOSIS — R92.8 ABNORMAL MAMMOGRAM: ICD-10-CM

## 2020-11-24 DIAGNOSIS — I10 ESSENTIAL HYPERTENSION: ICD-10-CM

## 2020-11-24 DIAGNOSIS — Z86.39 HISTORY OF DIABETES MELLITUS, TYPE II: ICD-10-CM

## 2020-11-24 DIAGNOSIS — M54.16 LUMBAR NEURITIS: ICD-10-CM

## 2020-11-24 DIAGNOSIS — R73.09 ELEVATED HEMOGLOBIN A1C: ICD-10-CM

## 2020-11-24 DIAGNOSIS — E78.49 OTHER HYPERLIPIDEMIA: ICD-10-CM

## 2020-11-24 DIAGNOSIS — I50.22 CHRONIC SYSTOLIC HEART FAILURE (HCC): ICD-10-CM

## 2020-11-24 DIAGNOSIS — M94.9 DISORDER OF BONE AND CARTILAGE: ICD-10-CM

## 2020-11-24 DIAGNOSIS — F51.01 PRIMARY INSOMNIA: ICD-10-CM

## 2020-11-24 DIAGNOSIS — M62.838 MUSCLE SPASM: ICD-10-CM

## 2020-11-24 RX ORDER — ROSUVASTATIN CALCIUM 40 MG/1
TABLET, COATED ORAL
Qty: 90 TAB | Refills: 1 | Status: SHIPPED | OUTPATIENT
Start: 2020-11-24 | End: 2021-05-11

## 2020-11-29 DIAGNOSIS — M25.551 RIGHT HIP PAIN: ICD-10-CM

## 2020-11-29 DIAGNOSIS — M70.61 TROCHANTERIC BURSITIS, RIGHT HIP: ICD-10-CM

## 2020-11-30 DIAGNOSIS — I50.22 CHRONIC SYSTOLIC HEART FAILURE (HCC): ICD-10-CM

## 2020-11-30 DIAGNOSIS — N39.0 URINARY TRACT INFECTION WITHOUT HEMATURIA, SITE UNSPECIFIED: ICD-10-CM

## 2020-11-30 RX ORDER — CELECOXIB 200 MG/1
200 CAPSULE ORAL DAILY
Qty: 30 CAP | Refills: 2 | Status: SHIPPED | OUTPATIENT
Start: 2020-11-30 | End: 2021-03-01

## 2020-11-30 RX ORDER — POTASSIUM CHLORIDE 750 MG/1
TABLET, EXTENDED RELEASE ORAL
Qty: 180 TAB | Refills: 0 | Status: SHIPPED | OUTPATIENT
Start: 2020-11-30 | End: 2021-03-01

## 2020-12-04 ENCOUNTER — OFFICE VISIT (OUTPATIENT)
Dept: ORTHOPEDIC SURGERY | Age: 60
End: 2020-12-04
Payer: MEDICARE

## 2020-12-04 VITALS
TEMPERATURE: 97.8 F | DIASTOLIC BLOOD PRESSURE: 65 MMHG | SYSTOLIC BLOOD PRESSURE: 136 MMHG | HEART RATE: 68 BPM | BODY MASS INDEX: 37.62 KG/M2 | WEIGHT: 186.6 LBS | HEIGHT: 59 IN | RESPIRATION RATE: 16 BRPM
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
## 2020-12-04 DIAGNOSIS — M25.562 LEFT KNEE PAIN, UNSPECIFIED CHRONICITY: ICD-10-CM

## 2020-12-04 DIAGNOSIS — Z96.652 HISTORY OF LEFT KNEE REPLACEMENT: Primary | ICD-10-CM

## 2020-12-04 PROCEDURE — G8427 DOCREV CUR MEDS BY ELIG CLIN: HCPCS | Performed by: PHYSICIAN ASSISTANT

## 2020-12-04 PROCEDURE — 73562 X-RAY EXAM OF KNEE 3: CPT | Performed by: PHYSICIAN ASSISTANT

## 2020-12-04 PROCEDURE — G8752 SYS BP LESS 140: HCPCS | Performed by: PHYSICIAN ASSISTANT

## 2020-12-04 PROCEDURE — G8417 CALC BMI ABV UP PARAM F/U: HCPCS | Performed by: PHYSICIAN ASSISTANT

## 2020-12-04 PROCEDURE — 99214 OFFICE O/P EST MOD 30 MIN: CPT | Performed by: PHYSICIAN ASSISTANT

## 2020-12-04 PROCEDURE — G9899 SCRN MAM PERF RSLTS DOC: HCPCS | Performed by: PHYSICIAN ASSISTANT

## 2020-12-04 PROCEDURE — G8432 DEP SCR NOT DOC, RNG: HCPCS | Performed by: PHYSICIAN ASSISTANT

## 2020-12-04 PROCEDURE — 3017F COLORECTAL CA SCREEN DOC REV: CPT | Performed by: PHYSICIAN ASSISTANT

## 2020-12-04 PROCEDURE — G8754 DIAS BP LESS 90: HCPCS | Performed by: PHYSICIAN ASSISTANT
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
## 2020-12-14 ENCOUNTER — DOCUMENTATION ONLY (OUTPATIENT)
Dept: FAMILY MEDICINE CLINIC | Age: 60
End: 2020-12-14

## 2020-12-14 NOTE — PROGRESS NOTES
Patient called and asked us to fax lab orders to [facility]. Patient called back with fax number. Labs printed and faxed to [phone]. Confirmation received.

## 2020-12-16 ENCOUNTER — VIRTUAL VISIT (OUTPATIENT)
Dept: FAMILY MEDICINE CLINIC | Age: 60
End: 2020-12-16
Payer: MEDICARE

## 2020-12-16 DIAGNOSIS — Z71.89 ADVANCE CARE PLANNING: ICD-10-CM

## 2020-12-16 DIAGNOSIS — Z00.00 MEDICARE ANNUAL WELLNESS VISIT, SUBSEQUENT: Primary | ICD-10-CM

## 2020-12-16 PROCEDURE — 3017F COLORECTAL CA SCREEN DOC REV: CPT | Performed by: NURSE PRACTITIONER

## 2020-12-16 PROCEDURE — G8432 DEP SCR NOT DOC, RNG: HCPCS | Performed by: NURSE PRACTITIONER

## 2020-12-16 PROCEDURE — G8427 DOCREV CUR MEDS BY ELIG CLIN: HCPCS | Performed by: NURSE PRACTITIONER

## 2020-12-16 PROCEDURE — G0439 PPPS, SUBSEQ VISIT: HCPCS | Performed by: NURSE PRACTITIONER

## 2020-12-16 PROCEDURE — G8756 NO BP MEASURE DOC: HCPCS | Performed by: NURSE PRACTITIONER

## 2020-12-16 PROCEDURE — G9899 SCRN MAM PERF RSLTS DOC: HCPCS | Performed by: NURSE PRACTITIONER

## 2020-12-16 PROCEDURE — 99497 ADVNCD CARE PLAN 30 MIN: CPT | Performed by: NURSE PRACTITIONER

## 2020-12-16 NOTE — PROGRESS NOTES
Advance Care Planning     Advance Care Planning (ACP) Physician/NP/PA Conversation      Date of Conversation: 12/16/2020  Conducted with: Patient with Decision Making Capacity    Healthcare Decision Maker:     Click here to complete [place] including selection of the Healthcare Decision Maker Relationship (ie \"Primary\")  Today we We have discussed next of kin and that would be her son. She has her mom listed currently but she is aware of [place] hierarchy laws     Care Preferences:    Hospitalization: \"If your health worsens and it becomes clear that your chance of recovery is unlikely, what would be your preference regarding hospitalization? \"  The patient would prefer comfort-focused treatment without hospitalization. Ventilation: \"If you were unable to breathe on your own and your chance of recovery was unlikely, what would be your preference about the use of a ventilator (breathing machine) if it was available to you? \"   She would like a 2 week window on the ventilator and if no chance of recovery she would like to be removed. Resuscitation: \"In the event your heart stopped as a result of an underlying serious health condition, would you want attempts to be made to restart your heart, or would you prefer a natural death? \"   Yes, attempt to resuscitate. Additional topics discussed: Organ donation - She is not an organ donor. This is not on her 's license.      Conversation Outcomes / Follow-Up Plan:   ACP in process - information provided, considering goals and options  Reviewed DNR/DNI and patient elects Full Code (Attempt Resuscitation)     Length of Voluntary ACP Conversation in minutes:  16 minutes    Krystina Jones NP
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
## 2020-12-16 NOTE — PATIENT INSTRUCTIONS
Medicare Wellness Visit, Female The best way to live healthy is to have a lifestyle where you eat a well-balanced diet, exercise regularly, limit alcohol use, and quit all forms of tobacco/nicotine, if applicable. Regular preventive services are another way to keep healthy. Preventive services (vaccines, screening tests, monitoring & exams) can help personalize your care plan, which helps you manage your own care. Screening tests can find health problems at the earliest stages, when they are easiest to treat. [facility] follows the current, evidence-based guidelines published by the [facility]id ([facility]) when recommending preventive services for our patients. Because we follow these guidelines, sometimes recommendations change over time as research supports it. (For example, mammograms used to be recommended annually. Even though Medicare will still pay for an annual mammogram, the newer guidelines recommend a mammogram every two years for women of average risk). Of course, you and your doctor may decide to screen more often for some diseases, based on your risk and your co-morbidities (chronic disease you are already diagnosed with). Preventive services for you include: - Medicare offers their members a free annual wellness visit, which is time for you and your primary care provider to discuss and plan for your preventive service needs. Take advantage of this benefit every year! 
-All adults over the age of 72 should receive the recommended pneumonia vaccines. Current USPSTF guidelines recommend a series of two vaccines for the best pneumonia protection.  
-All adults should have a flu vaccine yearly and a tetanus vaccine every 10 years.  
-All adults age 48 and older should receive the shingles vaccines (series of two vaccines). -All adults age 38-68 who are overweight should have a diabetes screening test once every three years. -All adults born between [birth year] and 1964 should be screened once for Hepatitis C. 
-Other screening tests and preventive services for persons with diabetes include: an eye exam to screen for diabetic retinopathy, a kidney function test, a foot exam, and stricter control over your cholesterol.  
-Cardiovascular screening for adults with routine risk involves an electrocardiogram (ECG) at intervals determined by your doctor.  
-Colorectal cancer screenings should be done for adults age 54-65 with no increased risk factors for colorectal cancer. There are a number of acceptable methods of screening for this type of cancer. Each test has its own benefits and drawbacks. Discuss with your doctor what is most appropriate for you during your annual wellness visit. The different tests include: colonoscopy (considered the best screening method), a fecal occult blood test, a fecal DNA test, and sigmoidoscopy. 
 
-A bone mass density test is recommended when a woman turns 65 to screen for osteoporosis. This test is only recommended one time, as a screening. Some providers will use this same test as a disease monitoring tool if you already have osteoporosis. -Breast cancer screenings are recommended every other year for women of normal risk, age 54-69. 
-Cervical cancer screenings for women over age 72 are only recommended with certain risk factors. Here is a list of your current Health Maintenance items (your personalized list of preventive services) with a due date: 
Health Maintenance Due Topic Date Due Jo Hdz Eye Exam  08/05/1970  Shingles Vaccine (1 of 2) 08/05/2011  Glaucoma Screening   09/29/2016 Jo Hdz Diabetic Foot Care  05/16/2020 Jo Hdz Annual Well Visit  10/23/2020

## 2020-12-16 NOTE — PROGRESS NOTES
Sushant Esqueda presents today for   Chief Complaint   Patient presents with    Annual Wellness Visit     Medicare       Sushant Esqueda preferred language for health care discussion is english/other. Is someone accompanying this pt? no    Is the patient using any DME equipment during [address]? no    Depression Screening:  3 most recent PHQ Screens 12/4/2020   PHQ Not Done -   Little interest or pleasure in doing things Not at all   Feeling down, depressed, irritable, or hopeless Not at all   Total Score PHQ 2 0       Learning Assessment:  Learning Assessment 1/29/2020   PRIMARY LEARNER Patient   HIGHEST LEVEL OF EDUCATION - PRIMARY LEARNER  4 YEARS OF COLLEGE   BARRIERS PRIMARY LEARNER NONE   CO-LEARNER CAREGIVER No   CO-LEARNER NAME -   PRIMARY LANGUAGE ENGLISH    NEED No   LEARNER PREFERENCE PRIMARY DEMONSTRATION   ANSWERED BY patient   RELATIONSHIP SELF       Abuse Screening:  Abuse Screening Questionnaire 1/29/2020   Do you ever feel afraid of your partner? N   Are you in a relationship with someone who physically or mentally threatens you? N   Is it safe for you to go home? Y       Generalized Anxiety  No flowsheet data found. Health Maintenance Due   Topic Date Due    Eye Exam Retinal or Dilated  08/05/1970    Shingrix Vaccine Age 50> (1 of 2) 08/05/2011    GLAUCOMA SCREENING Q2Y  09/29/2016    Foot Exam Q1  05/16/2020    Medicare Yearly Exam  10/23/2020   . Health Maintenance reviewed and discussed and ordered per Provider. Coordination of Care:  1. Have you been to the ER, urgent care clinic since your last visit? Hospitalized since your last visit? no    2. Have you seen or consulted any other health care providers outside of the [address] [place] [address] since your last visit? Include any pap smears or colon screening.  no      Advance Directive:  Discussed 1/29/20
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
## 2020-12-17 LAB
ALBUMIN SERPL-MCNC: 4 G/DL (ref 3.8–4.9)
ALBUMIN/GLOB SERPL: 1.5 {RATIO} (ref 1.2–2.2)
ALP SERPL-CCNC: 191 IU/L (ref 39–117)
ALT SERPL-CCNC: 31 IU/L (ref 0–32)
AST SERPL-CCNC: 34 IU/L (ref 0–40)
BILIRUB SERPL-MCNC: 0.2 MG/DL (ref 0–1.2)
BUN SERPL-MCNC: 18 MG/DL (ref 6–24)
BUN/CREAT SERPL: 16 (ref 9–23)
CALCIUM SERPL-MCNC: 9 MG/DL (ref 8.7–10.2)
CHLORIDE SERPL-SCNC: 99 MMOL/L (ref 96–106)
CO2 SERPL-SCNC: 27 MMOL/L (ref 20–29)
CREAT SERPL-MCNC: 1.15 MG/DL (ref 0.57–1)
EST. AVERAGE GLUCOSE BLD GHB EST-MCNC: 134 MG/DL
GLOBULIN SER CALC-MCNC: 2.6 G/DL (ref 1.5–4.5)
GLUCOSE SERPL-MCNC: 96 MG/DL (ref 65–99)
HBA1C MFR BLD: 6.3 % (ref 4.8–5.6)
POTASSIUM SERPL-SCNC: 3.5 MMOL/L (ref 3.5–5.2)
PROT SERPL-MCNC: 6.6 G/DL (ref 6–8.5)
SODIUM SERPL-SCNC: 142 MMOL/L (ref 134–144)

## 2020-12-18 ENCOUNTER — VIRTUAL VISIT (OUTPATIENT)
Dept: ORTHOPEDIC SURGERY | Age: 60
End: 2020-12-18
Payer: MEDICARE

## 2020-12-18 DIAGNOSIS — M47.817 LUMBOSACRAL SPONDYLOSIS WITHOUT MYELOPATHY: ICD-10-CM

## 2020-12-18 DIAGNOSIS — G60.9 IDIOPATHIC PERIPHERAL NEUROPATHY: Primary | ICD-10-CM

## 2020-12-18 DIAGNOSIS — M54.16 LUMBAR NEURITIS: ICD-10-CM

## 2020-12-18 DIAGNOSIS — M47.812 CERVICAL SPONDYLOSIS WITHOUT MYELOPATHY: ICD-10-CM

## 2020-12-18 DIAGNOSIS — M50.30 DDD (DEGENERATIVE DISC DISEASE), CERVICAL: ICD-10-CM

## 2020-12-18 PROCEDURE — G8417 CALC BMI ABV UP PARAM F/U: HCPCS | Performed by: PHYSICAL MEDICINE & REHABILITATION

## 2020-12-18 PROCEDURE — G8756 NO BP MEASURE DOC: HCPCS | Performed by: PHYSICAL MEDICINE & REHABILITATION

## 2020-12-18 PROCEDURE — 99442 PR PHYS/QHP TELEPHONE EVALUATION 11-20 MIN: CPT | Performed by: PHYSICAL MEDICINE & REHABILITATION

## 2020-12-18 PROCEDURE — 3017F COLORECTAL CA SCREEN DOC REV: CPT | Performed by: PHYSICAL MEDICINE & REHABILITATION

## 2020-12-18 PROCEDURE — G9899 SCRN MAM PERF RSLTS DOC: HCPCS | Performed by: PHYSICAL MEDICINE & REHABILITATION

## 2020-12-18 PROCEDURE — G8427 DOCREV CUR MEDS BY ELIG CLIN: HCPCS | Performed by: PHYSICAL MEDICINE & REHABILITATION

## 2020-12-18 PROCEDURE — G8432 DEP SCR NOT DOC, RNG: HCPCS | Performed by: PHYSICAL MEDICINE & REHABILITATION

## 2020-12-18 RX ORDER — PREGABALIN 300 MG/1
300 CAPSULE ORAL 2 TIMES DAILY
Qty: 60 CAP | Refills: 5 | Status: SHIPPED | OUTPATIENT
Start: 2020-12-18 | End: 2021-03-17 | Stop reason: SDUPTHER

## 2020-12-18 RX ORDER — DULOXETIN HYDROCHLORIDE 30 MG/1
30 CAPSULE, DELAYED RELEASE ORAL
Qty: 30 CAP | Refills: 5 | Status: SHIPPED | OUTPATIENT
Start: 2020-12-18 | End: 2021-01-14 | Stop reason: CLARIF

## 2020-12-19 LAB
BACTERIA UR CULT: ABNORMAL
BACTERIA UR CULT: ABNORMAL
DRUGS UR: NORMAL

## 2020-12-28 ENCOUNTER — TELEPHONE (OUTPATIENT)
Dept: FAMILY MEDICINE CLINIC | Age: 60
End: 2020-12-28

## 2020-12-28 RX ORDER — CEPHALEXIN 500 MG/1
500 CAPSULE ORAL 3 TIMES DAILY
Qty: 21 CAP | Refills: 0 | Status: SHIPPED | OUTPATIENT
Start: 2020-12-28 | End: 2021-01-04

## 2020-12-28 RX ORDER — CEPHALEXIN 750 MG/1
750 CAPSULE ORAL 4 TIMES DAILY
Qty: 20 CAP | Refills: 0 | Status: SHIPPED | OUTPATIENT
Start: 2020-12-28 | End: 2020-12-28 | Stop reason: DRUGHIGH

## 2020-12-28 NOTE — TELEPHONE ENCOUNTER
Call to patient. She verified her name, , and address. Labs and results reviewed with patient. Will start her on an antibiotic. Medication, side effects, possible allergic reactions and warnings reviewed with patient. Patient verbalized understanding. Recommend she start a probiotic or eat yogurt.   SHEELA García, FNP-C

## 2020-12-28 NOTE — TELEPHONE ENCOUNTER
Ms. Alisa Marks called and states that her insurance will not cover 750 mg of generic Keflex. She states they said to send in w prescriptions, 1 for 500 mg, and 1 for 250 mg.

## 2021-01-06 DIAGNOSIS — N39.0 URINARY TRACT INFECTION WITHOUT HEMATURIA, SITE UNSPECIFIED: ICD-10-CM

## 2021-01-06 DIAGNOSIS — N39.0 URINARY TRACT INFECTION WITHOUT HEMATURIA, SITE UNSPECIFIED: Primary | ICD-10-CM

## 2021-01-06 NOTE — TELEPHONE ENCOUNTER
Last Visit: 12/18/20 with MD Dong Alba  Next Appointment: none  Previous Refill Encounter(s): 10/14/20 #60 with 1 refill    Requested Prescriptions     Pending Prescriptions Disp Refills    baclofen (LIORESAL) 10 mg tablet 60 Tab 1     Sig: Take 1 Tab by mouth two (2) times a day.

## 2021-01-07 ENCOUNTER — HOSPITAL ENCOUNTER (OUTPATIENT)
Dept: LAB | Age: 61
Discharge: HOME | End: 2021-01-07

## 2021-01-07 ENCOUNTER — CLINICAL SUPPORT (OUTPATIENT)
Dept: FAMILY MEDICINE CLINIC | Age: 61
End: 2021-01-07
Payer: MEDICARE

## 2021-01-07 VITALS — TEMPERATURE: 97.8 F

## 2021-01-07 DIAGNOSIS — N39.0 URINARY TRACT INFECTION WITH HEMATURIA, SITE UNSPECIFIED: Primary | ICD-10-CM

## 2021-01-07 DIAGNOSIS — R31.9 URINARY TRACT INFECTION WITH HEMATURIA, SITE UNSPECIFIED: Primary | ICD-10-CM

## 2021-01-07 LAB
BILIRUB UR QL STRIP: NEGATIVE
GLUCOSE UR-MCNC: NEGATIVE MG/DL
KETONES P FAST UR STRIP-MCNC: NEGATIVE MG/DL
PH UR STRIP: 6.5 [PH] (ref 4.6–8)
PROT UR QL STRIP: NEGATIVE
SP GR UR STRIP: 1.01 (ref 1–1.03)
UA UROBILINOGEN AMB POC: NORMAL (ref 0.2–1)
URINALYSIS CLARITY POC: CLEAR
URINALYSIS COLOR POC: YELLOW
URINE BLOOD POC: NORMAL
URINE LEUKOCYTES POC: NEGATIVE
URINE NITRITES POC: NEGATIVE
XX-LABCORP SPECIMEN COL,LCBCF: NORMAL

## 2021-01-07 PROCEDURE — 81003 URINALYSIS AUTO W/O SCOPE: CPT | Performed by: NURSE PRACTITIONER

## 2021-01-07 PROCEDURE — 99001 SPECIMEN HANDLING PT-LAB: CPT

## 2021-01-07 RX ORDER — BACLOFEN 10 MG/1
10 TABLET ORAL 2 TIMES DAILY
Qty: 60 TAB | Refills: 1 | Status: SHIPPED | OUTPATIENT
Start: 2021-01-07 | End: 2021-05-10 | Stop reason: SDUPTHER

## 2021-01-09 LAB — BACTERIA UR CULT: NORMAL

## 2021-01-11 ENCOUNTER — TELEPHONE (OUTPATIENT)
Dept: FAMILY MEDICINE CLINIC | Age: 61
End: 2021-01-11

## 2021-01-11 DIAGNOSIS — R30.0 DYSURIA: Primary | ICD-10-CM

## 2021-01-11 NOTE — TELEPHONE ENCOUNTER
Call to patient. She verifies her name, , and address. She reports she continues to have some dysuria. Urine results and culture reviewed. She states she is drinking water. Recommended cranberry tablet instead of cranberry juice due to diabetes. I will refer patient to urology. I have discussed the importance of good vaginal and hand hygiene.  SHEELA García, FNP-C.

## 2021-01-11 NOTE — TELEPHONE ENCOUNTER
Patient called and said that she is still having burning when urinating. She said that she came in on last week and did a urine and no one has called her. Please advise

## 2021-01-14 RX ORDER — OXYCODONE AND ACETAMINOPHEN 7.5; 325 MG/1; MG/1
1 TABLET ORAL
COMMUNITY
End: 2021-05-06

## 2021-01-14 RX ORDER — LISINOPRIL 10 MG/1
10 TABLET ORAL DAILY
COMMUNITY
End: 2021-06-03 | Stop reason: DRUGHIGH

## 2021-01-18 ENCOUNTER — HOSPITAL ENCOUNTER (OUTPATIENT)
Dept: LAB | Age: 61
Discharge: HOME | End: 2021-01-18
Payer: MEDICARE

## 2021-01-18 PROCEDURE — U0003 INFECTIOUS AGENT DETECTION BY NUCLEIC ACID (DNA OR RNA); SEVERE ACUTE RESPIRATORY SYNDROME CORONAVIRUS 2 (SARS-COV-2) (CORONAVIRUS DISEASE [COVID-19]), AMPLIFIED PROBE TECHNIQUE, MAKING USE OF HIGH THROUGHPUT TECHNOLOGIES AS DESCRIBED BY CMS-2020-01-R: HCPCS

## 2021-01-19 LAB — SARS-COV-2, COV2NT: NOT DETECTED

## 2021-01-21 ENCOUNTER — ANESTHESIA EVENT (OUTPATIENT)
Dept: ENDOSCOPY | Age: 61
End: 2021-01-21
Payer: MEDICARE

## 2021-01-22 ENCOUNTER — ANESTHESIA (OUTPATIENT)
Dept: ENDOSCOPY | Age: 61
End: 2021-01-22
Payer: MEDICARE

## 2021-01-26 DIAGNOSIS — F51.01 PRIMARY INSOMNIA: ICD-10-CM

## 2021-01-27 RX ORDER — ZOLPIDEM TARTRATE 10 MG/1
10 TABLET ORAL
Qty: 30 TAB | Refills: 0 | Status: SHIPPED | OUTPATIENT
Start: 2021-01-27 | End: 2021-03-01

## 2021-02-18 ENCOUNTER — OFFICE VISIT (OUTPATIENT)
Dept: ORTHOPEDIC SURGERY | Age: 61
End: 2021-02-18
Payer: MEDICARE

## 2021-02-18 VITALS
DIASTOLIC BLOOD PRESSURE: 66 MMHG | WEIGHT: 185 LBS | HEART RATE: 62 BPM | SYSTOLIC BLOOD PRESSURE: 107 MMHG | OXYGEN SATURATION: 98 % | BODY MASS INDEX: 37.29 KG/M2 | TEMPERATURE: 97.5 F | HEIGHT: 59 IN

## 2021-02-18 DIAGNOSIS — Z96.643 HISTORY OF BILATERAL HIP REPLACEMENTS: ICD-10-CM

## 2021-02-18 DIAGNOSIS — M70.62 TROCHANTERIC BURSITIS OF LEFT HIP: ICD-10-CM

## 2021-02-18 DIAGNOSIS — M17.11 PRIMARY OSTEOARTHRITIS OF RIGHT KNEE: ICD-10-CM

## 2021-02-18 DIAGNOSIS — Z96.652 HISTORY OF LEFT KNEE REPLACEMENT: ICD-10-CM

## 2021-02-18 DIAGNOSIS — M70.61 TROCHANTERIC BURSITIS OF RIGHT HIP: Primary | ICD-10-CM

## 2021-02-18 PROCEDURE — G8417 CALC BMI ABV UP PARAM F/U: HCPCS | Performed by: PHYSICIAN ASSISTANT

## 2021-02-18 PROCEDURE — 3017F COLORECTAL CA SCREEN DOC REV: CPT | Performed by: PHYSICIAN ASSISTANT

## 2021-02-18 PROCEDURE — 20611 DRAIN/INJ JOINT/BURSA W/US: CPT | Performed by: PHYSICIAN ASSISTANT

## 2021-02-18 PROCEDURE — G8432 DEP SCR NOT DOC, RNG: HCPCS | Performed by: PHYSICIAN ASSISTANT

## 2021-02-18 PROCEDURE — G8427 DOCREV CUR MEDS BY ELIG CLIN: HCPCS | Performed by: PHYSICIAN ASSISTANT

## 2021-02-18 PROCEDURE — G9899 SCRN MAM PERF RSLTS DOC: HCPCS | Performed by: PHYSICIAN ASSISTANT

## 2021-02-18 PROCEDURE — 99214 OFFICE O/P EST MOD 30 MIN: CPT | Performed by: PHYSICIAN ASSISTANT

## 2021-02-18 PROCEDURE — G8754 DIAS BP LESS 90: HCPCS | Performed by: PHYSICIAN ASSISTANT

## 2021-02-18 PROCEDURE — G8752 SYS BP LESS 140: HCPCS | Performed by: PHYSICIAN ASSISTANT

## 2021-02-18 RX ORDER — BETAMETHASONE SODIUM PHOSPHATE AND BETAMETHASONE ACETATE 3; 3 MG/ML; MG/ML
6 INJECTION, SUSPENSION INTRA-ARTICULAR; INTRALESIONAL; INTRAMUSCULAR; SOFT TISSUE ONCE
Status: COMPLETED | OUTPATIENT
Start: 2021-02-18 | End: 2021-02-18

## 2021-02-18 RX ADMIN — BETAMETHASONE SODIUM PHOSPHATE AND BETAMETHASONE ACETATE 6 MG: 3; 3 INJECTION, SUSPENSION INTRA-ARTICULAR; INTRALESIONAL; INTRAMUSCULAR; SOFT TISSUE at 11:13

## 2021-02-18 RX ADMIN — BETAMETHASONE SODIUM PHOSPHATE AND BETAMETHASONE ACETATE 6 MG: 3; 3 INJECTION, SUSPENSION INTRA-ARTICULAR; INTRALESIONAL; INTRAMUSCULAR; SOFT TISSUE at 11:12
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
## 2021-02-24 DIAGNOSIS — F51.01 PRIMARY INSOMNIA: ICD-10-CM

## 2021-02-24 DIAGNOSIS — L29.9 ITCHING: ICD-10-CM

## 2021-02-24 NOTE — TELEPHONE ENCOUNTER
Patient need a medication refill. Triamcinolone    Requested Prescriptions     Pending Prescriptions Disp Refills    zolpidem (AMBIEN) 10 mg tablet 30 Tab 0     Sig: Take 1 Tab by mouth nightly as needed for Sleep. Max Daily Amount: 10 mg.    hydrOXYzine HCL (ATARAX) 25 mg tablet 30 Tab 1     Sig: Take 1 Tab by mouth three (3) times daily as needed for Itching.

## 2021-02-28 DIAGNOSIS — M70.61 TROCHANTERIC BURSITIS, RIGHT HIP: ICD-10-CM

## 2021-02-28 DIAGNOSIS — M25.551 RIGHT HIP PAIN: ICD-10-CM

## 2021-02-28 DIAGNOSIS — I50.22 CHRONIC SYSTOLIC HEART FAILURE (HCC): ICD-10-CM

## 2021-03-01 RX ORDER — POTASSIUM CHLORIDE 750 MG/1
TABLET, EXTENDED RELEASE ORAL
Qty: 180 TAB | Refills: 0 | Status: SHIPPED | OUTPATIENT
Start: 2021-03-01 | End: 2021-07-06

## 2021-03-01 RX ORDER — CELECOXIB 200 MG/1
200 CAPSULE ORAL DAILY
Qty: 30 CAP | Refills: 2 | Status: SHIPPED | OUTPATIENT
Start: 2021-03-01 | End: 2021-05-30

## 2021-03-01 RX ORDER — ZOLPIDEM TARTRATE 10 MG/1
10 TABLET ORAL
Qty: 30 TAB | Refills: 0 | OUTPATIENT
Start: 2021-03-01

## 2021-03-01 RX ORDER — HYDROXYZINE 25 MG/1
25 TABLET, FILM COATED ORAL
Qty: 30 TAB | Refills: 1 | Status: SHIPPED | OUTPATIENT
Start: 2021-03-01 | End: 2021-03-17 | Stop reason: SDUPTHER

## 2021-03-11 ENCOUNTER — OFFICE VISIT (OUTPATIENT)
Dept: ORTHOPEDIC SURGERY | Age: 61
End: 2021-03-11
Payer: MEDICARE

## 2021-03-11 VITALS
DIASTOLIC BLOOD PRESSURE: 61 MMHG | OXYGEN SATURATION: 99 % | TEMPERATURE: 96.6 F | RESPIRATION RATE: 16 BRPM | BODY MASS INDEX: 37.29 KG/M2 | SYSTOLIC BLOOD PRESSURE: 120 MMHG | HEART RATE: 63 BPM | HEIGHT: 59 IN | WEIGHT: 185 LBS

## 2021-03-11 DIAGNOSIS — M70.61 TROCHANTERIC BURSITIS, RIGHT HIP: ICD-10-CM

## 2021-03-11 DIAGNOSIS — Z91.81 HISTORY OF RECENT FALL: ICD-10-CM

## 2021-03-11 DIAGNOSIS — M17.11 PRIMARY OSTEOARTHRITIS OF RIGHT KNEE: Primary | ICD-10-CM

## 2021-03-11 DIAGNOSIS — Z96.652 HISTORY OF LEFT KNEE REPLACEMENT: ICD-10-CM

## 2021-03-11 PROCEDURE — G9899 SCRN MAM PERF RSLTS DOC: HCPCS | Performed by: PHYSICIAN ASSISTANT

## 2021-03-11 PROCEDURE — 73564 X-RAY EXAM KNEE 4 OR MORE: CPT | Performed by: PHYSICIAN ASSISTANT

## 2021-03-11 PROCEDURE — 3017F COLORECTAL CA SCREEN DOC REV: CPT | Performed by: PHYSICIAN ASSISTANT

## 2021-03-11 PROCEDURE — 99214 OFFICE O/P EST MOD 30 MIN: CPT | Performed by: PHYSICIAN ASSISTANT

## 2021-03-11 PROCEDURE — G8432 DEP SCR NOT DOC, RNG: HCPCS | Performed by: PHYSICIAN ASSISTANT

## 2021-03-11 PROCEDURE — G8754 DIAS BP LESS 90: HCPCS | Performed by: PHYSICIAN ASSISTANT

## 2021-03-11 PROCEDURE — G8427 DOCREV CUR MEDS BY ELIG CLIN: HCPCS | Performed by: PHYSICIAN ASSISTANT

## 2021-03-11 PROCEDURE — 73562 X-RAY EXAM OF KNEE 3: CPT | Performed by: PHYSICIAN ASSISTANT

## 2021-03-11 PROCEDURE — G8752 SYS BP LESS 140: HCPCS | Performed by: PHYSICIAN ASSISTANT

## 2021-03-11 PROCEDURE — G8417 CALC BMI ABV UP PARAM F/U: HCPCS | Performed by: PHYSICIAN ASSISTANT
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
## 2021-03-17 ENCOUNTER — VIRTUAL VISIT (OUTPATIENT)
Dept: FAMILY MEDICINE CLINIC | Age: 61
End: 2021-03-17
Payer: MEDICARE

## 2021-03-17 DIAGNOSIS — I10 ESSENTIAL HYPERTENSION: ICD-10-CM

## 2021-03-17 DIAGNOSIS — L29.9 ITCHING: ICD-10-CM

## 2021-03-17 DIAGNOSIS — E11.40 TYPE 2 DIABETES MELLITUS WITH DIABETIC NEUROPATHY, UNSPECIFIED WHETHER LONG TERM INSULIN USE (HCC): ICD-10-CM

## 2021-03-17 DIAGNOSIS — Z51.81 MEDICATION MONITORING ENCOUNTER: ICD-10-CM

## 2021-03-17 DIAGNOSIS — I50.22 CHRONIC SYSTOLIC HEART FAILURE (HCC): Primary | ICD-10-CM

## 2021-03-17 DIAGNOSIS — Z71.89 COUNSELING ON HEALTH PROMOTION AND DISEASE PREVENTION: ICD-10-CM

## 2021-03-17 PROCEDURE — G9899 SCRN MAM PERF RSLTS DOC: HCPCS | Performed by: NURSE PRACTITIONER

## 2021-03-17 PROCEDURE — G8432 DEP SCR NOT DOC, RNG: HCPCS | Performed by: NURSE PRACTITIONER

## 2021-03-17 PROCEDURE — 99214 OFFICE O/P EST MOD 30 MIN: CPT | Performed by: NURSE PRACTITIONER

## 2021-03-17 PROCEDURE — 3017F COLORECTAL CA SCREEN DOC REV: CPT | Performed by: NURSE PRACTITIONER

## 2021-03-17 PROCEDURE — G8427 DOCREV CUR MEDS BY ELIG CLIN: HCPCS | Performed by: NURSE PRACTITIONER

## 2021-03-17 PROCEDURE — 2022F DILAT RTA XM EVC RTNOPTHY: CPT | Performed by: NURSE PRACTITIONER

## 2021-03-17 PROCEDURE — G8756 NO BP MEASURE DOC: HCPCS | Performed by: NURSE PRACTITIONER

## 2021-03-17 PROCEDURE — 3046F HEMOGLOBIN A1C LEVEL >9.0%: CPT | Performed by: NURSE PRACTITIONER

## 2021-03-17 RX ORDER — SUCRALFATE 1 G/10ML
SUSPENSION ORAL 4 TIMES DAILY
Status: CANCELLED | OUTPATIENT
Start: 2021-03-17

## 2021-03-17 RX ORDER — ASPIRIN 325 MG
50000 TABLET, DELAYED RELEASE (ENTERIC COATED) ORAL
Qty: 12 CAP | Refills: 1 | Status: SHIPPED | OUTPATIENT
Start: 2021-03-17

## 2021-03-17 RX ORDER — TRIAMCINOLONE ACETONIDE 1 MG/G
OINTMENT TOPICAL 2 TIMES DAILY
Qty: 30 G | Refills: 3 | Status: SHIPPED | OUTPATIENT
Start: 2021-03-17 | End: 2021-10-13 | Stop reason: SDUPTHER

## 2021-03-17 RX ORDER — ASPIRIN 325 MG
50000 TABLET, DELAYED RELEASE (ENTERIC COATED) ORAL
COMMUNITY
End: 2021-03-17 | Stop reason: SDUPTHER

## 2021-03-17 RX ORDER — HYDROXYZINE 25 MG/1
25 TABLET, FILM COATED ORAL
Qty: 30 TAB | Refills: 3 | Status: SHIPPED | OUTPATIENT
Start: 2021-03-17 | End: 2021-04-26 | Stop reason: SDUPTHER
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
## 2021-03-17 NOTE — PROGRESS NOTES
Mary Mcneal presents today for   Chief Complaint   Patient presents with    Follow-up    Medication Refill       Mary Mcneal preferred language for health care discussion is english/other. Is someone accompanying this pt? no    Is the patient using any DME equipment during [address]? Yes, rollator    Depression Screening:  3 most recent PHQ Screens 3/17/2021   PHQ Not Done -   Little interest or pleasure in doing things Not at all   Feeling down, depressed, irritable, or hopeless Not at all   Total Score PHQ 2 0       Learning Assessment:  Learning Assessment 3/17/2021   PRIMARY LEARNER Patient   HIGHEST LEVEL OF EDUCATION - PRIMARY LEARNER  4 YEARS OF [facility] PRIMARY LEARNER NONE   CO-LEARNER CAREGIVER No   CO-LEARNER NAME -   PRIMARY LANGUAGE ENGLISH    NEED No   LEARNER PREFERENCE PRIMARY DEMONSTRATION   ANSWERED BY patient   RELATIONSHIP SELF       Abuse Screening:  Abuse Screening Questionnaire 3/17/2021   Do you ever feel afraid of your partner? N   Are you in a relationship with someone who physically or mentally threatens you? N   Is it safe for you to go home? Y       Generalized Anxiety  No flowsheet data found. Health Maintenance Due   Topic Date Due    Eye Exam Retinal or Dilated  Never done    COVID-19 Vaccine (1) Never done    Shingrix Vaccine Age 50> (1 of 2) Never done    GLAUCOMA SCREENING Q2Y  09/29/2016    Foot Exam Q1  05/16/2020    MICROALBUMIN Q1  01/29/2021   . Health Maintenance reviewed and discussed and ordered per Provider. Coordination of Care:  1. Have you been to the ER, urgent care clinic since your last visit? Hospitalized since your last visit? no    2. Have you seen or consulted any other health care providers outside of the [address] [place] [address] since your last visit? Include any pap smears or colon screening. no      Advance Directive:  1. Do you have an advance directive in place?  Patient Reply:no

## 2021-03-18 ENCOUNTER — TELEPHONE (OUTPATIENT)
Dept: FAMILY MEDICINE CLINIC | Age: 61
End: 2021-03-18

## 2021-03-18 NOTE — TELEPHONE ENCOUNTER
Patient called and said that you did not send her any medication for the burning in her stomach.  Please advise

## 2021-03-22 NOTE — TELEPHONE ENCOUNTER
Attempted to call patient back. No answer. Message left requesting a return call to the office.  SHEELA García, BRYP-C

## 2021-03-30 DIAGNOSIS — F51.01 PRIMARY INSOMNIA: ICD-10-CM

## 2021-03-30 RX ORDER — ZOLPIDEM TARTRATE 10 MG/1
10 TABLET ORAL
Qty: 30 TAB | Refills: 0 | Status: SHIPPED | OUTPATIENT
Start: 2021-03-30 | End: 2021-04-26 | Stop reason: SDUPTHER

## 2021-03-30 NOTE — TELEPHONE ENCOUNTER
checked with no concerns. Compliance drug screen has been ordered. Patient will need this completed prior to any additional refills.  [address]

## 2021-03-30 NOTE — TELEPHONE ENCOUNTER
Patient need a medication refill. Please advise. Requested Prescriptions     Pending Prescriptions Disp Refills    zolpidem (AMBIEN) 10 mg tablet 30 Tab 0     Sig: Take 1 Tab by mouth nightly as needed for Sleep. Max Daily Amount: 10 mg.

## 2021-04-22 ENCOUNTER — OFFICE VISIT (OUTPATIENT)
Dept: ORTHOPEDIC SURGERY | Age: 61
End: 2021-04-22
Payer: MEDICARE

## 2021-04-22 VITALS
TEMPERATURE: 97 F | HEIGHT: 59 IN | OXYGEN SATURATION: 70 % | RESPIRATION RATE: 12 BRPM | BODY MASS INDEX: 37.5 KG/M2 | HEART RATE: 98 BPM | WEIGHT: 186 LBS

## 2021-04-22 DIAGNOSIS — M17.11 PRIMARY OSTEOARTHRITIS OF RIGHT KNEE: Primary | ICD-10-CM

## 2021-04-22 DIAGNOSIS — M25.561 CHRONIC PAIN OF RIGHT KNEE: ICD-10-CM

## 2021-04-22 DIAGNOSIS — G89.29 CHRONIC PAIN OF RIGHT KNEE: ICD-10-CM

## 2021-04-22 PROCEDURE — G8417 CALC BMI ABV UP PARAM F/U: HCPCS | Performed by: ORTHOPAEDIC SURGERY

## 2021-04-22 PROCEDURE — G8427 DOCREV CUR MEDS BY ELIG CLIN: HCPCS | Performed by: ORTHOPAEDIC SURGERY

## 2021-04-22 PROCEDURE — 3017F COLORECTAL CA SCREEN DOC REV: CPT | Performed by: ORTHOPAEDIC SURGERY

## 2021-04-22 PROCEDURE — 20610 DRAIN/INJ JOINT/BURSA W/O US: CPT | Performed by: ORTHOPAEDIC SURGERY

## 2021-04-22 PROCEDURE — G8510 SCR DEP NEG, NO PLAN REQD: HCPCS | Performed by: ORTHOPAEDIC SURGERY

## 2021-04-22 PROCEDURE — 99214 OFFICE O/P EST MOD 30 MIN: CPT | Performed by: ORTHOPAEDIC SURGERY

## 2021-04-22 PROCEDURE — G9899 SCRN MAM PERF RSLTS DOC: HCPCS | Performed by: ORTHOPAEDIC SURGERY

## 2021-04-22 PROCEDURE — G8756 NO BP MEASURE DOC: HCPCS | Performed by: ORTHOPAEDIC SURGERY
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
## 2021-04-26 DIAGNOSIS — L29.9 ITCHING: ICD-10-CM

## 2021-04-26 DIAGNOSIS — F51.01 PRIMARY INSOMNIA: ICD-10-CM

## 2021-04-26 RX ORDER — HYDROXYZINE 25 MG/1
25 TABLET, FILM COATED ORAL
Qty: 30 TAB | Refills: 3 | Status: SHIPPED | OUTPATIENT
Start: 2021-04-26

## 2021-04-26 RX ORDER — ZOLPIDEM TARTRATE 10 MG/1
10 TABLET ORAL
Qty: 30 TAB | Refills: 0 | Status: SHIPPED | OUTPATIENT
Start: 2021-04-26 | End: 2021-06-03 | Stop reason: SDUPTHER

## 2021-04-26 NOTE — TELEPHONE ENCOUNTER
I have filled but please ask patient to go for her labs as these are needed before any additional refills.  SHEELA García, FNP-C

## 2021-04-26 NOTE — TELEPHONE ENCOUNTER
Pt needs rx refill. Please advise   Requested Prescriptions     Pending Prescriptions Disp Refills    hydrOXYzine HCL (ATARAX) 25 mg tablet 30 Tab 3     Sig: Take 1 Tab by mouth three (3) times daily as needed for Itching.  zolpidem (AMBIEN) 10 mg tablet 30 Tab 0     Sig: Take 1 Tab by mouth nightly as needed for Sleep. Max Daily Amount: 10 mg.

## 2021-04-29 ENCOUNTER — OFFICE VISIT (OUTPATIENT)
Dept: ORTHOPEDIC SURGERY | Age: 61
End: 2021-04-29
Payer: MEDICARE

## 2021-04-29 VITALS
HEIGHT: 59 IN | BODY MASS INDEX: 36.33 KG/M2 | HEART RATE: 68 BPM | TEMPERATURE: 97.5 F | WEIGHT: 180.2 LBS | RESPIRATION RATE: 16 BRPM | OXYGEN SATURATION: 98 %

## 2021-04-29 DIAGNOSIS — M17.11 PRIMARY OSTEOARTHRITIS OF RIGHT KNEE: Primary | ICD-10-CM

## 2021-04-29 DIAGNOSIS — M25.561 CHRONIC PAIN OF RIGHT KNEE: ICD-10-CM

## 2021-04-29 DIAGNOSIS — G89.29 CHRONIC PAIN OF RIGHT KNEE: ICD-10-CM

## 2021-04-29 PROCEDURE — 20610 DRAIN/INJ JOINT/BURSA W/O US: CPT | Performed by: ORTHOPAEDIC SURGERY
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
## 2021-05-08 DIAGNOSIS — R73.09 ELEVATED HEMOGLOBIN A1C: ICD-10-CM

## 2021-05-08 DIAGNOSIS — F51.01 PRIMARY INSOMNIA: ICD-10-CM

## 2021-05-08 DIAGNOSIS — M89.9 DISORDER OF BONE AND CARTILAGE: ICD-10-CM

## 2021-05-08 DIAGNOSIS — E78.49 OTHER HYPERLIPIDEMIA: ICD-10-CM

## 2021-05-08 DIAGNOSIS — R92.8 ABNORMAL MAMMOGRAM: ICD-10-CM

## 2021-05-08 DIAGNOSIS — I10 ESSENTIAL HYPERTENSION: ICD-10-CM

## 2021-05-08 DIAGNOSIS — M62.838 MUSCLE SPASM: ICD-10-CM

## 2021-05-08 DIAGNOSIS — M94.9 DISORDER OF BONE AND CARTILAGE: ICD-10-CM

## 2021-05-08 DIAGNOSIS — I50.22 CHRONIC SYSTOLIC HEART FAILURE (HCC): ICD-10-CM

## 2021-05-08 DIAGNOSIS — M54.16 LUMBAR NEURITIS: ICD-10-CM

## 2021-05-08 DIAGNOSIS — Z86.39 HISTORY OF DIABETES MELLITUS, TYPE II: ICD-10-CM

## 2021-05-10 RX ORDER — BACLOFEN 10 MG/1
10 TABLET ORAL 2 TIMES DAILY
Qty: 60 TAB | Refills: 1 | Status: SHIPPED | OUTPATIENT
Start: 2021-05-10 | End: 2021-07-08

## 2021-05-10 NOTE — TELEPHONE ENCOUNTER
Last Visit: 12/18/20 with MD Emilia Renee  Next Appointment: Advised to follow-up in 6 months  Previous Refill Encounter(s): 1/7/21 #60 with 1 refill    Requested Prescriptions     Pending Prescriptions Disp Refills    baclofen (LIORESAL) 10 mg tablet 60 Tab 1     Sig: Take 1 Tab by mouth two (2) times a day.

## 2021-05-11 RX ORDER — ROSUVASTATIN CALCIUM 40 MG/1
TABLET, COATED ORAL
Qty: 90 TAB | Refills: 1 | Status: SHIPPED | OUTPATIENT
Start: 2021-05-11

## 2021-05-13 ENCOUNTER — OFFICE VISIT (OUTPATIENT)
Dept: ORTHOPEDIC SURGERY | Age: 61
End: 2021-05-13
Payer: MEDICARE

## 2021-05-13 VITALS
HEART RATE: 84 BPM | OXYGEN SATURATION: 99 % | BODY MASS INDEX: 36.69 KG/M2 | RESPIRATION RATE: 16 BRPM | WEIGHT: 182 LBS | HEIGHT: 59 IN

## 2021-05-13 DIAGNOSIS — M25.562 ACUTE PAIN OF LEFT KNEE: ICD-10-CM

## 2021-05-13 DIAGNOSIS — M17.11 PRIMARY OSTEOARTHRITIS OF RIGHT KNEE: Primary | ICD-10-CM

## 2021-05-13 DIAGNOSIS — M25.362 KNEE GIVES OUT, LEFT: ICD-10-CM

## 2021-05-13 DIAGNOSIS — G89.29 CHRONIC PAIN OF RIGHT KNEE: ICD-10-CM

## 2021-05-13 DIAGNOSIS — Z91.81 HISTORY OF RECENT FALL: ICD-10-CM

## 2021-05-13 DIAGNOSIS — M25.561 CHRONIC PAIN OF RIGHT KNEE: ICD-10-CM

## 2021-05-13 DIAGNOSIS — Z96.652 HISTORY OF LEFT KNEE REPLACEMENT: ICD-10-CM

## 2021-05-13 PROCEDURE — 3017F COLORECTAL CA SCREEN DOC REV: CPT | Performed by: ORTHOPAEDIC SURGERY

## 2021-05-13 PROCEDURE — 99214 OFFICE O/P EST MOD 30 MIN: CPT | Performed by: ORTHOPAEDIC SURGERY

## 2021-05-13 PROCEDURE — 73562 X-RAY EXAM OF KNEE 3: CPT | Performed by: ORTHOPAEDIC SURGERY

## 2021-05-13 PROCEDURE — G8756 NO BP MEASURE DOC: HCPCS | Performed by: ORTHOPAEDIC SURGERY

## 2021-05-13 PROCEDURE — G8417 CALC BMI ABV UP PARAM F/U: HCPCS | Performed by: ORTHOPAEDIC SURGERY

## 2021-05-13 PROCEDURE — G8427 DOCREV CUR MEDS BY ELIG CLIN: HCPCS | Performed by: ORTHOPAEDIC SURGERY

## 2021-05-13 PROCEDURE — G9899 SCRN MAM PERF RSLTS DOC: HCPCS | Performed by: ORTHOPAEDIC SURGERY

## 2021-05-13 PROCEDURE — 20610 DRAIN/INJ JOINT/BURSA W/O US: CPT | Performed by: ORTHOPAEDIC SURGERY

## 2021-05-13 PROCEDURE — G8510 SCR DEP NEG, NO PLAN REQD: HCPCS | Performed by: ORTHOPAEDIC SURGERY
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
## 2021-05-14 DIAGNOSIS — K21.9 GERD (GASTROESOPHAGEAL REFLUX DISEASE): ICD-10-CM

## 2021-05-15 DIAGNOSIS — E11.40 TYPE 2 DIABETES MELLITUS WITH DIABETIC NEUROPATHY, UNSPECIFIED WHETHER LONG TERM INSULIN USE (HCC): ICD-10-CM

## 2021-05-16 RX ORDER — GLIPIZIDE 5 MG/1
TABLET ORAL
Qty: 90 TAB | Refills: 1 | Status: SHIPPED | OUTPATIENT
Start: 2021-05-16 | End: 2021-06-10

## 2021-05-16 RX ORDER — OMEPRAZOLE 20 MG/1
CAPSULE, DELAYED RELEASE ORAL
Qty: 90 CAP | Refills: 1 | Status: SHIPPED | OUTPATIENT
Start: 2021-05-16

## 2021-05-17 ENCOUNTER — TELEPHONE (OUTPATIENT)
Dept: ORTHOPEDIC SURGERY | Age: 61
End: 2021-05-17

## 2021-05-17 NOTE — TELEPHONE ENCOUNTER
Spoke with patient. Patient was transferred to the [facility] to schedule an appointment per BERNICE Donahue.

## 2021-05-17 NOTE — TELEPHONE ENCOUNTER
Patient called for Era Mcclure. Patient said she saw Era Mcclure on 5/13/21 for her left knee. Patient said her knee is swollen. Patient is asking for some Steroid Medication. That Era Mcclure had prescribed her some Steroid Pills in the past. Patient did not know the name of the medication. [facility] on [address] [phone]. Patient tel. [phone].

## 2021-06-01 DIAGNOSIS — F51.01 PRIMARY INSOMNIA: ICD-10-CM

## 2021-06-01 NOTE — TELEPHONE ENCOUNTER
Patient need a medication refill. Please advise     Requested Prescriptions     Pending Prescriptions Disp Refills    zolpidem (AMBIEN) 10 mg tablet 30 Tablet 0     Sig: Take 1 Tablet by mouth nightly as needed for Sleep. Max Daily Amount: 10 mg.

## 2021-06-02 ENCOUNTER — VIRTUAL VISIT (OUTPATIENT)
Dept: FAMILY MEDICINE CLINIC | Age: 61
End: 2021-06-02
Payer: MEDICARE

## 2021-06-02 DIAGNOSIS — R32 ENURESIS: ICD-10-CM

## 2021-06-02 DIAGNOSIS — R32 URINARY INCONTINENCE, UNSPECIFIED TYPE: Primary | ICD-10-CM
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
## 2021-06-02 DIAGNOSIS — Z79.899 ENCOUNTER FOR MEDICATION REVIEW: ICD-10-CM

## 2021-06-02 PROCEDURE — 99441 PR PHYS/QHP TELEPHONE EVALUATION 5-10 MIN: CPT | Performed by: NURSE PRACTITIONER

## 2021-06-02 NOTE — PROGRESS NOTES
Anne Sevilla presents today for   Chief Complaint   Patient presents with    Urinary Frequency    Enuresis     incontinence       Anne Sevilla preferred language for health care discussion is english/other. Is someone accompanying this pt? no    Is the patient using any DME equipment during [address]? no    Depression Screening:  3 most recent PHQ Screens 6/2/2021   PHQ Not Done -   Little interest or pleasure in doing things Not at all   Feeling down, depressed, irritable, or hopeless Not at all   Total Score PHQ 2 0       Learning Assessment:  Learning Assessment 3/17/2021   PRIMARY LEARNER Patient   HIGHEST LEVEL OF EDUCATION - PRIMARY LEARNER  4 YEARS [facility] PRIMARY LEARNER NONE   CO-LEARNER CAREGIVER No   CO-LEARNER NAME -   PRIMARY LANGUAGE ENGLISH    NEED No   LEARNER PREFERENCE PRIMARY DEMONSTRATION   ANSWERED BY patient   RELATIONSHIP SELF       Abuse Screening:  Abuse Screening Questionnaire 3/17/2021   Do you ever feel afraid of your partner? N   Are you in a relationship with someone who physically or mentally threatens you? N   Is it safe for you to go home? Y       Generalized Anxiety  No flowsheet data found. Health Maintenance Due   Topic Date Due    Eye Exam Retinal or Dilated  Never done    Shingrix Vaccine Age 50> (1 of 2) Never done    Foot Exam Q1  05/16/2020    MICROALBUMIN Q1  01/29/2021    Colorectal Cancer Screening Combo  04/28/2021   . Health Maintenance reviewed and discussed and ordered per Provider. Coordination of Care:  1. Have you been to the ER, urgent care clinic since your last visit? Hospitalized since your last visit? no    2. Have you seen or consulted any other health care providers outside of the [address] [place] [address] since your last visit? Include any pap smears or colon screening.  no      Advance Directive:  Discussed 3/17/21

## 2021-06-03 ENCOUNTER — OFFICE VISIT (OUTPATIENT)
Dept: FAMILY MEDICINE CLINIC | Age: 61
End: 2021-06-03
Payer: MEDICARE

## 2021-06-03 VITALS
OXYGEN SATURATION: 99 % | HEIGHT: 59 IN | BODY MASS INDEX: 36.77 KG/M2 | WEIGHT: 182.4 LBS | RESPIRATION RATE: 20 BRPM | TEMPERATURE: 98.1 F | SYSTOLIC BLOOD PRESSURE: 171 MMHG | DIASTOLIC BLOOD PRESSURE: 100 MMHG | HEART RATE: 81 BPM

## 2021-06-03 DIAGNOSIS — Z78.0 POST-MENOPAUSAL: ICD-10-CM

## 2021-06-03 DIAGNOSIS — K59.00 CONSTIPATION, UNSPECIFIED CONSTIPATION TYPE: ICD-10-CM

## 2021-06-03 DIAGNOSIS — R32 URINARY INCONTINENCE, UNSPECIFIED TYPE: Primary | ICD-10-CM

## 2021-06-03 DIAGNOSIS — I10 ESSENTIAL HYPERTENSION: ICD-10-CM

## 2021-06-03 DIAGNOSIS — F51.01 PRIMARY INSOMNIA: ICD-10-CM

## 2021-06-03 DIAGNOSIS — N39.0 URINARY TRACT INFECTION WITHOUT HEMATURIA, SITE UNSPECIFIED: ICD-10-CM

## 2021-06-03 LAB
BILIRUB UR QL STRIP: NEGATIVE
GLUCOSE UR-MCNC: NEGATIVE MG/DL
KETONES P FAST UR STRIP-MCNC: NEGATIVE MG/DL
PH UR STRIP: 5.5 [PH] (ref 4.6–8)
PROT UR QL STRIP: NEGATIVE
SP GR UR STRIP: 1 (ref 1–1.03)
UA UROBILINOGEN AMB POC: NORMAL (ref 0.2–1)
URINALYSIS CLARITY POC: CLEAR
URINALYSIS COLOR POC: YELLOW
URINE BLOOD POC: NEGATIVE
URINE LEUKOCYTES POC: NORMAL
URINE NITRITES POC: POSITIVE

## 2021-06-03 PROCEDURE — G8755 DIAS BP > OR = 90: HCPCS | Performed by: NURSE PRACTITIONER

## 2021-06-03 PROCEDURE — G8432 DEP SCR NOT DOC, RNG: HCPCS | Performed by: NURSE PRACTITIONER

## 2021-06-03 PROCEDURE — G8427 DOCREV CUR MEDS BY ELIG CLIN: HCPCS | Performed by: NURSE PRACTITIONER

## 2021-06-03 PROCEDURE — G8417 CALC BMI ABV UP PARAM F/U: HCPCS | Performed by: NURSE PRACTITIONER

## 2021-06-03 PROCEDURE — 81003 URINALYSIS AUTO W/O SCOPE: CPT | Performed by: NURSE PRACTITIONER

## 2021-06-03 PROCEDURE — 99214 OFFICE O/P EST MOD 30 MIN: CPT | Performed by: NURSE PRACTITIONER

## 2021-06-03 PROCEDURE — G8753 SYS BP > OR = 140: HCPCS | Performed by: NURSE PRACTITIONER

## 2021-06-03 PROCEDURE — G9899 SCRN MAM PERF RSLTS DOC: HCPCS | Performed by: NURSE PRACTITIONER

## 2021-06-03 PROCEDURE — 3017F COLORECTAL CA SCREEN DOC REV: CPT | Performed by: NURSE PRACTITIONER

## 2021-06-03 RX ORDER — MONTELUKAST SODIUM 10 MG/1
TABLET ORAL
Qty: 90 TABLET | Refills: 2 | Status: SHIPPED | OUTPATIENT
Start: 2021-06-03

## 2021-06-03 RX ORDER — ZOLPIDEM TARTRATE 10 MG/1
10 TABLET ORAL
Qty: 30 TABLET | Refills: 0 | OUTPATIENT
Start: 2021-06-03

## 2021-06-03 RX ORDER — LINACLOTIDE 145 UG/1
CAPSULE, GELATIN COATED ORAL
Qty: 30 CAPSULE | Refills: 5 | Status: SHIPPED | OUTPATIENT
Start: 2021-06-03

## 2021-06-03 RX ORDER — NITROFURANTOIN (MACROCRYSTALS) 100 MG/1
100 CAPSULE ORAL
Qty: 7 CAPSULE | Refills: 0 | Status: SHIPPED | OUTPATIENT
Start: 2021-06-03 | End: 2021-06-10

## 2021-06-03 RX ORDER — TITANIUM DIOXIDE, OCTINOXATE, ZINC OXIDE 4.61; 1.6; .78 G/40ML; G/40ML; G/40ML
400 CREAM TOPICAL DAILY
Qty: 30 CAPSULE | Refills: 3 | Status: SHIPPED | OUTPATIENT
Start: 2021-06-03

## 2021-06-03 RX ORDER — CALCIUM CARBONATE/VITAMIN D3 600 MG-125
500 TABLET ORAL DAILY
Qty: 30 TABLET | Refills: 0 | Status: SHIPPED | OUTPATIENT
Start: 2021-06-03 | End: 2021-10-13 | Stop reason: SDUPTHER

## 2021-06-03 RX ORDER — ZOLPIDEM TARTRATE 10 MG/1
10 TABLET ORAL
Qty: 30 TABLET | Refills: 0 | Status: SHIPPED | OUTPATIENT
Start: 2021-06-03 | End: 2021-06-30 | Stop reason: SDUPTHER

## 2021-06-03 RX ORDER — LISINOPRIL 20 MG/1
20 TABLET ORAL DAILY
Qty: 30 TABLET | Refills: 1 | Status: SHIPPED | OUTPATIENT
Start: 2021-06-03 | End: 2021-06-25
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
## 2021-06-03 NOTE — PROGRESS NOTES
Quynh Sweeney presents today for   Chief Complaint   Patient presents with    UTI    Incontinence     urinary       Quynh Sweeney preferred language for health care discussion is english/other. Is someone accompanying this pt? no    Is the patient using any DME equipment during [address]? no    Depression Screening:  3 most recent PHQ Screens 6/3/2021   PHQ Not Done -   Little interest or pleasure in doing things Not at all   Feeling down, depressed, irritable, or hopeless Not at all   Total Score PHQ 2 0       Learning Assessment:  Learning Assessment 3/17/2021   PRIMARY LEARNER Patient   HIGHEST LEVEL OF EDUCATION - PRIMARY LEARNER  4 YEARS [facility] PRIMARY LEARNER NONE   CO-LEARNER CAREGIVER No   CO-LEARNER NAME -   PRIMARY LANGUAGE ENGLISH    NEED No   LEARNER PREFERENCE PRIMARY DEMONSTRATION   ANSWERED BY patient   RELATIONSHIP SELF       Abuse Screening:  Abuse Screening Questionnaire 3/17/2021   Do you ever feel afraid of your partner? N   Are you in a relationship with someone who physically or mentally threatens you? N   Is it safe for you to go home? Y       Generalized Anxiety  No flowsheet data found. Health Maintenance Due   Topic Date Due    Eye Exam Retinal or Dilated  Never done    Shingrix Vaccine Age 50> (1 of 2) Never done    Foot Exam Q1  05/16/2020    MICROALBUMIN Q1  01/29/2021    Colorectal Cancer Screening Combo  04/28/2021   . Health Maintenance reviewed and discussed and ordered per Provider. Coordination of Care:  1. Have you been to the ER, urgent care clinic since your last visit? Hospitalized since your last visit? no    2. Have you seen or consulted any other health care providers outside of the [address] [place] [address] since your last visit? Include any pap smears or colon screening.  no

## 2021-06-07 RX ORDER — CELECOXIB 200 MG/1
200 CAPSULE ORAL DAILY
Qty: 30 CAPSULE | Refills: 2 | Status: SHIPPED | OUTPATIENT
Start: 2021-06-07 | End: 2021-09-08

## 2021-06-07 NOTE — TELEPHONE ENCOUNTER
Last Visit: 5/13/21 with MD Johan Olmedo  Next Appointment: 6/9/21 with MD Rawls  Previous Refill Encounter(s): 3/1/21 #30 with 2 refills    Requested Prescriptions     Pending Prescriptions Disp Refills    celecoxib (CeleBREX) 200 mg capsule 30 Capsule 2     Sig: Take 1 Capsule by mouth daily.  Take with food

## 2021-06-08 LAB — BACTERIA UR CULT: ABNORMAL

## 2021-06-09 ENCOUNTER — OFFICE VISIT (OUTPATIENT)
Dept: ORTHOPEDIC SURGERY | Age: 61
End: 2021-06-09
Payer: MEDICARE

## 2021-06-09 ENCOUNTER — TELEPHONE (OUTPATIENT)
Dept: SURGERY | Age: 61
End: 2021-06-09

## 2021-06-09 VITALS
RESPIRATION RATE: 15 BRPM | WEIGHT: 182 LBS | HEIGHT: 59 IN | OXYGEN SATURATION: 100 % | HEART RATE: 71 BPM | BODY MASS INDEX: 36.69 KG/M2

## 2021-06-09 DIAGNOSIS — Z96.652 HISTORY OF LEFT KNEE REPLACEMENT: ICD-10-CM

## 2021-06-09 DIAGNOSIS — Z91.81 HISTORY OF RECENT FALL: Primary | ICD-10-CM

## 2021-06-09 DIAGNOSIS — G89.29 CHRONIC PAIN OF LEFT KNEE: ICD-10-CM

## 2021-06-09 DIAGNOSIS — M25.562 CHRONIC PAIN OF LEFT KNEE: ICD-10-CM

## 2021-06-09 PROCEDURE — 99214 OFFICE O/P EST MOD 30 MIN: CPT | Performed by: ORTHOPAEDIC SURGERY

## 2021-06-09 PROCEDURE — G8756 NO BP MEASURE DOC: HCPCS | Performed by: ORTHOPAEDIC SURGERY

## 2021-06-09 PROCEDURE — G8510 SCR DEP NEG, NO PLAN REQD: HCPCS | Performed by: ORTHOPAEDIC SURGERY

## 2021-06-09 PROCEDURE — G8417 CALC BMI ABV UP PARAM F/U: HCPCS | Performed by: ORTHOPAEDIC SURGERY

## 2021-06-09 PROCEDURE — 73562 X-RAY EXAM OF KNEE 3: CPT | Performed by: ORTHOPAEDIC SURGERY

## 2021-06-09 PROCEDURE — 3017F COLORECTAL CA SCREEN DOC REV: CPT | Performed by: ORTHOPAEDIC SURGERY

## 2021-06-09 PROCEDURE — G8427 DOCREV CUR MEDS BY ELIG CLIN: HCPCS | Performed by: ORTHOPAEDIC SURGERY

## 2021-06-09 PROCEDURE — G9899 SCRN MAM PERF RSLTS DOC: HCPCS | Performed by: ORTHOPAEDIC SURGERY

## 2021-06-09 RX ORDER — DOXYCYCLINE 100 MG/1
100 TABLET ORAL 2 TIMES DAILY
Qty: 14 TABLET | Refills: 0 | Status: SHIPPED | OUTPATIENT
Start: 2021-06-09 | End: 2021-06-16

## 2021-06-09 NOTE — PROGRESS NOTES
Call to patient. She verified her name, , and address. Discussed her culture results. She reports she has had minimal improvement of her symptoms. Will change to doxycyline. I have discussed side effects. I have discussed probiotic use and when to take probiotic. I have discussed holding the iron and calcium during treatment with doxycycline and she verbalizes understanding. Medication, side effects, possible allergic reactions and warnings reviewed with patient. Patient verbalized understanding.    SHEELA García, FNP-C
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
## 2021-06-10 DIAGNOSIS — E11.40 TYPE 2 DIABETES MELLITUS WITH DIABETIC NEUROPATHY, UNSPECIFIED WHETHER LONG TERM INSULIN USE (HCC): ICD-10-CM

## 2021-06-10 RX ORDER — GLIPIZIDE 5 MG/1
TABLET ORAL
Qty: 90 TABLET | Refills: 1 | Status: SHIPPED | OUTPATIENT
Start: 2021-06-10

## 2021-06-21 ENCOUNTER — HOSPITAL ENCOUNTER (OUTPATIENT)
Dept: LAB | Age: 61
Discharge: HOME | End: 2021-06-21
Payer: MEDICARE

## 2021-06-21 PROCEDURE — U0003 INFECTIOUS AGENT DETECTION BY NUCLEIC ACID (DNA OR RNA); SEVERE ACUTE RESPIRATORY SYNDROME CORONAVIRUS 2 (SARS-COV-2) (CORONAVIRUS DISEASE [COVID-19]), AMPLIFIED PROBE TECHNIQUE, MAKING USE OF HIGH THROUGHPUT TECHNOLOGIES AS DESCRIBED BY CMS-2020-01-R: HCPCS

## 2021-06-22 LAB — SARS-COV-2, COV2NT: NOT DETECTED

## 2021-06-25 ENCOUNTER — HOSPITAL ENCOUNTER (OUTPATIENT)
Age: 61
Setting detail: OUTPATIENT SURGERY
Discharge: HOME | End: 2021-06-25
Attending: COLON & RECTAL SURGERY | Admitting: COLON & RECTAL SURGERY
Payer: MEDICARE

## 2021-06-25 VITALS
HEART RATE: 58 BPM | OXYGEN SATURATION: 100 % | WEIGHT: 180.4 LBS | DIASTOLIC BLOOD PRESSURE: 63 MMHG | SYSTOLIC BLOOD PRESSURE: 106 MMHG | BODY MASS INDEX: 36.37 KG/M2 | RESPIRATION RATE: 20 BRPM | HEIGHT: 59 IN | TEMPERATURE: 96.8 F

## 2021-06-25 DIAGNOSIS — I10 ESSENTIAL HYPERTENSION: ICD-10-CM

## 2021-06-25 LAB
GLUCOSE BLD STRIP.AUTO-MCNC: 100 MG/DL (ref 70–110)
GLUCOSE BLD STRIP.AUTO-MCNC: 114 MG/DL (ref 70–110)

## 2021-06-25 PROCEDURE — C1729 CATH, DRAINAGE: HCPCS | Performed by: COLON & RECTAL SURGERY

## 2021-06-25 PROCEDURE — 74011250637 HC RX REV CODE- 250/637: Performed by: NURSE ANESTHETIST, CERTIFIED REGISTERED

## 2021-06-25 PROCEDURE — 00811 ANES LWR INTST NDSC NOS: CPT | Performed by: NURSE ANESTHETIST, CERTIFIED REGISTERED

## 2021-06-25 PROCEDURE — 00811 ANES LWR INTST NDSC NOS: CPT | Performed by: ANESTHESIOLOGY

## 2021-06-25 PROCEDURE — 76040000007: Performed by: COLON & RECTAL SURGERY

## 2021-06-25 PROCEDURE — 74011250636 HC RX REV CODE- 250/636: Performed by: NURSE ANESTHETIST, CERTIFIED REGISTERED

## 2021-06-25 PROCEDURE — 88305 TISSUE EXAM BY PATHOLOGIST: CPT

## 2021-06-25 PROCEDURE — 2709999900 HC NON-CHARGEABLE SUPPLY: Performed by: COLON & RECTAL SURGERY

## 2021-06-25 PROCEDURE — 74011000250 HC RX REV CODE- 250: Performed by: NURSE ANESTHETIST, CERTIFIED REGISTERED

## 2021-06-25 PROCEDURE — 76060000032 HC ANESTHESIA 0.5 TO 1 HR: Performed by: COLON & RECTAL SURGERY

## 2021-06-25 PROCEDURE — 77030021593 HC FCPS BIOP ENDOSC BSC -A: Performed by: COLON & RECTAL SURGERY

## 2021-06-25 PROCEDURE — 77030013992 HC SNR POLYP ENDOSC BSC -B: Performed by: COLON & RECTAL SURGERY

## 2021-06-25 PROCEDURE — 77030008565 HC TBNG SUC IRR ERBE -B: Performed by: COLON & RECTAL SURGERY

## 2021-06-25 PROCEDURE — 82962 GLUCOSE BLOOD TEST: CPT

## 2021-06-25 PROCEDURE — 88300 SURGICAL PATH GROSS: CPT

## 2021-06-25 PROCEDURE — 77030040934 HC CATH DIAG DXTERITY MEDT -A: Performed by: COLON & RECTAL SURGERY

## 2021-06-25 RX ORDER — LIDOCAINE HYDROCHLORIDE 20 MG/ML
INJECTION, SOLUTION EPIDURAL; INFILTRATION; INTRACAUDAL; PERINEURAL AS NEEDED
Status: DISCONTINUED | OUTPATIENT
Start: 2021-06-25 | End: 2021-06-25 | Stop reason: HOSPADM

## 2021-06-25 RX ORDER — LIDOCAINE HYDROCHLORIDE 10 MG/ML
0.1 INJECTION, SOLUTION EPIDURAL; INFILTRATION; INTRACAUDAL; PERINEURAL AS NEEDED
Status: DISCONTINUED | OUTPATIENT
Start: 2021-06-25 | End: 2021-06-25 | Stop reason: HOSPADM

## 2021-06-25 RX ORDER — INSULIN LISPRO 100 [IU]/ML
INJECTION, SOLUTION INTRAVENOUS; SUBCUTANEOUS ONCE
Status: DISCONTINUED | OUTPATIENT
Start: 2021-06-25 | End: 2021-06-25 | Stop reason: HOSPADM

## 2021-06-25 RX ORDER — SODIUM CHLORIDE 0.9 % (FLUSH) 0.9 %
5-40 SYRINGE (ML) INJECTION AS NEEDED
Status: DISCONTINUED | OUTPATIENT
Start: 2021-06-25 | End: 2021-06-25 | Stop reason: HOSPADM

## 2021-06-25 RX ORDER — INSULIN LISPRO 100 [IU]/ML
INJECTION, SOLUTION INTRAVENOUS; SUBCUTANEOUS ONCE
Status: CANCELLED | OUTPATIENT
Start: 2021-06-25 | End: 2021-06-25

## 2021-06-25 RX ORDER — SODIUM CHLORIDE, SODIUM LACTATE, POTASSIUM CHLORIDE, CALCIUM CHLORIDE 600; 310; 30; 20 MG/100ML; MG/100ML; MG/100ML; MG/100ML
50 INJECTION, SOLUTION INTRAVENOUS CONTINUOUS
Status: CANCELLED | OUTPATIENT
Start: 2021-06-25

## 2021-06-25 RX ORDER — SODIUM CHLORIDE, SODIUM LACTATE, POTASSIUM CHLORIDE, CALCIUM CHLORIDE 600; 310; 30; 20 MG/100ML; MG/100ML; MG/100ML; MG/100ML
25 INJECTION, SOLUTION INTRAVENOUS CONTINUOUS
Status: DISCONTINUED | OUTPATIENT
Start: 2021-06-25 | End: 2021-06-25 | Stop reason: HOSPADM

## 2021-06-25 RX ORDER — FAMOTIDINE 20 MG/1
20 TABLET, FILM COATED ORAL ONCE
Status: COMPLETED | OUTPATIENT
Start: 2021-06-25 | End: 2021-06-25

## 2021-06-25 RX ORDER — LISINOPRIL 20 MG/1
TABLET ORAL
Qty: 30 TABLET | Refills: 1 | Status: SHIPPED | OUTPATIENT
Start: 2021-06-25 | End: 2021-07-22

## 2021-06-25 RX ORDER — PROPOFOL 10 MG/ML
INJECTION, EMULSION INTRAVENOUS AS NEEDED
Status: DISCONTINUED | OUTPATIENT
Start: 2021-06-25 | End: 2021-06-25 | Stop reason: HOSPADM

## 2021-06-25 RX ORDER — DEXTROSE 50 % IN WATER (D50W) INTRAVENOUS SYRINGE
25-50 AS NEEDED
Status: CANCELLED | OUTPATIENT
Start: 2021-06-25

## 2021-06-25 RX ORDER — SODIUM CHLORIDE 0.9 % (FLUSH) 0.9 %
5-40 SYRINGE (ML) INJECTION AS NEEDED
Status: CANCELLED | OUTPATIENT
Start: 2021-06-25

## 2021-06-25 RX ORDER — SODIUM CHLORIDE 0.9 % (FLUSH) 0.9 %
5-40 SYRINGE (ML) INJECTION EVERY 8 HOURS
Status: DISCONTINUED | OUTPATIENT
Start: 2021-06-25 | End: 2021-06-25 | Stop reason: HOSPADM

## 2021-06-25 RX ORDER — SODIUM CHLORIDE 0.9 % (FLUSH) 0.9 %
5-40 SYRINGE (ML) INJECTION EVERY 8 HOURS
Status: CANCELLED | OUTPATIENT
Start: 2021-06-25

## 2021-06-25 RX ORDER — MAGNESIUM SULFATE 100 %
4 CRYSTALS MISCELLANEOUS AS NEEDED
Status: CANCELLED | OUTPATIENT
Start: 2021-06-25

## 2021-06-25 RX ADMIN — SODIUM CHLORIDE, SODIUM LACTATE, POTASSIUM CHLORIDE, AND CALCIUM CHLORIDE 25 ML/HR: 600; 310; 30; 20 INJECTION, SOLUTION INTRAVENOUS at 07:50

## 2021-06-25 RX ADMIN — PROPOFOL 20 MG: 10 INJECTION, EMULSION INTRAVENOUS at 08:23

## 2021-06-25 RX ADMIN — PROPOFOL 20 MG: 10 INJECTION, EMULSION INTRAVENOUS at 08:50

## 2021-06-25 RX ADMIN — PROPOFOL 20 MG: 10 INJECTION, EMULSION INTRAVENOUS at 08:41

## 2021-06-25 RX ADMIN — PROPOFOL 20 MG: 10 INJECTION, EMULSION INTRAVENOUS at 08:52

## 2021-06-25 RX ADMIN — PROPOFOL 80 MG: 10 INJECTION, EMULSION INTRAVENOUS at 08:22

## 2021-06-25 RX ADMIN — PROPOFOL 20 MG: 10 INJECTION, EMULSION INTRAVENOUS at 08:48

## 2021-06-25 RX ADMIN — PROPOFOL 20 MG: 10 INJECTION, EMULSION INTRAVENOUS at 08:38

## 2021-06-25 RX ADMIN — PROPOFOL 20 MG: 10 INJECTION, EMULSION INTRAVENOUS at 08:29

## 2021-06-25 RX ADMIN — PROPOFOL 20 MG: 10 INJECTION, EMULSION INTRAVENOUS at 08:54

## 2021-06-25 RX ADMIN — PROPOFOL 20 MG: 10 INJECTION, EMULSION INTRAVENOUS at 08:32

## 2021-06-25 RX ADMIN — PROPOFOL 20 MG: 10 INJECTION, EMULSION INTRAVENOUS at 08:45

## 2021-06-25 RX ADMIN — LIDOCAINE HYDROCHLORIDE 100 MG: 20 INJECTION, SOLUTION EPIDURAL; INFILTRATION; INTRACAUDAL; PERINEURAL at 08:38

## 2021-06-25 RX ADMIN — PROPOFOL 20 MG: 10 INJECTION, EMULSION INTRAVENOUS at 08:26

## 2021-06-25 RX ADMIN — PROPOFOL 20 MG: 10 INJECTION, EMULSION INTRAVENOUS at 08:43

## 2021-06-25 RX ADMIN — PROPOFOL 20 MG: 10 INJECTION, EMULSION INTRAVENOUS at 08:35

## 2021-06-25 RX ADMIN — FAMOTIDINE 20 MG: 20 TABLET ORAL at 08:06

## 2021-06-25 NOTE — ANESTHESIA PREPROCEDURE EVALUATION
Relevant Problems   NEUROLOGY   (+) Drug psychosis ([facility]      CARDIOVASCULAR   (+) CAD (coronary artery disease)   (+) Chronic coronary artery disease   (+) Coronary artery disease   (+) Hypertension   (+) NSTEMI (non-ST elevated myocardial infarction) (HCC)   (+) ST elevation myocardial infarction (STEMI) ([facility]      ENDOCRINE   (+) Diabetes mellitus type 2 in obese ([facility])   (+) Morbid obesity ([facility])   (+) Type 2 diabetes mellitus with diabetic neuropathy ([facility]       Anesthetic History   No history of anesthetic complications            Review of Systems / Medical History  Patient summary reviewed and pertinent labs reviewed    Pulmonary          Smoker         Neuro/Psych             Comments: R hemiplegia Cardiovascular    Hypertension        Dysrhythmias   Pacemaker and CAD      Comments: Echo 5/2020:  · Normal cavity size, wall thickness and systolic function (ejection fraction normal). Calculated left ventricular ejection fraction is 50%. Visually measured ejection fraction. Abnormal left ventricular wall motion. · Pacer/ICD present. · Mild mitral valve regurgitation is present.   · Pulmonary arterial systolic pressure is 27 mmHg   GI/Hepatic/Renal     GERD           Endo/Other    Diabetes: type 2    Morbid obesity and arthritis     Other Findings              Physical Exam    Airway  Mallampati: II  TM Distance: 4 - 6 cm  Neck ROM: normal range of motion   Mouth opening: Normal     Cardiovascular  Regular rate and rhythm,  S1 and S2 normal,  no murmur, click, rub, or gallop             Dental  No notable dental hx       Pulmonary  Breath sounds clear to auscultation               Abdominal  GI exam deferred       Other Findings            Anesthetic Plan    ASA: 3  Anesthesia type: MAC          Induction: Intravenous  Anesthetic plan and risks discussed with: Patient
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
## 2021-06-25 NOTE — ANESTHESIA POSTPROCEDURE EVALUATION
Procedure(s):  COLONOSCOPY free foreign body removal.    MAC    Anesthesia Post Evaluation      Multimodal analgesia: multimodal analgesia used between 6 hours prior to anesthesia start to PACU discharge  Patient location during evaluation: PACU  Patient participation: complete - patient participated  Level of consciousness: awake and alert  Pain management: adequate  Airway patency: patent  Anesthetic complications: no  Cardiovascular status: acceptable  Respiratory status: acceptable  Hydration status: acceptable  Post anesthesia nausea and vomiting:  controlled  Final Post Anesthesia Temperature Assessment:  Normothermia (36.0-37.5 degrees C)      INITIAL Post-op Vital signs:   Vitals Value Taken Time   /62 06/25/21 0925   Temp 36.4 °C (97.6 °F) 06/25/21 0911   Pulse 59 06/25/21 0930   Resp 12 06/25/21 0930   SpO2 100 % 06/25/21 0911   Vitals shown include unvalidated device data.

## 2021-06-25 NOTE — DISCHARGE INSTRUCTIONS
Findings/Interventions:   Polyps - none.     Foreign body in cecum removed with snare. Sent for pathologic exam.       Recommendations: -Repeat colonoscopy in 3 years. Resume normal medication(s). If you had a biopsy or polypectomy, expect to receive your results within 3 weeks. If you have not received them by then, please call Dr. Willis Even office. If you had a colonoscopy, you may notice a few drops of blood on your underwear or on the toilet paper after you use the bathroom. This is caused by irritation to the bowel during the procedure and is not a problem. However, if you have heavy bleeding or if the bleeding persists for three or more days after the procedure, please contact your doctor. You might have gas or cramps for a few hours. You may feel nauseated today. Begin taking small sips of water and progress to solid foods gradually as you are feeling better. DISCHARGE SUMMARY from Nurse  PATIENT INSTRUCTIONS:  After general anesthesia or intravenous sedation, for 24 hours or while taking prescription Narcotics:  · Limit your activities  · Do not drive and operate hazardous machinery  · Do not make important personal or business decisions  · Do  not drink alcoholic beverages  · If you have not urinated within 8 hours after discharge, please contact your surgeon on call. Report the following to your surgeon:  · Excessive pain, swelling, redness or odor of or around the surgical area  · Temperature over 100.5  · Nausea and vomiting lasting longer than 4 hours or if unable to take medications  · Any signs of decreased circulation or nerve impairment to extremity: change in color, persistent  numbness, tingling, coldness or increase pain  · Any questions    What to do at Home:  Recommended activity: Activity as tolerated and no driving for today. *  Please give a list of your current medications to your Primary Care Provider.     *  Please update this list whenever your medications are discontinued, doses are      changed, or new medications (including over-the-counter products) are added. *  Please carry medication information at all times in case of emergency situations. These are general instructions for a healthy lifestyle:    No smoking/ No tobacco products/ Avoid exposure to second hand smoke  Surgeon General's Warning:  Quitting smoking now greatly reduces serious risk to your health. Obesity, smoking, and sedentary lifestyle greatly increases your risk for illness    A healthy diet, regular physical exercise & weight monitoring are important for maintaining a healthy lifestyle    You may be retaining fluid if you have a history of heart failure or if you experience any of the following symptoms:  Weight gain of 3 pounds or more overnight or 5 pounds in a week, increased swelling in our hands or feet or shortness of breath while lying flat in bed. Please call your doctor as soon as you notice any of these symptoms; do not wait until your next office visit. The discharge information has been reviewed with the patient. The patient verbalized understanding. Discharge medications reviewed with the patient and appropriate educational materials and side effects teaching were provided. ___________________________________________________________________________________________________________________________________    Patient Education        Colonoscopy: What to Expect at Home  Your Recovery  After a colonoscopy, you'll stay at the clinic for 1 to 2 hours until the medicines wear off. Then you can go home. But you'll need to arrange for a ride. Your doctor will tell you when you can eat and do your other usual activities. Your doctor will talk to you about when you'll need your next colonoscopy. Your doctor can help you decide how often you need to be checked. This will depend on the results of your test and your risk for colorectal cancer.   After the test, you may be bloated or have gas pains. You may need to pass gas. If a biopsy was done or a polyp was removed, you may have streaks of blood in your stool (feces) for a few days. Problems such as heavy rectal bleeding may not occur until several weeks after the test. This isn't common. But it can happen after polyps are removed. This care sheet gives you a general idea about how long it will take for you to recover. But each person recovers at a different pace. Follow the steps below to get better as quickly as possible. How can you care for yourself at home? Activity    · Rest when you feel tired.     · You can do your normal activities when it feels okay to do so. Diet    · Follow your doctor's directions for eating.     · Unless your doctor has told you not to, drink plenty of fluids. This helps to replace the fluids that were lost during the colon prep.     · Do not drink alcohol. Medicines    · Your doctor will tell you if and when you can restart your medicines. He or she will also give you instructions about taking any new medicines.     · If you take aspirin or some other blood thinner, ask your doctor if and when to start taking it again. Make sure that you understand exactly what your doctor wants you to do.     · If polyps were removed or a biopsy was done during the test, your doctor may tell you not to take aspirin or other anti-inflammatory medicines for a few days. These include ibuprofen (Advil, Motrin) and naproxen (Aleve). Other instructions    · For your safety, do not drive or operate machinery until the medicine wears off and you can think clearly. Your doctor may tell you not to drive or operate machinery until the day after your test.     · Do not sign legal documents or make major decisions until the medicine wears off and you can think clearly. The anesthesia can make it hard for you to fully understand what you are agreeing to. Follow-up care is a key part of your treatment and safety.  Be sure to make and go to all appointments, and call your doctor if you are having problems. It's also a good idea to know your test results and keep a list of the medicines you take. When should you call for help? Call 911 anytime you think you may need emergency care. For example, call if:    · You passed out (lost consciousness).     · You pass maroon or bloody stools.     · You have trouble breathing. Call your doctor now or seek immediate medical care if:    · You have pain that does not get better after you take pain medicine.     · You are sick to your stomach or cannot drink fluids.     · You have new or worse belly pain.     · You have blood in your stools.     · You have a fever.     · You cannot pass stools or gas. Watch closely for changes in your health, and be sure to contact your doctor if you have any problems. Where can you learn more? Go to http://www.gray.com/  Enter E264 in the search box to learn more about \"Colonoscopy: What to Expect at Home. \"  Current as of: December 17, 2020               Content Version: 12.8  © 1382-6178 [employer]. Care instructions adapted under license by [employer] (which disclaims liability or warranty for this information). If you have questions about a medical condition or this instruction, always ask your healthcare professional. [address] any warranty or liability for your use of this information.

## 2021-06-25 NOTE — PROCEDURES
[facility]  [address], [address]   [place], [address]  [phone]                    Colonoscopy Procedure Note      Gauri Guerrero  1960  [id number]                Date of Procedure: 6/25/2021    Preoperative diagnosis: Colon cancer Screening:   Z12.11    Postoperative diagnosis: foreign body in the cecum,    :  John Ferro MD    Assistant(s): Endoscopy Technician-1: Charlett Goodell  Endoscopy RN-1: Ramsey Turcios RN; Misael Barcenas RN    Sedation: MAC    Complications: None    Implants: None    Procedure Details:  Prior to the procedure, a history and physical were performed. The patients medications, allergies and sensitivities were reviewed and all questions were answered. After informed consent was obtained for the procedure, with all risks and benefits of procedure explained. The patient was taken to the endoscopy suite and placed in the left lateral decubitus position. Patient identification and proposed procedure were verified prior to the procedure by the nurse and I. After sequential anesthesia administered by anesthesiologist, a digital rectal exam was performed and was normal.  The Olympus video colonoscope was introduced through the anus and advanced to cecum, which was identified by the ileocecal valve and appendiceal orifice. The quality of preparation was fair. The colonoscope was slowly withdrawn and the mucosa examined for any abnormalities. Cecal withdrawal time was greater than 6 minutes. The patient tolerated the procedure well. There were no complications. Findings/Interventions:   Polyps - none. Foreign body in cecum removed with snare. Sent for pathologic exam.     EBL: none    Recommendations: -Repeat colonoscopy in 3 years. Resume normal medication(s).      Discharge Disposition:  Fior Jackson MD  6/25/2021  8:59 AM

## 2021-06-29 DIAGNOSIS — M47.817 SPONDYLOSIS OF LUMBOSACRAL REGION WITHOUT MYELOPATHY OR RADICULOPATHY: ICD-10-CM

## 2021-06-29 DIAGNOSIS — M54.16 LUMBAR RADICULOPATHY: ICD-10-CM

## 2021-06-29 RX ORDER — PREGABALIN 300 MG/1
300 CAPSULE ORAL 2 TIMES DAILY
Qty: 60 CAPSULE | Refills: 5 | OUTPATIENT
Start: 2021-06-29

## 2021-06-29 NOTE — TELEPHONE ENCOUNTER
Last Visit: 12/18/20 with MD Harjeet Bennett  Next Appointment: none  Previous Refill Encounter(s): 12/18/20 #60 with 5 refills    Requested Prescriptions     Pending Prescriptions Disp Refills    pregabalin (Lyrica) 300 mg capsule 60 Capsule 5     Sig: Take 1 Capsule by mouth two (2) times a day. Max Daily Amount: 600 mg.

## 2021-06-30 ENCOUNTER — VIRTUAL VISIT (OUTPATIENT)
Dept: FAMILY MEDICINE CLINIC | Age: 61
End: 2021-06-30
Payer: MEDICARE

## 2021-06-30 DIAGNOSIS — M25.562 CHRONIC PAIN OF LEFT KNEE: ICD-10-CM

## 2021-06-30 DIAGNOSIS — G89.29 CHRONIC PAIN OF LEFT KNEE: ICD-10-CM

## 2021-06-30 DIAGNOSIS — F51.01 PRIMARY INSOMNIA: ICD-10-CM

## 2021-06-30 DIAGNOSIS — N39.0 CHRONIC UTI: ICD-10-CM

## 2021-06-30 DIAGNOSIS — I50.22 CHRONIC SYSTOLIC HEART FAILURE (HCC): ICD-10-CM

## 2021-06-30 DIAGNOSIS — N30.00 ACUTE CYSTITIS WITHOUT HEMATURIA: Primary | ICD-10-CM

## 2021-06-30 DIAGNOSIS — N39.0 COMPLICATED UTI (URINARY TRACT INFECTION): ICD-10-CM
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
## 2021-06-30 DIAGNOSIS — E11.40 TYPE 2 DIABETES MELLITUS WITH DIABETIC NEUROPATHY, UNSPECIFIED WHETHER LONG TERM INSULIN USE (HCC): ICD-10-CM

## 2021-06-30 DIAGNOSIS — I10 ESSENTIAL HYPERTENSION: ICD-10-CM

## 2021-06-30 PROCEDURE — G8756 NO BP MEASURE DOC: HCPCS | Performed by: NURSE PRACTITIONER

## 2021-06-30 PROCEDURE — G9899 SCRN MAM PERF RSLTS DOC: HCPCS | Performed by: NURSE PRACTITIONER

## 2021-06-30 PROCEDURE — G8427 DOCREV CUR MEDS BY ELIG CLIN: HCPCS | Performed by: NURSE PRACTITIONER

## 2021-06-30 PROCEDURE — G8432 DEP SCR NOT DOC, RNG: HCPCS | Performed by: NURSE PRACTITIONER

## 2021-06-30 PROCEDURE — 99214 OFFICE O/P EST MOD 30 MIN: CPT | Performed by: NURSE PRACTITIONER

## 2021-06-30 PROCEDURE — 2022F DILAT RTA XM EVC RTNOPTHY: CPT | Performed by: NURSE PRACTITIONER

## 2021-06-30 PROCEDURE — 3046F HEMOGLOBIN A1C LEVEL >9.0%: CPT | Performed by: NURSE PRACTITIONER

## 2021-06-30 PROCEDURE — 3017F COLORECTAL CA SCREEN DOC REV: CPT | Performed by: NURSE PRACTITIONER

## 2021-06-30 RX ORDER — CEPHALEXIN 500 MG/1
500 CAPSULE ORAL 4 TIMES DAILY
Qty: 28 CAPSULE | Refills: 0 | Status: SHIPPED | OUTPATIENT
Start: 2021-06-30 | End: 2021-07-07

## 2021-06-30 RX ORDER — ZOLPIDEM TARTRATE 10 MG/1
10 TABLET ORAL
Qty: 30 TABLET | Refills: 0 | Status: SHIPPED | OUTPATIENT
Start: 2021-06-30 | End: 2021-08-04 | Stop reason: SDUPTHER

## 2021-06-30 NOTE — PROGRESS NOTES
Tamara So presents today for   Chief Complaint   Patient presents with    Follow-up    Hypertension     BP   122/78    UTI     still having burning sensation when urinating       Tamara So preferred language for health care discussion is english/other. Is someone accompanying this pt? no    Is the patient using any DME equipment during [address]? no    Depression Screening:  3 most recent PHQ Screens 6/30/2021   PHQ Not Done -   Little interest or pleasure in doing things Not at all   Feeling down, depressed, irritable, or hopeless Not at all   Total Score PHQ 2 0       Learning Assessment:  Learning Assessment 3/17/2021   PRIMARY LEARNER Patient   HIGHEST LEVEL OF EDUCATION - PRIMARY LEARNER  4 YEARS [facility] PRIMARY LEARNER NONE   CO-LEARNER CAREGIVER No   CO-LEARNER NAME -   PRIMARY LANGUAGE ENGLISH    NEED No   LEARNER PREFERENCE PRIMARY DEMONSTRATION   ANSWERED BY patient   RELATIONSHIP SELF       Abuse Screening:  Abuse Screening Questionnaire 3/17/2021   Do you ever feel afraid of your partner? N   Are you in a relationship with someone who physically or mentally threatens you? N   Is it safe for you to go home? Y       Generalized Anxiety  No flowsheet data found. Health Maintenance Due   Topic Date Due    Eye Exam Retinal or Dilated  Never done    Shingrix Vaccine Age 50> (1 of 2) Never done    Foot Exam Q1  05/16/2020    MICROALBUMIN Q1  01/29/2021   . Health Maintenance reviewed and discussed and ordered per Provider. Coordination of Care:  1. Have you been to the ER, urgent care clinic since your last visit? Hospitalized since your last visit? no    2. Have you seen or consulted any other health care providers outside of the [address] [place] [address] since your last visit? Include any pap smears or colon screening.  no
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
## 2021-07-07 ENCOUNTER — OFFICE VISIT (OUTPATIENT)
Dept: ORTHOPEDIC SURGERY | Age: 61
End: 2021-07-07
Payer: MEDICARE

## 2021-07-07 VITALS
WEIGHT: 174 LBS | SYSTOLIC BLOOD PRESSURE: 169 MMHG | DIASTOLIC BLOOD PRESSURE: 95 MMHG | OXYGEN SATURATION: 96 % | TEMPERATURE: 98.9 F | BODY MASS INDEX: 34.16 KG/M2 | HEIGHT: 60 IN | HEART RATE: 80 BPM | RESPIRATION RATE: 17 BRPM

## 2021-07-07 DIAGNOSIS — M54.16 LUMBAR NEURITIS: ICD-10-CM

## 2021-07-07 DIAGNOSIS — M50.30 DDD (DEGENERATIVE DISC DISEASE), CERVICAL: ICD-10-CM

## 2021-07-07 DIAGNOSIS — G60.9 IDIOPATHIC PERIPHERAL NEUROPATHY: ICD-10-CM

## 2021-07-07 DIAGNOSIS — M47.812 CERVICAL SPONDYLOSIS WITHOUT MYELOPATHY: Primary | ICD-10-CM

## 2021-07-07 DIAGNOSIS — M47.817 LUMBOSACRAL SPONDYLOSIS WITHOUT MYELOPATHY: ICD-10-CM

## 2021-07-07 PROCEDURE — G8427 DOCREV CUR MEDS BY ELIG CLIN: HCPCS | Performed by: PHYSICAL MEDICINE & REHABILITATION

## 2021-07-07 PROCEDURE — G8432 DEP SCR NOT DOC, RNG: HCPCS | Performed by: PHYSICAL MEDICINE & REHABILITATION

## 2021-07-07 PROCEDURE — 99213 OFFICE O/P EST LOW 20 MIN: CPT | Performed by: PHYSICAL MEDICINE & REHABILITATION

## 2021-07-07 PROCEDURE — G8755 DIAS BP > OR = 90: HCPCS | Performed by: PHYSICAL MEDICINE & REHABILITATION

## 2021-07-07 PROCEDURE — G9899 SCRN MAM PERF RSLTS DOC: HCPCS | Performed by: PHYSICAL MEDICINE & REHABILITATION

## 2021-07-07 PROCEDURE — G8417 CALC BMI ABV UP PARAM F/U: HCPCS | Performed by: PHYSICAL MEDICINE & REHABILITATION

## 2021-07-07 PROCEDURE — 3017F COLORECTAL CA SCREEN DOC REV: CPT | Performed by: PHYSICAL MEDICINE & REHABILITATION

## 2021-07-07 PROCEDURE — G8753 SYS BP > OR = 140: HCPCS | Performed by: PHYSICAL MEDICINE & REHABILITATION

## 2021-07-07 RX ORDER — PREGABALIN 150 MG/1
150 CAPSULE ORAL 2 TIMES DAILY
Qty: 30 CAPSULE | Refills: 0 | Status: SHIPPED | OUTPATIENT
Start: 2021-07-21 | End: 2021-10-18

## 2021-07-07 RX ORDER — PREGABALIN 300 MG/1
300 CAPSULE ORAL 2 TIMES DAILY
Qty: 60 CAPSULE | Refills: 1 | Status: SHIPPED | OUTPATIENT
Start: 2021-08-18 | End: 2021-08-04 | Stop reason: SDUPTHER

## 2021-07-07 RX ORDER — PREGABALIN 75 MG/1
75 CAPSULE ORAL 2 TIMES DAILY
Qty: 30 CAPSULE | Refills: 0 | Status: SHIPPED | OUTPATIENT
Start: 2021-07-07 | End: 2021-10-13 | Stop reason: DRUGHIGH

## 2021-07-07 RX ORDER — PREGABALIN 225 MG/1
225 CAPSULE ORAL 2 TIMES DAILY
Qty: 30 CAPSULE | Refills: 0 | Status: SHIPPED | OUTPATIENT
Start: 2021-08-04 | End: 2021-10-13 | Stop reason: DRUGHIGH

## 2021-07-07 NOTE — LETTER
7/7/2021    Patient: Pj Burton   YOB: 1960   Date of Visit: 7/7/2021     Maricarmen Anne NP  [address] [facility]    Dear Maricarmen Anne NP,      Thank you for referring Ms. Pj Burton to [address] for evaluation. My notes for this consultation are attached. If you have questions, please do not hesitate to call me. I look forward to following your patient along with you.       Sincerely,    Marco James MD

## 2021-07-08 RX ORDER — BACLOFEN 10 MG/1
TABLET ORAL
Qty: 60 TABLET | Refills: 1 | Status: SHIPPED | OUTPATIENT
Start: 2021-07-08 | End: 2021-09-12

## 2021-07-13 ENCOUNTER — OFFICE VISIT (OUTPATIENT)
Dept: ORTHOPEDIC SURGERY | Age: 61
End: 2021-07-13
Payer: MEDICARE

## 2021-07-13 VITALS — HEIGHT: 60 IN | RESPIRATION RATE: 15 BRPM | OXYGEN SATURATION: 99 % | HEART RATE: 80 BPM | BODY MASS INDEX: 33.98 KG/M2

## 2021-07-13 DIAGNOSIS — Z91.81 HISTORY OF FALLING: ICD-10-CM

## 2021-07-13 DIAGNOSIS — Z96.652 HISTORY OF LEFT KNEE REPLACEMENT: Primary | ICD-10-CM

## 2021-07-13 DIAGNOSIS — M17.11 PRIMARY OSTEOARTHRITIS OF RIGHT KNEE: ICD-10-CM

## 2021-07-13 DIAGNOSIS — M25.561 RIGHT KNEE PAIN, UNSPECIFIED CHRONICITY: ICD-10-CM

## 2021-07-13 DIAGNOSIS — G89.29 CHRONIC PAIN OF LEFT KNEE: ICD-10-CM

## 2021-07-13 DIAGNOSIS — M25.562 CHRONIC PAIN OF LEFT KNEE: ICD-10-CM

## 2021-07-13 PROCEDURE — G8756 NO BP MEASURE DOC: HCPCS | Performed by: ORTHOPAEDIC SURGERY

## 2021-07-13 PROCEDURE — G8427 DOCREV CUR MEDS BY ELIG CLIN: HCPCS | Performed by: ORTHOPAEDIC SURGERY

## 2021-07-13 PROCEDURE — 73562 X-RAY EXAM OF KNEE 3: CPT | Performed by: ORTHOPAEDIC SURGERY

## 2021-07-13 PROCEDURE — G8510 SCR DEP NEG, NO PLAN REQD: HCPCS | Performed by: ORTHOPAEDIC SURGERY

## 2021-07-13 PROCEDURE — 99214 OFFICE O/P EST MOD 30 MIN: CPT | Performed by: ORTHOPAEDIC SURGERY

## 2021-07-13 PROCEDURE — G8417 CALC BMI ABV UP PARAM F/U: HCPCS | Performed by: ORTHOPAEDIC SURGERY

## 2021-07-13 PROCEDURE — 3017F COLORECTAL CA SCREEN DOC REV: CPT | Performed by: ORTHOPAEDIC SURGERY

## 2021-07-13 PROCEDURE — 20610 DRAIN/INJ JOINT/BURSA W/O US: CPT | Performed by: ORTHOPAEDIC SURGERY

## 2021-07-13 PROCEDURE — G9899 SCRN MAM PERF RSLTS DOC: HCPCS | Performed by: ORTHOPAEDIC SURGERY

## 2021-07-13 RX ORDER — BETAMETHASONE SODIUM PHOSPHATE AND BETAMETHASONE ACETATE 3; 3 MG/ML; MG/ML
6 INJECTION, SUSPENSION INTRA-ARTICULAR; INTRALESIONAL; INTRAMUSCULAR; SOFT TISSUE ONCE
Status: COMPLETED | OUTPATIENT
Start: 2021-07-13 | End: 2021-07-13

## 2021-07-13 RX ADMIN — BETAMETHASONE SODIUM PHOSPHATE AND BETAMETHASONE ACETATE 6 MG: 3; 3 INJECTION, SUSPENSION INTRA-ARTICULAR; INTRALESIONAL; INTRAMUSCULAR; SOFT TISSUE at 15:02
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
## 2021-08-03 PROBLEM — I25.10 CAD (CORONARY ARTERY DISEASE): Status: RESOLVED | Noted: 2020-05-18 | Resolved: 2021-08-03

## 2021-08-04 ENCOUNTER — OFFICE VISIT (OUTPATIENT)
Dept: FAMILY MEDICINE CLINIC | Age: 61
End: 2021-08-04
Payer: MEDICARE

## 2021-08-04 VITALS
BODY MASS INDEX: 35.26 KG/M2 | HEIGHT: 60 IN | HEART RATE: 90 BPM | WEIGHT: 179.6 LBS | DIASTOLIC BLOOD PRESSURE: 79 MMHG | SYSTOLIC BLOOD PRESSURE: 133 MMHG | TEMPERATURE: 97 F | RESPIRATION RATE: 16 BRPM

## 2021-08-04 DIAGNOSIS — F51.01 PRIMARY INSOMNIA: ICD-10-CM

## 2021-08-04 DIAGNOSIS — R31.9 URINARY TRACT INFECTION WITH HEMATURIA, SITE UNSPECIFIED: ICD-10-CM

## 2021-08-04 DIAGNOSIS — I10 ESSENTIAL HYPERTENSION: Primary | ICD-10-CM

## 2021-08-04 DIAGNOSIS — N39.0 URINARY TRACT INFECTION WITH HEMATURIA, SITE UNSPECIFIED: ICD-10-CM
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
## 2021-08-04 LAB
BILIRUB UR QL STRIP: NEGATIVE
GLUCOSE UR-MCNC: NEGATIVE MG/DL
KETONES P FAST UR STRIP-MCNC: NEGATIVE MG/DL
PH UR STRIP: 5.5 [PH] (ref 4.6–8)
PROT UR QL STRIP: NEGATIVE
SP GR UR STRIP: 1.01 (ref 1–1.03)
UA UROBILINOGEN AMB POC: NORMAL (ref 0.2–1)
URINALYSIS CLARITY POC: CLEAR
URINALYSIS COLOR POC: NORMAL
URINE BLOOD POC: NORMAL
URINE LEUKOCYTES POC: NORMAL
URINE NITRITES POC: POSITIVE

## 2021-08-04 PROCEDURE — G8427 DOCREV CUR MEDS BY ELIG CLIN: HCPCS | Performed by: NURSE PRACTITIONER

## 2021-08-04 PROCEDURE — G8432 DEP SCR NOT DOC, RNG: HCPCS | Performed by: NURSE PRACTITIONER

## 2021-08-04 PROCEDURE — 3017F COLORECTAL CA SCREEN DOC REV: CPT | Performed by: NURSE PRACTITIONER

## 2021-08-04 PROCEDURE — G9899 SCRN MAM PERF RSLTS DOC: HCPCS | Performed by: NURSE PRACTITIONER

## 2021-08-04 PROCEDURE — 99214 OFFICE O/P EST MOD 30 MIN: CPT | Performed by: NURSE PRACTITIONER

## 2021-08-04 PROCEDURE — G8754 DIAS BP LESS 90: HCPCS | Performed by: NURSE PRACTITIONER

## 2021-08-04 PROCEDURE — G8417 CALC BMI ABV UP PARAM F/U: HCPCS | Performed by: NURSE PRACTITIONER

## 2021-08-04 PROCEDURE — 81003 URINALYSIS AUTO W/O SCOPE: CPT | Performed by: NURSE PRACTITIONER

## 2021-08-04 PROCEDURE — G8752 SYS BP LESS 140: HCPCS | Performed by: NURSE PRACTITIONER

## 2021-08-04 RX ORDER — ZOLPIDEM TARTRATE 10 MG/1
10 TABLET ORAL
Qty: 30 TABLET | Refills: 0 | Status: SHIPPED | OUTPATIENT
Start: 2021-08-04 | End: 2021-09-14 | Stop reason: SDUPTHER
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
## 2021-08-04 NOTE — PROGRESS NOTES
Bess Ha presents today for   Chief Complaint   Patient presents with    UTI    Hypertension       Bess Ha preferred language for health care discussion is english/other. Is someone accompanying this pt? no    Is the patient using any DME equipment during [address]? Yes, cane    Depression Screening:  3 most recent PHQ Screens 8/4/2021   PHQ Not Done -   Little interest or pleasure in doing things Not at all   Feeling down, depressed, irritable, or hopeless Not at all   Total Score PHQ 2 0       Learning Assessment:  Learning Assessment 3/17/2021   PRIMARY LEARNER Patient   HIGHEST LEVEL OF EDUCATION - PRIMARY LEARNER  4 YEARS OF [facility] PRIMARY LEARNER NONE   CO-LEARNER CAREGIVER No   CO-LEARNER NAME -   PRIMARY LANGUAGE ENGLISH    NEED No   LEARNER PREFERENCE PRIMARY DEMONSTRATION   ANSWERED BY patient   RELATIONSHIP SELF       Abuse Screening:  Abuse Screening Questionnaire 3/17/2021   Do you ever feel afraid of your partner? N   Are you in a relationship with someone who physically or mentally threatens you? N   Is it safe for you to go home? Y       Generalized Anxiety  No flowsheet data found. Health Maintenance Due   Topic Date Due    Eye Exam Retinal or Dilated  Never done    Shingrix Vaccine Age 50> (1 of 2) Never done    Foot Exam Q1  05/16/2020    MICROALBUMIN Q1  01/29/2021   . Health Maintenance reviewed and discussed and ordered per Provider. Coordination of Care:  1. Have you been to the ER, urgent care clinic since your last visit? Hospitalized since your last visit? no    2. Have you seen or consulted any other health care providers outside of the [address] [place] [address] since your last visit? Include any pap smears or colon screening.  no      Advance Directive:  Discussed 3/17/21 [facility]  Hospitalist Discharge Summary      Date of Admission:  2/17/2021  Date of Discharge:  2/19/2021  Discharging Provider: Mark Hernandez, DO      Discharge Diagnoses   Obstructing left UPJ 5 mm kidney stone s/p stenting   Renal mass concerning for renal cell carcinoma    H/o CAD  HTN     Follow-ups Needed After Discharge   Follow-up Appointments     Follow-up and recommended labs and tests      Follow up with Dr. Enrique to discuss surgery for the renal mass on   Thursday February 25th at 11:00 AM at our [facility] location.  Please   call [phone] with any questions.    [facility]  [address] [place] [address]. [id number]  You may call [phone] with any questions or concerns.         Follow-up and recommended labs and tests       Follow up with primary care provider, Teetee Hammer, within 2-4   weeks, for hospital follow- up. No follow up labs or test are needed.  Follow up with urology as planned           Unresulted Labs Ordered in the Past 30 Days of this Admission     No orders found from 1/18/2021 to 2/18/2021.        Discharge Disposition   Discharged to home  Condition at discharge: Stable    Hospital Course   Art Josue is a 73-year-old with remote history of kidney stones 15 years ago, now admitted with left-sided flank pain with a CT scan showing a 5 mm proximal left UPJ stone that appears to be obstructed, but no hydronephrosis, also with the incidental possible kidney mass versus complex cyst being admitted for further treatment.      Obstructing left UPJ 5 mm kidney stone  Presented with left flank pain and found to have a renal stone.  Has a history of them and has undergone ureteroscopy, kidney stone laser treatment and stenting.    - PRN analgesics  - Flomax 0.4 mg daily   - Urology consulted and appreciate their recommendations.  NPO at midnight for planned procedure tomorrow       Renal mass  concerning for renal cell  Seen on n the CT scan without contrast there was noted an incidental solid-appearing mass or hyperdense cyst in the lower pole of the left kidney that is indeterminate.   * MRI Abdomen:  1.  Lobulated 3.9 x 5.8 x 7.1 cm complex mass involving the left lower renal pole. This mass demonstrates heterogeneous early arterial enhancement on subtracted images and would be most compatible with a renal cell carcinoma until proven otherwise. This   protrudes into the left lower renal pole hilum.  2.  No other definitive enhancing renal masses. Bilateral renal cysts.  3.  No lymphadenopathy. Renal veins patent. Adrenal glands negative.  4.  Small hepatic cyst.  - Will defer management to urology.  No urgent indication for surgery.  Will follow up in outpatient clinic in 1-2 weeks     H/o CAD  HTN   No issues currently   - PTA Norvasc, lipitor, Losartan and Toprol XL   - Held PTA ASA while here and can resume at discharge     Consultations This Hospital Stay   UROLOGY IP CONSULT    Code Status   Full Code    Time Spent on this Encounter   I, Mark Hernandez DO, personally saw the patient today and spent greater than 30 minutes discharging this patient.       Mark Hernandez DO  [place] [address] [facility]  [address] [place] [address] AVE, [address]  [facility] [address]  Phone: [phone]  ______________________________________________________________________    Physical Exam   Vital Signs: Temp: 96.5  F (35.8  C) Temp src: Oral BP: 115/65 Pulse: 66   Resp: 16 SpO2: 91 % O2 Device: None (Room air) Oxygen Delivery: 3 LPM  Weight: 210 lbs 0 oz  General Appearance: Resting comfortably.  NAD   Respiratory: Clear to auscultation.  No respiratory distress  Cardiovascular: RRR.  No obvious murmurs  GI: Bowel sounds noted.  Non-tender  Skin: No obvious rashes or cyanosis  Other: No edema.  No calf tenderness         Primary Care Physician   Teetee Hammer    Discharge Orders      Follow-up and  recommended labs and tests    Follow up with Dr. Enrique to discuss surgery for the renal mass on Thursday February 25th at 11:00 AM at our [facility] location.  Please call [phone] with any questions.    [facility]  [address] [place] [address]. [id number]  You may call [phone] with any questions or concerns.     Reason for your hospital stay    Kidney stone and newly found mass     Follow-up and recommended labs and tests     Follow up with primary care provider, Teetee Hammer, within 2-4 weeks, for hospital follow- up. No follow up labs or test are needed.  Follow up with urology as planned     Activity    Your activity upon discharge: activity as tolerated     Diet    Follow this diet upon discharge: Orders Placed This Encounter      Advance Diet as Tolerated: Regular Diet Adult       Significant Results and Procedures   Most Recent 3 CBC's:  Recent Labs   Lab Test 02/19/21  0443 02/18/21  0710 02/17/21  1848 02/03/15  0740   WBC  --  6.7 13.7* 5.0   HGB  --  13.2* 15.0 14.1   MCV  --  97 95 91   * 127* 180 149*     Most Recent 3 BMP's:  Recent Labs   Lab Test 02/19/21  0443 02/18/21  0710 02/17/21  1848 07/10/20  0854   NA  --  140 138 141   POTASSIUM 4.1 4.2 4.2 3.4*   CHLORIDE  --  109 105 102   CO2  --  28 30 32*   BUN  --  30 27 14   CR  --  1.33* 1.29* 1.24   ANIONGAP  --  3 3 10.4   CANDICE  --  8.6 9.4 9.6   GLC  --  99 148* 119*   ,   Results for orders placed or performed during the hospital encounter of 02/17/21   CT Abdomen Pelvis w/o Contrast    Narrative    CT ABDOMEN AND PELVIS WITHOUT CONTRAST 2/17/2021 8:40 PM    CLINICAL HISTORY: Flank pain, kidney stone suspected.    TECHNIQUE: CT scan of the abdomen and pelvis was performed without IV  contrast. Multiplanar reformats were obtained. Dose reduction  techniques were used.    CONTRAST: None.    COMPARISON: None.    FINDINGS:   LOWER CHEST: Normal.    HEPATOBILIARY: Normal.    PANCREAS:  Normal.    SPLEEN: Normal.    ADRENAL GLANDS: Normal.    KIDNEYS/BLADDER: Nonobstructing left renal collecting system stones  are present with left hydronephrosis and an obstructing UPJ stone  measuring 0.5 cm on image 84 of series 3. No right urinary tract  calculi or hydronephrosis. Bladder is unremarkable. Upper pole left  renal cyst measures water density consistent with a simple cyst. There  is a questionable lobulated mass in the lower pole of the left kidney  measuring 6.4 x 5.6 cm on series 4, image 54, best seen on the coronal  reformatted images.    BOWEL: No bowel obstruction, diverticulitis or appendicitis.    LYMPH NODES: No enlarged abdominal or pelvic lymph nodes.    VASCULATURE: Calcified plaque abdominal aorta without aneurysm.    PELVIC ORGANS: Prostate gland is normal in size with a few scattered  calcified areas. Rectum is unremarkable. Small fat-containing inguinal  hernias are present.    OTHER: None.    MUSCULOSKELETAL: Degenerative spine changes.      Impression    IMPRESSION:   1.  Obstructing left UPJ stone measures 0.5 cm. Nonobstructing stones  left renal collecting system are also noted. No right urinary tract  calculi or hydronephrosis.    2.  Solid-appearing mass or hyperdense cyst lower pole left kidney is  indeterminate. Follow-up nonemergent MRI of the kidneys could be  performed for further assessment.    3.  Upper pole left renal cyst. No further follow-up required.    JAVED ABDALLA MD   MR Abdomen w/o & w Contrast    Narrative    EXAM: MR ABDOMEN W/O and W CONTRAST  LOCATION: [facility]  DATE/TIME: 2/18/2021 5:24 AM    INDICATION: Indeterminant suspicious left renal mass on noncontrast CT 2/17/2021, MRI recommended for further characterization.    COMPARISON: CT renal stone protocol 2/17/2021.    TECHNIQUE: Routine MRI abdomen protocol including T1 in/out phase, diffusion, multiplane T2, and dynamic T1 with IV contrast.     CONTRAST: 9 mL  Gadavist.    FINDINGS:  GENITOURINARY/ADRENAL GLANDS: Lobulated 3.9 x 5.8 x 7.1 cm complex mass involving the left lower renal pole. This complex mass demonstrates heterogeneous enhancement on early arterial phase imaging, including subtracted imaging. Findings would be most   compatible with a renal cell carcinoma until proven otherwise. The superior aspect of this mass protrudes into the left renal hilum. No other definitive enhancing renal masses. Small bilateral renal cysts. Moderate left-sided hydronephrosis and   perinephric edema extending down to the level of an obstructing proximal left ureteral calculus measuring 5 mm. Adrenal glands negative.    ADDITIONAL FINDINGS: Homogeneous enhancement to the liver. Small hepatic cyst. No biliary dilatation or calcified gallstones. Pancreas and spleen unremarkable. Normal caliber abdominal aorta. No lymphadenopathy. Renal veins patent. Colonic   diverticulosis.      Impression    IMPRESSION:  1.  Lobulated 3.9 x 5.8 x 7.1 cm complex mass involving the left lower renal pole. This mass demonstrates heterogeneous early arterial enhancement on subtracted images and would be most compatible with a renal cell carcinoma until proven otherwise. This   protrudes into the left lower renal pole hilum.    2.  No other definitive enhancing renal masses. Bilateral renal cysts.    3.  No lymphadenopathy. Renal veins patent. Adrenal glands negative.    4.  Small hepatic cyst.     XR Surgery LUCITA L/T 5 Min Fluoro w Stills    Narrative    This exam was marked as non-reportable because it will not be read by a   radiologist or a [place] non-radiologist provider.               Discharge Medications   Current Discharge Medication List      START taking these medications    Details   tamsulosin (FLOMAX) 0.4 MG capsule Take 1 capsule (0.4 mg) by mouth daily  Qty: 30 capsule, Refills: 0    Comments: Future refills by PCP Dr. Teetee Hammer with phone number [phone].  Associated  Diagnoses: Kidney stone         CONTINUE these medications which have NOT CHANGED    Details   amLODIPine (NORVASC) 2.5 MG tablet Take 1 tablet (2.5 mg) by mouth daily  Qty: 90 tablet, Refills: 3    Associated Diagnoses: Essential hypertension      ASPIRIN PO Take 81 mg by mouth      atorvastatin (LIPITOR) 40 MG tablet Take 1 tablet (40 mg) by mouth daily  Qty: 90 tablet, Refills: 3    Associated Diagnoses: Hyperlipidemia, unspecified hyperlipidemia type      furosemide (LASIX) 20 MG tablet Take 1 tablet (20 mg) by mouth daily  Qty: 90 tablet, Refills: 3    Associated Diagnoses: Peripheral edema      losartan (COZAAR) 50 MG tablet Take 1 tablet (50 mg) by mouth daily  Qty: 90 tablet, Refills: 3    Associated Diagnoses: Hypertension goal BP (blood pressure) < 140/90; Coronary artery disease involving native coronary artery of native heart without angina pectoris      metoprolol succinate ER (TOPROL-XL) 50 MG 24 hr tablet Take 1 tablet (50 mg) by mouth daily  Qty: 90 tablet, Refills: 3    Associated Diagnoses: Hypertension goal BP (blood pressure) < 140/90      nitroGLYcerin (NITROSTAT) 0.4 MG sublingual tablet For chest pain place 1 tablet under the tongue every 5 minutes for 3 doses. If symptoms persist 5 minutes after 1st dose call 911.  Qty: 9 tablet, Refills: 3    Associated Diagnoses: Coronary artery disease involving native coronary artery of native heart without angina pectoris      triamcinolone (KENALOG) 0.1 % external cream Apply topically to affected area 2 times daily  Qty: 15 g, Refills: 0    Associated Diagnoses: Dermatitis           Allergies   Allergies   Allergen Reactions     No Known Drug Allergies

## 2021-08-06 DIAGNOSIS — M47.812 CERVICAL SPONDYLOSIS WITHOUT MYELOPATHY: Primary | ICD-10-CM

## 2021-08-06 DIAGNOSIS — M50.30 DDD (DEGENERATIVE DISC DISEASE), CERVICAL: ICD-10-CM

## 2021-08-06 DIAGNOSIS — G60.9 IDIOPATHIC PERIPHERAL NEUROPATHY: ICD-10-CM

## 2021-08-06 DIAGNOSIS — M47.817 LUMBOSACRAL SPONDYLOSIS WITHOUT MYELOPATHY: ICD-10-CM

## 2021-08-06 DIAGNOSIS — M54.16 LUMBAR NEURITIS: ICD-10-CM

## 2021-08-06 RX ORDER — PREGABALIN 300 MG/1
300 CAPSULE ORAL 2 TIMES DAILY
Qty: 60 CAPSULE | Refills: 1 | Status: CANCELLED | OUTPATIENT
Start: 2021-08-06

## 2021-08-06 NOTE — TELEPHONE ENCOUNTER
Last Visit: 7/7/21 with MD Ju Hess  Next Appointment: none    Requested Prescriptions     Pending Prescriptions Disp Refills    pregabalin (LYRICA) 300 mg capsule 60 Capsule 1     Sig: Take 1 Capsule by mouth two (2) times a day. Max Daily Amount: 600 mg.

## 2021-08-07 LAB — BACTERIA UR CULT: ABNORMAL

## 2021-08-08 RX ORDER — PREGABALIN 150 MG/1
150 CAPSULE ORAL 2 TIMES DAILY
Qty: 60 CAPSULE | Refills: 0 | Status: SHIPPED | OUTPATIENT
Start: 2021-08-08 | End: 2021-09-06

## 2021-08-08 NOTE — TELEPHONE ENCOUNTER
Dr. Reyes Bottom note states he is restarting the lyrica 75 bid and slowly ramping her up to 300 mg bid. I will send in the 150 mg bid. Next month she can call for the 300 mg bid.

## 2021-08-09 NOTE — TELEPHONE ENCOUNTER
On patients visit 7-7-21, Dr Miguelito Justin sent in three prescriptions 75mg, 150mg and 225mg. Patient has already finished the 75 and 150mg. I told her to call her pharmacy and have them fill the 225mg.

## 2021-08-11 ENCOUNTER — TELEPHONE (OUTPATIENT)
Dept: FAMILY MEDICINE CLINIC | Age: 61
End: 2021-08-11

## 2021-08-11 DIAGNOSIS — N39.0 URINARY TRACT INFECTION WITHOUT HEMATURIA, SITE UNSPECIFIED: Primary | ICD-10-CM

## 2021-08-11 RX ORDER — NITROFURANTOIN (MACROCRYSTALS) 100 MG/1
100 CAPSULE ORAL 2 TIMES DAILY
Qty: 14 CAPSULE | Refills: 0 | Status: SHIPPED | OUTPATIENT
Start: 2021-08-11 | End: 2021-08-18

## 2021-08-11 NOTE — TELEPHONE ENCOUNTER
Call to patient to discuss her urine culture. No answer. Message left with no information given requesting a return call to the office. Patient with chronic UTI's. Last given Keflex in July -about 1month ago for a UTI. Referral to urology was placed and patient has see them for a cystoscopy in May of this year. Next appointment is not until Sept (9/29/2021). Will treat for UTI and have patient follow up with urology sooner if possible. Will send patient a letter and order nitrofurantoin.    SHEELA García, FNP-C

## 2021-08-18 DIAGNOSIS — I10 ESSENTIAL HYPERTENSION: ICD-10-CM

## 2021-08-18 RX ORDER — LISINOPRIL 20 MG/1
TABLET ORAL
Qty: 30 TABLET | Refills: 0 | OUTPATIENT
Start: 2021-08-18

## 2021-08-18 RX ORDER — LISINOPRIL 20 MG/1
20 TABLET ORAL DAILY
Qty: 90 TABLET | Refills: 0 | Status: SHIPPED | OUTPATIENT
Start: 2021-08-18

## 2021-08-20 ENCOUNTER — TELEPHONE (OUTPATIENT)
Dept: FAMILY MEDICINE CLINIC | Age: 61
End: 2021-08-20

## 2021-08-20 NOTE — TELEPHONE ENCOUNTER
Pt called because she needs NP Belkys Andrew to order some pads for her bed because she keeps wetting the bed. Pt needs a order sent to [address] [place] [address], the phone number is [phone]. Please advise.  Thank you!!!

## 2021-09-06 DIAGNOSIS — M47.812 CERVICAL SPONDYLOSIS WITHOUT MYELOPATHY: ICD-10-CM

## 2021-09-06 DIAGNOSIS — M54.16 LUMBAR NEURITIS: ICD-10-CM

## 2021-09-06 DIAGNOSIS — M47.817 LUMBOSACRAL SPONDYLOSIS WITHOUT MYELOPATHY: ICD-10-CM

## 2021-09-06 DIAGNOSIS — M50.30 DDD (DEGENERATIVE DISC DISEASE), CERVICAL: ICD-10-CM

## 2021-09-06 DIAGNOSIS — G60.9 IDIOPATHIC PERIPHERAL NEUROPATHY: ICD-10-CM

## 2021-09-06 RX ORDER — PREGABALIN 150 MG/1
150 CAPSULE ORAL 2 TIMES DAILY
Qty: 60 CAPSULE | Refills: 0 | Status: SHIPPED | OUTPATIENT
Start: 2021-09-06 | End: 2021-10-13 | Stop reason: DRUGHIGH

## 2021-09-08 RX ORDER — CELECOXIB 200 MG/1
200 CAPSULE ORAL DAILY
Qty: 30 CAPSULE | Refills: 2 | Status: SHIPPED | OUTPATIENT
Start: 2021-09-08 | End: 2021-10-13 | Stop reason: SDUPTHER

## 2021-09-09 ENCOUNTER — TELEPHONE (OUTPATIENT)
Dept: FAMILY MEDICINE CLINIC | Age: 61
End: 2021-09-09

## 2021-09-09 NOTE — TELEPHONE ENCOUNTER
Patient mother called and said that her daugther is not eating, sleeping all the time, not getting out of the bed. She said that she took her to the ER last night and they said that she had gout in her feet which her mom said is true but she feel like something else is wrong with her. She said that her urine is cloudy looks like chick noodle soup. She said that the hospital did not give her anything for her urine. Her mother would like to be called at [phone] she said that she is worried about her.  Please advise

## 2021-09-10 NOTE — TELEPHONE ENCOUNTER
Spoke with patient, states she's feeling 'a little better'. Able to walk a little whereas she wasn't able to walk at all before. States urine is still cloudy, no burning but frequent urination. States she could not come to give urine sample today. appt was given to her for 9/13/21 at 11:15. Advised patient if she started to feel worse to return to the ER, stated she would.

## 2021-09-12 RX ORDER — BACLOFEN 10 MG/1
TABLET ORAL
Qty: 60 TABLET | Refills: 1 | Status: SHIPPED | OUTPATIENT
Start: 2021-09-12 | End: 2021-11-07

## 2021-09-23 ENCOUNTER — TELEPHONE (OUTPATIENT)
Dept: FAMILY MEDICINE CLINIC | Age: 61
End: 2021-09-23

## 2021-09-23 DIAGNOSIS — N30.00 ACUTE CYSTITIS WITHOUT HEMATURIA: Primary | ICD-10-CM

## 2021-09-23 RX ORDER — CEPHALEXIN 500 MG/1
500 CAPSULE ORAL 2 TIMES DAILY
Qty: 20 CAPSULE | Refills: 0 | Status: SHIPPED | OUTPATIENT
Start: 2021-09-23 | End: 2021-10-03

## 2021-09-23 NOTE — TELEPHONE ENCOUNTER
Call to patient. She verified her name, , and her address. Urine culture discussed. Keflex ordered. Side effects discussed. Probiotic encouraged and instructions to not take this with antibiotic. Vaginal hygiene discussed. Discussed concern for multiple antibiotic use and associated risk. Medication, side effects, possible allergic reactions and warnings reviewed with patient. Patient verbalized understanding. She reports she does have an appointment with urology on the . Strongly recommend she follow up. Patient states she still has not received her inc. Pads. Will have nurse follow up on this.  SHEELA García, FNP-C

## 2021-10-04 ENCOUNTER — TELEPHONE (OUTPATIENT)
Dept: FAMILY MEDICINE CLINIC | Age: 61
End: 2021-10-04

## 2021-10-04 DIAGNOSIS — M54.16 LUMBAR RADICULOPATHY: ICD-10-CM

## 2021-10-04 DIAGNOSIS — M47.817 SPONDYLOSIS OF LUMBOSACRAL REGION WITHOUT MYELOPATHY OR RADICULOPATHY: ICD-10-CM

## 2021-10-04 RX ORDER — PREGABALIN 300 MG/1
300 CAPSULE ORAL 2 TIMES DAILY
Qty: 60 CAPSULE | Refills: 0 | Status: SHIPPED | OUTPATIENT
Start: 2021-10-04 | End: 2021-10-18

## 2021-10-04 NOTE — TELEPHONE ENCOUNTER
Patient is requesting the 300mg capsules        Requested Prescriptions     Pending Prescriptions Disp Refills    pregabalin (Lyrica) 300 mg capsule 60 Capsule 0     Sig: Take 1 Capsule by mouth two (2) times a day. Max Daily Amount: 600 mg.

## 2021-10-04 NOTE — TELEPHONE ENCOUNTER
Patient called and said that her foot doctor at [facility] said that she has gout and she would need medication for this. He does not prescribe medication so he told her to  call here.  Please advise

## 2021-10-07 NOTE — TELEPHONE ENCOUNTER
Spoke with the patient and made her an appointment. I called over to [facility] and they said that they think there doctor gave her medication for the gout. I asked that office notes be faxed over.

## 2021-10-12 ENCOUNTER — OFFICE VISIT (OUTPATIENT)
Dept: ORTHOPEDIC SURGERY | Age: 61
End: 2021-10-12
Payer: MEDICARE

## 2021-10-12 VITALS
OXYGEN SATURATION: 100 % | WEIGHT: 175 LBS | BODY MASS INDEX: 35.28 KG/M2 | TEMPERATURE: 98 F | HEIGHT: 59 IN | HEART RATE: 75 BPM

## 2021-10-12 DIAGNOSIS — M25.561 CHRONIC PAIN OF RIGHT KNEE: ICD-10-CM

## 2021-10-12 DIAGNOSIS — M17.11 PRIMARY OSTEOARTHRITIS OF RIGHT KNEE: Primary | ICD-10-CM

## 2021-10-12 DIAGNOSIS — T84.84XS PAIN DUE TO TOTAL LEFT KNEE REPLACEMENT, SEQUELA: ICD-10-CM

## 2021-10-12 DIAGNOSIS — Z96.652 PAIN DUE TO TOTAL LEFT KNEE REPLACEMENT, SEQUELA: ICD-10-CM

## 2021-10-12 DIAGNOSIS — G89.29 CHRONIC PAIN OF RIGHT KNEE: ICD-10-CM

## 2021-10-12 PROCEDURE — G9899 SCRN MAM PERF RSLTS DOC: HCPCS | Performed by: ORTHOPAEDIC SURGERY

## 2021-10-12 PROCEDURE — G8417 CALC BMI ABV UP PARAM F/U: HCPCS | Performed by: ORTHOPAEDIC SURGERY

## 2021-10-12 PROCEDURE — 99214 OFFICE O/P EST MOD 30 MIN: CPT | Performed by: PHYSICIAN ASSISTANT

## 2021-10-12 PROCEDURE — G8427 DOCREV CUR MEDS BY ELIG CLIN: HCPCS | Performed by: ORTHOPAEDIC SURGERY

## 2021-10-12 PROCEDURE — G8756 NO BP MEASURE DOC: HCPCS | Performed by: ORTHOPAEDIC SURGERY

## 2021-10-12 PROCEDURE — G8432 DEP SCR NOT DOC, RNG: HCPCS | Performed by: ORTHOPAEDIC SURGERY

## 2021-10-12 PROCEDURE — 3017F COLORECTAL CA SCREEN DOC REV: CPT | Performed by: ORTHOPAEDIC SURGERY

## 2021-10-12 PROCEDURE — 20611 DRAIN/INJ JOINT/BURSA W/US: CPT | Performed by: PHYSICIAN ASSISTANT

## 2021-10-12 RX ORDER — BETAMETHASONE SODIUM PHOSPHATE AND BETAMETHASONE ACETATE 3; 3 MG/ML; MG/ML
6 INJECTION, SUSPENSION INTRA-ARTICULAR; INTRALESIONAL; INTRAMUSCULAR; SOFT TISSUE ONCE
Status: COMPLETED | OUTPATIENT
Start: 2021-10-12 | End: 2021-10-12

## 2021-10-12 RX ORDER — CELECOXIB 200 MG/1
200 CAPSULE ORAL 2 TIMES DAILY
Qty: 60 CAPSULE | Refills: 2 | Status: SHIPPED | OUTPATIENT
Start: 2021-10-12 | End: 2021-11-29 | Stop reason: SDUPTHER

## 2021-10-12 RX ADMIN — BETAMETHASONE SODIUM PHOSPHATE AND BETAMETHASONE ACETATE 6 MG: 3; 3 INJECTION, SUSPENSION INTRA-ARTICULAR; INTRALESIONAL; INTRAMUSCULAR; SOFT TISSUE at 11:51
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
## 2021-10-13 ENCOUNTER — VIRTUAL VISIT (OUTPATIENT)
Dept: FAMILY MEDICINE CLINIC | Age: 61
End: 2021-10-13
Payer: MEDICARE

## 2021-10-13 DIAGNOSIS — F51.01 PRIMARY INSOMNIA: ICD-10-CM

## 2021-10-13 DIAGNOSIS — E79.0 HYPERURICEMIA: Primary | ICD-10-CM

## 2021-10-13 DIAGNOSIS — E11.40 TYPE 2 DIABETES MELLITUS WITH DIABETIC NEUROPATHY, UNSPECIFIED WHETHER LONG TERM INSULIN USE (HCC): ICD-10-CM

## 2021-10-13 DIAGNOSIS — Z78.0 POST-MENOPAUSAL: ICD-10-CM

## 2021-10-13 DIAGNOSIS — L29.9 ITCHING: ICD-10-CM

## 2021-10-13 PROCEDURE — G8427 DOCREV CUR MEDS BY ELIG CLIN: HCPCS | Performed by: NURSE PRACTITIONER

## 2021-10-13 PROCEDURE — 3017F COLORECTAL CA SCREEN DOC REV: CPT | Performed by: NURSE PRACTITIONER

## 2021-10-13 PROCEDURE — G8432 DEP SCR NOT DOC, RNG: HCPCS | Performed by: NURSE PRACTITIONER

## 2021-10-13 PROCEDURE — G8756 NO BP MEASURE DOC: HCPCS | Performed by: NURSE PRACTITIONER

## 2021-10-13 PROCEDURE — 2022F DILAT RTA XM EVC RTNOPTHY: CPT | Performed by: NURSE PRACTITIONER

## 2021-10-13 PROCEDURE — 3046F HEMOGLOBIN A1C LEVEL >9.0%: CPT | Performed by: NURSE PRACTITIONER

## 2021-10-13 PROCEDURE — G9899 SCRN MAM PERF RSLTS DOC: HCPCS | Performed by: NURSE PRACTITIONER

## 2021-10-13 PROCEDURE — 99215 OFFICE O/P EST HI 40 MIN: CPT | Performed by: NURSE PRACTITIONER

## 2021-10-13 RX ORDER — ZOLPIDEM TARTRATE 10 MG/1
10 TABLET ORAL
Qty: 30 TABLET | Refills: 0 | Status: SHIPPED | OUTPATIENT
Start: 2021-10-13

## 2021-10-13 RX ORDER — ALLOPURINOL 100 MG/1
100 TABLET ORAL DAILY
Qty: 30 TABLET | Refills: 0 | Status: SHIPPED | OUTPATIENT
Start: 2021-10-13

## 2021-10-13 RX ORDER — TRIAMCINOLONE ACETONIDE 1 MG/G
OINTMENT TOPICAL 2 TIMES DAILY
Qty: 30 G | Refills: 0 | Status: SHIPPED | OUTPATIENT
Start: 2021-10-13

## 2021-10-13 RX ORDER — CALCIUM CARBONATE/VITAMIN D3 600 MG-125
500 TABLET ORAL DAILY
Qty: 30 TABLET | Refills: 0 | Status: SHIPPED | OUTPATIENT
Start: 2021-10-13

## 2021-10-13 NOTE — PROGRESS NOTES
Chidi Haro presents today for   Chief Complaint   Patient presents with    Gout       [place] Tahir preferred language for health care discussion is english/other. Is someone accompanying this pt? no    Is the patient using any DME equipment during [address]? no    Depression Screening:  3 most recent PHQ Screens 10/13/2021   PHQ Not Done -   Little interest or pleasure in doing things Not at all   Feeling down, depressed, irritable, or hopeless Not at all   Total Score PHQ 2 0       Learning Assessment:  Learning Assessment 3/17/2021   PRIMARY LEARNER Patient   HIGHEST LEVEL OF EDUCATION - PRIMARY LEARNER  4 YEARS [facility] PRIMARY LEARNER NONE   CO-LEARNER CAREGIVER No   CO-LEARNER NAME -   PRIMARY LANGUAGE ENGLISH    NEED No   LEARNER PREFERENCE PRIMARY DEMONSTRATION   ANSWERED BY patient   RELATIONSHIP SELF       Abuse Screening:  Abuse Screening Questionnaire 3/17/2021   Do you ever feel afraid of your partner? N   Are you in a relationship with someone who physically or mentally threatens you? N   Is it safe for you to go home? Y       Generalized Anxiety  No flowsheet data found. Health Maintenance Due   Topic Date Due    Eye Exam Retinal or Dilated  Never done    Shingrix Vaccine Age 50> (1 of 2) Never done    Foot Exam Q1  05/16/2020    MICROALBUMIN Q1  01/29/2021    Flu Vaccine (1) 09/01/2021   . Health Maintenance reviewed and discussed and ordered per Provider. Coordination of Care:  1. Have you been to the ER, urgent care clinic since your last visit? Hospitalized since your last visit? no    2. Have you seen or consulted any other health care providers outside of the [address] [place] [address] since your last visit? Include any pap smears or colon screening.  no      Advance Directive:  Discussed 3/17/21
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
## 2021-10-18 ENCOUNTER — OFFICE VISIT (OUTPATIENT)
Dept: ORTHOPEDIC SURGERY | Age: 61
End: 2021-10-18
Payer: MEDICARE

## 2021-10-18 VITALS
HEIGHT: 59 IN | HEART RATE: 74 BPM | WEIGHT: 169.8 LBS | BODY MASS INDEX: 34.23 KG/M2 | OXYGEN SATURATION: 93 % | SYSTOLIC BLOOD PRESSURE: 124 MMHG | DIASTOLIC BLOOD PRESSURE: 75 MMHG | TEMPERATURE: 97.5 F

## 2021-10-18 DIAGNOSIS — M54.16 LUMBAR RADICULOPATHY: ICD-10-CM

## 2021-10-18 DIAGNOSIS — M47.817 SPONDYLOSIS OF LUMBOSACRAL REGION WITHOUT MYELOPATHY OR RADICULOPATHY: ICD-10-CM

## 2021-10-18 DIAGNOSIS — M54.16 LUMBAR NEURITIS: Primary | ICD-10-CM

## 2021-10-18 DIAGNOSIS — M47.817 LUMBOSACRAL SPONDYLOSIS WITHOUT MYELOPATHY: ICD-10-CM

## 2021-10-18 PROCEDURE — G8427 DOCREV CUR MEDS BY ELIG CLIN: HCPCS | Performed by: PHYSICAL MEDICINE & REHABILITATION

## 2021-10-18 PROCEDURE — G8752 SYS BP LESS 140: HCPCS | Performed by: PHYSICAL MEDICINE & REHABILITATION

## 2021-10-18 PROCEDURE — G8432 DEP SCR NOT DOC, RNG: HCPCS | Performed by: PHYSICAL MEDICINE & REHABILITATION

## 2021-10-18 PROCEDURE — 3017F COLORECTAL CA SCREEN DOC REV: CPT | Performed by: PHYSICAL MEDICINE & REHABILITATION

## 2021-10-18 PROCEDURE — G9899 SCRN MAM PERF RSLTS DOC: HCPCS | Performed by: PHYSICAL MEDICINE & REHABILITATION

## 2021-10-18 PROCEDURE — 99213 OFFICE O/P EST LOW 20 MIN: CPT | Performed by: PHYSICAL MEDICINE & REHABILITATION

## 2021-10-18 PROCEDURE — G8754 DIAS BP LESS 90: HCPCS | Performed by: PHYSICAL MEDICINE & REHABILITATION

## 2021-10-18 PROCEDURE — G8417 CALC BMI ABV UP PARAM F/U: HCPCS | Performed by: PHYSICAL MEDICINE & REHABILITATION

## 2021-10-18 RX ORDER — AMOXICILLIN AND CLAVULANATE POTASSIUM 875; 125 MG/1; MG/1
1 TABLET, FILM COATED ORAL EVERY 12 HOURS
COMMUNITY
Start: 2021-10-15 | End: 2021-10-25

## 2021-10-18 RX ORDER — PREGABALIN 75 MG/1
75 CAPSULE ORAL 2 TIMES DAILY
Qty: 60 CAPSULE | Refills: 1 | Status: CANCELLED | OUTPATIENT
Start: 2021-10-18

## 2021-10-18 RX ORDER — PREGABALIN 300 MG/1
300 CAPSULE ORAL 2 TIMES DAILY
Qty: 60 CAPSULE | Refills: 2 | Status: SHIPPED | OUTPATIENT
Start: 2021-10-18 | End: 2022-01-24 | Stop reason: SDUPTHER

## 2021-10-20 DIAGNOSIS — L29.9 ITCHING: ICD-10-CM

## 2021-10-20 RX ORDER — HYDROXYZINE 25 MG/1
25 TABLET, FILM COATED ORAL
Qty: 30 TABLET | Refills: 3 | OUTPATIENT
Start: 2021-10-20

## 2021-10-25 ENCOUNTER — OFFICE VISIT (OUTPATIENT)
Dept: ORTHOPEDIC SURGERY | Age: 61
End: 2021-10-25
Payer: MEDICARE

## 2021-10-25 VITALS
BODY MASS INDEX: 34.07 KG/M2 | HEIGHT: 59 IN | TEMPERATURE: 97.7 F | WEIGHT: 169 LBS | HEART RATE: 71 BPM | OXYGEN SATURATION: 98 %

## 2021-10-25 DIAGNOSIS — M25.511 CHRONIC RIGHT SHOULDER PAIN: ICD-10-CM

## 2021-10-25 DIAGNOSIS — M19.011 GLENOHUMERAL ARTHRITIS, RIGHT: Primary | ICD-10-CM

## 2021-10-25 DIAGNOSIS — G89.29 CHRONIC RIGHT SHOULDER PAIN: ICD-10-CM

## 2021-10-25 PROCEDURE — G8417 CALC BMI ABV UP PARAM F/U: HCPCS | Performed by: ORTHOPAEDIC SURGERY

## 2021-10-25 PROCEDURE — 3017F COLORECTAL CA SCREEN DOC REV: CPT | Performed by: ORTHOPAEDIC SURGERY

## 2021-10-25 PROCEDURE — 99214 OFFICE O/P EST MOD 30 MIN: CPT | Performed by: ORTHOPAEDIC SURGERY

## 2021-10-25 PROCEDURE — G9899 SCRN MAM PERF RSLTS DOC: HCPCS | Performed by: ORTHOPAEDIC SURGERY

## 2021-10-25 PROCEDURE — 20611 DRAIN/INJ JOINT/BURSA W/US: CPT | Performed by: ORTHOPAEDIC SURGERY

## 2021-10-25 PROCEDURE — 73030 X-RAY EXAM OF SHOULDER: CPT | Performed by: ORTHOPAEDIC SURGERY

## 2021-10-25 PROCEDURE — G8432 DEP SCR NOT DOC, RNG: HCPCS | Performed by: ORTHOPAEDIC SURGERY

## 2021-10-25 PROCEDURE — G8756 NO BP MEASURE DOC: HCPCS | Performed by: ORTHOPAEDIC SURGERY

## 2021-10-25 PROCEDURE — G8427 DOCREV CUR MEDS BY ELIG CLIN: HCPCS | Performed by: ORTHOPAEDIC SURGERY

## 2021-10-25 RX ORDER — TRIAMCINOLONE ACETONIDE 40 MG/ML
40 INJECTION, SUSPENSION INTRA-ARTICULAR; INTRAMUSCULAR ONCE
Status: COMPLETED | OUTPATIENT
Start: 2021-10-25 | End: 2021-10-25

## 2021-10-25 RX ADMIN — TRIAMCINOLONE ACETONIDE 40 MG: 40 INJECTION, SUSPENSION INTRA-ARTICULAR; INTRAMUSCULAR at 11:27
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
## 2021-11-04 DIAGNOSIS — E79.0 HYPERURICEMIA: ICD-10-CM

## 2021-11-04 RX ORDER — ALLOPURINOL 100 MG/1
TABLET ORAL
Qty: 30 TABLET | Refills: 0 | OUTPATIENT
Start: 2021-11-04

## 2021-11-07 RX ORDER — BACLOFEN 10 MG/1
TABLET ORAL
Qty: 60 TABLET | Refills: 1 | Status: SHIPPED | OUTPATIENT
Start: 2021-11-07 | End: 2022-01-24 | Stop reason: SDUPTHER

## 2021-11-23 ENCOUNTER — OFFICE VISIT (OUTPATIENT)
Dept: ORTHOPEDIC SURGERY | Age: 61
End: 2021-11-23
Payer: MEDICARE

## 2021-11-23 VITALS — OXYGEN SATURATION: 99 % | HEART RATE: 62 BPM | TEMPERATURE: 97.6 F | BODY MASS INDEX: 33.61 KG/M2 | WEIGHT: 166.4 LBS

## 2021-11-23 DIAGNOSIS — G89.29 CHRONIC RIGHT SHOULDER PAIN: Primary | ICD-10-CM

## 2021-11-23 DIAGNOSIS — M25.511 CHRONIC RIGHT SHOULDER PAIN: Primary | ICD-10-CM

## 2021-11-23 PROCEDURE — G8432 DEP SCR NOT DOC, RNG: HCPCS | Performed by: ORTHOPAEDIC SURGERY

## 2021-11-23 PROCEDURE — 3017F COLORECTAL CA SCREEN DOC REV: CPT | Performed by: ORTHOPAEDIC SURGERY

## 2021-11-23 PROCEDURE — G8756 NO BP MEASURE DOC: HCPCS | Performed by: ORTHOPAEDIC SURGERY

## 2021-11-23 PROCEDURE — G8427 DOCREV CUR MEDS BY ELIG CLIN: HCPCS | Performed by: ORTHOPAEDIC SURGERY

## 2021-11-23 PROCEDURE — G9899 SCRN MAM PERF RSLTS DOC: HCPCS | Performed by: ORTHOPAEDIC SURGERY

## 2021-11-23 PROCEDURE — G8417 CALC BMI ABV UP PARAM F/U: HCPCS | Performed by: ORTHOPAEDIC SURGERY

## 2021-11-23 PROCEDURE — 99213 OFFICE O/P EST LOW 20 MIN: CPT | Performed by: ORTHOPAEDIC SURGERY

## 2021-11-23 NOTE — PROGRESS NOTES
Viola Joiner  1960   Chief Complaint   Patient presents with    Shoulder Pain     right        HISTORY OF PRESENT ILLNESS  Viola Joiner is a 62 y.o. female who presents today for evaluation of right shoulder. She rates her pain 8/10 today. Previously had a rotator cuff repair about 15 years ago and the pain returned a couple of months ago. At last OV on 10/23/2021, patient had a right shoulder cortisone injection which provided no relief. Pt has a defibrillator and is on blood thinners. Patient denies any fever, chills, chest pain, shortness of breath or calf pain. The remainder of the review of systems is negative. There are no new illness or injuries to report since last seen in the office. There are no changes to medications, allergies, family or social history. PHYSICAL EXAM:   Visit Vitals  Pulse 62   Temp 97.6 °F (36.4 °C) (Temporal)   Wt 166 lb 6.4 oz (75.5 kg)   LMP  (LMP Unknown)   SpO2 99%   BMI 33.61 kg/m²     The patient is a well-developed, well-nourished female   in no acute distress. The patient is alert and oriented times three. The patient is alert and oriented times three. Mood and affect are normal.  LYMPHATIC: lymph nodes are not enlarged and are within normal limits  SKIN: normal in color and non tender to palpation. There are no bruises or abrasions noted. NEUROLOGICAL: Motor sensory exam is within normal limits. Reflexes are equal bilaterally.  There is normal sensation to pinprick and light touch  MUSCULOSKELETAL:  Examination Right shoulder   Skin Intact   AC joint tenderness -   Biceps tenderness -   Forward flexion/Elevation    Active abduction    Glenohumeral abduction 60   External rotation ROM 30   Internal rotation ROM 30   Apprehension -   Jennifers Relocation -   Jerk -   Load and Shift -   Obriens -   Speeds -   Impingement sign +   Supraspinatus/Empty Can -, 5/5   External Rotation Strength -, 5/5   Lift Off/Belly Press -, 5/5 Neurovascular Intact        IMAGING: XR of the right shoulder with 3 views obtained in the office dated 10/25/2021 was reviewed and read by Dr. Jose Angel Rae: Marked degenerative changes in the glenohumeral joint      IMPRESSION:      ICD-10-CM ICD-9-CM    1. Chronic right shoulder pain  M25.511 719.41 REFERRAL TO PHYSICAL THERAPY    G89.29 338.29         PLAN:  1. Pt presents today with right shoulder pain due to a probably RCT. Due to her health conditions, she cannot get a MRI and would not be a good candidate for surgery. I would like to continue with PT to help with the pain. Return in 4 weeks. Risk factors include: BMI>30, pacemaker, htn, dm   2. No ultrasound exam indicated today  3. No cortisone injection indicated today   4. Yes Physical/Occupational Therapy indicated today  5. No diagnostic test indicated today:   6. No durable medical equipment indicated today  7. No referral indicated today   8. No medications indicated today:   9. No Narcotic indicated today     RTC 4 weeks      Scribed by Jazmin Key [facility]) as dictated by Arsenio Monroe MD    I, Dr. Arsenio Monroe, confirm that all documentation is accurate.     Arsenio Monroe M.D.   [facility]

## 2021-11-29 DIAGNOSIS — M17.11 PRIMARY OSTEOARTHRITIS OF RIGHT KNEE: ICD-10-CM

## 2021-11-29 DIAGNOSIS — Z96.652 PAIN DUE TO TOTAL LEFT KNEE REPLACEMENT, SEQUELA: ICD-10-CM

## 2021-11-29 DIAGNOSIS — M25.561 CHRONIC PAIN OF RIGHT KNEE: ICD-10-CM

## 2021-11-29 DIAGNOSIS — T84.84XS PAIN DUE TO TOTAL LEFT KNEE REPLACEMENT, SEQUELA: ICD-10-CM

## 2021-11-29 DIAGNOSIS — G89.29 CHRONIC PAIN OF RIGHT KNEE: ICD-10-CM

## 2021-11-29 RX ORDER — BACLOFEN 10 MG/1
10 TABLET ORAL 2 TIMES DAILY
Qty: 60 TABLET | Refills: 1 | Status: CANCELLED | OUTPATIENT
Start: 2021-11-29

## 2021-11-29 NOTE — TELEPHONE ENCOUNTER
Last Visit: 10/18/21 with MD Vito Mattson  Next Appointment: 12/20/21 with MD Vito Mattson  Previous Refill Encounter(s): 11/7/21 #60 with 1 refill    Requested Prescriptions     Pending Prescriptions Disp Refills    baclofen (LIORESAL) 10 mg tablet 60 Tablet 1     Sig: Take 1 Tablet by mouth two (2) times a day.

## 2021-11-29 NOTE — TELEPHONE ENCOUNTER
Last RX given had a refill. Patient needs to call the pharmacy and request this. VM is full so I could not leave a message telling her this.

## 2021-11-29 NOTE — TELEPHONE ENCOUNTER
Last Visit: 10/12/21 with MD Julianne Abdul  Next Appointment: 1/6/22 with BERNICE Yanez  Previous Refill Encounter(s): 10/12/21 #60 with 2 refills    Requested Prescriptions     Pending Prescriptions Disp Refills    celecoxib (CeleBREX) 200 mg capsule 60 Capsule 2     Sig: Take 1 Capsule by mouth two (2) times a day for 90 days.

## 2021-11-30 RX ORDER — CELECOXIB 200 MG/1
200 CAPSULE ORAL 2 TIMES DAILY
Qty: 60 CAPSULE | Refills: 2 | Status: SHIPPED | OUTPATIENT
Start: 2021-11-30 | End: 2022-02-08

## 2021-12-07 ENCOUNTER — HOSPITAL ENCOUNTER (OUTPATIENT)
Dept: PHYSICAL THERAPY | Age: 61
Discharge: HOME | End: 2021-12-07
Attending: ORTHOPAEDIC SURGERY
Payer: MEDICARE

## 2021-12-07 PROCEDURE — 97162 PT EVAL MOD COMPLEX 30 MIN: CPT | Performed by: PHYSICAL THERAPIST

## 2021-12-07 PROCEDURE — 97110 THERAPEUTIC EXERCISES: CPT | Performed by: PHYSICAL THERAPIST

## 2021-12-07 NOTE — PROGRESS NOTES
[facility]              [address]        [place], [address] [place]   [phone] fax    Plan of Care/ Statement of Necessity for Physical Therapy Services    Patient name: Faizan Martin Start of Care: 2021   Referral source: Hue Murphy,* : 1960    Medical Diagnosis: Right shoulder pain [M25.511]  Payor: [facility] MEDICARE / Plan: [facility] / Product Type: Managed Care Medicare /  Onset Date:2021    Treatment Diagnosis: Right shoulder pain   Prior Hospitalization: see medical history Provider#: [id number]   Medications: Verified on Patient summary List    Comorbidities: Back Pain, Arthritis, BMI 33.9, CHF/Heart Disease, Diabetes, Heart Attack, HBP, Kidney/Bladder or Urination Problem, Prior surgeries. Prior Level of Function: Cooks and cleans, does laundry and makes beds. Independent self care. The Plan of Care and following information is based on the information from the initial evaluation. Assessment/ key information: Patient with signs and symptoms consistent with right shoulder pain. She reports that the pain is chronic but has been elevated for a couple of months. She has limited AROM and decreased strength. Passive motion is limited and painful with guarding. Functionally, she notes she is unable to sleep on her right side and needs help with some of her normal activities. Palpation of the right shoulder is globally painful. Patient will benefit from a program of skilled physical therapy to include therapeutic exercises to address strength deficits, therapeutic activities to improve functional mobility, neuromuscular reeducation to address balance, coordination and proprioception, manual therapy to address ROM and tissue extensibility and modalities as indicated. All questions were answered.     Evaluation Complexity History HIGH Complexity :3+ comorbidities / personal factors will impact the outcome/ POC ; Examination MEDIUM Complexity : 3 Standardized tests and measures addressing body structure, function, activity limitation and / or participation in recreation  ;Presentation HIGH Complexity : Unstable and unpredictable characteristics  ; Clinical Decision Making MEDIUM Complexity : FOTO score of 26-74  Overall Complexity Rating: MEDIUM  Problem List: pain affecting function, decrease ROM, decrease strength, impaired gait/ balance, decrease ADL/ functional abilitiies, decrease activity tolerance and decrease flexibility/ joint mobility   Treatment Plan may include any combination of the following: Therapeutic exercise, Therapeutic activities, Neuromuscular re-education, Physical agent/modality and Manual therapy  Patient / Family readiness to learn indicated by: asking questions, trying to perform skills and interest  Persons(s) to be included in education: patient (P)  Barriers to Learning/Limitations: None  Patient Goal (s): pain relief, to be able to lift grocery bags and I want to be able to do my own housework and make my bed  Patient Self Reported Health Status: poor  Rehabilitation Potential: fair    Short Term Goals: To be accomplished in 1-2 treatments:  1. Patient will become proficient in their HEP and will be compliant in performing that program.  Evaluation:   Patient given a written/illustrated HEP. Long Term Goals: To be accomplished in 10 treatments:  1. Patient's pain level will be 4-5/10 with activity in order to improve patient's ability to perform normal ADLs. Evaluation:  8/10-10/10  2. Patient will demonstrate AROM right shoulder flex 0-130, scaption 0-100, IR 0-45, ER 0-40 to increase ease of ADLs. Evaluation:  AROM right shoulder flexion 0-49; scaption 0-53; IR/ER NT.  3. Patient will increase FOTO score to 59 to indicate increased functional mobility. Evaluation:  44  4.  Patient will be able to lift and carry a light grocery bag in order to perform normal ADLs.  Evaluation:  Currently notes she needs help with groceries, has pain with lifting. Frequency / Duration: Patient to be seen 2 times per week for 10 treatments. Patient/ Caregiver education and instruction: Diagnosis, prognosis, exercises   [x]  Plan of care has been reviewed with PTA      Certification Period: 12/7/2021 - 1/5/2022  Shant Ramiro, PT 12/7/2021 8:04 AM  ________________________________________________________________________    I certify that the above Therapy Services are being furnished while the patient is under my care. I agree with the treatment plan and certify that this therapy is necessary.     [de-identified] Signature:____________Date:_________TIME:________     Rocio Briseno,*  ** Signature, Date and Time must be completed for valid certification **  Please sign and return to [facility] - [facility]              [address], [address] [place]   [phone] fax

## 2021-12-07 NOTE — PROGRESS NOTES
PT DAILY TREATMENT NOTE/SHOULDER EVAL     Patient Name: Bruce Blancas  Date:2021  : 1960  [x]  Patient  Verified  Payor: [facility] MEDICARE / [facility] / Product Type: Managed Care Medicare /    In time:11:02  Out time:11:45  Total Treatment Time (min): 43  Visit #: 1 of 10    Medicare/BCBS Only   Total Timed Codes (min):  23 1:1 Treatment Time:  43       Treatment Area: Right shoulder pain [M25.511]    SUBJECTIVE  Pain Level (0-10 scale): 8/10 now; 6/10 at best; 10/10 at worst.  [x]constant []intermittent []improving []worsening [x]no change since onset    Any medication changes, allergies to medications, adverse drug reactions, diagnosis change, or new procedure performed?: [x] No    [] Yes (see summary sheet for update)  Subjective functional status/changes:     PLOF: Cooks and cleans, does laundry and makes beds. Independent self care. Limitations to PLOF: Unable to squeeze a mop, difficulty lifting grocery bags, needs help making her bed and needs help with house cleaning. Mechanism of Injury: Patient has had \"multiple falls\" and states she usually lands on her shoulder. Has been having shoulder pain for over a year. Shoulder pain has become worse over the past few months. Current symptoms/Complaints: Patient reports constant pain in the right shoulder. Indicates that it is on the superior aspect of the [address] joint and notes \"it's where my rotator cuff is at\". Unable to sleep on her right side. Previous Treatment/Compliance: Injection recently did not help. PMHx/Surgical Hx: 15 years ago had a rotator cuff repair. Work Hx: Disabled  Pt Goals: \"pain relief, to be able to lift grocery bags and I want to be able to do my own housework and make my bed\".   Barriers: [x]pain []financial [x]time [x]transportation []other  Cognition: A & O x 3    Other:    OBJECTIVE/EXAMINATION  Domestic Life: Lives alone  Activity/Recreational Limitations: Limited due to multiple issues but shoulder is limiting her activity most.  Mobility: ambulates with SPC and left knee brace. Gait is with patient dragging her right leg with left knee flexion contracture and bilateral hip flexion contracture. Self Care: Independent. 20 min [x]Eval                  []Re-Eval       23 min Therapeutic Exercise:  [] See flow sheet :   Rationale: increase ROM and increase strength to improve the patients ability to increase her functional activity level. With   [] TE   [] TA   [] neuro   [] other: Patient Education: [x] Review HEP    [] Progressed/Changed HEP based on:   [] positioning   [] body mechanics   [] transfers   [] heat/ice application    [] other:      Other Objective/Functional Measures:     Physical Therapy Evaluation - Shoulder    Posture: [] Poor    [x] Fair    [] Good    Describe: Rounded shoulders, standing with bilateral hips flexed. ROM:  [] Unable to assess at this time                                           AROM                                                              PROM   Left Right  Left Right   Flexion 0-138 0-49 Flexion  0-115   Extension   Extension     Scaption/ABD 0-106 0-53 Scaptin/ABD  0-60   ER @ 0 Degrees NT NT ER @ 0 Degrees  0-5   ER @ 90 Degrees 0-63 NT ER @ 90 Degrees  NT   IR @ 90 Degrees 0-55 NT IR @ 90 Degrees  NT     End Feel / Painful Arc: No.      Strength:   [] Unable to assess at this time                                                                            L (1-5) R (1-5) Pain   Flexors 4 2 [] Yes   [] No   Abductors 4 2 [] Yes   [] No   External Rotators 4 4 [] Yes   [] No   Internal Rotators 4 4 [] Yes   [] No      [] Yes   [] No      [] Yes   [] No      [] Yes   [] No   Elbow Flexion 4 4 [] Yes   [] No   Elbow Extension 4+ 4 [] Yes   [] No       Scapulohumoral Control / Rhythm:  Able to eccentrically lower with good control?  Left: [x] Yes   [] No     Right: [x] Yes   [] No from available passive abduction    Palpation  [] Min [] Mod  [x] Severe    Location: bicipital groove  [] Min  [x] Mod  [] Severe    Location: right AC joint, lateral deltoid, subacromial space, posterior GH joint. Neer's Test  [x] Pos   [] Neg   Hawkin's Test  [x] Pos   [] Neg   Crossover Test [x] Pos   [] Neg       Other Tests / Comments:   IMAGING: XR of the right shoulder with 3 views obtained in the office dated 10/25/2021 was reviewed and read by Dr. Latosha Tidwell: Marked degenerative changes in the glenohumeral joint      Pain with all gentle passive motion. Guarding through the available ROM. Pain Level (0-10 scale) post treatment: 8    ASSESSMENT/Changes in Function: Patient with signs and symptoms consistent with right shoulder pain. She reports that the pain is chronic but has been elevated for a couple of months. She has limited AROM and decreased strength. Passive motion is limited and painful with guarding. Functionally, she notes she is unable to sleep on her right side and needs help with some of her normal activities. Palpation of the right shoulder is globally painful. Patient will continue to benefit from skilled PT services to modify and progress therapeutic interventions, address functional mobility deficits, address ROM deficits, address strength deficits, analyze and address soft tissue restrictions, analyze and cue movement patterns, analyze and modify body mechanics/ergonomics and assess and modify postural abnormalities to attain remaining goals. [x]  See Plan of Care  []  See progress note/recertification  []  See Discharge Summary         Progress towards goals / Updated goals:  Short Term Goals: To be accomplished in 1-2 treatments:  1. Patient will become proficient in their HEP and will be compliant in performing that program.  Evaluation:   Patient given a written/illustrated HEP.     Long Term Goals: To be accomplished in 10 treatments:  1.  Patient's pain level will be 4-5/10 with activity in order to improve patient's ability to perform normal ADLs. Evaluation:  8/10-10/10  2. Patient will demonstrate AROM right shoulder flex 0-130, scaption 0-100, IR 0-45, ER 0-40 to increase ease of ADLs. Evaluation:  AROM right shoulder flexion 0-49; scaption 0-53; IR/ER NT.  3. Patient will increase FOTO score to 59 to indicate increased functional mobility. Evaluation:  44  4. Patient will be able to lift and carry a light grocery bag in order to perform normal ADLs.   Evaluation:  Currently notes she needs help with groceries, has pain with lifting.       PLAN  [x]  Upgrade activities as tolerated     [x]  Continue plan of care  []  Update interventions per flow sheet       []  Discharge due to:_  []  Other:_      Daksha An, PT 12/7/2021  8:07 AM

## 2021-12-14 ENCOUNTER — APPOINTMENT (OUTPATIENT)
Dept: PHYSICAL THERAPY | Age: 61
End: 2021-12-14
Attending: ORTHOPAEDIC SURGERY
Payer: MEDICARE

## 2022-01-17 NOTE — PROGRESS NOTES
[facility]              [address]        [place], [address]   [phone] fax    Discharge Summary  Patient name: Fabiana Lopez Start of Care: 2021   Referral source: [place],* : 1960   Medical/Treatment Diagnosis: Right shoulder pain [M25.511]  Payor: [facility] MEDICARE / Plan: [facility] / Product Type: Managed Care Medicare /  Onset Date:2021     Prior Hospitalization: see medical history Provider#: [id number]   Medications: Verified on Patient Summary List    Comorbidities: Back Pain, Arthritis, BMI 33.9, CHF/Heart Disease, Diabetes, Heart Attack, HBP, Kidney/Bladder or Urination Problem, Prior surgeries. Prior Level of Function: Cooks and cleans, does laundry and makes beds.  Independent self care. Visits from Start of Care: 1    Missed Visits: 1  Reporting Period : 2021 to 2022    Summary of Care:  Short Term Goals: To be accomplished in 1-2 treatments:  1.  Patient will become proficient in their HEP and will be compliant in performing that program.  Evaluation:   Patient given a written/illustrated HEP.     Long Term Goals: To be accomplished in 10 treatments:  1. Patient's pain level will be 4-5/10 with activity in order to improve patient's ability to perform normal ADLs. Evaluation:  8/10-10/10  2. Patient will demonstrate AROM right shoulder flex 0-130, scaption 0-100, IR 0-45, ER 0-40 to increase ease of ADLs. Evaluation:  AROM right shoulder flexion 0-49; scaption 0-53; IR/ER NT.  3. Patient will increase FOTO score to 59 to indicate increased functional mobility. Evaluation:  44  4. Patient will be able to lift and carry a light grocery bag in order to perform normal ADLs. Evaluation:  Currently notes she needs help with groceries, has pain with lifting. Unable to update goals, patient non compliant with POC.   We have attempted to contact this patient on 4 occasions, unable to leave a message, patient did not call back. ASSESSMENT/RECOMMENDATIONS:  [x]Discontinue therapy: []Patient has reached or is progressing toward set goals      [x]Patient is non-compliant       []Due to lack of appreciable progress towards set goals    Lobo Santacruz, PT 1/17/2022 8:55 AM    NOTE TO PHYSICIAN:  Please complete the following and fax to: [facility] at [facility] at [phone]  . Retain this original for your records. If you are unable to process this request in   24 hours, please contact our office.      [] I have read the above report and request that my patient continue therapy with the following changes/special instructions:  [] I have read the above report and request that my patient be discharged from therapy    Physician's Signature:____________Date:_________TIME:________     Garima Sarmiento Signature, Date and Time must be completed for valid certification **

## 2022-01-24 DIAGNOSIS — M47.817 SPONDYLOSIS OF LUMBOSACRAL REGION WITHOUT MYELOPATHY OR RADICULOPATHY: ICD-10-CM

## 2022-01-24 DIAGNOSIS — M54.16 LUMBAR RADICULOPATHY: ICD-10-CM

## 2022-01-24 RX ORDER — PREGABALIN 300 MG/1
300 CAPSULE ORAL 2 TIMES DAILY
Qty: 60 CAPSULE | Refills: 2 | Status: SHIPPED | OUTPATIENT
Start: 2022-01-24 | End: 2022-04-01 | Stop reason: SDUPTHER

## 2022-01-24 RX ORDER — BACLOFEN 10 MG/1
10 TABLET ORAL 2 TIMES DAILY
Qty: 60 TABLET | Refills: 1 | Status: SHIPPED | OUTPATIENT
Start: 2022-01-24 | End: 2022-03-27

## 2022-01-24 NOTE — TELEPHONE ENCOUNTER
Last Visit: 10/25/21 with MD Anupama Hyde  Next Appointment: 2/4/22 with MD Anupama Hyde  Previous Refill Encounter(s): 10/18/21 Lyrica #60 with 2 refills, 11/7/21 Baclofen #60 with 1 refill    Requested Prescriptions     Pending Prescriptions Disp Refills    baclofen (LIORESAL) 10 mg tablet 60 Tablet 1     Sig: Take 1 Tablet by mouth two (2) times a day.  pregabalin (Lyrica) 300 mg capsule 60 Capsule 2     Sig: Take 1 Capsule by mouth two (2) times a day. Max Daily Amount: 600 mg.
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
## 2022-02-04 ENCOUNTER — OFFICE VISIT (OUTPATIENT)
Dept: ORTHOPEDIC SURGERY | Age: 62
End: 2022-02-04
Payer: MEDICARE

## 2022-02-04 VITALS
TEMPERATURE: 97.5 F | OXYGEN SATURATION: 100 % | HEART RATE: 80 BPM | HEIGHT: 59 IN | BODY MASS INDEX: 33.47 KG/M2 | WEIGHT: 166 LBS

## 2022-02-04 DIAGNOSIS — M54.16 LUMBAR RADICULOPATHY: Primary | ICD-10-CM

## 2022-02-04 DIAGNOSIS — M54.16 LUMBAR NEURITIS: ICD-10-CM

## 2022-02-04 DIAGNOSIS — M47.817 LUMBOSACRAL SPONDYLOSIS WITHOUT MYELOPATHY: ICD-10-CM

## 2022-02-04 PROCEDURE — 3017F COLORECTAL CA SCREEN DOC REV: CPT | Performed by: PHYSICAL MEDICINE & REHABILITATION

## 2022-02-04 PROCEDURE — G8427 DOCREV CUR MEDS BY ELIG CLIN: HCPCS | Performed by: PHYSICAL MEDICINE & REHABILITATION

## 2022-02-04 PROCEDURE — G8417 CALC BMI ABV UP PARAM F/U: HCPCS | Performed by: PHYSICAL MEDICINE & REHABILITATION

## 2022-02-04 PROCEDURE — G8756 NO BP MEASURE DOC: HCPCS | Performed by: PHYSICAL MEDICINE & REHABILITATION

## 2022-02-04 PROCEDURE — G8432 DEP SCR NOT DOC, RNG: HCPCS | Performed by: PHYSICAL MEDICINE & REHABILITATION

## 2022-02-04 PROCEDURE — 99213 OFFICE O/P EST LOW 20 MIN: CPT | Performed by: PHYSICAL MEDICINE & REHABILITATION

## 2022-02-04 NOTE — LETTER
2/4/2022    Patient: Eunice Salazar   YOB: 1960   Date of Visit: 2/4/2022     BERNICE Sebastian  [facility] [address]  Via Fax: [phone]    Dear BERNICE Sebastian,      Thank you for referring Ms. Eunice Salazar to [address] for evaluation. My notes for this consultation are attached. If you have questions, please do not hesitate to call me. I look forward to following your patient along with you.       Sincerely,    Kandy Gutierrez MD

## 2022-03-18 PROBLEM — M47.812 SPONDYLOSIS WITHOUT MYELOPATHY OR RADICULOPATHY, CERVICAL REGION: Status: ACTIVE | Noted: 2017-02-03

## 2022-03-19 PROBLEM — M47.817 SPONDYLOSIS OF LUMBOSACRAL JOINT WITHOUT MYELOPATHY: Status: ACTIVE | Noted: 2017-05-09

## 2022-03-19 PROBLEM — E11.40 TYPE 2 DIABETES MELLITUS WITH DIABETIC NEUROPATHY (HCC): Status: ACTIVE | Noted: 2018-01-10

## 2022-03-19 PROBLEM — M54.12 RADICULOPATHY, CERVICAL: Status: ACTIVE | Noted: 2017-02-03

## 2022-03-19 PROBLEM — I21.4 NSTEMI (NON-ST ELEVATED MYOCARDIAL INFARCTION) (HCC): Status: ACTIVE | Noted: 2020-05-12

## 2022-03-19 PROBLEM — M50.30 OTHER CERVICAL DISC DEGENERATION, UNSPECIFIED CERVICAL REGION: Status: ACTIVE | Noted: 2017-02-03

## 2022-03-19 PROBLEM — R55 SYNCOPE AND COLLAPSE: Status: ACTIVE | Noted: 2020-05-12

## 2022-03-19 PROBLEM — M47.812 CERVICAL SPONDYLOSIS WITHOUT MYELOPATHY: Status: ACTIVE | Noted: 2017-10-11

## 2022-03-19 PROBLEM — M54.16 LUMBAR RADICULOPATHY: Status: ACTIVE | Noted: 2018-09-24

## 2022-03-20 DIAGNOSIS — G60.9 IDIOPATHIC PERIPHERAL NEUROPATHY: ICD-10-CM

## 2022-03-20 DIAGNOSIS — M47.812 CERVICAL SPONDYLOSIS WITHOUT MYELOPATHY: ICD-10-CM

## 2022-03-20 DIAGNOSIS — M50.30 DDD (DEGENERATIVE DISC DISEASE), CERVICAL: ICD-10-CM

## 2022-03-20 DIAGNOSIS — M54.16 LUMBAR NEURITIS: ICD-10-CM

## 2022-03-20 DIAGNOSIS — M47.817 LUMBOSACRAL SPONDYLOSIS WITHOUT MYELOPATHY: ICD-10-CM

## 2022-03-20 PROBLEM — M54.12 CERVICAL RADICULOPATHY: Status: ACTIVE | Noted: 2018-04-18

## 2022-03-20 PROBLEM — M75.110: Status: ACTIVE | Noted: 2017-06-09

## 2022-03-20 PROBLEM — M75.112 INCOMPLETE TEAR OF LEFT ROTATOR CUFF: Status: ACTIVE | Noted: 2017-05-09

## 2022-03-20 PROBLEM — R32 URINARY INCONTINENCE: Status: ACTIVE | Noted: 2018-04-18

## 2022-03-20 PROBLEM — M50.20 HNP (HERNIATED NUCLEUS PULPOSUS), CERVICAL: Status: ACTIVE | Noted: 2018-09-24

## 2022-03-21 RX ORDER — DULOXETIN HYDROCHLORIDE 30 MG/1
CAPSULE, DELAYED RELEASE ORAL
Qty: 30 CAPSULE | Refills: 5 | OUTPATIENT
Start: 2022-03-21

## 2022-03-27 DIAGNOSIS — M50.30 DDD (DEGENERATIVE DISC DISEASE), CERVICAL: ICD-10-CM

## 2022-03-27 DIAGNOSIS — M47.812 CERVICAL SPONDYLOSIS WITHOUT MYELOPATHY: ICD-10-CM

## 2022-03-27 DIAGNOSIS — M47.817 LUMBOSACRAL SPONDYLOSIS WITHOUT MYELOPATHY: ICD-10-CM

## 2022-03-27 DIAGNOSIS — M54.16 LUMBAR NEURITIS: ICD-10-CM

## 2022-03-27 DIAGNOSIS — G60.9 IDIOPATHIC PERIPHERAL NEUROPATHY: ICD-10-CM

## 2022-03-27 RX ORDER — DULOXETIN HYDROCHLORIDE 30 MG/1
CAPSULE, DELAYED RELEASE ORAL
Qty: 30 CAPSULE | Refills: 5 | Status: SHIPPED | OUTPATIENT
Start: 2022-03-27

## 2022-03-27 RX ORDER — BACLOFEN 10 MG/1
TABLET ORAL
Qty: 60 TABLET | Refills: 1 | Status: SHIPPED | OUTPATIENT
Start: 2022-03-27 | End: 2022-06-08
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
## 2022-04-01 ENCOUNTER — OFFICE VISIT (OUTPATIENT)
Dept: ORTHOPEDIC SURGERY | Age: 62
End: 2022-04-01
Payer: MEDICARE

## 2022-04-01 VITALS
WEIGHT: 170 LBS | TEMPERATURE: 97.5 F | BODY MASS INDEX: 34.27 KG/M2 | HEART RATE: 77 BPM | OXYGEN SATURATION: 98 % | HEIGHT: 59 IN

## 2022-04-01 DIAGNOSIS — M54.16 LUMBAR NEURITIS: ICD-10-CM

## 2022-04-01 DIAGNOSIS — M50.30 DDD (DEGENERATIVE DISC DISEASE), CERVICAL: ICD-10-CM

## 2022-04-01 DIAGNOSIS — M54.2 CERVICAL PAIN: Primary | ICD-10-CM

## 2022-04-01 DIAGNOSIS — M54.2 CERVICAL PAIN: ICD-10-CM

## 2022-04-01 DIAGNOSIS — M47.817 LUMBOSACRAL SPONDYLOSIS WITHOUT MYELOPATHY: ICD-10-CM

## 2022-04-01 DIAGNOSIS — M54.16 LUMBAR RADICULOPATHY: ICD-10-CM

## 2022-04-01 DIAGNOSIS — M47.812 CERVICAL SPONDYLOSIS WITHOUT MYELOPATHY: ICD-10-CM

## 2022-04-01 DIAGNOSIS — M54.12 CERVICAL NEURITIS: ICD-10-CM

## 2022-04-01 DIAGNOSIS — R26.89 LOSS OF BALANCE: ICD-10-CM

## 2022-04-01 DIAGNOSIS — M47.817 SPONDYLOSIS OF LUMBOSACRAL REGION WITHOUT MYELOPATHY OR RADICULOPATHY: ICD-10-CM

## 2022-04-01 PROCEDURE — G8427 DOCREV CUR MEDS BY ELIG CLIN: HCPCS | Performed by: PHYSICAL MEDICINE & REHABILITATION

## 2022-04-01 PROCEDURE — G8417 CALC BMI ABV UP PARAM F/U: HCPCS | Performed by: PHYSICAL MEDICINE & REHABILITATION

## 2022-04-01 PROCEDURE — G8432 DEP SCR NOT DOC, RNG: HCPCS | Performed by: PHYSICAL MEDICINE & REHABILITATION

## 2022-04-01 PROCEDURE — 3017F COLORECTAL CA SCREEN DOC REV: CPT | Performed by: PHYSICAL MEDICINE & REHABILITATION

## 2022-04-01 PROCEDURE — 99213 OFFICE O/P EST LOW 20 MIN: CPT | Performed by: PHYSICAL MEDICINE & REHABILITATION

## 2022-04-01 PROCEDURE — G8756 NO BP MEASURE DOC: HCPCS | Performed by: PHYSICAL MEDICINE & REHABILITATION

## 2022-04-01 PROCEDURE — 72040 X-RAY EXAM NECK SPINE 2-3 VW: CPT | Performed by: PHYSICAL MEDICINE & REHABILITATION

## 2022-04-01 RX ORDER — PREGABALIN 300 MG/1
300 CAPSULE ORAL 2 TIMES DAILY
Qty: 60 CAPSULE | Refills: 2 | Status: SHIPPED | OUTPATIENT
Start: 2022-04-01 | End: 2022-07-08 | Stop reason: SDUPTHER

## 2022-04-01 RX ORDER — FLASH GLUCOSE SENSOR
KIT MISCELLANEOUS
COMMUNITY
Start: 2022-03-23

## 2022-04-01 NOTE — LETTER
4/1/2022    Patient: Kj Hoyos   YOB: 1960   Date of Visit: 4/1/2022     BERNICE Thomas  [place] [address]  Via Fax: [phone]    Dear BERNICE Thomas,      Thank you for referring Ms. Kj Hoyos to [address] for evaluation. My notes for this consultation are attached. If you have questions, please do not hesitate to call me. I look forward to following your patient along with you.       Sincerely,    Gunnar Waller MD

## 2022-04-07 ENCOUNTER — OFFICE VISIT (OUTPATIENT)
Dept: ORTHOPEDIC SURGERY | Age: 62
End: 2022-04-07
Payer: MEDICARE

## 2022-04-07 VITALS
HEART RATE: 69 BPM | TEMPERATURE: 97 F | OXYGEN SATURATION: 100 % | WEIGHT: 167 LBS | RESPIRATION RATE: 16 BRPM | HEIGHT: 59 IN | BODY MASS INDEX: 33.67 KG/M2

## 2022-04-07 DIAGNOSIS — M17.11 PRIMARY OSTEOARTHRITIS OF RIGHT KNEE: Primary | ICD-10-CM

## 2022-04-07 DIAGNOSIS — Z96.652 HISTORY OF LEFT KNEE REPLACEMENT: ICD-10-CM

## 2022-04-07 DIAGNOSIS — M70.52 PES ANSERINUS BURSITIS OF LEFT KNEE: ICD-10-CM

## 2022-04-07 PROCEDURE — 3017F COLORECTAL CA SCREEN DOC REV: CPT | Performed by: PHYSICIAN ASSISTANT

## 2022-04-07 PROCEDURE — G8427 DOCREV CUR MEDS BY ELIG CLIN: HCPCS | Performed by: PHYSICIAN ASSISTANT

## 2022-04-07 PROCEDURE — G8432 DEP SCR NOT DOC, RNG: HCPCS | Performed by: PHYSICIAN ASSISTANT

## 2022-04-07 PROCEDURE — 99214 OFFICE O/P EST MOD 30 MIN: CPT | Performed by: PHYSICIAN ASSISTANT

## 2022-04-07 PROCEDURE — G8417 CALC BMI ABV UP PARAM F/U: HCPCS | Performed by: PHYSICIAN ASSISTANT

## 2022-04-07 PROCEDURE — G8756 NO BP MEASURE DOC: HCPCS | Performed by: PHYSICIAN ASSISTANT

## 2022-04-07 PROCEDURE — 20611 DRAIN/INJ JOINT/BURSA W/US: CPT | Performed by: PHYSICIAN ASSISTANT

## 2022-04-07 RX ORDER — BETAMETHASONE SODIUM PHOSPHATE AND BETAMETHASONE ACETATE 3; 3 MG/ML; MG/ML
3 INJECTION, SUSPENSION INTRA-ARTICULAR; INTRALESIONAL; INTRAMUSCULAR; SOFT TISSUE ONCE
Status: COMPLETED | OUTPATIENT
Start: 2022-04-07 | End: 2022-04-07

## 2022-04-07 RX ORDER — BETAMETHASONE SODIUM PHOSPHATE AND BETAMETHASONE ACETATE 3; 3 MG/ML; MG/ML
6 INJECTION, SUSPENSION INTRA-ARTICULAR; INTRALESIONAL; INTRAMUSCULAR; SOFT TISSUE ONCE
Status: COMPLETED | OUTPATIENT
Start: 2022-04-07 | End: 2022-04-07

## 2022-04-07 RX ADMIN — BETAMETHASONE SODIUM PHOSPHATE AND BETAMETHASONE ACETATE 6 MG: 3; 3 INJECTION, SUSPENSION INTRA-ARTICULAR; INTRALESIONAL; INTRAMUSCULAR; SOFT TISSUE at 10:31

## 2022-04-07 RX ADMIN — BETAMETHASONE SODIUM PHOSPHATE AND BETAMETHASONE ACETATE 3 MG: 3; 3 INJECTION, SUSPENSION INTRA-ARTICULAR; INTRALESIONAL; INTRAMUSCULAR; SOFT TISSUE at 10:30
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
## 2022-04-13 ENCOUNTER — APPOINTMENT (OUTPATIENT)
Dept: CT IMAGING | Age: 62
End: 2022-04-13
Attending: PHYSICAL MEDICINE & REHABILITATION
Payer: MEDICARE

## 2022-04-13 ENCOUNTER — HOSPITAL ENCOUNTER (OUTPATIENT)
Dept: GENERAL RADIOLOGY | Age: 62
Discharge: HOME | End: 2022-04-13
Attending: RADIOLOGY | Admitting: RADIOLOGY
Payer: MEDICARE

## 2022-04-13 VITALS
DIASTOLIC BLOOD PRESSURE: 83 MMHG | HEART RATE: 56 BPM | TEMPERATURE: 97.5 F | HEIGHT: 59 IN | WEIGHT: 163.9 LBS | RESPIRATION RATE: 16 BRPM | OXYGEN SATURATION: 100 % | SYSTOLIC BLOOD PRESSURE: 148 MMHG | BODY MASS INDEX: 33.04 KG/M2

## 2022-04-13 DIAGNOSIS — M50.30 DDD (DEGENERATIVE DISC DISEASE), CERVICAL: ICD-10-CM

## 2022-04-13 DIAGNOSIS — M54.12 CERVICAL NEURITIS: ICD-10-CM

## 2022-04-13 DIAGNOSIS — M47.812 CERVICAL SPONDYLOSIS WITHOUT MYELOPATHY: ICD-10-CM

## 2022-04-13 DIAGNOSIS — M54.2 CERVICAL PAIN: ICD-10-CM

## 2022-04-13 LAB
ANION GAP SERPL CALC-SCNC: 2 MMOL/L (ref 3–18)
APTT PPP: 29.8 SEC (ref 23–36.4)
BUN SERPL-MCNC: 21 MG/DL (ref 7–18)
BUN/CREAT SERPL: 20 (ref 12–20)
CALCIUM SERPL-MCNC: 8.6 MG/DL (ref 8.5–10.1)
CHLORIDE SERPL-SCNC: 106 MMOL/L (ref 100–111)
CO2 SERPL-SCNC: 32 MMOL/L (ref 21–32)
CREAT SERPL-MCNC: 1.06 MG/DL (ref 0.6–1.3)
ERYTHROCYTE [DISTWIDTH] IN BLOOD BY AUTOMATED COUNT: 14.3 % (ref 11.6–14.5)
GLUCOSE BLD STRIP.AUTO-MCNC: 89 MG/DL (ref 70–110)
GLUCOSE SERPL-MCNC: 84 MG/DL (ref 74–99)
HCT VFR BLD AUTO: 36.4 % (ref 35–45)
HGB BLD-MCNC: 12 G/DL (ref 12–16)
INR PPP: 1.1 (ref 0.8–1.2)
MCH RBC QN AUTO: 29.4 PG (ref 24–34)
MCHC RBC AUTO-ENTMCNC: 33 G/DL (ref 31–37)
MCV RBC AUTO: 89.2 FL (ref 78–100)
NRBC # BLD: 0 K/UL (ref 0–0.01)
NRBC BLD-RTO: 0 PER 100 WBC
PLATELET # BLD AUTO: 238 K/UL (ref 135–420)
PMV BLD AUTO: 10.6 FL (ref 9.2–11.8)
POTASSIUM SERPL-SCNC: 3 MMOL/L (ref 3.5–5.5)
PROTHROMBIN TIME: 14 SEC (ref 11.5–15.2)
RBC # BLD AUTO: 4.08 M/UL (ref 4.2–5.3)
SODIUM SERPL-SCNC: 140 MMOL/L (ref 136–145)
WBC # BLD AUTO: 4.4 K/UL (ref 4.6–13.2)

## 2022-04-13 PROCEDURE — 74011250636 HC RX REV CODE- 250/636: Performed by: PHYSICAL MEDICINE & REHABILITATION

## 2022-04-13 PROCEDURE — 85027 COMPLETE CBC AUTOMATED: CPT

## 2022-04-13 PROCEDURE — 85610 PROTHROMBIN TIME: CPT

## 2022-04-13 PROCEDURE — 80048 BASIC METABOLIC PNL TOTAL CA: CPT

## 2022-04-13 PROCEDURE — 72126 CT NECK SPINE W/DYE: CPT

## 2022-04-13 PROCEDURE — 74011000636 HC RX REV CODE- 636: Performed by: PHYSICAL MEDICINE & REHABILITATION

## 2022-04-13 PROCEDURE — 82962 GLUCOSE BLOOD TEST: CPT

## 2022-04-13 PROCEDURE — 85730 THROMBOPLASTIN TIME PARTIAL: CPT

## 2022-04-13 PROCEDURE — 62302 MYELOGRAPHY LUMBAR INJECTION: CPT

## 2022-04-13 RX ORDER — ACETAMINOPHEN 325 MG/1
650 TABLET ORAL
Status: DISCONTINUED | OUTPATIENT
Start: 2022-04-13 | End: 2022-04-13 | Stop reason: HOSPADM

## 2022-04-13 RX ORDER — LIDOCAINE HYDROCHLORIDE 10 MG/ML
5 INJECTION, SOLUTION EPIDURAL; INFILTRATION; INTRACAUDAL; PERINEURAL
Status: COMPLETED | OUTPATIENT
Start: 2022-04-13 | End: 2022-04-13

## 2022-04-13 RX ORDER — SODIUM CHLORIDE 9 MG/ML
20 INJECTION, SOLUTION INTRAVENOUS CONTINUOUS
Status: DISCONTINUED | OUTPATIENT
Start: 2022-04-13 | End: 2022-04-13 | Stop reason: HOSPADM

## 2022-04-13 RX ADMIN — LIDOCAINE HYDROCHLORIDE 5 ML: 10 INJECTION, SOLUTION EPIDURAL; INFILTRATION; INTRACAUDAL; PERINEURAL at 10:28

## 2022-04-13 RX ADMIN — IOPAMIDOL 10 ML: 612 INJECTION, SOLUTION INTRATHECAL at 10:28

## 2022-04-13 RX ADMIN — SODIUM CHLORIDE 20 ML/HR: 9 INJECTION, SOLUTION INTRAVENOUS at 09:52

## 2022-04-13 NOTE — DISCHARGE INSTRUCTIONS
Patient Education        Myelogram: About This Test  What is it? A myelogram uses X-rays and a special dye to make pictures of bones and nerves of the spine (spinal canal). The spinal canal holds the spinal cord, the spinal nerve roots, and the fluid-filled space between the bones in your spine. Why is this test done? A myelogram is done to check for:  · The cause of arm or leg numbness, weakness, or pain. · Narrowing of the spinal canal (spinal stenosis). · A tumor or infection causing problems with the spinal cord or nerve roots. · A spinal disc that has ruptured (herniated disc). · Inflammation of the membrane that covers the brain and spinal cord. · Problems with the blood vessels to the spine. This test may help find the cause of pain that can't be found by other tests, such as an MRI or a CT scan. How do you prepare for the test?  Your doctor will tell you if you need to change how much you eat and drink before the myelogram. You may be asked to increase the amount of water you drink before the test. Follow the instructions your doctor gives you about eating and drinking. If you take aspirin or some other blood thinner, ask your doctor if you should stop taking it before your test. Make sure that you understand exactly what your doctor wants you to do. These medicines increase the risk of bleeding. Be sure you have someone to take you home. Anesthesia and pain medicine will make it unsafe for you to drive or get home on your own. How is the test done? The dye is put into your spinal canal with a thin needle. This is called a lumbar puncture. The dye moves through the space so the nerve roots and spinal cord can be seen more clearly. After the dye is put in, you will lie still while the X-ray pictures are taken. How does it feel? You will feel a quick sting from a small needle that has medicine to numb the skin on your back.  You will also feel some pressure as the long, thin spinal needle is put into your spinal canal. You may feel a quick, sharp pain down your buttock or leg when the needle is moved in your spine. You may find it hard to lie on your stomach or side during this test.  The dye may make you feel warm and flushed and have a metallic taste in your mouth. Some people feel sick to their stomach or have a headache. Tell your doctor how you are feeling. How long does the test take? · A myelogram usually takes 30 minutes to 1 hour. What happens after the test?  · You will probably be able to go home 30 minutes to 2 hours after the test.  · You may need to lie in bed with your head raised for 4 to 24 hours after the test. To prevent seizures, which are a rare side effect, do not bend over or lie down with your head lower than your body. Keeping your head higher than your body after a myelogram also may help prevent or reduce other side effects, such as headache, nausea, or vomiting. · Avoid strenuous activity, such as running or heavy lifting, for at least 1 day after your myelogram.  · Drink plenty of water after the myelogram. Your doctor will give you instructions on taking your regular medicines. When should you call for help? Call 911 anytime you think you may need emergency care. For example, call if:  · You have a seizure. Call your doctor now or seek immediate medical care if:  · You have any increase in pain, weakness, or numbness in your legs. · You have a severe headache or stiff neck, or your eyes become very sensitive to light. · You have a headache that lasts longer than 24 hours. · You have problems urinating or having a bowel movement. · You have a fever. Follow-up care is a key part of your treatment and safety. Be sure to make and go to all appointments, and call your doctor if you are having problems. It's also a good idea to keep a list of the medicines you take. Ask your doctor when you can expect to have your test results. Where can you learn more?   Go to http://www.gray.com/  Enter [id number] in the search box to learn more about \"Myelogram: About This Test.\"  Current as of: June 17, 2021               Content Version: 13.2  © 7542-9196 Healthwise, [facility]. Care instructions adapted under license by [employer] (which disclaims liability or warranty for this information). If you have questions about a medical condition or this instruction, always ask your healthcare professional. [address] any warranty or liability for your use of this information.

## 2022-04-27 NOTE — TELEPHONE ENCOUNTER
Call pt for rx 
Upper Range (In Mg/Kg): 150
Headache Monitoring: I recommended monitoring the headaches for now. There is no evidence of increased intracranial pressure. They were instructed to call if the headaches are worsening.
Counseling Text: I reviewed the side effect in detail. Patient should get monthly blood tests, not donate blood, not drive at night if vision affected, and not share medication.
Use Enhanced Counseling Feature (Automatic): No
Show Text Field For Brand Names Of Contraception?: Yes
Myalgia Treatment: I explained this is common when taking isotretinoin. If this worsens they will contact us. They may try OTC ibuprofen.
Retinoid Dermatitis Normal Treatment: I recommended more frequent application of Cetaphil or CeraVe to the areas of dermatitis.
Dosing Month 6 (Required For Cumulative Dosing): 24mg BID
Nosebleeds Normal Treatment: I explained this is common when taking isotretinoin. I recommended saline mist in each nostril multiple times a day. If this worsens they will contact us.
Retinoid Dermatitis Aggressive Treatment: I recommended more frequent application of Cetaphil or CeraVe to the areas of dermatitis. I also prescribed a topical steroid for twice daily use until the dermatitis resolves.
Cheilitis Aggressive Treatment: I recommended application of Vaseline or Aquaphor numerous times a day (as often as every hour) and before going to bed. I also prescribed a topical steroid for twice daily use.
Lower Range (In Mg/Kg): 120
Any Myalgia?: Yes - Monitoring
Xerosis Aggressive Treatment: I recommended application of Cetaphil or CeraVe numerous times a day and before going to bed to all dry areas. I also prescribed a topical steroid for twice daily use.
Kilograms Preamble Statement (Weight Entered In Details Tab): Reported Weight in kilograms:
Female Completion Statement: After discussing her treatment course we decided to discontinue isotretinoin therapy at this time. I explained that she would need to continue her birth control methods for at least one month after the last dosage. She should also get a pregnancy test one month after the last dose. She shouldn't donate blood for one month after the last dose. She should call with any new symptoms of depression.
Ipledge Number (Optional): [id number]
Hypertriglyceridemia Monitoring: I explained this is common when taking isotretinoin. We will monitor closely.
Patient Weight (Optional But Required For Cumulative Dose-Numbers And Decimals Only): 110
Pounds Preamble Statement (Weight Entered In Details Tab): Reported Weight in pounds:
Detail Level: Zone
Xerosis Normal Treatment: I recommended application of Cetaphil or CeraVe numerous times a day and before going to bed to all dry areas.
Dosing Month 8 (Required For Cumulative Dosing): 40mg TID
Xerosis Aggressive Treatment: I recommended application of Cetaphil or CeraVe numerous times a day going to bed to all dry areas. I also prescribed a topical steroid for twice daily use.
Female Pregnancy Counseling Text: Female patients should also be on two forms of birth control while taking this medication and for one month after their last dose.
Male Completion Statement: After discussing his treatment course we decided to discontinue isotretinoin therapy at this time. He shouldn't donate blood for one month after the last dose. He should call with any new symptoms of depression.
Target Cumulative Dosage (In Mg/Kg): 135
Dosing Month 7 (Required For Cumulative Dosing): 40mg BID
What Is The Patient's Gender: Female
Months Of Therapy Completed: 8
Xerosis Normal Treatment: I recommended application of Cetaphil or CeraVe numerous times a day going to bed to all dry areas.
Use Therapeutic Ranged Or Therapeutic Target: please select Range or Target
Cheilitis Normal Treatment: I recommended application of Vaseline or Aquaphor numerous times a day (as often as every hour) and before going to bed.
Weight Units: pounds
Myalgia Monitoring: I explained this is common when taking isotretinoin. If this worsens they will contact us.
Is Cheilitis Present?: Yes - Normal Treatment
Are Labs Available For Review?: No- Labs Deferred This Month
Comments: Will defer labs for remainder of course.
Hypertriglyceridemia Treatment: I explained this is common when taking isotretinoin. We will monitor closely. I recommended a daily fish oil supplement.
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
## 2022-05-06 ENCOUNTER — OFFICE VISIT (OUTPATIENT)
Dept: ORTHOPEDIC SURGERY | Age: 62
End: 2022-05-06
Payer: MEDICARE

## 2022-05-06 VITALS
HEIGHT: 59 IN | BODY MASS INDEX: 32.86 KG/M2 | TEMPERATURE: 97.4 F | OXYGEN SATURATION: 98 % | WEIGHT: 163 LBS | HEART RATE: 64 BPM

## 2022-05-06 DIAGNOSIS — M54.12 CERVICAL NEURITIS: ICD-10-CM

## 2022-05-06 DIAGNOSIS — M50.30 DDD (DEGENERATIVE DISC DISEASE), CERVICAL: ICD-10-CM

## 2022-05-06 DIAGNOSIS — M47.812 CERVICAL SPONDYLOSIS WITHOUT MYELOPATHY: ICD-10-CM

## 2022-05-06 DIAGNOSIS — M54.2 CERVICAL PAIN: Primary | ICD-10-CM

## 2022-05-06 DIAGNOSIS — M54.2 CERVICAL PAIN: ICD-10-CM

## 2022-05-06 PROCEDURE — G8756 NO BP MEASURE DOC: HCPCS | Performed by: PHYSICAL MEDICINE & REHABILITATION

## 2022-05-06 PROCEDURE — G8427 DOCREV CUR MEDS BY ELIG CLIN: HCPCS | Performed by: PHYSICAL MEDICINE & REHABILITATION

## 2022-05-06 PROCEDURE — 3017F COLORECTAL CA SCREEN DOC REV: CPT | Performed by: PHYSICAL MEDICINE & REHABILITATION

## 2022-05-06 PROCEDURE — G8417 CALC BMI ABV UP PARAM F/U: HCPCS | Performed by: PHYSICAL MEDICINE & REHABILITATION

## 2022-05-06 PROCEDURE — G8432 DEP SCR NOT DOC, RNG: HCPCS | Performed by: PHYSICAL MEDICINE & REHABILITATION

## 2022-05-06 PROCEDURE — 99213 OFFICE O/P EST LOW 20 MIN: CPT | Performed by: PHYSICAL MEDICINE & REHABILITATION

## 2022-05-06 NOTE — LETTER
5/6/2022    Patient: Vicky Rodriguez   YOB: 1960   Date of Visit: 5/6/2022     BERNICE Pena Derrick Lex Ir[address] Joanna  Via Fax: [phone]    Dear BERNICE Pena,      Thank you for referring Ms. Vicky Rodriguez to [address] for evaluation. My notes for this consultation are attached. If you have questions, please do not hesitate to call me. I look forward to following your patient along with you.       Sincerely,    Cailin James MD

## 2022-05-12 ENCOUNTER — TELEPHONE (OUTPATIENT)
Dept: ORTHOPEDIC SURGERY | Age: 62
End: 2022-05-12

## 2022-06-08 RX ORDER — BACLOFEN 10 MG/1
TABLET ORAL
Qty: 60 TABLET | Refills: 1 | Status: SHIPPED | OUTPATIENT
Start: 2022-06-08 | End: 2022-08-05 | Stop reason: SDUPTHER

## 2022-06-08 NOTE — TELEPHONE ENCOUNTER
5-6-22 NOTE    Her falls may be related to her knee giving out and I do no appreciate spinal pathology to account for this.  She did not require refills of Lyrica 300 mg BID or baclofen at this time    Last refill 3-27-22 #60 1RF

## 2022-06-08 NOTE — TELEPHONE ENCOUNTER
In the last 3 ov notes with Dr. Yanira Ram, he does not mention she is on baclofen. See if he is wanting her to continue this me.

## 2022-06-09 ENCOUNTER — OFFICE VISIT (OUTPATIENT)
Dept: ORTHOPEDIC SURGERY | Age: 62
End: 2022-06-09
Payer: MEDICARE

## 2022-06-09 VITALS — BODY MASS INDEX: 32.86 KG/M2 | TEMPERATURE: 97.5 F | HEIGHT: 59 IN | WEIGHT: 163 LBS

## 2022-06-09 DIAGNOSIS — Z96.643 HISTORY OF BILATERAL HIP REPLACEMENTS: Primary | ICD-10-CM

## 2022-06-09 DIAGNOSIS — M70.61 TROCHANTERIC BURSITIS OF RIGHT HIP: ICD-10-CM

## 2022-06-09 DIAGNOSIS — M70.62 TROCHANTERIC BURSITIS OF LEFT HIP: ICD-10-CM

## 2022-06-09 PROCEDURE — G8432 DEP SCR NOT DOC, RNG: HCPCS | Performed by: PHYSICIAN ASSISTANT

## 2022-06-09 PROCEDURE — G8427 DOCREV CUR MEDS BY ELIG CLIN: HCPCS | Performed by: PHYSICIAN ASSISTANT

## 2022-06-09 PROCEDURE — G8417 CALC BMI ABV UP PARAM F/U: HCPCS | Performed by: PHYSICIAN ASSISTANT

## 2022-06-09 PROCEDURE — 20611 DRAIN/INJ JOINT/BURSA W/US: CPT | Performed by: PHYSICIAN ASSISTANT

## 2022-06-09 PROCEDURE — 99213 OFFICE O/P EST LOW 20 MIN: CPT | Performed by: PHYSICIAN ASSISTANT

## 2022-06-09 PROCEDURE — 73523 X-RAY EXAM HIPS BI 5/> VIEWS: CPT | Performed by: PHYSICIAN ASSISTANT

## 2022-06-09 PROCEDURE — G8756 NO BP MEASURE DOC: HCPCS | Performed by: PHYSICIAN ASSISTANT

## 2022-06-09 PROCEDURE — 3017F COLORECTAL CA SCREEN DOC REV: CPT | Performed by: PHYSICIAN ASSISTANT

## 2022-06-09 RX ORDER — BETAMETHASONE SODIUM PHOSPHATE AND BETAMETHASONE ACETATE 3; 3 MG/ML; MG/ML
12 INJECTION, SUSPENSION INTRA-ARTICULAR; INTRALESIONAL; INTRAMUSCULAR; SOFT TISSUE ONCE
Status: COMPLETED | OUTPATIENT
Start: 2022-06-09 | End: 2022-06-09

## 2022-06-09 RX ADMIN — BETAMETHASONE SODIUM PHOSPHATE AND BETAMETHASONE ACETATE 12 MG: 3; 3 INJECTION, SUSPENSION INTRA-ARTICULAR; INTRALESIONAL; INTRAMUSCULAR; SOFT TISSUE at 11:05
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
## 2022-06-13 ENCOUNTER — TELEPHONE (OUTPATIENT)
Dept: ORTHOPEDIC SURGERY | Age: 62
End: 2022-06-13

## 2022-06-13 NOTE — TELEPHONE ENCOUNTER
Patient was looking for a gel/cream to be sent to [facility] on [address] for her.     Last visit:  6/9/21 with BERNICE Yun  Next appt:  None        For Pharmacy [address] in place:    Recommendation Provided To:    Intervention Detail: New Rx: 1, reason: Patient Preference   Gap Closed?:    Intervention Accepted By:   Romana Abrams Time Spent (min): 5

## 2022-07-08 ENCOUNTER — TELEPHONE (OUTPATIENT)
Dept: ORTHOPEDIC SURGERY | Age: 62
End: 2022-07-08

## 2022-07-08 DIAGNOSIS — M47.817 SPONDYLOSIS OF LUMBOSACRAL REGION WITHOUT MYELOPATHY OR RADICULOPATHY: ICD-10-CM

## 2022-07-08 DIAGNOSIS — M54.16 LUMBAR RADICULOPATHY: ICD-10-CM

## 2022-07-08 NOTE — TELEPHONE ENCOUNTER
Pt left a voice message requesting a refill. [phone]      Last visit 06/09/2022 BERNICE Maria   Next appointment Nothing scheduled   Previous refill encounter(s)   04/01/2022 Lyrica #60 with 2 refills    No access to [facility] [address]     Intervention Detail: New Rx: 1, reason: Patient Preference   Time Spent (min): 5        Requested Prescriptions     Pending Prescriptions Disp Refills    pregabalin (Lyrica) 300 mg capsule 60 Capsule 0     Sig: Take 1 Capsule by mouth two (2) times a day. Max Daily Amount: 600 mg.

## 2022-07-09 RX ORDER — PREGABALIN 300 MG/1
300 CAPSULE ORAL 2 TIMES DAILY
Qty: 60 CAPSULE | Refills: 0 | Status: SHIPPED | OUTPATIENT
Start: 2022-07-13 | End: 2022-08-05 | Stop reason: SDUPTHER

## 2022-07-10 NOTE — TELEPHONE ENCOUNTER
I sent in a refill. She needs to schedule a fu visit with Dr. Sherrill Swartz  Before further refills can be given.

## 2022-08-05 DIAGNOSIS — M47.817 SPONDYLOSIS OF LUMBOSACRAL REGION WITHOUT MYELOPATHY OR RADICULOPATHY: ICD-10-CM

## 2022-08-05 DIAGNOSIS — M54.16 LUMBAR RADICULOPATHY: ICD-10-CM

## 2022-08-05 NOTE — TELEPHONE ENCOUNTER
Patient had an appt 8.8. 22 with Dr. Kellen Solares to go over her EMG. EMG is not schld till 9.1. 25 with Dr. Randall Alaniz appt with Dr. Kellen Solares for 9.14.25. She needs her Lyrica and Bacofen called into [facility] on [address]. Please call [phone].

## 2022-08-07 RX ORDER — BACLOFEN 10 MG/1
10 TABLET ORAL 2 TIMES DAILY
Qty: 60 TABLET | Refills: 1 | Status: SHIPPED | OUTPATIENT
Start: 2022-08-07 | End: 2022-09-19 | Stop reason: SDUPTHER

## 2022-08-07 RX ORDER — PREGABALIN 300 MG/1
300 CAPSULE ORAL 2 TIMES DAILY
Qty: 60 CAPSULE | Refills: 0 | Status: SHIPPED | OUTPATIENT
Start: 2022-08-07 | End: 2022-09-06 | Stop reason: SDUPTHER

## 2022-08-23 ENCOUNTER — OFFICE VISIT (OUTPATIENT)
Dept: ORTHOPEDIC SURGERY | Age: 62
End: 2022-08-23
Payer: MEDICARE

## 2022-08-23 VITALS — HEIGHT: 59 IN | TEMPERATURE: 97.1 F | WEIGHT: 168.6 LBS | BODY MASS INDEX: 33.99 KG/M2

## 2022-08-23 DIAGNOSIS — M75.101 TEAR OF RIGHT ROTATOR CUFF, UNSPECIFIED TEAR EXTENT, UNSPECIFIED WHETHER TRAUMATIC: Primary | ICD-10-CM

## 2022-08-23 PROCEDURE — G8417 CALC BMI ABV UP PARAM F/U: HCPCS | Performed by: ORTHOPAEDIC SURGERY

## 2022-08-23 PROCEDURE — G8427 DOCREV CUR MEDS BY ELIG CLIN: HCPCS | Performed by: ORTHOPAEDIC SURGERY

## 2022-08-23 PROCEDURE — 3017F COLORECTAL CA SCREEN DOC REV: CPT | Performed by: ORTHOPAEDIC SURGERY

## 2022-08-23 PROCEDURE — G8756 NO BP MEASURE DOC: HCPCS | Performed by: ORTHOPAEDIC SURGERY

## 2022-08-23 PROCEDURE — 99213 OFFICE O/P EST LOW 20 MIN: CPT | Performed by: ORTHOPAEDIC SURGERY

## 2022-08-23 PROCEDURE — G8432 DEP SCR NOT DOC, RNG: HCPCS | Performed by: ORTHOPAEDIC SURGERY

## 2022-08-23 RX ORDER — MOMETASONE FUROATE 1 MG/G
OINTMENT TOPICAL
COMMUNITY
Start: 2022-08-17

## 2022-08-23 NOTE — PROGRESS NOTES
Adi Aldana  1960   Chief Complaint   Patient presents with    Shoulder Pain     rt        HISTORY OF PRESENT ILLNESS  Adi Aldana is a 65 y.o. female who presents today for reevaluation of right shoulder. Patient rates pain as 9/10 today. She was last seen here for this in November 2021 at which time she was instructed to continue with PT. She has self-discontinued with PT because it was not providing relief. Previously had a rotator cuff repair about 15 years ago. At [facility] on 10/23/2021, patient had a right shoulder cortisone injection which provided no relief. Of note, she has a defibrillator and is on blood thinners. She notes she has fallen 3 times secondary to knee and hip issues and these falls have affected the shoulder. The falls were in May and August. Patient denies any fever, chills, chest pain, shortness of breath or calf pain. The remainder of the review of systems is negative. There are no new illness or injuries to report since last seen in the office. There are no changes to medications, allergies, family or social history. PHYSICAL EXAM:   Visit Vitals  Temp 97.1 °F (36.2 °C) (Temporal)   Ht 4' 11\" (1.499 m)   Wt 168 lb 9.6 oz (76.5 kg)   LMP  (LMP Unknown)   BMI 34.05 kg/m²     The patient is a well-developed, well-nourished female   in no acute distress. The patient is alert and oriented times three. The patient is alert and oriented times three. Mood and affect are normal.  LYMPHATIC: lymph nodes are not enlarged and are within normal limits  SKIN: normal in color and non tender to palpation. There are no bruises or abrasions noted. NEUROLOGICAL: Motor sensory exam is within normal limits. Reflexes are equal bilaterally.  There is normal sensation to pinprick and light touch  MUSCULOSKELETAL:  Examination Right shoulder   Skin Intact   AC joint tenderness -   Biceps tenderness -   Forward flexion/Elevation    Active abduction    Glenohumeral abduction 60 External rotation ROM 30   Internal rotation ROM 30   Apprehension -   Jennifers Relocation -   Jerk -   Load and Shift -   Obriens -   Speeds -   Impingement sign +   Supraspinatus/Empty Can -, 5/5   External Rotation Strength -, 5/5   Lift Off/Belly Press -, 5/5   Neurovascular Intact         IMAGING: XR of the right shoulder with 3 views obtained in the office dated 10/25/2021 was reviewed and read by Dr. Bonnie Llanes: Marked degenerative changes in the glenohumeral joint      IMPRESSION:      ICD-10-CM ICD-9-CM    1. Tear of right rotator cuff, unspecified tear extent, unspecified whether traumatic  M75.101 840.4            PLAN:   1. Pt presents today with right shoulder pain c/w probable RCT and I will be ordering a CT arthrogram to assess the rotator cuff. Risk factors include: BMI>30, pacemaker, htn, dm   2. No ultrasound exam indicated today  3. No cortisone injection indicated today   4. No Physical/Occupational Therapy indicated today  5. Yes diagnostic test indicated today: CT ARTHROGRAM R SHOULDER  6. No durable medical equipment indicated today  7. No referral indicated today   8. No medications indicated today:   9. No Narcotic indicated today       RTC following CT      Scribed by Conrado Franklin) as dictated by Aleksandr Souza MD    I, Dr. Aleksandr Souza, confirm that all documentation is accurate.     Aleksandr Souza M.D.   [facility]

## 2022-08-30 RX ORDER — BACLOFEN 10 MG/1
TABLET ORAL
Qty: 60 TABLET | Refills: 1 | OUTPATIENT
Start: 2022-08-30

## 2022-09-06 DIAGNOSIS — M54.16 LUMBAR RADICULOPATHY: ICD-10-CM

## 2022-09-06 DIAGNOSIS — M47.817 SPONDYLOSIS OF LUMBOSACRAL REGION WITHOUT MYELOPATHY OR RADICULOPATHY: ICD-10-CM

## 2022-09-06 RX ORDER — PREGABALIN 300 MG/1
300 CAPSULE ORAL 2 TIMES DAILY
Qty: 60 CAPSULE | Refills: 0 | Status: SHIPPED | OUTPATIENT
Start: 2022-09-06 | End: 2022-09-19 | Stop reason: SDUPTHER

## 2022-09-06 NOTE — TELEPHONE ENCOUNTER
Last Visit: 5/6/22 with MD Shante Gaxiola  Next Appointment: 9/19/22 with MD Shante Gaxiola  Previous Refill Encounter(s): 8/7/22 #60    Requested Prescriptions     Pending Prescriptions Disp Refills    pregabalin (Lyrica) 300 mg capsule 60 Capsule 0     Sig: Take 1 Capsule by mouth two (2) times a day. Max Daily Amount: 600 mg. For [facility] in place:   Recommendation Provided To:    Intervention Detail: New Rx: 1, reason: Patient Preference  Gap Closed?:   Intervention Accepted By:   Time Spent (min): 5

## 2022-09-08 ENCOUNTER — HOSPITAL ENCOUNTER (OUTPATIENT)
Dept: GENERAL RADIOLOGY | Age: 62
Discharge: HOME | End: 2022-09-08
Attending: ORTHOPAEDIC SURGERY
Payer: MEDICARE

## 2022-09-08 ENCOUNTER — HOSPITAL ENCOUNTER (OUTPATIENT)
Dept: CT IMAGING | Age: 62
Discharge: HOME | End: 2022-09-08
Attending: ORTHOPAEDIC SURGERY
Payer: MEDICARE

## 2022-09-08 DIAGNOSIS — M75.101 TEAR OF RIGHT ROTATOR CUFF, UNSPECIFIED TEAR EXTENT, UNSPECIFIED WHETHER TRAUMATIC: ICD-10-CM

## 2022-09-08 PROCEDURE — 77002 NEEDLE LOCALIZATION BY XRAY: CPT

## 2022-09-08 PROCEDURE — 74011000636 HC RX REV CODE- 636: Performed by: ORTHOPAEDIC SURGERY

## 2022-09-08 PROCEDURE — 74011000250 HC RX REV CODE- 250: Performed by: ORTHOPAEDIC SURGERY

## 2022-09-08 PROCEDURE — 73201 CT UPPER EXTREMITY W/DYE: CPT

## 2022-09-08 RX ORDER — LIDOCAINE HYDROCHLORIDE 10 MG/ML
6 INJECTION, SOLUTION EPIDURAL; INFILTRATION; INTRACAUDAL; PERINEURAL
Status: COMPLETED | OUTPATIENT
Start: 2022-09-08 | End: 2022-09-08

## 2022-09-08 RX ORDER — SODIUM CHLORIDE 9 MG/ML
10 INJECTION INTRAMUSCULAR; INTRAVENOUS; SUBCUTANEOUS
Status: COMPLETED | OUTPATIENT
Start: 2022-09-08 | End: 2022-09-08

## 2022-09-08 RX ORDER — LIDOCAINE HYDROCHLORIDE 10 MG/ML
6 INJECTION, SOLUTION EPIDURAL; INFILTRATION; INTRACAUDAL; PERINEURAL
Status: DISCONTINUED | OUTPATIENT
Start: 2022-09-08 | End: 2022-09-08

## 2022-09-08 RX ORDER — SODIUM CHLORIDE 9 MG/ML
10 INJECTION INTRAMUSCULAR; INTRAVENOUS; SUBCUTANEOUS
Status: DISCONTINUED | OUTPATIENT
Start: 2022-09-08 | End: 2022-09-08

## 2022-09-08 RX ADMIN — IOPAMIDOL 11 ML: 408 INJECTION, SOLUTION INTRATHECAL at 12:13

## 2022-09-08 RX ADMIN — SODIUM CHLORIDE 10 ML: 9 INJECTION INTRAMUSCULAR; INTRAVENOUS; SUBCUTANEOUS at 12:13

## 2022-09-08 RX ADMIN — LIDOCAINE HYDROCHLORIDE 5 ML: 10 INJECTION, SOLUTION EPIDURAL; INFILTRATION; INTRACAUDAL; PERINEURAL at 12:13

## 2022-09-09 NOTE — TELEPHONE ENCOUNTER
Baclofen was sent on 8/7/22 for #60 with 1 refill      For Pharmacy [facility] in place:   Recommendation Provided To:    Intervention Detail: New Rx: 1, reason: Patient Preference  Gap Closed?:   Intervention Accepted By:   Time Spent (min): 5

## 2022-09-15 ENCOUNTER — DOCUMENTATION ONLY (OUTPATIENT)
Dept: ORTHOPEDIC SURGERY | Age: 62
End: 2022-09-15

## 2022-09-15 ENCOUNTER — OFFICE VISIT (OUTPATIENT)
Dept: ORTHOPEDIC SURGERY | Age: 62
End: 2022-09-15
Payer: MEDICARE

## 2022-09-15 VITALS
RESPIRATION RATE: 16 BRPM | HEIGHT: 59 IN | WEIGHT: 167 LBS | TEMPERATURE: 97.3 F | BODY MASS INDEX: 33.67 KG/M2 | OXYGEN SATURATION: 100 % | HEART RATE: 81 BPM

## 2022-09-15 DIAGNOSIS — M75.101 TEAR OF RIGHT ROTATOR CUFF, UNSPECIFIED TEAR EXTENT, UNSPECIFIED WHETHER TRAUMATIC: Primary | ICD-10-CM

## 2022-09-15 PROCEDURE — G8432 DEP SCR NOT DOC, RNG: HCPCS | Performed by: PHYSICIAN ASSISTANT

## 2022-09-15 PROCEDURE — 3017F COLORECTAL CA SCREEN DOC REV: CPT | Performed by: PHYSICIAN ASSISTANT

## 2022-09-15 PROCEDURE — G8756 NO BP MEASURE DOC: HCPCS | Performed by: PHYSICIAN ASSISTANT

## 2022-09-15 PROCEDURE — G8427 DOCREV CUR MEDS BY ELIG CLIN: HCPCS | Performed by: PHYSICIAN ASSISTANT

## 2022-09-15 PROCEDURE — 99214 OFFICE O/P EST MOD 30 MIN: CPT | Performed by: PHYSICIAN ASSISTANT

## 2022-09-15 PROCEDURE — G8417 CALC BMI ABV UP PARAM F/U: HCPCS | Performed by: PHYSICIAN ASSISTANT

## 2022-09-15 NOTE — PROGRESS NOTES
Yenny Thacker  1960   Chief Complaint   Patient presents with    Shoulder Pain     Right          HISTORY OF PRESENT ILLNESS  Yenny Thacker is a 65 y.o. female who presents today for reevaluation of right shoulder and CT review. Patient rates pain as 8/10 today. She was seen here for this in November 2021 at which time she was instructed to continue with PT. She has self-discontinued with PT because it was not providing relief. Previously had a rotator cuff repair about 15 years ago. At [address] on 10/23/2021, patient had a right shoulder cortisone injection which provided no relief. Of note, she has a defibrillator and is on blood thinners. She notes she has fallen 3 times secondary to knee and hip issues and these falls have affected the shoulder. The falls were in May and August. Patient denies any fever, chills, chest pain, shortness of breath or calf pain. The remainder of the review of systems is negative. There are no new illness or injuries to report since last seen in the office. There are no changes to medications, allergies, family or social history. PHYSICAL EXAM:   Visit Vitals  Pulse 81   Temp 97.3 °F (36.3 °C) (Temporal)   Resp 16   Ht 4' 11\" (1.499 m)   Wt 167 lb (75.8 kg)   LMP  (LMP Unknown)   SpO2 100%   BMI 33.73 kg/m²     The patient is a well-developed, well-nourished female   in no acute distress. The patient is alert and oriented times three. The patient is alert and oriented times three. Mood and affect are normal.  LYMPHATIC: lymph nodes are not enlarged and are within normal limits  SKIN: normal in color and non tender to palpation. There are no bruises or abrasions noted. NEUROLOGICAL: Motor sensory exam is within normal limits. Reflexes are equal bilaterally.  There is normal sensation to pinprick and light touch  MUSCULOSKELETAL:  Examination Right shoulder   Skin Intact   AC joint tenderness -   Biceps tenderness -   Forward flexion/Elevation    Active abduction    Glenohumeral abduction 60   External rotation ROM 30   Internal rotation ROM 30   Apprehension -   Jennifers Relocation -   Jerk -   Load and Shift -   Obriens -   Speeds -   Impingement sign +   Supraspinatus/Empty Can -, 5/5   External Rotation Strength -, 5/5   Lift Off/Belly Press -, 5/5   Neurovascular Intact         IMAGING: CT arthrogram of right shoulder dated 9/08/2022 was reviewed and read by myself reveals:   IMPRESSION:  1. Partial-thickness articular sided tear at the supraspinatus/infraspinatus  overlap region insertion (approximately 50% thickness). No disproportionate  rotator cuff muscle atrophy. 2.  Advanced glenohumeral osteoarthritis with diffuse labral degeneration and  loss of bone stock at the posterior superior through posterior inferior glenoid. 3.  Suspected prior acromioplasty and distal clavicle resection. Mild hooking of  the anterior aspect of the distal acromion. 4.  Multiple right upper, middle, and lower lobe pulmonary nodules, measuring  less than 6 mm, unchanged since 5/12/2020, likely benign. No routine imaging  follow-up is required for these nodules per Fleischner criteria. XR of the right shoulder with 3 views obtained in the office dated 10/25/2021 was reviewed and read by Dr. Mohit Sullivan: Marked degenerative changes in the glenohumeral joint      IMPRESSION:      ICD-10-CM ICD-9-CM    1. Tear of right rotator cuff, unspecified tear extent, unspecified whether traumatic  M75.101 840.4              PLAN:   1. I discussed the risks and benefits and potential adverse outcomes of both operative vs non operative treatment of right RCT with the patient and patient wishes to proceed with arthroscopic right shoulder rotator cuff revision.       Risks of operative intervention include but not limited to bleeding, infection, deep vein thrombosis, pulmonary embolism, death, limb length discrepancy, reflexive sympathetic dystrophy, fat embolism syndrome,damage to blood vessels and nerves, malunion, non-union, delayed union, failure of hardware, post traumatic arthritis, stroke, heart attack, and death. Patient understands that infection may arise and may require numerous surgeries. The patient was counseled at length about the risks of raeann Covid-19 during their perioperative period and any recovery window from their procedure. The patient was made aware that raeann Covid-19  may worsen their prognosis for recovering from their procedure and lend to a higher morbidity and/or mortality risk. All material risks, benefits, and reasonable alternatives including postponing the procedure were discussed. The patient does  wish to proceed with the procedure at this time. History and physical exam to be preformed at a later date. Risk factors include: BMI>30, pacemaker, htn, dm   2. No ultrasound exam indicated today  3. No cortisone injection indicated today   4. No Physical/Occupational Therapy indicated today  5. No diagnostic test indicated today  6. No durable medical equipment indicated today  7. No referral indicated today   8. No medications indicated today:   9.  No Narcotic indicated today       RTC H&P      Scribed by Giuliana Mcdowell) as dictated by Romayne Kicks, PA-C Romayne Kicks, PA-C [facility]

## 2022-09-15 NOTE — PATIENT INSTRUCTIONS
Dr. He Desai Shoulder Arthroscopy Information    What is the surgery? This is an outpatient procedure at either [facility] or [address] will be completely asleep for the procedure. Dr. He Desai will make somewhere between 2-5 small incisions in your shoulder depending on the amount of work to be completed. He will take a tour of your shoulder with the camera and fix everything that needs to be fixed during your surgery. Total surgery takes about 45 mins to an hour and half depending on how much needed to be repaired    What can you expect after surgery? You will have a bulky dressing on your shoulder that you can remove 2 days after surgery. You will be able to shower 2 days after surgery but no soaking in a bath, hot tub, ocean or pool x 2 weeks to allow for full wound healing  You will be in a sling for 5-6 weeks depending on your repair. You will wear this sling whenever you are active, up moving around, and sleeping at night. This sling is to keep you from moving your arm on your own. You are essentially one armed until you are out of your sling. This means no reaching, pulling, grabbing or lifting with the operative arm. Dr. He Desai will start physical therapy for you the first business day after surgery. While you cannot move your arm we allow physical therapy to gently move your shoulder. We call this passive range of motion. The goal of this is to decrease your stiffness and in turn decrease your post operative pain. Plan on being in physical therapy for 10-12 weeks    When can I return to work? Most patients return to desk work only after 2 weeks. You are able to type but there is no overhead work or lifting  You will start some gentle lifting up to 5-10lbs at about 8-10 weeks post operatively. You will gradually increase how much you are able to lift after this point under the guidance of Dr. He Desai, his physician assistant and physical therapy.   At 6 months you are able to do all activities as tolerated but it may take you a full 9-12 months to fully recover from your surgery    Not all shoulder arthroscopies are the same. The specifics of your individual case will be discussed at length with you by Dr. Nestora Sacks and his Physician Assistant. Kuldeep Justice  Surgical Coordinator  [address] [place] [address]. Derrick. [address], [email or web address]  P: [phone]  F: [phone]      Exercises to be done post operatively before your first session of therapy: It is ok to remove your sling to perform these exercises. Pendulum swing    If you have pain in your back, do not do this exercise. Hold on to a table or the back of a chair with your good arm. Then bend forward a little and let your sore arm hang straight down. This exercise does not use the arm muscles. Rather, use your legs and your hips to create movement that makes your arm swing freely. Use the movement from your hips and legs to guide the slightly swinging arm back and forth like a pendulum (or elephant trunk). Then guide it in circles that start small (about the size of a dinner plate). Make the circles a bit larger each day, as your pain allows. Do this exercise for up to 5 minutes, 5 to 7 times each day. As you have less pain, try bending over a little farther to do this exercise. This will increase the amount of movement at your shoulder. Elbow flexion stretch    While letting your arm hang down at your side, Lift the forearm of the operative arm and bend the elbow. Then extend or straighten your arm again. Your palm should face toward you.    You can do this assisted with your good arm or on your own  Repeat 2 to 4 times a day
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
## 2022-09-19 ENCOUNTER — OFFICE VISIT (OUTPATIENT)
Dept: ORTHOPEDIC SURGERY | Age: 62
End: 2022-09-19
Payer: MEDICARE

## 2022-09-19 VITALS
SYSTOLIC BLOOD PRESSURE: 125 MMHG | WEIGHT: 164.4 LBS | OXYGEN SATURATION: 98 % | BODY MASS INDEX: 33.14 KG/M2 | DIASTOLIC BLOOD PRESSURE: 72 MMHG | HEIGHT: 59 IN | HEART RATE: 67 BPM | TEMPERATURE: 97.2 F

## 2022-09-19 DIAGNOSIS — M47.812 CERVICAL SPONDYLOSIS WITHOUT MYELOPATHY: ICD-10-CM

## 2022-09-19 DIAGNOSIS — M54.12 CERVICAL NEURITIS: ICD-10-CM

## 2022-09-19 DIAGNOSIS — Z01.818 PREOP EXAMINATION: Primary | ICD-10-CM

## 2022-09-19 DIAGNOSIS — M54.16 LUMBAR RADICULOPATHY: ICD-10-CM

## 2022-09-19 DIAGNOSIS — M54.16 LUMBAR NEURITIS: ICD-10-CM

## 2022-09-19 DIAGNOSIS — M50.30 DDD (DEGENERATIVE DISC DISEASE), CERVICAL: Primary | ICD-10-CM

## 2022-09-19 DIAGNOSIS — M47.817 SPONDYLOSIS OF LUMBOSACRAL REGION WITHOUT MYELOPATHY OR RADICULOPATHY: ICD-10-CM

## 2022-09-19 PROCEDURE — 3017F COLORECTAL CA SCREEN DOC REV: CPT | Performed by: PHYSICAL MEDICINE & REHABILITATION

## 2022-09-19 PROCEDURE — G8752 SYS BP LESS 140: HCPCS | Performed by: PHYSICAL MEDICINE & REHABILITATION

## 2022-09-19 PROCEDURE — G8417 CALC BMI ABV UP PARAM F/U: HCPCS | Performed by: PHYSICAL MEDICINE & REHABILITATION

## 2022-09-19 PROCEDURE — G8427 DOCREV CUR MEDS BY ELIG CLIN: HCPCS | Performed by: PHYSICAL MEDICINE & REHABILITATION

## 2022-09-19 PROCEDURE — G8754 DIAS BP LESS 90: HCPCS | Performed by: PHYSICAL MEDICINE & REHABILITATION

## 2022-09-19 PROCEDURE — G8432 DEP SCR NOT DOC, RNG: HCPCS | Performed by: PHYSICAL MEDICINE & REHABILITATION

## 2022-09-19 PROCEDURE — 99213 OFFICE O/P EST LOW 20 MIN: CPT | Performed by: PHYSICAL MEDICINE & REHABILITATION

## 2022-09-19 RX ORDER — BACLOFEN 10 MG/1
10 TABLET ORAL 2 TIMES DAILY
Qty: 60 TABLET | Refills: 2 | Status: SHIPPED | OUTPATIENT
Start: 2022-09-19

## 2022-09-19 RX ORDER — PREGABALIN 300 MG/1
300 CAPSULE ORAL 2 TIMES DAILY
Qty: 180 CAPSULE | Refills: 0 | Status: SHIPPED | OUTPATIENT
Start: 2022-09-19

## 2022-09-19 NOTE — LETTER
9/19/2022    Patient: Campos Rand   YOB: 1960   Date of Visit: 9/19/2022     Caron Hurst [address]  Via Fax: [phone]    Dear BERNICE Hurst,      Thank you for referring Ms. Campos Rand to [address] for evaluation. My notes for this consultation are attached. If you have questions, please do not hesitate to call me. I look forward to following your patient along with you.       Sincerely,    Keesha Lagunas MD

## 2022-09-27 ENCOUNTER — OFFICE VISIT (OUTPATIENT)
Dept: ORTHOPEDIC SURGERY | Age: 62
End: 2022-09-27
Payer: MEDICARE

## 2022-09-27 DIAGNOSIS — M17.11 PRIMARY OSTEOARTHRITIS OF RIGHT KNEE: Primary | ICD-10-CM

## 2022-09-27 PROCEDURE — 20611 DRAIN/INJ JOINT/BURSA W/US: CPT | Performed by: PHYSICIAN ASSISTANT
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
## 2022-10-06 ENCOUNTER — OFFICE VISIT (OUTPATIENT)
Dept: ORTHOPEDIC SURGERY | Age: 62
End: 2022-10-06
Payer: MEDICARE

## 2022-10-06 VITALS — WEIGHT: 164 LBS | HEIGHT: 59 IN | BODY MASS INDEX: 33.06 KG/M2 | TEMPERATURE: 97.2 F

## 2022-10-06 DIAGNOSIS — M17.11 PRIMARY OSTEOARTHRITIS OF RIGHT KNEE: Primary | ICD-10-CM

## 2022-10-06 DIAGNOSIS — M70.61 TROCHANTERIC BURSITIS OF RIGHT HIP: ICD-10-CM

## 2022-10-06 PROCEDURE — 20611 DRAIN/INJ JOINT/BURSA W/US: CPT | Performed by: PHYSICIAN ASSISTANT

## 2022-10-06 RX ORDER — BETAMETHASONE SODIUM PHOSPHATE AND BETAMETHASONE ACETATE 3; 3 MG/ML; MG/ML
6 INJECTION, SUSPENSION INTRA-ARTICULAR; INTRALESIONAL; INTRAMUSCULAR; SOFT TISSUE ONCE
Status: COMPLETED | OUTPATIENT
Start: 2022-10-06 | End: 2022-10-06

## 2022-10-06 RX ADMIN — BETAMETHASONE SODIUM PHOSPHATE AND BETAMETHASONE ACETATE 6 MG: 3; 3 INJECTION, SUSPENSION INTRA-ARTICULAR; INTRALESIONAL; INTRAMUSCULAR; SOFT TISSUE at 11:10
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
## 2022-10-07 ENCOUNTER — HOSPITAL ENCOUNTER (OUTPATIENT)
Dept: LAB | Age: 62
Discharge: HOME | End: 2022-10-07

## 2022-10-07 ENCOUNTER — HOSPITAL ENCOUNTER (OUTPATIENT)
Dept: LAB | Age: 62
Discharge: HOME | End: 2022-10-07
Payer: MEDICARE

## 2022-10-07 DIAGNOSIS — Z01.818 PREOP EXAMINATION: ICD-10-CM

## 2022-10-07 LAB
ATRIAL RATE: 66 BPM
CALCULATED P AXIS, ECG09: 45 DEGREES
CALCULATED R AXIS, ECG10: -4 DEGREES
CALCULATED T AXIS, ECG11: 44 DEGREES
DIAGNOSIS, 93000: NORMAL
P-R INTERVAL, ECG05: 186 MS
Q-T INTERVAL, ECG07: 420 MS
QRS DURATION, ECG06: 92 MS
QTC CALCULATION (BEZET), ECG08: 440 MS
VENTRICULAR RATE, ECG03: 66 BPM
XX-LABCORP SPECIMEN COL,LCBCF: NORMAL

## 2022-10-07 PROCEDURE — 93005 ELECTROCARDIOGRAM TRACING: CPT

## 2022-10-07 PROCEDURE — 99001 SPECIMEN HANDLING PT-LAB: CPT

## 2022-10-10 DIAGNOSIS — Z01.818 PREOP EXAMINATION: ICD-10-CM

## 2022-10-11 NOTE — DISCHARGE INSTRUCTIONS
Dr. Yeh Began Shoulder Arthroscopy  Postoperative Information    RESUME YOUR PLAVIX TOMORROW    How to manage your pain after your Surgery:  After your surgery it is expected that you will have some pain. In efforts to reduce the amounts of side effects from pain medications we would like you to follow the below medication regimen after surgery:  Take 1000mg of Tylenol by mouth every 8 hours x 5 days. This is 4 tabs of regular strength Tylenol or 2 tabs of Extra Strength Tylenol  300mg of Gabapentin every night x 5 days starting the evening you get home from the hospital  DO NOT TAKE THIS IF YOU ALREADY TAKE LYRICA OR GABAPENTIN (TAKE YOUR NORMAL DOSE) OR HAVE ALLERGY TO GABAPENTIN    Then ONLY IF YOUR PAIN IS UNCONTROLLED you may take your Narcotic Pain medication as prescribed. THIS IS THE PRESCRIPTION THAT WAS GIVEN TO YOU IN THE OFFICE. You should place an ice bag over your shoulder to help with pain and reduce swelling. A soft bandage was placed on your shoulder. You may take the bandage off two days after surgery You may have a clear bandage on your incisions that looks like tape. This is surgical superglue. Leave these on unless you are noticing redness or itching. Band-Aids should be used for the first 4-5 days over each incision. There are 2-6 small incisions in your shoulder and they may be sore and develop bruising over the next several days. The bruising should resolve and no special care will be needed. It is safe to take a shower or bathe two days after surgery. You will be given a sling to use. Continue to wear this at all times, unless you are given different instructions. You will be shown specific instructions when you see your physical therapist. Angela Pipe should start PT the first business day following surgery. Even though your incisions are small, there has been an operation inside and around the shoulder joint. Complete healing may take weeks or several months.     If you have a high temperature, unexpected pain, redness or swelling in your shoulder please contact my office immediately. Dr. Thomas Vazquez office number [phone]    DISCHARGE SUMMARY from Nurse    PATIENT INSTRUCTIONS:    After general anesthesia or intravenous sedation, for 24 hours or while taking prescription Narcotics:  Limit your activities  Do not drive and operate hazardous machinery  Do not make important personal or business decisions  Do  not drink alcoholic beverages  If you have not urinated within 8 hours after discharge, please contact your surgeon on call. Report the following to your surgeon:  Excessive pain, swelling, redness or odor of or around the surgical area  Temperature over 100.5  Nausea and vomiting lasting longer than 4 hours or if unable to take medications  Any signs of decreased circulation or nerve impairment to extremity: change in color, persistent  numbness, tingling, coldness or increase pain  Any questions        These are general instructions for a healthy lifestyle:    No smoking/ No tobacco products/ Avoid exposure to second hand smoke  Surgeon General's Warning:  Quitting smoking now greatly reduces serious risk to your health. Obesity, smoking, and sedentary lifestyle greatly increases your risk for illness    A healthy diet, regular physical exercise & weight monitoring are important for maintaining a healthy lifestyle    You may be retaining fluid if you have a history of heart failure or if you experience any of the following symptoms:  Weight gain of 3 pounds or more overnight or 5 pounds in a week, increased swelling in our hands or feet or shortness of breath while lying flat in bed. Please call your doctor as soon as you notice any of these symptoms; do not wait until your next office visit. The discharge information has been reviewed with the patient. The patient verbalized understanding.   Discharge medications reviewed with the patient and appropriate educational materials and side effects teaching were provided.   ___________________________________________________________________________________________________________________________________

## 2022-10-13 ENCOUNTER — OFFICE VISIT (OUTPATIENT)
Dept: ORTHOPEDIC SURGERY | Age: 62
End: 2022-10-13
Payer: MEDICARE

## 2022-10-13 DIAGNOSIS — M17.11 PRIMARY OSTEOARTHRITIS OF RIGHT KNEE: Primary | ICD-10-CM

## 2022-10-13 PROCEDURE — 20611 DRAIN/INJ JOINT/BURSA W/US: CPT | Performed by: PHYSICIAN ASSISTANT

## 2022-10-13 NOTE — PATIENT INSTRUCTIONS
Dr. Jose Dsouza Shoulder Arthroscopy Information    What is the surgery? This is an outpatient procedure at either [facility] or [address] will be completely asleep for the procedure. Dr. Jose Dsouza will make somewhere between 2-5 small incisions in your shoulder depending on the amount of work to be completed. He will take a tour of your shoulder with the camera and fix everything that needs to be fixed during your surgery. Total surgery takes about 45 mins to an hour and half depending on how much needed to be repaired    What can you expect after surgery? You will have a bulky dressing on your shoulder that you can remove 2 days after surgery. You will be able to shower 2 days after surgery but no soaking in a bath, hot tub, ocean or pool x 2 weeks to allow for full wound healing  You will be in a sling for 5-6 weeks depending on your repair. You will wear this sling whenever you are active, up moving around, and sleeping at night. This sling is to keep you from moving your arm on your own. You are essentially one armed until you are out of your sling. This means no reaching, pulling, grabbing or lifting with the operative arm. Dr. Jose Dsouza will start physical therapy for you the first business day after surgery. While you cannot move your arm we allow physical therapy to gently move your shoulder. We call this passive range of motion. The goal of this is to decrease your stiffness and in turn decrease your post operative pain. Plan on being in physical therapy for 10-12 weeks    When can I return to work? Most patients return to desk work only after 2 weeks. You are able to type but there is no overhead work or lifting  You will start some gentle lifting up to 5-10lbs at about 8-10 weeks post operatively. You will gradually increase how much you are able to lift after this point under the guidance of Dr. Jose Dsouza, his physician assistant and physical therapy.   At 6 months you are able to do all activities as tolerated but it may take you a full 9-12 months to fully recover from your surgery    Not all shoulder arthroscopies are the same. The specifics of your individual case will be discussed at length with you by Dr. Sonya Garcia and his Physician Assistant. Stanley Lyons  Surgical Coordinator  [address] [address] [email or web address]  P: [phone]  F: [phone]      Exercises to be done post operatively before your first session of therapy: It is ok to remove your sling to perform these exercises. Pendulum swing    If you have pain in your back, do not do this exercise. Hold on to a table or the back of a chair with your good arm. Then bend forward a little and let your sore arm hang straight down. This exercise does not use the arm muscles. Rather, use your legs and your hips to create movement that makes your arm swing freely. Use the movement from your hips and legs to guide the slightly swinging arm back and forth like a pendulum (or elephant trunk). Then guide it in circles that start small (about the size of a dinner plate). Make the circles a bit larger each day, as your pain allows. Do this exercise for up to 5 minutes, 5 to 7 times each day. As you have less pain, try bending over a little farther to do this exercise. This will increase the amount of movement at your shoulder. Elbow flexion stretch    While letting your arm hang down at your side, Lift the forearm of the operative arm and bend the elbow. Then extend or straighten your arm again. Your palm should face toward you.    You can do this assisted with your good arm or on your own  Repeat 2 to 4 times a day

## 2022-10-13 NOTE — PROGRESS NOTES
Patient: Gregor Osgood                MRN: [id number]       SSN: [id number]  YOB: 1960        AGE: 65 y.o. SEX: female  There is no height or weight on file to calculate BMI. PCP: BERNICE Prasad Mc  10/13/22        Pt presents today for their 3rd euflexxa  injection for Right knee . There has been no recent fevers/chills, systemic changes, or injuries to report. PE:  General A&Ox3, NAD  Exam of the knees reveals skin intact, no erythema, no ecchymosis, no signs for infection. No cyanosis, clubbing, or edema present distally. Neg calf tenderness, neg homans, no signs for DVT present. A: Right knee OA    P: The Right knee was injected 2ml euflexxa with US guided assistance without complications. Patient was instructed on post injection care. Chart reviewed for the following:  Dixon HERNANDEZ PA-C, have reviewed the History, Physical and updated the Allergic reactions for Gregor Osgood? TIME OUT performed immediately prior to start of procedure:  Dixon HERNANDEZ PA-C, have performed the following reviews on Gregor Osgood prior to the start of the procedure:  ????????  * Patient was identified by name and date of birth   * Agreement on procedure being performed was verified  * Risks and Benefits explained to the patient  * Procedure site verified and marked as necessary  * Patient was positioned for comfort  * Consent was signed and verified    Time: 10:43 AM    Body part: Right knee, intra-articular    Medication & Dose: 2ml euflexxa    Date of procedure: 10/13/22    Procedure performed by: Silver Fischer PA-C    Patient assisted by: self    How tolerated by patient: tolerated the procedure well with no complications    Post Procedural Pain Scale: 5    Comments:  701 [employer]KE[employer] using a frequency of 10MHz with a 12L-RS transducer head was used to confirm needle placement.   Ultrasound images captured using 70[employer] Ultrasound machine and scanned into patient's chart      Shy Good PA-C

## 2022-10-14 ENCOUNTER — OFFICE VISIT (OUTPATIENT)
Dept: ORTHOPEDIC SURGERY | Age: 62
End: 2022-10-14

## 2022-10-14 VITALS
TEMPERATURE: 97.7 F | HEART RATE: 88 BPM | WEIGHT: 165 LBS | DIASTOLIC BLOOD PRESSURE: 79 MMHG | BODY MASS INDEX: 33.33 KG/M2 | OXYGEN SATURATION: 99 % | SYSTOLIC BLOOD PRESSURE: 144 MMHG

## 2022-10-14 DIAGNOSIS — M75.101 TEAR OF RIGHT ROTATOR CUFF, UNSPECIFIED TEAR EXTENT, UNSPECIFIED WHETHER TRAUMATIC: Primary | ICD-10-CM

## 2022-10-14 RX ORDER — OXYCODONE HYDROCHLORIDE 5 MG/1
5 TABLET ORAL
Qty: 40 TABLET | Refills: 0 | Status: SHIPPED | OUTPATIENT
Start: 2022-10-14 | End: 2022-10-21

## 2022-10-17 RX ORDER — GABAPENTIN 100 MG/1
400 CAPSULE ORAL ONCE
Status: CANCELLED | OUTPATIENT
Start: 2022-10-17 | End: 2022-10-17

## 2022-10-17 RX ORDER — ACETAMINOPHEN 325 MG/1
1000 TABLET ORAL ONCE
Status: CANCELLED | OUTPATIENT
Start: 2022-10-17 | End: 2022-10-17
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
## 2022-10-17 NOTE — PERIOP NOTES
PRE-SURGICAL INSTRUCTIONS        Patient's Name:  Dieog Levin      Today's Date:  10/17/2022      Surgery Date:  10/21/2022                Do NOT eat or drink anything, including candy, gum, or ice chips after midnight on 10/20/2022, unless you have specific instructions from your surgeon or anesthesia provider to do so. You may brush your teeth before coming to the hospital.  No smoking 24 hours prior to the day of surgery. No alcohol 24 hours prior to the day of surgery. No recreational drugs for one week prior to the day of surgery. Leave all valuables, including money/purse, at home. Remove all jewelry, nail polish, acrylic nails, and makeup (including mascara); no lotions powders, deodorant, or perfume/cologne/after shave on the skin. Follow instruction for Hibiclens washes and CHG wipes from surgeon's office. Glasses/contact lenses and dentures may be worn to the hospital.  They will be removed prior to surgery. Call your doctor if symptoms of a cold or illness develop within 24-48 hours prior to your surgery. 11.  If you are having an outpatient procedure, please make arrangements for a responsible ADULT TO [address] [place] [address] and stay with you for 24 hours after your surgery. 12. ONE VISITOR in the hospital at this time for outpatient procedures. Exceptions may be made for surgical admissions, per nursing unit guidelines      Special Instructions:      Bring list of CURRENT medications. Bring any pertinent legal medical records. Take these medications the morning of surgery with a sip of water:  Blood Pressure medication as directed  Follow physician instructions about insulin. Follow physician instructions about stopping anticoagulants. On the day of surgery, come in the main entrance of [facility]. Let the  at the desk know you are there for surgery. A staff member will come escort you to the surgical area on the second floor.     If you have any questions or concerns, please do not hesitate to call:     (Prior to the day of surgery) [facility] department:  [phone]   (Day of surgery) Pre-Op department:  [phone]    These surgical instructions were reviewed with patient during the PAT phone call. Crutches

## 2022-10-19 DIAGNOSIS — M75.101 TEAR OF RIGHT ROTATOR CUFF, UNSPECIFIED TEAR EXTENT, UNSPECIFIED WHETHER TRAUMATIC: Primary | ICD-10-CM

## 2022-10-20 ENCOUNTER — ANESTHESIA EVENT (OUTPATIENT)
Dept: SURGERY | Age: 62
End: 2022-10-20
Payer: MEDICARE

## 2022-10-20 ENCOUNTER — TELEPHONE (OUTPATIENT)
Dept: ORTHOPEDIC SURGERY | Age: 62
End: 2022-10-20

## 2022-10-20 NOTE — TELEPHONE ENCOUNTER
Shoaib Farris from [address] [place] [address] called and stated the patient needs an authorization with [facility]. Her sx is tomorrow 10/21/2022.

## 2022-10-21 ENCOUNTER — HOSPITAL ENCOUNTER (OUTPATIENT)
Age: 62
Setting detail: OUTPATIENT SURGERY
Discharge: HOME | End: 2022-10-21
Attending: ORTHOPAEDIC SURGERY | Admitting: ORTHOPAEDIC SURGERY
Payer: MEDICARE

## 2022-10-21 ENCOUNTER — ANESTHESIA (OUTPATIENT)
Dept: SURGERY | Age: 62
End: 2022-10-21
Payer: MEDICARE

## 2022-10-21 VITALS
WEIGHT: 165 LBS | RESPIRATION RATE: 16 BRPM | SYSTOLIC BLOOD PRESSURE: 123 MMHG | TEMPERATURE: 97.8 F | OXYGEN SATURATION: 96 % | HEART RATE: 66 BPM | HEIGHT: 59 IN | BODY MASS INDEX: 33.26 KG/M2 | DIASTOLIC BLOOD PRESSURE: 84 MMHG

## 2022-10-21 DIAGNOSIS — M75.111 NONTRAUMATIC INCOMPLETE TEAR OF RIGHT ROTATOR CUFF: Primary | ICD-10-CM

## 2022-10-21 LAB
GLUCOSE BLD STRIP.AUTO-MCNC: 104 MG/DL (ref 70–110)
POTASSIUM SERPL-SCNC: 5.1 MMOL/L (ref 3.5–5.5)

## 2022-10-21 PROCEDURE — 77030002922 HC SUT FBRWRE ARTH -B: Performed by: ORTHOPAEDIC SURGERY

## 2022-10-21 PROCEDURE — 2709999900 HC NON-CHARGEABLE SUPPLY: Performed by: ORTHOPAEDIC SURGERY

## 2022-10-21 PROCEDURE — 77030040922 HC BLNKT HYPOTHRM STRY -A: Performed by: ORTHOPAEDIC SURGERY

## 2022-10-21 PROCEDURE — 64415 NJX AA&/STRD BRCH PLXS IMG: CPT | Performed by: ANESTHESIOLOGY

## 2022-10-21 PROCEDURE — 01630 ANES OPN/ARTHR PX SHO JT NOS: CPT | Performed by: ANESTHESIOLOGY

## 2022-10-21 PROCEDURE — 76210000026 HC REC RM PH II 1 TO 1.5 HR: Performed by: ORTHOPAEDIC SURGERY

## 2022-10-21 PROCEDURE — 76210000006 HC OR PH I REC 0.5 TO 1 HR: Performed by: ORTHOPAEDIC SURGERY

## 2022-10-21 PROCEDURE — 74011250636 HC RX REV CODE- 250/636: Performed by: NURSE ANESTHETIST, CERTIFIED REGISTERED

## 2022-10-21 PROCEDURE — 77030031139 HC SUT VCRL2 J&J -A: Performed by: ORTHOPAEDIC SURGERY

## 2022-10-21 PROCEDURE — 74011000250 HC RX REV CODE- 250: Performed by: NURSE ANESTHETIST, CERTIFIED REGISTERED

## 2022-10-21 PROCEDURE — 74011000258 HC RX REV CODE- 258: Performed by: ORTHOPAEDIC SURGERY

## 2022-10-21 PROCEDURE — 77030017964 HC PRB COAG4 STRY -C: Performed by: ORTHOPAEDIC SURGERY

## 2022-10-21 PROCEDURE — 76942 ECHO GUIDE FOR BIOPSY: CPT | Performed by: ANESTHESIOLOGY

## 2022-10-21 PROCEDURE — C1713 ANCHOR/SCREW BN/BN,TIS/BN: HCPCS | Performed by: ORTHOPAEDIC SURGERY

## 2022-10-21 PROCEDURE — 76060000033 HC ANESTHESIA 1 TO 1.5 HR: Performed by: ORTHOPAEDIC SURGERY

## 2022-10-21 PROCEDURE — 76010000149 HC OR TIME 1 TO 1.5 HR: Performed by: ORTHOPAEDIC SURGERY

## 2022-10-21 PROCEDURE — 64450 NJX AA&/STRD OTHER PN/BRANCH: CPT | Performed by: ANESTHESIOLOGY

## 2022-10-21 PROCEDURE — 77030026438 HC STYL ET INTUB CARD -A: Performed by: ANESTHESIOLOGY

## 2022-10-21 PROCEDURE — 77030010427: Performed by: ORTHOPAEDIC SURGERY

## 2022-10-21 PROCEDURE — 01630 ANES OPN/ARTHR PX SHO JT NOS: CPT | Performed by: NURSE ANESTHETIST, CERTIFIED REGISTERED

## 2022-10-21 PROCEDURE — 77030040361 HC SLV COMPR DVT MDII -B: Performed by: ORTHOPAEDIC SURGERY

## 2022-10-21 PROCEDURE — 77030002919 HC SUT FBRTAPE ARTH -B: Performed by: ORTHOPAEDIC SURGERY

## 2022-10-21 PROCEDURE — 77030006891 HC BLD SHV RESECT STRY -B: Performed by: ORTHOPAEDIC SURGERY

## 2022-10-21 PROCEDURE — 84132 ASSAY OF SERUM POTASSIUM: CPT

## 2022-10-21 PROCEDURE — 77030039266 HC ADH SKN EXOFIN S2SG -A: Performed by: ORTHOPAEDIC SURGERY

## 2022-10-21 PROCEDURE — 77030013079 HC BLNKT BAIR HGGR 3M -A: Performed by: ANESTHESIOLOGY

## 2022-10-21 PROCEDURE — 77030004453 HC BUR SHV STRY -B: Performed by: ORTHOPAEDIC SURGERY

## 2022-10-21 PROCEDURE — 82962 GLUCOSE BLOOD TEST: CPT

## 2022-10-21 PROCEDURE — 74011250637 HC RX REV CODE- 250/637: Performed by: PHYSICIAN ASSISTANT

## 2022-10-21 PROCEDURE — 77030033005 HC TBNG ARTHSC PMP STRY -B: Performed by: ORTHOPAEDIC SURGERY

## 2022-10-21 PROCEDURE — 77030002966 HC SUT PDS J&J -A: Performed by: ORTHOPAEDIC SURGERY

## 2022-10-21 PROCEDURE — 74011250636 HC RX REV CODE- 250/636: Performed by: ORTHOPAEDIC SURGERY

## 2022-10-21 PROCEDURE — 29827 SHO ARTHRS SRG RT8TR CUF RPR: CPT | Performed by: PHYSICIAN ASSISTANT

## 2022-10-21 PROCEDURE — 74011250637 HC RX REV CODE- 250/637: Performed by: NURSE ANESTHETIST, CERTIFIED REGISTERED

## 2022-10-21 PROCEDURE — 29827 SHO ARTHRS SRG RT8TR CUF RPR: CPT | Performed by: ORTHOPAEDIC SURGERY

## 2022-10-21 PROCEDURE — 77030002933 HC SUT MCRYL J&J -A: Performed by: ORTHOPAEDIC SURGERY

## 2022-10-21 PROCEDURE — 77030003598 HC NDL MULT/FIRE ARTH -C: Performed by: ORTHOPAEDIC SURGERY

## 2022-10-21 PROCEDURE — 77030008683 HC TU ET CUF COVD -A: Performed by: ANESTHESIOLOGY

## 2022-10-21 PROCEDURE — 77030003666 HC NDL SPINAL BD -A: Performed by: ORTHOPAEDIC SURGERY

## 2022-10-21 PROCEDURE — 74011250636 HC RX REV CODE- 250/636: Performed by: ANESTHESIOLOGY

## 2022-10-21 PROCEDURE — 77030010430: Performed by: ORTHOPAEDIC SURGERY

## 2022-10-21 DEVICE — ANCHOR SUTURE BIOCOMP 4.75X19.1 MM SWIVELOCK C: Type: IMPLANTABLE DEVICE | Site: SHOULDER | Status: FUNCTIONAL

## 2022-10-21 RX ORDER — SODIUM CHLORIDE, SODIUM LACTATE, POTASSIUM CHLORIDE, CALCIUM CHLORIDE 600; 310; 30; 20 MG/100ML; MG/100ML; MG/100ML; MG/100ML
125 INJECTION, SOLUTION INTRAVENOUS CONTINUOUS
Status: DISCONTINUED | OUTPATIENT
Start: 2022-10-21 | End: 2022-10-21 | Stop reason: HOSPADM

## 2022-10-21 RX ORDER — GLYCOPYRROLATE 0.2 MG/ML
INJECTION INTRAMUSCULAR; INTRAVENOUS AS NEEDED
Status: DISCONTINUED | OUTPATIENT
Start: 2022-10-21 | End: 2022-10-21 | Stop reason: HOSPADM

## 2022-10-21 RX ORDER — MAGNESIUM SULFATE 100 %
4 CRYSTALS MISCELLANEOUS AS NEEDED
Status: DISCONTINUED | OUTPATIENT
Start: 2022-10-21 | End: 2022-10-21 | Stop reason: HOSPADM

## 2022-10-21 RX ORDER — FENTANYL CITRATE 50 UG/ML
50 INJECTION, SOLUTION INTRAMUSCULAR; INTRAVENOUS AS NEEDED
Status: DISCONTINUED | OUTPATIENT
Start: 2022-10-21 | End: 2022-10-21 | Stop reason: HOSPADM

## 2022-10-21 RX ORDER — HYDROMORPHONE HYDROCHLORIDE 2 MG/ML
0.5 INJECTION, SOLUTION INTRAMUSCULAR; INTRAVENOUS; SUBCUTANEOUS
Status: DISCONTINUED | OUTPATIENT
Start: 2022-10-21 | End: 2022-10-21 | Stop reason: HOSPADM

## 2022-10-21 RX ORDER — INSULIN LISPRO 100 [IU]/ML
INJECTION, SOLUTION INTRAVENOUS; SUBCUTANEOUS ONCE
Status: DISCONTINUED | OUTPATIENT
Start: 2022-10-21 | End: 2022-10-21 | Stop reason: HOSPADM

## 2022-10-21 RX ORDER — GABAPENTIN 400 MG/1
400 CAPSULE ORAL ONCE
Status: COMPLETED | OUTPATIENT
Start: 2022-10-21 | End: 2022-10-21

## 2022-10-21 RX ORDER — ONDANSETRON 2 MG/ML
4 INJECTION INTRAMUSCULAR; INTRAVENOUS ONCE
Status: DISCONTINUED | OUTPATIENT
Start: 2022-10-21 | End: 2022-10-21 | Stop reason: HOSPADM

## 2022-10-21 RX ORDER — ACETAMINOPHEN 500 MG
1000 TABLET ORAL ONCE
Status: COMPLETED | OUTPATIENT
Start: 2022-10-21 | End: 2022-10-21

## 2022-10-21 RX ORDER — FENTANYL CITRATE 50 UG/ML
INJECTION, SOLUTION INTRAMUSCULAR; INTRAVENOUS
Status: COMPLETED | OUTPATIENT
Start: 2022-10-21 | End: 2022-10-21

## 2022-10-21 RX ORDER — ROPIVACAINE HYDROCHLORIDE 5 MG/ML
INJECTION, SOLUTION EPIDURAL; INFILTRATION; PERINEURAL
Status: COMPLETED | OUTPATIENT
Start: 2022-10-21 | End: 2022-10-21

## 2022-10-21 RX ORDER — DEXAMETHASONE SODIUM PHOSPHATE 4 MG/ML
INJECTION, SOLUTION INTRA-ARTICULAR; INTRALESIONAL; INTRAMUSCULAR; INTRAVENOUS; SOFT TISSUE AS NEEDED
Status: DISCONTINUED | OUTPATIENT
Start: 2022-10-21 | End: 2022-10-21 | Stop reason: HOSPADM

## 2022-10-21 RX ORDER — SUCCINYLCHOLINE CHLORIDE 20 MG/ML
INJECTION INTRAMUSCULAR; INTRAVENOUS AS NEEDED
Status: DISCONTINUED | OUTPATIENT
Start: 2022-10-21 | End: 2022-10-21 | Stop reason: HOSPADM

## 2022-10-21 RX ORDER — EPHEDRINE SULFATE/0.9% NACL/PF 25 MG/5 ML
SYRINGE (ML) INTRAVENOUS AS NEEDED
Status: DISCONTINUED | OUTPATIENT
Start: 2022-10-21 | End: 2022-10-21 | Stop reason: HOSPADM

## 2022-10-21 RX ORDER — ONDANSETRON 2 MG/ML
INJECTION INTRAMUSCULAR; INTRAVENOUS AS NEEDED
Status: DISCONTINUED | OUTPATIENT
Start: 2022-10-21 | End: 2022-10-21 | Stop reason: HOSPADM

## 2022-10-21 RX ORDER — LIDOCAINE HYDROCHLORIDE 20 MG/ML
INJECTION, SOLUTION EPIDURAL; INFILTRATION; INTRACAUDAL; PERINEURAL AS NEEDED
Status: DISCONTINUED | OUTPATIENT
Start: 2022-10-21 | End: 2022-10-21 | Stop reason: HOSPADM

## 2022-10-21 RX ORDER — CEFAZOLIN SODIUM 1 G/3ML
INJECTION, POWDER, FOR SOLUTION INTRAMUSCULAR; INTRAVENOUS AS NEEDED
Status: DISCONTINUED | OUTPATIENT
Start: 2022-10-21 | End: 2022-10-21 | Stop reason: HOSPADM

## 2022-10-21 RX ORDER — FENTANYL CITRATE 50 UG/ML
INJECTION, SOLUTION INTRAMUSCULAR; INTRAVENOUS AS NEEDED
Status: DISCONTINUED | OUTPATIENT
Start: 2022-10-21 | End: 2022-10-21 | Stop reason: HOSPADM

## 2022-10-21 RX ORDER — ROPIVACAINE HYDROCHLORIDE 5 MG/ML
30 INJECTION, SOLUTION EPIDURAL; INFILTRATION; PERINEURAL
Status: COMPLETED | OUTPATIENT
Start: 2022-10-21 | End: 2022-10-21

## 2022-10-21 RX ORDER — SODIUM CHLORIDE, SODIUM LACTATE, POTASSIUM CHLORIDE, CALCIUM CHLORIDE 600; 310; 30; 20 MG/100ML; MG/100ML; MG/100ML; MG/100ML
50 INJECTION, SOLUTION INTRAVENOUS CONTINUOUS
Status: DISCONTINUED | OUTPATIENT
Start: 2022-10-21 | End: 2022-10-21 | Stop reason: HOSPADM

## 2022-10-21 RX ORDER — FAMOTIDINE 20 MG/1
20 TABLET, FILM COATED ORAL ONCE
Status: COMPLETED | OUTPATIENT
Start: 2022-10-21 | End: 2022-10-21

## 2022-10-21 RX ORDER — FENTANYL CITRATE 50 UG/ML
100 INJECTION, SOLUTION INTRAMUSCULAR; INTRAVENOUS ONCE
Status: COMPLETED | OUTPATIENT
Start: 2022-10-21 | End: 2022-10-21

## 2022-10-21 RX ORDER — MIDAZOLAM HYDROCHLORIDE 1 MG/ML
INJECTION, SOLUTION INTRAMUSCULAR; INTRAVENOUS
Status: COMPLETED | OUTPATIENT
Start: 2022-10-21 | End: 2022-10-21

## 2022-10-21 RX ORDER — MIDAZOLAM HYDROCHLORIDE 1 MG/ML
2 INJECTION, SOLUTION INTRAMUSCULAR; INTRAVENOUS ONCE
Status: COMPLETED | OUTPATIENT
Start: 2022-10-21 | End: 2022-10-21

## 2022-10-21 RX ORDER — PROPOFOL 10 MG/ML
INJECTION, EMULSION INTRAVENOUS AS NEEDED
Status: DISCONTINUED | OUTPATIENT
Start: 2022-10-21 | End: 2022-10-21 | Stop reason: HOSPADM

## 2022-10-21 RX ORDER — LIDOCAINE HYDROCHLORIDE 10 MG/ML
5 INJECTION, SOLUTION EPIDURAL; INFILTRATION; INTRACAUDAL; PERINEURAL ONCE
Status: COMPLETED | OUTPATIENT
Start: 2022-10-21 | End: 2022-10-21

## 2022-10-21 RX ORDER — DEXTROSE MONOHYDRATE 100 MG/ML
0-250 INJECTION, SOLUTION INTRAVENOUS AS NEEDED
Status: DISCONTINUED | OUTPATIENT
Start: 2022-10-21 | End: 2022-10-21 | Stop reason: HOSPADM

## 2022-10-21 RX ADMIN — FENTANYL CITRATE 100 MCG: 50 INJECTION, SOLUTION INTRAMUSCULAR; INTRAVENOUS at 09:31

## 2022-10-21 RX ADMIN — FENTANYL CITRATE 100 MCG: 50 INJECTION, SOLUTION INTRAMUSCULAR; INTRAVENOUS at 10:00

## 2022-10-21 RX ADMIN — GLYCOPYRROLATE 0.2 MG: 0.2 INJECTION INTRAMUSCULAR; INTRAVENOUS at 10:18

## 2022-10-21 RX ADMIN — ACETAMINOPHEN 1000 MG: 500 TABLET ORAL at 09:00

## 2022-10-21 RX ADMIN — MIDAZOLAM HYDROCHLORIDE 2 MG: 2 INJECTION, SOLUTION INTRAMUSCULAR; INTRAVENOUS at 09:31

## 2022-10-21 RX ADMIN — Medication 20 MG: at 10:12

## 2022-10-21 RX ADMIN — ROPIVACAINE HYDROCHLORIDE 10 ML: 5 INJECTION, SOLUTION EPIDURAL; INFILTRATION; PERINEURAL at 09:36

## 2022-10-21 RX ADMIN — GABAPENTIN 400 MG: 400 CAPSULE ORAL at 09:06

## 2022-10-21 RX ADMIN — Medication 20 MG: at 10:10

## 2022-10-21 RX ADMIN — DEXAMETHASONE SODIUM PHOSPHATE 4 MG: 4 INJECTION, SOLUTION INTRAMUSCULAR; INTRAVENOUS at 10:11

## 2022-10-21 RX ADMIN — SODIUM CHLORIDE, SODIUM LACTATE, POTASSIUM CHLORIDE, AND CALCIUM CHLORIDE: 600; 310; 30; 20 INJECTION, SOLUTION INTRAVENOUS at 09:50

## 2022-10-21 RX ADMIN — SUCCINYLCHOLINE CHLORIDE 100 MG: 20 INJECTION, SOLUTION INTRAMUSCULAR; INTRAVENOUS at 10:00

## 2022-10-21 RX ADMIN — ONDANSETRON 4 MG: 2 INJECTION INTRAMUSCULAR; INTRAVENOUS at 10:47

## 2022-10-21 RX ADMIN — LIDOCAINE HYDROCHLORIDE 2 ML: 10 INJECTION, SOLUTION EPIDURAL; INFILTRATION; INTRACAUDAL; PERINEURAL at 09:32

## 2022-10-21 RX ADMIN — CEFAZOLIN 2 G: 1 INJECTION, POWDER, FOR SOLUTION INTRAMUSCULAR; INTRAVENOUS at 10:10

## 2022-10-21 RX ADMIN — ROPIVACAINE HYDROCHLORIDE 30 ML: 5 INJECTION EPIDURAL; INFILTRATION; PERINEURAL at 09:32

## 2022-10-21 RX ADMIN — Medication 10 MG: at 10:41

## 2022-10-21 RX ADMIN — FAMOTIDINE 20 MG: 20 TABLET ORAL at 09:06

## 2022-10-21 RX ADMIN — LIDOCAINE HYDROCHLORIDE 50 MG: 20 INJECTION, SOLUTION EPIDURAL; INFILTRATION; INTRACAUDAL; PERINEURAL at 10:00

## 2022-10-21 RX ADMIN — PROPOFOL 100 MG: 10 INJECTION, EMULSION INTRAVENOUS at 10:00

## 2022-10-21 NOTE — OP NOTES
Operative Report    Patient: Pepe Medley MRN: [id number]  SSN: [id number]    YOB: 1960  Age: 65 y.o. Sex: female       Date of Surgery: 10/21/2022     Preoperative Diagnosis: Tear of right rotator cuff, unspecified tear extent, unspecified whether traumatic [M75.101]     Postoperative Diagnosis: Tear of right rotator cuff, unspecified tear extent, unspecified whether traumatic [M75.101]     Surgeon(s) and Role:     Adolph Litten, MD - Primary  Assistant Juan Carlos Velásquez instrumental managing the arthroscope of soft tissue and bony work is done  Anesthesia: General     Procedure: Procedure(s):  RIGHT SHOULDER ARTHROSCOPIC ROTATOR CUFF REVISION/ARTHREX/NERVE BLOCK     Procedure in Detail: Patient was taken to the operating room Doser general trach anesthesia with anesthesia staff. Placed in a lateral decubitus position right arm prepped with ChloraPrep solution draped free sterile field. Posterior portal was used arthroscopy portal lateral portals were portal portals were made with 11 blade followed by blunt trochars. Once the arthroscope was in place the shoulder was inspected. There was significant degeneration of the glenohumeral joint. Subscapularis was intact there was some wear of the biceps tendon but still overall intact. Is a 2 cm tear in the footprint of the supraspinatus. Attention was then placed subacromial space where prominent bursa was debrided using a shaver. The patient had had a previous acromioplasty so there was no need to decompress this area. The area of tear was identified and through the defect bony bed was taken down to bleeding bone using a shaver. Mattress suture with fiber tape along with a 2 fiber links were placed were loaded into swivel lock which was impacted laterally after tensioning effecting very stable repair. Subcutaneous tissues closed with 3-0 Vicryl and the skin with 4-0 Monocryl.   Sterile dressings were applied patient tolerated procedure well was taken to recovery room without problems      Estimated Blood Loss: Minimal    Tourniquet Time: * No tourniquets in log *      Implants:   Implant Name Type Inv. Item Serial No.  Lot No. LRB No. Used Action   ANCHOR SUTURE BIOCOMP 4.75X19.1 MM [id number]  [facility] SUTURE BIOCOMP 4.75X19.1 MM [employer]_ [id number] Right 1 Implanted               Specimens: * No specimens in log *        Drains: None                Complications: None    Counts: Sponge and needle counts were correct times two.     Signed By:  Rosalva Titus MD     October 21, 2022

## 2022-10-21 NOTE — ANESTHESIA POSTPROCEDURE EVALUATION
Procedure(s):  RIGHT SHOULDER ARTHROSCOPIC ROTATOR CUFF REVISION/ARTHREX/NERVE BLOCK.    general, regional    Anesthesia Post Evaluation      Multimodal analgesia: multimodal analgesia used between 6 hours prior to anesthesia start to PACU discharge  Patient location during evaluation: bedside  Patient participation: complete - patient participated  Level of consciousness: awake  Pain management: adequate  Airway patency: patent  Anesthetic complications: no  Cardiovascular status: stable  Respiratory status: acceptable  Hydration status: acceptable  Post anesthesia nausea and vomiting:  controlled      INITIAL Post-op Vital signs:   Vitals Value Taken Time   /58 10/21/22 1142   Temp 37.3 °C (99.1 °F) 10/21/22 1142   Pulse 63 10/21/22 1150   Resp 11 10/21/22 1150   SpO2 96 % 10/21/22 1149   Vitals shown include unvalidated device data.

## 2022-10-21 NOTE — BRIEF OP NOTE
Brief Postoperative Note    Patient: Areli Hill  YOB: 1960  MRN: [id number]    Date of Procedure: 10/21/2022     Pre-Op Diagnosis: Tear of right rotator cuff, unspecified tear extent, unspecified whether traumatic [M75.101]    Post-Op Diagnosis: Same as preoperative diagnosis. and IMPINGEMENT       Procedure(s):  RIGHT SHOULDER ARTHROSCOPIC ROTATOR CUFF REVISION/ARTHREX/NERVE BLOCK    Surgeon(s):  Tyler Chauhan MD    Surgical Assistant: Physician Assistant: BERNICE Gabriel  Surg Asst-1: Jacqueline COOK    Anesthesia: General     Estimated Blood Loss (mL): Minimal    Complications: None    Specimens: * No specimens in log *     Implants:   Implant Name Type Inv.  Item Serial No.  Lot No. LRB No. Used Action   ANCHOR SUTURE BIOCOMP 4.75X19.1 MM [employer] [id number]  [facility] SUTURE BIOCOMP 4.75X19.1 MM [employer] [facility]_ [id number] Right 1 Implanted       Drains: * No LDAs found *    Findings: 1CM ROTATOR CUFF REPAIR    Electronically Signed by BERNICE Jon on 10/21/2022 at 11:17 AM

## 2022-10-21 NOTE — PROGRESS NOTES
Date of Surgery Update:  Galdino Erwin was seen and examined. History and physical has been reviewed. The patient has been examined. There have been no significant clinical changes since the completion of the originally dated History and Physical.    Signed By: Mratha Mccallum MD     October 21, 2022 9:37 AM        The above patient was independently seen and examined by myself. The case was then discussed and a proper diagnosis/plan was made. I agree with the above assessment.

## 2022-10-21 NOTE — ANESTHESIA PROCEDURE NOTES
Peripheral Block    Start time: 10/21/2022 9:30 AM  End time: 10/21/2022 9:43 AM  Performed by: Ross York MD  Authorized by: Ross York MD       Pre-procedure: Indications: at surgeon's request and post-op pain management    Preanesthetic Checklist: patient identified, risks and benefits discussed, site marked, timeout performed, anesthesia consent given, patient being monitored and fire risk safety assessment completed and verbalized    Timeout Time: 09:30 EDT      Block Type:   Block Type:   Interscalene  Laterality:  Right  Monitoring:  Standard ASA monitoring, responsive to questions, continuous pulse ox, oxygen, frequent vital sign checks and heart rate  Injection Technique:  Single shot  Procedures: ultrasound guided    Patient Position: supine  Prep: chlorhexidine    Location:  Interscalene  Needle Type:  Ultraplex  Needle Gauge:  22 G  Needle Localization:  Ultrasound guidance  Medication Injected:  FentaNYL citrate (PF) injection sedation initial - IntraVENous   100 mcg - 10/21/2022 9:31:00 AM  midazolam (VERSED) injection - IntraVENous   2 mg - 10/21/2022 9:31:00 AM  ropivacaine (PF) (NAROPIN)(0.5%) 5 mg/mL injection - Peripheral Nerve Block   10 mL - 10/21/2022 9:36:00 AM  Med Admin Time: 10/21/2022 9:36 AM    Assessment:  Number of attempts:  1  Injection Assessment:  Incremental injection every 5 mL, local visualized surrounding nerve on ultrasound, negative aspiration for blood, negative aspiration for CSF, no paresthesia, no intravascular symptoms, low pressure verified by pressure monitor and ultrasound image on chart  Patient tolerance:  Patient tolerated the procedure well with no immediate complications

## 2022-10-21 NOTE — ANESTHESIA PREPROCEDURE EVALUATION
Relevant Problems   No relevant active problems       Anesthetic History   No history of anesthetic complications            Review of Systems / Medical History  Patient summary reviewed and pertinent labs reviewed    Pulmonary  Within defined limits                 Neuro/Psych   Within defined limits           Cardiovascular    Hypertension: well controlled        Dysrhythmias   Pacemaker, past MI, CAD and cardiac stents    Exercise tolerance: >4 METS     GI/Hepatic/Renal     GERD           Endo/Other    Diabetes: well controlled, type 2    Arthritis     Other Findings              Physical Exam    Airway  Mallampati: III  TM Distance: 4 - 6 cm  Neck ROM: decreased range of motion   Mouth opening: Normal     Cardiovascular    Rhythm: regular  Rate: normal         Dental    Dentition: Full lower dentures and Full upper dentures     Pulmonary  Breath sounds clear to auscultation               Abdominal  GI exam deferred       Other Findings            Anesthetic Plan    ASA: 3  Anesthesia type: general and regional          Induction: Intravenous  Anesthetic plan and risks discussed with: Patient

## 2022-10-25 ENCOUNTER — HOSPITAL ENCOUNTER (OUTPATIENT)
Dept: PHYSICAL THERAPY | Age: 62
Discharge: HOME | End: 2022-10-25
Attending: ORTHOPAEDIC SURGERY
Payer: MEDICAID

## 2022-10-25 ENCOUNTER — HOSPITAL ENCOUNTER (OUTPATIENT)
Dept: PHYSICAL THERAPY | Age: 62
End: 2022-10-25
Attending: ORTHOPAEDIC SURGERY
Payer: MEDICAID

## 2022-10-25 ENCOUNTER — TELEPHONE (OUTPATIENT)
Dept: PHYSICAL THERAPY | Age: 62
End: 2022-10-25

## 2022-10-25 ENCOUNTER — HOSPITAL ENCOUNTER (OUTPATIENT)
Dept: PHYSICAL THERAPY | Age: 62
End: 2022-10-25
Attending: ORTHOPAEDIC SURGERY

## 2022-10-25 PROCEDURE — 97110 THERAPEUTIC EXERCISES: CPT | Performed by: PHYSICAL THERAPIST

## 2022-10-25 PROCEDURE — 97162 PT EVAL MOD COMPLEX 30 MIN: CPT | Performed by: PHYSICAL THERAPIST

## 2022-10-25 PROCEDURE — 97140 MANUAL THERAPY 1/> REGIONS: CPT | Performed by: PHYSICAL THERAPIST

## 2022-10-25 NOTE — THERAPY EVALUATION
PT DAILY TREATMENT NOTE/SHOULDER EVAL     Patient Name: Anthony Malave  Date:10/25/2022  : 1960  [x]  Patient  Verified  Payor: Roslyn Graham / Plan: Jamar Pete [id number] HMO / Product Type: Managed Care Medicare /    In time:2:05  Out time:2:45  Total Treatment Time (min): 40  Visit #: 1 of 12    Medicare/BCBS Only   Total Timed Codes (min):  25 1:1 Treatment Time:  40       Treatment Area: Right shoulder pain [M25.511]    SUBJECTIVE  Pain Level (0-10 scale): 9/10 now; 9/10 at best; 10/10 at worst.  [x]constant []intermittent []improving []worsening []no change since onset    Any medication changes, allergies to medications, adverse drug reactions, diagnosis change, or new procedure performed?: [x] No    [] Yes (see summary sheet for update)  Subjective functional status/changes:     PLOF: Did a little cooking but limited in dusting and other activities. Limitations to PLOF: right arm is in a sling. Mechanism of Injury: Right shoulder RTC repair on 10/21/2022. Current symptoms/Complaints: Constant pain and limited mobility of the right arm. She is sleeping okay. Previous Treatment/Compliance: None  PMHx/Surgical Hx: Right RTC repair about 15-16 years ago. Work Hx: Disabled. Pt Goals: \"To be able to use my arm more or better and do a little more\"  Barriers: [x]pain []financial []time []transportation []other  Cognition: A & O x 3    Other:    OBJECTIVE/EXAMINATION  Domestic Life: Lives with her mother currently  Activity/Recreational Limitations: Limited due to altered activity level and multiple problems. Mobility: ambulates with SPC with decrease eloisa. Self Care: Needs assistance. 15 min [x]Eval                  []Re-Eval       10 min Therapeutic Exercise:  [] See flow sheet :   Rationale: increase ROM to improve the patients ability to regain functional mobility. 15 min Manual Therapy:  GH manual stretching in supine with grade I distractions.    The manual therapy interventions were performed at a separate and distinct time from the therapeutic activities interventions. Rationale: decrease pain, increase ROM, and increase tissue extensibility to increase ease of motion to improve function. With   [] TE   [] TA   [] neuro   [] other: Patient Education: [x] Review HEP    [] Progressed/Changed HEP based on:   [] positioning   [] body mechanics   [] transfers   [] heat/ice application    [] other:      Other Objective/Functional Measures:     Physical Therapy Evaluation - Shoulder    Posture: [] Poor    [x] Fair    [] Good    Describe:    ROM:  [] Unable to assess at this time                                                                                                       PROM      Left Right      Flexion  0-35              Scaption/ABD  0-30      ER @ 0 Degrees  0                     End Feel / Painful Arc: No, pain is present throughout ROM.     Strength:   [x] Unable to assess at this time                                                                            L (1-5) R (1-5) Pain   Flexors   [] Yes   [] No   Abductors   [] Yes   [] No   External Rotators   [] Yes   [] No   Internal Rotators   [] Yes   [] No   Supraspinatus   [] Yes   [] No   Serratus Anterior   [] Yes   [] No   Lower Trapezius   [] Yes   [] No   Elbow Flexion   [] Yes   [] No   Elbow Extension   [] Yes   [] No       Palpation  [] Min  [x] Mod  [] Severe    Location: globally about the right GH joint  [] Min  [x] Mod  [] Severe    Location: right UT  [] Min  [] Mod  [] Severe    Location:    Optional Tests:  Deferred  Sensation Left Right Reflexes Left Right   Biceps (C5)   Biceps (C5)     Stern Radial(C6-7)   Brachioradialis (C6)     Stern Ulnar(C8-T1)   Triceps (C7)       Adson's Test  [] Pos   [] Neg Yergason's Test [] Pos   [] Neg  Margot's Test  [] Pos   [] Neg Bristol's Sign [] Pos   [] Neg  Neer's Test  [] Pos   [] Neg Clunk Test  [] Pos   [] Neg  Hawkin's Test  [] Pos   [] Neg AC Joint  [] Pos   [] Neg  Speed's Test  [] Pos   [] Neg SC Joint  [] Pos   [] Neg  Empty Can  [] Pos   [] Neg Pectoral Tightness [] Pos   [] Neg  Anterior Apprehension [] Pos   [] Neg   Posterior Apprehension [] Pos   [] Neg      Other Tests / Comments:   Injury 28 years ago that affected her right side and resulted in some altered sensation in her right hand. Pain Level (0-10 scale) post treatment: 10    ASSESSMENT/Changes in Function: Patient with signs and symptoms consistent with right shoulder pain s/p right RTC repair on 10/21/2022. Patient reports pain levels of 9-10/10 constantly. She presents in an improperly fitted sling. Abductor cushion simply hanging below her forearm. She has limited flexion and scaption. Pain and guarding with PROM. Functionally, she is requiring assistance with all of her ADLs. She also notes a preexisting right sided weakness x 28 years. Patient will continue to benefit from skilled PT services to modify and progress therapeutic interventions, address functional mobility deficits, address ROM deficits, address strength deficits, analyze and address soft tissue restrictions, analyze and cue movement patterns, analyze and modify body mechanics/ergonomics, and instruct in home and community integration to attain remaining goals. [x]  See Plan of Care  []  See progress note/recertification  []  See Discharge Summary         Progress towards goals / Updated goals:  Short Term Goals: To be accomplished in 1-2 treatments:  1. Patient will become proficient in their HEP and will be compliant in performing that program.  Evaluation:   Patient given a written/illustrated HEP. 2.  Patient will demonstrate PROM right shoulder flexion 0-90 deg; scaption 0-70 deg. Evaluation:  PROM right shoulder flexion 0-35, scaption 0-30     Long Term Goals: To be accomplished in 12 treatments:  1.  Patient's pain level will be 5-6/10 with activity in order to improve patient's ability to perform normal ADLs. Evaluation:  Pain level 9/10-10/10  2. Patient will demonstrate AAROM right shoulder flex 0-90, scaption 0-70 to increase ease of ADLs. Evaluation:  PROM right shoulder flexion 0-35, scaption 0-30  3. Patient will increase FOTO score to 48 to indicate increased functional mobility. Evaluation:  FOTO = 4  4. Patient will demonstrate PROM right shoulder flexion 0-120 deg; scaption 0-90 deg.   Evaluation: PROM right shoulder flexion 0-35, scaption 0-30    PLAN  [x]  Upgrade activities as tolerated     [x]  Continue plan of care  []  Update interventions per flow sheet       []  Discharge due to:_  []  Other:_      Brandin Kline, PT 10/25/2022  2:06 PM

## 2022-10-25 NOTE — THERAPY EVALUATION
[facility] - [facility]              [address]        [place], [address] [place]   [phone] fax    Plan of Care/ Statement of Necessity for Physical Therapy Services    Patient name: Tiffanie Gracia Start of Care: 10/25/2022   Referral source: Raiza ,* : 1960    Medical Diagnosis: Right shoulder pain [M25.511]  Payor: BLUE [employer] MEDICARE / Plan: Parvmercy Leggett 8141 HMO / Product Type: Managed Care Medicare /  Onset Date:10/21/2022    Treatment Diagnosis: right shoulder pain   Prior Hospitalization: see medical history Provider#: [id number]   Medications: Verified on Patient summary List    Comorbidities: Arthritis, Back Pain, BMI 33.3, CHF/Heart Disease, DM, GI Disease, Heart Attack, HBP, Incontinence, Kidney/Bladder/Urination Problem, Osteoporosis, Pacemaker, Prior surgery, Prosthesis/Implant. Prior Level of Function: Did a little cooking but limited in dusting and other activities. The Plan of Care and following information is based on the information from the initial evaluation. Assessment/ key information: Patient with signs and symptoms consistent with right shoulder pain s/p right RTC repair on 10/21/2022. Patient reports pain levels of 9-10/10 constantly. She presents in an improperly fitted sling. Abductor cushion simply hanging below her forearm. She has limited flexion and scaption. Pain and guarding with PROM. Functionally, she is requiring assistance with all of her ADLs. She also notes a preexisting right sided weakness x 28 years. Patient will benefit from a program of skilled physical therapy to include therapeutic exercises to address strength deficits, therapeutic activities to improve functional mobility, neuromuscular reeducation to address balance, coordination and proprioception, manual therapy to address ROM and tissue extensibility and modalities as indicated.   All questions were answered. Evaluation Complexity History HIGH Complexity :3+ comorbidities / personal factors will impact the outcome/ POC ; Examination MEDIUM Complexity : 3 Standardized tests and measures addressing body structure, function, activity limitation and / or participation in recreation  ;Presentation HIGH Complexity : Unstable and unpredictable characteristics  ; Clinical Decision Making HIGH Complexity : FOTO score of 1- 25   Overall Complexity Rating: MEDIUM  Problem List: pain affecting function, decrease ROM, decrease strength, impaired gait/ balance, decrease ADL/ functional abilitiies, decrease activity tolerance, decrease flexibility/ joint mobility, and decrease transfer abilities   Treatment Plan may include any combination of the following: Therapeutic exercise, Neuromuscular reeducation, Manual therapy, Therapeutic activity, and Self care/home management  Patient / Family readiness to learn indicated by: asking questions, trying to perform skills, and interest  Persons(s) to be included in education: patient (P) and family support person (FSP);list patient's mother  Barriers to Learning/Limitations: None  Patient Goal (s): To be able to use my arm more or better and do a little more  Patient Self Reported Health Status: fair  Rehabilitation Potential: fair    Short Term Goals: To be accomplished in 1-2 treatments:  1. Patient will become proficient in their HEP and will be compliant in performing that program.  Evaluation:   Patient given a written/illustrated HEP. 2.  Patient will demonstrate PROM right shoulder flexion 0-90 deg; scaption 0-70 deg. Evaluation:  PROM right shoulder flexion 0-35, scaption 0-30    Long Term Goals: To be accomplished in 12 treatments:  1. Patient's pain level will be 5-6/10 with activity in order to improve patient's ability to perform normal ADLs. Evaluation:  Pain level 9/10-10/10  2.  Patient will demonstrate AAROM right shoulder flex 0-90, scaption 0-70 to increase ease of ADLs. Evaluation:  PROM right shoulder flexion 0-35, scaption 0-30  3. Patient will increase FOTO score to 48 to indicate increased functional mobility. Evaluation:  FOTO = 4  4. Patient will demonstrate PROM right shoulder flexion 0-120 deg; scaption 0-90 deg. Evaluation: PROM right shoulder flexion 0-35, scaption 0-30    Frequency / Duration: Patient to be seen 2-3 times per week for 12 treatments. Patient/ Caregiver education and instruction: Diagnosis, prognosis, exercises   [x]  Plan of care has been reviewed with PTA      Certification Period: 10/25/2022-11/23/2022  Rudolph Hoffman, PT 10/25/2022 2:35 PM  ________________________________________________________________________    I certify that the above Therapy Services are being furnished while the patient is under my care. I agree with the treatment plan and certify that this therapy is necessary.     [de-identified] Signature:____________Date:_________TIME:________     Mavis Peterson,*  ** Signature, Date and Time must be completed for valid certification **  Please sign and return to [facility] - [address], [address] [place]   [phone] fax

## 2022-10-27 ENCOUNTER — OFFICE VISIT (OUTPATIENT)
Dept: ORTHOPEDIC SURGERY | Age: 62
End: 2022-10-27
Payer: MEDICARE

## 2022-10-27 ENCOUNTER — HOSPITAL ENCOUNTER (OUTPATIENT)
Dept: PHYSICAL THERAPY | Age: 62
Discharge: HOME | End: 2022-10-27
Attending: ORTHOPAEDIC SURGERY
Payer: MEDICAID

## 2022-10-27 VITALS — OXYGEN SATURATION: 98 % | WEIGHT: 169 LBS | BODY MASS INDEX: 34.13 KG/M2 | HEART RATE: 77 BPM | TEMPERATURE: 97.3 F

## 2022-10-27 DIAGNOSIS — M75.101 TEAR OF RIGHT ROTATOR CUFF, UNSPECIFIED TEAR EXTENT, UNSPECIFIED WHETHER TRAUMATIC: Primary | ICD-10-CM

## 2022-10-27 PROCEDURE — 97110 THERAPEUTIC EXERCISES: CPT

## 2022-10-27 PROCEDURE — 99024 POSTOP FOLLOW-UP VISIT: CPT | Performed by: PHYSICIAN ASSISTANT

## 2022-10-27 PROCEDURE — 97112 NEUROMUSCULAR REEDUCATION: CPT

## 2022-10-27 PROCEDURE — 97140 MANUAL THERAPY 1/> REGIONS: CPT

## 2022-10-27 RX ORDER — OXYCODONE HYDROCHLORIDE 5 MG/1
5 TABLET ORAL
Qty: 28 TABLET | Refills: 0 | Status: SHIPPED | OUTPATIENT
Start: 2022-10-27 | End: 2022-11-03

## 2022-10-27 RX ORDER — OXYCODONE HYDROCHLORIDE 5 MG/1
5 TABLET ORAL
COMMUNITY
End: 2022-10-27 | Stop reason: SDUPTHER

## 2022-10-27 NOTE — PROGRESS NOTES
PT DAILY TREATMENT NOTE     Patient Name: Elif Damico  Date:10/27/2022  : 1960  [x]  Patient  Verified  Payor: Nai Krishnaal / Plan: Jamar Leggett 8141 HMO / Product Type: Managed Care Medicare /    In time:2:47  Out time:3:25  Total Treatment Time (min): 38  Visit #: 2 of 12    Medicare/BCBS Only   Total Timed Codes (min):  38 1:1 Treatment Time:  38       Treatment Area: Right shoulder pain [M25.511]    SUBJECTIVE  Pain Level (0-10 scale): 9  Any medication changes, allergies to medications, adverse drug reactions, diagnosis change, or new procedure performed?: [x] No    [] Yes (see summary sheet for update)  Subjective functional status/changes:   [] No changes reported  Patient reports she had a follow up with her doctor today that went well. OBJECTIVE          15 min Therapeutic Exercise:  [x] See flow sheet :   Rationale: increase ROM, increase strength, improve coordination, improve balance, and increase proprioception to improve the patients ability to increase ease with ADLs. 8 min Neuromuscular Re-education:  [x]  See flow sheet :   Rationale: increase ROM, increase strength, improve coordination, improve balance, and increase proprioception  to improve the patients ability to increase ease with dressing. 15 min Manual Therapy:    Sidelying- right scapular glides  Supine- PROM shoulder per protocol. The manual therapy interventions were performed at a separate and distinct time from the therapeutic activities interventions. Rationale: decrease pain, increase ROM, increase tissue extensibility, decrease edema , correct positional vertigo, decrease trigger points, and increase postural awareness to increase ease with bathing.              With   [] TE   [] TA   [] neuro   [] other: Patient Education: [x] Review HEP    [] Progressed/Changed HEP based on:   [] positioning   [] body mechanics   [] transfers   [] heat/ice application    [] other: Other Objective/Functional Measures: pt reports performing HEP. Pain Level (0-10 scale) post treatment: 7    ASSESSMENT/Changes in Function: Initiated exercises per POC. Patient had no guarding with manual and no reports of increase of pain. Patient will continue to benefit from skilled PT services to modify and progress therapeutic interventions, address functional mobility deficits, address ROM deficits, address strength deficits, analyze and address soft tissue restrictions, analyze and cue movement patterns, analyze and modify body mechanics/ergonomics, assess and modify postural abnormalities, address imbalance/dizziness, and instruct in home and community integration to attain remaining goals. []  See Plan of Care  []  See progress note/recertification  []  See Discharge Summary         Progress towards goals / Updated goals:  Short Term Goals: To be accomplished in 1-2 treatments:  1. Patient will become proficient in their HEP and will be compliant in performing that program.  Evaluation:   Patient given a written/illustrated HEP. Current: MET: pt reports performing HEP. 10/27/22  2. Patient will demonstrate PROM right shoulder flexion 0-90 deg; scaption 0-70 deg. Evaluation:  PROM right shoulder flexion 0-35, scaption 0-30     Long Term Goals: To be accomplished in 12 treatments:  1. Patient's pain level will be 5-6/10 with activity in order to improve patient's ability to perform normal ADLs. Evaluation:  Pain level 9/10-10/10  2. Patient will demonstrate AAROM right shoulder flex 0-90, scaption 0-70 to increase ease of ADLs. Evaluation:  PROM right shoulder flexion 0-35, scaption 0-30  3. Patient will increase FOTO score to 48 to indicate increased functional mobility. Evaluation:  FOTO = 4  4. Patient will demonstrate PROM right shoulder flexion 0-120 deg; scaption 0-90 deg.   Evaluation: PROM right shoulder flexion 0-35, scaption 0-30    PLAN  []  Upgrade activities as tolerated [x]  Continue plan of care  []  Update interventions per flow sheet       []  Discharge due to:_  []  Other:_      Anika Gomez, PTA 10/27/2022  2:47 PM    Future Appointments   Date Time Provider Oren Ramon   10/31/2022  2:00 PM Le Dale [facility]   11/28/2022  1:30 PM BERNICE Atkins [facility] BS AMB   11/30/2022 10:00 AM Shola Rayo PA-C [facility] BS AMB   12/19/2022  1:00 PM Sol Sánchez MD [place] BS AMB

## 2022-10-27 NOTE — PROGRESS NOTES
Francisco Javier Mccarty  1960     HISTORY OF PRESENT ILLNESS  Francisco Javier Mccarty is a 65 y.o. female who presents today for evaluation s/p Right shoulder arthroscopic 2cm rotator cuff repair on 10/21/22. Patient has been going to PT. Describes pain as a 9/10. Has been taking pain meds for pain. Still has night pain. Patient denies any fever, chills, chest pain, shortness of breath or calf pain. The remainder of the review of systems is negative. There are no new illness or injuries to report since last seen in the office. No changes in medications, allergies, social or family history. PHYSICAL EXAM:   Visit Vitals  LMP  (LMP Unknown)      The patient is a well-developed, well-nourished female in no acute distress. The patient is alert and oriented times three. The patient appears to be well groomed. Mood and affect are normal.  ORTHOPEDIC EXAM of right shoulder:  Inspection: swelling not present,  Bruising not present  Incision, clean, dry, intact, sutures in place  Passive glenohumeral abduction 0-35 degrees  Stability: Stable  Strength: n/a  2+ distal pulses    IMPRESSION:  S/P Right shoulder arthroscopic 2cm rotator cuff repair    PLAN:   Incisions cleaned. Surgery was discussed at length today. Patient to continue with PROM and PT. Continue wearing sling. Stressed to patient that nothing causes an increase in pain.   RTC 4 weeks    AMAURY Aparicio [facility]

## 2022-10-31 ENCOUNTER — HOSPITAL ENCOUNTER (OUTPATIENT)
Dept: PHYSICAL THERAPY | Age: 62
Discharge: HOME | End: 2022-10-31
Attending: ORTHOPAEDIC SURGERY
Payer: MEDICAID

## 2022-10-31 PROCEDURE — 97110 THERAPEUTIC EXERCISES: CPT

## 2022-10-31 PROCEDURE — 97140 MANUAL THERAPY 1/> REGIONS: CPT

## 2022-10-31 NOTE — PROGRESS NOTES
PT DAILY TREATMENT NOTE     Patient Name: Donna Dong  Date:10/31/2022  : 1960  [x]  Patient  Verified  Payor: [employer] / Plan: Jamar Leggett 8141 HMO / Product Type: Managed Care Medicare /    In time:200  Out time:240  Total Treatment Time (min): 40  Visit #: 3 of 12    Medicare/BCBS Only   Total Timed Codes (min):  32 1:1 Treatment Time:  32       Treatment Area: Right shoulder pain [M25.511]    SUBJECTIVE  Pain Level (0-10 scale): 7  Any medication changes, allergies to medications, adverse drug reactions, diagnosis change, or new procedure performed?: [x] No    [] Yes (see summary sheet for update)  Subjective functional status/changes:   [] No changes reported  Patient reported decreased pain in right shd    OBJECTIVE    Modality rationale: decrease inflammation and decrease pain to improve the patients ability to perform ADLs   Min Type Additional Details    [] Estim:  []Unatt       []IFC  []Premod                        []Other:  []w/ice   []w/heat  Position:  Location:    [] Estim: []Att    []TENS instruct  []NMES                    []Other:  []w/US   []w/ice   []w/heat  Position:  Location:    []  Traction: [] Cervical       []Lumbar                       [] Prone          []Supine                       []Intermittent   []Continuous Lbs:  [] before manual  [] after manual    []  Ultrasound: []Continuous   [] Pulsed                           []1MHz   []3MHz W/cm2:  Location:    []  Iontophoresis with dexamethasone         Location: [] Take home patch   [] In clinic   8 [x]  Ice     []  heat  []  Ice massage  []  Laser   []  Anodyne Position:supine  Location:right shd    []  Laser with stim  []  Other:  Position:  Location:    []  Vasopneumatic Device    []  Right     []  Left  Pre-treatment girth:  Post-treatment girth:  Measured at (location):  Pressure:       [] lo [] med [] hi   Temperature: [] lo [] med [] hi   [] Skin assessment post-treatment:  []intact []redness- no adverse reaction    []redness - adverse reaction:         12 min Therapeutic Exercise:  [] See flow sheet :   Rationale: increase ROM and increase strength to improve the patients ability to perform ADLs        20 min Manual Therapy:  Sidelying- right scapular glides  Supine- PROM shoulder per protocol. The manual therapy interventions were performed at a separate and distinct time from the therapeutic activities interventions. Rationale: decrease pain, increase ROM, and increase tissue extensibility to perform ADLs          With   [] TE   [] TA   [] neuro   [] other: Patient Education: [x] Review HEP    [] Progressed/Changed HEP based on:   [] positioning   [] body mechanics   [] transfers   [] heat/ice application    [] other:      Other Objective/Functional Measures:   - scapular tipping and rounded shd posture on right improved with manual  - improving PROM flex and scaption 70 degs      Pain Level (0-10 scale) post treatment: 6    ASSESSMENT/Changes in Function: pt is progressing towards improved PROM right shd as today able to obtain 70 degs flex/scap post manual today. Reviewed relaxed shd posture and mm guarding with good understanding and ability to perform at end of treatment. Patient will continue to benefit from skilled PT services to modify and progress therapeutic interventions, address functional mobility deficits, address ROM deficits, address strength deficits, analyze and address soft tissue restrictions, and analyze and cue movement patterns to attain remaining goals. [x]  See Plan of Care  []  See progress note/recertification  []  See Discharge Summary         Progress towards goals / Updated goals:  Short Term Goals: To be accomplished in 1-2 treatments:  1. Patient will become proficient in their HEP and will be compliant in performing that program.  Evaluation:   Patient given a written/illustrated HEP. Current: MET: pt reports performing HEP. 10/27/22  2. Patient will demonstrate PROM right shoulder flexion 0-90 deg; scaption 0-70 deg. Evaluation:  PROM right shoulder flexion 0-35, scaption 0-30     Long Term Goals: To be accomplished in 12 treatments:  1. Patient's pain level will be 5-6/10 with activity in order to improve patient's ability to perform normal ADLs. Evaluation:  Pain level 9/10-10/10  2. Patient will demonstrate AAROM right shoulder flex 0-90, scaption 0-70 to increase ease of ADLs. Evaluation:  PROM right shoulder flexion 0-35, scaption 0-30  3. Patient will increase FOTO score to 48 to indicate increased functional mobility. Evaluation:  FOTO = 4  4. Patient will demonstrate PROM right shoulder flexion 0-120 deg; scaption 0-90 deg.   Evaluation: PROM right shoulder flexion 0-35, scaption 0-30    PLAN  []  Upgrade activities as tolerated     [x]  Continue plan of care  []  Update interventions per flow sheet       []  Discharge due to:_  []  Other:_      Cheri Lugo PTA 10/31/2022  2:29 PM    Future Appointments   Date Time Provider Oren Ramon   11/28/2022  1:30 PM Caron [facility] BS AMB   11/30/2022 10:00 AM Safia Villatoro PA-C [facility] BS AMB   12/19/2022  1:00 PM Pezzella, Cristopher Boast, MD [place] BS AMB

## 2022-11-02 ENCOUNTER — HOSPITAL ENCOUNTER (OUTPATIENT)
Dept: PHYSICAL THERAPY | Age: 62
Discharge: HOME | End: 2022-11-02
Attending: ORTHOPAEDIC SURGERY
Payer: MEDICARE

## 2022-11-02 PROCEDURE — 97140 MANUAL THERAPY 1/> REGIONS: CPT

## 2022-11-02 PROCEDURE — 97110 THERAPEUTIC EXERCISES: CPT

## 2022-11-02 NOTE — PROGRESS NOTES
PT DAILY TREATMENT NOTE     Patient Name: Galdino Erwin  Date:2022  : 1960  [x]  Patient  Verified  Payor: [facility] MEDICARE / Plan: HARDIK SAUCEDO [facility] / Product Type: Managed Care Medicare /    In time:2:45  Out time:3:20  Total Treatment Time (min): 35  Visit #: 4 of 12    Medicare/BCBS Only   Total Timed Codes (min):  35 1:1 Treatment Time:  35       Treatment Area: Right shoulder pain [M25.511]    SUBJECTIVE  Pain Level (0-10 scale): 7  Any medication changes, allergies to medications, adverse drug reactions, diagnosis change, or new procedure performed?: [x] No    [] Yes (see summary sheet for update)  Subjective functional status/changes:   [] No changes reported  Patient reports she is compliant with wearing the sling at all times and sleeping, but has been having pain. OBJECTIVE          12 min Therapeutic Exercise:  [x] See flow sheet :   Rationale: increase ROM, increase strength, improve coordination, improve balance, and increase proprioception to improve the patients ability to increase ease with ADLs. 8 min Neuromuscular Re-education:  [x]  See flow sheet :   Rationale: increase ROM, increase strength, improve coordination, improve balance, and increase proprioception  to improve the patients ability to increase ease with dressing. 15 min Manual Therapy:    Sidelying- right scapular glides  Supine- PROM shoulder per protocol. The manual therapy interventions were performed at a separate and distinct time from the therapeutic activities interventions. Rationale: decrease pain, increase ROM, increase tissue extensibility, decrease edema , correct positional vertigo, decrease trigger points, and increase postural awareness to increase ease with bathing.               With   [] TE   [] TA   [] neuro   [] other: Patient Education: [x] Review HEP    [] Progressed/Changed HEP based on:   [] positioning   [] body mechanics   [] transfers   [] heat/ice application [] other:      Other Objective/Functional Measures: PROM right shoulder flexion 0-120, scaption 0-60 deg. Pain Level (0-10 scale) post treatment: 7    ASSESSMENT/Changes in Function: Patient arrived with sling poorly positioned on shoulder with front strap moved back close to elbow. Repositioned sling and strap to help maintain proper positioning. Patient will continue to benefit from skilled PT services to modify and progress therapeutic interventions, address functional mobility deficits, address ROM deficits, address strength deficits, analyze and address soft tissue restrictions, analyze and cue movement patterns, analyze and modify body mechanics/ergonomics, assess and modify postural abnormalities, address imbalance/dizziness, and instruct in home and community integration to attain remaining goals. []  See Plan of Care  []  See progress note/recertification  []  See Discharge Summary         Progress towards goals / Updated goals:  Short Term Goals: To be accomplished in 1-2 treatments:  1. Patient will become proficient in their HEP and will be compliant in performing that program.  Evaluation:   Patient given a written/illustrated HEP. Current: MET: pt reports performing HEP. 10/27/22  2. Patient will demonstrate PROM right shoulder flexion 0-90 deg; scaption 0-70 deg. Evaluation:  PROM right shoulder flexion 0-35, scaption 0-30  Current: Progressing: PROM right shoulder flexion 0-120, scaption 0-60 deg. 11/2/22     Long Term Goals: To be accomplished in 12 treatments:  1. Patient's pain level will be 5-6/10 with activity in order to improve patient's ability to perform normal ADLs. Evaluation:  Pain level 9/10-10/10  2. Patient will demonstrate AAROM right shoulder flex 0-90, scaption 0-70 to increase ease of ADLs. Evaluation:  PROM right shoulder flexion 0-35, scaption 0-30    3. Patient will increase FOTO score to 48 to indicate increased functional mobility.   Evaluation:  FOTO = 4  4. Patient will demonstrate PROM right shoulder flexion 0-120 deg; scaption 0-90 deg.   Evaluation: PROM right shoulder flexion 0-35, scaption 0-30       PLAN  []  Upgrade activities as tolerated     [x]  Continue plan of care  []  Update interventions per flow sheet       []  Discharge due to:_  []  Other:_      Fermin Coleman, DAVID 11/2/2022  2:59 PM    Future Appointments   Date Time Provider Oren Yenny   11/7/2022  1:15 PM Yun Bryant, PTA [facility]   11/9/2022  2:00 PM [facility] PT [place] [facility]   11/14/2022  2:45 PM Michael Orma, PT [facility]   11/16/2022  2:00 PM Alphonse Leblancllor, PTA [facility]   11/21/2022 11:00 AM Jorge Delarosa, PT [facility]   11/22/2022 11:45 AM Alphonse Leblancllor, PTA [facility]   11/28/2022  1:30 PM BERNICE Youssef [facility] BS AMB   11/29/2022 12:30 PM Michael Orma, PT [facility]   11/30/2022 10:00 AM Claudio Hillman PA-C [facility] BS AMB   12/19/2022  1:00 PM Zee Sánchez MD [place] BS AMB

## 2022-11-07 ENCOUNTER — APPOINTMENT (OUTPATIENT)
Dept: PHYSICAL THERAPY | Age: 62
End: 2022-11-07
Attending: ORTHOPAEDIC SURGERY
Payer: MEDICARE

## 2022-11-09 ENCOUNTER — HOSPITAL ENCOUNTER (OUTPATIENT)
Dept: PHYSICAL THERAPY | Age: 62
Discharge: HOME | End: 2022-11-09
Attending: ORTHOPAEDIC SURGERY
Payer: MEDICARE

## 2022-11-09 PROCEDURE — 97140 MANUAL THERAPY 1/> REGIONS: CPT

## 2022-11-09 PROCEDURE — 97110 THERAPEUTIC EXERCISES: CPT

## 2022-11-09 NOTE — PROGRESS NOTES
PT DAILY TREATMENT NOTE     Patient Name: Felicia Hernandez  Date:2022  : 1960  [x]  Patient  Verified  Payor: [facility] MEDICARE / Plan: HARDIK SAUCEDO [facility] / Product Type: Managed Care Medicare /    In time: 210   Out time:240  Total Treatment Time (min): 30  Visit #: 5 of 12    Medicare/BCBS Only   Total Timed Codes (min):  30 1:1 Treatment Time:  30       Treatment Area: Right shoulder pain [M25.511]    SUBJECTIVE  Pain Level (0-10 scale): 7/10  Any medication changes, allergies to medications, adverse drug reactions, diagnosis change, or new procedure performed?: [x] No    [] Yes (see summary sheet for update)  Subjective functional status/changes:   [] No changes reported  Patient 10 minutes late  \"Its bad today\"     OBJECTIVE      18 min Therapeutic Exercise:  [x] See flow sheet :   Rationale: increase ROM and increase strength to improve the patients ability to perform ADLs        12 min Manual Therapy:  Sidelying- right scapular glides  Supine- PROM shoulder per protocol. Scar massage   The manual therapy interventions were performed at a separate and distinct time from the therapeutic activities interventions. Rationale: decrease pain, increase ROM, increase tissue extensibility, and decrease trigger points to perform ADLs            With   [x] TE   [] TA   [] neuro   [] other: Patient Education: [x] Review HEP    [] Progressed/Changed HEP based on:   [] positioning   [] body mechanics   [] transfers   [] heat/ice application    [] other:      Other Objective/Functional Measures:   Increased wrist AROM to 20x   PROM shoulder flx 130*    Pain Level (0-10 scale) post treatment: 7/10    ASSESSMENT/Changes in Function: Patient actively participates in therapy and tolerates exercises fairly.  Guarding at the shoulder and limited motion d/t pain     Patient will continue to benefit from skilled PT services to modify and progress therapeutic interventions, address functional mobility deficits, address ROM deficits, address strength deficits, analyze and address soft tissue restrictions, analyze and cue movement patterns, analyze and modify body mechanics/ergonomics, assess and modify postural abnormalities, address imbalance/dizziness, and instruct in home and community integration to attain remaining goals. []  See Plan of Care  []  See progress note/recertification  []  See Discharge Summary         Progress towards goals / Updated goals:  Short Term Goals: To be accomplished in 1-2 treatments:  1. Patient will become proficient in their HEP and will be compliant in performing that program.  Evaluation:   Patient given a written/illustrated HEP. Current: MET: pt reports performing HEP. 10/27/22  2. Patient will demonstrate PROM right shoulder flexion 0-90 deg; scaption 0-70 deg. Evaluation:  PROM right shoulder flexion 0-35, scaption 0-30  Current: Progressing: PROM right shoulder flexion 0-130, scaption 0-60 deg. 11/9/22     Long Term Goals: To be accomplished in 12 treatments:  1. Patient's pain level will be 5-6/10 with activity in order to improve patient's ability to perform normal ADLs. Evaluation:  Pain level 9/10-10/10  2. Patient will demonstrate AAROM right shoulder flex 0-90, scaption 0-70 to increase ease of ADLs. Evaluation:  PROM right shoulder flexion 0-35, scaption 0-30     3. Patient will increase FOTO score to 48 to indicate increased functional mobility. Evaluation:  FOTO = 4  4. Patient will demonstrate PROM right shoulder flexion 0-120 deg; scaption 0-90 deg.   Evaluation: PROM right shoulder flexion 0-35, scaption 0-30    PLAN  [x]  Upgrade activities as tolerated     [x]  Continue plan of care  []  Update interventions per flow sheet       []  Discharge due to:_  []  Other:_      Josy Coello, PTA 11/9/2022  2:13 PM    Future Appointments   Date Time Provider Oren Ramon   11/11/2022  1:15 PM Ele Diane, DAVID [facility]   11/14/2022  2:45 PM Clay Bear PT [facility]   11/16/2022  2:00 PM Woo Torrezer [facility]   11/21/2022 11:00 AM Jorge Delarosa, PT [facility]   11/22/2022 11:45 AM Alphonse Leblancllor, PTA [facility]   11/28/2022  1:30 PM Caron Youssef [facility] BS AMB   11/29/2022 12:30 PM Michael Lu, PT [facility]   11/30/2022 10:00 AM Claudio Hillman PA-C [facility] BS AMB   12/19/2022  1:00 PM Zee Sánchez MD [facility] BS AMB

## 2022-11-11 ENCOUNTER — HOSPITAL ENCOUNTER (OUTPATIENT)
Dept: PHYSICAL THERAPY | Age: 62
Discharge: HOME | End: 2022-11-11
Attending: ORTHOPAEDIC SURGERY
Payer: MEDICARE

## 2022-11-11 PROCEDURE — 97110 THERAPEUTIC EXERCISES: CPT

## 2022-11-11 PROCEDURE — 97140 MANUAL THERAPY 1/> REGIONS: CPT

## 2022-11-11 NOTE — PROGRESS NOTES
PT DAILY TREATMENT NOTE     Patient Name: Melinda Jeffrey  Date:2022  : 1960  [x]  Patient  Verified  Payor: [facility] MEDICARE / [facility] Product Type: Managed Care Medicare /    In time:1:12  Out time:1:52  Total Treatment Time (min): 40  Visit #: 6 of 12    Medicare/BCBS Only   Total Timed Codes (min):  30 1:1 Treatment Time:  30       Treatment Area: Right shoulder pain [M25.511]    SUBJECTIVE  Pain Level (0-10 scale): 9  Any medication changes, allergies to medications, adverse drug reactions, diagnosis change, or new procedure performed?: [x] No    [] Yes (see summary sheet for update)  Subjective functional status/changes:   [] No changes reported  Patient states she was walking and her knee gave out on her causing her to fall. Patient states she fell onto her right shoulder and has been having pain along the front of her shoulder. OBJECTIVE    Modality rationale: decrease pain to improve the patients ability to decrease difficulty while performing tasks. Min Type Additional Details   10 [x]  Ice     []  heat  []  Ice massage  []  Laser   []  Anodyne Position:sitting  Location:right shoulder   [] Skin assessment post-treatment:  []intact []redness- no adverse reaction    []redness - adverse reaction:           15 min Therapeutic Exercise:  [x] See flow sheet :   Rationale: increase ROM, increase strength, improve coordination, improve balance, and increase proprioception to improve the patients ability to increase ease with ADLs. 15 min Manual Therapy:    Sidelying- right scapular glides  Supine- PROM shoulder per protocol. The manual therapy interventions were performed at a separate and distinct time from the therapeutic activities interventions.   Rationale: decrease pain, increase ROM, increase tissue extensibility, decrease edema , correct positional vertigo, decrease trigger points, and increase postural awareness to increase ease with bathing. With   [] TE   [] TA   [] neuro   [] other: Patient Education: [x] Review HEP    [] Progressed/Changed HEP based on:   [] positioning   [] body mechanics   [] transfers   [] heat/ice application    [] other:      Other Objective/Functional Measures: right shoulder PROM flexion 120 deg, scaption 90 deg. Pain Level (0-10 scale) post treatment: 8    ASSESSMENT/Changes in Function: Despite the fall yesterday, patient continues to maintain PROM well. Continue to maintain PROM phase of protocol. Patient will continue to benefit from skilled PT services to modify and progress therapeutic interventions, address functional mobility deficits, address ROM deficits, address strength deficits, analyze and address soft tissue restrictions, analyze and cue movement patterns, analyze and modify body mechanics/ergonomics, assess and modify postural abnormalities, address imbalance/dizziness, and instruct in home and community integration to attain remaining goals. []  See Plan of Care  []  See progress note/recertification  []  See Discharge Summary         Progress towards goals / Updated goals:  Short Term Goals: To be accomplished in 1-2 treatments:  1. Patient will become proficient in their HEP and will be compliant in performing that program.  Evaluation:   Patient given a written/illustrated HEP. Current: MET: pt reports performing HEP. 10/27/22  2. Patient will demonstrate PROM right shoulder flexion 0-90 deg; scaption 0-70 deg. Evaluation:  PROM right shoulder flexion 0-35, scaption 0-30  Current: Progressing: PROM right shoulder flexion 0-130, scaption 0-60 deg. 11/9/22     Long Term Goals: To be accomplished in 12 treatments:  1. Patient's pain level will be 5-6/10 with activity in order to improve patient's ability to perform normal ADLs. Evaluation:  Pain level 9/10-10/10  2. Patient will demonstrate AAROM right shoulder flex 0-90, scaption 0-70 to increase ease of ADLs.   Evaluation: PROM right shoulder flexion 0-35, scaption 0-30     3. Patient will increase FOTO score to 48 to indicate increased functional mobility. Evaluation:  FOTO = 4  4. Patient will demonstrate PROM right shoulder flexion 0-120 deg; scaption 0-90 deg.   Evaluation: PROM right shoulder flexion 0-35, scaption 0-30       PLAN  []  Upgrade activities as tolerated     [x]  Continue plan of care  []  Update interventions per flow sheet       []  Discharge due to:_  []  Other:_      Claire Roach PTA 11/11/2022  1:15 PM    Future Appointments   Date Time Provider Oren Ramon   11/14/2022  2:45 PM Modesta Tian, PT [facility]   11/16/2022  2:00 PM Delma Martinez PTA [facility]   11/21/2022 11:00 AM Yolanda Tapia, PT [facility]   11/22/2022 11:45 AM Delma Martinez PTA [facility]   11/28/2022  1:30 PM BERNICE Riojas [facility] BS AMB   11/29/2022 12:30 PM Modesta Tian, PT [facility]   11/30/2022 10:00 AM AMAURY Griffin BS AMB   12/19/2022  1:00 PM Elizabeth Sánchez MD [place] BS AMB

## 2022-11-14 ENCOUNTER — TELEPHONE (OUTPATIENT)
Dept: PHYSICAL THERAPY | Age: 62
End: 2022-11-14

## 2022-11-14 ENCOUNTER — HOSPITAL ENCOUNTER (OUTPATIENT)
Dept: PHYSICAL THERAPY | Age: 62
End: 2022-11-14
Attending: ORTHOPAEDIC SURGERY
Payer: MEDICARE

## 2022-11-15 ENCOUNTER — OFFICE VISIT (OUTPATIENT)
Dept: ORTHOPEDIC SURGERY | Age: 62
End: 2022-11-15
Payer: MEDICARE

## 2022-11-15 VITALS — WEIGHT: 169 LBS | BODY MASS INDEX: 34.07 KG/M2 | HEIGHT: 59 IN

## 2022-11-15 DIAGNOSIS — M75.101 TEAR OF RIGHT ROTATOR CUFF, UNSPECIFIED TEAR EXTENT, UNSPECIFIED WHETHER TRAUMATIC: Primary | ICD-10-CM

## 2022-11-15 PROCEDURE — 73030 X-RAY EXAM OF SHOULDER: CPT | Performed by: PHYSICIAN ASSISTANT

## 2022-11-15 PROCEDURE — 99024 POSTOP FOLLOW-UP VISIT: CPT | Performed by: PHYSICIAN ASSISTANT

## 2022-11-15 RX ORDER — OXYCODONE AND ACETAMINOPHEN 5; 325 MG/1; MG/1
1 TABLET ORAL
Qty: 21 TABLET | Refills: 0 | Status: SHIPPED | OUTPATIENT
Start: 2022-11-15 | End: 2022-11-21 | Stop reason: SDUPTHER

## 2022-11-16 ENCOUNTER — APPOINTMENT (OUTPATIENT)
Dept: PHYSICAL THERAPY | Age: 62
End: 2022-11-16
Attending: ORTHOPAEDIC SURGERY
Payer: MEDICARE

## 2022-11-21 ENCOUNTER — APPOINTMENT (OUTPATIENT)
Dept: PHYSICAL THERAPY | Age: 62
End: 2022-11-21
Attending: ORTHOPAEDIC SURGERY
Payer: MEDICARE

## 2022-11-21 ENCOUNTER — OFFICE VISIT (OUTPATIENT)
Dept: ORTHOPEDIC SURGERY | Age: 62
End: 2022-11-21
Payer: MEDICARE

## 2022-11-21 VITALS — WEIGHT: 172.6 LBS | BODY MASS INDEX: 34.8 KG/M2 | HEIGHT: 59 IN

## 2022-11-21 DIAGNOSIS — M75.101 TEAR OF RIGHT ROTATOR CUFF, UNSPECIFIED TEAR EXTENT, UNSPECIFIED WHETHER TRAUMATIC: ICD-10-CM

## 2022-11-21 DIAGNOSIS — G89.29 CHRONIC RIGHT SHOULDER PAIN: Primary | ICD-10-CM

## 2022-11-21 DIAGNOSIS — M25.511 CHRONIC RIGHT SHOULDER PAIN: Primary | ICD-10-CM

## 2022-11-21 PROCEDURE — 99024 POSTOP FOLLOW-UP VISIT: CPT | Performed by: PHYSICIAN ASSISTANT

## 2022-11-21 PROCEDURE — 73030 X-RAY EXAM OF SHOULDER: CPT | Performed by: PHYSICIAN ASSISTANT

## 2022-11-21 RX ORDER — OXYCODONE AND ACETAMINOPHEN 5; 325 MG/1; MG/1
1 TABLET ORAL
Qty: 21 TABLET | Refills: 0 | Status: SHIPPED | OUTPATIENT
Start: 2022-11-21 | End: 2022-11-28

## 2022-11-21 NOTE — PROGRESS NOTES
Pepe Medley  1960     HISTORY OF PRESENT ILLNESS  Pepe Medley is a 65 y.o. female who presents today for evaluation s/p Right shoulder arthroscopic 2cm rotator cuff repair on 10/21/22. Patient has been going to PT. Describes pain as a 9/10. She notes that she fell on 11/10/22. She has also had 2 additional falls since she was last seen. She states that she gets very dizzy when she gets up quickly. She has been seen by neurology in the past . Has apt with PCP tomorrow. has had an increase in pain since. Has been taking pain meds for pain. Still has night pain. Patient denies any fever, chills, chest pain, shortness of breath or calf pain. The remainder of the review of systems is negative. There are no new illness or injuries to report since last seen in the office. No changes in medications, allergies, social or family history. PHYSICAL EXAM:   Visit Vitals  Ht 4' 11\" (1.499 m)   Wt 172 lb 9.6 oz (78.3 kg)   LMP  (LMP Unknown)   BMI 34.86 kg/m²      The patient is a well-developed, well-nourished female in no acute distress. The patient is alert and oriented times three. The patient appears to be well groomed. Mood and affect are normal.  ORTHOPEDIC EXAM of right shoulder:  Inspection: swelling not present,  Bruising not present  Incisions well healed  Passive glenohumeral abduction 0-35 degrees  Stability: Stable  Strength: n/a  2+ distal pulses    IMAGING: 3 view xray of right shoulder taken in office on 11/21/22 read and reviewed by myself reveal inferior spur of humerus head with high riding humerus c/w cuff tear arthropathy    IMPRESSION:  S/P Right shoulder arthroscopic 2cm rotator cuff repair    PLAN:   Patient with increase in pain post operatively. We will order a stat CT arthrogram on her today. One-time prescription for Percocet given today given her increase in pain. Patient will likely require a reverse total shoulder arthroplasty.   She also has an appointment tomorrow with her primary care doctor.   RTC with Dr. Jose Dsouza after AMAURY [phone] and Spine Specialist

## 2022-11-22 ENCOUNTER — APPOINTMENT (OUTPATIENT)
Dept: PHYSICAL THERAPY | Age: 62
End: 2022-11-22
Attending: ORTHOPAEDIC SURGERY
Payer: MEDICARE

## 2022-11-28 ENCOUNTER — OFFICE VISIT (OUTPATIENT)
Dept: ORTHOPEDIC SURGERY | Age: 62
End: 2022-11-28
Payer: MEDICARE

## 2022-11-28 ENCOUNTER — TELEPHONE (OUTPATIENT)
Dept: PHYSICAL THERAPY | Age: 62
End: 2022-11-28

## 2022-11-28 VITALS — HEIGHT: 59 IN | BODY MASS INDEX: 34.88 KG/M2 | WEIGHT: 173 LBS | TEMPERATURE: 97.1 F

## 2022-11-28 DIAGNOSIS — M75.101 TEAR OF RIGHT ROTATOR CUFF, UNSPECIFIED TEAR EXTENT, UNSPECIFIED WHETHER TRAUMATIC: Primary | ICD-10-CM

## 2022-11-28 PROCEDURE — 99024 POSTOP FOLLOW-UP VISIT: CPT | Performed by: PHYSICIAN ASSISTANT

## 2022-11-28 RX ORDER — OXYCODONE AND ACETAMINOPHEN 5; 325 MG/1; MG/1
1 TABLET ORAL
Qty: 28 TABLET | Refills: 0 | Status: SHIPPED | OUTPATIENT
Start: 2022-11-28 | End: 2022-12-05

## 2022-11-28 NOTE — PROGRESS NOTES
Britt Mayer  1960     HISTORY OF PRESENT ILLNESS  Britt Mayer is a 65 y.o. female who presents today for evaluation s/p Right shoulder arthroscopic 2cm rotator cuff repair on 10/21/22. Patient has been going to PT. Describes pain as a 9/10. She notes that she fell on 11/10/22. She has also had 2 additional falls since she was seen at [facility] on 11/15/2022. She states that she gets very dizzy when she gets up quickly. She has been seen by neurology in the past .  has had an increase in pain since. Has been taking pain meds for pain. Still has night pain. Her CT scan is scheduled for 12/06/2022. Her pain is unchanged since last OV, now having pain with simple movements such as putting on a sports bra. Patient denies any fever, chills, chest pain, shortness of breath or calf pain. The remainder of the review of systems is negative. There are no new illness or injuries to report since last seen in the office. No changes in medications, allergies, social or family history. PHYSICAL EXAM:   Visit Vitals  Temp 97.1 °F (36.2 °C) (Temporal)   Ht 4' 11\" (1.499 m)   Wt 173 lb (78.5 kg)   LMP  (LMP Unknown)   BMI 34.94 kg/m²      The patient is a well-developed, well-nourished female in no acute distress. The patient is alert and oriented times three. The patient appears to be well groomed. Mood and affect are normal.  ORTHOPEDIC EXAM of right shoulder:  Inspection: swelling not present,  Bruising not present  Incisions well healed  Passive glenohumeral abduction 0-35 degrees  Stability: Stable  Strength: n/a  2+ distal pulses    IMAGING: 3 view xray of right shoulder taken in office on 11/21/22 read and reviewed by myself reveal inferior spur of humerus head with high riding humerus c/w cuff tear arthropathy    IMPRESSION:  S/P Right shoulder arthroscopic 2cm rotator cuff repair  Encounter Diagnosis   Name Primary?     Tear of right rotator cuff, unspecified tear extent, unspecified whether traumatic Yes PLAN:   Patient with increase in pain post operatively. CT arthrogram is scheduled for 12/06/2022. Was provided with refill for Percocet today given her increase in pain. Patient given pain medication for short term acute pain relief. Goal is to treat patient according to above plan and to ultimately have patient off all pain medications once appropriate. If chronic pain management is required beyond what is expected for current orthopedic problem, will refer patient to pain management.  was reviewed and will be reviewed with every medication refill request.   Patient will likely require a reverse total shoulder arthroplasty.       RTC following CT with Dr. Hayden Spar    Scribed by [facility]) as dictated by AMAURY [address] and Spine Specialist

## 2022-11-29 ENCOUNTER — APPOINTMENT (OUTPATIENT)
Dept: PHYSICAL THERAPY | Age: 62
End: 2022-11-29
Attending: ORTHOPAEDIC SURGERY
Payer: MEDICARE

## 2022-12-05 NOTE — THERAPY DISCHARGE
[facility]              [address]        [place], [address]   [phone] fax    Discharge Summary  Patient name: Melinda Jeffrey Start of Care: 10/25/2022   Referral source: Jonelle Bustos,* : 1960   Medical/Treatment Diagnosis: Right shoulder pain [M25.511]  Payor: [facility] MEDICARE / Plan: [facility] / Product Type: Managed Care Medicare /  Onset Date:10/21/2022     Prior Hospitalization: see medical history Provider#: [id number]   Medications: Verified on Patient Summary List    Comorbidities: Arthritis, Back Pain, BMI 33.3, CHF/Heart Disease, DM, GI Disease, Heart Attack, HBP, Incontinence, Kidney/Bladder/Urination Problem, Osteoporosis, Pacemaker, Prior surgery, Prosthesis/Implant. Prior Level of Function: Did a little cooking but limited in dusting and other activities. Visits from Start of Care: 6    Missed Visits: 1  Reporting Period : 10/25/2022 to 2022    Summary of Care:  Short Term Goals: To be accomplished in 1-2 treatments:  1. Patient will become proficient in their HEP and will be compliant in performing that program.  Evaluation:   Patient given a written/illustrated HEP. Current: MET: pt reports performing HEP. 10/27/22  2. Patient will demonstrate PROM right shoulder flexion 0-90 deg; scaption 0-70 deg. Evaluation:  PROM right shoulder flexion 0-35, scaption 0-30  Current: Progressing: PROM right shoulder flexion 0-130, scaption 0-60 deg. 22     Long Term Goals: To be accomplished in 12 treatments:  1. Patient's pain level will be 5-6/10 with activity in order to improve patient's ability to perform normal ADLs. Evaluation:  Pain level 9/10-10/10  2. Patient will demonstrate AAROM right shoulder flex 0-90, scaption 0-70 to increase ease of ADLs. Evaluation:  PROM right shoulder flexion 0-35, scaption 0-30     3.  Patient will increase FOTO score to 48 to indicate increased functional mobility. Evaluation:  FOTO = 4  4. Patient will demonstrate PROM right shoulder flexion 0-120 deg; scaption 0-90 deg. Evaluation: PROM right shoulder flexion 0-35, scaption 0-30      ASSESSMENT/RECOMMENDATIONS:  Patient sustained a falll and was c/o increased shoulder pain. Informed by orthopedics that she will be having surgery. D/C therapy. [x]Discontinue therapy: []Patient has reached or is progressing toward set goals      []Patient is non-compliant or has abdicated      []Due to lack of appreciable progress towards set goals, probable surgery. Dee Monroe, PT 12/5/2022 7:38 AM    NOTE TO PHYSICIAN:  Please complete the following and fax to: [facility] at [facility] at [phone]  . Retain this original for your records. If you are unable to process this request in   24 hours, please contact our office.      [] I have read the above report and request that my patient continue therapy with the following changes/special instructions:  [] I have read the above report and request that my patient be discharged from therapy    Physician's Signature:____________Date:_________TIME:________     Brooklyn Melchor Signature, Date and Time must be completed for valid certification **

## 2022-12-06 ENCOUNTER — HOSPITAL ENCOUNTER (OUTPATIENT)
Dept: CT IMAGING | Age: 62
Discharge: HOME | End: 2022-12-06
Attending: ORTHOPAEDIC SURGERY
Payer: MEDICARE

## 2022-12-06 ENCOUNTER — HOSPITAL ENCOUNTER (OUTPATIENT)
Dept: GENERAL RADIOLOGY | Age: 62
Discharge: HOME | End: 2022-12-06
Attending: ORTHOPAEDIC SURGERY
Payer: MEDICARE

## 2022-12-06 DIAGNOSIS — M75.101 TEAR OF RIGHT ROTATOR CUFF, UNSPECIFIED TEAR EXTENT, UNSPECIFIED WHETHER TRAUMATIC: ICD-10-CM

## 2022-12-06 PROCEDURE — 74011000250 HC RX REV CODE- 250: Performed by: ORTHOPAEDIC SURGERY

## 2022-12-06 PROCEDURE — 73201 CT UPPER EXTREMITY W/DYE: CPT

## 2022-12-06 PROCEDURE — 77002 NEEDLE LOCALIZATION BY XRAY: CPT

## 2022-12-06 PROCEDURE — 74011000636 HC RX REV CODE- 636: Performed by: ORTHOPAEDIC SURGERY

## 2022-12-06 RX ORDER — SODIUM CHLORIDE 9 MG/ML
10 INJECTION INTRAMUSCULAR; INTRAVENOUS; SUBCUTANEOUS
Status: COMPLETED | OUTPATIENT
Start: 2022-12-06 | End: 2022-12-06

## 2022-12-06 RX ORDER — LIDOCAINE HYDROCHLORIDE 10 MG/ML
5 INJECTION, SOLUTION EPIDURAL; INFILTRATION; INTRACAUDAL; PERINEURAL
Status: COMPLETED | OUTPATIENT
Start: 2022-12-06 | End: 2022-12-06

## 2022-12-06 RX ADMIN — LIDOCAINE HYDROCHLORIDE 5 ML: 10 INJECTION, SOLUTION EPIDURAL; INFILTRATION; INTRACAUDAL; PERINEURAL at 13:35

## 2022-12-06 RX ADMIN — SODIUM CHLORIDE 10 ML: 9 INJECTION, SOLUTION INTRAMUSCULAR; INTRAVENOUS; SUBCUTANEOUS at 13:35

## 2022-12-06 RX ADMIN — IOPAMIDOL 10 ML: 408 INJECTION, SOLUTION INTRATHECAL at 13:35

## 2022-12-14 ENCOUNTER — TELEPHONE (OUTPATIENT)
Dept: ORTHOPEDIC SURGERY | Age: 62
End: 2022-12-14

## 2022-12-14 NOTE — TELEPHONE ENCOUNTER
Patient is asking for a refill of Oxycodone 5mg. Patient uses [facility] pharmacy on [address]. Patient can be reached at [phone].

## 2022-12-17 DIAGNOSIS — M54.16 LUMBAR RADICULOPATHY: ICD-10-CM

## 2022-12-17 DIAGNOSIS — M47.817 SPONDYLOSIS OF LUMBOSACRAL REGION WITHOUT MYELOPATHY OR RADICULOPATHY: ICD-10-CM

## 2022-12-19 ENCOUNTER — VIRTUAL VISIT (OUTPATIENT)
Dept: ORTHOPEDIC SURGERY | Age: 62
End: 2022-12-19
Payer: MEDICARE

## 2022-12-19 ENCOUNTER — OFFICE VISIT (OUTPATIENT)
Dept: ORTHOPEDIC SURGERY | Age: 62
End: 2022-12-19

## 2022-12-19 VITALS — BODY MASS INDEX: 34.88 KG/M2 | TEMPERATURE: 97.1 F | HEIGHT: 59 IN | WEIGHT: 173 LBS

## 2022-12-19 DIAGNOSIS — M75.101 TEAR OF RIGHT ROTATOR CUFF, UNSPECIFIED TEAR EXTENT, UNSPECIFIED WHETHER TRAUMATIC: Primary | ICD-10-CM

## 2022-12-19 DIAGNOSIS — M47.817 SPONDYLOSIS OF LUMBOSACRAL REGION WITHOUT MYELOPATHY OR RADICULOPATHY: ICD-10-CM

## 2022-12-19 DIAGNOSIS — M54.16 LUMBAR NEURITIS: ICD-10-CM

## 2022-12-19 DIAGNOSIS — M50.30 DDD (DEGENERATIVE DISC DISEASE), CERVICAL: Primary | ICD-10-CM

## 2022-12-19 DIAGNOSIS — M54.16 LUMBAR RADICULOPATHY: ICD-10-CM

## 2022-12-19 DIAGNOSIS — M25.511 CHRONIC RIGHT SHOULDER PAIN: ICD-10-CM

## 2022-12-19 DIAGNOSIS — G89.29 CHRONIC RIGHT SHOULDER PAIN: ICD-10-CM

## 2022-12-19 PROCEDURE — G8432 DEP SCR NOT DOC, RNG: HCPCS | Performed by: PHYSICAL MEDICINE & REHABILITATION

## 2022-12-19 PROCEDURE — G8756 NO BP MEASURE DOC: HCPCS | Performed by: PHYSICAL MEDICINE & REHABILITATION

## 2022-12-19 PROCEDURE — 3017F COLORECTAL CA SCREEN DOC REV: CPT | Performed by: ORTHOPAEDIC SURGERY

## 2022-12-19 PROCEDURE — 3017F COLORECTAL CA SCREEN DOC REV: CPT | Performed by: PHYSICAL MEDICINE & REHABILITATION

## 2022-12-19 PROCEDURE — 73030 X-RAY EXAM OF SHOULDER: CPT | Performed by: ORTHOPAEDIC SURGERY

## 2022-12-19 PROCEDURE — G8417 CALC BMI ABV UP PARAM F/U: HCPCS | Performed by: PHYSICAL MEDICINE & REHABILITATION

## 2022-12-19 PROCEDURE — 99213 OFFICE O/P EST LOW 20 MIN: CPT | Performed by: PHYSICAL MEDICINE & REHABILITATION

## 2022-12-19 PROCEDURE — G8432 DEP SCR NOT DOC, RNG: HCPCS | Performed by: ORTHOPAEDIC SURGERY

## 2022-12-19 PROCEDURE — G8756 NO BP MEASURE DOC: HCPCS | Performed by: ORTHOPAEDIC SURGERY

## 2022-12-19 PROCEDURE — G8427 DOCREV CUR MEDS BY ELIG CLIN: HCPCS | Performed by: PHYSICAL MEDICINE & REHABILITATION

## 2022-12-19 PROCEDURE — G8417 CALC BMI ABV UP PARAM F/U: HCPCS | Performed by: ORTHOPAEDIC SURGERY

## 2022-12-19 PROCEDURE — 99214 OFFICE O/P EST MOD 30 MIN: CPT | Performed by: ORTHOPAEDIC SURGERY

## 2022-12-19 PROCEDURE — G8427 DOCREV CUR MEDS BY ELIG CLIN: HCPCS | Performed by: ORTHOPAEDIC SURGERY

## 2022-12-19 RX ORDER — PREGABALIN 300 MG/1
300 CAPSULE ORAL 2 TIMES DAILY
Qty: 180 CAPSULE | Refills: 0 | Status: SHIPPED | OUTPATIENT
Start: 2022-12-19

## 2022-12-19 RX ORDER — BACLOFEN 10 MG/1
TABLET ORAL
Qty: 180 TABLET | Refills: 0 | Status: SHIPPED | OUTPATIENT
Start: 2022-12-19

## 2022-12-19 RX ORDER — TRAMADOL HYDROCHLORIDE 50 MG/1
50 TABLET ORAL
Qty: 28 TABLET | Refills: 0 | Status: SHIPPED | OUTPATIENT
Start: 2022-12-19 | End: 2022-12-26

## 2022-12-19 NOTE — PROGRESS NOTES
Leonela Lin  1960     HISTORY OF PRESENT ILLNESS  Leonela Lin is a 65 y.o. female who presents today for evaluation s/p Right shoulder arthroscopic 2cm rotator cuff repair on 10/21/22. She presents today for CT review. Patient has been going to PT. Describes pain as a 5/10. She notes that she fell on 11/10/22. She has also had 2 additional falls since she was seen at [facility] on 11/15/2022. She states that she gets very dizzy when she gets up quickly. She has been seen by neurology in the past .  has had an increase in pain since. Has been taking pain meds for pain. Still has night pain. Her pain is unchanged since last OV, now having pain with simple movements such as putting on a sports bra. Patient denies any fever, chills, chest pain, shortness of breath or calf pain. The remainder of the review of systems is negative. There are no new illness or injuries to report since last seen in the office. No changes in medications, allergies, social or family history. PHYSICAL EXAM:   Visit Vitals  Temp 97.1 °F (36.2 °C) (Temporal)   Ht 4' 11\" (1.499 m)   Wt 173 lb (78.5 kg)   LMP  (LMP Unknown)   BMI 34.94 kg/m²      The patient is a well-developed, well-nourished female in no acute distress. The patient is alert and oriented times three. The patient appears to be well groomed. Mood and affect are normal.  ORTHOPEDIC EXAM of right shoulder:  Inspection: swelling not present,  Bruising not present  Incisions well healed  Passive glenohumeral abduction 0-35 degrees  Stability: Stable  Strength: n/a  2+ distal pulses    IMAGING: XR of the right shoulder with 3 views obtained in the office dated 12/19/2022 was reviewed and read by Dr. Jacek Tamayo: proximal migration of the humeral head with inferior       CT arthrogram of right shoulder dated 12/06/2022 was reviewed and read by Dr. Jacek Tamayo:   IMPRESSION:  1. Full-thickness tear now seen in supraspinatus tendon anteriorly.  Contrast from glenohumeral injection leaking into the subacromial bursa through the tear. Additional partial-thickness tear of the posterior portion of the tendon. Partial-thickness tear of the infraspinatus tendon, along the articular side, with intrasubstance contrast extension. 2. Advanced glenohumeral joint osteoarthrosis. 3 view xray of right shoulder taken in office on 11/21/22 read and reviewed by myself reveal inferior spur of humerus head with high riding humerus c/w cuff tear arthropathy    IMPRESSION:  S/P Right shoulder arthroscopic 2cm rotator cuff repair  Encounter Diagnoses   Name Primary? Chronic right shoulder pain     Tear of right rotator cuff, unspecified tear extent, unspecified whether traumatic Yes           PLAN:   Patient with increase in pain post operatively. CT arthrogram reveals recurrent full-thickness RCT. We discussed that she will require a reverse total shoulder arthroplasty. She was prescribed Tramadol in the office today. Patient given pain medication for short term acute pain relief. Goal is to treat patient according to above plan and to ultimately have patient off all pain medications once appropriate. If chronic pain management is required beyond what is expected for current orthopedic problem, will refer patient to pain management.  was reviewed and will be reviewed with every medication refill request.     I discussed the risks and benefits and potential adverse outcomes of both operative vs non operative treatment of right shoulder recurrent RCT with the patient and patient wishes to proceed with right reverse TSA. Risks of operative intervention include but not limited to bleeding, infection, deep vein thrombosis, pulmonary embolism, death, limb length discrepancy, reflexive sympathetic dystrophy, fat embolism syndrome,damage to blood vessels and nerves, malunion, non-union, delayed union, failure of hardware, post traumatic arthritis, stroke, heart attack, and death. Patient understands that infection may arise and may require numerous surgeries. The patient was counseled at length about the risks of raeann Covid-19 during their perioperative period and any recovery window from their procedure. The patient was made aware that raeann Covid-19  may worsen their prognosis for recovering from their procedure and lend to a higher morbidity and/or mortality risk. All material risks, benefits, and reasonable alternatives including postponing the procedure were discussed. The patient does  wish to proceed with the procedure at this time. History and physical exam to be preformed at a later date. RTC H&P    Scribed by Anahi Fortune [address]) as dictated by Paul Banegas MD    I, Dr. Paul Banegas, confirm that all documentation is accurate.     Paul Banegas M.D.   [facility]
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
## 2023-01-01 NOTE — TELEPHONE ENCOUNTER
Next appt 8/25/20    Last labs 6/22/20 A1C/Comp/Mg  K = 4.0    Requested Prescriptions     Pending Prescriptions Disp Refills    potassium chloride (Klor-Con M10) 10 mEq tablet 180 Tab 0     Sig: Take 1 Tab by mouth two (2) times a day.
 Infant (Birth)

## 2023-01-13 ENCOUNTER — TELEPHONE (OUTPATIENT)
Dept: ORTHOPEDIC SURGERY | Age: 63
End: 2023-01-13

## 2023-01-13 DIAGNOSIS — M25.511 CHRONIC RIGHT SHOULDER PAIN: Primary | ICD-10-CM

## 2023-01-13 DIAGNOSIS — G89.29 CHRONIC RIGHT SHOULDER PAIN: Primary | ICD-10-CM

## 2023-01-13 RX ORDER — TRAMADOL HYDROCHLORIDE 50 MG/1
50 TABLET ORAL
Qty: 28 TABLET | Refills: 0 | Status: SHIPPED | OUTPATIENT
Start: 2023-01-13 | End: 2023-01-16 | Stop reason: SDUPTHER

## 2023-01-13 NOTE — TELEPHONE ENCOUNTER
Patient called and is asking for Tramadol medication from [address] [place] [address]. Patient said her shoulder is hurting. [facility] on [address]  Tel [phone]. Patient tel. [phone].       Note : patient next appt is on 2/14/23 for the Right shoulder

## 2023-01-16 RX ORDER — TRAMADOL HYDROCHLORIDE 50 MG/1
50 TABLET ORAL
Qty: 21 TABLET | Refills: 0 | Status: SHIPPED | OUTPATIENT
Start: 2023-01-16 | End: 2023-01-23

## 2023-01-16 NOTE — TELEPHONE ENCOUNTER
Pt called stating the pharmacy said a physician needs to authorize the rx that was sent on 1/13 for tramadol.      Please advise pt at [phone]

## 2023-02-03 NOTE — TELEPHONE ENCOUNTER
Chief Complaint  Establish Care, Hypertension (HOME MONITORING- HAS LOG WITH HER, DOES NOT TAKE IT EVERY SINGLE DAY ), BLOOD THINNER (BROKE FOOT IN 7/2012, BEEN ON ELIQUIS SINCE 10/2022, CURRENTLY TAKING AS RIGHT NOW, DISCUSS COMING OFF ), EYE SIGHT ([facility]- NORMAL IN AND BEHIND EYE, NOT AS OF LATELY BUT ONCE A MONTH , TYPICALLY RIGHT EYE BOTTOM GREY AND CANT SEE THROUGH IT, SEES ABOVE GREY LINE NOT BELOW- GOES AWAY AFTER A FEW SECONDS, BELIEVES IT MAY BE ANXIETY ), and Dizziness (UNSURE IF ITS VERTIGO, AND FEELS IT MAY BE SOMETHING ELSE BUT HAS MOMENTS WHERE SHE FEELS VERY OFF); HTN    Subjective          Britany Pyle presents to [facility]  Anxiety  Presents for initial visit. Symptoms include nervous/anxious behavior. Patient reports no chest pain, compulsions, confusion, decreased concentration, depressed mood, dizziness, dry mouth, excessive worry, feeling of choking, hyperventilation, impotence, insomnia, irritability, malaise, muscle tension, nausea, obsessions, palpitations, panic, restlessness, shortness of breath or suicidal ideas.       Hypertension  This is a new problem. The problem is uncontrolled. Associated symptoms include anxiety. Pertinent negatives include no blurred vision, chest pain, headaches, malaise/fatigue, neck pain, orthopnea, palpitations, peripheral edema, PND, shortness of breath or sweats. Compliance problems include diet and exercise.  There is no history of angina, kidney disease, CAD/MI, CVA, heart failure, left ventricular hypertrophy, PVD or retinopathy.     Previous PCP: WES SHER, DO  Specialists: [facility], [facility], DR SHONNA LEAVITT   Covid Vaccine: REFUSED  Shingrix: NOT DONE  Pneumonia Vaccine: NOT IN ADULT YEARS   TDAP Vaccine: 6/25/2019  Colonoscopy: NEVER HEAD   Pap Smear: 1/5/2023  Mammogram: SCHEDULED 4/7/2023      Broke right foot July 2022 non  Pt called requesting refill for medication . Requested Prescriptions     Pending Prescriptions Disp Refills    zolpidem (AMBIEN) 10 mg tablet 30 Tab 0     Sig: Take 1 Tab by mouth nightly as needed for Sleep. Max Daily Amount: 10 mg. "weight bearing x 6 weeks. September 1st started walking. .  October 7th had significant DVT.   Stopped Nuvaring. - [facility] Hdez - [facility] A[facility] -   Having ablation soon.   Had uterine biopsy - benign   Was off blood thinner   Had thrombophilia panel and was negative.     Olga has been also to "[employer] Works. Having issue with eyes. Intermittent blurring lasts < 30 seconds. Has this feeling like she could pass out.   Eyes haven't focused.   Usually happens at computer.   Happens once a week     Current Outpatient Medications   Medication Instructions   • apixaban (ELIQUIS) 5 mg, Oral, 2 Times Daily   • busPIRone (BUSPAR) 5 mg, Oral, 3 Times Daily   • ELDERBERRY PO Oral   • fluticasone (Flonase) 50 MCG/ACT nasal spray 2 sprays, Nasal, Daily   • lisinopril (PRINIVIL,ZESTRIL) 2.5 mg, Oral, Daily   • Multiple Vitamins-Minerals (WOMENS MULTIVITAMIN PO) Oral       The following portions of the patient's history were reviewed and updated as appropriate: allergies, current medications, past family history, past medical history, past social history, past surgical history, and problem list.    Objective   Vital Signs:   /94 (BP Location: Left arm, Patient Position: Sitting, Cuff Size: Adult)   Pulse 82   Temp 98.2 °F (36.8 °C) (Temporal)   Ht 172.7 cm (68\")   Wt 84.2 kg (185 lb 9.6 oz)   SpO2 98%   BMI 28.22 kg/m²     Wt Readings from Last 3 Encounters:   02/03/23 84.2 kg (185 lb 9.6 oz)   10/27/22 82.1 kg (181 lb)   10/07/22 80.2 kg (176 lb 12.8 oz)     BP Readings from Last 3 Encounters:   02/03/23 142/94   10/27/22 129/84   10/07/22 139/96     Physical Exam   Appearance: No acute distress, well-nourished  Head: normocephalic, atraumatic  Eyes: extraocular movements intact, no scleral icterus, no conjunctival injection  Ears, Nose, and Throat: external ears normal, nares patent, moist mucous membranes  Cardiovascular: regular rate and rhythm. no murmurs, rubs, or gallops. no " edema  Respiratory: breathing comfortably, symmetric chest rise, clear to auscultation bilaterally. No wheezes, rales, or rhonchi.  Neuro: alert and oriented to time, place, and person. Normal gait  Psych: normal mood and affect     Result Review :   The following data was reviewed by: ROYCE Solis on 02/03/2023:  Common labs    Common Labs 9/12/22 9/12/22 2/3/23 2/3/23 2/3/23    1626 1626 1116 1116 1116   Glucose 110 (A)    106 (A)   BUN 8    9   Creatinine 0.80    0.86   Sodium 140    138   Potassium 3.9    4.3   Chloride 103    102   Calcium 9.5    9.7   Albumin 4.40    4.5   Total Bilirubin 0.3    0.4   Alkaline Phosphatase 82    80   AST (SGOT) 17    16   ALT (SGPT) 13    19   WBC   7.48     Hemoglobin   12.5     Hematocrit   37.0     Platelets   314     Total Cholesterol  176  186    Triglycerides  286 (A)  124    HDL Cholesterol  50  42    LDL Cholesterol   80  122 (A)    (A) Abnormal value                   Lab Results   Component Value Date    COVID19 NOT DETECTED 04/28/2020    POCPREGUR Negative 09/10/2021       Procedures        Assessment and Plan    Diagnoses and all orders for this visit:    1. Primary hypertension (Primary)  Assessment & Plan:  Hypertension is newly identified.  Dietary sodium restriction.  Weight loss.  Regular aerobic exercise.  Medication changes per orders.  Blood pressure will be reassessed in 4 weeks.    Stop duel diuretic and irbesartan.   Will start low dose lisinopril and do close follow up for dose increase     Orders:  -     TSH Rfx On Abnormal To Free T4  -     Comprehensive Metabolic Panel  -     CBC & Differential  -     Discontinue: lisinopril (PRINIVIL,ZESTRIL) 2.5 MG tablet; Take 1 tablet by mouth Daily.  Dispense: 30 tablet; Refill: 1  -     lisinopril (PRINIVIL,ZESTRIL) 2.5 MG tablet; Take 1 tablet by mouth Daily.  Dispense: 30 tablet; Refill: 1    2. ABEL (generalized anxiety disorder)  Assessment & Plan:  Psychological condition is stable .  Continue  current treatment regimen.  Regular aerobic exercise.  Psychological condition  will be reassessed in 4 weeks.    Orders:  -     Discontinue: busPIRone (BUSPAR) 5 MG tablet; Take 1 tablet by mouth 3 (Three) Times a Day.  Dispense: 90 tablet; Refill: 1  -     busPIRone (BUSPAR) 5 MG tablet; Take 1 tablet by mouth 3 (Three) Times a Day.  Dispense: 90 tablet; Refill: 1    3. Screening for colon cancer  -     Ambulatory Referral to Gastroenterology    4. Screening for lipid disorders  -     Lipid Panel    5. Personal history of DVT (deep vein thrombosis)  -     Duplex Venous Lower Extremity - Left CAR; Future    6. Vision changes  -     MRI Brain Without Contrast; Future    - spent time discussing plan for HTN.   - patient has been cleared by Ophthalmology, would like to get MRI of brain. Red flags to ED   - will follow up duplex due to size of clot.     Medications Discontinued During This Encounter   Medication Reason   • etonogestrel-ethinyl estradiol (NUVARING) 0.12-0.015 MG/24HR vaginal ring Historical Med - Therapy completed   • etonogestrel-ethinyl estradiol (NUVARING) 0.12-0.015 MG/24HR vaginal ring Historical Med - Therapy completed   • spironolactone-hydrochlorothiazide (ALDACTAZIDE) 25-25 MG tablet Historical Med - Therapy completed   • irbesartan (AVAPRO) 150 MG tablet Historical Med - Therapy completed   • lisinopril (PRINIVIL,ZESTRIL) 2.5 MG tablet    • busPIRone (BUSPAR) 5 MG tablet    • escitalopram (Lexapro) 10 MG tablet Historical Med - Therapy completed        I spent 45 minutes caring for Britany on this date of service. This time includes time spent by me in the following activities:preparing for the visit, reviewing tests, obtaining and/or reviewing a separately obtained history, performing a medically appropriate examination and/or evaluation , counseling and educating the patient/family/caregiver, ordering medications, tests, or procedures, referring and communicating with other health care  professionals , documenting information in the medical record and independently interpreting results and communicating that information with the patient/family/caregiver  Follow Up   Return in about 4 weeks (around 3/3/2023) for Hypertension.  Patient was given instructions and counseling regarding her condition or for health maintenance advice. Please see specific information pulled into the AVS if appropriate.       Fidelina Mcghee, ROYCE  02/07/23  07:51 EST

## 2023-02-08 ENCOUNTER — HOSPITAL ENCOUNTER (OUTPATIENT)
Dept: LAB | Age: 63
Discharge: HOME | End: 2023-02-08

## 2023-02-08 DIAGNOSIS — Z01.818 PREOP EXAMINATION: Primary | ICD-10-CM

## 2023-02-08 LAB — XX-LABCORP SPECIMEN COL,LCBCF: NORMAL

## 2023-02-08 PROCEDURE — 99001 SPECIMEN HANDLING PT-LAB: CPT

## 2023-02-09 ENCOUNTER — HOSPITAL ENCOUNTER (OUTPATIENT)
Dept: LAB | Age: 63
End: 2023-02-09
Payer: MEDICARE

## 2023-02-09 ENCOUNTER — HOSPITAL ENCOUNTER (OUTPATIENT)
Dept: LAB | Age: 63
Discharge: HOME | End: 2023-02-09

## 2023-02-09 ENCOUNTER — TRANSCRIBE ORDER (OUTPATIENT)
Dept: REGISTRATION | Age: 63
End: 2023-02-09

## 2023-02-09 ENCOUNTER — HOSPITAL ENCOUNTER (OUTPATIENT)
Dept: GENERAL RADIOLOGY | Age: 63
Discharge: HOME | End: 2023-02-09
Payer: MEDICARE

## 2023-02-09 DIAGNOSIS — Z01.818 OTHER SPECIFIED PRE-OPERATIVE EXAMINATION: Primary | ICD-10-CM

## 2023-02-09 DIAGNOSIS — Z01.818 PREOP EXAMINATION: ICD-10-CM

## 2023-02-09 DIAGNOSIS — Z01.818 OTHER SPECIFIED PRE-OPERATIVE EXAMINATION: ICD-10-CM

## 2023-02-09 LAB
ATRIAL RATE: 67 BPM
CALCULATED P AXIS, ECG09: 47 DEGREES
CALCULATED R AXIS, ECG10: -4 DEGREES
CALCULATED T AXIS, ECG11: -68 DEGREES
DIAGNOSIS, 93000: NORMAL
P-R INTERVAL, ECG05: 180 MS
Q-T INTERVAL, ECG07: 432 MS
QRS DURATION, ECG06: 76 MS
QTC CALCULATION (BEZET), ECG08: 456 MS
VENTRICULAR RATE, ECG03: 67 BPM

## 2023-02-09 PROCEDURE — 93005 ELECTROCARDIOGRAM TRACING: CPT

## 2023-02-09 PROCEDURE — 99001 SPECIMEN HANDLING PT-LAB: CPT

## 2023-02-09 PROCEDURE — 71046 X-RAY EXAM CHEST 2 VIEWS: CPT

## 2023-02-14 ENCOUNTER — OFFICE VISIT (OUTPATIENT)
Age: 63
End: 2023-02-14

## 2023-02-14 VITALS
WEIGHT: 168 LBS | HEIGHT: 59 IN | SYSTOLIC BLOOD PRESSURE: 147 MMHG | BODY MASS INDEX: 33.87 KG/M2 | TEMPERATURE: 97.8 F | DIASTOLIC BLOOD PRESSURE: 85 MMHG

## 2023-02-14 DIAGNOSIS — M12.811 RIGHT ROTATOR CUFF TEAR ARTHROPATHY: Primary | ICD-10-CM

## 2023-02-14 DIAGNOSIS — M75.101 RIGHT ROTATOR CUFF TEAR ARTHROPATHY: Primary | ICD-10-CM

## 2023-02-14 LAB — XX-LABCORP SPECIMEN COL,LCBCF: NORMAL

## 2023-02-14 RX ORDER — ACETAMINOPHEN 325 MG/1
1000 TABLET ORAL ONCE
OUTPATIENT
Start: 2023-02-14 | End: 2023-02-14

## 2023-02-14 RX ORDER — GABAPENTIN 100 MG/1
300 CAPSULE ORAL ONCE
OUTPATIENT
Start: 2023-02-14 | End: 2023-02-14

## 2023-02-14 NOTE — PATIENT INSTRUCTIONS
Dr. Kendrick Purcell Total and Reverse Total Shoulder Information    What is the surgery? This is an inpatient procedure done at [facility] 50 will be completely asleep for procedure. Dr. Kendrick Purcell will make an incision in the front of your shoulder and replace the joint itself   Total surgery takes about an hour and half   You will then spend the night to receive prophylactic antibiotics and pain medication if needed. The goal is to send you home the following day and have you begin outpatient physical therapy about 3 days after you are discharged to home    What can you expect after surgery? You will have a waterproof dressing on your shoulder following surgery. You will be able to shower 2 days after surgery but no soaking in a bath, hot tub, ocean or pool x 2 weeks to allow for full wound healing  You will be in a sling for 4 weeks. You will wear this sling whenever you are active or up moving around. This sling is to keep you from moving your arm on your own. You are essentially one armed until you are out of your sling. This means no reaching, pulling, grabbing or lifting with the operative arm. It is ok for you to remove your sling when you are sitting in a chair or you are in bed  Dr. Kendrick Purcell will start physical therapy for you a few days after you are discharged from the hospital. While you cannot move your arm we allow physical therapy to gently move your shoulder. We call this passive range of motion. The goal of this is to decrease your stiffness and in turn decrease your post operative pain. Plan on being in physical therapy for 10-12 weeks    When can I return to work? Most patients return to desk work only after 2 weeks. You are able to type but there is no overhead work or lifting  You will start some gentle lifting up to 5-10lbs at about 8-10 weeks post operatively.  You will gradually increase how much you are able to lift after this point under the guidance of Dr. Kendrick Purcell, his physician assistant and physical therapy. At 6 months you are able to do all activities as tolerated but it may take you a full 9-12 months to fully recover from your surgery    Not all shoulder replacements are the same. The specifics of your individual case will be discussed at length with you by Dr. Trisha Hammonds and his Physician Assistant. Julia Downey  Surgical Coordinator  [address] [place] [address]. [facility].  [address] [place] [address], [email or web address]  P: [phone]  F: [phone]
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
## 2023-02-15 ENCOUNTER — HOSPITAL ENCOUNTER (OUTPATIENT)
Facility: HOSPITAL | Age: 63
Discharge: HOME | End: 2023-02-18
Payer: MEDICARE

## 2023-02-15 LAB
ABO + RH BLD: NORMAL
BLOOD GROUP ANTIBODIES SERPL: NORMAL
SPECIMEN EXP DATE BLD: NORMAL

## 2023-02-15 PROCEDURE — 86900 BLOOD TYPING SEROLOGIC ABO: CPT

## 2023-02-15 PROCEDURE — 36415 COLL VENOUS BLD VENIPUNCTURE: CPT

## 2023-02-20 ENCOUNTER — ANESTHESIA EVENT (OUTPATIENT)
Facility: HOSPITAL | Age: 63
DRG: 483 | End: 2023-02-20
Payer: MEDICARE

## 2023-02-21 NOTE — NURSE NAVIGATOR
Call placed to patient, ID verified x 2. Patient  has decided with their surgeon to have shoulder replacement surgery  to decrease  pain and improve mobility . Topics discussed included surgery preparation, what to expect the day of surgery, medications, physical and occupational therapy, and discharge planning. It was discussed that this is considered an elective surgery and that prior to the surgery  decisions such as arranging for help at home once they are discharged needs to be made. Patient agreed to get home ready for surgery and to have a ride arranged to go home. Instructions were given for CHG bathing. Patient will complete the procedure the morning of surgery. Patient was reminded not to apply any deoderant, perfumes, makeup or lotions to skin the morning of surgery. She will remain NPO after midnight the night before surgery and will take only the medications as instructed to take by her surgeon the morning of surgery with a sip of water. A total shoulder replacement  surgery education book will be provided to patient post op prior to discharge. Education regarding the importance of early and frequent ambulation to avoid surgical complications  was provided. Proper sling wear and postop showering instructions were reviewed. Recommended the use of ice to assist with pain and swelling post op for 20 minutes an hour, not to be placed directly on her skin. Patient verbalized understanding of all information provided. Opportunity was given to ask questions and phone number of the Orthopaedic   was given for any questions or concerns that may arise later.

## 2023-02-22 ENCOUNTER — HOSPITAL ENCOUNTER (INPATIENT)
Facility: HOSPITAL | Age: 63
LOS: 1 days | Discharge: HOME | DRG: 483 | End: 2023-02-23
Attending: ORTHOPAEDIC SURGERY | Admitting: ORTHOPAEDIC SURGERY
Payer: MEDICARE

## 2023-02-22 ENCOUNTER — ANESTHESIA (OUTPATIENT)
Facility: HOSPITAL | Age: 63
DRG: 483 | End: 2023-02-22
Payer: MEDICARE

## 2023-02-22 DIAGNOSIS — M75.101 ROTATOR CUFF TEAR ARTHROPATHY, RIGHT: Primary | ICD-10-CM

## 2023-02-22 DIAGNOSIS — M12.811 ROTATOR CUFF TEAR ARTHROPATHY, RIGHT: Primary | ICD-10-CM

## 2023-02-22 LAB
GLUCOSE BLD STRIP.AUTO-MCNC: 109 MG/DL (ref 70–110)
GLUCOSE BLD STRIP.AUTO-MCNC: 96 MG/DL (ref 70–110)

## 2023-02-22 PROCEDURE — 64450 NJX AA&/STRD OTHER PN/BRANCH: CPT | Performed by: ANESTHESIOLOGY

## 2023-02-22 PROCEDURE — 6360000002 HC RX W HCPCS: Performed by: PHYSICIAN ASSISTANT

## 2023-02-22 PROCEDURE — 0RRJ00Z REPLACEMENT OF RIGHT SHOULDER JOINT WITH REVERSE BALL AND SOCKET SYNTHETIC SUBSTITUTE, OPEN APPROACH: ICD-10-PCS | Performed by: ORTHOPAEDIC SURGERY

## 2023-02-22 PROCEDURE — 2580000003 HC RX 258: Performed by: PHYSICIAN ASSISTANT

## 2023-02-22 PROCEDURE — C1776 JOINT DEVICE (IMPLANTABLE): HCPCS | Performed by: ORTHOPAEDIC SURGERY

## 2023-02-22 PROCEDURE — 2580000003 HC RX 258: Performed by: ORTHOPAEDIC SURGERY

## 2023-02-22 PROCEDURE — 6360000002 HC RX W HCPCS: Performed by: ORTHOPAEDIC SURGERY

## 2023-02-22 PROCEDURE — 1100000000 HC RM PRIVATE

## 2023-02-22 PROCEDURE — 6360000002 HC RX W HCPCS: Performed by: NURSE ANESTHETIST, CERTIFIED REGISTERED

## 2023-02-22 PROCEDURE — 7100000000 HC PACU RECOVERY - FIRST 15 MIN: Performed by: ORTHOPAEDIC SURGERY

## 2023-02-22 PROCEDURE — 3700000000 HC ANESTHESIA ATTENDED CARE: Performed by: ORTHOPAEDIC SURGERY

## 2023-02-22 PROCEDURE — 82962 GLUCOSE BLOOD TEST: CPT

## 2023-02-22 PROCEDURE — 2500000003 HC RX 250 WO HCPCS: Performed by: NURSE ANESTHETIST, CERTIFIED REGISTERED

## 2023-02-22 PROCEDURE — 3600000002 HC SURGERY LEVEL 2 BASE: Performed by: ORTHOPAEDIC SURGERY

## 2023-02-22 PROCEDURE — 64415 NJX AA&/STRD BRCH PLXS IMG: CPT | Performed by: ANESTHESIOLOGY

## 2023-02-22 PROCEDURE — 2709999900 HC NON-CHARGEABLE SUPPLY: Performed by: ORTHOPAEDIC SURGERY

## 2023-02-22 PROCEDURE — 6370000000 HC RX 637 (ALT 250 FOR IP): Performed by: NURSE ANESTHETIST, CERTIFIED REGISTERED

## 2023-02-22 PROCEDURE — 01638 ANES OPN/ARTHR TOT SHO RPLCM: CPT | Performed by: ANESTHESIOLOGY

## 2023-02-22 PROCEDURE — 3700000001 HC ADD 15 MINUTES (ANESTHESIA): Performed by: ORTHOPAEDIC SURGERY

## 2023-02-22 PROCEDURE — 7100000001 HC PACU RECOVERY - ADDTL 15 MIN: Performed by: ORTHOPAEDIC SURGERY

## 2023-02-22 PROCEDURE — C9290 INJ, BUPIVACAINE LIPOSOME: HCPCS | Performed by: ORTHOPAEDIC SURGERY

## 2023-02-22 PROCEDURE — 6370000000 HC RX 637 (ALT 250 FOR IP): Performed by: PHYSICIAN ASSISTANT

## 2023-02-22 PROCEDURE — C1713 ANCHOR/SCREW BN/BN,TIS/BN: HCPCS | Performed by: ORTHOPAEDIC SURGERY

## 2023-02-22 PROCEDURE — 97162 PT EVAL MOD COMPLEX 30 MIN: CPT

## 2023-02-22 PROCEDURE — 97530 THERAPEUTIC ACTIVITIES: CPT

## 2023-02-22 PROCEDURE — 3600000012 HC SURGERY LEVEL 2 ADDTL 15MIN: Performed by: ORTHOPAEDIC SURGERY

## 2023-02-22 PROCEDURE — 2720000010 HC SURG SUPPLY STERILE: Performed by: ORTHOPAEDIC SURGERY

## 2023-02-22 DEVICE — BASEPLATE GLEN 24MM SHLDR MOD UNIVERS REVERS: Type: IMPLANTABLE DEVICE | Site: SHOULDER | Status: FUNCTIONAL

## 2023-02-22 DEVICE — CUP HUM DIA36MM SHLDR NEUT SUT UNIVERS REVERS: Type: IMPLANTABLE DEVICE | Site: SHOULDER | Status: FUNCTIONAL

## 2023-02-22 DEVICE — SCREW BNE L32MM DIA5.5MM PERIPH LOK FOR MOD GLEN SYS: Type: IMPLANTABLE DEVICE | Site: SHOULDER | Status: FUNCTIONAL

## 2023-02-22 DEVICE — SCREW BONE L16MM DIA4.5MM PERIPH NONLOCKING FOR MOD GLEN SYS: Type: IMPLANTABLE DEVICE | Site: SHOULDER | Status: FUNCTIONAL

## 2023-02-22 DEVICE — SPHERE GLEN DIA36MM +4 BASEPLT 24MM SHLDR LAT TAPR MOD UNIV: Type: IMPLANTABLE DEVICE | Site: SHOULDER | Status: FUNCTIONAL

## 2023-02-22 DEVICE — SCREW BNE L20MM DIA45MM PERIPH NONLOCKING FOR MOD GLEN SYS: Type: IMPLANTABLE DEVICE | Site: SHOULDER | Status: FUNCTIONAL

## 2023-02-22 DEVICE — SCREW GLEN FIX 25MM SHLDR CTRL MOD UNIVERS REVERS: Type: IMPLANTABLE DEVICE | Site: SHOULDER | Status: FUNCTIONAL

## 2023-02-22 DEVICE — SCREW BNE L36MM DIA5.5MM PERIPH LOK FOR MOD GLEN SYS: Type: IMPLANTABLE DEVICE | Site: SHOULDER | Status: FUNCTIONAL

## 2023-02-22 DEVICE — COMPONENT TOT SHLDR CAPPED S3ARTHREX] ARTHREX INC]: Type: IMPLANTABLE DEVICE | Site: SHOULDER | Status: FUNCTIONAL

## 2023-02-22 DEVICE — STEM HUM SZ 5 SHLDR COAT CAP UNIVERS REVERS: Type: IMPLANTABLE DEVICE | Site: SHOULDER | Status: FUNCTIONAL

## 2023-02-22 DEVICE — LINER HUM SM DIA36MM +6MM OFFSET SHLDR UHMWPE UNIVERS: Type: IMPLANTABLE DEVICE | Site: SHOULDER | Status: FUNCTIONAL

## 2023-02-22 RX ORDER — GABAPENTIN 300 MG/1
300 CAPSULE ORAL ONCE
Status: COMPLETED | OUTPATIENT
Start: 2023-02-22 | End: 2023-02-22

## 2023-02-22 RX ORDER — DIPHENHYDRAMINE HCL 25 MG
25 CAPSULE ORAL EVERY 6 HOURS PRN
Status: DISCONTINUED | OUTPATIENT
Start: 2023-02-22 | End: 2023-02-23 | Stop reason: HOSPADM

## 2023-02-22 RX ORDER — SODIUM CHLORIDE 9 MG/ML
INJECTION, SOLUTION INTRAVENOUS PRN
Status: DISCONTINUED | OUTPATIENT
Start: 2023-02-22 | End: 2023-02-22 | Stop reason: HOSPADM

## 2023-02-22 RX ORDER — GLIPIZIDE 5 MG/1
2.5 TABLET ORAL
Status: DISCONTINUED | OUTPATIENT
Start: 2023-02-22 | End: 2023-02-23 | Stop reason: HOSPADM

## 2023-02-22 RX ORDER — LIDOCAINE HYDROCHLORIDE 20 MG/ML
INJECTION, SOLUTION EPIDURAL; INFILTRATION; INTRACAUDAL; PERINEURAL PRN
Status: DISCONTINUED | OUTPATIENT
Start: 2023-02-22 | End: 2023-02-22 | Stop reason: SDUPTHER

## 2023-02-22 RX ORDER — ACETAMINOPHEN 500 MG
1000 TABLET ORAL ONCE
Status: COMPLETED | OUTPATIENT
Start: 2023-02-22 | End: 2023-02-22

## 2023-02-22 RX ORDER — OXYCODONE HYDROCHLORIDE 5 MG/1
5 TABLET ORAL EVERY 4 HOURS PRN
Status: DISCONTINUED | OUTPATIENT
Start: 2023-02-22 | End: 2023-02-23 | Stop reason: HOSPADM

## 2023-02-22 RX ORDER — FUROSEMIDE 40 MG/1
40 TABLET ORAL DAILY
Status: DISCONTINUED | OUTPATIENT
Start: 2023-02-22 | End: 2023-02-23 | Stop reason: HOSPADM

## 2023-02-22 RX ORDER — CETIRIZINE HYDROCHLORIDE 10 MG/1
10 TABLET ORAL DAILY
Status: DISCONTINUED | OUTPATIENT
Start: 2023-02-22 | End: 2023-02-23 | Stop reason: HOSPADM

## 2023-02-22 RX ORDER — ONDANSETRON 2 MG/ML
4 INJECTION INTRAMUSCULAR; INTRAVENOUS
Status: DISCONTINUED | OUTPATIENT
Start: 2023-02-22 | End: 2023-02-22 | Stop reason: HOSPADM

## 2023-02-22 RX ORDER — ONDANSETRON 4 MG/1
4 TABLET, ORALLY DISINTEGRATING ORAL EVERY 8 HOURS PRN
Status: DISCONTINUED | OUTPATIENT
Start: 2023-02-22 | End: 2023-02-23 | Stop reason: HOSPADM

## 2023-02-22 RX ORDER — TAMSULOSIN HYDROCHLORIDE 0.4 MG/1
0.4 CAPSULE ORAL DAILY
Status: DISCONTINUED | OUTPATIENT
Start: 2023-02-22 | End: 2023-02-23 | Stop reason: HOSPADM

## 2023-02-22 RX ORDER — KETOROLAC TROMETHAMINE 15 MG/ML
INJECTION, SOLUTION INTRAMUSCULAR; INTRAVENOUS PRN
Status: DISCONTINUED | OUTPATIENT
Start: 2023-02-22 | End: 2023-02-22 | Stop reason: SDUPTHER

## 2023-02-22 RX ORDER — EZETIMIBE 10 MG/1
10 TABLET ORAL DAILY
Status: DISCONTINUED | OUTPATIENT
Start: 2023-02-22 | End: 2023-02-23 | Stop reason: HOSPADM

## 2023-02-22 RX ORDER — FENTANYL CITRATE 50 UG/ML
100 INJECTION, SOLUTION INTRAMUSCULAR; INTRAVENOUS ONCE
Status: COMPLETED | OUTPATIENT
Start: 2023-02-22 | End: 2023-02-22

## 2023-02-22 RX ORDER — FENTANYL CITRATE 50 UG/ML
INJECTION, SOLUTION INTRAMUSCULAR; INTRAVENOUS PRN
Status: DISCONTINUED | OUTPATIENT
Start: 2023-02-22 | End: 2023-02-22 | Stop reason: SDUPTHER

## 2023-02-22 RX ORDER — SPIRONOLACTONE 25 MG/1
25 TABLET ORAL DAILY
Status: DISCONTINUED | OUTPATIENT
Start: 2023-02-22 | End: 2023-02-23 | Stop reason: HOSPADM

## 2023-02-22 RX ORDER — ROSUVASTATIN CALCIUM 20 MG/1
20 TABLET, COATED ORAL NIGHTLY
Status: DISCONTINUED | OUTPATIENT
Start: 2023-02-22 | End: 2023-02-23 | Stop reason: HOSPADM

## 2023-02-22 RX ORDER — PANTOPRAZOLE SODIUM 20 MG/1
40 TABLET, DELAYED RELEASE ORAL EVERY MORNING
Status: DISCONTINUED | OUTPATIENT
Start: 2023-02-23 | End: 2023-02-23 | Stop reason: HOSPADM

## 2023-02-22 RX ORDER — EPHEDRINE SULFATE/0.9% NACL/PF 50 MG/5 ML
SYRINGE (ML) INTRAVENOUS PRN
Status: DISCONTINUED | OUTPATIENT
Start: 2023-02-22 | End: 2023-02-22 | Stop reason: SDUPTHER

## 2023-02-22 RX ORDER — MONTELUKAST SODIUM 10 MG/1
10 TABLET ORAL DAILY
Status: DISCONTINUED | OUTPATIENT
Start: 2023-02-22 | End: 2023-02-23 | Stop reason: HOSPADM

## 2023-02-22 RX ORDER — LISINOPRIL 20 MG/1
20 TABLET ORAL DAILY
Status: DISCONTINUED | OUTPATIENT
Start: 2023-02-22 | End: 2023-02-23 | Stop reason: HOSPADM

## 2023-02-22 RX ORDER — CHOLECALCIFEROL (VITAMIN D3) 125 MCG
500 CAPSULE ORAL DAILY
Status: DISCONTINUED | OUTPATIENT
Start: 2023-02-22 | End: 2023-02-23 | Stop reason: HOSPADM

## 2023-02-22 RX ORDER — MULTIVIT WITH MINERALS/LUTEIN
500 TABLET ORAL DAILY
Status: DISCONTINUED | OUTPATIENT
Start: 2023-02-22 | End: 2023-02-23 | Stop reason: HOSPADM

## 2023-02-22 RX ORDER — HYDROXYZINE 50 MG/1
25 TABLET, FILM COATED ORAL 3 TIMES DAILY PRN
Status: DISCONTINUED | OUTPATIENT
Start: 2023-02-22 | End: 2023-02-23 | Stop reason: HOSPADM

## 2023-02-22 RX ORDER — SODIUM CHLORIDE, SODIUM LACTATE, POTASSIUM CHLORIDE, CALCIUM CHLORIDE 600; 310; 30; 20 MG/100ML; MG/100ML; MG/100ML; MG/100ML
INJECTION, SOLUTION INTRAVENOUS CONTINUOUS
Status: DISCONTINUED | OUTPATIENT
Start: 2023-02-22 | End: 2023-02-22 | Stop reason: HOSPADM

## 2023-02-22 RX ORDER — OYSTER SHELL CALCIUM WITH VITAMIN D 500; 200 MG/1; [IU]/1
1 TABLET, FILM COATED ORAL DAILY
Status: DISCONTINUED | OUTPATIENT
Start: 2023-02-22 | End: 2023-02-22

## 2023-02-22 RX ORDER — DIPHENHYDRAMINE HYDROCHLORIDE 50 MG/ML
25 INJECTION INTRAMUSCULAR; INTRAVENOUS EVERY 6 HOURS PRN
Status: DISCONTINUED | OUTPATIENT
Start: 2023-02-22 | End: 2023-02-23 | Stop reason: HOSPADM

## 2023-02-22 RX ORDER — HYDROMORPHONE HYDROCHLORIDE 1 MG/ML
0.5 INJECTION, SOLUTION INTRAMUSCULAR; INTRAVENOUS; SUBCUTANEOUS EVERY 5 MIN PRN
Status: DISCONTINUED | OUTPATIENT
Start: 2023-02-22 | End: 2023-02-22 | Stop reason: HOSPADM

## 2023-02-22 RX ORDER — CARVEDILOL 12.5 MG/1
12.5 TABLET ORAL 2 TIMES DAILY WITH MEALS
Status: DISCONTINUED | OUTPATIENT
Start: 2023-02-22 | End: 2023-02-23 | Stop reason: HOSPADM

## 2023-02-22 RX ORDER — SUCCINYLCHOLINE/SOD CL,ISO/PF 100 MG/5ML
SYRINGE (ML) INTRAVENOUS PRN
Status: DISCONTINUED | OUTPATIENT
Start: 2023-02-22 | End: 2023-02-22 | Stop reason: SDUPTHER

## 2023-02-22 RX ORDER — ACETAMINOPHEN 500 MG
1000 TABLET ORAL EVERY 8 HOURS
Status: DISCONTINUED | OUTPATIENT
Start: 2023-02-22 | End: 2023-02-23 | Stop reason: HOSPADM

## 2023-02-22 RX ORDER — ROCURONIUM BROMIDE 10 MG/ML
INJECTION, SOLUTION INTRAVENOUS PRN
Status: DISCONTINUED | OUTPATIENT
Start: 2023-02-22 | End: 2023-02-22 | Stop reason: SDUPTHER

## 2023-02-22 RX ORDER — SODIUM CHLORIDE 0.9 % (FLUSH) 0.9 %
5-40 SYRINGE (ML) INJECTION PRN
Status: DISCONTINUED | OUTPATIENT
Start: 2023-02-22 | End: 2023-02-22 | Stop reason: HOSPADM

## 2023-02-22 RX ORDER — FERROUS SULFATE 325(65) MG
325 TABLET ORAL
Status: DISCONTINUED | OUTPATIENT
Start: 2023-02-23 | End: 2023-02-23 | Stop reason: HOSPADM

## 2023-02-22 RX ORDER — POTASSIUM CHLORIDE 750 MG/1
10 TABLET, EXTENDED RELEASE ORAL 2 TIMES DAILY
Status: DISCONTINUED | OUTPATIENT
Start: 2023-02-22 | End: 2023-02-23 | Stop reason: HOSPADM

## 2023-02-22 RX ORDER — ONDANSETRON 2 MG/ML
INJECTION INTRAMUSCULAR; INTRAVENOUS PRN
Status: DISCONTINUED | OUTPATIENT
Start: 2023-02-22 | End: 2023-02-22 | Stop reason: SDUPTHER

## 2023-02-22 RX ORDER — MIDAZOLAM HYDROCHLORIDE 2 MG/2ML
2 INJECTION, SOLUTION INTRAMUSCULAR; INTRAVENOUS ONCE
Status: COMPLETED | OUTPATIENT
Start: 2023-02-22 | End: 2023-02-22

## 2023-02-22 RX ORDER — FAMOTIDINE 20 MG/1
20 TABLET, FILM COATED ORAL ONCE
Status: COMPLETED | OUTPATIENT
Start: 2023-02-22 | End: 2023-02-22

## 2023-02-22 RX ORDER — DEXAMETHASONE SODIUM PHOSPHATE 4 MG/ML
INJECTION, SOLUTION INTRA-ARTICULAR; INTRALESIONAL; INTRAMUSCULAR; INTRAVENOUS; SOFT TISSUE PRN
Status: DISCONTINUED | OUTPATIENT
Start: 2023-02-22 | End: 2023-02-22 | Stop reason: SDUPTHER

## 2023-02-22 RX ORDER — DEXTROSE MONOHYDRATE 100 MG/ML
INJECTION, SOLUTION INTRAVENOUS CONTINUOUS PRN
Status: DISCONTINUED | OUTPATIENT
Start: 2023-02-22 | End: 2023-02-22 | Stop reason: HOSPADM

## 2023-02-22 RX ORDER — PREGABALIN 75 MG/1
300 CAPSULE ORAL 2 TIMES DAILY
Status: DISCONTINUED | OUTPATIENT
Start: 2023-02-22 | End: 2023-02-23 | Stop reason: HOSPADM

## 2023-02-22 RX ORDER — POLYETHYLENE GLYCOL 3350 17 G/17G
17 POWDER, FOR SOLUTION ORAL DAILY
Status: DISCONTINUED | OUTPATIENT
Start: 2023-02-22 | End: 2023-02-23 | Stop reason: HOSPADM

## 2023-02-22 RX ORDER — ALLOPURINOL 100 MG/1
100 TABLET ORAL DAILY
Status: DISCONTINUED | OUTPATIENT
Start: 2023-02-22 | End: 2023-02-23 | Stop reason: HOSPADM

## 2023-02-22 RX ORDER — PROPOFOL 10 MG/ML
INJECTION, EMULSION INTRAVENOUS PRN
Status: DISCONTINUED | OUTPATIENT
Start: 2023-02-22 | End: 2023-02-22 | Stop reason: SDUPTHER

## 2023-02-22 RX ORDER — HYDROMORPHONE HYDROCHLORIDE 1 MG/ML
0.5 INJECTION, SOLUTION INTRAMUSCULAR; INTRAVENOUS; SUBCUTANEOUS
Status: DISCONTINUED | OUTPATIENT
Start: 2023-02-22 | End: 2023-02-23 | Stop reason: HOSPADM

## 2023-02-22 RX ORDER — ONDANSETRON 2 MG/ML
4 INJECTION INTRAMUSCULAR; INTRAVENOUS EVERY 6 HOURS PRN
Status: DISCONTINUED | OUTPATIENT
Start: 2023-02-22 | End: 2023-02-23 | Stop reason: HOSPADM

## 2023-02-22 RX ORDER — INSULIN LISPRO 100 [IU]/ML
0-15 INJECTION, SOLUTION INTRAVENOUS; SUBCUTANEOUS ONCE
Status: DISCONTINUED | OUTPATIENT
Start: 2023-02-22 | End: 2023-02-22 | Stop reason: HOSPADM

## 2023-02-22 RX ORDER — BACLOFEN 10 MG/1
10 TABLET ORAL 3 TIMES DAILY PRN
Status: DISCONTINUED | OUTPATIENT
Start: 2023-02-22 | End: 2023-02-23 | Stop reason: HOSPADM

## 2023-02-22 RX ORDER — ROPIVACAINE HYDROCHLORIDE 5 MG/ML
30 INJECTION, SOLUTION EPIDURAL; INFILTRATION; PERINEURAL ONCE
Status: COMPLETED | OUTPATIENT
Start: 2023-02-22 | End: 2023-02-22

## 2023-02-22 RX ORDER — CLOPIDOGREL BISULFATE 75 MG/1
75 TABLET ORAL DAILY
Status: DISCONTINUED | OUTPATIENT
Start: 2023-02-22 | End: 2023-02-23 | Stop reason: HOSPADM

## 2023-02-22 RX ORDER — SODIUM CHLORIDE 0.9 % (FLUSH) 0.9 %
5-40 SYRINGE (ML) INJECTION EVERY 12 HOURS SCHEDULED
Status: DISCONTINUED | OUTPATIENT
Start: 2023-02-22 | End: 2023-02-23 | Stop reason: HOSPADM

## 2023-02-22 RX ORDER — SODIUM CHLORIDE 0.9 % (FLUSH) 0.9 %
5-40 SYRINGE (ML) INJECTION PRN
Status: DISCONTINUED | OUTPATIENT
Start: 2023-02-22 | End: 2023-02-23 | Stop reason: HOSPADM

## 2023-02-22 RX ORDER — SODIUM CHLORIDE 0.9 % (FLUSH) 0.9 %
5-40 SYRINGE (ML) INJECTION EVERY 12 HOURS SCHEDULED
Status: DISCONTINUED | OUTPATIENT
Start: 2023-02-22 | End: 2023-02-22 | Stop reason: HOSPADM

## 2023-02-22 RX ORDER — LIDOCAINE HYDROCHLORIDE 10 MG/ML
1 INJECTION, SOLUTION EPIDURAL; INFILTRATION; INTRACAUDAL; PERINEURAL
Status: DISCONTINUED | OUTPATIENT
Start: 2023-02-22 | End: 2023-02-22 | Stop reason: HOSPADM

## 2023-02-22 RX ORDER — SODIUM CHLORIDE 9 MG/ML
INJECTION, SOLUTION INTRAVENOUS PRN
Status: DISCONTINUED | OUTPATIENT
Start: 2023-02-22 | End: 2023-02-23 | Stop reason: HOSPADM

## 2023-02-22 RX ORDER — DIPHENHYDRAMINE HYDROCHLORIDE 50 MG/ML
12.5 INJECTION INTRAMUSCULAR; INTRAVENOUS
Status: DISCONTINUED | OUTPATIENT
Start: 2023-02-22 | End: 2023-02-22 | Stop reason: HOSPADM

## 2023-02-22 RX ADMIN — POTASSIUM CHLORIDE 10 MEQ: 750 TABLET, EXTENDED RELEASE ORAL at 22:24

## 2023-02-22 RX ADMIN — KETOROLAC TROMETHAMINE 15 MG: 15 INJECTION, SOLUTION INTRAMUSCULAR; INTRAVENOUS at 07:36

## 2023-02-22 RX ADMIN — SODIUM CHLORIDE, PRESERVATIVE FREE 10 ML: 5 INJECTION INTRAVENOUS at 22:25

## 2023-02-22 RX ADMIN — FAMOTIDINE 20 MG: 20 TABLET ORAL at 07:09

## 2023-02-22 RX ADMIN — ACETAMINOPHEN 1000 MG: 500 TABLET ORAL at 07:08

## 2023-02-22 RX ADMIN — ROSUVASTATIN CALCIUM 20 MG: 20 TABLET, COATED ORAL at 22:25

## 2023-02-22 RX ADMIN — OXYCODONE HYDROCHLORIDE 5 MG: 5 TABLET ORAL at 16:36

## 2023-02-22 RX ADMIN — BISACODYL 5 MG: 5 TABLET, COATED ORAL at 16:35

## 2023-02-22 RX ADMIN — MIDAZOLAM 2 MG: 1 INJECTION INTRAMUSCULAR; INTRAVENOUS at 07:18

## 2023-02-22 RX ADMIN — PROPOFOL 80 MG: 10 INJECTION, EMULSION INTRAVENOUS at 07:36

## 2023-02-22 RX ADMIN — DEXAMETHASONE SODIUM PHOSPHATE 4 MG: 4 INJECTION, SOLUTION INTRAMUSCULAR; INTRAVENOUS at 07:36

## 2023-02-22 RX ADMIN — GABAPENTIN 300 MG: 300 CAPSULE ORAL at 07:09

## 2023-02-22 RX ADMIN — LIDOCAINE HYDROCHLORIDE 20 MG: 20 INJECTION, SOLUTION EPIDURAL; INFILTRATION; INTRACAUDAL; PERINEURAL at 07:36

## 2023-02-22 RX ADMIN — FENTANYL CITRATE 100 MCG: 50 INJECTION, SOLUTION INTRAMUSCULAR; INTRAVENOUS at 07:36

## 2023-02-22 RX ADMIN — FENTANYL CITRATE 100 MCG: 50 INJECTION, SOLUTION INTRAMUSCULAR; INTRAVENOUS at 07:18

## 2023-02-22 RX ADMIN — ACETAMINOPHEN 1000 MG: 500 TABLET ORAL at 22:23

## 2023-02-22 RX ADMIN — ROPIVACAINE HYDROCHLORIDE 30 ML: 5 INJECTION, SOLUTION EPIDURAL; INFILTRATION; PERINEURAL at 07:19

## 2023-02-22 RX ADMIN — ONDANSETRON 4 MG: 2 INJECTION INTRAMUSCULAR; INTRAVENOUS at 07:36

## 2023-02-22 RX ADMIN — FUROSEMIDE 40 MG: 40 TABLET ORAL at 16:37

## 2023-02-22 RX ADMIN — PREGABALIN 300 MG: 75 CAPSULE ORAL at 22:25

## 2023-02-22 RX ADMIN — Medication 10 MG: at 08:18

## 2023-02-22 RX ADMIN — CARVEDILOL 12.5 MG: 12.5 TABLET, FILM COATED ORAL at 16:36

## 2023-02-22 RX ADMIN — MONTELUKAST 10 MG: 10 TABLET, FILM COATED ORAL at 16:37

## 2023-02-22 RX ADMIN — TAMSULOSIN HYDROCHLORIDE 0.4 MG: 0.4 CAPSULE ORAL at 16:35

## 2023-02-22 RX ADMIN — ROCURONIUM BROMIDE 10 MG: 10 INJECTION, SOLUTION INTRAVENOUS at 07:36

## 2023-02-22 RX ADMIN — Medication 100 MG: at 07:36

## 2023-02-22 RX ADMIN — OXYCODONE HYDROCHLORIDE 5 MG: 5 TABLET ORAL at 12:58

## 2023-02-22 RX ADMIN — CEFAZOLIN 2000 MG: 1 INJECTION, POWDER, FOR SOLUTION INTRAMUSCULAR; INTRAVENOUS at 16:28

## 2023-02-22 RX ADMIN — ACETAMINOPHEN 1000 MG: 500 TABLET ORAL at 16:29

## 2023-02-22 RX ADMIN — CETIRIZINE HYDROCHLORIDE 10 MG: 10 TABLET, FILM COATED ORAL at 16:37

## 2023-02-22 RX ADMIN — LISINOPRIL 20 MG: 20 TABLET ORAL at 16:36

## 2023-02-22 RX ADMIN — Medication 1 TABLET: at 16:36

## 2023-02-22 RX ADMIN — CLOPIDOGREL BISULFATE 75 MG: 75 TABLET ORAL at 16:35

## 2023-02-22 RX ADMIN — Medication 500 MCG: at 16:36

## 2023-02-22 RX ADMIN — EZETIMIBE 10 MG: 10 TABLET ORAL at 16:35

## 2023-02-22 RX ADMIN — CEFAZOLIN 2000 MG: 1 INJECTION, POWDER, FOR SOLUTION INTRAMUSCULAR; INTRAVENOUS at 22:24

## 2023-02-22 RX ADMIN — WATER 2000 MG: 1 INJECTION, SOLUTION INTRAMUSCULAR; INTRAVENOUS; SUBCUTANEOUS at 07:30

## 2023-02-22 RX ADMIN — GLIPIZIDE 2.5 MG: 5 TABLET ORAL at 16:29

## 2023-02-22 ASSESSMENT — PAIN SCALES - WONG BAKER: WONGBAKER_NUMERICALRESPONSE: 0

## 2023-02-22 ASSESSMENT — PAIN - FUNCTIONAL ASSESSMENT: PAIN_FUNCTIONAL_ASSESSMENT: 0-10

## 2023-02-22 ASSESSMENT — PAIN SCALES - GENERAL: PAINLEVEL_OUTOF10: 9

## 2023-02-22 NOTE — ANESTHESIA POSTPROCEDURE EVALUATION
Department of Anesthesiology  Postprocedure Note    Patient: Valerie London  MRN: [id number]  YOB: 1960  Date of evaluation: 2/22/2023      Procedure Summary     Date: 02/22/23 Room / Location: [facility] MAIN 07 / [facility] MAIN OR    Anesthesia Start: 0730 Anesthesia Stop: 1424    Procedure: RIGHT REVERSE TOTAL SHOULDER ARTHROPLASTY; ARTHREX; NERVE BLOCK (Right: Shoulder) Diagnosis:       Tear of right rotator cuff, unspecified tear extent, unspecified whether traumatic      (Tear of right rotator cuff, unspecified tear extent, unspecified whether traumatic [M75.101])    Surgeons: Chelsie Castano MD Responsible Provider: Jermain Iraheta MD    Anesthesia Type: General ASA Status: 3          Anesthesia Type: General    Yris Phase I: Yris Score: 10    Yris Phase II:        Anesthesia Post Evaluation    Patient location during evaluation: bedside  Patient participation: complete - patient participated  Airway patency: patent  Complications: no  Cardiovascular status: hemodynamically stable  Respiratory status: acceptable  Hydration status: stable

## 2023-02-22 NOTE — PROGRESS NOTES
1500- Patient  arrived to unit from  PACU oriented  to room. Vital signs taken. Call bell  in reach. Will continue to monitor. 1600- Patient received  pain medication and  scheduled medication. No other issues noted will continue to  monitor.

## 2023-02-22 NOTE — OP NOTE
Operative Note      Patient: Akhil Gaitan  YOB: 1960  MRN: [id number]    Date of Procedure: 2/22/2023    Pre-Op Diagnosis: Tear of right rotator cuff, unspecified tear extent, unspecified whether traumatic [M75.101]    Post-Op Diagnosis: Cuff tear arthropathy right shoulder       Procedure(s):  RIGHT REVERSE TOTAL SHOULDER ARTHROPLASTY; [address]; NERVE BLOCK    Surgeon(s):  Elinor Chopra MD    Assistant:   Surgical Assistant: Truong Topete  Physician Assistant: Leopoldo Nestle, PA, Reese Esqueda instrumental in managing and assisting all facets of the procedure therefore medically necessary for the success of the procedure    Anesthesia: General    Estimated Blood Loss (mL): less than 022     Complications: None    Specimens:   * No specimens in log *    Implants:  Implant Name Type Inv. Item Serial No.  Lot No. LRB No. Used Action   SCREW SAMINA FIX 25MM SHLDR CTRL MOD UNIVERS REVERS - [id number]  SCREW SAMINA FIX 25MM SHLDR CTRL MOD UNIVERS REVERS  [employer] [id number] Right 1 Implanted   BASEPLATE SAMINA 72AG SHLDR MOD UNIVERS REVERS - [id number]  BASEPLATE SAMINA 78XB SHLDR MOD UNIVERS REVERS  [address] INC[employer]WD [id number] Right 1 Implanted   SCREW BNE L36MM DIA5. 5MM PERIPH KYMBERLY FOR MOD SAMINA SYS - [id number]  SCREW BNE L36MM DIA5. 5MM PERIPH KYMBERLY FOR MOD SAMINA SYS  [address] INC-WD [id number] Right 1 Implanted   SCREW BNE L32MM DIA5. 5MM PERIPH KYMBERLY FOR MOD SAMINA SYS - [id number]  SCREW BNE L32MM DIA5. 5MM PERIPH KYMBERLY FOR MOD SAMINA SYS  [employer] [id number] Right 1 Implanted   SCREW BNE L20MM [id number] PERIPH NONLOCKING FOR MOD SAMINA SYS - [id number]  SCREW BNE L20MM [id number] PERIPH NONLOCKING FOR MOD SAMINA SYS  [employer] [id number] Right 1 Implanted   SCREW BONE L16MM DIA4. 5MM PERIPH NONLOCKING FOR MOD SAMINA SYS - [id number]  SCREW BONE L16MM DIA4. 5MM PERIPH NONLOCKING FOR MOD SAMINA SYS  [employer] [id number] Right 1 Implanted   SPHERE SAMINA [id number] +4 BASEPLT 24MM SHLDR LAT TAPR MOD UNIV - [id number]  SPHERE SAMINA [id number] +4 BASEPLT 24MM SHLDR LAT TAPR MOD UNIV  ARTHREX [facility]- [id number] Right 1 Implanted   STEM HUM SZ 5 SHLDR COAT CAP [facility] - [id number]  STEM HUM SZ 5 SHLDR COAT CAP [facility]  [facility] [id number] Right 1 Implanted   CUP HUM [id number] SHLDR NEUT SUT [facility] - [id number]  CUP HUM [id number] SHLDR NEUT SUT [facility]  [facility] [id number] Right 1 Implanted   LINER HUM SM [id number] +6MM OFFSET SHLDR [facility] - [id number]  LINER HUM SM [id number] +6MM OFFSET SHLDR [facility]  [facility] [id number] Right 1 Implanted         Drains: * No LDAs found *    Findings: As above    Detailed Description of Procedure:   Patient was taken to the operating room [place] under general trach anesthesia with anesthesia staff. Placed in a beachchair position the right arm prepped with ChloraPrep solution draped free sterile field. A deltopectoral approach was used subcutaneous tissue dissected bluntly to the level of the deltopectoral interval which was developed the dissecting the cephalic vein and protecting medially and the deltoid laterally. Clavipectoral fascia was incised superior third of the pectoralis tendon was divided and the biceps tendon was tenodesed to this tendon using a FiberWire suture. Conjoined tendon was retracted medially protecting axillary musculocutaneous nerves at all times. The circumflex vessels were ligated and subscapularis tenotomy performed exposing the joint. Intramedullary guide was used 30 degrees of retroversion the humeral head was cut. Attention was then placed glenoid with the capsule was removed from 3 to 6 o'clock position  from the subscapularis tendon and the baseplate template was placed and the guidepin was inserted 25 mm in depth. Concentric reaming was then performed and the central peg was drilled.   A 24 mm baseplate with a 25 mm central screw which had been tapped previously was then inserted and secured further with 2 locking screws and 2 nonlocking screws. 36 glenosphere +4 lateralized was impacted and then further secure with a screw. Attention was then with the humerus proximal humerus with 5 mm broach thought to be appropriate size and stability this was followed by reaming the suture cup and then the trials placed with a 6 insert thought to be appropriate size upon relocating the shoulder and stable in all planes of motion. Trials were removed the area was irrigated into holes through bone were placed at the bicipital groove. 2 sutures through the lateral fin were passed through the holes in the bone in the bicipital groove and the prosthesis was impacted 4 sutures have been placed in the suture cup these 8 limbs were passed through subscapularis tendon after having impacted the 6 insert and relocated the shoulder. The 8 strands were tied to the 4 strands from passed through the bone and suture bridge configuration effecting very stable repair of the subscapularis. Irrigation was then used and Exparel injected deep and superficial and then the deltopectoral interval was closed with 0 Vicryl. Subcutaneous tissues were closed with 2-0 Vicryl and the skin with 4-0 Monocryl. Sterile dressings were applied patient tolerated procedure well was taken to recovery room without problems.     Electronically signed by Ebenezer Arredondo MD on 2/22/2023 at 9:47 AM

## 2023-02-22 NOTE — ANESTHESIA PROCEDURE NOTES
Peripheral Block    Patient location during procedure: pre-op  Reason for block: post-op pain management  Start time: 2/22/2023 7:17 AM  End time: 2/22/2023 7:25 AM  Staffing  Performed: anesthesiologist   Anesthesiologist: Mao Cheng MD  Preanesthetic Checklist  Completed: patient identified, IV checked, site marked, risks and benefits discussed, surgical/procedural consents, equipment checked, pre-op evaluation, timeout performed, anesthesia consent given, oxygen available, monitors applied/VS acknowledged, fire risk safety assessment completed and verbalized and blood product R/B/A discussed and consented  Peripheral Block   Patient position: sitting  Prep: ChloraPrep  Provider prep: mask and sterile gloves  Patient monitoring: oxygen, cardiac monitor, continuous pulse ox, continuous capnometry, frequent blood pressure checks and IV access  Block type: Brachial plexus  Interscalene  Laterality: right  Injection technique: single-shot  Guidance: ultrasound guided  Local infiltration: ropivacaine  Infiltration strength: 0.5 %  Local infiltration: ropivacaine  Dose: 25 mL    Needle   Needle gauge: 22 G  Needle localization: ultrasound guidance  Needle length: 5 cm  Assessment   Injection assessment: negative aspiration for heme, no paresthesia on injection, local visualized surrounding nerve on ultrasound, no intravascular symptoms and low pressure verified by pressure monitor  Paresthesia pain: none  Slow fractionated injection: yes  Hemodynamics: stable  Real-time US image taken/store: yes  Outcomes: uncomplicated

## 2023-02-22 NOTE — PERIOP NOTE
TRANSFER - OUT REPORT:    Verbal report given to Tristan Shelia on Mitchell Juany  being transferred to [facility] for routine post-op       Report consisted of patient's Situation, Background, Assessment and   Recommendations(SBAR). Information from the following report(s) Nurse Handoff Report and MAR was reviewed with the receiving nurse. [place] Assessment: No data recorded  Lines:   Peripheral IV 02/22/23 Left;Posterior Forearm (Active)   Site Assessment Clean, dry & intact 02/22/23 1118   Line Status Infusing 02/22/23 1118   Phlebitis Assessment No symptoms 02/22/23 1118   Infiltration Assessment 0 02/22/23 1118   Dressing Status Clean, dry & intact 02/22/23 1118   Dressing Type Transparent 02/22/23 1118        Opportunity for questions and clarification was provided.       Patient transported with:  Patient Transport bed

## 2023-02-22 NOTE — BRIEF OP NOTE
Brief Postoperative Note      Patient: Diana Ford  YOB: 1960  MRN: [id number]    Date of Procedure: 2/22/2023    Pre-Op Diagnosis: Tear of right rotator cuff, unspecified tear extent, unspecified whether traumatic [M75.101]    Post-Op Diagnosis:  same as above       Procedure(s):  RIGHT REVERSE TOTAL SHOULDER ARTHROPLASTY; [address]; NERVE BLOCK    Surgeon(s):  See Dill MD    Assistant:  Surgical Assistant: Akash Meng  Physician Assistant: RADHA Khalil    Anesthesia: General    Estimated Blood Loss (mL): 10 mL    Complications: None    Specimens:   * No specimens in log *    Implants:  Implant Name Type Inv. Item Serial No.  Lot No. LRB No. Used Action   SCREW SAMINA FIX 25MM SHLDR CTRL [employer] UNIVERS REVERS - [id number]  SCREW SAMINA FIX 25MM SHLDR CTRL MOD UNIVERS REVERS  [employer] [id number] Right 1 Implanted   BASEPLATE SAMINA 88VM SHLDR MOD UNIVERS REVERS - [id number]  BASEPLATE SAMINA 70MS SHLDR MOD UNIVERS REVERS  [address] [employer] [id number] Right 1 Implanted   SCREW BNE L36MM DIA5. 5MM PERIPH KYMBERLY FOR MOD SAMINA SYS - [id number]  SCREW BNE L36MM DIA5. 5MM PERIPH KYMBERLY FOR MOD SMAINA SYS  [address] INC[employer]WD [id number] Right 1 Implanted   SCREW BNE L32MM DIA5. 5MM PERIPH KYMBERLY FOR MOD SAMINA SYS - [id number]  SCREW BNE L32MM DIA5. 5MM PERIPH KYMBERLY FOR MOD SAMINA SYS  [employer] [id number] Right 1 Implanted   SCREW BNE L20MM [id number] PERIPH NONLOCKING FOR MOD SAMINA SYS - [id number]  SCREW BNE L20MM [id number] PERIPH NONLOCKING FOR MOD SAMINA SYS  [employer] [id number] Right 1 Implanted   SCREW BONE L16MM DIA4. 5MM PERIPH NONLOCKING FOR MOD SAMINA SYS - [id number]  SCREW BONE L16MM DIA4. 5MM PERIPH NONLOCKING FOR MOD SAMINA SYS  [employer] [id number] Right 1 Implanted   SPHERE SAMINA [id number] +4 BASEPLT 24MM SHLDR LAT TAPR MOD UNIV - [id number]  SPHERE SAMINA [id number] +4 BASEPLT 24MM SHLDR LAT TAPR MOD UNIV  [employer] [id number] Right 1 Implanted   STEM HUM SZ 5 SHLDR COAT CAP [employer] REVERS - [id number]  STEM HUM SZ 5 SHLDR COAT CAP UNIVERS [facility]  [employer]- [id number] Right 1 Implanted   CUP HUM [id number] SHLDR NEUT [facility] REVERS - [id number]  CUP HUM [id number] SHLDR NEUT SUT [facility]  [employer] [id number] Right 1 Implanted   LINER HUM SM [id number] +6MM OFFSET SHLDR [facility] - [id number]  LINER HUM SM [id number] +6MM OFFSET SHLDR [facility]  ARTHREX [facility] [id number] Right 1 Implanted         Drains: * No LDAs found *    Findings: cuff tear arthropathy    Electronically signed by RADHA Bailey on 2/22/2023 at 10:33 AM
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
## 2023-02-22 NOTE — H&P
Update History & Physical    The patient's History and Physical of February 22,  was reviewed with the patient and I examined the patient. There was no change. The surgical site was confirmed by the patient and me. Plan: The risks, benefits, expected outcome, and alternative to the recommended procedure have been discussed with the patient. Patient understands and wants to proceed with the procedure.      Electronically signed by Elle Gutierrez MD on 2/22/2023 at 7:23 AM

## 2023-02-23 VITALS
HEART RATE: 69 BPM | BODY MASS INDEX: 33.02 KG/M2 | DIASTOLIC BLOOD PRESSURE: 68 MMHG | OXYGEN SATURATION: 94 % | SYSTOLIC BLOOD PRESSURE: 139 MMHG | RESPIRATION RATE: 18 BRPM | TEMPERATURE: 98.4 F | WEIGHT: 163.8 LBS | HEIGHT: 59 IN

## 2023-02-23 LAB
BASOPHILS # BLD: 0 K/UL (ref 0–0.1)
BASOPHILS NFR BLD: 0 % (ref 0–2)
DIFFERENTIAL METHOD BLD: ABNORMAL
EOSINOPHIL # BLD: 0 K/UL (ref 0–0.4)
EOSINOPHIL NFR BLD: 0 % (ref 0–5)
ERYTHROCYTE [DISTWIDTH] IN BLOOD BY AUTOMATED COUNT: 14.8 % (ref 11.6–14.5)
HCT VFR BLD AUTO: 35.6 % (ref 35–45)
HGB BLD-MCNC: 11.5 G/DL (ref 12–16)
IMM GRANULOCYTES # BLD AUTO: 0 K/UL (ref 0–0.04)
IMM GRANULOCYTES NFR BLD AUTO: 0 % (ref 0–0.5)
LYMPHOCYTES # BLD: 2.2 K/UL (ref 0.9–3.6)
LYMPHOCYTES NFR BLD: 30 % (ref 21–52)
MCH RBC QN AUTO: 29.6 PG (ref 24–34)
MCHC RBC AUTO-ENTMCNC: 32.3 G/DL (ref 31–37)
MCV RBC AUTO: 91.5 FL (ref 78–100)
MONOCYTES # BLD: 0.5 K/UL (ref 0.05–1.2)
MONOCYTES NFR BLD: 7 % (ref 3–10)
NEUTS SEG # BLD: 4.5 K/UL (ref 1.8–8)
NEUTS SEG NFR BLD: 62 % (ref 40–73)
NRBC # BLD: 0 K/UL (ref 0–0.01)
NRBC BLD-RTO: 0 PER 100 WBC
PLATELET # BLD AUTO: 202 K/UL (ref 135–420)
PMV BLD AUTO: 11.5 FL (ref 9.2–11.8)
RBC # BLD AUTO: 3.89 M/UL (ref 4.2–5.3)
WBC # BLD AUTO: 7.2 K/UL (ref 4.6–13.2)

## 2023-02-23 PROCEDURE — 97165 OT EVAL LOW COMPLEX 30 MIN: CPT

## 2023-02-23 PROCEDURE — 36415 COLL VENOUS BLD VENIPUNCTURE: CPT

## 2023-02-23 PROCEDURE — 97535 SELF CARE MNGMENT TRAINING: CPT

## 2023-02-23 PROCEDURE — 85025 COMPLETE CBC W/AUTO DIFF WBC: CPT

## 2023-02-23 PROCEDURE — 97116 GAIT TRAINING THERAPY: CPT

## 2023-02-23 PROCEDURE — 97110 THERAPEUTIC EXERCISES: CPT

## 2023-02-23 PROCEDURE — 6370000000 HC RX 637 (ALT 250 FOR IP): Performed by: PHYSICIAN ASSISTANT

## 2023-02-23 PROCEDURE — 2580000003 HC RX 258: Performed by: PHYSICIAN ASSISTANT

## 2023-02-23 RX ORDER — OXYCODONE HYDROCHLORIDE 5 MG/1
5 TABLET ORAL EVERY 4 HOURS PRN
Qty: 40 TABLET | Refills: 0 | Status: SHIPPED | OUTPATIENT
Start: 2023-02-23 | End: 2023-03-02

## 2023-02-23 RX ADMIN — CLOPIDOGREL BISULFATE 75 MG: 75 TABLET ORAL at 08:22

## 2023-02-23 RX ADMIN — FUROSEMIDE 40 MG: 40 TABLET ORAL at 08:23

## 2023-02-23 RX ADMIN — EZETIMIBE 10 MG: 10 TABLET ORAL at 08:23

## 2023-02-23 RX ADMIN — CARVEDILOL 12.5 MG: 12.5 TABLET, FILM COATED ORAL at 08:22

## 2023-02-23 RX ADMIN — FERROUS SULFATE TAB 325 MG (65 MG ELEMENTAL FE) 325 MG: 325 (65 FE) TAB at 08:23

## 2023-02-23 RX ADMIN — PANTOPRAZOLE SODIUM 40 MG: 20 TABLET, DELAYED RELEASE ORAL at 08:22

## 2023-02-23 RX ADMIN — TAMSULOSIN HYDROCHLORIDE 0.4 MG: 0.4 CAPSULE ORAL at 09:09

## 2023-02-23 RX ADMIN — Medication 1 TABLET: at 08:22

## 2023-02-23 RX ADMIN — OXYCODONE HYDROCHLORIDE 5 MG: 5 TABLET ORAL at 08:23

## 2023-02-23 RX ADMIN — CETIRIZINE HYDROCHLORIDE 10 MG: 10 TABLET, FILM COATED ORAL at 08:22

## 2023-02-23 RX ADMIN — POTASSIUM CHLORIDE 10 MEQ: 750 TABLET, EXTENDED RELEASE ORAL at 08:23

## 2023-02-23 RX ADMIN — SPIRONOLACTONE 25 MG: 25 TABLET ORAL at 09:09

## 2023-02-23 RX ADMIN — Medication 500 MG: at 08:22

## 2023-02-23 RX ADMIN — SODIUM CHLORIDE, PRESERVATIVE FREE 10 ML: 5 INJECTION INTRAVENOUS at 09:11

## 2023-02-23 RX ADMIN — OXYCODONE HYDROCHLORIDE 5 MG: 5 TABLET ORAL at 04:18

## 2023-02-23 RX ADMIN — POLYETHYLENE GLYCOL 3350 17 G: 17 POWDER, FOR SOLUTION ORAL at 08:23

## 2023-02-23 RX ADMIN — ACETAMINOPHEN 1000 MG: 500 TABLET ORAL at 08:23

## 2023-02-23 RX ADMIN — LISINOPRIL 20 MG: 20 TABLET ORAL at 08:23

## 2023-02-23 RX ADMIN — GLIPIZIDE 2.5 MG: 5 TABLET ORAL at 07:13

## 2023-02-23 RX ADMIN — LINACLOTIDE 145 MCG: 145 CAPSULE, GELATIN COATED ORAL at 09:09

## 2023-02-23 RX ADMIN — ALLOPURINOL 100 MG: 100 TABLET ORAL at 09:09

## 2023-02-23 RX ADMIN — BISACODYL 5 MG: 5 TABLET, COATED ORAL at 08:23

## 2023-02-23 RX ADMIN — MONTELUKAST 10 MG: 10 TABLET, FILM COATED ORAL at 08:22

## 2023-02-23 ASSESSMENT — PAIN DESCRIPTION - LOCATION: LOCATION: SHOULDER

## 2023-02-23 ASSESSMENT — PAIN SCALES - GENERAL
PAINLEVEL_OUTOF10: 9
PAINLEVEL_OUTOF10: 10
PAINLEVEL_OUTOF10: 9

## 2023-02-23 ASSESSMENT — PAIN DESCRIPTION - DESCRIPTORS: DESCRIPTORS: STABBING

## 2023-02-23 ASSESSMENT — PAIN - FUNCTIONAL ASSESSMENT: PAIN_FUNCTIONAL_ASSESSMENT: ACTIVITIES ARE NOT PREVENTED

## 2023-02-23 ASSESSMENT — PAIN DESCRIPTION - ORIENTATION: ORIENTATION: RIGHT

## 2023-02-23 ASSESSMENT — PAIN SCALES - WONG BAKER: WONGBAKER_NUMERICALRESPONSE: 0

## 2023-02-23 NOTE — DISCHARGE INSTRUCTIONS
Dr. Lidia Covarrubias Total Shoulder Postoperative Information    How to manage your pain after your Surgery:  After your surgery it is expected that you will have some pain. In efforts to reduce the amounts of side effects from pain medications we would like you to follow the below medication regimen after surgery:  Take 1000mg of Tylenol by mouth every 8 hours x 5 days. This is 4 tabs of regular strength Tylenol or 2 tabs of Extra Strength Tylenol  We would like you to take a NSAID medication for the first 3 days following surgery. In efforts to avoid additional prescription costs we recommend one of the following over the counter medications. 600mg of Ibuprofen every 8 hours x 3 days    OR   500mg of Naproxen (Aleve) every 12 hours x 3 days  If you have a prescription for Meloxicam or Celebrex already you may resume this as previously prescribed instead of the Over the Counter Medications. DO NOT TAKE ANY OF THESE IF YOU HAVE CHRONIC RENAL FAILURE, ARE TAKING A BLOOD THINNER, HAVE A HISTORY OF A GASTRIC BLEED OR ARE ALLERGIC TO ASPIRIN. IT IS OK TO TAKE IF YOU HAVE HISTORY OF GASTRIC BYPASS SURGERY. Then ONLY IF YOUR PAIN IS UNCONTROLLED you may take your Narcotic Pain medication as prescribed. THIS IS THE PRESCRIPTION THAT WAS GIVEN TO YOU IN THE OFFICE. You should place an ice bag over your shoulder to help with pain and reduce swelling. Your shoulder may be sore and develop bruising over the next several days. The bruising should resolve and no special care will be needed. Leave your bandage on until we see you in the office next week. It is safe to take a shower when you get home    You will be given a sling to use. Continue to wear this while you are active or up and moving around. OK to take off if you are sedentary. No moving the arm away from your body on your own, reaching or carrying with the operated arm. There has been an operation inside and around the shoulder joint.  Complete healing may take weeks or several months. If you have a high temperature, unexpected pain, redness or swelling in your shoulder please contact my office immediately. Please call to make an appointment to see me in my office in 1 week. Dr. Karoline Vega office number [phone]    DISCHARGE SUMMARY from Nurse    PATIENT INSTRUCTIONS:    After general anesthesia or intravenous sedation, for 24 hours or while taking prescription Narcotics:  Limit your activities  Do not drive and operate hazardous machinery  Do not make important personal or business decisions  Do  not drink alcoholic beverages  If you have not urinated within 8 hours after discharge, please contact your surgeon on call. Report the following to your surgeon:  Excessive pain, swelling, redness or odor of or around the surgical area  Temperature over 100.5  Nausea and vomiting lasting longer than 4 hours or if unable to take medications  Any signs of decreased circulation or nerve impairment to extremity: change in color, persistent  numbness, tingling, coldness or increase pain  Any questions    What to do at Home:  Recommended activity: no heavy lifting, pushing, pulling with the implant side for 2 months,     If you experience any of the following symptoms Refer to Post Operative Instructions, please follow up with Dr Moreno Mendoza in 1 week , Dr Michael Adamson in 2 week ,call for appointment. *  Please give a list of your current medications to your Primary Care Provider. *  Please update this list whenever your medications are discontinued, doses are      changed, or new medications (including over-the-counter products) are added. *  Please carry medication information at all times in case of emergency situations.     These are general instructions for a healthy lifestyle:    No smoking/ No tobacco products/ Avoid exposure to second hand smoke  Surgeon General's Warning:  Quitting smoking now greatly reduces serious risk to your health. Obesity, smoking, and sedentary lifestyle greatly increases your risk for illness    A healthy diet, regular physical exercise & weight monitoring are important for maintaining a healthy lifestyle    You may be retaining fluid if you have a history of heart failure or if you experience any of the following symptoms:  Weight gain of 3 pounds or more overnight or 5 pounds in a week, increased swelling in our hands or feet or shortness of breath while lying flat in bed. Please call your doctor as soon as you notice any of these symptoms; do not wait until your next office visit. Patient armband removed and shredded   Thank you for enrolling in [address] [facility]. Please follow the instructions below to securely access your online medical record. [employer] allows you to send messages to your doctor, view your test results, renew your prescriptions, schedule appointments, and more. How Do I Sign Up? In your Internet browser, go to [email or web address]  Click on the Sign Up Now link in the Sign In box. You will see the New Member Sign Up page. Enter your [employer] Access Code exactly as it appears below. You will not need to use this code after youve completed the sign-up process. If you do not sign up before the expiration date, you must request a new code. [employer] Access Code: Activation code not generated  Current [employer] Status: Active    Enter your Social Security Number ([id number]) and Date of Birth (mm/dd/yyyy) as indicated and click Submit. You will be taken to the next sign-up page. Create a [employer] ID. This will be your [employer] login ID and cannot be changed, so think of one that is secure and easy to remember. Create a [employer] password. You can change your password at any time. Enter your Password Reset Question and Answer. This can be used at a later time if you forget your password. Enter your e-mail address.  You will receive e-mail notification when new information is available in [address] Click Sign Up. You can now view your medical record. Additional Information  If you have questions, please contact your physician practice where you receive care. Remember, MyChart is NOT to be used for urgent needs. For medical emergencies, dial 911. The discharge information has been reviewed with the {PATIENT PARENT GUARDIAN:[id number]}. The {PATIENT PARENT GUARDIAN:52583} verbalized understanding. Discharge medications reviewed with the {Dishcarge meds reviewed WOCF:36480} and appropriate educational materials and side effects teaching were provided.   ___________________________________________________________________________________________________________________________________

## 2023-02-23 NOTE — PLAN OF CARE
Problem: Chronic Conditions and Co-morbidities  Goal: Patient's chronic conditions and co-morbidity symptoms are monitored and maintained or improved  Outcome: Progressing     Problem: Pain  Goal: Verbalizes/displays adequate comfort level or baseline comfort level  Outcome: Progressing     Problem: Safety - Adult  Goal: Free from fall injury  Outcome: Progressing     Problem: Discharge Planning  Goal: Discharge to home or other facility with appropriate resources  Outcome: Progressing     Problem: Physical Therapy - Adult  Goal: By Discharge: Performs mobility at highest level of function for planned discharge setting. See evaluation for individualized goals. Description: Physical Therapy Goals:  Initiated 2/22/2023 to be met within 7-10 days. 1.  Patient will move from supine to sit and sit to supine  in bed with supervision/set-up. 2.  Patient will transfer from bed to chair and chair to bed with supervision/set-up using the least restrictive device. 3.  Patient will perform sit to stand with supervision/set-up. 4.  Patient will ambulate with supervision/set-up for 100 feet with the least restrictive device. 5.  Patient will ascend/descend 4 stairs with handrail(s) with supervision/set-up. PLOF: Patient reports she ambulates with SPC/ walker at home. She lives in single [place] home and will have family support following surgery.        2/22/2023 1703 by Davina Almendarez PT  Outcome: Progressing

## 2023-02-23 NOTE — PROGRESS NOTES
PATIENT REFILL REQUEST    Name of Medication(s): norco  Dosage of Medication: 5-325mg  Date last dispensed: 5/7/21  Date of last office visit: 5/7/2021  Date of next office visit: 8/20/21  Any telephone encounters since the patient's last visit: No   Date of last UDS: 5/7/21  Additional Information/Comments:     Spoke with [facility], they already have her gabapentin ready. Pt informed.    PDMP CHECKED:  Criteria met. Forwarded to Dr. Treviño     Received discharge prescriptions for patient at outpatient pharmacy. Patient has no copay.

## 2023-02-23 NOTE — PLAN OF CARE
Problem: Occupational Therapy - Adult  Goal: By Discharge: Performs self-care activities at highest level of function for planned discharge setting. See evaluation for individualized goals. Description: Occupational Therapy Goals:  Initiated 2/23/2023 to be met within 7-10 days. 1.  Patient will perform upper body dressing including sling management with supervision/set-up using compensatory techniques prn.   2.  Patient will perform lower body dressing with supervision/set-up using compensatory techniques prn.  3.  Patient will perform toilet transfers with supervision/set-up. 4.  Patient will perform all aspects of toileting with supervision/set-up. 5.  Patient will participate in upper extremity therapeutic exercise/activities and tolerate PROM with supervision/set-up for 8-10 minutes to increase ROM/strength for ADLs. 6.  Patient will utilize energy conservation techniques during functional activities with verbal cues. PLOF:Patient was independent with self-care and used a cane/rolling walker for functional mobility PTA.  Patient has all DME from prior sx (previous shoulders and knee) and has good family support at d/c     2/23/2023 0392 by Tiarra Nelson OTR/L  Outcome: Progressing Continue Regimen: Betamethasone as needed for flares Detail Level: Zone

## 2023-02-23 NOTE — PLAN OF CARE
Problem: Physical Therapy - Adult  Goal: By Discharge: Performs mobility at highest level of function for planned discharge setting. See evaluation for individualized goals. Description: Physical Therapy Goals:  Initiated 2/22/2023 to be met within 7-10 days. 1.  Patient will move from supine to sit and sit to supine  in bed with supervision/set-up. 2.  Patient will transfer from bed to chair and chair to bed with supervision/set-up using the least restrictive device. 3.  Patient will perform sit to stand with supervision/set-up. 4.  Patient will ambulate with supervision/set-up for 100 feet with the least restrictive device. 5.  Patient will ascend/descend 4 stairs with handrail(s) with supervision/set-up. PLOF: Patient reports she ambulates with SPC/ walker at home. She lives in single story home and will have family support following surgery. Outcome: Progressing            PHYSICAL THERAPY TREATMENT    Patient: Vero Love (33 y.o. female)  Date: 2/23/2023  Diagnosis: Tear of right rotator cuff, unspecified tear extent, unspecified whether traumatic [M75.101]  Right rotator cuff tear arthropathy [M75.101, M12.811] Rotator cuff tear arthropathy, right  Procedure(s) (LRB):  RIGHT REVERSE TOTAL SHOULDER ARTHROPLASTY; ARTHREX; NERVE BLOCK (Right) 1 Day Post-Op  Precautions: Fall Risk, Weight Bearing, Right Upper Extremity Weight Bearing: Non Weight Bearing, Left Upper Extremity Weight Bearing: Non Weight Bearing  PLOF: see above      ASSESSMENT:   Pt seen POD 1 R RTSA and agreeable, aware of precautions. Pt given SPC for safe ambulation. Initially SBA with gait however progressed to min A 2/2 fatigue. Pt amb approx 40 ft x 2 (to/from stairs). Pt reporting amb short distances at home and needed verbal cues to safely turn to sit at end of gait. Pt goes up/down 2 steps with min A 2/2 not having any railings at home and utilized cane for support on the RUE.  Pt will require physical assist to get into her home as she has 2 [address] with no railings and she verbalizes that her sons will help her as this is her baseline. At end of session, pt left in bed with all needs met, pt cleared for discharge home with family assist and use of [facility] for safety. Nursing notified. Progression toward goals:   [x]      Improving appropriately and progressing toward goals  []      Improving slowly and progressing toward goals  []      Not making progress toward goals and plan of care will be adjusted     PLAN:  Patient continues to benefit from skilled intervention to address the above impairments. Continue treatment per established plan of care. Further Equipment Recommendations for Discharge: straight cane- pt owns      AMPAC: 18/24    This AMPAC score should be considered in conjunction with interdisciplinary team recommendations to determine the most appropriate discharge setting. Patient's social support, diagnosis, medical stability, and prior level of function should also be taken into consideration. SUBJECTIVE:   Patient stated, \"I dont walk long distances. \"    OBJECTIVE DATA SUMMARY:   Critical Behavior:  Orientation Level: Oriented X4         Functional Mobility Training:  Bed Mobility:  Bed Mobility Training  Sit to Supine: Supervision  Transfers:  Transfer Training  Transfer Training: Yes  Overall Level of Assistance: Stand-by assistance  Sit to Stand: Stand-by assistance  Stand to Sit: Stand-by assistance  Toilet Transfer: Minimum assistance  Balance:  Balance  Sitting: Intact  Sitting - Static: Good (unsupported)  Sitting - Dynamic: Good (unsupported)  Standing: Impaired  Standing - Static: Fair  Standing - Dynamic: Fair     Ambulation/Gait Training:     Gait  Overall Level of Assistance: Contact-guard assistance;Minimum assistance  Base of Support: Center of gravity altered  Speed/Terra: Shuffled; Slow  Step Length: Left shortened;Right shortened  Stance: Left increased;Right increased  Gait Abnormalities: Decreased step clearance;Shuffling gait  Distance (ft): 40 Feet  Assistive Device: Cane, straight  Rail Use: None  Stairs - Level of Assistance: Minimum assistance  Number of Stairs Trained: 2  Pain:  Pain level pre-treatment: not rated, mild R shoulder pain /10  Pain level post-treatment: \"    \"/10   Pain Intervention(s): Rest, Ice, Repositioning   Response to intervention: Nurse notified    Activity Tolerance:    Good    Please refer to the flowsheet for vital signs taken during this treatment.  After treatment:   [] Patient left in no apparent distress sitting up in chair  [x] Patient left in no apparent distress in bed  [x] Call bell left within reach  [x] Nursing notified  [] Caregiver present  [] Bed alarm activated  [] SCDs applied      COMMUNICATION/EDUCATION:          Akosua Amaro, PT  Minutes: 25    [facility] AM-PAC® Basic Mobility Inpatient Short Form (6-Clicks) Version 2    How much HELP from another person does the patient currently need…    (If the patient hasn't done an activity recently, how much help from another person do you think he/she would need if he/she tried?)   Total (Total A or Dep)   A Lot  (Mod to Max A)   A Little (Sup or Min A)   None (Mod I to I)   Turning from your back to your side while in a flat bed without using bedrails?   [] 1 [] 2 [x] 3 [] 4   2. Moving from lying on your back to sitting on the side of a flat bed without using bedrails?    [] 1 [] 2 [] 3 [] 4   3. Moving to and from a bed to a chair (including a wheelchair)?   [] 1 [] 2 [x] 3 [] 4   4. Standing up from a chair using your arms (e.g., wheelchair, or bedside chair)?   [] 1 [] 2 [x] 3 [] 4   5. Walking in hospital room?   [] 1 [] 2 [x] 3 [] 4   6. Climbing 3-5 steps with a railing?+   [] 1 [] 2 [x] 3 [] 4   +If stair climbing cannot be assessed, skip item #6.  Sum responses from items 1-5.     Current research shows that an AM-PAC score of 18 (14 without stairs) or greater is associated with  a discharge to the patient's home setting. Based on an AM-PAC score of 18/24 (or **/20 if omitting stairs) and their current functional mobility deficits, it is recommended that the patient have 2-3 sessions per week of Physical Therapy at d/c to increase the patient's independence.
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
## 2023-02-23 NOTE — DISCHARGE SUMMARY
Discharge Summary    Patient: Perfecto Patterson               Sex: female          DOA: 2/22/2023         YOB: 1960      Age:  65 y.o.        LOS:  LOS: 1 day                Admit Date: 2/22/2023    Discharge Date: 2/23/2023    Admission Diagnoses: Tear of right rotator cuff, unspecified tear extent, unspecified whether traumatic [M75.101]  Right rotator cuff tear arthropathy [M75.101, M12.811]    Discharge Diagnoses:      Discharge Condition: Good    Discharge Destination: Home    Hospital Course: 65 y.o. female who was admitted after uncomplicated right reverse total shoulder arthroplasty. She did well post operatively and pain was controlled. Vitals and labs stable. Received prophylactic abx. Verbalized readiness to go home    Consults: None    Significant Diagnostic Studies: labs: cbc    Discharge Medications:     Current Discharge Medication List        START taking these medications    Details   oxyCODONE (ROXICODONE) 5 MG immediate release tablet Take 1 tablet by mouth every 4 hours as needed for Pain for up to 7 days. Max Daily Amount: 30 mg  Qty: 40 tablet, Refills: 0    Comments: Attending JATIN Sawyer [id number]  Associated Diagnoses: Rotator cuff tear arthropathy, right           CONTINUE these medications which have NOT CHANGED    Details   ergocalciferol (ERGOCALCIFEROL) 1.25 MG (31101 UT) capsule TAKE 1 CAPSULE BY MOUTH ONE TIME PER WEEK      Lancets MISC Free style Elizabeth lancets -test twice a day      allopurinol (ZYLOPRIM) 100 MG tablet Take 100 mg by mouth daily      ascorbic acid (VITAMIN C) 500 MG tablet Take 500 mg by mouth daily      baclofen (LIORESAL) 10 MG tablet TAKE 1 TABLET BY MOUTH TWO TIMES A DAY. Calcium Carbonate-Vitamin D 600-3. 125 MG-MCG TABS Take 500 mg by mouth daily      carvedilol (COREG) 12.5 MG tablet Take 12.5 mg by mouth 2 times daily (with meals)      vitamin D (CHOLECALCIFEROL) 22839 UNIT CAPS Take 50,000 Units by mouth every 7 days      clopidogrel (PLAVIX) 75 MG tablet Take 75 mg by mouth daily      cyanocobalamin 500 MCG tablet Take 500 mcg by mouth daily      doxycycline monohydrate (ADOXA) 100 MG tablet Take 100 mg by mouth 2 times daily      estrogens conjugated (PREMARIN) 0.625 MG/GM CREA vaginal cream APPLY 0.5 G TO AFFECTED AREA DAILY. APPLY PEA SIZED AMOUNT TO URETHRA AND JUST INSIDE OF VAGINA 3X A WEEK      ezetimibe (ZETIA) 10 MG tablet TAKE 1 TABLET BY MOUTH EVERY DAY      ferrous sulfate (IRON 325) 325 (65 Fe) MG tablet TAKE 2 TABLETS BY MOUTH EVERY OTHER DAY      furosemide (LASIX) 40 MG tablet Take one tablet daily  Indications: fluid in the lungs due to chronic heart failure      glipiZIDE (GLUCOTROL) 5 MG tablet TAKE 1/2 TABLET BY MOUTH TWICE A DAY      hydrOXYzine HCl (ATARAX) 25 MG tablet Take 25 mg by mouth 3 times daily as needed      linaclotide (LINZESS) 145 MCG capsule TAKE 1 CAPSULE BY MOUTH EVERY DAY      lisinopril (PRINIVIL;ZESTRIL) 20 MG tablet Take 20 mg by mouth daily      loratadine (CLARITIN) 10 MG tablet Take 10 mg by mouth daily      mometasone (ELOCON) 0.1 % ointment ceived the following from [facility]: Outside name: mometasone (ELOCON) 0.1 % ointment      montelukast (SINGULAIR) 10 MG tablet TAKE 1 TABLET BY MOUTH EVERY DAY      omeprazole (PRILOSEC) 20 MG delayed release capsule TAKE 1 CAPSULE BY MOUTH EVERY DAY      potassium chloride (KLOR-CON M) 10 MEQ extended release tablet TAKE 1 TABLET BY MOUTH TWICE A DAY      pregabalin (LYRICA) 300 MG capsule Take 300 mg by mouth 2 times daily. rosuvastatin (CRESTOR) 40 MG tablet TAKE 1 TABLET BY MOUTH DAILY. APPOINTMENT REQUIRED FOR ADDITIONAL REFILLS. spironolactone (ALDACTONE) 25 MG tablet TAKE 1 TABLET BY MOUTH EVERY DAY      tamsulosin (FLOMAX) 0.4 MG capsule Take 0.4 mg by mouth      zolpidem (AMBIEN) 10 MG tablet Take 10 mg by mouth.            STOP taking these medications       Acetaminophen 325 MG CAPS Comments:   Reason for Stopping: cefdinir (OMNICEF) 300 MG capsule Comments:   Reason for Stopping:         celecoxib (CELEBREX) 200 MG capsule Comments:   Reason for Stopping:               Activity: no heavy lifting, pushing, pulling with the implant side for 2 months and no driving while on analgesics    Diet: regular diet    Wound Care: keep wound clean and dry, reinforce dressing PRN, and ice to area for comfort    Follow-up: 1 week with ortho, 2 weeks with PCP      YOLI Molina [facility]

## 2023-02-23 NOTE — PROGRESS NOTES
Discharge teaching completed at bedside with patient. . Opportunity provided for clarifying questions. IV removed. ID removed and shredded.

## 2023-02-23 NOTE — FLOWSHEET NOTE
Received report from KERI Mireles. Pt AAOx4, NAD, breathing non labored, on room air, HOB up. IV site clean, dry and intact. Dressing right shoulder in place. Right arm in a sling. Bed at the lowest level on lock position, call bell w/i reach. Bed alarm on.

## 2023-03-01 ENCOUNTER — TELEPHONE (OUTPATIENT)
Age: 63
End: 2023-03-01

## 2023-03-01 ENCOUNTER — OFFICE VISIT (OUTPATIENT)
Age: 63
End: 2023-03-01
Payer: MEDICARE

## 2023-03-01 VITALS — TEMPERATURE: 97.8 F | BODY MASS INDEX: 32.86 KG/M2 | HEIGHT: 59 IN | WEIGHT: 163 LBS

## 2023-03-01 DIAGNOSIS — Z96.611 STATUS POST REVERSE TOTAL SHOULDER REPLACEMENT, RIGHT: Primary | ICD-10-CM

## 2023-03-01 PROCEDURE — 99024 POSTOP FOLLOW-UP VISIT: CPT | Performed by: PHYSICIAN ASSISTANT

## 2023-03-01 PROCEDURE — 73030 X-RAY EXAM OF SHOULDER: CPT | Performed by: PHYSICIAN ASSISTANT

## 2023-03-01 RX ORDER — CIPROFLOXACIN 500 MG/1
500 TABLET, FILM COATED ORAL 2 TIMES DAILY
Qty: 20 TABLET | Refills: 0 | Status: SHIPPED | OUTPATIENT
Start: 2023-03-01 | End: 2023-03-11

## 2023-03-01 RX ORDER — OXYCODONE HYDROCHLORIDE 5 MG/1
5 TABLET ORAL EVERY 4 HOURS PRN
Qty: 40 TABLET | Refills: 0 | Status: SHIPPED | OUTPATIENT
Start: 2023-03-01 | End: 2023-03-08

## 2023-03-01 NOTE — PROGRESS NOTES
Asuncion Hopkins  1960     HISTORY OF PRESENT ILLNESS  Asuncion Hopkins is a 62 y.o. female who presents today for evaluation s/p right reverse total shoulder arthroplasty on 2/22/23. Patient has not been going to PT. Describes pain as a 8/10. Has been taking roxicodone for pain. Still has night pain. Patient denies any fever, chills, chest pain, shortness of breath or calf pain. The remainder of the review of systems is negative. There are no new illness or injuries to report since last seen in the office. No changes in medications, allergies, social or family history.      PHYSICAL EXAM:   Temp 97.8 °F (36.6 °C) (Temporal)   Ht 4' 11\" (1.499 m)   Wt 163 lb (73.9 kg)   BMI 32.92 kg/m²    The patient is a well-developed, well-nourished female in no acute distress.  The patient is alert and oriented times three. The patient appears to be well groomed. Mood and affect are normal.  ORTHOPEDIC EXAM of right shoulder:  Inspection: swelling present,  Bruising present  Incision, clean, dry, intact, sutures in place  Passive glenohumeral abduction 0-35 degrees  Stability: Stable  Strength: n/a  2+ distal pulses    IMAGING: 3 view xray images of right shoulder taken in the office on 3/1/2023 read and reviewed by myself reveal reverse total shoulder arthroplasty in excellent position     IMPRESSION:  S/P right reverse total shoulder arthroplasty    PLAN:   Incisions cleaned. Surgery was discussed at length today. Patient to continue with PROM and PT. Continue wearing sling. Stressed to patient that nothing causes an increase in pain.  RTC 3 weeks    Alyssa Obando PA-C  [facility]

## 2023-03-03 ENCOUNTER — HOSPITAL ENCOUNTER (OUTPATIENT)
Facility: HOSPITAL | Age: 63
Setting detail: RECURRING SERIES
Discharge: HOME | End: 2023-03-06
Payer: MEDICARE

## 2023-03-03 PROCEDURE — 97140 MANUAL THERAPY 1/> REGIONS: CPT | Performed by: PHYSICAL THERAPIST

## 2023-03-03 PROCEDURE — 97162 PT EVAL MOD COMPLEX 30 MIN: CPT | Performed by: PHYSICAL THERAPIST

## 2023-03-03 NOTE — THERAPY EVALUATION
PT DAILY TREATMENT NOTE/SHOULDER EVAL     Patient Name: Ezio Senior    Date: 3/3/2023    : 1960  Insurance: Payor: Theresa Park / Plan: KATHY [facility] [address] / Product Type: *No Product type* /      Patient  verified yes     Visit #   Current / Total 1 12   Time   In / Out 1:30 2:15   Pain   In / Out 10 8   Subjective Functional Status/Changes: Patient underwent a right RTC repair . She fell and injured her right shoulder resulting in a reverse total shoulder. Changes to:  Meds, Allergies, Med Hx, Sx Hx? If yes, update Summary List no         Treatment Area: Status post reverse total shoulder replacement, right [Z96.611]    SUBJECTIVE  Pain Level (0-10 scale): 10/10 now; 5/10 at best; 10/10 at worst  [x]constant []intermittent []improving []worsening [x]no change since onset    Any medication changes, allergies to medications, adverse drug reactions, diagnosis change, or new procedure performed?: [x] No    [] Yes (see summary sheet for update)  Subjective functional status/changes:     PLOF: Dishes, clean, laundry, cooking. Limitations to PLOF: Unable to do anything, right arm in a sling. Mechanism of Injury: Right RTC repair on 10/21/2022. On her PT visit on 22 she indicated that she sustained a fall and fell onto her right shoulder. She was referred to Ortho for evaluation and she subsequently underwent a reverse total shoulder surgery on 23  Current symptoms/Complaints: Patient with constant pain in the right shoulder. She has been in the sling since surgery. Takes the sling off to bathe. Previous Treatment/Compliance: None  PMHx/Surgical Hx: Right RTC , Right reverse total shoulder 2023  Work Hx: Disabled  Pt Goals:  \"To be able use my arm more and to move it\"  Barriers: [x]pain []financial []time []transportation []other  Cognition: A & O x 3    Other:    OBJECTIVE/EXAMINATION  Domestic Life: Lives with her family. Activity/Recreational Limitations: Limited use of her right arm. Mobility: ambulates with SPC with assistance at this time. Self Care: Has help with dressing and bathing. Modalities Rationale:     decrease edema, decrease inflammation, and decrease pain to improve patient's ability to progress to PLOF and address remaining functional goals. min [] Estim Unattended, type/location:                                      []  w/ice    []  w/heat    min [] Estim Attended, type/location:                                     []  w/US     []  w/ice    []  w/heat    []  TENS insruct      min []  Mechanical Traction: type/lbs                   []  pro   []  sup   []  int   []  cont    []  before manual    []  after manual    min []  Ultrasound, settings/location:      min []  Iontophoresis w/ dexamethasone, location:                                               []  take home patch       []  in clinic   10 min  unbill [x]  Ice     []  Heat    location/position: Right shoulder  sitting    min []  Paraffin,  details:     min []  Vasopneumatic Device, press/temp:     min []  Piper Cotton / Rock Relic: If using vaso (only need to measure limb vaso being performed on)      pre-treatment girth :       post-treatment girth :       measured at (landmark location) :      min []  Other:    Skin assessment post-treatment (if applicable):    []  intact    []  redness- no adverse reaction                 []redness - adverse reaction:           25 min [x]Eval                  []Re-Eval       Therapeutic Procedures: Tx Min Billable or 1:1 Min (if diff from Tx Min) Procedure, Rationale, Specifics    10 [id number] Manual Therapy (timed):  increase ROM and increase tissue extensibility to improve patient's ability to progress to PLOF and address remaining functional goals. The manual therapy interventions were performed at a separate and distinct time from the therapeutic activities interventions .  (see flow sheet as applicable) Details if applicable: Total Total Reminder: MC/BC bill using total billable min of TIMED therapeutic procedures (example: do not include dry needle or estim unattended, both untimed codes, in totals to left)     [x]  Patient Education billed concurrently with other procedures     Other Objective/Functional Measures:     Physical Therapy Evaluation - Shoulder    Posture: [x] Poor    [] Fair    [] Good    Describe: left lateral lean with left shoulder depressed. ROM:  [] Unable to assess at this time                                           AROM                                                              PROM   Left Right  Left Right   Flexion   Flexion  40   Extension        Scaption/ABD   Scaptin/ABD  35   ER @ 0 Degrees   ER @ 0 Degrees  0   ER @ 90 Degrees   ER @ 90 Degrees  NT   IR @ 90 Degrees   IR @ 90 Degrees  NT   Right elbow ROM:   degrees passively    End Feel / Painful Arc:Pain through the available ROM. Strength:   [x] Unable to assess at this time                                                                            L (1-5) R (1-5) Pain   Flexors   [] Yes   [] No   Abductors   [] Yes   [] No   External Rotators   [] Yes   [] No   Internal Rotators   [] Yes   [] No   Supraspinatus   [] Yes   [] No   Serratus Anterior   [] Yes   [] No   Lower Trapezius   [] Yes   [] No   Elbow Flexion   [] Yes   [] No   Elbow Extension   [] Yes   [] No       Scapulohumoral Control / Rhythm: NA  Able to eccentrically lower with good control? Left: [] Yes   [] No     Right: [] Yes   [] No    Accessory Motions: limited due to pain    Palpation  [] Min  [] Mod  [x] Severe    Location: globally about the right GH joint.     Special Tests: Deferred   Adson's Test  [] Pos   [] Neg Yergason's Test [] Pos   [] Neg  Sammie's Test  [] Pos   [] Neg Labette's Sign [] Pos   [] Neg  Neer's Test  [] Pos   [] Neg Clunk Test  [] Pos   [] Neg  Hawkin's Test  [] Pos   [] Neg AC Joint  [] Pos   [] Neg  Speed's Test  [] Pos   [] Neg SC Joint  [] Pos   [] Neg  Empty Can  [] Pos   [] Neg Pectoral Tightness [] Pos   [] Neg  Anterior Apprehension [] Pos   [] Neg   Posterior Apprehension [] Pos   [] Neg      Other Tests / Comments:   RADHA Low note of 3/1/23: IMAGING: 3 view xray images of right shoulder taken in the office on 3/1/2023 read and reviewed by myself reveal reverse total shoulder arthroplasty in excellent position      Pain Level (0-10 scale) post treatment: 8    ASSESSMENT/Changes in Function:  Patient with signs and symptoms consistent with right shoulder pain following a reverse total shoulder replacement. Patient had surgery on 2/22/2023. She presents in a sling with c/o severe pain at 10/10. She has not been doing any exercises at home and only comes out of the sling to bathe. She has limited PROM with c/o pain in the shoulder. Functionally, she is not using her right arm at all at this point. Patient will continue to benefit from skilled PT services to modify and progress therapeutic interventions, analyze and address functional mobility deficits, analyze and address ROM deficits, analyze and address strength deficits, analyze and address soft tissue restrictions, analyze and cue for proper movement patterns, and analyze and modify for postural abnormalities to address functional deficits and attain remaining goals.      [x]  See POC for goals and assessment     PLAN  []  Upgrade activities as tolerated     []  Continue plan of care  []  Update interventions per flow sheet       []  Other:_      Maricel Pack, PT 3/3/2023  7:11 AM

## 2023-03-03 NOTE — THERAPY EVALUATION
[facility] PHYSICAL THERAPY  [address], [address] [place] [address] Ph: [phone] Fx: [phone]  Plan of Care / Statement of Necessity for Physical Therapy Services     Patient Name: Alverto Grigsby : 1960   Medical   Diagnosis: S/P reverse TSA (Z96.611) Treatment Diagnosis:   Right shoulder pain   Onset Date: 2023     [facility]   Prior Hospitalization: See medical history Provider #: [id number]   Prior Level of Function: Did a little cooking but limited in dusting and other activities. Comorbidities: Arthritis, Back Pain, BMI 33.3, CHF/Heart Disease, DM, GI Disease, Heart Attack, HBP, Incontinence, Kidney/Bladder/Urination Problem, Osteoporosis, Pacemaker, Prior surgery, Prosthesis/Implant. Medications: Verified on Patient Summary List     Assessment / key information:  Patient with signs and symptoms consistent with right shoulder pain following a reverse total shoulder replacement. Patient had surgery on 2023. She presents in a sling with c/o severe pain at 10/10. She has not been doing any exercises at home and only comes out of the sling to bathe. She has limited PROM with c/o pain in the shoulder. Functionally, she is not using her right arm at all at this point. Patient will benefit from a program of skilled physical therapy to include therapeutic exercises to address strength deficits, therapeutic activities to improve functional mobility, neuromuscular reeducation to address balance, coordination and proprioception, manual therapy to address ROM and tissue extensibility and modalities as indicated. All questions were answered.       Evaluation Complexity:  History:  HIGH Complexity :3+ comorbidities / personal factors will impact the outcome/ POC ; Examination:  MEDIUM Complexity : 3 Standardized tests and measures addressin body structure, function, activity limitation and / or participation in recreation  ;Presentation:  HIGH Complexity : Unstable and unpredictable characteristics  ; Clinical Decision Making:  HIGH Complexity : FOTO score of 1- 25  FOTO score = an established functional score where 100 = no disability  Overall Complexity Rating: MEDIUM  Problem List: pain affecting function, decrease ROM, decrease strength, impaired gait/balance, decrease ADL/functional abilities, decrease activity tolerance, decrease flexibility/joint mobility, and decrease transfer abilities    Treatment Plan may include any combination of the followin Therapeutic Exercise, 96012 Neuromuscular Re-Education, 30588 Manual Therapy, and 87394 Therapeutic Activity  Patient / Family readiness to learn indicated by: asking questions, trying to perform skills, interest, return verbalization , and return demonstration   Persons(s) to be included in education: patient (P) and family support person (FSP); list patient's son  Barriers to Learning/Limitations: none  Measures taken if barriers to learning present: NA  Patient Goal (s): \"To be able use my arm more and to move it\"  Patient Self Reported Health Status: fair  Rehabilitation Potential: good    Short Term Goals: To be accomplished in 1-2 treatments  1. Patient will become proficient in their HEP and will be compliant in performing that program.  Evaluation:   Patient given a written/illustrated HEP. Long Term Goals: To be accomplished in 12 treatments  1. Patient's pain level will be 4-5/10 with activity in order to improve patient's ability to perform normal ADLs. Evaluation:  5/10-10/10  2. Patient will demonstrate PROM right shoulder flex 0-90, scaption 0-70 to increase ease of ADLs. Evaluation:  PROM Right shoulder flex 0-40, scaption 0-35 degrees. 3. Patient will increase FOTO score to  45 to indicate increased functional mobility. Evaluation:  4  4.  Patient will be able to reach to the bottom shelf of a kitchen cabinet in order to perform normal ADLs. Evaluation:  Notes she is unable to do this. Frequency / Duration: Patient to be seen 2-3 times per week for 12 treatments    Patient/ Caregiver education and instruction: Diagnosis, prognosis, self care, activity modification, and exercises [x]  Plan of care has been reviewed with PTA    Certification Period: 3/3/2023-4/1/2023    Imelda Strauss, PT       3/3/2023       7:08 AM  ===================================================================  I certify that the above Therapy Services are being furnished while the patient is under my care. I agree with the treatment plan and certify that this therapy is necessary. [de-identified] Signature:_________________________   DATE:_________   TIME:________                           RADHA Mo    ** Signature, Date and Time must be completed for valid certification **  Please sign and fax to [facility] Therapy [phone].   Thank you

## 2023-03-08 ENCOUNTER — APPOINTMENT (OUTPATIENT)
Facility: HOSPITAL | Age: 63
End: 2023-03-08
Payer: MEDICARE

## 2023-03-08 ENCOUNTER — HOSPITAL ENCOUNTER (OUTPATIENT)
Facility: HOSPITAL | Age: 63
Setting detail: RECURRING SERIES
Discharge: HOME | End: 2023-03-11
Payer: MEDICARE

## 2023-03-08 PROCEDURE — 97140 MANUAL THERAPY 1/> REGIONS: CPT | Performed by: PHYSICAL THERAPIST

## 2023-03-08 PROCEDURE — 97110 THERAPEUTIC EXERCISES: CPT | Performed by: PHYSICAL THERAPIST

## 2023-03-08 NOTE — PROGRESS NOTES
PHYSICAL / OCCUPATIONAL THERAPY - DAILY TREATMENT NOTE (updated )    Patient Name: Elayne Constantino    Date: 3/8/2023    : 1960  Insurance: Payor: Don Rollisnor / [facility] [address] / Product Type: *No Product type* /      Patient  verified Yes     Visit #   Current / Total 2 12   Time   In / Out 7:05 7:45   Pain   In / Out 8 7   Subjective Functional Status/Changes: Patient continues to report she is in a lot of pain. Changes to:  Meds, Allergies, Med Hx, Sx Hx? If yes, update Summary List no       TREATMENT AREA =  Status post reverse total shoulder replacement, right [Z96.611]    OBJECTIVE    Modalities Rationale:     decrease edema, decrease inflammation, and decrease pain to improve patient's ability to progress to PLOF and address remaining functional goals. min [] Estim Unattended, type/location:                                      []  w/ice    []  w/heat    min [] Estim Attended, type/location:                                     []  w/US     []  w/ice    []  w/heat    []  TENS insruct      min []  Mechanical Traction: type/lbs                   []  pro   []  sup   []  int   []  cont    []  before manual    []  after manual    min []  Ultrasound, settings/location:      min []  Iontophoresis w/ dexamethasone, location:                                               []  take home patch       []  in clinic   10 min  unbill [x]  Ice     []  Heat    location/position: Right shoulder  Sitting.    min []  Paraffin,  details:     min []  Vasopneumatic Device, press/temp:     min []  Jodene Massing / Jacklyn Silvius: If using vaso (only need to measure limb vaso being performed on)      pre-treatment girth :       post-treatment girth :       measured at (landmark location) :      min []  Other:    Skin assessment post-treatment (if applicable):    []  intact    []  redness- no adverse reaction                 []redness - adverse reaction:         Therapeutic Procedures:     Tx Min Billable or 1:1 Min (if diff from Tx Min) Procedure, Rationale, Specifics   20 10 75225 Therapeutic Exercise (timed):  increase ROM, strength, coordination, balance, and proprioception to improve patient's ability to progress to PLOF and address remaining functional goals. (see flow sheet as applicable)     Details if applicable:       15 15 25150 Manual Therapy (timed):  increase ROM and increase tissue extensibility to improve patient's ability to progress to PLOF and address remaining functional goals. The manual therapy interventions were performed at a separate and distinct time from the therapeutic activities interventions . (see flow sheet as applicable)     Details if applicable:     35 22 [facility] Totals Reminder: bill using total billable min of TIMED therapeutic procedures (example: do not include dry needle or estim unattended, both untimed codes, in totals to left)  8-22 min = 1 unit; 23-37 min = 2 units; 38-52 min = 3 units; 53-67 min = 4 units; 68-82 min = 5 units   Total Total     [x]  Patient Education billed concurrently with other procedures   [x] Review HEP    [] Progressed/Changed HEP, detail:    [] Other detail:       Objective Information/Functional Measures/Assessment  Patient presents to therapy wearing her sling. Right arm has some neurologic impairment mostly in her wrist and hand. Passive elevation to 80 degrees. She continues to have limited use of the right UE. Patient will continue to benefit from skilled PT services to modify and progress therapeutic interventions, analyze and address functional mobility deficits, analyze and address ROM deficits, analyze and address strength deficits, analyze and address soft tissue restrictions, analyze and cue for proper movement patterns, and analyze and modify for postural abnormalities to address functional deficits and attain remaining goals.      Progress toward goals / Updated goals:  []  See Progress Note/Recertification  Short Term Goals: To be accomplished in 1-2 treatments  1. Patient will become proficient in their HEP and will be compliant in performing that program.  Evaluation:   Patient given a written/illustrated HEP. Current:  Reports she has been doing her HEP.  3/8/2023. Progressing. Long Term Goals: To be accomplished in 12 treatments  1. Patient's pain level will be 4-5/10 with activity in order to improve patient's ability to perform normal ADLs. Evaluation:  5/10-10/10  2. Patient will demonstrate PROM right shoulder flex 0-90, scaption 0-70 to increase ease of ADLs. Evaluation:  PROM Right shoulder flex 0-40, scaption 0-35 degrees. 3. Patient will increase FOTO score to  45 to indicate increased functional mobility. Evaluation:  4  4. Patient will be able to reach to the bottom shelf of a kitchen cabinet in order to perform normal ADLs. Evaluation:  Notes she is unable to do this.     PLAN  Yes  Continue plan of care  []  Upgrade activities as tolerated  []  Discharge due to :  []  Other:    Lenore Miranda PT    3/8/2023    8:15 AM    Future Appointments   Date Time Provider Quinten [place]   3/13/2023  4:00 PM Steven Cheema PT [facility]   3/15/2023 10:00 AM Lenore Miranda PT [facility]   3/20/2023 10:30 AM Lenore Miranda PT [facility]   3/20/2023 11:15 AM MD INGRID Odonnell III BS AMB   3/22/2023 10:30 AM Lenroe Miranda PT [facility]   3/22/2023  1:15 PM RADHA Yanez [facility] BS AMB   3/24/2023  1:20 PM Paty Cole PA-C [facility] Eli Sched   3/27/2023 10:30 AM Lenore Miranda PT [facility]   3/29/2023 10:30 AM Lenore Miranda PT [facility]

## 2023-03-13 ENCOUNTER — HOSPITAL ENCOUNTER (OUTPATIENT)
Facility: HOSPITAL | Age: 63
Setting detail: RECURRING SERIES
Discharge: HOME | End: 2023-03-16
Payer: MEDICARE

## 2023-03-13 PROCEDURE — 97140 MANUAL THERAPY 1/> REGIONS: CPT

## 2023-03-13 PROCEDURE — 97110 THERAPEUTIC EXERCISES: CPT

## 2023-03-13 NOTE — PROGRESS NOTES
PHYSICAL / OCCUPATIONAL THERAPY - DAILY TREATMENT NOTE (updated )    Patient Name: Marcos Velasquez    Date: 3/13/2023    : 1960  Insurance: Payor: Angélica Castaneda / Plan: KATHY [facility] [address] / Product Type: *No Product type* /      Patient  verified Yes     Visit #   Current / Total 3 12   Time   In / Out 3:58 4:29   Pain   In / Out 8 8   Subjective Functional Status/Changes: Pt continues to report elevated pain levels in the right shoulder region. Changes to:  Meds, Allergies, Med Hx, Sx Hx? If yes, update Summary List no       TREATMENT AREA =  Status post reverse total shoulder replacement, right [Z96.611]    OBJECTIVE  Therapeutic Procedures: Tx Min Billable or 1:1 Min (if diff from Tx Min) Procedure, Rationale, Specifics    13409 Therapeutic Exercise (timed):  increase ROM, strength, coordination, balance, and proprioception to improve patient's ability to progress to PLOF and address remaining functional goals. (see flow sheet as applicable)     Details if applicable:        79879 Manual Therapy (timed):  increase ROM and increase tissue extensibility to improve patient's ability to progress to PLOF and address remaining functional goals. The manual therapy interventions were performed at a separate and distinct time from the therapeutic activities interventions .  (see flow sheet as applicable)     Details if applicable:  in supine: gentle scar massage, DTM right UT, PROM right shoulder flex/ABD and elbow flex/EXT with gentle overpressure and distraction after and before performing above   31 29 [facility] Totals Reminder: bill using total billable min of TIMED therapeutic procedures (example: do not include dry needle or estim unattended, both untimed codes, in totals to left)  8-22 min = 1 unit; 23-37 min = 2 units; 38-52 min = 3 units; 53-67 min = 4 units; 68-82 min = 5 units   Total Total     [x]  Patient Education billed concurrently with other procedures [x] Review HEP    [] Progressed/Changed HEP, detail:    [] Other detail:       Objective Information/Functional Measures/Assessment:  PROM right shoulder flex ~80degs per visual observation. Reported no change in pain post session today. Pt denied CP post session and states she will ice her shoulder at home. Added gripper to improve UE strength. She does have limited right elbow EXT PROM as noted with manual interventions today. Her PROM has improved but continues to be limited and painful. Unable to perform AAROM right shoulder ABD in supine but able to perform with improved tolerance in sitting. Continue POC as tolerated to improve PROM, pain, and activity tolerance. Patient will continue to benefit from skilled PT services to modify and progress therapeutic interventions, analyze and address functional mobility deficits, analyze and address ROM deficits, analyze and address strength deficits, analyze and address soft tissue restrictions, analyze and cue for proper movement patterns, and analyze and modify for postural abnormalities to address functional deficits and attain remaining goals. Progress toward goals / Updated goals:  []  See Progress Note/Recertification  Short Term Goals: To be accomplished in 1-2 treatments  1. Patient will become proficient in their HEP and will be compliant in performing that program.  Evaluation:   Patient given a written/illustrated HEP. Current:  Reports she has been doing her HEP.  3/8/2023. Progressing. Long Term Goals: To be accomplished in 12 treatments  1. Patient's pain level will be 4-5/10 with activity in order to improve patient's ability to perform normal ADLs. Evaluation:  5/10-10/10  2. Patient will demonstrate PROM right shoulder flex 0-90, scaption 0-70 to increase ease of ADLs. Evaluation:  PROM Right shoulder flex 0-40, scaption 0-35 degrees. Current: PROM right shoulder flex ~80degs per visual observation 3/13/2023  3.  Patient will increase FOTO score to  45 to indicate increased functional mobility. Evaluation:  4  4. Patient will be able to reach to the bottom shelf of a kitchen cabinet in order to perform normal ADLs. Evaluation:  Notes she is unable to do this.     PLAN  Yes  Continue plan of care  [x]  Upgrade activities as tolerated  []  Discharge due to :  []  Other:    Paresh Villarreal, PT    3/13/2023    4:16 PM    Future Appointments   Date Time Provider Quinten [place]   3/15/2023 10:00 AM Angie Hughes, PT [facility]   3/20/2023 10:30 AM Angie Hughes, PT [facility]   3/20/2023 11:15 AM MD INGRID Rose III BS AMB   3/22/2023 10:30 AM Angie Hughes, PT [facility]   3/22/2023  1:15 PM RADHA Kramer [facility] BS AMB   3/24/2023  1:20 PM YOLI Esparza [facility]   3/27/2023 10:30 AM Angie Hughes, PT [facility]   3/29/2023 10:30 AM Angie Hughes, PT [facility]

## 2023-03-15 ENCOUNTER — APPOINTMENT (OUTPATIENT)
Facility: HOSPITAL | Age: 63
End: 2023-03-15
Payer: MEDICARE

## 2023-03-15 ENCOUNTER — TELEPHONE (OUTPATIENT)
Facility: HOSPITAL | Age: 63
End: 2023-03-15

## 2023-03-17 ENCOUNTER — TELEPHONE (OUTPATIENT)
Age: 63
End: 2023-03-17

## 2023-03-17 DIAGNOSIS — Z96.611 STATUS POST REVERSE TOTAL SHOULDER REPLACEMENT, RIGHT: Primary | ICD-10-CM

## 2023-03-17 RX ORDER — OXYCODONE HYDROCHLORIDE AND ACETAMINOPHEN 5; 325 MG/1; MG/1
1 TABLET ORAL EVERY 6 HOURS PRN
Qty: 28 TABLET | Refills: 0 | Status: SHIPPED | OUTPATIENT
Start: 2023-03-17 | End: 2023-03-24

## 2023-03-20 ENCOUNTER — HOSPITAL ENCOUNTER (OUTPATIENT)
Facility: HOSPITAL | Age: 63
Setting detail: RECURRING SERIES
Discharge: HOME | End: 2023-03-23
Payer: MEDICARE

## 2023-03-20 ENCOUNTER — OFFICE VISIT (OUTPATIENT)
Age: 63
End: 2023-03-20
Payer: MEDICARE

## 2023-03-20 VITALS
OXYGEN SATURATION: 98 % | TEMPERATURE: 97.4 F | WEIGHT: 164 LBS | RESPIRATION RATE: 18 BRPM | HEART RATE: 66 BPM | BODY MASS INDEX: 33.12 KG/M2

## 2023-03-20 DIAGNOSIS — M54.16 LUMBAR NEURITIS: ICD-10-CM

## 2023-03-20 DIAGNOSIS — M50.30 DDD (DEGENERATIVE DISC DISEASE), CERVICAL: Primary | ICD-10-CM

## 2023-03-20 DIAGNOSIS — M47.816 SPONDYLOSIS WITHOUT MYELOPATHY OR RADICULOPATHY, LUMBAR REGION: ICD-10-CM

## 2023-03-20 PROCEDURE — 99213 OFFICE O/P EST LOW 20 MIN: CPT | Performed by: PHYSICAL MEDICINE & REHABILITATION

## 2023-03-20 PROCEDURE — 97110 THERAPEUTIC EXERCISES: CPT | Performed by: PHYSICAL THERAPIST

## 2023-03-20 PROCEDURE — 97140 MANUAL THERAPY 1/> REGIONS: CPT | Performed by: PHYSICAL THERAPIST

## 2023-03-20 RX ORDER — CELECOXIB 200 MG/1
CAPSULE ORAL
COMMUNITY
Start: 2023-02-25

## 2023-03-20 RX ORDER — PREGABALIN 300 MG/1
300 CAPSULE ORAL 2 TIMES DAILY
Qty: 180 CAPSULE | Refills: 0 | Status: SHIPPED | OUTPATIENT
Start: 2023-03-20 | End: 2023-06-18

## 2023-03-20 RX ORDER — DESONIDE 0.5 MG/G
CREAM TOPICAL
COMMUNITY

## 2023-03-20 RX ORDER — DOXEPIN HYDROCHLORIDE 50 MG/G
CREAM TOPICAL
COMMUNITY

## 2023-03-20 NOTE — PROGRESS NOTES
independently reviewed. Per report, degenerative changes as described above to include fairly prominent lower lumbar  facet arthropathy at L4-L5 and L5-S1 as described above. There is no evidence of herniation or high-grade stenosis. No additional abnormality that would otherwise with confidence correlate with  the patient's right leg radicular symptoms. Lumbar Myelogram dated 8/14/2020. Per report, uncomplicated lumbar myelogram as above. Additional myelogram and CT findings dictated separately. Cervical spine plain films dated 4/1/2022. 2 views: AP and lateral. Revealed:  Listing to the left. Pacemaker leads identified in the LUQ. Mild straightening of cervical lordosis. Mild disc space narrowing at C4-5. Mild to moderate disc space narrowing at C5-6. Anterior osteophytes  noted at C4, C5. Cervical spine CT Myleogram dated 4/13/2022 films independently  reviewed. Per report, there is apparent adhesion of the posterior cord to the dura at the C4-C5 level with alteration of the shape of the cord. Multilevel degenerative disc disease without evidence of severe canal or neural foraminal stenosis. A  RUE EMG dated 9/1/2022 by Dr. Kerwin Sandoval was within normal limits. Dr. Kerwin Sandoval did not see any evidence of carpal tunnel syndrome, cubital tunnel syndrome, or cervical radiculopathy  At her last clinical appointment, after recent shoulder injury, patient would like to continue with current treatment plan. I refilled her Baclofen and Lyrica 300 mg BID. The patient returns today with low back pain radiating to the LLE in a L4 distribution to the knee. Patient denies RLE symptoms. She rates her pain 6-9/10, previously 5-9/10. Patient says her pain scale is for her low back and LLE. Patient is still taking the Plavix. Patient is still taking the Lyrica 300 mg BID. Patient had a right shoulder total arthroplasty on 2/22/2023 by Dr. Drake Griggs. Patient says she is still in PT for her shoulder.  Pt denies change in bowel

## 2023-03-20 NOTE — PROGRESS NOTES
0-60 degrees. Patient has improved PROM, she is not quite 4 weeks post op and we are following her protocol. Patient will continue to benefit from skilled PT services to modify and progress therapeutic interventions, analyze and address functional mobility deficits, analyze and address ROM deficits, analyze and address strength deficits, analyze and address soft tissue restrictions, analyze and cue for proper movement patterns, and analyze and modify for postural abnormalities to address functional deficits and attain remaining goals. Progress toward goals / Updated goals:  []  See Progress Note/Recertification  Short Term Goals: To be accomplished in 1-2 treatments  1. Patient will become proficient in their HEP and will be compliant in performing that program.  Evaluation:   Patient given a written/illustrated HEP. Current:  Reports she has been doing her HEP.  3/8/2023. Progressing. Long Term Goals: To be accomplished in 12 treatments  1. Patient's pain level will be 4-5/10 with activity in order to improve patient's ability to perform normal ADLs. Evaluation:  5/10-10/10  2. Patient will demonstrate PROM right shoulder flex 0-90, scaption 0-70 to increase ease of ADLs. Evaluation:  PROM Right shoulder flex 0-40, scaption 0-35 degrees. Current: PROM right shoulder flex 0-130 (supine). Scaption 0-60 degrees. 3/20/2023. Progressing  3. Patient will increase FOTO score to  45 to indicate increased functional mobility. Evaluation:  4  4. Patient will be able to reach to the bottom shelf of a kitchen cabinet in order to perform normal ADLs. Evaluation:  Notes she is unable to do this.     PLAN  Yes  Continue plan of care  [x]  Upgrade activities as tolerated  []  Discharge due to :  []  Other:    Wesley Cueto PT    3/20/2023    10:43 AM    Future Appointments   Date Time Provider Quinten Vegas   3/20/2023 11:15 AM Duncan Carrizales III, MD [facility] AM[facility]   3/22/2023 10:30 AM Wesley Cueto PT

## 2023-03-22 ENCOUNTER — HOSPITAL ENCOUNTER (OUTPATIENT)
Facility: HOSPITAL | Age: 63
Setting detail: RECURRING SERIES
Discharge: HOME | End: 2023-03-25
Payer: MEDICARE

## 2023-03-22 ENCOUNTER — OFFICE VISIT (OUTPATIENT)
Age: 63
End: 2023-03-22

## 2023-03-22 VITALS — HEIGHT: 59 IN | WEIGHT: 164 LBS | BODY MASS INDEX: 33.06 KG/M2 | TEMPERATURE: 97.8 F

## 2023-03-22 DIAGNOSIS — M75.101 RIGHT ROTATOR CUFF TEAR ARTHROPATHY: Primary | ICD-10-CM

## 2023-03-22 DIAGNOSIS — M12.811 RIGHT ROTATOR CUFF TEAR ARTHROPATHY: Primary | ICD-10-CM

## 2023-03-22 DIAGNOSIS — Z96.611 STATUS POST REVERSE TOTAL SHOULDER REPLACEMENT, RIGHT: ICD-10-CM

## 2023-03-22 PROCEDURE — 99024 POSTOP FOLLOW-UP VISIT: CPT | Performed by: PHYSICIAN ASSISTANT

## 2023-03-22 PROCEDURE — 97110 THERAPEUTIC EXERCISES: CPT | Performed by: PHYSICAL THERAPIST

## 2023-03-22 PROCEDURE — 97140 MANUAL THERAPY 1/> REGIONS: CPT | Performed by: PHYSICAL THERAPIST

## 2023-03-22 RX ORDER — HYDROCODONE BITARTRATE AND ACETAMINOPHEN 7.5; 325 MG/1; MG/1
1 TABLET ORAL EVERY 6 HOURS PRN
Qty: 28 TABLET | Refills: 0 | Status: SHIPPED | OUTPATIENT
Start: 2023-03-22 | End: 2023-03-29

## 2023-03-22 NOTE — PROGRESS NOTES
the bottom shelf of a kitchen cabinet in order to perform normal ADLs. Evaluation:  Notes she is unable to do this.     PLAN  Yes  Continue plan of care  [x]  Upgrade activities as tolerated  []  Discharge due to :  []  Other:    Clara Chakraborty, PT    3/22/2023    7:18 AM    Future Appointments   Date Time Provider Quinten Vegas   3/22/2023 10:30 AM Clara Chakraborty, PT [facility]   3/22/2023  1:15 PM RADHA Chicas [facility] BS AMB   3/24/2023  1:20 PM YOLI Mcclellan   3/27/2023 10:30 AM Clara Chakraborty, PT [facility]   3/29/2023 10:30 AM Clara Chakraborty, PT [facility]   3/30/2023  8:50 AM Michiel Sever, PA-C [facility] BS AMB   6/19/2023 10:45 AM Allison Islas MD [place] BS AMB

## 2023-03-22 NOTE — PROGRESS NOTES
Marlen Palomares  1960     HISTORY OF PRESENT ILLNESS  Marlen Palomares is a 65 y.o. female who presents today for evaluation s/p right reverse total shoulder arthroplasty on 2/22/23. Patient has been going to PT. Describes pain as a 7/10. Has been taking roxicodone for pain. Still has night pain but overall feels like she is making small improvements. Patient denies any fever, chills, chest pain, shortness of breath or calf pain. The remainder of the review of systems is negative. There are no new illness or injuries to report since last seen in the office. No changes in medications, allergies, social or family history. PHYSICAL EXAM:   Temp 97.8 °F (36.6 °C) (Temporal)   Ht 4' 11\" (1.499 m)   Wt 164 lb (74.4 kg)   BMI 33.12 kg/m²    The patient is a well-developed, well-nourished female in no acute distress. The patient is alert and oriented times three. The patient appears to be well groomed. Mood and affect are normal.  ORTHOPEDIC EXAM of right shoulder:  Inspection: swelling present,  Bruising present  Incision well healed  Passive glenohumeral abduction 0-80 degrees  Stability: Stable  Strength: n/a  2+ distal pulses      IMPRESSION:  S/P right reverse total shoulder arthroplasty    PLAN:   Patient doing well post operatively. Will cont with PT. Ok to d/c sling.  [id number] [address] for arom now  Stressed no increases in pain    RTC 6 weeks    YOLI Hardin [facility]

## 2023-03-27 ENCOUNTER — APPOINTMENT (OUTPATIENT)
Facility: HOSPITAL | Age: 63
End: 2023-03-27
Payer: MEDICARE

## 2023-03-27 ENCOUNTER — TELEPHONE (OUTPATIENT)
Facility: HOSPITAL | Age: 63
End: 2023-03-27

## 2023-03-27 RX ORDER — BACLOFEN 10 MG/1
TABLET ORAL
Qty: 180 TABLET | Refills: 0 | Status: SHIPPED | OUTPATIENT
Start: 2023-03-27

## 2023-03-28 NOTE — TELEPHONE ENCOUNTER
Spoke to patient using two identifiers. Informed prescription sent to pharmacy. Patient verbalized understanding. No questions at this time.

## 2023-03-29 ENCOUNTER — HOSPITAL ENCOUNTER (OUTPATIENT)
Facility: HOSPITAL | Age: 63
Setting detail: RECURRING SERIES
Discharge: HOME | End: 2023-04-01
Payer: MEDICARE

## 2023-03-29 PROCEDURE — 97110 THERAPEUTIC EXERCISES: CPT | Performed by: PHYSICAL THERAPIST

## 2023-03-29 PROCEDURE — 97140 MANUAL THERAPY 1/> REGIONS: CPT | Performed by: PHYSICAL THERAPIST

## 2023-03-29 NOTE — PROGRESS NOTES
PHYSICAL / OCCUPATIONAL THERAPY - DAILY TREATMENT NOTE (updated )    Patient Name: Yamila Barry    Date: 3/29/2023    : 1960  Insurance: Payor: Margaret Number / Plan: [address] / Product Type: *No Product type* /      Patient  verified Yes     Visit #   Current / Total 6 12   Time   In / Out 10:29 11:07   Pain   In / Out 6 8   Subjective Functional Status/Changes: Patient is 5 weeks s/p right reverse TSA. She noted at the end of the session she has increased pain and was very tearful. Changes to:  Meds, Allergies, Med Hx, Sx Hx? If yes, update Summary List no       TREATMENT AREA =  Status post reverse total shoulder replacement, right [Z96.611]    OBJECTIVE    Modalities Rationale:     decrease edema, decrease inflammation, and decrease pain to improve patient's ability to progress to PLOF and address remaining functional goals. 10 min  unbill [x]  Ice     []  Heat    location/position: Supine  Right shoulder   Skin assessment post-treatment (if applicable):    []  intact    []  redness- no adverse reaction                 []redness - adverse reaction:         Therapeutic Procedures: Tx Min Billable or 1:1 Min (if diff from Tx Min) Procedure, Rationale, Specifics    15 [id number] Therapeutic Exercise (timed):  increase ROM, strength, coordination, balance, and proprioception to improve patient's ability to progress to PLOF and address remaining functional goals. (see flow sheet as applicable)     Details if applicable:        13 [id number] Manual Therapy (timed):  increase ROM and increase tissue extensibility to improve patient's ability to progress to PLOF and address remaining functional goals. The manual therapy interventions were performed at a separate and distinct time from the therapeutic activities interventions .  (see flow sheet as applicable)     Details if applicable:      28 [facility] Totals Reminder: bill using total billable min of TIMED therapeutic

## 2023-03-30 ENCOUNTER — OFFICE VISIT (OUTPATIENT)
Age: 63
End: 2023-03-30

## 2023-03-30 DIAGNOSIS — M70.62 TROCHANTERIC BURSITIS, LEFT HIP: ICD-10-CM

## 2023-03-30 DIAGNOSIS — M25.552 PAIN IN LEFT HIP: Primary | ICD-10-CM

## 2023-03-30 RX ORDER — BETAMETHASONE SODIUM PHOSPHATE AND BETAMETHASONE ACETATE 3; 3 MG/ML; MG/ML
6 INJECTION, SUSPENSION INTRA-ARTICULAR; INTRALESIONAL; INTRAMUSCULAR; SOFT TISSUE ONCE
Status: COMPLETED | OUTPATIENT
Start: 2023-03-30 | End: 2023-03-30

## 2023-03-30 RX ADMIN — BETAMETHASONE SODIUM PHOSPHATE AND BETAMETHASONE ACETATE 6 MG: 3; 3 INJECTION, SUSPENSION INTRA-ARTICULAR; INTRALESIONAL; INTRAMUSCULAR; SOFT TISSUE at 08:44
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
## 2023-04-03 ENCOUNTER — HOSPITAL ENCOUNTER (OUTPATIENT)
Facility: HOSPITAL | Age: 63
Setting detail: RECURRING SERIES
Discharge: HOME | End: 2023-04-06
Payer: MEDICARE

## 2023-04-03 ENCOUNTER — TELEPHONE (OUTPATIENT)
Age: 63
End: 2023-04-03

## 2023-04-03 PROCEDURE — 97110 THERAPEUTIC EXERCISES: CPT

## 2023-04-03 PROCEDURE — 97140 MANUAL THERAPY 1/> REGIONS: CPT

## 2023-04-03 NOTE — TELEPHONE ENCOUNTER
Patient called requesting a refill of hydrocodone be sent to [facility] on file. Please advise pt at [phone].

## 2023-04-03 NOTE — TELEPHONE ENCOUNTER
Can no longer rx narcotics. If pain is so severe she should be seen.  Otherwise voltaren gel or otc tylenol

## 2023-04-03 NOTE — PROGRESS NOTES
estim unattended, both untimed codes, in totals to left)  8-22 min = 1 unit; 23-37 min = 2 units; 38-52 min = 3 units; 53-67 min = 4 units; 68-82 min = 5 units   Total Total     [x]  Patient Education billed concurrently with other procedures   [x] Review HEP    [] Progressed/Changed HEP, detail:    [] Other detail:       Objective Information/Functional Measures/Assessment    Patient continues to have high rating of pain, but is progressing well with PROM. Progressing patient slowely with AAROM and AROM due to continuing high ratings of pain. Patient will continue to benefit from skilled PT / OT services to modify and progress therapeutic interventions, analyze and address functional mobility deficits, analyze and address ROM deficits, analyze and address strength deficits, analyze and address soft tissue restrictions, analyze and cue for proper movement patterns, analyze and modify for postural abnormalities, analyze and address imbalance/dizziness, and instruct in home and community integration to address functional deficits and attain remaining goals. Progress toward goals / Updated goals:  []  See Progress Note/Recertification    Short Term Goals: To be accomplished in 1-2 treatments  1. Patient will become proficient in their HEP and will be compliant in performing that program.  Evaluation:   Patient given a written/illustrated HEP. Current:  MET: pt reports performing HEP. 4/3/23     Long Term Goals: To be accomplished in 12 treatments  1. Patient's pain level will be 4-5/10 with activity in order to improve patient's ability to perform normal ADLs. Evaluation:  5/10-10/10  Current: Progressing: Regressed: pain range 6/10-8/10. 4/3/23  2. Patient will demonstrate PROM right shoulder flex 0-90, scaption 0-70 to increase ease of ADLs. Evaluation:  PROM Right shoulder flex 0-40, scaption 0-35 degrees. Current: PROM right shoulder flex 0-130 (supine). Scaption 0-60 degrees. 3/20/2023.
allowing us to assist in the care of your patient. Certification Period: 4/3/23-5/2/23  Reporting Period (date from last assessment to current assessment): 3/3/2023-4/2/2023    CESAR RODRÍGUEZ, PTA       4/3/2023       2:43 PM      ___ I have read the above report and request that my patient continue as recommended.   ___ I have read the above report and request that my patient continue therapy with the following changes/special instructions: ________________________________________________   ___ I have read the above report and request that my patient be discharged from therapy. [de-identified] Signature:_________________________   DATE:_________   TIME:________                           RADHA Veliz    ** Signature, Date and Time must be completed for valid certification **  Please sign and fax to [facility] [phone].   Thank you

## 2023-04-05 ENCOUNTER — HOSPITAL ENCOUNTER (OUTPATIENT)
Facility: HOSPITAL | Age: 63
Setting detail: RECURRING SERIES
Discharge: HOME | End: 2023-04-08
Payer: MEDICARE

## 2023-04-05 PROCEDURE — 97110 THERAPEUTIC EXERCISES: CPT

## 2023-04-05 PROCEDURE — 97140 MANUAL THERAPY 1/> REGIONS: CPT

## 2023-04-05 NOTE — PROGRESS NOTES
4/13/2023 12:00 PM Ellen Galeana, PTA [facility]   4/17/2023 12:20 PM Sandy Dickey, PT [facility]   4/19/2023 11:00 AM Sandy Dickey, PT [facility]   4/28/2023  2:00 PM Paty Maza PA-C [facility] [place] Sched   5/3/2023  1:00 PM RADHA [facility] AMB   6/1/2023  9:15 AM Shannan David MD [facility] Sched   6/19/2023 10:45 AM Leela Chavez MD [facility] AMB

## 2023-04-11 ENCOUNTER — APPOINTMENT (OUTPATIENT)
Facility: HOSPITAL | Age: 63
End: 2023-04-11
Payer: MEDICARE

## 2023-04-13 ENCOUNTER — APPOINTMENT (OUTPATIENT)
Facility: HOSPITAL | Age: 63
End: 2023-04-13
Payer: MEDICARE

## 2023-04-13 DIAGNOSIS — M25.561 PAIN IN RIGHT KNEE: ICD-10-CM

## 2023-04-13 DIAGNOSIS — M17.11 UNILATERAL PRIMARY OSTEOARTHRITIS, RIGHT KNEE: ICD-10-CM

## 2023-04-17 ENCOUNTER — APPOINTMENT (OUTPATIENT)
Facility: HOSPITAL | Age: 63
End: 2023-04-17
Payer: MEDICARE

## 2023-04-17 ENCOUNTER — TELEPHONE (OUTPATIENT)
Age: 63
End: 2023-04-17

## 2023-04-17 DIAGNOSIS — Z96.611 STATUS POST REVERSE TOTAL SHOULDER REPLACEMENT, RIGHT: Primary | ICD-10-CM

## 2023-04-17 RX ORDER — CELECOXIB 200 MG/1
CAPSULE ORAL
Qty: 60 CAPSULE | Refills: 2 | Status: SHIPPED | OUTPATIENT
Start: 2023-04-17

## 2023-04-17 RX ORDER — TRAMADOL HYDROCHLORIDE 50 MG/1
50 TABLET ORAL EVERY 6 HOURS PRN
Qty: 28 TABLET | Refills: 0 | Status: SHIPPED | OUTPATIENT
Start: 2023-04-17 | End: 2023-04-24

## 2023-04-17 NOTE — TELEPHONE ENCOUNTER
Patient is asking for a refill of Hydrocodone. Patient uses the [facility] on [address], and can be reached at [phone].

## 2023-04-19 ENCOUNTER — OFFICE VISIT (OUTPATIENT)
Age: 63
End: 2023-04-19
Payer: MEDICARE

## 2023-04-19 ENCOUNTER — APPOINTMENT (OUTPATIENT)
Facility: HOSPITAL | Age: 63
End: 2023-04-19
Payer: MEDICARE

## 2023-04-19 VITALS — BODY MASS INDEX: 33.06 KG/M2 | HEIGHT: 59 IN | WEIGHT: 164 LBS

## 2023-04-19 DIAGNOSIS — M12.811 RIGHT ROTATOR CUFF TEAR ARTHROPATHY: Primary | ICD-10-CM

## 2023-04-19 DIAGNOSIS — Z96.611 STATUS POST REVERSE TOTAL SHOULDER REPLACEMENT, RIGHT: ICD-10-CM

## 2023-04-19 DIAGNOSIS — M75.101 RIGHT ROTATOR CUFF TEAR ARTHROPATHY: ICD-10-CM

## 2023-04-19 DIAGNOSIS — M75.101 RIGHT ROTATOR CUFF TEAR ARTHROPATHY: Primary | ICD-10-CM

## 2023-04-19 DIAGNOSIS — M12.811 RIGHT ROTATOR CUFF TEAR ARTHROPATHY: ICD-10-CM

## 2023-04-19 PROCEDURE — 99024 POSTOP FOLLOW-UP VISIT: CPT | Performed by: PHYSICIAN ASSISTANT

## 2023-04-19 PROCEDURE — 73030 X-RAY EXAM OF SHOULDER: CPT | Performed by: PHYSICIAN ASSISTANT

## 2023-04-19 RX ORDER — CEPHALEXIN 500 MG/1
500 CAPSULE ORAL 4 TIMES DAILY
Qty: 28 CAPSULE | Refills: 0 | Status: SHIPPED | OUTPATIENT
Start: 2023-04-19 | End: 2023-04-26

## 2023-04-19 RX ORDER — AMOXICILLIN AND CLAVULANATE POTASSIUM 875; 125 MG/1; MG/1
1 TABLET, FILM COATED ORAL 2 TIMES DAILY
Qty: 20 TABLET | Refills: 0 | Status: SHIPPED | OUTPATIENT
Start: 2023-04-19 | End: 2023-04-29

## 2023-04-19 NOTE — PROGRESS NOTES
infection. We will also order a stat CT scan with metal reducing software to eval for any hardware loosening given her frequent falls.   RTC after CT scan    YOLI Quezada [facility]

## 2023-04-20 ENCOUNTER — HOSPITAL ENCOUNTER (OUTPATIENT)
Facility: HOSPITAL | Age: 63
Discharge: HOME | End: 2023-04-20
Payer: MEDICARE

## 2023-04-20 DIAGNOSIS — Z96.611 STATUS POST REVERSE TOTAL SHOULDER REPLACEMENT, RIGHT: ICD-10-CM

## 2023-04-20 DIAGNOSIS — M12.811 RIGHT ROTATOR CUFF TEAR ARTHROPATHY: ICD-10-CM

## 2023-04-20 DIAGNOSIS — M75.101 RIGHT ROTATOR CUFF TEAR ARTHROPATHY: ICD-10-CM

## 2023-04-20 PROCEDURE — 73200 CT UPPER EXTREMITY W/O DYE: CPT

## 2023-04-21 ENCOUNTER — HOSPITAL ENCOUNTER (OUTPATIENT)
Facility: HOSPITAL | Age: 63
Discharge: HOME | End: 2023-04-24

## 2023-04-21 LAB — LABCORP SPECIMEN COLLECTION: NORMAL

## 2023-04-21 PROCEDURE — 99001 SPECIMEN HANDLING PT-LAB: CPT

## 2023-04-22 LAB
BASOPHILS # BLD AUTO: 0 X10E3/UL (ref 0–0.2)
BASOPHILS NFR BLD AUTO: 1 %
EOSINOPHIL # BLD AUTO: 0.1 X10E3/UL (ref 0–0.4)
EOSINOPHIL NFR BLD AUTO: 5 %
ERYTHROCYTE [DISTWIDTH] IN BLOOD BY AUTOMATED COUNT: 14.9 % (ref 11.7–15.4)
ERYTHROCYTE [SEDIMENTATION RATE] IN BLOOD BY WESTERGREN METHOD: 39 MM/HR (ref 0–40)
HCT VFR BLD AUTO: 34.2 % (ref 34–46.6)
HGB BLD-MCNC: 11.2 G/DL (ref 11.1–15.9)
IMM GRANULOCYTES # BLD AUTO: 0 X10E3/UL (ref 0–0.1)
IMM GRANULOCYTES NFR BLD AUTO: 0 %
LYMPHOCYTES # BLD AUTO: 1.8 X10E3/UL (ref 0.7–3.1)
LYMPHOCYTES NFR BLD AUTO: 61 %
MCH RBC QN AUTO: 28.9 PG (ref 26.6–33)
MCHC RBC AUTO-ENTMCNC: 32.7 G/DL (ref 31.5–35.7)
MCV RBC AUTO: 88 FL (ref 79–97)
MONOCYTES # BLD AUTO: 0.3 X10E3/UL (ref 0.1–0.9)
MONOCYTES NFR BLD AUTO: 10 %
MORPHOLOGY BLD-IMP: ABNORMAL
NEUTROPHILS # BLD AUTO: 0.7 X10E3/UL (ref 1.4–7)
NEUTROPHILS NFR BLD AUTO: 23 %
PLATELET # BLD AUTO: 242 X10E3/UL (ref 150–450)
RBC # BLD AUTO: 3.87 X10E6/UL (ref 3.77–5.28)
WBC # BLD AUTO: 2.9 X10E3/UL (ref 3.4–10.8)

## 2023-04-23 LAB — CRP SERPL-MCNC: 11 MG/L (ref 0–10)

## 2023-05-01 ENCOUNTER — OFFICE VISIT (OUTPATIENT)
Age: 63
End: 2023-05-01

## 2023-05-01 VITALS — BODY MASS INDEX: 33.06 KG/M2 | WEIGHT: 164 LBS | HEIGHT: 59 IN

## 2023-05-01 DIAGNOSIS — M12.811 RIGHT ROTATOR CUFF TEAR ARTHROPATHY: Primary | ICD-10-CM

## 2023-05-01 DIAGNOSIS — M75.101 RIGHT ROTATOR CUFF TEAR ARTHROPATHY: Primary | ICD-10-CM

## 2023-05-01 DIAGNOSIS — R26.81 GAIT INSTABILITY: ICD-10-CM

## 2023-05-01 PROCEDURE — 99024 POSTOP FOLLOW-UP VISIT: CPT | Performed by: PHYSICIAN ASSISTANT

## 2023-05-01 RX ORDER — TRAMADOL HYDROCHLORIDE 50 MG/1
50 TABLET ORAL EVERY 6 HOURS PRN
Qty: 28 TABLET | Refills: 0 | Status: SHIPPED | OUTPATIENT
Start: 2023-05-01 | End: 2023-05-08

## 2023-05-01 NOTE — PROGRESS NOTES
Rohan Aquino  1960     HISTORY OF PRESENT ILLNESS  Rohan Aquino is a 65 y.o. female who presents today for evaluation s/p right reverse total shoulder arthroplasty on 2/22/23. Patient held her PT Describes pain as a 7/10. She has had multiple falls. She put herself back in her sling. She states the falls are secondary to her knee which shes ortho in [place] for. Patient denies any fever, chills, chest pain, shortness of breath or calf pain. The remainder of the review of systems is negative. There are no new illness or injuries to report since last seen in the office. No changes in medications, allergies, social or family history. PHYSICAL EXAM:   There were no vitals taken for this visit. The patient is a well-developed, well-nourished female in no acute distress. The patient is alert and oriented times three. The patient appears to be well groomed. Mood and affect are normal.  ORTHOPEDIC EXAM of right shoulder:  Inspection: swelling present,  Bruising present  Incision with dehiscence that is healing  Passive glenohumeral abduction 0-80 degrees  Stability: Stable  Strength: n/a  2+ distal pulses    IMAGING: Three-view x-rays of the right shoulder taken in the office on 4/10/2023 read and reviewed by myself reveal reverse total shoulder hardware in position with no acute abnormalities    Three-view x-rays of the right shoulder taken in the office on 4/19/2023 read and reviewed by myself reveal reverse total shoulder hardware in position with no acute abnormalities    CT scan of right shoulder read and reviewed by myself reveals: 1. Interval reversal shoulder arthroplasty. No hardware complication detected. No evidence of hardware fracture, malalignment, loosening, scapular notching or  acromion fracture. 2.  Possible mild to moderate subacromial/subdeltoid bursitis or scarring. 3.  Prior acromioplasty and distal clavicle resection.   4.  Suspected chronic osteochondroma

## 2023-05-03 ENCOUNTER — TELEPHONE (OUTPATIENT)
Facility: HOSPITAL | Age: 63
End: 2023-05-03

## 2023-05-03 NOTE — TELEPHONE ENCOUNTER
Called patient to schedule a follow up appointment, a referral to continue PT was received. Offered patient a cancellation in the schedule today and tomorrow. Patient declined. Patient has been added to the wait list for Friday 5/5. Has been made aware of 30 day policy.

## 2023-05-04 ENCOUNTER — TELEPHONE (OUTPATIENT)
Facility: HOSPITAL | Age: 63
End: 2023-05-04

## 2023-05-04 NOTE — TELEPHONE ENCOUNTER
Called patient to offer 820am cancellation for tomorrow. Patient declined, would like something later.

## 2023-05-26 ENCOUNTER — HOME HEALTH ADMISSION (OUTPATIENT)
Age: 63
End: 2023-05-26
Payer: MEDICARE

## 2023-05-30 ENCOUNTER — HOME CARE VISIT (OUTPATIENT)
Age: 63
End: 2023-05-30
Payer: MEDICARE

## 2023-05-30 ENCOUNTER — HOME CARE VISIT (OUTPATIENT)
Age: 63
End: 2023-05-30

## 2023-05-30 VITALS
SYSTOLIC BLOOD PRESSURE: 118 MMHG | DIASTOLIC BLOOD PRESSURE: 62 MMHG | RESPIRATION RATE: 18 BRPM | HEART RATE: 89 BPM | OXYGEN SATURATION: 98 % | TEMPERATURE: 98.4 F

## 2023-05-30 PROCEDURE — G0299 HHS/HOSPICE OF RN EA 15 MIN: HCPCS

## 2023-05-30 ASSESSMENT — ENCOUNTER SYMPTOMS
PAIN LOCATION - PAIN QUALITY: ACHING, SORE
DYSPNEA ACTIVITY LEVEL: AFTER AMBULATING MORE THAN 20 FT
STOOL DESCRIPTION: FORMED

## 2023-05-31 ENCOUNTER — HOME CARE VISIT (OUTPATIENT)
Age: 63
End: 2023-05-31
Payer: MEDICARE

## 2023-05-31 NOTE — HOME HEALTH
time.      High risk medication teaching regarding anticoagulants, antiplatelets, antibiotics, antipsychotics, hyperglycemic agents, or opioid/narcotics performed (specify): tramadol, clopidogrel    RADHA Salgado notified of any discrepancies/look a like medications/medication interactions - omeprazole and clopidogrel, potassium chloride and spironolactone, spironolactone and lisinopril, clopidogrel and grapefruit juice. .     Home health supplies by type and quantity ordered/delivered this visit include: n/a    Patient education provided this visit to include: see interventions    Patient level of understanding of education provided: Patient/Caregiver verbalized understanding but needs reinforcement and additional teaching    Sharps Education Provided: Clinician instructed patient/CG on proper disposal of sharps: Containers should be made of hard plastic, be puncture-resistant and leakproof, such as a laundry detergent or bleach bottle.  When the container is ¾ full, it should be sealed with tape and labeled DO NOT RECYCLE prior to discarding in the regular trash.      Patient response to procedure performed:  n/a    Home exercise program/Homework provided: importance of compliance with medication regimen, follow up with MD as ordered, safety and fall prevention, compliance with diabetic diet, assessing diabetic feet daily, checking blood sugar daily. Monitor for s/s of hypo/hyperglycemia. Patient educated on fall precautions to include Use good lighting in all rooms, make sure you use nightlights, Place frequently used items in easy-to-reach places, Set up furniture so that there are clear paths around it, Remove throw rugs and other tripping hazards from the floors, Avoid walking on wet floors, Keep moving. Physical activity can go a long way toward fall prevention, Wear sensible shoes with backs on them, Use assistive devices.   Report any s/sx of abnormal bleeding to MD immediately/seek medical treatment

## 2023-06-01 ENCOUNTER — HOME CARE VISIT (OUTPATIENT)
Age: 63
End: 2023-06-01
Payer: MEDICARE

## 2023-06-01 ENCOUNTER — HOSPITAL ENCOUNTER (OUTPATIENT)
Facility: HOSPITAL | Age: 63
Setting detail: RECURRING SERIES
Discharge: HOME | End: 2023-06-04
Payer: MEDICARE

## 2023-06-01 PROCEDURE — 97162 PT EVAL MOD COMPLEX 30 MIN: CPT | Performed by: PHYSICAL THERAPIST

## 2023-06-02 ENCOUNTER — HOME CARE VISIT (OUTPATIENT)
Age: 63
End: 2023-06-02
Payer: MEDICARE

## 2023-06-02 ENCOUNTER — HOSPITAL ENCOUNTER (OUTPATIENT)
Facility: HOSPITAL | Age: 63
End: 2023-06-02
Payer: MEDICARE

## 2023-06-02 VITALS
RESPIRATION RATE: 18 BRPM | HEART RATE: 80 BPM | TEMPERATURE: 98.2 F | OXYGEN SATURATION: 96 % | SYSTOLIC BLOOD PRESSURE: 115 MMHG | DIASTOLIC BLOOD PRESSURE: 70 MMHG

## 2023-06-02 DIAGNOSIS — M75.101 RIGHT ROTATOR CUFF TEAR ARTHROPATHY: ICD-10-CM

## 2023-06-02 DIAGNOSIS — M12.811 RIGHT ROTATOR CUFF TEAR ARTHROPATHY: ICD-10-CM

## 2023-06-02 PROCEDURE — 2500000003 HC RX 250 WO HCPCS: Performed by: PHYSICIAN ASSISTANT

## 2023-06-02 PROCEDURE — 6360000004 HC RX CONTRAST MEDICATION: Performed by: PHYSICIAN ASSISTANT

## 2023-06-02 PROCEDURE — 20605 DRAIN/INJ JOINT/BURSA W/O US: CPT

## 2023-06-02 PROCEDURE — G0299 HHS/HOSPICE OF RN EA 15 MIN: HCPCS

## 2023-06-02 RX ORDER — LIDOCAINE HYDROCHLORIDE 10 MG/ML
10 INJECTION, SOLUTION EPIDURAL; INFILTRATION; INTRACAUDAL; PERINEURAL ONCE
Status: COMPLETED | OUTPATIENT
Start: 2023-06-02 | End: 2023-06-02

## 2023-06-02 RX ADMIN — LIDOCAINE HYDROCHLORIDE 10 ML: 10 INJECTION, SOLUTION EPIDURAL; INFILTRATION; INTRACAUDAL; PERINEURAL at 13:30

## 2023-06-02 RX ADMIN — IOPAMIDOL 2 ML: 408 INJECTION, SOLUTION INTRATHECAL at 13:30

## 2023-06-03 NOTE — HOME HEALTH
Patient admitted to home health services for neurogenic bladder with pyelonephritis, deconditioning, Patient went for therapy eval at [facility] on 6/1/23. Patient was not sure if she would be a candidate for outpatient rehab services due to her frequent falls, unsteady gait and poor balance. According to patient [facility] therapy recommended home PT but patient decided she would like to try outpatient therapy and see how it goes. Patient to be discharged home from home health services due to this. Patient instructed to call her physician if she feels she cannot complete outpatient therapy and needs home health services again. During the 2 visits patient educated on her disease process, UTI prevention, and medications. Discharge instructions provided. SN instructed patient to follow up with MD as ordered. If you have to miss your appointments reschedule them. SN instructed patient on importance of compliance with medication regimen, try to avoid missing doses, if you do miss a dose do not double up but instead take at your next scheduled time. SN instructed on importance of infection control and handwashing. SN instructed on when to call the Dr.: temp >100.4, any pain not relieved with medications, any slight shortness of breath, chest discomfort, falls, or questions and concerns. SN instructed to continue diabetic and AHA diet. SN instructed to continue exercises as prescribed by therapy but don't over do it.

## 2023-06-05 ENCOUNTER — HOSPITAL ENCOUNTER (OUTPATIENT)
Facility: HOSPITAL | Age: 63
Setting detail: RECURRING SERIES
Discharge: HOME | End: 2023-06-08
Payer: MEDICARE

## 2023-06-05 PROCEDURE — 97140 MANUAL THERAPY 1/> REGIONS: CPT | Performed by: PHYSICAL THERAPIST

## 2023-06-05 PROCEDURE — 97530 THERAPEUTIC ACTIVITIES: CPT | Performed by: PHYSICAL THERAPIST

## 2023-06-05 PROCEDURE — 97110 THERAPEUTIC EXERCISES: CPT | Performed by: PHYSICAL THERAPIST

## 2023-06-05 NOTE — PROGRESS NOTES
to indicate increased functional mobility. Evaluation: FOTO = 41  4. Patient will be able able to ambulate with supervision with a hakeem walkerin order to improve her functional mobility. Evaluation:  Fairly non ambulatory at this time, stays in her .     PLAN  Yes  Continue plan of care  [x]  Upgrade activities as tolerated  []  Discharge due to :  []  Other:    Pepper Cassette, PT    6/5/2023    2:12 PM    Future Appointments   Date Time Provider Quinten Vegas   6/5/2023  2:20 PM Pepper Cassette, PT [facility]   6/7/2023  2:20 PM Megan Genta, PTA [facility]   6/12/2023  2:20 PM Pepper Cassette, PT [facility]   6/14/2023  2:20 PM Frances Bhakta, PTA [facility]   6/19/2023 10:45 AM Aleksander Blackmon MD [facility] AMB   6/19/2023  2:20 PM Pepper Cassette, PT [facility]   6/21/2023  2:20 PM Frances Bhakta, PTA [facility]   6/26/2023  2:20 PM Pepper Cassette, PT [facility]   6/28/2023  2:20 PM Frances Bhakta, PTA [facility]   12/14/2023 11:15 AM MD Jey Holcomb

## 2023-06-06 ENCOUNTER — OFFICE VISIT (OUTPATIENT)
Age: 63
End: 2023-06-06
Payer: MEDICARE

## 2023-06-06 VITALS — BODY MASS INDEX: 33.12 KG/M2 | HEIGHT: 59 IN | TEMPERATURE: 98 F

## 2023-06-06 DIAGNOSIS — M75.101 RIGHT ROTATOR CUFF TEAR ARTHROPATHY: Primary | ICD-10-CM

## 2023-06-06 DIAGNOSIS — M12.811 RIGHT ROTATOR CUFF TEAR ARTHROPATHY: Primary | ICD-10-CM

## 2023-06-06 PROCEDURE — 99213 OFFICE O/P EST LOW 20 MIN: CPT | Performed by: PHYSICIAN ASSISTANT

## 2023-06-06 NOTE — PROGRESS NOTES
Vero Love  1960     HISTORY OF PRESENT ILLNESS  Vero Love is a 65 y.o. female who presents today for reevaluation s/p right reverse total shoulder arthroplasty on 2/22/23. Describes pain as a 8/10. She has had multiple falls. She put herself back in her sling. She states the falls are secondary to her knee which shes ortho in [place] for. An attempted aspiration under flouro was done on 6/2. Discussed with performing provider that no fluid was able to be aspirated. She has been attending PT . After review of her chart patient was recently admitted to [facility] on 5/10/23 for a UTI and was treated for sepsis. .    Patient denies any fever, chills, chest pain, shortness of breath or calf pain. The remainder of the review of systems is negative. There are no new illness or injuries to report since last seen in the office. No changes in medications, allergies, social or family history. PHYSICAL EXAM:   There were no vitals taken for this visit. The patient is a well-developed, well-nourished female in no acute distress. The patient is alert and oriented times three. The patient appears to be well groomed. Mood and affect are normal.  ORTHOPEDIC EXAM of right shoulder:  Inspection: swelling present,  Bruising present  Incision well healed  Passive glenohumeral abduction 0-80 degrees  Stability: Stable  Strength: n/a  2+ distal pulses    IMAGING: Three-view x-rays of the right shoulder taken in the office on 4/10/2023 read and reviewed by myself reveal reverse total shoulder hardware in position with no acute abnormalities    Three-view x-rays of the right shoulder taken in the office on 4/19/2023 read and reviewed by myself reveal reverse total shoulder hardware in position with no acute abnormalities    CT scan of right shoulder read and reviewed by myself reveals: 1. Interval reversal shoulder arthroplasty. No hardware complication detected.   No evidence of hardware

## 2023-06-07 ENCOUNTER — HOSPITAL ENCOUNTER (OUTPATIENT)
Facility: HOSPITAL | Age: 63
Setting detail: RECURRING SERIES
Discharge: HOME | End: 2023-06-10
Payer: MEDICARE

## 2023-06-07 PROCEDURE — 97110 THERAPEUTIC EXERCISES: CPT

## 2023-06-07 PROCEDURE — 97530 THERAPEUTIC ACTIVITIES: CPT

## 2023-06-07 PROCEDURE — 97140 MANUAL THERAPY 1/> REGIONS: CPT

## 2023-06-07 NOTE — PROGRESS NOTES
PHYSICAL / OCCUPATIONAL THERAPY - DAILY TREATMENT NOTE (updated )    Patient Name: Olga Perkins    Date: 2023    : 1960  Insurance: Payor: Fanta Crawford / Plan: [address] [place] [address],[address] [address] / Product Type: *No Product type* /      Patient  verified Yes     Visit #   Current / Total 3 24   Time   In / Out 220 300   Pain   In / Out 7/10 7/10   Subjective Functional Status/Changes: Pt states right shoulder feels stiff today. Reports no change in pain since last visit. TREATMENT AREA =  Right rotator cuff tear arthropathy [M75.101, M12.811]  Gait instability [R26.81]    OBJECTIVE         Therapeutic Procedures: Tx Min Billable or 1:1 Min (if diff from Tx Min) Procedure, Rationale, Specifics   22  81862 Therapeutic Exercise (timed):  increase ROM, strength, coordination, balance, and proprioception to improve patient's ability to progress to PLOF and address remaining functional goals. (see flow sheet as applicable)     Details if applicable:       10  01346 Therapeutic Activity (timed):  use of dynamic activities replicating functional movements to increase ROM, strength, coordination, balance, and proprioception in order to improve patient's ability to progress to PLOF and address remaining functional goals. (see flow sheet as applicable)     Details if applicable:     8  69521 Manual Therapy (timed):  decrease pain, increase ROM, and increase tissue extensibility to improve patient's ability to progress to PLOF and address remaining functional goals. The manual therapy interventions were performed at a separate and distinct time from the therapeutic activities interventions . (see flow sheet as applicable)     Details if applicable:  Right GH joint mobs in supine, grade II-III anterior/posterior glides. Manual stretching to increase elevation and rotation.           Details if applicable:            Details if applicable:     36  [facility] Totals Reminder: bill using total

## 2023-06-19 ENCOUNTER — HOSPITAL ENCOUNTER (OUTPATIENT)
Facility: HOSPITAL | Age: 63
Setting detail: RECURRING SERIES
Discharge: HOME | End: 2023-06-22
Payer: MEDICARE

## 2023-06-19 ENCOUNTER — OFFICE VISIT (OUTPATIENT)
Age: 63
End: 2023-06-19
Payer: MEDICARE

## 2023-06-19 VITALS
SYSTOLIC BLOOD PRESSURE: 108 MMHG | WEIGHT: 157 LBS | DIASTOLIC BLOOD PRESSURE: 64 MMHG | OXYGEN SATURATION: 98 % | TEMPERATURE: 96.9 F | HEIGHT: 59 IN | BODY MASS INDEX: 31.65 KG/M2 | HEART RATE: 67 BPM

## 2023-06-19 DIAGNOSIS — M17.11 UNILATERAL PRIMARY OSTEOARTHRITIS, RIGHT KNEE: ICD-10-CM

## 2023-06-19 DIAGNOSIS — M54.16 LUMBAR NEURITIS: ICD-10-CM

## 2023-06-19 DIAGNOSIS — M47.817 LUMBOSACRAL SPONDYLOSIS WITHOUT MYELOPATHY: ICD-10-CM

## 2023-06-19 DIAGNOSIS — M25.561 PAIN IN RIGHT KNEE: ICD-10-CM

## 2023-06-19 DIAGNOSIS — M50.30 DDD (DEGENERATIVE DISC DISEASE), CERVICAL: Primary | ICD-10-CM

## 2023-06-19 PROCEDURE — 97110 THERAPEUTIC EXERCISES: CPT | Performed by: PHYSICAL THERAPIST

## 2023-06-19 PROCEDURE — 99214 OFFICE O/P EST MOD 30 MIN: CPT | Performed by: PHYSICAL MEDICINE & REHABILITATION

## 2023-06-19 PROCEDURE — 97112 NEUROMUSCULAR REEDUCATION: CPT | Performed by: PHYSICAL THERAPIST

## 2023-06-19 PROCEDURE — 97140 MANUAL THERAPY 1/> REGIONS: CPT | Performed by: PHYSICAL THERAPIST

## 2023-06-19 PROCEDURE — 3078F DIAST BP <80 MM HG: CPT | Performed by: PHYSICAL MEDICINE & REHABILITATION

## 2023-06-19 PROCEDURE — 3074F SYST BP LT 130 MM HG: CPT | Performed by: PHYSICAL MEDICINE & REHABILITATION

## 2023-06-19 RX ORDER — BETAMETHASONE DIPROPIONATE 0.5 MG/G
CREAM TOPICAL
COMMUNITY
Start: 2023-05-02

## 2023-06-19 RX ORDER — PREGABALIN 300 MG/1
300 CAPSULE ORAL 2 TIMES DAILY
Qty: 180 CAPSULE | Refills: 0 | Status: SHIPPED | OUTPATIENT
Start: 2023-06-19 | End: 2023-09-17

## 2023-06-19 RX ORDER — TRAMADOL HYDROCHLORIDE 50 MG/1
TABLET ORAL
COMMUNITY
Start: 2023-05-31

## 2023-06-19 RX ORDER — BACLOFEN 10 MG/1
10 TABLET ORAL 2 TIMES DAILY
Qty: 180 TABLET | Refills: 0 | OUTPATIENT
Start: 2023-06-19

## 2023-06-19 RX ORDER — CELECOXIB 200 MG/1
200 CAPSULE ORAL 2 TIMES DAILY
Qty: 60 CAPSULE | Refills: 2 | Status: SHIPPED | OUTPATIENT
Start: 2023-06-19

## 2023-06-19 NOTE — PROGRESS NOTES
unit; 23-37 min = 2 units; 38-52 min = 3 units; 53-67 min = 4 units; 68-82 min = 5 units   Total Total     [x]  Patient Education billed concurrently with other procedures   [x] Review HEP    [] Progressed/Changed HEP, detail:    [] Other detail:       Objective Information/Functional Measures/Assessment  Patient presents to PT with her own hakeem walker. She was able to ambulate into therapy independently. Patient will continue to benefit from skilled PT / OT services to modify and progress therapeutic interventions, analyze and address functional mobility deficits, analyze and address ROM deficits, analyze and address strength deficits, analyze and address soft tissue restrictions, analyze and cue for proper movement patterns, analyze and modify for postural abnormalities, analyze and address imbalance/dizziness, and instruct in home and community integration to address functional deficits and attain remaining goals. Progress toward goals / Updated goals:  []  See Progress Note/Recertification  Short Term Goals: To be accomplished in 1-2 treatments  1. Patient will become proficient in their HEP and will be compliant in performing that program.  Evaluation:   Patient given a written/illustrated HEP. Current:  Patient reports full compliance with her HEP.  6/5/2023. Goal Met. Long Term Goals: To be accomplished in 24 treatments  1. Patient's pain level will be 4-5/10 with activity in order to improve patient's ability to perform normal ADLs. Evaluation:  5/10-10/10  Current: remains: pain range 5/10-10/10. 6/14/23  2. Patient will demonstrate AROM right shoulder flex 0-90, scaption 0-75 to increase ease of ADLs. Evaluation:  PROM flex 0-90, scaption 0-55  3. Patient will increase FOTO score to  53 to indicate increased functional mobility. Evaluation: FOTO = 41  4. Patient will be able able to ambulate with supervision with a hakeem walkerin order to improve her functional mobility.   Evaluation:

## 2023-06-19 NOTE — PROGRESS NOTES
furosemide (LASIX) 40 MG tablet Take 0.5 tablets by mouth 2 times daily      mupirocin (BACTROBAN) 2 % ointment Apply 1 Dose topically 3 times daily. acetaminophen (TYLENOL) 325 MG tablet Take 2 tablets by mouth every 6 hours as needed for Fever or Pain      celecoxib (CELEBREX) 200 MG capsule TAKE 1 CAPSULE BY MOUTH TWICE A DAY 60 capsule 2    tamsulosin (FLOMAX) 0.4 MG capsule TAKE 1 CAPSULE BY MOUTH EVERY NIGHT 90 capsule 0    baclofen (LIORESAL) 10 MG tablet TAKE 1 TABLET BY MOUTH TWICE A  tablet 0    desonide (DESOWEN) 0.05 % cream desonide 0.05 % topical cream      doxepin (ZONALON) 5 % cream doxepin 5 % topical cream   APPLY 1-2g (DIME SIZE AMOUNT) TO THE AFFECTED AREA(S) BY TOPICAL ROUTE 4 TIMES PER DAY AS NEEDED      ergocalciferol (ERGOCALCIFEROL) 1.25 MG (01107 UT) capsule TAKE 1 CAPSULE BY MOUTH ONE TIME PER WEEK      Lancets MISC Free style Elizabeth lancets -test twice a day      allopurinol (ZYLOPRIM) 100 MG tablet Take 1 tablet by mouth daily      carvedilol (COREG) 12.5 MG tablet Take 1 tablet by mouth 2 times daily (with meals)      clopidogrel (PLAVIX) 75 MG tablet Take 1 tablet by mouth daily      doxycycline monohydrate (ADOXA) 100 MG tablet Take 1 tablet by mouth 2 times daily      estrogens conjugated (PREMARIN) 0.625 MG/GM CREA vaginal cream APPLY 0.5 G TO AFFECTED AREA DAILY.  APPLY PEA SIZED AMOUNT TO URETHRA AND JUST INSIDE OF VAGINA 3X A WEEK      ezetimibe (ZETIA) 10 MG tablet TAKE 1 TABLET BY MOUTH EVERY DAY      ferrous sulfate (IRON 325) 325 (65 Fe) MG tablet TAKE 1 TABLETS BY MOUTH EVERY DAY      glipiZIDE (GLUCOTROL) 5 MG tablet TAKE 1/2 TABLET BY MOUTH TWICE A DAY      hydrOXYzine HCl (ATARAX) 25 MG tablet Take 1 tablet by mouth 3 times daily as needed      linaclotide (LINZESS) 145 MCG capsule TAKE 1 CAPSULE BY MOUTH EVERY DAY      lisinopril (PRINIVIL;ZESTRIL) 20 MG tablet Take 1 tablet by mouth daily      loratadine (CLARITIN) 10 MG tablet Take 1 tablet by mouth daily

## 2023-06-20 ENCOUNTER — TELEPHONE (OUTPATIENT)
Facility: HOSPITAL | Age: 63
End: 2023-06-20

## 2023-06-20 ENCOUNTER — OFFICE VISIT (OUTPATIENT)
Age: 63
End: 2023-06-20
Payer: MEDICARE

## 2023-06-20 VITALS — HEIGHT: 59 IN | BODY MASS INDEX: 31.65 KG/M2 | WEIGHT: 157 LBS

## 2023-06-20 DIAGNOSIS — M50.30 DDD (DEGENERATIVE DISC DISEASE), CERVICAL: ICD-10-CM

## 2023-06-20 DIAGNOSIS — M54.16 LUMBAR NEURITIS: ICD-10-CM

## 2023-06-20 DIAGNOSIS — M75.101 RIGHT ROTATOR CUFF TEAR ARTHROPATHY: Primary | ICD-10-CM

## 2023-06-20 DIAGNOSIS — M12.811 RIGHT ROTATOR CUFF TEAR ARTHROPATHY: Primary | ICD-10-CM

## 2023-06-20 DIAGNOSIS — M47.817 LUMBOSACRAL SPONDYLOSIS WITHOUT MYELOPATHY: ICD-10-CM

## 2023-06-20 PROCEDURE — 99212 OFFICE O/P EST SF 10 MIN: CPT | Performed by: ORTHOPAEDIC SURGERY

## 2023-06-20 NOTE — PROGRESS NOTES
Mattie Trimble  1960     HISTORY OF PRESENT ILLNESS  Mattie Trimble is a 65 y.o. female who presents today for reevaluation s/p right reverse total shoulder arthroplasty on 2/22/23. Describes pain as a 5/10. She has had multiple falls. She put herself back in her sling. She states the falls are secondary to her knee which shes ortho in [place] for. An attempted aspiration under flouro was done on 6/2. Discussed with performing provider that no fluid was able to be aspirated. After review of her chart patient was recently admitted to [facility] on 5/10/23 for a UTI and was treated for sepsis. She has been attending PT without much relief, feels like she is in more pain when she leaves PT sessions. She reports slight improvement in pain however is experiencing a burning sensation with reaching behind. Patient denies any fever, chills, chest pain, shortness of breath or calf pain. The remainder of the review of systems is negative. There are no new illness or injuries to report since last seen in the office. No changes in medications, allergies, social or family history. PHYSICAL EXAM:   Ht 4' 11\" (1.499 m)   Wt 157 lb (71.2 kg)   BMI 31.71 kg/m²    The patient is a well-developed, well-nourished female in no acute distress. The patient is alert and oriented times three. The patient appears to be well groomed.  Mood and affect are normal.  ORTHOPEDIC EXAM of right shoulder:  Inspection: swelling present,  Bruising present  Incision well healed  Passive glenohumeral abduction 0-80 degrees  Stability: Stable  Strength: n/a  2+ distal pulses    IMAGING: Three-view x-rays of the right shoulder taken in the office on 4/10/2023 read and reviewed by myself reveal reverse total shoulder hardware in position with no acute abnormalities    Three-view x-rays of the right shoulder taken in the office on 4/19/2023 read and reviewed by myself reveal reverse total shoulder hardware in position

## 2023-06-20 NOTE — TELEPHONE ENCOUNTER
CXL >24hrs. Patient called requesting to cancel future appts per her MD's request. Per patient, her MD requested that she hold on PT for 3 weeks. Appts scheduled for the week of 7/10.

## 2023-06-21 ENCOUNTER — APPOINTMENT (OUTPATIENT)
Facility: HOSPITAL | Age: 63
End: 2023-06-21
Payer: MEDICARE

## 2023-06-22 RX ORDER — BACLOFEN 10 MG/1
TABLET ORAL
Qty: 180 TABLET | Refills: 0 | Status: SHIPPED | OUTPATIENT
Start: 2023-06-22

## 2023-06-26 ENCOUNTER — APPOINTMENT (OUTPATIENT)
Facility: HOSPITAL | Age: 63
End: 2023-06-26
Payer: MEDICARE

## 2023-06-28 ENCOUNTER — APPOINTMENT (OUTPATIENT)
Facility: HOSPITAL | Age: 63
End: 2023-06-28
Payer: MEDICARE

## 2023-07-02 DIAGNOSIS — M47.816 SPONDYLOSIS WITHOUT MYELOPATHY OR RADICULOPATHY, LUMBAR REGION: ICD-10-CM

## 2023-07-02 DIAGNOSIS — M54.16 LUMBAR NEURITIS: ICD-10-CM

## 2023-07-02 DIAGNOSIS — M50.30 DDD (DEGENERATIVE DISC DISEASE), CERVICAL: ICD-10-CM

## 2023-07-03 DIAGNOSIS — M75.101 RIGHT ROTATOR CUFF TEAR ARTHROPATHY: ICD-10-CM

## 2023-07-03 DIAGNOSIS — Z96.611 STATUS POST REVERSE TOTAL SHOULDER REPLACEMENT, RIGHT: ICD-10-CM

## 2023-07-03 DIAGNOSIS — M12.811 RIGHT ROTATOR CUFF TEAR ARTHROPATHY: ICD-10-CM

## 2023-07-03 RX ORDER — PREGABALIN 300 MG/1
300 CAPSULE ORAL 2 TIMES DAILY
Qty: 180 CAPSULE | Refills: 0 | OUTPATIENT
Start: 2023-07-03 | End: 2023-10-01

## 2023-07-05 RX ORDER — CIPROFLOXACIN 500 MG/1
TABLET, FILM COATED ORAL
Qty: 20 TABLET | Refills: 0 | OUTPATIENT
Start: 2023-07-05

## 2023-07-05 RX ORDER — CEPHALEXIN 500 MG/1
CAPSULE ORAL
Qty: 28 CAPSULE | Refills: 0 | OUTPATIENT
Start: 2023-07-05

## 2023-07-10 ENCOUNTER — HOSPITAL ENCOUNTER (OUTPATIENT)
Facility: HOSPITAL | Age: 63
Setting detail: RECURRING SERIES
Discharge: HOME | End: 2023-07-13
Payer: MEDICARE

## 2023-07-10 PROCEDURE — 97112 NEUROMUSCULAR REEDUCATION: CPT | Performed by: PHYSICAL THERAPIST

## 2023-07-10 PROCEDURE — 97110 THERAPEUTIC EXERCISES: CPT | Performed by: PHYSICAL THERAPIST

## 2023-07-10 PROCEDURE — 97140 MANUAL THERAPY 1/> REGIONS: CPT | Performed by: PHYSICAL THERAPIST

## 2023-07-10 NOTE — PROGRESS NOTES
[facility]  [address], [address] Ph: [phone] Fx: [phone]  CONTINUED PLAN OF CARE/RECERTIFICATION FOR PHYSICAL THERAPY          Patient Name: Lili Bruno : 1960   Treatment/Medical Diagnosis: Right rotator cuff tear arthropathy [M75.101, M12.811]  Gait instability [R26.81]   Onset Date: 2023    Referral Source: Sarahi Vitale[address]): 2023   Prior Hospitalization: See Medical History Provider #: [id number]   Prior Level of Function: Does not do much at home. Has a PCA 5 hours a day and family helps. Comorbidities: Arthritis, Back Pain, BMI 34.1, CHF/Heart Disease, DM, Heart Attack, HBP, Incontinence, Osteopososis   Visits from [facility]: 7 Missed Visits: 0 (she was on a 3 week hold per MD)     Progress to Goals:  Short Term Goals: To be accomplished in 1-2 treatments  1. Patient will become proficient in their HEP and will be compliant in performing that program.  Evaluation:   Patient given a written/illustrated HEP. Current:  Patient reports full compliance with her HEP.  2023. Goal Met. Long Term Goals: To be accomplished in 24 treatments  1. Patient's pain level will be 4-5/10 with activity in order to improve patient's ability to perform normal ADLs. Evaluation:  5/10-10/10  Current: 5/10-8/10. 7/10/2023. Progressing  2. Patient will demonstrate AROM right shoulder flex 0-90, scaption 0-75 to increase ease of ADLs. Evaluation:  PROM flex 0-90, scaption 0-55  Current:  PROM Flexion 0-120; scaption 0-75 degrees. AROM Flexion 0-80; scaption 0-60.  7/10/2023. Progressing  3. Patient will increase FOTO score to  53 to indicate increased functional mobility. Evaluation: FOTO = 41  Current:  FOTO = 50.  7/10/2023. Progressing. 4. Patient will be able able to ambulate with supervision with a hakeem walkerin order to improve her functional mobility.   Evaluation:  Fairly non ambulatory at this time,

## 2023-07-10 NOTE — PROGRESS NOTES
PHYSICAL / OCCUPATIONAL THERAPY - DAILY TREATMENT NOTE (updated )    Patient Name: Jimmie Closs    Date: 7/10/2023    : 1960  Insurance: Payor: Faizan Barber / Plan: Rosangela Oliver / Product Type: *No Product type* /      Patient  verified Yes     Visit #   Current / Total 7 24   Time   In / Out 2:27 3:15   Pain   In / Out 5 3   Subjective Functional Status/Changes: Patient returns to PT after a 3 week hold by ortho for shoulder pain. She states she had a fall and was bruised. She fell getting out of bed on Saturday onto her right shoulder. TREATMENT AREA =  Right rotator cuff tear arthropathy [M75.101, M12.811]  Gait instability [R26.81]    OBJECTIVE    Therapeutic Procedures: Tx Min Billable or 1:1 Min (if diff from Tx Min) Procedure, Rationale, Specifics   20  69668 Therapeutic Exercise (timed):  increase ROM, strength, coordination, balance, and proprioception to improve patient's ability to progress to PLOF and address remaining functional goals. (see flow sheet as applicable)     Details if applicable:       18  26668 Neuromuscular Re-Education (timed):  improve balance, coordination, kinesthetic sense, posture, core stability and proprioception to improve patient's ability to develop conscious control of individual muscles and awareness of position of extremities in order to progress to PLOF and address remaining functional goals. (see flow sheet as applicable)     Details if applicable:     10  61579 Manual Therapy (timed):  decrease pain, increase ROM, and increase tissue extensibility to improve patient's ability to progress to PLOF and address remaining functional goals. The manual therapy interventions were performed at a separate and distinct time from the therapeutic activities interventions . (see flow sheet as applicable)     Details if applicable:  Right GH joint mobs in supine, grade II-III anterior/posterior glides.   Manual stretching to increase

## 2023-07-13 ENCOUNTER — HOSPITAL ENCOUNTER (OUTPATIENT)
Facility: HOSPITAL | Age: 63
Setting detail: RECURRING SERIES
Discharge: HOME | End: 2023-07-16
Payer: MEDICARE

## 2023-07-13 PROCEDURE — 97530 THERAPEUTIC ACTIVITIES: CPT | Performed by: PHYSICAL THERAPIST

## 2023-07-13 PROCEDURE — 97110 THERAPEUTIC EXERCISES: CPT | Performed by: PHYSICAL THERAPIST

## 2023-07-13 PROCEDURE — 97112 NEUROMUSCULAR REEDUCATION: CPT | Performed by: PHYSICAL THERAPIST

## 2023-07-17 ENCOUNTER — TELEPHONE (OUTPATIENT)
Facility: HOSPITAL | Age: 63
End: 2023-07-17

## 2023-07-20 ENCOUNTER — OFFICE VISIT (OUTPATIENT)
Age: 63
End: 2023-07-20

## 2023-07-20 VITALS — HEIGHT: 59 IN | WEIGHT: 157 LBS | BODY MASS INDEX: 31.65 KG/M2

## 2023-07-20 DIAGNOSIS — M75.101 RIGHT ROTATOR CUFF TEAR ARTHROPATHY: Primary | ICD-10-CM

## 2023-07-20 DIAGNOSIS — Z96.611 STATUS POST REVERSE TOTAL SHOULDER REPLACEMENT, RIGHT: ICD-10-CM

## 2023-07-20 DIAGNOSIS — M12.811 RIGHT ROTATOR CUFF TEAR ARTHROPATHY: Primary | ICD-10-CM

## 2023-07-21 ENCOUNTER — HOSPITAL ENCOUNTER (OUTPATIENT)
Facility: HOSPITAL | Age: 63
Setting detail: RECURRING SERIES
Discharge: HOME | End: 2023-07-24
Payer: MEDICARE

## 2023-07-21 PROCEDURE — 97110 THERAPEUTIC EXERCISES: CPT

## 2023-07-21 PROCEDURE — 97112 NEUROMUSCULAR REEDUCATION: CPT

## 2023-07-21 PROCEDURE — 97140 MANUAL THERAPY 1/> REGIONS: CPT

## 2023-07-21 NOTE — PROGRESS NOTES
PHYSICAL / OCCUPATIONAL THERAPY - DAILY TREATMENT NOTE (updated )    Patient Name: Kevon Elizondo    Date: 2023    : 1960  Insurance: Payor: Armin Butcher / Plan: [employer]on Shield / Product Type: *No Product type* /      Patient  verified Yes     Visit #   Current / Total 9 24   Time   In / Out 145 223   Pain   In / Out 4 2   Subjective Functional Status/Changes: Patient reports her shoulder and knee pain are equal today. TREATMENT AREA =  Right shoulder pain [M25.511]  Other abnormalities of gait and mobility [R26.89]    OBJECTIVE    Therapeutic Procedures: Tx Min Billable or 1:1 Min (if diff from Tx Min) Procedure, Rationale, Specifics   18  02237 Therapeutic Exercise (timed):  increase ROM, strength, coordination, balance, and proprioception to improve patient's ability to progress to PLOF and address remaining functional goals. (see flow sheet as applicable)     Details if applicable:       10  68718 Neuromuscular Re-Education (timed):  improve balance, coordination, kinesthetic sense, posture, core stability and proprioception to improve patient's ability to develop conscious control of individual muscles and awareness of position of extremities in order to progress to PLOF and address remaining functional goals. (see flow sheet as applicable)     Details if applicable:     10  23990 Manual Therapy (timed):  decrease pain, increase ROM, and increase tissue extensibility to improve patient's ability to progress to PLOF and address remaining functional goals. The manual therapy interventions were performed at a separate and distinct time from the therapeutic activities interventions .  (see flow sheet as applicable)     Details if applicable:     45  [facility] Totals Reminder: bill using total billable min of TIMED therapeutic procedures (example: do not include dry needle or estim unattended, both untimed codes, in totals to left)  8-22 min = 1 unit; 23-37 min = 2 units;

## 2023-07-24 ENCOUNTER — HOSPITAL ENCOUNTER (OUTPATIENT)
Facility: HOSPITAL | Age: 63
Setting detail: RECURRING SERIES
End: 2023-07-24
Payer: MEDICARE

## 2023-07-24 ENCOUNTER — TELEPHONE (OUTPATIENT)
Facility: HOSPITAL | Age: 63
End: 2023-07-24

## 2023-07-25 ENCOUNTER — HOSPITAL ENCOUNTER (EMERGENCY)
Facility: HOSPITAL | Age: 63
Discharge: HOME | End: 2023-07-25
Attending: EMERGENCY MEDICINE
Payer: MEDICARE

## 2023-07-25 ENCOUNTER — HOSPITAL ENCOUNTER (OUTPATIENT)
Facility: HOSPITAL | Age: 63
Discharge: HOME | End: 2023-07-28
Attending: PHYSICAL MEDICINE & REHABILITATION
Payer: MEDICARE

## 2023-07-25 ENCOUNTER — APPOINTMENT (OUTPATIENT)
Facility: HOSPITAL | Age: 63
End: 2023-07-25
Payer: MEDICARE

## 2023-07-25 ENCOUNTER — HOSPITAL ENCOUNTER (OUTPATIENT)
Facility: HOSPITAL | Age: 63
Discharge: STILL ADMITTED OR ADMINISTRATIVE | End: 2023-07-25
Attending: RADIOLOGY | Admitting: RADIOLOGY
Payer: MEDICARE

## 2023-07-25 VITALS
WEIGHT: 168 LBS | RESPIRATION RATE: 13 BRPM | OXYGEN SATURATION: 99 % | HEIGHT: 59 IN | BODY MASS INDEX: 33.87 KG/M2 | TEMPERATURE: 98 F | DIASTOLIC BLOOD PRESSURE: 54 MMHG | HEART RATE: 64 BPM | SYSTOLIC BLOOD PRESSURE: 92 MMHG

## 2023-07-25 VITALS
RESPIRATION RATE: 20 BRPM | SYSTOLIC BLOOD PRESSURE: 116 MMHG | BODY MASS INDEX: 33.93 KG/M2 | DIASTOLIC BLOOD PRESSURE: 76 MMHG | WEIGHT: 168 LBS | OXYGEN SATURATION: 100 % | TEMPERATURE: 97.5 F | HEART RATE: 71 BPM

## 2023-07-25 DIAGNOSIS — T68.XXXA HYPOTHERMIA, INITIAL ENCOUNTER: ICD-10-CM

## 2023-07-25 DIAGNOSIS — N39.0 URINARY TRACT INFECTION WITHOUT HEMATURIA, SITE UNSPECIFIED: ICD-10-CM

## 2023-07-25 DIAGNOSIS — R41.82 ALTERED MENTAL STATUS, UNSPECIFIED ALTERED MENTAL STATUS TYPE: Primary | ICD-10-CM

## 2023-07-25 LAB
ALBUMIN SERPL-MCNC: 3.4 G/DL (ref 3.4–5)
ALBUMIN/GLOB SERPL: 0.9 (ref 0.8–1.7)
ALP SERPL-CCNC: 150 U/L (ref 45–117)
ALT SERPL-CCNC: 23 U/L (ref 13–56)
AMPHET UR QL SCN: NEGATIVE
ANION GAP SERPL CALC-SCNC: 4 MMOL/L (ref 3–18)
APPEARANCE UR: CLEAR
AST SERPL-CCNC: 20 U/L (ref 10–38)
BACTERIA URNS QL MICRO: ABNORMAL /HPF
BARBITURATES UR QL SCN: NEGATIVE
BASOPHILS # BLD: 0.1 K/UL (ref 0–0.1)
BASOPHILS NFR BLD: 2 % (ref 0–2)
BENZODIAZ UR QL: NEGATIVE
BILIRUB DIRECT SERPL-MCNC: <0.1 MG/DL (ref 0–0.2)
BILIRUB SERPL-MCNC: 0.2 MG/DL (ref 0.2–1)
BILIRUB UR QL: NEGATIVE
BUN SERPL-MCNC: 36 MG/DL (ref 7–18)
BUN/CREAT SERPL: 28 (ref 12–20)
CALCIUM SERPL-MCNC: 8.2 MG/DL (ref 8.5–10.1)
CANNABINOIDS UR QL SCN: NEGATIVE
CHLORIDE SERPL-SCNC: 104 MMOL/L (ref 100–111)
CO2 SERPL-SCNC: 27 MMOL/L (ref 21–32)
COCAINE UR QL SCN: NEGATIVE
COLOR UR: YELLOW
CREAT SERPL-MCNC: 1.28 MG/DL (ref 0.6–1.3)
DIFFERENTIAL METHOD BLD: ABNORMAL
EKG ATRIAL RATE: 53 BPM
EKG DIAGNOSIS: NORMAL
EKG P AXIS: 50 DEGREES
EKG P-R INTERVAL: 228 MS
EKG Q-T INTERVAL: 522 MS
EKG QRS DURATION: 90 MS
EKG QTC CALCULATION (BAZETT): 489 MS
EKG R AXIS: -7 DEGREES
EKG T AXIS: -15 DEGREES
EKG VENTRICULAR RATE: 53 BPM
EOSINOPHIL # BLD: 0.2 K/UL (ref 0–0.4)
EOSINOPHIL NFR BLD: 7 % (ref 0–5)
EPITH CASTS URNS QL MICRO: ABNORMAL /LPF (ref 0–5)
ERYTHROCYTE [DISTWIDTH] IN BLOOD BY AUTOMATED COUNT: 15.8 % (ref 11.6–14.5)
ETHANOL SERPL-MCNC: <3 MG/DL (ref 0–3)
GLOBULIN SER CALC-MCNC: 3.6 G/DL (ref 2–4)
GLUCOSE BLD STRIP.AUTO-MCNC: 78 MG/DL (ref 70–110)
GLUCOSE SERPL-MCNC: 87 MG/DL (ref 74–99)
GLUCOSE UR STRIP.AUTO-MCNC: NEGATIVE MG/DL
HCT VFR BLD AUTO: 31.8 % (ref 35–45)
HGB BLD-MCNC: 10.7 G/DL (ref 12–16)
HGB UR QL STRIP: NEGATIVE
IMM GRANULOCYTES # BLD AUTO: 0 K/UL (ref 0–0.04)
IMM GRANULOCYTES NFR BLD AUTO: 0 % (ref 0–0.5)
KETONES UR QL STRIP.AUTO: NEGATIVE MG/DL
LACTATE SERPL-SCNC: 0.6 MMOL/L (ref 0.4–2)
LACTATE SERPL-SCNC: 0.6 MMOL/L (ref 0.4–2)
LEUKOCYTE ESTERASE UR QL STRIP.AUTO: ABNORMAL
LIPASE SERPL-CCNC: 48 U/L (ref 73–393)
LYMPHOCYTES # BLD: 2.2 K/UL (ref 0.9–3.6)
LYMPHOCYTES NFR BLD: 65 % (ref 21–52)
Lab: NORMAL
MAGNESIUM SERPL-MCNC: 2.8 MG/DL (ref 1.6–2.6)
MCH RBC QN AUTO: 29 PG (ref 24–34)
MCHC RBC AUTO-ENTMCNC: 33.6 G/DL (ref 31–37)
MCV RBC AUTO: 86.2 FL (ref 78–100)
METHADONE UR QL: NEGATIVE
MONOCYTES # BLD: 0.2 K/UL (ref 0.05–1.2)
MONOCYTES NFR BLD: 7 % (ref 3–10)
NEUTS SEG # BLD: 0.7 K/UL (ref 1.8–8)
NEUTS SEG NFR BLD: 20 % (ref 40–73)
NITRITE UR QL STRIP.AUTO: POSITIVE
NRBC # BLD: 0 K/UL (ref 0–0.01)
NRBC BLD-RTO: 0 PER 100 WBC
OPIATES UR QL: NEGATIVE
PCP UR QL: NEGATIVE
PH UR STRIP: 5.5 (ref 5–8)
PLATELET # BLD AUTO: 210 K/UL (ref 135–420)
PMV BLD AUTO: 10.9 FL (ref 9.2–11.8)
POTASSIUM SERPL-SCNC: 3.8 MMOL/L (ref 3.5–5.5)
PROT SERPL-MCNC: 7 G/DL (ref 6.4–8.2)
PROT UR STRIP-MCNC: NEGATIVE MG/DL
RBC # BLD AUTO: 3.69 M/UL (ref 4.2–5.3)
RBC #/AREA URNS HPF: NEGATIVE /HPF (ref 0–5)
SODIUM SERPL-SCNC: 135 MMOL/L (ref 136–145)
SP GR UR REFRACTOMETRY: 1.01 (ref 1–1.03)
TROPONIN I SERPL HS-MCNC: 6 NG/L (ref 0–54)
TROPONIN I SERPL HS-MCNC: 6 NG/L (ref 0–54)
TSH SERPL DL<=0.05 MIU/L-ACNC: 0.41 UIU/ML (ref 0.36–3.74)
UROBILINOGEN UR QL STRIP.AUTO: 0.2 EU/DL (ref 0.2–1)
WBC # BLD AUTO: 3.4 K/UL (ref 4.6–13.2)
WBC URNS QL MICRO: ABNORMAL /HPF (ref 0–4)

## 2023-07-25 PROCEDURE — 72132 CT LUMBAR SPINE W/DYE: CPT

## 2023-07-25 PROCEDURE — 2500000003 HC RX 250 WO HCPCS: Performed by: PHYSICAL MEDICINE & REHABILITATION

## 2023-07-25 PROCEDURE — 70450 CT HEAD/BRAIN W/O DYE: CPT

## 2023-07-25 PROCEDURE — 2580000003 HC RX 258: Performed by: STUDENT IN AN ORGANIZED HEALTH CARE EDUCATION/TRAINING PROGRAM

## 2023-07-25 PROCEDURE — 62304 MYELOGRAPHY LUMBAR INJECTION: CPT

## 2023-07-25 PROCEDURE — 83735 ASSAY OF MAGNESIUM: CPT

## 2023-07-25 PROCEDURE — 82077 ASSAY SPEC XCP UR&BREATH IA: CPT

## 2023-07-25 PROCEDURE — 6360000004 HC RX CONTRAST MEDICATION: Performed by: PHYSICAL MEDICINE & REHABILITATION

## 2023-07-25 PROCEDURE — 83690 ASSAY OF LIPASE: CPT

## 2023-07-25 PROCEDURE — 80307 DRUG TEST PRSMV CHEM ANLYZR: CPT

## 2023-07-25 PROCEDURE — 93005 ELECTROCARDIOGRAM TRACING: CPT | Performed by: STUDENT IN AN ORGANIZED HEALTH CARE EDUCATION/TRAINING PROGRAM

## 2023-07-25 PROCEDURE — 99284 EMERGENCY DEPT VISIT MOD MDM: CPT

## 2023-07-25 PROCEDURE — 6360000002 HC RX W HCPCS: Performed by: STUDENT IN AN ORGANIZED HEALTH CARE EDUCATION/TRAINING PROGRAM

## 2023-07-25 PROCEDURE — 83605 ASSAY OF LACTIC ACID: CPT

## 2023-07-25 PROCEDURE — 80048 BASIC METABOLIC PNL TOTAL CA: CPT

## 2023-07-25 PROCEDURE — 81001 URINALYSIS AUTO W/SCOPE: CPT

## 2023-07-25 PROCEDURE — 87040 BLOOD CULTURE FOR BACTERIA: CPT

## 2023-07-25 PROCEDURE — 82962 GLUCOSE BLOOD TEST: CPT

## 2023-07-25 PROCEDURE — 84443 ASSAY THYROID STIM HORMONE: CPT

## 2023-07-25 PROCEDURE — 93010 ELECTROCARDIOGRAM REPORT: CPT | Performed by: INTERNAL MEDICINE

## 2023-07-25 PROCEDURE — 84484 ASSAY OF TROPONIN QUANT: CPT

## 2023-07-25 PROCEDURE — 80076 HEPATIC FUNCTION PANEL: CPT

## 2023-07-25 PROCEDURE — 85025 COMPLETE CBC W/AUTO DIFF WBC: CPT

## 2023-07-25 RX ORDER — ACETAMINOPHEN 325 MG/1
650 TABLET ORAL EVERY 4 HOURS PRN
Status: DISCONTINUED | OUTPATIENT
Start: 2023-07-25 | End: 2023-07-25 | Stop reason: HOSPADM

## 2023-07-25 RX ORDER — LIDOCAINE HYDROCHLORIDE 10 MG/ML
5 INJECTION, SOLUTION EPIDURAL; INFILTRATION; INTRACAUDAL; PERINEURAL ONCE
Status: COMPLETED | OUTPATIENT
Start: 2023-07-25 | End: 2023-07-25

## 2023-07-25 RX ORDER — CEPHALEXIN 500 MG/1
500 CAPSULE ORAL 2 TIMES DAILY
Qty: 14 CAPSULE | Refills: 0 | Status: SHIPPED | OUTPATIENT
Start: 2023-07-25 | End: 2023-08-01

## 2023-07-25 RX ADMIN — IOPAMIDOL 10 ML: 408 INJECTION, SOLUTION INTRATHECAL at 12:04

## 2023-07-25 RX ADMIN — CEFTRIAXONE 1000 MG: 1 INJECTION, POWDER, FOR SOLUTION INTRAMUSCULAR; INTRAVENOUS at 22:20

## 2023-07-25 RX ADMIN — LIDOCAINE HYDROCHLORIDE 5 ML: 10 INJECTION, SOLUTION EPIDURAL; INFILTRATION; INTRACAUDAL; PERINEURAL at 12:06

## 2023-07-25 ASSESSMENT — PAIN - FUNCTIONAL ASSESSMENT: PAIN_FUNCTIONAL_ASSESSMENT: 0-10

## 2023-07-25 ASSESSMENT — PAIN SCALES - WONG BAKER: WONGBAKER_NUMERICALRESPONSE: 0

## 2023-07-25 NOTE — ACP (ADVANCE CARE PLANNING)
Advance Care Planning     Advance Care Planning Inpatient Note  [facility] Department    Today's Date: 7/25/2023  Unit: [facility] EMERGENCY DEPT    Received request from . Upon review of chart and communication with care team, patient's decision making abilities are not in question. . Patient was/were present in the room during visit. Goals of ACP Conversation:  Discuss advance care planning documents    Health Care Decision Makers:     No healthcare decision makers have been documented. Click here to complete [address] including selection of the Healthcare Decision Maker Relationship (ie \"Primary\")  Summary:  No Decision Maker named by patient at this time    Advance Care Planning Documents (Patient Wishes):  None     Assessment:  Patient seen in the emergency room after an RRT was called in ambulatory discharge. Patient is non responsive at this time. , There is no advance directive present    Interventions:  Patient DECLINED ACP conversation    Care Preferences Communicated:   No    Outcomes/Plan:      Electronically signed by Nghia Noonan., [facility] on 7/25/2023 at 2:26 PM
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
## 2023-07-25 NOTE — ED TRIAGE NOTES
Responded to Phase II Recovery for an unresponsive Pt following scheduled outpatient Myelogram procedure. Per nursing staff, Pt was lethargic and sleeping prior to and following procedure. RRT called for inability to wake patient. Upon arrival, Pt presenting w/slow snoring resps, hypotension and unresponsiveness. Pt placed on cardiac monitor, revealing hypotension; EJ and peripheral arm IV started immediately, while attempting more methods of waking Pt. Per surgical staff, Pt was given absolutely no sedation prior to or during the Myelogram. Later found out (per family) that Pt takes opioids at home, and was initially lethargic and poorly responsive coming to the hospital.   Pt transported to ED for further management. Cardiac Hx of MI, stent placement, CHF.

## 2023-07-25 NOTE — ED NOTES
Repeat temp 95.2 Temporal. 94.9 Rectal. Dr. Hubbard [place] informed. Heating blanket placed on patient.       Mally Gonzáles RN  07/25/23 8139

## 2023-07-26 LAB
EKG ATRIAL RATE: 53 BPM
EKG DIAGNOSIS: NORMAL
EKG P AXIS: 51 DEGREES
EKG P-R INTERVAL: 226 MS
EKG Q-T INTERVAL: 524 MS
EKG QRS DURATION: 88 MS
EKG QTC CALCULATION (BAZETT): 491 MS
EKG R AXIS: -8 DEGREES
EKG T AXIS: -3 DEGREES
EKG VENTRICULAR RATE: 53 BPM

## 2023-07-26 PROCEDURE — 93010 ELECTROCARDIOGRAM REPORT: CPT | Performed by: INTERNAL MEDICINE

## 2023-07-26 NOTE — ED PROVIDER NOTES
Patient care assumed from Dr. Jessica Lopez. Refer to original note for detail. Patient currently on Nakia hugger for hypothermia. Had altered mental status earlier. Patient's core temperature has returned to baseline. Nakia hugger discontinued. Patient continues to maintain her temperature. Most recently at 80 degrees. Patient does appear to have pain urinary tract infection. Will provide Rocephin for this. Will discharge with Keflex. Patient is now alert and oriented my skin to be discharged. Will discharge home.      Angela Spear DO  07/25/23 2322

## 2023-07-26 NOTE — PERIOP NOTE
No Doctor's order was seen; informed Radiology department, Dorina from IR said no need for IV and no further orders are needed. Vital signs and assessment was done.

## 2023-07-27 ENCOUNTER — TELEPHONE (OUTPATIENT)
Facility: HOSPITAL | Age: 63
End: 2023-07-27

## 2023-07-27 ENCOUNTER — APPOINTMENT (OUTPATIENT)
Facility: HOSPITAL | Age: 63
End: 2023-07-27
Payer: MEDICARE

## 2023-07-27 NOTE — TELEPHONE ENCOUNTER
CXL <24hrs. Patient is not feeling well. Patient has a cough and running nose, unable to make today's appt.

## 2023-07-28 ENCOUNTER — APPOINTMENT (OUTPATIENT)
Facility: HOSPITAL | Age: 63
End: 2023-07-28
Payer: MEDICARE

## 2023-07-30 LAB
BACTERIA SPEC CULT: NORMAL
BACTERIA SPEC CULT: NORMAL
SERVICE CMNT-IMP: NORMAL
SERVICE CMNT-IMP: NORMAL

## 2023-07-31 ENCOUNTER — TELEPHONE (OUTPATIENT)
Age: 63
End: 2023-07-31

## 2023-07-31 ENCOUNTER — HOSPITAL ENCOUNTER (OUTPATIENT)
Facility: HOSPITAL | Age: 63
Setting detail: RECURRING SERIES
Discharge: HOME | End: 2023-08-03
Payer: MEDICARE

## 2023-07-31 PROCEDURE — 97110 THERAPEUTIC EXERCISES: CPT

## 2023-07-31 NOTE — PROGRESS NOTES
PHYSICAL / OCCUPATIONAL THERAPY - DAILY TREATMENT NOTE (updated )    Patient Name: Phani Hathaway    Date: 2023    : 1960  Insurance: Payor: Parish Aly / Plan: Erika Nielsen / Product Type: *No Product type* /      Patient  verified Yes     Visit #   Current / Total 10 24   Time   In / Out 146 203   Pain   In / Out 8 3   Subjective Functional Status/Changes: Patient states she fell twice over the weekend. TREATMENT AREA =  Right shoulder pain [M25.511]  Other abnormalities of gait and mobility [R26.89]    OBJECTIVE      Therapeutic Procedures: Tx Min Billable or 1:1 Min (if diff from Tx Min) Procedure, Rationale, Specifics   17  83457 Therapeutic Exercise (timed):  increase ROM, strength, coordination, balance, and proprioception to improve patient's ability to progress to PLOF and address remaining functional goals. (see flow sheet as applicable)     Details if applicable:       16  [facility] Totals Reminder: bill using total billable min of TIMED therapeutic procedures (example: do not include dry needle or estim unattended, both untimed codes, in totals to left)  8-22 min = 1 unit; 23-37 min = 2 units; 38-52 min = 3 units; 53-67 min = 4 units; 68-82 min = 5 units   Total Total     [x]  Patient Education billed concurrently with other procedures   [x] Review HEP    [] Progressed/Changed HEP, detail:    [] Other detail:       Objective Information/Functional Measures/Assessment    Patient states having two falls over the weekend with one landing on her right elbow. Patient states while having a prodecure done last Tuesday she had a code blue called on her due to her low breathing rate and low body temperature. Patient continues to have limited mobility of right shoulder and high ratings of pain. Patient ambulates with hemiwalker with right foot drag. Due to insurance reason patient is to be DC from therapy at this time.          Progress toward goals / Updated goals:  []
2. Patient will demonstrate AROM right shoulder flex 0-90, scaption 0-75 to increase ease of ADLs. Evaluation:  PROM flex 0-90, scaption 0-55  Current:  Progressing: AROM flexion 0-81; scaption 0-82. 3. Patient will increase FOTO score to  53 to indicate increased functional mobility. Evaluation: FOTO = 41  Current:  Progressing. FOTO = 52. .    4. Patient will be able able to ambulate with supervision with a hakeem walkerin order to improve her functional mobility. Evaluation:  Fairly non ambulatory at this time, stays in her WC. Current:  Goal met. Patient able to ambulate 80 feet with close supervision with use of hakeem walker. .       Reporting Period (date from last assessment to current assessment): 7/10/2023-7/31/2023    RECOMMENDATIONS  Discontinue therapy due to lack of appreciable progress towards goals. And due to insurance reasons. If you have any questions/comments please contact us directly at [phone]. Thank you for allowing us to assist in the care of your patient. CESAR RODRÍGUEZ, PTA       7/31/2023       2:09 PM  NOTE TO PHYSICIAN:  Your patient's insurance requires this discharge note be signed and returned. ___ I have read the above report and request that my patient be discharged from therapy.      Physician's Signature:_________________________   DATE:_________   TIME:________                           RADHA Kaur    ** Signature, Date and Time must be completed for valid certification **  Please sign and fax to [facility] [phone]  Thank you

## 2023-08-05 DIAGNOSIS — Z96.611 STATUS POST REVERSE TOTAL SHOULDER REPLACEMENT, RIGHT: ICD-10-CM

## 2023-08-07 RX ORDER — CIPROFLOXACIN 500 MG/1
TABLET, FILM COATED ORAL
Qty: 20 TABLET | Refills: 0 | OUTPATIENT
Start: 2023-08-07

## 2023-09-11 RX ORDER — BACLOFEN 10 MG/1
TABLET ORAL
Qty: 180 TABLET | Refills: 0 | OUTPATIENT
Start: 2023-09-11

## 2023-09-26 DIAGNOSIS — M50.30 DDD (DEGENERATIVE DISC DISEASE), CERVICAL: ICD-10-CM

## 2023-09-26 DIAGNOSIS — M54.16 LUMBAR NEURITIS: ICD-10-CM

## 2023-09-26 DIAGNOSIS — M47.817 LUMBOSACRAL SPONDYLOSIS WITHOUT MYELOPATHY: ICD-10-CM

## 2023-09-27 RX ORDER — PREGABALIN 300 MG/1
300 CAPSULE ORAL 2 TIMES DAILY
Qty: 180 CAPSULE | Refills: 0 | OUTPATIENT
Start: 2023-09-27 | End: 2023-12-26

## 2023-09-27 NOTE — TELEPHONE ENCOUNTER
Last seen 6-19-23  No follow up scheduled  Last RX 6-22-23 #180 with 0 RF      Per last refill request she needed a follow up before receiving a refill. I attempted to call her to let her know that she needs a follow up , called [phone] vm is not set up. I also called [phone] and her mom answered. I instructed her mom to call our office at her earliest convenience .

## 2023-10-26 ENCOUNTER — OFFICE VISIT (OUTPATIENT)
Age: 63
End: 2023-10-26

## 2023-10-26 VITALS — HEIGHT: 59 IN | BODY MASS INDEX: 33.93 KG/M2

## 2023-10-26 DIAGNOSIS — M25.561 CHRONIC PAIN OF RIGHT KNEE: ICD-10-CM

## 2023-10-26 DIAGNOSIS — M25.551 RIGHT HIP PAIN: ICD-10-CM

## 2023-10-26 DIAGNOSIS — M70.61 TROCHANTERIC BURSITIS, RIGHT HIP: ICD-10-CM

## 2023-10-26 DIAGNOSIS — Z96.652 HISTORY OF LEFT KNEE REPLACEMENT: Primary | ICD-10-CM

## 2023-10-26 DIAGNOSIS — M17.11 PRIMARY OSTEOARTHRITIS OF RIGHT KNEE: ICD-10-CM

## 2023-10-26 DIAGNOSIS — M17.11 PATELLOFEMORAL ARTHRITIS OF RIGHT KNEE: ICD-10-CM

## 2023-10-26 DIAGNOSIS — G89.29 CHRONIC PAIN OF RIGHT KNEE: ICD-10-CM

## 2023-10-26 DIAGNOSIS — Z91.81 HISTORY OF FALL: ICD-10-CM

## 2023-10-26 RX ORDER — BETAMETHASONE SODIUM PHOSPHATE AND BETAMETHASONE ACETATE 3; 3 MG/ML; MG/ML
6 INJECTION, SUSPENSION INTRA-ARTICULAR; INTRALESIONAL; INTRAMUSCULAR; SOFT TISSUE ONCE
Status: COMPLETED | OUTPATIENT
Start: 2023-10-26 | End: 2023-10-26

## 2023-10-26 RX ADMIN — BETAMETHASONE SODIUM PHOSPHATE AND BETAMETHASONE ACETATE 6 MG: 3; 3 INJECTION, SUSPENSION INTRA-ARTICULAR; INTRALESIONAL; INTRAMUSCULAR; SOFT TISSUE at 11:18

## 2023-10-26 NOTE — PROGRESS NOTES
(TYLENOL) 325 MG tablet Take 2 tablets by mouth every 6 hours as needed for Fever or Pain      desonide (DESOWEN) 0.05 % cream desonide 0.05 % topical cream      doxepin (ZONALON) 5 % cream doxepin 5 % topical cream   APPLY 1-2g (DIME SIZE AMOUNT) TO THE AFFECTED AREA(S) BY TOPICAL ROUTE 4 TIMES PER DAY AS NEEDED      pregabalin (LYRICA) 300 MG capsule Take 1 capsule by mouth 2 times daily for 90 days.  Max Daily Amount: 600 mg 180 capsule 0    ergocalciferol (ERGOCALCIFEROL) 1.25 MG (77176 UT) capsule TAKE 1 CAPSULE BY MOUTH ONE TIME PER WEEK      Lancets MISC Free style Elizabeth lancets -test twice a day      allopurinol (ZYLOPRIM) 100 MG tablet Take 1 tablet by mouth daily      carvedilol (COREG) 12.5 MG tablet Take 1 tablet by mouth 2 times daily (with meals)      clopidogrel (PLAVIX) 75 MG tablet Take 1 tablet by mouth daily      cyanocobalamin 500 MCG tablet Take 1 tablet by mouth daily (Patient not taking: Reported on 6/19/2023)      doxycycline monohydrate (ADOXA) 100 MG tablet Take 1 tablet by mouth 2 times daily      ezetimibe (ZETIA) 10 MG tablet TAKE 1 TABLET BY MOUTH EVERY DAY      ferrous sulfate (IRON 325) 325 (65 Fe) MG tablet TAKE 1 TABLETS BY MOUTH EVERY DAY      glipiZIDE (GLUCOTROL) 5 MG tablet TAKE 1/2 TABLET BY MOUTH TWICE A DAY      hydrOXYzine HCl (ATARAX) 25 MG tablet Take 1 tablet by mouth 3 times daily as needed for Anxiety or Itching      linaclotide (LINZESS) 145 MCG capsule TAKE 1 CAPSULE BY MOUTH EVERY DAY      lisinopril (PRINIVIL;ZESTRIL) 20 MG tablet Take 1 tablet by mouth daily      loratadine (CLARITIN) 10 MG tablet Take 1 tablet by mouth daily      mometasone (ELOCON) 0.1 % ointment ceived the following from [facility]: Outside name: mometasone (ELOCON) 0.1 % ointment      montelukast (SINGULAIR) 10 MG tablet TAKE 1 TABLET BY MOUTH EVERY DAY      omeprazole (PRILOSEC) 20 MG delayed release capsule TAKE 1 CAPSULE BY MOUTH EVERY DAY      potassium chloride (KLOR-CON M)

## 2023-10-30 ENCOUNTER — OFFICE VISIT (OUTPATIENT)
Age: 63
End: 2023-10-30
Payer: MEDICARE

## 2023-10-30 VITALS — HEIGHT: 59 IN | BODY MASS INDEX: 33.93 KG/M2

## 2023-10-30 DIAGNOSIS — G89.29 CHRONIC LEFT SHOULDER PAIN: Primary | ICD-10-CM

## 2023-10-30 DIAGNOSIS — M75.122 NONTRAUMATIC COMPLETE TEAR OF LEFT ROTATOR CUFF: ICD-10-CM

## 2023-10-30 DIAGNOSIS — G89.29 CHRONIC RIGHT SHOULDER PAIN: ICD-10-CM

## 2023-10-30 DIAGNOSIS — M25.512 CHRONIC LEFT SHOULDER PAIN: Primary | ICD-10-CM

## 2023-10-30 DIAGNOSIS — M25.511 CHRONIC RIGHT SHOULDER PAIN: ICD-10-CM

## 2023-10-30 PROCEDURE — 99214 OFFICE O/P EST MOD 30 MIN: CPT | Performed by: ORTHOPAEDIC SURGERY

## 2023-10-30 PROCEDURE — 20611 DRAIN/INJ JOINT/BURSA W/US: CPT | Performed by: ORTHOPAEDIC SURGERY

## 2023-10-30 PROCEDURE — 73030 X-RAY EXAM OF SHOULDER: CPT | Performed by: ORTHOPAEDIC SURGERY

## 2023-10-30 RX ORDER — TRIAMCINOLONE ACETONIDE 40 MG/ML
40 INJECTION, SUSPENSION INTRA-ARTICULAR; INTRAMUSCULAR ONCE
Status: COMPLETED | OUTPATIENT
Start: 2023-10-30 | End: 2023-10-30

## 2023-10-30 RX ADMIN — TRIAMCINOLONE ACETONIDE 40 MG: 40 INJECTION, SUSPENSION INTRA-ARTICULAR; INTRAMUSCULAR at 15:08

## 2023-10-30 NOTE — PROGRESS NOTES
have reviewed the History, Physical and updated the Allergic reactions for [address] performed immediately prior to start of procedure:  IJudie MD have performed the following reviews on Gibran Carbajal prior to the start of the procedure:            * Patient was identified by name and date of birth   * Agreement on procedure being performed was verified  * Risks and Benefits explained to the patient  * Procedure site verified and marked as necessary  * Patient was positioned for comfort  * Consent was signed and verified     Time: 3:08 PM    Date of procedure: 10/30/2023    Procedure performed by:  Judie Bernstein MD    Provider assisted by: (see medication administration)    How tolerated by patient: tolerated the procedure well with no complications    Comments: none     IMAGING: 3 views of the right shoulder on 10/30/2023 show no change in position of the reverse shoulder with evidence of impingement in the glenoid no acute fractures. 3 views of the left shoulder show slight proximal migration of the humeral head with marked sclerotic changes in the greater tuberosity    IMPRESSION:      ICD-10-CM    1. Chronic left shoulder pain  M25.512 [10567] Shoulder 2V or more    G89.29 triamcinolone acetonide (KENALOG-40) injection 40 mg     AMB POC US DRAIN/INJECT LARGE JOINT/BURSA      2. Chronic right shoulder pain  M25.511 [83660] Shoulder 2V or more    G89.29            PLAN:   1. This time given the persistent problems on the left shoulder with injected Kenalog. See her back in 3 weeks. Her condition is worsening and she continues to have multiple falls for which she is to see a neurologist  Risk factors include: Diabetes high blood pressure  2. Yes cortisone injection indicated today   3. No Physical/Occupational Therapy indicated today  4. No diagnostic test indicated today:   5. No durable medical equipment indicated today  6.  Yes referral indicated today neurology to assess

## 2023-11-20 ENCOUNTER — OFFICE VISIT (OUTPATIENT)
Age: 63
End: 2023-11-20
Payer: MEDICARE

## 2023-11-20 VITALS — WEIGHT: 168 LBS | BODY MASS INDEX: 33.87 KG/M2 | HEIGHT: 59 IN

## 2023-11-20 DIAGNOSIS — Z96.611 STATUS POST REVERSE TOTAL SHOULDER REPLACEMENT, RIGHT: ICD-10-CM

## 2023-11-20 DIAGNOSIS — M25.511 CHRONIC RIGHT SHOULDER PAIN: ICD-10-CM

## 2023-11-20 DIAGNOSIS — G89.29 CHRONIC LEFT SHOULDER PAIN: Primary | ICD-10-CM

## 2023-11-20 DIAGNOSIS — M25.512 CHRONIC LEFT SHOULDER PAIN: Primary | ICD-10-CM

## 2023-11-20 DIAGNOSIS — M12.811 RIGHT ROTATOR CUFF TEAR ARTHROPATHY: ICD-10-CM

## 2023-11-20 DIAGNOSIS — G89.29 CHRONIC RIGHT SHOULDER PAIN: ICD-10-CM

## 2023-11-20 DIAGNOSIS — M75.101 RIGHT ROTATOR CUFF TEAR ARTHROPATHY: ICD-10-CM

## 2023-11-20 DIAGNOSIS — M75.122 NONTRAUMATIC COMPLETE TEAR OF LEFT ROTATOR CUFF: ICD-10-CM

## 2023-11-20 PROCEDURE — 99214 OFFICE O/P EST MOD 30 MIN: CPT | Performed by: PHYSICIAN ASSISTANT

## 2023-11-20 RX ORDER — LIDOCAINE 50 MG/G
1 PATCH TOPICAL DAILY
Qty: 30 PATCH | Refills: 0 | Status: SHIPPED | OUTPATIENT
Start: 2023-11-20 | End: 2023-12-20

## 2023-11-27 ENCOUNTER — OFFICE VISIT (OUTPATIENT)
Age: 63
End: 2023-11-27
Payer: MEDICARE

## 2023-11-27 VITALS — BODY MASS INDEX: 30.56 KG/M2 | HEIGHT: 59 IN | HEART RATE: 69 BPM | OXYGEN SATURATION: 98 % | WEIGHT: 151.6 LBS

## 2023-11-27 DIAGNOSIS — M47.812 CERVICAL SPONDYLOSIS WITHOUT MYELOPATHY: ICD-10-CM

## 2023-11-27 DIAGNOSIS — M51.37 DDD (DEGENERATIVE DISC DISEASE), LUMBOSACRAL: ICD-10-CM

## 2023-11-27 DIAGNOSIS — M54.16 LUMBAR NEURITIS: ICD-10-CM

## 2023-11-27 DIAGNOSIS — M54.2 CERVICAL PAIN (NECK): Primary | ICD-10-CM

## 2023-11-27 DIAGNOSIS — M50.30 DDD (DEGENERATIVE DISC DISEASE), CERVICAL: ICD-10-CM

## 2023-11-27 DIAGNOSIS — M47.812 FACET ARTHROPATHY, CERVICAL: ICD-10-CM

## 2023-11-27 PROCEDURE — 72040 X-RAY EXAM NECK SPINE 2-3 VW: CPT | Performed by: PHYSICAL MEDICINE & REHABILITATION

## 2023-11-27 PROCEDURE — 99214 OFFICE O/P EST MOD 30 MIN: CPT | Performed by: PHYSICAL MEDICINE & REHABILITATION

## 2023-11-27 RX ORDER — PREGABALIN 300 MG/1
300 CAPSULE ORAL 2 TIMES DAILY
Qty: 180 CAPSULE | Refills: 0 | Status: SHIPPED | OUTPATIENT
Start: 2023-11-27 | End: 2024-02-25

## 2023-11-27 RX ORDER — METHYLPREDNISOLONE 4 MG/1
TABLET ORAL
Qty: 1 KIT | Refills: 0 | Status: SHIPPED | OUTPATIENT
Start: 2023-11-27

## 2023-11-27 RX ORDER — PIMECROLIMUS 10 MG/G
CREAM TOPICAL
COMMUNITY
Start: 2023-11-07

## 2023-11-27 NOTE — PROGRESS NOTES
Otherwise, sensation is intact to light touch throughout. RADIOGRAPHS  Cervical spine plain films dated 11/27/2023. 2 views: AP and Lateral. revealed:  Anterior osteophytes at C4, C5, and C6. Mild disc space narrowing C4-C5 and C5-C6. No acute pathology identified. ASSESSMENT   Asuncion was seen today for neck pain and back pain. Diagnoses and all orders for this visit:    Cervical pain (neck)  -     AMB POC XRAY, SPINE, CERVICAL; 2 OR 3    DDD (degenerative disc disease), cervical    Lumbar neuritis    Cervical spondylosis without myelopathy    Facet arthropathy, cervical    DDD (degenerative disc disease), lumbosacral          IMPRESSION AND PLAN:  Patient returns to the office today with c/o low back pain radiating down the RLE in a S1 distribution to the calf and left sided neck pain without radicular symptoms. Patient denies LLE symptoms. Multiple treatment options were discussed. She was recently referred to pain management. I recommended she move forward with the pain management referral. Patient is not interested in injections at this time. I started her on a Medrol Dosepak, NSAIDs deferred secondary to Plavix. I refilled her Lyrica 300 mg BID. Patient is neurologically intact. I will see the patient back in 9 week's time or earlier if needed. Written by Trevor Lilly, as dictated by Iris Chadwick MD  I examined the patient, reviewed and agree with the note.

## 2023-12-08 DIAGNOSIS — Z96.611 STATUS POST REVERSE TOTAL SHOULDER REPLACEMENT, RIGHT: ICD-10-CM

## 2023-12-08 DIAGNOSIS — M75.101 RIGHT ROTATOR CUFF TEAR ARTHROPATHY: ICD-10-CM

## 2023-12-08 DIAGNOSIS — M25.561 PAIN IN RIGHT KNEE: ICD-10-CM

## 2023-12-08 DIAGNOSIS — M17.11 UNILATERAL PRIMARY OSTEOARTHRITIS, RIGHT KNEE: ICD-10-CM

## 2023-12-08 DIAGNOSIS — M12.811 RIGHT ROTATOR CUFF TEAR ARTHROPATHY: ICD-10-CM

## 2023-12-11 RX ORDER — CELECOXIB 200 MG/1
200 CAPSULE ORAL 2 TIMES DAILY
Qty: 60 CAPSULE | Refills: 2 | Status: SHIPPED | OUTPATIENT
Start: 2023-12-11

## 2023-12-13 RX ORDER — LIDOCAINE 50 MG/G
PATCH TOPICAL
Qty: 30 PATCH | Refills: 0 | Status: SHIPPED | OUTPATIENT
Start: 2023-12-13

## 2024-01-29 ENCOUNTER — OFFICE VISIT (OUTPATIENT)
Age: 64
End: 2024-01-29
Payer: MEDICAID

## 2024-01-29 ENCOUNTER — TELEPHONE (OUTPATIENT)
Age: 64
End: 2024-01-29

## 2024-01-29 VITALS
HEART RATE: 64 BPM | WEIGHT: 151 LBS | BODY MASS INDEX: 30.5 KG/M2 | RESPIRATION RATE: 19 BRPM | TEMPERATURE: 97.1 F | OXYGEN SATURATION: 95 %

## 2024-01-29 DIAGNOSIS — M47.812 CERVICAL SPONDYLOSIS WITHOUT MYELOPATHY: ICD-10-CM

## 2024-01-29 DIAGNOSIS — M47.812 FACET ARTHROPATHY, CERVICAL: ICD-10-CM

## 2024-01-29 DIAGNOSIS — M50.30 DDD (DEGENERATIVE DISC DISEASE), CERVICAL: ICD-10-CM

## 2024-01-29 DIAGNOSIS — M51.37 DDD (DEGENERATIVE DISC DISEASE), LUMBOSACRAL: ICD-10-CM

## 2024-01-29 DIAGNOSIS — M54.16 LUMBAR NEURITIS: ICD-10-CM

## 2024-01-29 DIAGNOSIS — M54.2 CERVICAL PAIN (NECK): Primary | ICD-10-CM

## 2024-01-29 PROCEDURE — 99214 OFFICE O/P EST MOD 30 MIN: CPT | Performed by: PHYSICAL MEDICINE & REHABILITATION

## 2024-01-29 RX ORDER — PREGABALIN 300 MG/1
300 CAPSULE ORAL 2 TIMES DAILY
Qty: 180 CAPSULE | Refills: 0 | Status: SHIPPED | OUTPATIENT
Start: 2024-01-29 | End: 2024-04-28

## 2024-01-29 NOTE — PROGRESS NOTES
of her shoulder but  no relief of her neck pain. She failed NEURONTIN. Previously was taking Tegratol.  She has taken Topamax in the past. Pt previously completed the MDP without significant  relief. She is on Plavix through Dr. Tsai. PmHx  defibrillator (2014, not MRI compatible), gastric bypass, DM, heart failure. Pt reports she had cardiac stents placed on 12/8/2020. Dr. Mcdaniels said she cannot come off her Plavix for blocks. Pt is no longer followed by [facility], had been receiving Hydrocodone. Pt states she is no longer followed by Dr. Alvarez secondary to allergic reactions to pain medications. She f/u with Dr. Martinez on 7/8/2021 and per pt he did not recommend surgical intervention or any additional treatment. Note from Alma Delia Dsouza LPN dated 11/18/16 indicating Dr. Charlse reviewed the CT myelogram  and stated he didn't note definite surgical pathology to account for her pain complaints. Dr. Patrick Charles again reviewed patient's cervical CT myelogram and felt there was no definitive surgical  pathology. Note from Dr. Rico dated 1/17/19 indicating patient was seen with c/o shoulder pain radiated to the elbow. Has h/o rotator cuff tear.  XR showed evidence of a distal clavicle exicison, slight proximal migration of the humeral head. Of note, pt had minimal relief with cortisone injection. The plan was for Dr. Rico to obtain a CT  scan of the right shoulder but the pt has not heard back from their office regarding this. Note from Dr. Rico dated 3/20/19 indicating patient was seen with c/o right shoulder pain  to the elbow. Reviewed right shoulder CT and performed a right shoulder injection at that time. Note from JR Carlos GARCIA dated 8/15/19 indicating patient  was seen with c/o right trochanteric bursitis. Preformed injection on the right hip that day. Note from Soledad Terry NP dated 9/23/19 indicating patient  was seen with c/o constipation and f/u DM. Pt is not monitoring her blood

## 2024-01-29 NOTE — TELEPHONE ENCOUNTER
I attempted to contact 's office for an update on 's PM referral that was put in back in Nov. The office did not answer. I lvm with our office number along with my direct line for call back.

## 2024-02-01 DIAGNOSIS — Z96.611 STATUS POST REVERSE TOTAL SHOULDER REPLACEMENT, RIGHT: ICD-10-CM

## 2024-02-01 DIAGNOSIS — M12.811 RIGHT ROTATOR CUFF TEAR ARTHROPATHY: ICD-10-CM

## 2024-02-01 DIAGNOSIS — M75.101 RIGHT ROTATOR CUFF TEAR ARTHROPATHY: ICD-10-CM

## 2024-02-01 RX ORDER — LIDOCAINE 50 MG/G
PATCH TOPICAL
Qty: 30 PATCH | Refills: 0 | Status: SHIPPED | OUTPATIENT
Start: 2024-02-01

## 2024-02-21 ENCOUNTER — OFFICE VISIT (OUTPATIENT)
Age: 64
End: 2024-02-21

## 2024-02-21 ENCOUNTER — TELEPHONE (OUTPATIENT)
Age: 64
End: 2024-02-21

## 2024-02-21 VITALS — HEIGHT: 59 IN | BODY MASS INDEX: 30.5 KG/M2

## 2024-02-21 DIAGNOSIS — M24.112 DEGENERATIVE TEAR OF GLENOID LABRUM OF LEFT SHOULDER: ICD-10-CM
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
## 2024-02-21 DIAGNOSIS — M70.61 TROCHANTERIC BURSITIS, RIGHT HIP: Primary | ICD-10-CM

## 2024-02-21 DIAGNOSIS — M25.812 SHOULDER IMPINGEMENT, LEFT: ICD-10-CM

## 2024-02-21 RX ORDER — DICLOFENAC EPOLAMINE 0.01 G/1
1 SYSTEM TOPICAL 2 TIMES DAILY
Qty: 60 PATCH | Refills: 1 | Status: CANCELLED | OUTPATIENT
Start: 2024-02-21

## 2024-02-21 RX ORDER — LIDOCAINE HYDROCHLORIDE 10 MG/ML
3 INJECTION, SOLUTION INFILTRATION; PERINEURAL ONCE
Status: COMPLETED | OUTPATIENT
Start: 2024-02-21 | End: 2024-02-21

## 2024-02-21 RX ORDER — BETAMETHASONE SODIUM PHOSPHATE AND BETAMETHASONE ACETATE 3; 3 MG/ML; MG/ML
6 INJECTION, SUSPENSION INTRA-ARTICULAR; INTRALESIONAL; INTRAMUSCULAR; SOFT TISSUE ONCE
Status: COMPLETED | OUTPATIENT
Start: 2024-02-21 | End: 2024-02-21

## 2024-02-21 RX ADMIN — BETAMETHASONE SODIUM PHOSPHATE AND BETAMETHASONE ACETATE 6 MG: 3; 3 INJECTION, SUSPENSION INTRA-ARTICULAR; INTRALESIONAL; INTRAMUSCULAR; SOFT TISSUE at 10:41

## 2024-02-21 RX ADMIN — LIDOCAINE HYDROCHLORIDE 3 ML: 10 INJECTION, SOLUTION INFILTRATION; PERINEURAL at 10:42

## 2024-02-21 NOTE — PROGRESS NOTES
Patient: Asuncion Hopkins                MRN: [id number]       SSN: [id number]  YOB: 1960        AGE: 63 y.o.        SEX: female  Body mass index is 30.5 kg/m².    PCP: Jaime Mart PA  02/21/24    Ms. Hopkins is here today she is having some right hip pain she had a fall the knee gave Farrell on her we have been following her for her stiff knee with a slowly failing patella as well and it is the right lateral hip that is really bothering her the most no groin pain no start up pain her right hip replacement was done approximately 2008 it is a [employer] and that she has some known mild heterotopic ossification in the examination today no change with the knees the hip itself rotates well her abductors are firing and as well as the hip flexors well-healed incision she is tender over the trochanter and range of motion in the hip is noncontributory low back is mildly tender straight leg raise is negative and the foot is warm and well-perfused    At this point she would like to try with an injection for the right hip bursitis I think it is a very good option may consider little physical therapy for her later also like to prescribe some Flector pain patches and she can put them twice a day as needed including the hip and the back it has been a pleasure to see her today she is a very nice lady social determinants of health were reviewed and she does have limited ambulation does use a wheeled walker with seat    Will check on her and again in about 3 months time thank you  X-rays 3 views right hip February 21, 2024 she has bilateral synergy hip replacements in good position alignment grade 1-2 heterotopic ossification which remains unchanged no acute fracture , stimulator with leads in place right hemipelvis    [facility]  OFFICE PROCEDURE PROGRESS NOTE      Chart reviewed for the following:  IGuillermo M.D., have reviewed the History, Physical and

## 2024-02-21 NOTE — TELEPHONE ENCOUNTER
Patient called and said that  was going to call in some patches medication to her pharmacy, but nothing has been called in as yet.     [facility] on [address] [facility]  Tel. [phone]    Patient tel. [phone]    Note : patient seen today for the right hip and shoulder.

## 2024-02-22 RX ORDER — LIDOCAINE 50 MG/G
1 PATCH TOPICAL DAILY
Qty: 30 PATCH | Refills: 0 | Status: SHIPPED | OUTPATIENT
Start: 2024-02-22 | End: 2024-03-23

## 2024-02-27 DIAGNOSIS — M47.812 CERVICAL SPONDYLOSIS WITHOUT MYELOPATHY: ICD-10-CM

## 2024-02-27 DIAGNOSIS — M50.30 DDD (DEGENERATIVE DISC DISEASE), CERVICAL: ICD-10-CM

## 2024-02-27 DIAGNOSIS — M51.37 DDD (DEGENERATIVE DISC DISEASE), LUMBOSACRAL: ICD-10-CM

## 2024-02-27 DIAGNOSIS — M47.812 FACET ARTHROPATHY, CERVICAL: ICD-10-CM

## 2024-02-27 DIAGNOSIS — M54.2 CERVICAL PAIN (NECK): ICD-10-CM

## 2024-02-27 DIAGNOSIS — M54.16 LUMBAR NEURITIS: ICD-10-CM

## 2024-02-28 RX ORDER — METHYLPREDNISOLONE 4 MG/1
TABLET ORAL
OUTPATIENT
Start: 2024-02-28

## 2024-03-18 DIAGNOSIS — M17.11 UNILATERAL PRIMARY OSTEOARTHRITIS, RIGHT KNEE: ICD-10-CM

## 2024-03-18 DIAGNOSIS — M25.561 PAIN IN RIGHT KNEE: ICD-10-CM

## 2024-03-18 RX ORDER — CELECOXIB 200 MG/1
200 CAPSULE ORAL 2 TIMES DAILY
Qty: 60 CAPSULE | Refills: 2 | Status: SHIPPED | OUTPATIENT
Start: 2024-03-18

## 2024-03-19 DIAGNOSIS — M75.101 RIGHT ROTATOR CUFF TEAR ARTHROPATHY: ICD-10-CM

## 2024-03-19 DIAGNOSIS — M12.811 RIGHT ROTATOR CUFF TEAR ARTHROPATHY: ICD-10-CM

## 2024-03-19 DIAGNOSIS — Z96.611 STATUS POST REVERSE TOTAL SHOULDER REPLACEMENT, RIGHT: ICD-10-CM

## 2024-04-02 DIAGNOSIS — Z96.611 STATUS POST REVERSE TOTAL SHOULDER REPLACEMENT, RIGHT: ICD-10-CM

## 2024-04-02 DIAGNOSIS — M12.811 RIGHT ROTATOR CUFF TEAR ARTHROPATHY: ICD-10-CM

## 2024-04-02 DIAGNOSIS — M75.101 RIGHT ROTATOR CUFF TEAR ARTHROPATHY: ICD-10-CM

## 2024-04-08 RX ORDER — LIDOCAINE 50 MG/G
PATCH TOPICAL
Qty: 30 PATCH | Refills: 0 | Status: SHIPPED | OUTPATIENT
Start: 2024-04-08

## 2024-05-15 ENCOUNTER — OFFICE VISIT (OUTPATIENT)
Age: 64
End: 2024-05-15
Payer: MEDICAID

## 2024-05-15 DIAGNOSIS — M70.61 TROCHANTERIC BURSITIS OF RIGHT HIP: Primary | ICD-10-CM

## 2024-05-15 PROCEDURE — 99214 OFFICE O/P EST MOD 30 MIN: CPT | Performed by: PHYSICIAN ASSISTANT

## 2024-05-15 PROCEDURE — 20611 DRAIN/INJ JOINT/BURSA W/US: CPT | Performed by: PHYSICIAN ASSISTANT

## 2024-05-15 RX ORDER — LIDOCAINE HYDROCHLORIDE 10 MG/ML
3 INJECTION, SOLUTION INFILTRATION; PERINEURAL ONCE
Status: COMPLETED | OUTPATIENT
Start: 2024-05-15 | End: 2024-05-15

## 2024-05-15 RX ORDER — LIDOCAINE 50 MG/G
1 PATCH TOPICAL DAILY
Qty: 30 PATCH | Refills: 0 | Status: SHIPPED | OUTPATIENT
Start: 2024-05-15 | End: 2024-06-14

## 2024-05-15 RX ORDER — BETAMETHASONE SODIUM PHOSPHATE AND BETAMETHASONE ACETATE 3; 3 MG/ML; MG/ML
6 INJECTION, SUSPENSION INTRA-ARTICULAR; INTRALESIONAL; INTRAMUSCULAR; SOFT TISSUE ONCE
Status: COMPLETED | OUTPATIENT
Start: 2024-05-15 | End: 2024-05-15

## 2024-05-15 RX ADMIN — BETAMETHASONE SODIUM PHOSPHATE AND BETAMETHASONE ACETATE 6 MG: 3; 3 INJECTION, SUSPENSION INTRA-ARTICULAR; INTRALESIONAL; INTRAMUSCULAR; SOFT TISSUE at 14:52

## 2024-05-15 RX ADMIN — LIDOCAINE HYDROCHLORIDE 3 ML: 10 INJECTION, SOLUTION INFILTRATION; PERINEURAL at 14:53
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
## 2024-05-17 ENCOUNTER — OFFICE VISIT (OUTPATIENT)
Age: 64
End: 2024-05-17
Payer: MEDICARE

## 2024-05-17 VITALS
BODY MASS INDEX: 27.82 KG/M2 | TEMPERATURE: 98.6 F | OXYGEN SATURATION: 99 % | HEIGHT: 59 IN | WEIGHT: 138 LBS | HEART RATE: 71 BPM

## 2024-05-17 DIAGNOSIS — M54.2 CERVICAL PAIN (NECK): ICD-10-CM

## 2024-05-17 DIAGNOSIS — M51.37 DDD (DEGENERATIVE DISC DISEASE), LUMBOSACRAL: ICD-10-CM

## 2024-05-17 DIAGNOSIS — M50.30 DDD (DEGENERATIVE DISC DISEASE), CERVICAL: ICD-10-CM

## 2024-05-17 DIAGNOSIS — M47.812 CERVICAL SPONDYLOSIS WITHOUT MYELOPATHY: ICD-10-CM

## 2024-05-17 DIAGNOSIS — M47.812 FACET ARTHROPATHY, CERVICAL: ICD-10-CM

## 2024-05-17 DIAGNOSIS — M54.16 LUMBAR NEURITIS: Primary | ICD-10-CM

## 2024-05-17 PROCEDURE — 99213 OFFICE O/P EST LOW 20 MIN: CPT | Performed by: PHYSICAL MEDICINE & REHABILITATION

## 2024-05-17 NOTE — PROGRESS NOTES
the calf and left sided neck pain without radicular symptoms. Multiple treatment options were discussed. At this time, pt symptoms remain unchanged. I have little left to offer this patient. Despite numerous treatment options, she has tried the following treatment options with no benefit: physical therapy, multiple neuropathic medications, and injections. Despite being previously referred to Pain Management by orthopedics, pt has not being seen by their practice at this time. I continue to agree with this recommendation. I will put my own referral for Pain Management. I suggest pt continue to take Lyrica 100 mg BID as prescribed by her PCP.The patient is Neurologically intact. I will see the patient back as needed.     Written by Lindsey Weldon, as dictated by Duncan Tristan MD  I examined the patient, reviewed and agree with the note.

## 2024-05-28 ENCOUNTER — OFFICE VISIT (OUTPATIENT)
Age: 64
End: 2024-05-28
Payer: MEDICARE

## 2024-05-28 VITALS — WEIGHT: 151 LBS | BODY MASS INDEX: 30.44 KG/M2 | HEIGHT: 59 IN

## 2024-05-28 DIAGNOSIS — M25.512 LEFT SHOULDER PAIN, UNSPECIFIED CHRONICITY: Primary | ICD-10-CM

## 2024-05-28 DIAGNOSIS — S46.012D TRAUMATIC TEAR OF LEFT ROTATOR CUFF, UNSPECIFIED TEAR EXTENT, SUBSEQUENT ENCOUNTER: ICD-10-CM

## 2024-05-28 PROCEDURE — 20611 DRAIN/INJ JOINT/BURSA W/US: CPT | Performed by: ORTHOPAEDIC SURGERY

## 2024-05-28 PROCEDURE — 99214 OFFICE O/P EST MOD 30 MIN: CPT | Performed by: ORTHOPAEDIC SURGERY

## 2024-05-28 PROCEDURE — 73030 X-RAY EXAM OF SHOULDER: CPT | Performed by: ORTHOPAEDIC SURGERY

## 2024-05-28 RX ORDER — LIDOCAINE HYDROCHLORIDE 10 MG/ML
6 INJECTION, SOLUTION INFILTRATION; PERINEURAL ONCE
Status: COMPLETED | OUTPATIENT
Start: 2024-05-28 | End: 2024-05-28

## 2024-05-28 RX ORDER — TRIAMCINOLONE ACETONIDE 40 MG/ML
40 INJECTION, SUSPENSION INTRA-ARTICULAR; INTRAMUSCULAR ONCE
Status: COMPLETED | OUTPATIENT
Start: 2024-05-28 | End: 2024-05-28

## 2024-05-28 RX ADMIN — LIDOCAINE HYDROCHLORIDE 6 ML: 10 INJECTION, SOLUTION INFILTRATION; PERINEURAL at 10:32

## 2024-05-28 RX ADMIN — TRIAMCINOLONE ACETONIDE 40 MG: 40 INJECTION, SUSPENSION INTRA-ARTICULAR; INTRAMUSCULAR at 10:32

## 2024-05-28 NOTE — PROGRESS NOTES
Asuncion Hopkins  1960   Chief Complaint   Patient presents with    Shoulder Pain     Left shoulder        HISTORY OF PRESENT ILLNESS  Asuncion Hopkins is a 63 y.o. female who presents today for reevaluation of left shoulder pain. Pain is a 10/10. Has been having multiple falls. At  on 10/30/2023, patient received a left shoulder cortisone injection which provided good relief. She has fallen again with an exacerbation of her right shoulder pain.     Of note, she is s/p reverse total shoulder arthroplasty.    Patient denies any fever, chills, chest pain, shortness of breath or calf pain. The remainder of the review of systems is negative. There are no new illness or injuries to report since last seen in the office. No changes in medications, allergies, social or family history.      PHYSICAL EXAM:   Ht 1.499 m (4' 11\")   Wt 68.5 kg (151 lb)   BMI 30.50 kg/m²   The patient is a well-developed, well-nourished female   in no acute distress.  The patient is alert and oriented times three.  The patient is alert and oriented times three. Mood and affect are normal.  LYMPHATIC: lymph nodes are not enlarged and are within normal limits  SKIN: normal in color and non tender to palpation. There are no bruises or abrasions noted.   NEUROLOGICAL: Motor sensory exam is within normal limits. Reflexes are equal bilaterally. There is normal sensation to pinprick and light touch  MUSCULOSKELETAL:  Unchanged on the right on the left shoulder glenohumeral abduction 80 degrees external rotation 60 degrees with pain no instability motor sensor exam is normal positive supraspinatus stress test     PROCEDURE:   Left Shoulder Injection with Ultrasound Guidance    Indication:  left shoulder pain/swelling    After sterile prep, 6mL lidocaine with 1mL 40mg Kenalog were injected into the left shoulder intrabursal under ultrasound guidance. Ultrasound images captured and scanned into patient's chart.        [facility]

## 2024-06-09 DIAGNOSIS — M70.61 TROCHANTERIC BURSITIS OF RIGHT HIP: ICD-10-CM

## 2024-06-10 RX ORDER — LIDOCAINE 50 MG/G
PATCH TOPICAL
Qty: 30 PATCH | Refills: 0 | Status: SHIPPED | OUTPATIENT
Start: 2024-06-10

## 2024-06-11 ENCOUNTER — OFFICE VISIT (OUTPATIENT)
Age: 64
End: 2024-06-11
Payer: MEDICARE

## 2024-06-11 VITALS — HEIGHT: 59 IN | BODY MASS INDEX: 30.44 KG/M2 | WEIGHT: 151 LBS

## 2024-06-11 DIAGNOSIS — M25.512 ACUTE PAIN OF LEFT SHOULDER: Primary | ICD-10-CM

## 2024-06-11 DIAGNOSIS — M25.512 LEFT SHOULDER PAIN, UNSPECIFIED CHRONICITY: ICD-10-CM

## 2024-06-11 DIAGNOSIS — S46.012D TRAUMATIC TEAR OF LEFT ROTATOR CUFF, UNSPECIFIED TEAR EXTENT, SUBSEQUENT ENCOUNTER: ICD-10-CM

## 2024-06-11 PROCEDURE — 73030 X-RAY EXAM OF SHOULDER: CPT | Performed by: ORTHOPAEDIC SURGERY

## 2024-06-11 PROCEDURE — 99213 OFFICE O/P EST LOW 20 MIN: CPT | Performed by: ORTHOPAEDIC SURGERY

## 2024-06-11 NOTE — PROGRESS NOTES
Asuncion oHpkins  1960   Chief Complaint   Patient presents with    Shoulder Pain     left        HISTORY OF PRESENT ILLNESS  Asuncion Hopkins is a 63 y.o. female who presents today for reevaluation of left shoulder. Pain is a 8/10. The patient states that her knee gave out and she fell on her left shoulder. Since then, she has had increasing left shoulder pain.     Patient denies any fever, chills, chest pain, shortness of breath or calf pain. The remainder of the review of systems is negative. There are no new illness or injuries other than that mentioned above to report since last seen in the office. No changes in medications, allergies, social or family history.      PHYSICAL EXAM:   Ht 1.499 m (4' 11\")   Wt 68.5 kg (151 lb)   BMI 30.50 kg/m²   The patient is a well-developed, well-nourished female   in no acute distress.  The patient is alert and oriented times three.  The patient is alert and oriented times three. Mood and affect are normal.  LYMPHATIC: lymph nodes are not enlarged and are within normal limits  SKIN: normal in color and non tender to palpation. There are no bruises or abrasions noted.   NEUROLOGICAL: Motor sensory exam is within normal limits. Reflexes are equal bilaterally. There is normal sensation to pinprick and light touch  MUSCULOSKELETAL: Unchanged       PROCEDURE: None    IMAGING: XR of the left shoulder with 3 views obtained in office dated 6/11/2024 reviewed and read by Dr. Rico: arthritic changes noted, no acute fractures or dislocation.       IMPRESSION:      ICD-10-CM    1. Acute pain of left shoulder  M25.512 [45732] Shoulder 2V or more      2. Traumatic tear of left rotator cuff, unspecified tear extent, subsequent encounter  S46.012D CT SHOULDER LEFT WO CONTRAST     FL INJ SHOULDER ARTHROGRAM      3. Left shoulder pain, unspecified chronicity  M25.512 CT SHOULDER LEFT WO CONTRAST     FL INJ SHOULDER ARTHROGRAM           PLAN:   1. Patient presents with left

## 2024-07-10 ENCOUNTER — TELEPHONE (OUTPATIENT)
Age: 64
End: 2024-07-10

## 2024-07-10 RX ORDER — CELECOXIB 200 MG/1
200 CAPSULE ORAL 2 TIMES DAILY
Qty: 60 CAPSULE | Refills: 2 | Status: SHIPPED | OUTPATIENT
Start: 2024-07-10

## 2024-07-10 NOTE — TELEPHONE ENCOUNTER
Patient requesting refill of celecoxib (CELEBREX) 200 MG capsule called in to [facility] on [address].

## 2024-07-15 ENCOUNTER — TELEPHONE (OUTPATIENT)
Age: 64
End: 2024-07-15

## 2024-07-15 ENCOUNTER — HOSPITAL ENCOUNTER (OUTPATIENT)
Facility: HOSPITAL | Age: 64
Discharge: HOME | End: 2024-07-18
Attending: ORTHOPAEDIC SURGERY
Payer: MEDICARE

## 2024-07-15 DIAGNOSIS — M25.512 LEFT SHOULDER PAIN, UNSPECIFIED CHRONICITY: ICD-10-CM

## 2024-07-15 DIAGNOSIS — S46.012D TRAUMATIC TEAR OF LEFT ROTATOR CUFF, UNSPECIFIED TEAR EXTENT, SUBSEQUENT ENCOUNTER: ICD-10-CM

## 2024-07-15 PROCEDURE — 2500000003 HC RX 250 WO HCPCS: Performed by: ORTHOPAEDIC SURGERY

## 2024-07-15 PROCEDURE — 2580000003 HC RX 258: Performed by: ORTHOPAEDIC SURGERY

## 2024-07-15 PROCEDURE — 6360000004 HC RX CONTRAST MEDICATION: Performed by: ORTHOPAEDIC SURGERY

## 2024-07-15 PROCEDURE — 77002 NEEDLE LOCALIZATION BY XRAY: CPT

## 2024-07-15 PROCEDURE — 73201 CT UPPER EXTREMITY W/DYE: CPT

## 2024-07-15 RX ORDER — LIDOCAINE HYDROCHLORIDE 10 MG/ML
5 INJECTION, SOLUTION EPIDURAL; INFILTRATION; INTRACAUDAL; PERINEURAL ONCE
Status: COMPLETED | OUTPATIENT
Start: 2024-07-15 | End: 2024-07-15

## 2024-07-15 RX ORDER — IOPAMIDOL 408 MG/ML
20 INJECTION, SOLUTION INTRATHECAL
Status: COMPLETED | OUTPATIENT
Start: 2024-07-15 | End: 2024-07-15

## 2024-07-15 RX ORDER — SODIUM CHLORIDE 0.9 % (FLUSH) 0.9 %
10 SYRINGE (ML) INJECTION PRN
Status: DISCONTINUED | OUTPATIENT
Start: 2024-07-15 | End: 2024-07-19 | Stop reason: HOSPADM

## 2024-07-15 RX ADMIN — IOPAMIDOL 20 ML: 408 INJECTION, SOLUTION INTRATHECAL at 10:15

## 2024-07-15 RX ADMIN — LIDOCAINE HYDROCHLORIDE 5 ML: 10 INJECTION, SOLUTION EPIDURAL; INFILTRATION; INTRACAUDAL; PERINEURAL at 10:13

## 2024-07-15 RX ADMIN — Medication 10 ML: at 10:15

## 2024-07-15 NOTE — TELEPHONE ENCOUNTER
Jackie from [facility] CT scan dept called for .    Jackie said she needs a call back as soon as possible. That she needs clarification on the CT Scan for the Left Shoulder. That she needs to know if the CT Scan is WO Contrast or not. That is notes state otherwise.     Jackie said the patient is there now. That she needs a call back as soon as possible.    Jackie tel. [phone].

## 2024-07-18 DIAGNOSIS — M70.61 TROCHANTERIC BURSITIS OF RIGHT HIP: ICD-10-CM

## 2024-07-18 RX ORDER — LIDOCAINE 50 MG/G
PATCH TOPICAL
Qty: 30 PATCH | Refills: 0 | Status: SHIPPED | OUTPATIENT
Start: 2024-07-18

## 2024-07-24 ENCOUNTER — OFFICE VISIT (OUTPATIENT)
Age: 64
End: 2024-07-24
Payer: MEDICARE

## 2024-07-24 VITALS — HEIGHT: 59 IN | BODY MASS INDEX: 30.5 KG/M2

## 2024-07-24 DIAGNOSIS — M25.512 LEFT SHOULDER PAIN, UNSPECIFIED CHRONICITY: ICD-10-CM

## 2024-07-24 DIAGNOSIS — S46.012D TRAUMATIC TEAR OF LEFT ROTATOR CUFF, UNSPECIFIED TEAR EXTENT, SUBSEQUENT ENCOUNTER: Primary | ICD-10-CM

## 2024-07-24 PROCEDURE — 99214 OFFICE O/P EST MOD 30 MIN: CPT | Performed by: ORTHOPAEDIC SURGERY

## 2024-07-24 NOTE — PROGRESS NOTES
Asuncion Hopkins  1960   Chief Complaint   Patient presents with    Shoulder Pain     left        HISTORY OF PRESENT ILLNESS  Asuncion Hopkins is a 63 y.o. female who presents today for reevaluation of left shoulder pain. Pain is a 10/10. Recent CT revealed some rotator cuff tearing. She uses her walker in her left arm. She believes some of her pain may be due to overuse from using the walker. Her pain interferes with her activities of daily living. She experiences pain mostly at night.     Patient denies any fever, chills, chest pain, shortness of breath or calf pain. The remainder of the review of systems is negative. There are no new illness or injuries other than that mentioned above to report since last seen in the office. No changes in medications, allergies, social or family history.      PHYSICAL EXAM:   Ht 1.499 m (4' 11\")   BMI 30.50 kg/m²   The patient is a well-developed, well-nourished female   in no acute distress.  The patient is alert and oriented times three.  The patient is alert and oriented times three. Mood and affect are normal.  LYMPHATIC: lymph nodes are not enlarged and are within normal limits  SKIN: normal in color and non tender to palpation. There are no bruises or abrasions noted.   NEUROLOGICAL: Motor sensory exam is within normal limits. Reflexes are equal bilaterally. There is normal sensation to pinprick and light touch  MUSCULOSKELETAL: Unchanged (positive supraspinatus stress test    PROCEDURE: none    IMAGING: CT of the left shoulder w cont dated 7/15/24 obtained by [facility] reviewed by Dr. Rico revealed:  1.  No definite full-thickness tear.  Deep partial thickness/ near  full-thickness tear of the supra- and infraspinatus tendons with extension of  the tearing plane into the tendon substance.  Additional partial tear with  intrasubstance tear in the subscapularis.     2.  Moderate degenerative changes with cartilage defects, marginal osteophytes  and subcortical cysts.

## 2024-08-08 ENCOUNTER — TELEPHONE (OUTPATIENT)
Age: 64
End: 2024-08-08

## 2024-08-08 DIAGNOSIS — M25.512 LEFT SHOULDER PAIN, UNSPECIFIED CHRONICITY: ICD-10-CM

## 2024-08-08 DIAGNOSIS — S46.012D TRAUMATIC TEAR OF LEFT ROTATOR CUFF, UNSPECIFIED TEAR EXTENT, SUBSEQUENT ENCOUNTER: Primary | ICD-10-CM

## 2024-08-08 NOTE — TELEPHONE ENCOUNTER
Patients surgery was not approved it does not meet criteria. Patient needs conservative treatment to include physical therapy.  Please order physical therapy.

## 2024-09-04 ENCOUNTER — PREP FOR PROCEDURE (OUTPATIENT)
Age: 64
End: 2024-09-04

## 2024-09-04 DIAGNOSIS — Z01.818 PREOP EXAMINATION: ICD-10-CM

## 2024-09-04 DIAGNOSIS — S46.012D TRAUMATIC TEAR OF LEFT ROTATOR CUFF, SUBSEQUENT ENCOUNTER: ICD-10-CM

## 2024-09-04 DIAGNOSIS — Z01.818 PREOP EXAMINATION: Primary | ICD-10-CM

## 2024-09-12 ENCOUNTER — OFFICE VISIT (OUTPATIENT)
Age: 64
End: 2024-09-12

## 2024-09-12 VITALS
WEIGHT: 141 LBS | BODY MASS INDEX: 28.43 KG/M2 | HEIGHT: 59 IN | SYSTOLIC BLOOD PRESSURE: 116 MMHG | DIASTOLIC BLOOD PRESSURE: 62 MMHG

## 2024-09-12 DIAGNOSIS — S46.012D TRAUMATIC TEAR OF LEFT ROTATOR CUFF, SUBSEQUENT ENCOUNTER: Primary | ICD-10-CM

## 2024-09-12 RX ORDER — TRIAMCINOLONE ACETONIDE 1 MG/G
OINTMENT TOPICAL 2 TIMES DAILY
COMMUNITY
Start: 2024-07-02

## 2024-09-16 ENCOUNTER — TELEPHONE (OUTPATIENT)
Age: 64
End: 2024-09-16

## 2024-09-16 DIAGNOSIS — S46.012D TRAUMATIC TEAR OF LEFT ROTATOR CUFF, SUBSEQUENT ENCOUNTER: Primary | ICD-10-CM

## 2024-09-17 ENCOUNTER — ANESTHESIA EVENT (OUTPATIENT)
Facility: HOSPITAL | Age: 64
End: 2024-09-17
Payer: MEDICARE

## 2024-09-17 RX ORDER — GABAPENTIN 300 MG/1
300 CAPSULE ORAL
Qty: 5 CAPSULE | Refills: 0 | Status: SHIPPED | OUTPATIENT
Start: 2024-09-17 | End: 2024-09-22

## 2024-09-17 RX ORDER — OXYCODONE HYDROCHLORIDE 5 MG/1
5 TABLET ORAL EVERY 4 HOURS PRN
Qty: 40 TABLET | Refills: 0 | Status: SHIPPED | OUTPATIENT
Start: 2024-09-17 | End: 2024-09-24

## 2024-09-18 ENCOUNTER — ANESTHESIA (OUTPATIENT)
Facility: HOSPITAL | Age: 64
End: 2024-09-18
Payer: MEDICARE

## 2024-09-18 ENCOUNTER — HOSPITAL ENCOUNTER (OUTPATIENT)
Facility: HOSPITAL | Age: 64
Setting detail: OUTPATIENT SURGERY
Discharge: HOME | End: 2024-09-18
Attending: ORTHOPAEDIC SURGERY | Admitting: ORTHOPAEDIC SURGERY
Payer: MEDICARE

## 2024-09-18 VITALS
DIASTOLIC BLOOD PRESSURE: 80 MMHG | HEART RATE: 54 BPM | SYSTOLIC BLOOD PRESSURE: 129 MMHG | WEIGHT: 141 LBS | OXYGEN SATURATION: 98 % | RESPIRATION RATE: 18 BRPM | HEIGHT: 59 IN | TEMPERATURE: 97.2 F | BODY MASS INDEX: 28.43 KG/M2

## 2024-09-18 LAB
GLUCOSE BLD STRIP.AUTO-MCNC: 106 MG/DL (ref 70–110)
GLUCOSE BLD STRIP.AUTO-MCNC: 79 MG/DL (ref 70–110)

## 2024-09-18 PROCEDURE — 2580000003 HC RX 258: Performed by: ORTHOPAEDIC SURGERY

## 2024-09-18 PROCEDURE — 6360000002 HC RX W HCPCS: Performed by: ANESTHESIOLOGY

## 2024-09-18 PROCEDURE — 6360000002 HC RX W HCPCS: Performed by: ORTHOPAEDIC SURGERY

## 2024-09-18 PROCEDURE — 2720000010 HC SURG SUPPLY STERILE: Performed by: ORTHOPAEDIC SURGERY

## 2024-09-18 PROCEDURE — 3700000000 HC ANESTHESIA ATTENDED CARE: Performed by: ORTHOPAEDIC SURGERY

## 2024-09-18 PROCEDURE — 7100000010 HC PHASE II RECOVERY - FIRST 15 MIN: Performed by: ORTHOPAEDIC SURGERY

## 2024-09-18 PROCEDURE — 6370000000 HC RX 637 (ALT 250 FOR IP): Performed by: NURSE ANESTHETIST, CERTIFIED REGISTERED

## 2024-09-18 PROCEDURE — 7100000011 HC PHASE II RECOVERY - ADDTL 15 MIN: Performed by: ORTHOPAEDIC SURGERY

## 2024-09-18 PROCEDURE — 6360000002 HC RX W HCPCS: Performed by: NURSE ANESTHETIST, CERTIFIED REGISTERED

## 2024-09-18 PROCEDURE — 64415 NJX AA&/STRD BRCH PLXS IMG: CPT | Performed by: ANESTHESIOLOGY

## 2024-09-18 PROCEDURE — 3700000001 HC ADD 15 MINUTES (ANESTHESIA): Performed by: ORTHOPAEDIC SURGERY

## 2024-09-18 PROCEDURE — 3600000012 HC SURGERY LEVEL 2 ADDTL 15MIN: Performed by: ORTHOPAEDIC SURGERY

## 2024-09-18 PROCEDURE — 3600000002 HC SURGERY LEVEL 2 BASE: Performed by: ORTHOPAEDIC SURGERY

## 2024-09-18 PROCEDURE — C1713 ANCHOR/SCREW BN/BN,TIS/BN: HCPCS | Performed by: ORTHOPAEDIC SURGERY

## 2024-09-18 PROCEDURE — 2709999900 HC NON-CHARGEABLE SUPPLY: Performed by: ORTHOPAEDIC SURGERY

## 2024-09-18 PROCEDURE — 7100000001 HC PACU RECOVERY - ADDTL 15 MIN: Performed by: ORTHOPAEDIC SURGERY

## 2024-09-18 PROCEDURE — 7100000000 HC PACU RECOVERY - FIRST 15 MIN: Performed by: ORTHOPAEDIC SURGERY

## 2024-09-18 PROCEDURE — 2500000003 HC RX 250 WO HCPCS: Performed by: NURSE ANESTHETIST, CERTIFIED REGISTERED

## 2024-09-18 PROCEDURE — 29827 SHO ARTHRS SRG RT8TR CUF RPR: CPT | Performed by: ORTHOPAEDIC SURGERY

## 2024-09-18 PROCEDURE — 2580000003 HC RX 258: Performed by: NURSE ANESTHETIST, CERTIFIED REGISTERED

## 2024-09-18 PROCEDURE — 82962 GLUCOSE BLOOD TEST: CPT

## 2024-09-18 DEVICE — ANCHOR SUTURE L 24.5MM DIA 5.5MM SUTURETAPE 1.3 MM B-TCP: Type: IMPLANTABLE DEVICE | Site: SHOULDER | Status: FUNCTIONAL

## 2024-09-18 RX ORDER — FENTANYL CITRATE 50 UG/ML
100 INJECTION, SOLUTION INTRAMUSCULAR; INTRAVENOUS
Status: COMPLETED | OUTPATIENT
Start: 2024-09-18 | End: 2024-09-18

## 2024-09-18 RX ORDER — INSULIN LISPRO 100 [IU]/ML
1-15 INJECTION, SOLUTION INTRAVENOUS; SUBCUTANEOUS ONCE
Status: DISCONTINUED | OUTPATIENT
Start: 2024-09-18 | End: 2024-09-18 | Stop reason: HOSPADM

## 2024-09-18 RX ORDER — SODIUM CHLORIDE 9 MG/ML
INJECTION, SOLUTION INTRAVENOUS PRN
Status: DISCONTINUED | OUTPATIENT
Start: 2024-09-18 | End: 2024-09-18 | Stop reason: HOSPADM

## 2024-09-18 RX ORDER — ROCURONIUM BROMIDE 10 MG/ML
INJECTION, SOLUTION INTRAVENOUS
Status: DISCONTINUED | OUTPATIENT
Start: 2024-09-18 | End: 2024-09-18 | Stop reason: SDUPTHER

## 2024-09-18 RX ORDER — DEXAMETHASONE SODIUM PHOSPHATE 4 MG/ML
INJECTION, SOLUTION INTRA-ARTICULAR; INTRALESIONAL; INTRAMUSCULAR; INTRAVENOUS; SOFT TISSUE
Status: DISCONTINUED | OUTPATIENT
Start: 2024-09-18 | End: 2024-09-18 | Stop reason: SDUPTHER

## 2024-09-18 RX ORDER — DEXTROSE MONOHYDRATE 100 MG/ML
INJECTION, SOLUTION INTRAVENOUS CONTINUOUS PRN
Status: DISCONTINUED | OUTPATIENT
Start: 2024-09-18 | End: 2024-09-18 | Stop reason: HOSPADM

## 2024-09-18 RX ORDER — FAMOTIDINE 20 MG/1
20 TABLET, FILM COATED ORAL ONCE
Status: COMPLETED | OUTPATIENT
Start: 2024-09-18 | End: 2024-09-18

## 2024-09-18 RX ORDER — LIDOCAINE HYDROCHLORIDE 20 MG/ML
INJECTION, SOLUTION EPIDURAL; INFILTRATION; INTRACAUDAL; PERINEURAL
Status: DISCONTINUED | OUTPATIENT
Start: 2024-09-18 | End: 2024-09-18 | Stop reason: SDUPTHER

## 2024-09-18 RX ORDER — SODIUM CHLORIDE, SODIUM LACTATE, POTASSIUM CHLORIDE, CALCIUM CHLORIDE 600; 310; 30; 20 MG/100ML; MG/100ML; MG/100ML; MG/100ML
INJECTION, SOLUTION INTRAVENOUS CONTINUOUS
Status: DISCONTINUED | OUTPATIENT
Start: 2024-09-18 | End: 2024-09-18 | Stop reason: HOSPADM

## 2024-09-18 RX ORDER — SODIUM CHLORIDE 0.9 % (FLUSH) 0.9 %
5-40 SYRINGE (ML) INJECTION PRN
Status: DISCONTINUED | OUTPATIENT
Start: 2024-09-18 | End: 2024-09-18 | Stop reason: HOSPADM

## 2024-09-18 RX ORDER — PROPOFOL 10 MG/ML
INJECTION, EMULSION INTRAVENOUS
Status: DISCONTINUED | OUTPATIENT
Start: 2024-09-18 | End: 2024-09-18 | Stop reason: SDUPTHER

## 2024-09-18 RX ORDER — MIDAZOLAM HYDROCHLORIDE 2 MG/2ML
2 INJECTION, SOLUTION INTRAMUSCULAR; INTRAVENOUS
Status: COMPLETED | OUTPATIENT
Start: 2024-09-18 | End: 2024-09-18

## 2024-09-18 RX ORDER — SODIUM CHLORIDE 0.9 % (FLUSH) 0.9 %
5-40 SYRINGE (ML) INJECTION EVERY 12 HOURS SCHEDULED
Status: DISCONTINUED | OUTPATIENT
Start: 2024-09-18 | End: 2024-09-18 | Stop reason: HOSPADM

## 2024-09-18 RX ORDER — MAGNESIUM SULFATE HEPTAHYDRATE 500 MG/ML
INJECTION, SOLUTION INTRAMUSCULAR; INTRAVENOUS
Status: DISCONTINUED | OUTPATIENT
Start: 2024-09-18 | End: 2024-09-18 | Stop reason: SDUPTHER

## 2024-09-18 RX ORDER — ROPIVACAINE HYDROCHLORIDE 5 MG/ML
INJECTION, SOLUTION EPIDURAL; INFILTRATION; PERINEURAL
Status: COMPLETED | OUTPATIENT
Start: 2024-09-18 | End: 2024-09-18

## 2024-09-18 RX ORDER — ONDANSETRON 2 MG/ML
4 INJECTION INTRAMUSCULAR; INTRAVENOUS
Status: DISCONTINUED | OUTPATIENT
Start: 2024-09-18 | End: 2024-09-18 | Stop reason: HOSPADM

## 2024-09-18 RX ORDER — EPHEDRINE SULFATE/0.9% NACL/PF 25 MG/5 ML
SYRINGE (ML) INTRAVENOUS
Status: DISCONTINUED | OUTPATIENT
Start: 2024-09-18 | End: 2024-09-18 | Stop reason: SDUPTHER

## 2024-09-18 RX ORDER — LIDOCAINE HYDROCHLORIDE 10 MG/ML
1 INJECTION, SOLUTION EPIDURAL; INFILTRATION; INTRACAUDAL; PERINEURAL
Status: DISCONTINUED | OUTPATIENT
Start: 2024-09-18 | End: 2024-09-18 | Stop reason: HOSPADM

## 2024-09-18 RX ORDER — SUCCINYLCHOLINE/SOD CL,ISO/PF 100 MG/5ML
SYRINGE (ML) INTRAVENOUS
Status: DISCONTINUED | OUTPATIENT
Start: 2024-09-18 | End: 2024-09-18 | Stop reason: SDUPTHER

## 2024-09-18 RX ORDER — FENTANYL CITRATE 50 UG/ML
50 INJECTION, SOLUTION INTRAMUSCULAR; INTRAVENOUS EVERY 5 MIN PRN
Status: DISCONTINUED | OUTPATIENT
Start: 2024-09-18 | End: 2024-09-18 | Stop reason: HOSPADM

## 2024-09-18 RX ORDER — FENTANYL CITRATE 50 UG/ML
25 INJECTION, SOLUTION INTRAMUSCULAR; INTRAVENOUS EVERY 5 MIN PRN
Status: DISCONTINUED | OUTPATIENT
Start: 2024-09-18 | End: 2024-09-18 | Stop reason: HOSPADM

## 2024-09-18 RX ORDER — GLUCAGON 1 MG
1 KIT INJECTION PRN
Status: DISCONTINUED | OUTPATIENT
Start: 2024-09-18 | End: 2024-09-18 | Stop reason: HOSPADM

## 2024-09-18 RX ORDER — NALOXONE HYDROCHLORIDE 0.4 MG/ML
INJECTION, SOLUTION INTRAMUSCULAR; INTRAVENOUS; SUBCUTANEOUS PRN
Status: DISCONTINUED | OUTPATIENT
Start: 2024-09-18 | End: 2024-09-18 | Stop reason: HOSPADM

## 2024-09-18 RX ORDER — ONDANSETRON 2 MG/ML
INJECTION INTRAMUSCULAR; INTRAVENOUS
Status: DISCONTINUED | OUTPATIENT
Start: 2024-09-18 | End: 2024-09-18 | Stop reason: SDUPTHER

## 2024-09-18 RX ORDER — ROPIVACAINE HYDROCHLORIDE 5 MG/ML
30 INJECTION, SOLUTION EPIDURAL; INFILTRATION; PERINEURAL ONCE
Status: COMPLETED | OUTPATIENT
Start: 2024-09-18 | End: 2024-09-18

## 2024-09-18 RX ADMIN — ROPIVACAINE HYDROCHLORIDE 24 ML: 5 INJECTION EPIDURAL; INFILTRATION; PERINEURAL at 07:24

## 2024-09-18 RX ADMIN — FAMOTIDINE 20 MG: 20 TABLET, FILM COATED ORAL at 07:09

## 2024-09-18 RX ADMIN — FENTANYL CITRATE 50 MCG: 50 INJECTION INTRAMUSCULAR; INTRAVENOUS at 07:25

## 2024-09-18 RX ADMIN — EPHEDRINE SULFATE 10 MG: 5 INJECTION INTRAVENOUS at 08:29

## 2024-09-18 RX ADMIN — ROPIVACAINE HYDROCHLORIDE 24 ML: 5 INJECTION EPIDURAL; INFILTRATION; PERINEURAL at 07:27

## 2024-09-18 RX ADMIN — EPHEDRINE SULFATE 10 MG: 5 INJECTION INTRAVENOUS at 07:51

## 2024-09-18 RX ADMIN — PROPOFOL 100 MG: 10 INJECTION, EMULSION INTRAVENOUS at 07:44

## 2024-09-18 RX ADMIN — ROCURONIUM BROMIDE 20 MG: 50 INJECTION INTRAVENOUS at 07:53

## 2024-09-18 RX ADMIN — MIDAZOLAM 1 MG: 1 INJECTION INTRAMUSCULAR; INTRAVENOUS at 07:25

## 2024-09-18 RX ADMIN — WATER 2000 MG: 1 INJECTION INTRAMUSCULAR; INTRAVENOUS; SUBCUTANEOUS at 07:55

## 2024-09-18 RX ADMIN — Medication 100 MG: at 07:44

## 2024-09-18 RX ADMIN — MAGNESIUM SULFATE HEPTAHYDRATE 1 G: 500 INJECTION, SOLUTION INTRAMUSCULAR; INTRAVENOUS at 07:44

## 2024-09-18 RX ADMIN — SUGAMMADEX 200 MG: 100 INJECTION, SOLUTION INTRAVENOUS at 08:45

## 2024-09-18 RX ADMIN — EPHEDRINE SULFATE 5 MG: 5 INJECTION INTRAVENOUS at 07:59

## 2024-09-18 RX ADMIN — DEXAMETHASONE SODIUM PHOSPHATE 4 MG: 4 INJECTION INTRA-ARTICULAR; INTRALESIONAL; INTRAMUSCULAR; INTRAVENOUS; SOFT TISSUE at 07:53

## 2024-09-18 RX ADMIN — LIDOCAINE HYDROCHLORIDE 50 MG: 20 INJECTION, SOLUTION EPIDURAL; INFILTRATION; INTRACAUDAL; PERINEURAL at 07:44

## 2024-09-18 RX ADMIN — SODIUM CHLORIDE, POTASSIUM CHLORIDE, SODIUM LACTATE AND CALCIUM CHLORIDE: 600; 310; 30; 20 INJECTION, SOLUTION INTRAVENOUS at 07:07

## 2024-09-18 RX ADMIN — ONDANSETRON 4 MG: 2 INJECTION INTRAMUSCULAR; INTRAVENOUS at 07:44

## 2024-09-18 ASSESSMENT — PAIN - FUNCTIONAL ASSESSMENT: PAIN_FUNCTIONAL_ASSESSMENT: 0-10

## 2024-09-18 ASSESSMENT — PAIN SCALES - GENERAL
PAINLEVEL_OUTOF10: 0
PAINLEVEL_OUTOF10: 0

## 2024-09-25 ENCOUNTER — OFFICE VISIT (OUTPATIENT)
Age: 64
End: 2024-09-25

## 2024-09-25 VITALS — BODY MASS INDEX: 28.48 KG/M2 | HEIGHT: 59 IN

## 2024-09-25 DIAGNOSIS — S46.012D TRAUMATIC TEAR OF LEFT ROTATOR CUFF, SUBSEQUENT ENCOUNTER: Primary | ICD-10-CM

## 2024-09-25 PROCEDURE — 99024 POSTOP FOLLOW-UP VISIT: CPT | Performed by: ORTHOPAEDIC SURGERY

## 2024-09-26 ENCOUNTER — TELEPHONE (OUTPATIENT)
Age: 64
End: 2024-09-26

## 2024-09-26 DIAGNOSIS — S46.012D TRAUMATIC TEAR OF LEFT ROTATOR CUFF, SUBSEQUENT ENCOUNTER: Primary | ICD-10-CM

## 2024-09-27 RX ORDER — HYDROCODONE BITARTRATE AND ACETAMINOPHEN 7.5; 325 MG/1; MG/1
1 TABLET ORAL EVERY 6 HOURS PRN
Qty: 28 TABLET | Refills: 0 | Status: SHIPPED | OUTPATIENT
Start: 2024-09-27 | End: 2024-10-04

## 2024-09-30 ENCOUNTER — TELEPHONE (OUTPATIENT)
Age: 64
End: 2024-09-30

## 2024-09-30 NOTE — TELEPHONE ENCOUNTER
Patient called to inquire where we sent the DME order from 9/12 for the scooter.  She hasn't heard from anyone yet and wants to call them directly.  Please advise patient at [phone].

## 2024-10-02 ENCOUNTER — HOSPITAL ENCOUNTER (OUTPATIENT)
Facility: HOSPITAL | Age: 64
Setting detail: RECURRING SERIES
Discharge: HOME | End: 2024-10-05
Payer: MEDICARE

## 2024-10-02 PROCEDURE — 97163 PT EVAL HIGH COMPLEX 45 MIN: CPT

## 2024-10-02 NOTE — PROGRESS NOTES
[facility]  [address] [address] [facility] [address] [facility] [place] [address] Ph:[phone] Fx: [phone]    PLAN OF CARE/ Statement of Necessity for Physical Therapy Services           Patient name: Asuncion Hopkins Start of Care: 10/2/2024   Referral source: Lance Uriarte,* : 1960    Medical Diagnosis: Left shoulder pain [M25.512]       Onset Date: 2024   Treatment Diagnosis: M25.512  LEFT SHOULDER PAIN                                     Prior Hospitalization: see medical history Provider#: [id number]   Medications: Verified on Patient Summary List     Comorbidities: CAD, Heart attack, Right hemiplegia, neuropathy, Pacemaker, spinal cord injury, MI, OA, DM, Arm pain, arthritis,     Prior Level of Function: Pt has limited functional mobility. Uses hakeem walker for short distances and transport chair out of house. Pt needs assistance for all ADLs.     The Plan of Care and following information is based on the information from the initial evaluation.    Assessment / key information:   The patient is a 64-year-old female referred to therapy with left shoulder pain s/p RCR on 24. The patient with history of right hemiplegia who came in with left arm in sling (without abduction pillow) in a transport chair accompanied by son, reported a current pain level of 8/10 which gets worsen to 9/10 during ADLs . The patient reports she was ambulating in house using hakeem walker and uses transport chair ou side the house. Pt needed max/total A for w/c <> treatment table transfer. During the objective assessment, the patient demonstrated significant limitation in AROM with pain and discomfort. Additionally, the patient showed positive tenderness, stiffness, and weakness in left UE, resulting in functional limitations. Skilled therapy is recommended to address the above deficits and help the patient return to her prior level of function.     Evaluation Complexity:  History:

## 2024-10-02 NOTE — PROGRESS NOTES
PT DAILY TREATMENT NOTE/SHOULDER EVAL 10-18      Patient Name: Asuncion Hopkins    Date: 10/2/2024    : 1960  Insurance: Payor: [employer] MEDICARE / Plan: KATHY [facility] [employer]EEPERS MEDIBLUE PLUS / Product Type: *No Product type* /      Patient  verified yes     Visit #   Current / Total 1 24   Time   In / Out 0150 0225   Pain   In / Out 8/10 8/10   Subjective Functional Status/Changes: Left shoulder pain s/p RCR on 24   Changes to:  Meds, Allergies, Med Hx, Sx Hx?  If yes, update Summary List no       TREATMENT AREA =  Left shoulder pain [M25.512]      SUBJECTIVE  Pain Level (0-10 scale): 8/10  [x]constant []intermittent []improving []worsening [x]no change since onset    Any medication changes, allergies to medications, adverse drug reactions, diagnosis change, or new procedure performed?: [x] No    [] Yes (see summary sheet for update)  Subjective functional status/changes:     PLOF: Pt has limited functional mobility. Uses hakeem walker for short distances and transport chair out of house. Pt needs assistance for all ADLs.   Mechanism of Injury: Pt reports she had several falls resulting in left rotator cuff tear. Pt underwent surgery on 24. Pt came with sling on in transport chair. Pt pointed pain in left anterior shoulder descried as sharp in nature.   Current symptoms/Complaints: 5/10 at best with medication; 9/10 at worst with movement during dressing, ADLs; pt reports they are unable to sleep through the night secondary to pain  Previous Treatment/Compliance: medication and resting  PMHx/Surgical Hx: CAD, Heart attack, Right hemiplegia, neuropathy, Pacemaker, spinal cord injury, MI, OA, DM, Arm pain, arthritis,   Work Hx: Does not work  Living Situation: Lives with family in one story house.   Pt Goals: \"be able to use my arm\"  Barriers: []pain []financial []time []transportation []other  Motivation: asking questions, trying to perform skills, interest, and return verbalization   Substance  Orbital.../Triceps.../Fat overlying ribcage.../Buccal...

## 2024-10-07 ENCOUNTER — TELEPHONE (OUTPATIENT)
Facility: HOSPITAL | Age: 64
End: 2024-10-07

## 2024-10-07 RX ORDER — CELECOXIB 200 MG/1
200 CAPSULE ORAL 2 TIMES DAILY
Qty: 60 CAPSULE | Refills: 2 | Status: SHIPPED | OUTPATIENT
Start: 2024-10-07

## 2024-10-08 ENCOUNTER — APPOINTMENT (OUTPATIENT)
Facility: HOSPITAL | Age: 64
End: 2024-10-08
Payer: MEDICARE

## 2024-10-11 ENCOUNTER — HOSPITAL ENCOUNTER (OUTPATIENT)
Facility: HOSPITAL | Age: 64
Setting detail: RECURRING SERIES
Discharge: HOME | End: 2024-10-14
Payer: MEDICARE

## 2024-10-11 PROCEDURE — 97016 VASOPNEUMATIC DEVICE THERAPY: CPT

## 2024-10-11 PROCEDURE — 97112 NEUROMUSCULAR REEDUCATION: CPT

## 2024-10-11 PROCEDURE — 97110 THERAPEUTIC EXERCISES: CPT

## 2024-10-11 NOTE — PROGRESS NOTES
PHYSICAL / OCCUPATIONAL THERAPY - DAILY TREATMENT NOTE     Patient Name: Asuncion Hopkins    Date: 10/11/2024    : 1960  Insurance: Payor: [employer]A MEDICARE / Plan: KATHY [facility] HEALTHKEEPERS MEDIBLUE PLUS / Product Type: *No Product type* /      Patient  verified Yes     Visit #   Current / Total 2 24   Time   In / Out 1:52 2:34   Pain   In / Out -8/10 6/10   Subjective Functional Status/Changes: Reports HEP compliance. No change in symptoms.   Changes to:  Allergies, Med Hx, Sx Hx?   no       TREATMENT AREA =  Left shoulder pain [M25.512]    If an interpreting service is utilized for treatment of this patient, the contents of this document represent the material reviewed with the patient via the .     OBJECTIVE    Modalities Rationale:     decrease inflammation and decrease pain to improve patient's ability to progress to PLOF and address remaining functional goals.     min [] Estim Unattended, type/location:                                      []  w/ice    []  w/heat    min [] Estim Attended, type/location:                                     []  w/US     []  w/ice    []  w/heat    []  TENS insruct      min []  Mechanical Traction: type/lbs                   []  pro   []  sup   []  int   []  cont    []  before manual    []  after manual    min []  Ultrasound, settings/location:      min []  Iontophoresis w/ dexamethasone, location:                                               []  take home patch       []  in clinic        min  unbilled []  Ice     []  Heat    location/position:     min []  Paraffin,  details:    10 min [x]  Vasopneumatic Device, press/temp:  LP/LT - Pt seated    min []  Whirlpool / Fluido:    If using vaso (only need to measure limb vaso being performed on)      pre-treatment girth :       post-treatment girth :       measured at (landmark location) :      min []  Other:    Skin assessment post-treatment (if applicable):    []  intact    []  redness- no adverse reaction

## 2024-10-16 ENCOUNTER — OFFICE VISIT (OUTPATIENT)
Age: 64
End: 2024-10-16
Payer: MEDICARE

## 2024-10-16 VITALS — BODY MASS INDEX: 28.43 KG/M2 | HEIGHT: 59 IN | WEIGHT: 141 LBS

## 2024-10-16 DIAGNOSIS — M70.61 TROCHANTERIC BURSITIS OF RIGHT HIP: Primary | ICD-10-CM

## 2024-10-16 DIAGNOSIS — Z96.652 HISTORY OF LEFT KNEE REPLACEMENT: ICD-10-CM

## 2024-10-16 PROCEDURE — 99214 OFFICE O/P EST MOD 30 MIN: CPT | Performed by: PHYSICIAN ASSISTANT

## 2024-10-16 PROCEDURE — 20611 DRAIN/INJ JOINT/BURSA W/US: CPT | Performed by: PHYSICIAN ASSISTANT

## 2024-10-16 RX ORDER — LIDOCAINE HYDROCHLORIDE 10 MG/ML
3 INJECTION, SOLUTION INFILTRATION; PERINEURAL ONCE
Status: COMPLETED | OUTPATIENT
Start: 2024-10-16 | End: 2024-10-16

## 2024-10-16 RX ORDER — BETAMETHASONE SODIUM PHOSPHATE AND BETAMETHASONE ACETATE 3; 3 MG/ML; MG/ML
6 INJECTION, SUSPENSION INTRA-ARTICULAR; INTRALESIONAL; INTRAMUSCULAR; SOFT TISSUE ONCE
Status: COMPLETED | OUTPATIENT
Start: 2024-10-16 | End: 2024-10-16

## 2024-10-16 RX ADMIN — LIDOCAINE HYDROCHLORIDE 3 ML: 10 INJECTION, SOLUTION INFILTRATION; PERINEURAL at 11:13

## 2024-10-16 RX ADMIN — BETAMETHASONE SODIUM PHOSPHATE AND BETAMETHASONE ACETATE 6 MG: 3; 3 INJECTION, SUSPENSION INTRA-ARTICULAR; INTRALESIONAL; INTRAMUSCULAR; SOFT TISSUE at 11:12
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
## 2024-10-17 ENCOUNTER — TELEPHONE (OUTPATIENT)
Facility: HOSPITAL | Age: 64
End: 2024-10-17

## 2024-10-17 ENCOUNTER — APPOINTMENT (OUTPATIENT)
Facility: HOSPITAL | Age: 64
End: 2024-10-17
Payer: MEDICARE

## 2024-10-18 ENCOUNTER — APPOINTMENT (OUTPATIENT)
Facility: HOSPITAL | Age: 64
End: 2024-10-18
Payer: MEDICARE

## 2024-10-18 ENCOUNTER — TELEPHONE (OUTPATIENT)
Age: 64
End: 2024-10-18

## 2024-10-18 DIAGNOSIS — S46.012D TRAUMATIC TEAR OF LEFT ROTATOR CUFF, SUBSEQUENT ENCOUNTER: Primary | ICD-10-CM

## 2024-10-18 RX ORDER — TRAMADOL HYDROCHLORIDE 50 MG/1
50 TABLET ORAL EVERY 6 HOURS PRN
Qty: 28 TABLET | Refills: 0 | OUTPATIENT
Start: 2024-10-18 | End: 2024-10-25

## 2024-10-18 NOTE — TELEPHONE ENCOUNTER
Pt is request pain meds of Oxycodone 7.5MG to be sent to the pharmacy.    Pharmacy:  [facility]/pharmacy #[id number] - [facility] [address] - [place] [phone] - F [phone]    Callback # [phone]

## 2024-10-28 ENCOUNTER — OFFICE VISIT (OUTPATIENT)
Age: 64
End: 2024-10-28

## 2024-10-28 VITALS — BODY MASS INDEX: 28.43 KG/M2 | HEIGHT: 59 IN | WEIGHT: 141 LBS

## 2024-10-28 DIAGNOSIS — L03.114 CELLULITIS OF LEFT ELBOW: ICD-10-CM

## 2024-10-28 DIAGNOSIS — Z98.890 STATUS POST LEFT ROTATOR CUFF REPAIR: Primary | ICD-10-CM

## 2024-10-28 PROCEDURE — 99024 POSTOP FOLLOW-UP VISIT: CPT | Performed by: NURSE PRACTITIONER

## 2024-10-28 RX ORDER — TRAMADOL HYDROCHLORIDE 50 MG/1
50 TABLET ORAL EVERY 6 HOURS PRN
Qty: 28 TABLET | Refills: 0 | Status: SHIPPED | OUTPATIENT
Start: 2024-10-28 | End: 2024-11-04

## 2024-10-28 NOTE — PROGRESS NOTES
Asuncion Hopkins  1960   Chief Complaint   Patient presents with    Shoulder Pain     left        HISTORY OF PRESENT ILLNESS  Asuncion Hopkins is a 64 y.o. female who presents today for reevaluation of s/p left shoulder arthroscopic rotator cuff repair on 09/18/2024. Pain is a 8/10. Her incisions look good but she has a swelling over the left elbow that has been present for a week. She denies any injury. She has had to pause her physical therapy for a week due to a death in the family but will be restarting this next week. She is in a wheelchair.     Patient denies any fever, chills, chest pain, shortness of breath or calf pain. The remainder of the review of systems is negative. There are no new illness or injuries other than that mentioned above to report since last seen in the office. No changes in medications, allergies, social or family history.      PHYSICAL EXAM:   Ht 1.499 m (4' 11\")   Wt 64 kg (141 lb)   BMI 28.48 kg/m²   The patient is a well-developed, well-nourished female   in no acute distress.  The patient is alert and oriented times three.  The patient is alert and oriented times three. Mood and affect are normal.  LYMPHATIC: lymph nodes are not enlarged and are within normal limits  SKIN: normal in color and non tender to palpation. There are no bruises or abrasions noted.   NEUROLOGICAL: Motor sensory exam is within normal limits. Reflexes are equal bilaterally. There is normal sensation to pinprick and light touch  MUSCULOSKELETAL: Wounds are clean and dry with only 30 degrees of abduction with mild discomfort. Left elbow swelling noted, ttp, with calor noted    PROCEDURE: none    IMAGING:     CT of the left shoulder w cont dated 7/15/24 obtained by [facility] reviewed by Dr. Rico revealed:  1.  No definite full-thickness tear.  Deep partial thickness/ near  full-thickness tear of the supra- and infraspinatus tendons with extension of  the tearing plane into the tendon substance.

## 2024-10-31 ENCOUNTER — OFFICE VISIT (OUTPATIENT)
Age: 64
End: 2024-10-31

## 2024-10-31 ENCOUNTER — HOSPITAL ENCOUNTER (OUTPATIENT)
Facility: HOSPITAL | Age: 64
Setting detail: RECURRING SERIES
Discharge: HOME | End: 2024-11-03
Payer: MEDICARE

## 2024-10-31 DIAGNOSIS — Z98.890 STATUS POST LEFT ROTATOR CUFF REPAIR: ICD-10-CM

## 2024-10-31 DIAGNOSIS — L03.114 CELLULITIS OF LEFT ELBOW: Primary | ICD-10-CM

## 2024-10-31 PROCEDURE — 99024 POSTOP FOLLOW-UP VISIT: CPT | Performed by: NURSE PRACTITIONER

## 2024-10-31 NOTE — PROGRESS NOTES
PHYSICAL / OCCUPATIONAL THERAPY - DAILY TREATMENT NOTE     Patient Name: Asuncion Hopkins    Date: 10/31/2024    : 1960  Insurance: Payor: [employer]ANDRA MEDICARE / Plan: KATHY [facility] HEALTHKEEPERS MEDIBLUE PLUS / Product Type: *No Product type* /      Patient  verified Yes     Time   In / Out 12:43 1:00   Pain   In / Out 7/10 7/10   Subjective Functional Status/Changes: The patient reports that she started medication due to having left elbow pain and swelling late last week, she states that she feels her pain is the same or worsened since that time.    Changes to:  Allergies, Med Hx, Sx Hx?   yes : Penicillin on Monday     TREATMENT AREA =  Left shoulder pain [M25.512]    If an interpreting service is utilized for treatment of this patient, the contents of this document represent the material reviewed with the patient via the .     OBJECTIVE    Objective Information/Functional Measures/Assessment    The patient has noticeable warmth of her left elbow and apparent ecchymosis of her lateral forearm.     Circumferential measurements:    Left wrist: 16 cm  Right wrist 14.5 cm    Left elbow 26 cm  Right elbow 24 cm     Radial pulse intact left elbow     The patient has notable edema of her left arm from her elbow down that is significantly more in her left more so than her right. Her pain has increased, and she does have notable warmth of her left elbow. She has a follow-up with Ortho office directly after her PT today. Due to her signs and symptoms upon presentation today, PT opted to hold treatment, and defer to MD regarding next steps in her care at this time. The patient is in agreement with this plan.     PLAN    Continue plan of care  []  Upgrade activities as tolerated  []  Discharge due to :  [x]  Other: Await MD impression regarding the patients presentation    Gasper Munoz, PT    10/31/2024    12:48 PM    Future Appointments   Date Time Provider Department Center   10/31/2024  2:30 PM

## 2024-10-31 NOTE — PROGRESS NOTES
and infraspinatus tendons with extension of  the tearing plane into the tendon substance.  Additional partial tear with  intrasubstance tear in the subscapularis.     2.  Moderate degenerative changes with cartilage defects, marginal osteophytes  and subcortical cysts.       XR of the left shoulder with 3 views obtained in office dated 6/11/2024 reviewed and read by Dr. Rico: arthritic changes noted, no acute fractures or dislocation.     IMPRESSION:    No diagnosis found.       PLAN:   1. Pt presents today with s/p left shoulder arthroscopic rotator cuff repair on 09/18/2024. She will resume physical therapy for passive ROM or the shoulder. We will start a course of augmentin for the elbow and see her back in 3 days to re-assess.   Risk factors include: Unchanged  2. No cortisone injection indicated today   3. No Physical/Occupational Therapy indicated today  4. No diagnostic test indicated today:   5. No durable medical equipment indicated today  6. No referral indicated today   7. Yes medications indicated today: Augmentin will add 3 days for a full 10 course of antibiotic  8. Yes Narcotic indicated today for short term acute pain secondary to s/p left shoulder arthroscopic rotator cuff repair on 09/18/2024. Patient given pain medication for short term acute pain relief. Goal is to treat patient according to above plan and to ultimately have patient off all pain medications once appropriate. If chronic pain management is required beyond what is expected for current orthopedic problem, will refer patient to pain management.  was reviewed and will be reviewed with every medication refill request.       RTC 5 days for recheck

## 2024-11-05 ENCOUNTER — OFFICE VISIT (OUTPATIENT)
Age: 64
End: 2024-11-05
Payer: MEDICARE

## 2024-11-05 DIAGNOSIS — Z98.890 STATUS POST LEFT ROTATOR CUFF REPAIR: ICD-10-CM

## 2024-11-05 DIAGNOSIS — M25.512 LEFT SHOULDER PAIN, UNSPECIFIED CHRONICITY: Primary | ICD-10-CM

## 2024-11-05 PROCEDURE — 73030 X-RAY EXAM OF SHOULDER: CPT | Performed by: NURSE PRACTITIONER

## 2024-11-05 PROCEDURE — 99024 POSTOP FOLLOW-UP VISIT: CPT | Performed by: NURSE PRACTITIONER

## 2024-11-05 NOTE — PROGRESS NOTES
Asuncion Hopkins  1960   Chief Complaint   Patient presents with    Post-Op Check     LT RCR        HISTORY OF PRESENT ILLNESS  Asuncion Hopkins is a 64 y.o. female who presents today for reevaluation of s/p left shoulder arthroscopic rotator cuff repair on 09/18/2024. Pain is a 7/10. Her incisions are healed. Swelling over the left elbow has resolved and she is finishing up her Augmentin for this. She fell on Sunday and landed on the left shoulder. They have paused her physical therapy again and she continues in a sling today. She is in a wheelchair.     Patient denies any fever, chills, chest pain, shortness of breath or calf pain. The remainder of the review of systems is negative. There are no new illness or injuries other than that mentioned above to report since last seen in the office. No changes in medications, allergies, social or family history.      PHYSICAL EXAM:   There were no vitals taken for this visit.  The patient is a well-developed, well-nourished female   in no acute distress.  The patient is alert and oriented times three.  The patient is alert and oriented times three. Mood and affect are normal.  LYMPHATIC: lymph nodes are not enlarged and are within normal limits  SKIN: normal in color and non tender to palpation. There are no bruises or abrasions noted.   NEUROLOGICAL: Motor sensory exam is within normal limits. Reflexes are equal bilaterally. There is normal sensation to pinprick and light touch  MUSCULOSKELETAL: Wounds are healed clean and dry with only 45 degrees of abduction with mild discomfort. Left elbow swelling resolved, no erythema or calor noted, nttp  PROCEDURE: none    IMAGING:     CT of the left shoulder w cont dated 7/15/24 obtained by [facility] reviewed by Dr. Rico revealed:  1.  No definite full-thickness tear.  Deep partial thickness/ near  full-thickness tear of the supra- and infraspinatus tendons with extension of  the tearing plane into the tendon substance.

## 2024-11-18 ENCOUNTER — HOSPITAL ENCOUNTER (OUTPATIENT)
Facility: HOSPITAL | Age: 64
Setting detail: RECURRING SERIES
Discharge: HOME | End: 2024-11-21
Payer: MEDICARE

## 2024-11-18 PROCEDURE — 97530 THERAPEUTIC ACTIVITIES: CPT

## 2024-11-18 PROCEDURE — 97112 NEUROMUSCULAR REEDUCATION: CPT

## 2024-11-18 PROCEDURE — 97110 THERAPEUTIC EXERCISES: CPT

## 2024-11-18 NOTE — PROGRESS NOTES
PHYSICAL / OCCUPATIONAL THERAPY - DAILY TREATMENT NOTE     Patient Name: Asuncion Hopkins    Date: 2024    : 1960  Insurance: Payor: BCALEJANDRA MEDICARE / Plan: KATHY [facility] HEALTHKEEPERS MEDIBLUE PLUS / Product Type: *No Product type* /      Patient  verified Yes     Visit #   Current / Total 3 24   Time   In / Out 1:52 2:40   Pain   In / Out 7/10 5/10   Subjective Functional Status/Changes: Pt reports current 7/10 pain. No longer wearing sling.   Changes to:  Allergies, Med Hx, Sx Hx?   no       TREATMENT AREA =  Left shoulder pain [M25.512]    If an interpreting service is utilized for treatment of this patient, the contents of this document represent the material reviewed with the patient via the .     OBJECTIVE    Modalities Rationale:     decrease inflammation and decrease pain to improve patient's ability to progress to PLOF and address remaining functional goals.     min [] Estim Unattended, type/location:                                      []  w/ice    []  w/heat    min [] Estim Attended, type/location:                                     []  w/US     []  w/ice    []  w/heat    []  TENS insruct      min []  Mechanical Traction: type/lbs                   []  pro   []  sup   []  int   []  cont    []  before manual    []  after manual    min []  Ultrasound, settings/location:      min []  Iontophoresis w/ dexamethasone, location:                                               []  take home patch       []  in clinic     10   min  unbilled [x]  Ice     []  Heat    location/position:  Left shoulder - Pt seated    min []  Paraffin,  details:     min []  Vasopneumatic Device, press/temp:     min []  Whirlpool / Fluido:    If using vaso (only need to measure limb vaso being performed on)      pre-treatment girth :       post-treatment girth :       measured at (landmark location) :      min []  Other:    Skin assessment post-treatment (if applicable):    []  intact    []  redness- no adverse 
Status: 72.72% disability              PN 11/18/2024: 52.27%  Pt will have painfree left shoulder AROM WFL to aid in functional mechanics for ADLs.  Eval Status: PROM flexion 45 deg, abd 75 deg, ER 5 deg with discomfort.  PN 11/18/2024: PROM Left shoulder flexion 140, abd 135 degrees.  Pt will have at least 4+/5 left shoulder strength to improve joint stability and mechanics during ADLs.  Eval Status: not tested due to post-op protocol  PN 11/18/2024: Not assessed.  Patient will improve pain in left shoulder to 4/10 at worst to improve functional activity tolerance and restore prior level of function.  Eval Status: 9/10 at worst  PN 11/18/2024: Pain level 9/10 at worst in the last week.    Summary of Care/ Key Functional Changes:   Pain level 9/10 at worst in the last week. PROM Left shoulder flexion 140, abd 135 degrees. QuickDASH 52.27%.     ASSESSMENT/RECOMMENDATIONS:   Pt has had limited attendance, 2 F/U treatments since IE on 10/2/2024. Pt has had complications, significant swelling in Left elbow and reports falling on Left shoulder on 11/3/2024. Pt received augmentin to treat Left elbow swelling. Orthopaedic released pt to continue PT during 11/5/2024 F/U appointment. Reassessed most goals during today's treatment, did not assess Left shoulder strength due to pain levels. Initiated AAROM phase of protocol. Continue PT to increase Left shoulder mobility and strength to improve ease with self care and ADL's.     Patient would benefit from the continuation of skilled rehab interventions for functional progress to achieving above stated clinically significant goals. Continue per plan of care.         Thank you for this referral.   Germeias Saenz, PTA 11/18/2024 2:41 PM  Spencer Traylor, PT, CMTPT-DN

## 2024-11-25 ENCOUNTER — HOSPITAL ENCOUNTER (OUTPATIENT)
Facility: HOSPITAL | Age: 64
Setting detail: RECURRING SERIES
Discharge: HOME | End: 2024-11-28
Payer: MEDICARE

## 2024-11-25 PROCEDURE — 97140 MANUAL THERAPY 1/> REGIONS: CPT

## 2024-11-25 PROCEDURE — 97016 VASOPNEUMATIC DEVICE THERAPY: CPT

## 2024-11-25 PROCEDURE — 97112 NEUROMUSCULAR REEDUCATION: CPT

## 2024-11-25 PROCEDURE — 97110 THERAPEUTIC EXERCISES: CPT

## 2024-11-25 NOTE — PROGRESS NOTES
intermittent mild discomfort during abduction movement. Pt able to complete AAROM using wand with good control and ROM.       Patient will continue to benefit from skilled PT / OT services to modify and progress therapeutic interventions, analyze and address functional mobility deficits, analyze and address ROM deficits, analyze and address strength deficits, analyze and address soft tissue restrictions, analyze and cue for proper movement patterns, analyze and modify for postural abnormalities, and instruct in home and community integration to address functional deficits and attain remaining goals.    Progress toward goals / Updated goals:  []  See Progress Note/Recertification    Short Term Goals: To be accomplished in 4 weeks:  Patient will be independent and compliant with HEP to progress toward goals and restore functional mobility.   Eval Status: issued at eval  PN 10/11/2024: Met, pt reports compliance.  Patient will improve pain in left shoulder to 6/10 at worst  to improve ROM tolerance and restore prior level of function.  Eval Status: 9/10 at worst  PN 11/18/2024: Pain level 9/10 at worst in the last week.  Current 11/25/24: 7/10 at worst.  Pt will improve PROM of left shoulder flexion and abd to at least 90 deg to ease with upper body dressing.  Eval Status: PROM flexion 45 deg, abd 75 deg with discomfort.  PN 11/18/2024: PROM Left shoulder flexion 140, abd 135 degrees.      Long Term Goals: To be accomplished in 12 weeks:  Patient will improve Quick DASH score to 30% disability to improve functional tolerance for ADLs.  Eval Status: 72.72% disability              PN 11/18/2024: 52.27%  Pt will have painfree left shoulder AROM WFL to aid in functional mechanics for ADLs.  Eval Status: PROM flexion 45 deg, abd 75 deg, ER 5 deg with discomfort.  PN 11/18/2024: PROM Left shoulder flexion 140, abd 135 degrees.  Pt will have at least 4+/5 left shoulder strength to improve joint stability and mechanics during

## 2024-12-01 DIAGNOSIS — Z98.890 STATUS POST LEFT ROTATOR CUFF REPAIR: ICD-10-CM

## 2024-12-02 ENCOUNTER — HOSPITAL ENCOUNTER (OUTPATIENT)
Facility: HOSPITAL | Age: 64
Setting detail: RECURRING SERIES
Discharge: HOME | End: 2024-12-05
Payer: MEDICARE

## 2024-12-02 PROCEDURE — 97112 NEUROMUSCULAR REEDUCATION: CPT

## 2024-12-02 PROCEDURE — 97140 MANUAL THERAPY 1/> REGIONS: CPT

## 2024-12-02 PROCEDURE — 97110 THERAPEUTIC EXERCISES: CPT

## 2024-12-02 RX ORDER — TRAMADOL HYDROCHLORIDE 50 MG/1
50 TABLET ORAL EVERY 6 HOURS PRN
Qty: 28 TABLET | Refills: 0 | OUTPATIENT
Start: 2024-12-02 | End: 2024-12-09

## 2024-12-02 NOTE — PROGRESS NOTES
PHYSICAL / OCCUPATIONAL THERAPY - DAILY TREATMENT NOTE     Patient Name: Asuncion Hopkins    Date: 2024    : 1960  Insurance: Payor: [employer] MEDICARE / Plan: [employer] FULL DUAL ADVANTAGE 2 / Product Type: *No Product type* /      Patient  verified Yes     Visit #   Current / Total 5 24   Time   In / Out 1:56 2:23   Pain   In / Out 5/10 4/10   Subjective Functional Status/Changes: Pt reports using left arm more with self care.   Changes to:  Allergies, Med Hx, Sx Hx?   no       TREATMENT AREA =  Left shoulder pain [M25.512]    If an interpreting service is utilized for treatment of this patient, the contents of this document represent the material reviewed with the patient via the .     OBJECTIVE    Therapeutic Procedures:  Tx Min Billable or 1:1 Min (if diff from Tx Min) Procedure, Rationale, Specifics   11  45441 Therapeutic Exercise (timed):  increase ROM, strength, coordination, balance, and proprioception to improve patient's ability to progress to PLOF and address remaining functional goals. (see flow sheet as applicable)    Details if applicable:       8  02600 Neuromuscular Re-Education (timed):  improve balance, coordination, kinesthetic sense, posture, core stability and proprioception to improve patient's ability to develop conscious control of individual muscles and awareness of position of extremities in order to progress to PLOF and address remaining functional goals. (see flow sheet as applicable)    Details if applicable:     8  31716 Manual Therapy (timed):  decrease pain, increase ROM, increase tissue extensibility, and decrease trigger points to improve patient's ability to progress to PLOF and address remaining functional goals.  The manual therapy interventions were performed at a separate and distinct time from the therapeutic activities interventions . Details:        Details if applicable:  STM Left UT, lev scap. PROM Left shoulder flex, abd, ER. Pt supine.   27  [facility]

## 2024-12-05 ENCOUNTER — APPOINTMENT (OUTPATIENT)
Facility: HOSPITAL | Age: 64
End: 2024-12-05
Payer: MEDICAID

## 2024-12-06 ENCOUNTER — HOSPITAL ENCOUNTER (OUTPATIENT)
Facility: HOSPITAL | Age: 64
Setting detail: RECURRING SERIES
Discharge: HOME | End: 2024-12-09
Payer: MEDICARE

## 2024-12-06 PROCEDURE — 97110 THERAPEUTIC EXERCISES: CPT

## 2024-12-06 PROCEDURE — 97140 MANUAL THERAPY 1/> REGIONS: CPT

## 2024-12-06 NOTE — PROGRESS NOTES
PHYSICAL / OCCUPATIONAL THERAPY - DAILY TREATMENT NOTE     Patient Name: Asuncion Hopkins    Date: 2024    : 1960  Insurance: Payor: [facility] MEDICARE / Plan: KATHY FULL DUAL ADVANTAGE 2 / Product Type: *No Product type* /      Patient  verified Yes     Visit #   Current / Total 6 24   Time   In / Out 310pm 347pm   Pain   In / Out 7 5   Subjective Functional Status/Changes: Pt state her shoulder hurts. States the exercises bother her some at home and she has to have help    Changes to:  Allergies, Med Hx, Sx Hx?   no       TREATMENT AREA =  Left shoulder pain [M25.512]    If an interpreting service is utilized for treatment of this patient, the contents of this document represent the material reviewed with the patient via the .     OBJECTIVE    Modalities Rationale:   pt declined    Therapeutic Procedures:  Tx Min Billable or 1:1 Min (if diff from Tx Min) Procedure, Rationale, Specifics   29 15 [id number] Therapeutic Exercise (timed):  increase ROM, strength, coordination, balance, and proprioception to improve patient's ability to progress to PLOF and address remaining functional goals. (see flow sheet as applicable)    Details if applicable:       8  34911 Manual Therapy (timed):  decrease pain, increase ROM, and increase tissue extensibility to improve patient's ability to progress to PLOF and address remaining functional goals.  The manual therapy interventions were performed at a separate and distinct time from the therapeutic activities interventions . Details: PROM left shoulder all planes per protocol as tolerated    Details if applicable:            Details if applicable:           Details if applicable:            Details if applicable:     37 23 [facility] Totals Reminder: bill using total billable min of TIMED therapeutic procedures (example: do not include dry needle or estim unattended, both untimed codes, in totals to left)  8-22 min = 1 unit; 23-37 min = 2 units; 38-52 min = 3 units;

## 2024-12-18 ENCOUNTER — OFFICE VISIT (OUTPATIENT)
Age: 64
End: 2024-12-18

## 2024-12-18 VITALS — TEMPERATURE: 97.5 F | HEIGHT: 59 IN | BODY MASS INDEX: 28.43 KG/M2 | WEIGHT: 141 LBS

## 2024-12-18 DIAGNOSIS — M17.11 UNILATERAL PRIMARY OSTEOARTHRITIS, RIGHT KNEE: ICD-10-CM

## 2024-12-18 DIAGNOSIS — Z96.652 HISTORY OF LEFT KNEE REPLACEMENT: Primary | ICD-10-CM

## 2024-12-18 DIAGNOSIS — M70.61 TROCHANTERIC BURSITIS OF RIGHT HIP: ICD-10-CM

## 2024-12-18 RX ORDER — DICLOFENAC EPOLAMINE 0.01 G/1
1 SYSTEM TOPICAL 2 TIMES DAILY
Qty: 30 PATCH | Refills: 1 | Status: SHIPPED | OUTPATIENT
Start: 2024-12-18

## 2024-12-18 RX ORDER — LIDOCAINE HYDROCHLORIDE 10 MG/ML
3 INJECTION, SOLUTION INFILTRATION; PERINEURAL ONCE
Status: COMPLETED | OUTPATIENT
Start: 2024-12-18 | End: 2024-12-18

## 2024-12-18 RX ORDER — BETAMETHASONE SODIUM PHOSPHATE AND BETAMETHASONE ACETATE 3; 3 MG/ML; MG/ML
6 INJECTION, SUSPENSION INTRA-ARTICULAR; INTRALESIONAL; INTRAMUSCULAR; SOFT TISSUE ONCE
Status: COMPLETED | OUTPATIENT
Start: 2024-12-18 | End: 2024-12-18

## 2024-12-18 RX ADMIN — BETAMETHASONE SODIUM PHOSPHATE AND BETAMETHASONE ACETATE 6 MG: 3; 3 INJECTION, SUSPENSION INTRA-ARTICULAR; INTRALESIONAL; INTRAMUSCULAR; SOFT TISSUE at 14:04

## 2024-12-18 RX ADMIN — LIDOCAINE HYDROCHLORIDE 3 ML: 10 INJECTION, SOLUTION INFILTRATION; PERINEURAL at 14:05

## 2024-12-18 NOTE — PROGRESS NOTES
has been worked up for infection in the past and fortunately all is been negative.  She has had no recent injuries or falls.  No fevers or chills.  She reports lateral-based discomfort of her hips.  Denies any groin pain or thigh pain.  She is having some achiness in her left knee.  Denies any increased swelling.  No warmth.  No fevers or chills.  No injuries or falls.  She is also having trouble with the right knee.  She has decreased walking tolerance.  Does have trouble getting up from a chair.  She has trouble with stairs.    Patient denies recent fevers, chills, chest pain, SOB, or injuries.   No recent systemic changes noted.  A 12-point review of systems is performed today.  Pertinent positives are noted.  All other systems reviewed and otherwise are negative.    Physical exam: General: Alert and oriented x3, nad.  well-developed, well nourished.  normal affect, AF.  NC/AT, EOMI, neck supple, trachea midline, no JVD present. Breathing is non-labored.  Examination of lower extremities was pain-free range of motion of the hips.  She does have pain to palpation of the trochanter bursa bilaterally.  Negative straight leg raise.  Negative calf tenderness.  Negative Homans.  No signs of DVT present.  Left knee reveals skin intact.  There is no erythema or ecchymosis noted.  There are no signs for infection or cellulitis present.  She has missed about 15 degrees on extension.  She has good flexion.  Stability is good.  She has negative effusion.  There are no signs for infection.  The right knee reveals skin intact.  There is no erythema or ecchymosis noted.  There are no signs of infection or cellulitis present.  There is pain to palpation medial joint line as well as patellofemoral grinding crepitus anteriorly with range of motion activities noted.    Radiographs obtained in office today 12/18/2024 at the [facility] location including AP pelvis shows the hips to be well-fixed without evidence for loosening or

## 2024-12-19 ENCOUNTER — HOSPITAL ENCOUNTER (OUTPATIENT)
Facility: HOSPITAL | Age: 64
Setting detail: RECURRING SERIES
Discharge: HOME | End: 2024-12-22
Payer: MEDICAID

## 2024-12-19 PROCEDURE — 97110 THERAPEUTIC EXERCISES: CPT

## 2024-12-19 PROCEDURE — 97530 THERAPEUTIC ACTIVITIES: CPT

## 2024-12-19 PROCEDURE — 97016 VASOPNEUMATIC DEVICE THERAPY: CPT

## 2024-12-19 NOTE — PROGRESS NOTES
PHYSICAL / OCCUPATIONAL THERAPY - DAILY TREATMENT NOTE     Patient Name: Asuncion Hopkins    Date: 2024    : 1960  Insurance: Payor: [employer]IC[employer] / Plan: KATHY FULL DUAL ADVANTAGE 2 / Product Type: *No Product type* /      Patient  verified Yes     Visit #   Current / Total 7 24   Time   In / Out 10:34 11:20   Pain   In / Out Pain with use 0/10   Subjective Functional Status/Changes: Pt reports she is having the same Left UE pain that she was having before surgery.   Changes to:  Allergies, Med Hx, Sx Hx?   no       TREATMENT AREA =  Left shoulder pain [M25.512]    If an interpreting service is utilized for treatment of this patient, the contents of this document represent the material reviewed with the patient via the .     OBJECTIVE    Modalities Rationale:     decrease inflammation and decrease pain to improve patient's ability to progress to PLOF and address remaining functional goals.     min [] Estim Unattended, type/location:                                      []  w/ice    []  w/heat    min [] Estim Attended, type/location:                                     []  w/US     []  w/ice    []  w/heat    []  TENS insruct      min []  Mechanical Traction: type/lbs                   []  pro   []  sup   []  int   []  cont    []  before manual    []  after manual    min []  Ultrasound, settings/location:      min []  Iontophoresis w/ dexamethasone, location:                                               []  take home patch       []  in clinic        min  unbilled []  Ice     []  Heat    location/position:     min []  Paraffin,  details:    10 min [x]  Vasopneumatic Device, press/temp:  LP/LT - Pt seated    min []  Whirlpool / Fluido:    If using vaso (only need to measure limb vaso being performed on)      pre-treatment girth :       post-treatment girth :       measured at (landmark location) :      min []  Other:    Skin assessment post-treatment (if applicable):    []  intact    []

## 2024-12-20 NOTE — PROGRESS NOTES
[facility] - [facility]  [address]  [place] [address]  [phone] fax    Continued Plan of Care/ Re-certification for Physical Therapy Services      Patient name: Asuncion Hopkins Start of Care: 10/02/2024   Referral source: Lance Uriarte,* : 1960   Medical/Treatment Diagnosis: Left shoulder pain [M25.512] Onset Date:2024     Prior Hospitalization: see medical history Provider#: [id number]     Comorbidities: CAD, Heart attack, Right hemiplegia, neuropathy, Pacemaker, spinal cord injury, MI, OA, DM, Arm pain, arthritis,     Prior Level of Function:Pt has limited functional mobility. Uses hakeem walker for short distances and transport chair out of house. Pt needs assistance for all ADLs.     Visits from Start of Care: 7    Missed Visits: 2    Reporting Period: 10/02/2024 to 2024    The Plan of Care and following information is based on the patient's current status:    Key functional changes: pt showed good improvement in ROM, flexibility and functional activity independence during transfers and bed mobility.        Problems/ barriers to goal attainment: none     Problem List: pain affecting function, decrease ROM, decrease strength, edema affection function, decrease ADL/functional abilities, decrease activity tolerance, decrease flexibility/joint mobility, and decrease transfer abilities     Treatment Plan: Therapeutic exercise, Neuromuscular reeducation, Manual therapy, Therapeutic activity, Self care/home management, Electric stim unattended , Vasopneumatic device, and Ultrasound     Goals for this certification period to be accomplished in 12 weeks  Short Term Goals: To be accomplished in 4 weeks:  Patient will be independent and compliant with HEP to progress toward goals and restore functional mobility.   Eval Status: issued at eval  PN 10/11/2024: Met, pt reports compliance.  Patient will improve pain in left shoulder to 6/10 at

## 2024-12-30 ENCOUNTER — TELEPHONE (OUTPATIENT)
Age: 64
End: 2024-12-30

## 2024-12-30 NOTE — TELEPHONE ENCOUNTER
Pt requesting a refill.    RX:  celecoxib (CELEBREX) 200 MG capsule    Pharmacy:  [facility]/pharmacy #[id number] - [place] [id number] - [address] [phone] - F [phone]    callback # [phone]

## 2024-12-31 RX ORDER — CELECOXIB 200 MG/1
200 CAPSULE ORAL 2 TIMES DAILY
Qty: 60 CAPSULE | Refills: 2 | Status: SHIPPED | OUTPATIENT
Start: 2024-12-31

## 2024-12-31 NOTE — TELEPHONE ENCOUNTER
12/31/24 Tried to call patient to let her know that her medication is at her pharmacy on file. There was no answer, will try again later.

## 2025-01-02 ENCOUNTER — TELEPHONE (OUTPATIENT)
Facility: HOSPITAL | Age: 65
End: 2025-01-02

## 2025-01-06 ENCOUNTER — OFFICE VISIT (OUTPATIENT)
Age: 65
End: 2025-01-06

## 2025-01-06 DIAGNOSIS — Z91.81 HISTORY OF FALL: ICD-10-CM

## 2025-01-06 DIAGNOSIS — M25.512 ACUTE PAIN OF LEFT SHOULDER: Primary | ICD-10-CM

## 2025-01-06 DIAGNOSIS — Z98.890 STATUS POST LEFT ROTATOR CUFF REPAIR: ICD-10-CM

## 2025-01-06 NOTE — PROGRESS NOTES
required beyond what is expected for current orthopedic problem, will refer patient to pain management.  was reviewed and will be reviewed with every medication refill request.       RTC prn

## 2025-01-15 ENCOUNTER — TELEPHONE (OUTPATIENT)
Facility: HOSPITAL | Age: 65
End: 2025-01-15

## 2025-01-31 NOTE — TELEPHONE ENCOUNTER
spoke with pt about continuing therapy or discharging and she stated that she re-tore her rotator cuff and believes that the doctor wants that to heal before continuing therapy

## 2025-03-03 ENCOUNTER — OFFICE VISIT (OUTPATIENT)
Age: 65
End: 2025-03-03
Payer: MEDICARE

## 2025-03-03 DIAGNOSIS — M70.61 TROCHANTERIC BURSITIS OF RIGHT HIP: Primary | ICD-10-CM

## 2025-03-03 PROCEDURE — 20611 DRAIN/INJ JOINT/BURSA W/US: CPT | Performed by: PHYSICIAN ASSISTANT

## 2025-03-03 PROCEDURE — 99214 OFFICE O/P EST MOD 30 MIN: CPT | Performed by: PHYSICIAN ASSISTANT

## 2025-03-03 RX ORDER — BETAMETHASONE SODIUM PHOSPHATE AND BETAMETHASONE ACETATE 3; 3 MG/ML; MG/ML
6 INJECTION, SUSPENSION INTRA-ARTICULAR; INTRALESIONAL; INTRAMUSCULAR; SOFT TISSUE ONCE
Status: COMPLETED | OUTPATIENT
Start: 2025-03-03 | End: 2025-03-03

## 2025-03-03 RX ADMIN — BETAMETHASONE SODIUM PHOSPHATE AND BETAMETHASONE ACETATE 6 MG: 3; 3 INJECTION, SUSPENSION INTRA-ARTICULAR; INTRALESIONAL; INTRAMUSCULAR; SOFT TISSUE at 10:56

## 2025-03-03 NOTE — PROGRESS NOTES
Not on file    Highest education level: Not on file   Occupational History    Not on file   Tobacco Use    Smoking status: Former     Current packs/day: 0.00     Types: Cigarettes     Quit date: 1998     Years since quittin.8    Smokeless tobacco: Never   Vaping Use    Vaping status: Never Used   Substance and Sexual Activity    Alcohol use: No    Drug use: Never    Sexual activity: Not on file     Comment: Hysterectomy   Other Topics Concern    Not on file   Social History Narrative    Not on file     Social Determinants of Health     Financial Resource Strain: Medium Risk (2023)    [facility] from [email or web address]    Overall Financial Resource Strain (CARDIA)     Difficulty of Paying Living Expenses: Somewhat hard   Food Insecurity: Food Insecurity Present (2023)    [email or web address]    Hunger Vital Sign     Worried About Running Out of Food in the Last Year: Never true     Ran Out of Food in the Last Year: Sometimes true   Transportation Needs: No Transportation Needs (2023)    [email or web address]    PRAPARE - Transportation     Lack of Transportation (Medical): No     Lack of Transportation (Non-Medical): No   Physical Activity: Inactive (2023)    [email or web address]    Exercise Vital Sign     Days of Exercise per Week: 0 days     Minutes of Exercise per Session: 20 min   Stress: No Stress Concern Present (2023)    [facility]    [employer] - Occupational Stress Questionnaire     Feeling of Stress : Not at all   Social Connections: Moderately Integrated (2023)    [facility] from [email or web address]    Social Connection and Isolation Panel [NHANES]     Frequency of Communication with Friends and Family: More than three times a week     Frequency of Social Gatherings with Friends and Family: Three times a week

## 2025-03-24 ENCOUNTER — TELEPHONE (OUTPATIENT)
Age: 65
End: 2025-03-24

## 2025-03-24 RX ORDER — CELECOXIB 200 MG/1
200 CAPSULE ORAL 2 TIMES DAILY
Qty: 60 CAPSULE | Refills: 2 | Status: SHIPPED | OUTPATIENT
Start: 2025-03-24

## 2025-04-03 ENCOUNTER — OFFICE VISIT (OUTPATIENT)
Age: 65
End: 2025-04-03

## 2025-04-03 VITALS — BODY MASS INDEX: 28.48 KG/M2 | HEIGHT: 59 IN

## 2025-04-03 DIAGNOSIS — M17.11 UNILATERAL PRIMARY OSTEOARTHRITIS, RIGHT KNEE: Primary | ICD-10-CM

## 2025-04-03 DIAGNOSIS — M70.62 TROCHANTERIC BURSITIS OF LEFT HIP: ICD-10-CM

## 2025-04-03 RX ORDER — BETAMETHASONE SODIUM PHOSPHATE AND BETAMETHASONE ACETATE 3; 3 MG/ML; MG/ML
6 INJECTION, SUSPENSION INTRA-ARTICULAR; INTRALESIONAL; INTRAMUSCULAR; SOFT TISSUE ONCE
Status: COMPLETED | OUTPATIENT
Start: 2025-04-03 | End: 2025-04-03

## 2025-04-03 RX ORDER — LIDOCAINE HYDROCHLORIDE 10 MG/ML
3 INJECTION, SOLUTION INFILTRATION; PERINEURAL ONCE
Status: CANCELLED | OUTPATIENT
Start: 2025-04-03 | End: 2025-04-03

## 2025-04-03 RX ADMIN — BETAMETHASONE SODIUM PHOSPHATE AND BETAMETHASONE ACETATE 6 MG: 3; 3 INJECTION, SUSPENSION INTRA-ARTICULAR; INTRALESIONAL; INTRAMUSCULAR; SOFT TISSUE at 15:34
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
## 2025-04-14 ENCOUNTER — OFFICE VISIT (OUTPATIENT)
Age: 65
End: 2025-04-14
Payer: MEDICARE

## 2025-04-14 VITALS — WEIGHT: 138 LBS | HEIGHT: 59 IN | BODY MASS INDEX: 27.82 KG/M2 | TEMPERATURE: 98 F

## 2025-04-14 DIAGNOSIS — M54.16 LUMBAR NEURITIS: Primary | ICD-10-CM

## 2025-04-14 DIAGNOSIS — M51.360 DEGENERATION OF INTERVERTEBRAL DISC OF LUMBAR REGION WITH DISCOGENIC BACK PAIN: ICD-10-CM

## 2025-04-14 PROCEDURE — 99214 OFFICE O/P EST MOD 30 MIN: CPT | Performed by: PHYSICAL MEDICINE & REHABILITATION

## 2025-04-14 NOTE — PROGRESS NOTES
was recently referred to pain management. I recommended she move forward with the pain management referral. Patient is not interested in injections at this time. I started her on a Medrol Dosepak, NSAIDs deferred secondary to Plavix. I refilled her Lyrica 300 mg BID. Previously seen for low back pain radiating down the RLE in a S1 distribution to the calf and left sided neck pain without radicular symptoms. She rates her pain 8-10/10, previously 10/10. Patient says that overall her pain is the same when compared to the last office visit. Per pt report, she does not know why her PCP decreased her Lyrica 300 mg BID to 150 mg BID. At her last clinical appointment,  At this time, pt symptoms remain unchanged. I have little left to offer this patient. Despite numerous treatment options, she has tried the following treatment options with no benefit: physical therapy, multiple neuropathic medications, and injections. Despite being previously referred to Pain Management by orthopedics, pt has not being seen by their practice at this time. I continue to agree with this recommendation. I will put my own referral for Pain Management. I suggest pt continue to take Lyrica 100 mg BID as prescribed by her PCP.The patient is Neurologically intact. I will see the patient back as needed.       She was last seen by me on 5/17/2024 and she was told to come back as needed. The patient returns today with  localized lower back pain.  She rates her pain 9/10, previously 8-10/10. Patient says that overall her pain is worse when compared to the last office visit. Pt reports she was not seen at a chronic pain management center; reports she was never contacted. Pt reports benefit provided by her previous Pt continues on Plavix; managed by Dr. Gonzalez Tyler. A1c of 6.4 on 1/8/2025. GFR of >60 on 1/8/2025.      reviewed. Body mass index is 27.87 kg/m².    PCP: Jaime Mart PA      Past Medical History:   Diagnosis Date    Arm pain jan15

## 2025-06-04 ENCOUNTER — HOSPITAL ENCOUNTER (OUTPATIENT)
Facility: HOSPITAL | Age: 65
Discharge: HOME | End: 2025-06-07
Attending: PHYSICAL MEDICINE & REHABILITATION
Payer: MEDICARE

## 2025-06-04 DIAGNOSIS — M51.360 DEGENERATION OF INTERVERTEBRAL DISC OF LUMBAR REGION WITH DISCOGENIC BACK PAIN: ICD-10-CM

## 2025-06-04 DIAGNOSIS — M54.16 LUMBAR NEURITIS: ICD-10-CM

## 2025-06-04 PROCEDURE — 72131 CT LUMBAR SPINE W/O DYE: CPT

## 2025-06-09 ENCOUNTER — OFFICE VISIT (OUTPATIENT)
Age: 65
End: 2025-06-09
Payer: MEDICARE

## 2025-06-09 VITALS — HEIGHT: 59 IN | TEMPERATURE: 97 F | WEIGHT: 150 LBS | BODY MASS INDEX: 30.24 KG/M2

## 2025-06-09 DIAGNOSIS — M51.360 DEGENERATION OF INTERVERTEBRAL DISC OF LUMBAR REGION WITH DISCOGENIC BACK PAIN: ICD-10-CM

## 2025-06-09 DIAGNOSIS — F41.9 ANXIETY: ICD-10-CM

## 2025-06-09 DIAGNOSIS — M47.816 LUMBAR FACET ARTHROPATHY: ICD-10-CM

## 2025-06-09 DIAGNOSIS — M54.16 LUMBAR NEURITIS: Primary | ICD-10-CM

## 2025-06-09 PROCEDURE — 99214 OFFICE O/P EST MOD 30 MIN: CPT | Performed by: PHYSICAL MEDICINE & REHABILITATION

## 2025-06-09 RX ORDER — DIAZEPAM 10 MG/1
10 TABLET ORAL ONCE
Qty: 1 TABLET | Refills: 0 | Status: SHIPPED | OUTPATIENT
Start: 2025-06-09 | End: 2025-06-09

## 2025-06-09 NOTE — PROGRESS NOTES
[facility]  [address]  [place] [id number]  Tel: [phone]  Fax: [phone]          PROGRESS NOTE      HISTORY OF PRESENT ILLNESS:  The patient is a 64 y.o. female and was seen today for follow up of lower back pain (R>>L). Previously seen for low back pain radiating down the RLE in a S1 distribution to the calf and left sided neck pain without radicular symptoms.  Patient denies LLE symptoms. Previously seen for low back pain radiating to the LLE in a L4 distribution to the knee. Patient denies RLE symptoms. Previously seen for centralized low back pain. Previously seen for low back pain that radiates into the BLE, reports onset of LLE symptoms without trauma. Previously seen for low back pain into the RLE in a S1 distribution to the ankle. Previously, she was seen for low  back pain>RLE pain. Additionally, she endorses neck spasms. Previously, she was seen for low back pain into the RLE in a S1 (previously L4) distribution to the ankle. Previously, she was seen for c/o neck and left shoulder pain as well as extending  into the RUE to the elbow. Her pain is exacerbated with lifting her arm or reaching behind her. She reports her low back pain is tolerable at this point. Previously, her main  complaint was that of low back pain. She continues to have neck pain extending into the left shoulder. She was initially seen with left-sided neck pain extending into the left shoulder. She denies  symptoms extending to the hand at this time. Pain is exacerbated with reaching behind and overhead activity. Pt reports multiple falls due to LOB ongoing x 1+ year and states it is  progressive in nature. She has also been dropping objects. Pt endorses loss of dexterity and states she has been dropping things with her left hand. She admits to staggering with walking. She continues to have LOB with coordination issues and falls. Pt  reports remote h/o spinal cord injury (24

## 2025-06-10 DIAGNOSIS — M47.816 LUMBAR FACET ARTHROPATHY: ICD-10-CM

## 2025-06-10 DIAGNOSIS — M54.16 LUMBAR NEURITIS: Primary | ICD-10-CM

## 2025-06-10 DIAGNOSIS — M51.360 DEGENERATION OF INTERVERTEBRAL DISC OF LUMBAR REGION WITH DISCOGENIC BACK PAIN: ICD-10-CM

## 2025-06-17 RX ORDER — CELECOXIB 200 MG/1
200 CAPSULE ORAL 2 TIMES DAILY
Qty: 60 CAPSULE | Refills: 2 | Status: SHIPPED | OUTPATIENT
Start: 2025-06-17

## (undated) DEVICE — CANNULA THREADED FLEX 8.0 X 72MM: Brand: CLEAR-TRAC

## (undated) DEVICE — CATHETER ANGIO JR4 STD 0.038 IN 6 FRX100 CM SUPER TORQUE +

## (undated) DEVICE — HANDPIECE SET WITH HIGH FLOW TIP AND SUCTION TUBE: Brand: INTERPULSE

## (undated) DEVICE — NEEDLE SUT PASS FOR ROT CUF LABRAL REP MULTFI SCORPION

## (undated) DEVICE — COPILOT BLEEDBACK CONTROL VALVE: Brand: COPILOT

## (undated) DEVICE — STERILE POLYISOPRENE POWDER-FREE SURGICAL GLOVES: Brand: PROTEXIS

## (undated) DEVICE — APPLICATOR MEDICATED 26 CC SOLUTION HI LT ORNG CHLORAPREP

## (undated) DEVICE — (D)BNDG ADHESIVE FABRIC 3/4X3 -- DISC BY MFR USE ITEM 357960

## (undated) DEVICE — SUTURE MCRYL SZ 4-0 L18IN ABSRB UD L19MM PS-2 3/8 CIR PRIM Y496G

## (undated) DEVICE — CATH BLLN DIL 2.25X12MM RX -- EUPHORA

## (undated) DEVICE — APPLICATOR BNDG 1MM ADH PREMIERPRO EXOFIN

## (undated) DEVICE — GAUZE,SPONGE,4"X4",16PLY,STRL,LF,10/TRAY: Brand: MEDLINE

## (undated) DEVICE — BLANKET WRM AD W50XL85.8IN PACU FULL BODY FORC AIR

## (undated) DEVICE — CANNULA ORIG TL CLR W FOAM CUSHIONS AND 14FT SUPL TB 3 CHN

## (undated) DEVICE — SYRINGE MED 25GA 3ML L5/8IN SUBQ PLAS W/ DETACH NDL SFTY

## (undated) DEVICE — FORCEPS BX L240CM JAW DIA2.8MM L CAP W/ NDL MIC MESH TOOTH

## (undated) DEVICE — GARMENT,MEDLINE,DVT,INT,CALF,MED, GEN2: Brand: MEDLINE

## (undated) DEVICE — SHOULDER SUSPENSION KIT 6 PER BOX

## (undated) DEVICE — SOLUTION IRRIG 3000ML 0.9% SOD CHL FLX CONT 0797208] ICU MEDICAL INC]

## (undated) DEVICE — PAD,NON-ADHERENT,3X8,STERILE,LF,1/PK: Brand: MEDLINE

## (undated) DEVICE — TRAY MYEL SFTY +

## (undated) DEVICE — SET FLD ADMIN 3 W STPCOCK FIX FEM L BOR 1IN

## (undated) DEVICE — MEDIA CONTRAST 10ML 200MG/ML 41%

## (undated) DEVICE — BRACE SHLDR M FA L135 145IN UNIV AIRMESH BRTH SLNG 15DEG

## (undated) DEVICE — [AGGRESSIVE 6-FLUTE BARREL BUR, ARTHROSCOPIC SHAVER BLADE,  DO NOT RESTERILIZE,  DO NOT USE IF PACKAGE IS DAMAGED,  KEEP DRY,  KEEP AWAY FROM SUNLIGHT]: Brand: FORMULA

## (undated) DEVICE — 4-PORT MANIFOLD: Brand: NEPTUNE 2

## (undated) DEVICE — INTENDED FOR TISSUE SEPARATION, AND OTHER PROCEDURES THAT REQUIRE A SHARP SURGICAL BLADE TO PUNCTURE OR CUT.: Brand: BARD-PARKER ®  SAFETY SCALPED

## (undated) DEVICE — SPONGE LAP W18XL18IN WHT COT 4 PLY FLD STRUNG RADPQ DISP ST 2 PER PACK

## (undated) DEVICE — PREP SKN CHLRAPRP APL 26ML STR --

## (undated) DEVICE — NDL SPNE QNCKE 22GX3.5IN LF --

## (undated) DEVICE — SPONGE LAPAROTOMY W18XL18IN WHITE STRUNG RADIOPAQUE STERILE

## (undated) DEVICE — SPONGE LAP 18X18IN STRL -- 5/PK

## (undated) DEVICE — KIT SUT SZ 2 L38IN FIBERWIRE W/ TAPR NDL STR NIT LOOP MIC

## (undated) DEVICE — PAD,ABDOMINAL,8"X10",ST,LF: Brand: MEDLINE

## (undated) DEVICE — CATHETER DIAG AD L100CM DIA6FR STD JUDKINS L 4 POLYUR COR

## (undated) DEVICE — BLANKET WRM W40.2XL55.9IN IORT LO BODY + MISTRAL AIR

## (undated) DEVICE — SUTURE FIBERLINK SZ 2-0 L26IN NONABSORBABLE BLU CLS LOOP AR7235

## (undated) DEVICE — FLEX ADVANTAGE 3000CC: Brand: FLEX ADVANTAGE

## (undated) DEVICE — SUTURE VICRYL SZ 3-0 L27IN ABSRB UD L36MM CT-1 1/2 CIR J258H

## (undated) DEVICE — PACK PROCEDURE SURG ORTH SHLDR CUST

## (undated) DEVICE — SUTURE VCRL SZ 0 L27IN ABSRB UD L36MM CT-1 1/2 CIR J260H

## (undated) DEVICE — PRESSURE MONITORING SET: Brand: TRUWAVE

## (undated) DEVICE — GOWN ISOL IMPERV UNIV, DISP, OPEN BACK, BLUE --

## (undated) DEVICE — 90-S MAX, SUCTION PROBE, NON-BENDABLE, MAX CUT LEVEL 11: Brand: SERFAS ENERGY

## (undated) DEVICE — BLADE SHV DIA4MM RED RESECT FOR GEN DEB REM OF PERIOST FR

## (undated) DEVICE — PACK PROCEDURE SURG VASC CATH 161 MMC LF

## (undated) DEVICE — SUTURE FIBERWIRE SZ 2 W/ TAPERED NEEDLE BLUE L38IN NONABSORB BLU L26.5MM 1/2 CIRCLE AR7200

## (undated) DEVICE — LIQUIBAND RAPID ADHESIVE 36/CS 0.8ML: Brand: MEDLINE

## (undated) DEVICE — SUTURE ABSORBABLE BRAIDED 0 CTXB 36 IN VIO PDS II ZB370

## (undated) DEVICE — 3M™ MEDIPORE™ H SOFT CLOTH SURGICAL TAPE 2862, 2 INCH X 10 YARD (5CM X 9,1M), 12 ROLLS/CASE: Brand: 3M™ MEDIPORE™

## (undated) DEVICE — CATHETER SUCT TR FL TIP 14FR W/ O CTRL

## (undated) DEVICE — Device

## (undated) DEVICE — CATHETER THOR 36FR DIA10.7MM POLYVI CHL TRCR TIP STR SFT

## (undated) DEVICE — SUTURE VCRL SZ 3-0 L27IN ABSRB UD L36MM CT-1 1/2 CIR J258H

## (undated) DEVICE — KIT CLN UP BON SECOURS MARYV

## (undated) DEVICE — DRAIN SURG L0.75IN TRCR

## (undated) DEVICE — [RESECTOR CUTTER, ARTHROSCOPIC SHAVER BLADE,  DO NOT RESTERILIZE,  DO NOT USE IF PACKAGE IS DAMAGED,  KEEP DRY,  KEEP AWAY FROM SUNLIGHT]: Brand: FORMULA

## (undated) DEVICE — REM POLYHESIVE ADULT PATIENT RETURN ELECTRODE: Brand: VALLEYLAB

## (undated) DEVICE — CATHETER GUID AD 6FR L100CM COR PERIPH GRN NYL PTFE XB L 3

## (undated) DEVICE — DRAPE,REIN 53X77,STERILE: Brand: MEDLINE

## (undated) DEVICE — PACK PROCEDURE SURG TOT HIP BSHR LF

## (undated) DEVICE — BIT DRILL 3.0

## (undated) DEVICE — HI-TORQUE BALANCE GUIDE WIRE W/HYDROCOAT .014 STRAIGHT TIP 3.0 CM X 190 CM: Brand: HI-TORQUE BALANCE

## (undated) DEVICE — INTRODUCER SHTH 6FR CANN L11CM DIL TIP 35MM GRN TUNGSTEN

## (undated) DEVICE — SKIN MARKER,REGULAR TIP WITH RULER AND LABELS: Brand: DEVON

## (undated) DEVICE — SNARE POLYP M W27MMXL240CM OVL STIFF DISP CAPTIVATOR

## (undated) DEVICE — MEDI-VAC SUCTION HIGH CAPACITY: Brand: CARDINAL HEALTH

## (undated) DEVICE — NDL SPNE MANAN 22GX6IN --

## (undated) DEVICE — 2108 SERIES SAGITTAL BLADE (24.8 X 1.24 X 80.1MM)

## (undated) DEVICE — INFLOW CASSETTE TUBING, DO NOT USE IF PACKAGE IS DAMAGED: Brand: CROSSFLOW

## (undated) DEVICE — SOLUTION IRRIG 1000ML H2O STRL BLT

## (undated) DEVICE — SUT FIBRWIR 2 38IN BLU --

## (undated) DEVICE — SYR 50ML SLIP TIP NSAF LF STRL --

## (undated) DEVICE — SUTURE VCRL SZ 2-0 L27IN ABSRB VLT L36MM CT-1 1/2 CIR J339H

## (undated) DEVICE — SYR 10ML LUER LOK 1/5ML GRAD --

## (undated) DEVICE — FLUFF AND POLYMER UNDERPAD,EXTRA HEAVY: Brand: WINGS

## (undated) DEVICE — (D)SYR 10ML 1/5ML GRAD NSAF -- PKGING CHANGE USE ITEM 338027

## (undated) DEVICE — SYR 20ML LL STRL LF --

## (undated) DEVICE — PERCLOSE PROGLIDE™ SUTURE-MEDIATED CLOSURE SYSTEM: Brand: PERCLOSE PROGLIDE™

## (undated) DEVICE — DEPRESSOR TNG L6IN NONSTERILE WOOD SMOOTH SPLNT FREE

## (undated) DEVICE — BANDAGE,GAUZE,BULKEE II,4.5"X4.1YD,STRL: Brand: MEDLINE

## (undated) DEVICE — CANNULA THREADED DISP 8.5 X 72MM: Brand: CLEAR-TRAC

## (undated) DEVICE — PROCEDURE KIT FLUID MGMT 10 FR CUST MAINFOLD

## (undated) DEVICE — DEVICE COBB SUCTION

## (undated) DEVICE — NDL MULTIFIRE SCORPION ARTH --

## (undated) DEVICE — AIRLIFE™ NASAL OXYGEN CANNULA CURVED, NONFLARED TIP WITH 14 FOOT (4.3 M) CRUSH-RESISTANT TUBING, OVER-THE-EAR STYLE: Brand: AIRLIFE™

## (undated) DEVICE — SUTURE MONOCRYL SZ 4-0 L18IN ABSRB UD L19MM PS-2 3/8 CIR PRIM Y496G

## (undated) DEVICE — ENDOSCOPY PUMP TUBING/ CAP SET: Brand: ERBE

## (undated) DEVICE — SOLUTION IV 1000ML 0.9% SOD CHL

## (undated) DEVICE — MEDI-VAC NON-CONDUCTIVE SUCTION TUBING: Brand: CARDINAL HEALTH

## (undated) DEVICE — DEVICE INFL W ACCS + HEMSTAS VLV INSRT TOOL AND TORQ BASIX

## (undated) DEVICE — BUR SHV CUT HLLW 6 FLUT 5.5MM --

## (undated) DEVICE — SET PIN MOD GLEN SYS

## (undated) DEVICE — NEEDLE SUT SZ 2 S STL MAYO CATGUT 1/2 CIR TAPR PT